# Patient Record
Sex: MALE | Race: WHITE | Employment: STUDENT | URBAN - NONMETROPOLITAN AREA
[De-identification: names, ages, dates, MRNs, and addresses within clinical notes are randomized per-mention and may not be internally consistent; named-entity substitution may affect disease eponyms.]

---
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
## 2019-04-23 ENCOUNTER — HOSPITAL ENCOUNTER (EMERGENCY)
Age: 19
Discharge: HOME | End: 2019-04-23
Attending: EMERGENCY MEDICINE
Payer: COMMERCIAL

## 2019-04-23 VITALS
TEMPERATURE: 98.5 F | SYSTOLIC BLOOD PRESSURE: 152 MMHG | BODY MASS INDEX: 21.67 KG/M2 | WEIGHT: 160 LBS | HEIGHT: 72 IN | HEART RATE: 61 BPM | RESPIRATION RATE: 16 BRPM | DIASTOLIC BLOOD PRESSURE: 79 MMHG | OXYGEN SATURATION: 99 %

## 2019-04-23 DIAGNOSIS — L03.011 CELLULITIS OF RIGHT MIDDLE FINGER: Primary | ICD-10-CM

## 2019-04-23 PROCEDURE — 99213 OFFICE O/P EST LOW 20 MIN: CPT

## 2019-04-23 PROCEDURE — 99213 OFFICE O/P EST LOW 20 MIN: CPT | Performed by: EMERGENCY MEDICINE

## 2019-04-23 RX ORDER — CEPHALEXIN 500 MG/1
500 CAPSULE ORAL 4 TIMES DAILY
Qty: 40 CAPSULE | Refills: 0 | Status: SHIPPED | OUTPATIENT
Start: 2019-04-23 | End: 2019-05-03

## 2019-04-23 ASSESSMENT — ENCOUNTER SYMPTOMS
SORE THROAT: 0
VOMITING: 0
SINUS PRESSURE: 0
ABDOMINAL PAIN: 0
TROUBLE SWALLOWING: 0
BACK PAIN: 0
WHEEZING: 0
STRIDOR: 0
EYE REDNESS: 0
DIARRHEA: 0
COUGH: 0
NAUSEA: 0
EYE DISCHARGE: 0
EYE PAIN: 0
SHORTNESS OF BREATH: 0
VOICE CHANGE: 0

## 2019-04-23 NOTE — ED PROVIDER NOTES
[address] [facility] Encounter      [address] [facility]       Chief Complaint   Patient presents with    Nail Problem       Nurses Notes reviewed and I agree except as noted in the HPI. HISTORY OF PRESENT ILLNESS   Soni Alvarez is a 22 y.o. male who presents with 5 day history of increasingly severe pain, redness, swelling distal aspect of right middle finger. Denies pain at this time. Started as hang nail, now has infection. He noticed purulent drainage this morning adjacent to The nail plate. No fever, vomiting, motor sensory deficits. No previous history of diabetes or MRSA. Right-hand dominant. Nonsmoker    REVIEW OF SYSTEMS     Review of Systems   Constitutional: Negative for appetite change, chills, fatigue, fever and unexpected weight change. HENT: Negative for congestion, ear discharge, ear pain, sinus pressure, sneezing, sore throat, trouble swallowing and voice change. Eyes: Negative for pain, discharge and redness. Respiratory: Negative for cough, shortness of breath, wheezing and stridor. Cardiovascular: Negative for chest pain and leg swelling. Gastrointestinal: Negative for abdominal pain, diarrhea, nausea and vomiting. Genitourinary: Negative for dysuria, frequency, hematuria and urgency. Musculoskeletal: Negative for arthralgias, back pain, myalgias and neck pain. Skin: Negative for rash. Pain Redness swelling distal right middle finger   Neurological: Negative for dizziness, syncope, weakness and headaches. Hematological: Negative for adenopathy. Psychiatric/Behavioral: Negative for behavioral problems, confusion, sleep disturbance and suicidal ideas. The patient is not nervous/anxious. All other systems reviewed and are negative. PAST MEDICAL HISTORY   History reviewed. No pertinent past medical history. SURGICAL HISTORY     Patient  has a past surgical history that includes Foot surgery.     CURRENT MEDICATIONS       Discharge Medication List as of 4/23/2019  7:23 PM      CONTINUE these medications which have NOT CHANGED    Details   ibuprofen (IBU) 400 MG tablet Take 1 tablet by mouth every 6 hours as needed for Pain or Fever, Disp-20 tablet, R-0Normal             ALLERGIES     Patient is has No Known Allergies. FAMILY HISTORY     Patient'sfamily history is not on file. SOCIAL HISTORY     Patient  reports that he has never smoked. He has never used smokeless tobacco. He reports that he does not drink alcohol or use drugs. PHYSICAL EXAM     ED TRIAGE VITALS  BP: (!) 152/79, Temp: 98.5 °F (36.9 °C), Heart Rate: 61, Resp: 16, SpO2: 99 %  Physical Exam   Constitutional: He is oriented to person, place, and time. He appears well-developed and well-nourished. No distress. Moist membranes, normal airway   HENT:   Head: Normocephalic and atraumatic. Right Ear: External ear normal.   Left Ear: External ear normal.   Nose: Nose normal.   Mouth/Throat: Oropharynx is clear and moist. No trismus in the jaw. No uvula swelling. No oropharyngeal exudate, posterior oropharyngeal edema, posterior oropharyngeal erythema or tonsillar abscesses. Eyes: Pupils are equal, round, and reactive to light. Conjunctivae and EOM are normal. Right eye exhibits no discharge. Left eye exhibits no discharge. No scleral icterus. Neck: Normal range of motion. No JVD present. No thyromegaly present. No meningismus   Cardiovascular: Normal rate, regular rhythm, S1 normal, S2 normal, normal heart sounds, intact distal pulses and normal pulses. Exam reveals no gallop and no friction rub. No murmur heard. Pulmonary/Chest: Effort normal and breath sounds normal. No stridor. No respiratory distress. He has no wheezes. He has no rales. He exhibits no tenderness. No Cough, lungs clear   Abdominal: Soft. Bowel sounds are normal. He exhibits no distension and no mass. There is no tenderness. There is no rebound and no guarding.    Soft nontender Musculoskeletal: Normal range of motion. He exhibits no edema. Right hand: He exhibits tenderness and swelling. He exhibits normal range of motion, no bony tenderness and no deformity. Hands:  Lymphadenopathy:     He has no cervical adenopathy. Right cervical: No superficial cervical and no deep cervical adenopathy present. Left cervical: No superficial cervical and no deep cervical adenopathy present. Neurological: He is alert and oriented to person, place, and time. He has normal reflexes. He displays normal reflexes. No cranial nerve deficit. He exhibits normal muscle tone. Coordination normal.   Appropriate, distal neurovascular intact right upper extremity   Skin: Skin is warm and dry. No rash noted. He is not diaphoretic. There is erythema. No felon or abscess. no septic joint or tenosynovitis. no osteomyelitis. All c/w with cellulitis   Psychiatric: He has a normal mood and affect. His behavior is normal. Judgment and thought content normal.   Nursing note and vitals reviewed. DIAGNOSTIC RESULTS   Labs: No results found for this visit on 04/23/19. IMAGING:  No orders to display     URGENT CARE COURSE:     Vitals:    04/23/19 1846   BP: (!) 152/79   Pulse: 61   Resp: 16   Temp: 98.5 °F (36.9 °C)   TempSrc: Temporal   SpO2: 99%   Weight: 160 lb (72.6 kg)   Height: 6' (1.829 m)       Medications - No data to display  PROCEDURES:  None  FINALIMPRESSION      1. Cellulitis of right middle finger        DISPOSITION/PLAN   DISPOSITION    Nontoxic, well-hydrated, normal airway. No sepsis or CNS infection. Patient has localized cellulitis of the distal aspect right middle finger without evidence of abscess, felon, tenosynovitis, septic joint, osteomyelitis. Will treat with cephalexin, warm soaks followed by Bactroban ointment, Tylenol, Motrin, increased oral clear liquids.   Patient to recheck with PCP in 6 days if problems persist, and he understands to go to ED if worse.    PATIENT REFERRED TO:  Sherrilyn Bosworth Dr.  [address] [facility]  [phone]    Schedule an appointment as soon as possible for a visit in 6 days  Recheck if problems persist, go to emergency if worse    DISCHARGE MEDICATIONS:  Discharge Medication List as of 4/23/2019  7:23 PM      START taking these medications    Details   cephALEXin (KEFLEX) 500 MG capsule Take 1 capsule by mouth 4 times daily for 40 doses, Disp-40 capsule, R-0Print      mupirocin (BACTROBAN) 2 % ointment Apply topically 3 times daily.   Apply after warm soaks, Disp-22 g, R-0, Print           Discharge Medication List as of 4/23/2019  7:23 PM          MD Brian Huynh MD  04/23/19 Kelley Boss

## 2023-10-19 ENCOUNTER — HOSPITAL ENCOUNTER (INPATIENT)
Facility: HOSPITAL | Age: 23
LOS: 2 days | Discharge: OTHER INSTITUTION | DRG: 424 | End: 2023-10-21
Attending: STUDENT IN AN ORGANIZED HEALTH CARE EDUCATION/TRAINING PROGRAM | Admitting: INTERNAL MEDICINE
Payer: COMMERCIAL

## 2023-10-19 ENCOUNTER — APPOINTMENT (OUTPATIENT)
Dept: RADIOLOGY | Facility: HOSPITAL | Age: 23
DRG: 424 | End: 2023-10-19
Payer: COMMERCIAL

## 2023-10-19 DIAGNOSIS — C25.9 METASTASIS FROM PANCREATIC CANCER (MULTI): Primary | ICD-10-CM

## 2023-10-19 DIAGNOSIS — C79.9 METASTASIS FROM PANCREATIC CANCER (MULTI): Primary | ICD-10-CM

## 2023-10-19 LAB
ALBUMIN SERPL BCP-MCNC: 4 G/DL (ref 3.4–5)
ALP SERPL-CCNC: 58 U/L (ref 33–120)
ALT SERPL W P-5'-P-CCNC: 14 U/L (ref 10–52)
ANION GAP SERPL CALC-SCNC: 17 MMOL/L (ref 10–20)
AST SERPL W P-5'-P-CCNC: 38 U/L (ref 9–39)
BASOPHILS # BLD AUTO: 0.03 X10*3/UL (ref 0–0.1)
BASOPHILS NFR BLD AUTO: 0.4 %
BILIRUB SERPL-MCNC: 0.5 MG/DL (ref 0–1.2)
BUN SERPL-MCNC: 12 MG/DL (ref 6–23)
CALCIUM SERPL-MCNC: 8.9 MG/DL (ref 8.6–10.3)
CHLORIDE SERPL-SCNC: 96 MMOL/L (ref 98–107)
CO2 SERPL-SCNC: 29 MMOL/L (ref 21–32)
CREAT SERPL-MCNC: 1.19 MG/DL (ref 0.5–1.3)
CREAT SERPL-MCNC: 1.24 MG/DL (ref 0.5–1.3)
EOSINOPHIL # BLD AUTO: 0.04 X10*3/UL (ref 0–0.7)
EOSINOPHIL NFR BLD AUTO: 0.6 %
ERYTHROCYTE [DISTWIDTH] IN BLOOD BY AUTOMATED COUNT: 12.8 % (ref 11.5–14.5)
GFR SERPL CREATININE-BSD FRML MDRD: 83 ML/MIN/1.73M*2
GFR SERPL CREATININE-BSD FRML MDRD: 87 ML/MIN/1.73M*2
GLUCOSE SERPL-MCNC: 87 MG/DL (ref 74–99)
HCT VFR BLD AUTO: 39.8 % (ref 41–52)
HETEROPH AB SERPLBLD QL IA.RAPID: NEGATIVE
HGB BLD-MCNC: 13.7 G/DL (ref 13.5–17.5)
IMM GRANULOCYTES # BLD AUTO: 0.02 X10*3/UL (ref 0–0.7)
IMM GRANULOCYTES NFR BLD AUTO: 0.3 % (ref 0–0.9)
LYMPHOCYTES # BLD AUTO: 1.15 X10*3/UL (ref 1.2–4.8)
LYMPHOCYTES NFR BLD AUTO: 16.4 %
MCH RBC QN AUTO: 29.5 PG (ref 26–34)
MCHC RBC AUTO-ENTMCNC: 34.4 G/DL (ref 32–36)
MCV RBC AUTO: 86 FL (ref 80–100)
MONOCYTES # BLD AUTO: 0.48 X10*3/UL (ref 0.1–1)
MONOCYTES NFR BLD AUTO: 6.9 %
NEUTROPHILS # BLD AUTO: 5.28 X10*3/UL (ref 1.2–7.7)
NEUTROPHILS NFR BLD AUTO: 75.4 %
NRBC BLD-RTO: 0 /100 WBCS (ref 0–0)
PLATELET # BLD AUTO: 331 X10*3/UL (ref 150–450)
PMV BLD AUTO: 9.6 FL (ref 7.5–11.5)
POTASSIUM SERPL-SCNC: 4.1 MMOL/L (ref 3.5–5.3)
PROT SERPL-MCNC: 6.4 G/DL (ref 6.4–8.2)
RBC # BLD AUTO: 4.64 X10*6/UL (ref 4.5–5.9)
S PYO DNA THROAT QL NAA+PROBE: NOT DETECTED
SODIUM SERPL-SCNC: 138 MMOL/L (ref 136–145)
WBC # BLD AUTO: 7 X10*3/UL (ref 4.4–11.3)

## 2023-10-19 PROCEDURE — 82378 CARCINOEMBRYONIC ANTIGEN: CPT | Mod: CMCLAB,GEALAB | Performed by: STUDENT IN AN ORGANIZED HEALTH CARE EDUCATION/TRAINING PROGRAM

## 2023-10-19 PROCEDURE — 86308 HETEROPHILE ANTIBODY SCREEN: CPT | Performed by: PHYSICIAN ASSISTANT

## 2023-10-19 PROCEDURE — 85025 COMPLETE CBC W/AUTO DIFF WBC: CPT | Performed by: PHYSICIAN ASSISTANT

## 2023-10-19 PROCEDURE — 86301 IMMUNOASSAY TUMOR CA 19-9: CPT | Mod: CMCLAB,GEALAB | Performed by: STUDENT IN AN ORGANIZED HEALTH CARE EDUCATION/TRAINING PROGRAM

## 2023-10-19 PROCEDURE — 82105 ALPHA-FETOPROTEIN SERUM: CPT | Mod: CMCLAB,GEALAB | Performed by: STUDENT IN AN ORGANIZED HEALTH CARE EDUCATION/TRAINING PROGRAM

## 2023-10-19 PROCEDURE — 96365 THER/PROPH/DIAG IV INF INIT: CPT | Mod: 59

## 2023-10-19 PROCEDURE — 87651 STREP A DNA AMP PROBE: CPT | Performed by: PHYSICIAN ASSISTANT

## 2023-10-19 PROCEDURE — 2500000004 HC RX 250 GENERAL PHARMACY W/ HCPCS (ALT 636 FOR OP/ED): Performed by: PHYSICIAN ASSISTANT

## 2023-10-19 PROCEDURE — 2550000001 HC RX 255 CONTRASTS: Performed by: PHYSICIAN ASSISTANT

## 2023-10-19 PROCEDURE — 1100000001 HC PRIVATE ROOM DAILY

## 2023-10-19 PROCEDURE — 96375 TX/PRO/DX INJ NEW DRUG ADDON: CPT

## 2023-10-19 PROCEDURE — 36415 COLL VENOUS BLD VENIPUNCTURE: CPT | Performed by: PHYSICIAN ASSISTANT

## 2023-10-19 PROCEDURE — 70491 CT SOFT TISSUE NECK W/DYE: CPT | Mod: MG

## 2023-10-19 PROCEDURE — 99285 EMERGENCY DEPT VISIT HI MDM: CPT | Performed by: STUDENT IN AN ORGANIZED HEALTH CARE EDUCATION/TRAINING PROGRAM

## 2023-10-19 PROCEDURE — 96372 THER/PROPH/DIAG INJ SC/IM: CPT

## 2023-10-19 PROCEDURE — 2500000001 HC RX 250 WO HCPCS SELF ADMINISTERED DRUGS (ALT 637 FOR MEDICARE OP)

## 2023-10-19 PROCEDURE — 74177 CT ABD & PELVIS W/CONTRAST: CPT | Mod: FOREIGN READ | Performed by: RADIOLOGY

## 2023-10-19 PROCEDURE — 74177 CT ABD & PELVIS W/CONTRAST: CPT | Mod: FR,ME

## 2023-10-19 PROCEDURE — 82565 ASSAY OF CREATININE: CPT | Performed by: STUDENT IN AN ORGANIZED HEALTH CARE EDUCATION/TRAINING PROGRAM

## 2023-10-19 PROCEDURE — 36415 COLL VENOUS BLD VENIPUNCTURE: CPT | Performed by: STUDENT IN AN ORGANIZED HEALTH CARE EDUCATION/TRAINING PROGRAM

## 2023-10-19 PROCEDURE — 80053 COMPREHEN METABOLIC PANEL: CPT | Performed by: PHYSICIAN ASSISTANT

## 2023-10-19 PROCEDURE — 2500000004 HC RX 250 GENERAL PHARMACY W/ HCPCS (ALT 636 FOR OP/ED)

## 2023-10-19 PROCEDURE — 70491 CT SOFT TISSUE NECK W/DYE: CPT | Performed by: STUDENT IN AN ORGANIZED HEALTH CARE EDUCATION/TRAINING PROGRAM

## 2023-10-19 RX ORDER — ACETAMINOPHEN 325 MG/1
650 TABLET ORAL EVERY 4 HOURS PRN
Status: DISCONTINUED | OUTPATIENT
Start: 2023-10-19 | End: 2023-10-20

## 2023-10-19 RX ORDER — ENOXAPARIN SODIUM 100 MG/ML
40 INJECTION SUBCUTANEOUS EVERY 24 HOURS
Status: DISCONTINUED | OUTPATIENT
Start: 2023-10-19 | End: 2023-10-21 | Stop reason: HOSPADM

## 2023-10-19 RX ORDER — AMOXICILLIN 250 MG
2 CAPSULE ORAL 2 TIMES DAILY
Status: DISCONTINUED | OUTPATIENT
Start: 2023-10-19 | End: 2023-10-21 | Stop reason: HOSPADM

## 2023-10-19 RX ORDER — DEXAMETHASONE SODIUM PHOSPHATE 10 MG/ML
10 INJECTION INTRAMUSCULAR; INTRAVENOUS ONCE
Status: COMPLETED | OUTPATIENT
Start: 2023-10-19 | End: 2023-10-19

## 2023-10-19 RX ORDER — OXYCODONE AND ACETAMINOPHEN 5; 325 MG/1; MG/1
2 TABLET ORAL EVERY 6 HOURS PRN
Status: DISCONTINUED | OUTPATIENT
Start: 2023-10-19 | End: 2023-10-20

## 2023-10-19 RX ORDER — KETOROLAC TROMETHAMINE 15 MG/ML
15 INJECTION, SOLUTION INTRAMUSCULAR; INTRAVENOUS ONCE
Status: COMPLETED | OUTPATIENT
Start: 2023-10-19 | End: 2023-10-19

## 2023-10-19 RX ORDER — OXYCODONE AND ACETAMINOPHEN 5; 325 MG/1; MG/1
1 TABLET ORAL EVERY 6 HOURS PRN
Status: DISCONTINUED | OUTPATIENT
Start: 2023-10-19 | End: 2023-10-20

## 2023-10-19 RX ADMIN — KETOROLAC TROMETHAMINE 15 MG: 15 INJECTION INTRAMUSCULAR; INTRAVENOUS at 17:09

## 2023-10-19 RX ADMIN — AMPICILLIN SODIUM AND SULBACTAM SODIUM 3 G: 2; 1 INJECTION, POWDER, FOR SOLUTION INTRAMUSCULAR; INTRAVENOUS at 17:41

## 2023-10-19 RX ADMIN — ENOXAPARIN SODIUM 40 MG: 40 INJECTION SUBCUTANEOUS at 23:04

## 2023-10-19 RX ADMIN — IOHEXOL 125 ML: 350 INJECTION, SOLUTION INTRAVENOUS at 18:58

## 2023-10-19 RX ADMIN — DEXAMETHASONE SODIUM PHOSPHATE 10 MG: 10 INJECTION, SOLUTION INTRAMUSCULAR; INTRAVENOUS at 17:09

## 2023-10-19 RX ADMIN — SODIUM CHLORIDE 1000 ML: 9 INJECTION, SOLUTION INTRAVENOUS at 17:13

## 2023-10-19 RX ADMIN — SENNOSIDES AND DOCUSATE SODIUM 2 TABLET: 8.6; 5 TABLET ORAL at 23:04

## 2023-10-19 RX ADMIN — AMPICILLIN SODIUM AND SULBACTAM SODIUM 3 G: 2; 1 INJECTION, POWDER, FOR SOLUTION INTRAMUSCULAR; INTRAVENOUS at 23:14

## 2023-10-19 SDOH — SOCIAL STABILITY: SOCIAL INSECURITY: ARE YOU OR HAVE YOU BEEN THREATENED OR ABUSED PHYSICALLY, EMOTIONALLY, OR SEXUALLY BY ANYONE?: NO

## 2023-10-19 SDOH — SOCIAL STABILITY: SOCIAL INSECURITY: DO YOU FEEL UNSAFE GOING BACK TO THE PLACE WHERE YOU ARE LIVING?: NO

## 2023-10-19 SDOH — SOCIAL STABILITY: SOCIAL INSECURITY: ARE THERE ANY APPARENT SIGNS OF INJURIES/BEHAVIORS THAT COULD BE RELATED TO ABUSE/NEGLECT?: NO

## 2023-10-19 SDOH — SOCIAL STABILITY: SOCIAL INSECURITY: HAVE YOU HAD THOUGHTS OF HARMING ANYONE ELSE?: NO

## 2023-10-19 SDOH — SOCIAL STABILITY: SOCIAL INSECURITY: ABUSE: ADULT

## 2023-10-19 SDOH — SOCIAL STABILITY: SOCIAL INSECURITY: HAS ANYONE EVER THREATENED TO HURT YOUR FAMILY OR YOUR PETS?: NO

## 2023-10-19 SDOH — SOCIAL STABILITY: SOCIAL INSECURITY: DO YOU FEEL ANYONE HAS EXPLOITED OR TAKEN ADVANTAGE OF YOU FINANCIALLY OR OF YOUR PERSONAL PROPERTY?: NO

## 2023-10-19 SDOH — SOCIAL STABILITY: SOCIAL INSECURITY: WERE YOU ABLE TO COMPLETE ALL THE BEHAVIORAL HEALTH SCREENINGS?: YES

## 2023-10-19 SDOH — SOCIAL STABILITY: SOCIAL INSECURITY: DOES ANYONE TRY TO KEEP YOU FROM HAVING/CONTACTING OTHER FRIENDS OR DOING THINGS OUTSIDE YOUR HOME?: NO

## 2023-10-19 ASSESSMENT — ACTIVITIES OF DAILY LIVING (ADL)
GROOMING: INDEPENDENT
TOILETING: INDEPENDENT
LACK_OF_TRANSPORTATION: NO
HEARING - RIGHT EAR: FUNCTIONAL
WALKS IN HOME: INDEPENDENT
JUDGMENT_ADEQUATE_SAFELY_COMPLETE_DAILY_ACTIVITIES: YES
JUDGMENT_ADEQUATE_SAFELY_COMPLETE_DAILY_ACTIVITIES: YES
LACK_OF_TRANSPORTATION: NO
HEARING - LEFT EAR: FUNCTIONAL
PATIENT'S MEMORY ADEQUATE TO SAFELY COMPLETE DAILY ACTIVITIES?: YES
BATHING: INDEPENDENT
HEARING - LEFT EAR: FUNCTIONAL
FEEDING YOURSELF: INDEPENDENT
ADEQUATE_TO_COMPLETE_ADL: YES
TOILETING: INDEPENDENT
DRESSING YOURSELF: INDEPENDENT
BATHING: INDEPENDENT
PATIENT'S MEMORY ADEQUATE TO SAFELY COMPLETE DAILY ACTIVITIES?: YES
ADEQUATE_TO_COMPLETE_ADL: YES
HEARING - RIGHT EAR: FUNCTIONAL
FEEDING YOURSELF: INDEPENDENT
DRESSING YOURSELF: INDEPENDENT
WALKS IN HOME: INDEPENDENT

## 2023-10-19 ASSESSMENT — PAIN - FUNCTIONAL ASSESSMENT
PAIN_FUNCTIONAL_ASSESSMENT: 0-10
PAIN_FUNCTIONAL_ASSESSMENT: 0-10

## 2023-10-19 ASSESSMENT — PATIENT HEALTH QUESTIONNAIRE - PHQ9
2. FEELING DOWN, DEPRESSED OR HOPELESS: NOT AT ALL
SUM OF ALL RESPONSES TO PHQ9 QUESTIONS 1 & 2: 1
1. LITTLE INTEREST OR PLEASURE IN DOING THINGS: SEVERAL DAYS

## 2023-10-19 ASSESSMENT — COGNITIVE AND FUNCTIONAL STATUS - GENERAL
MOBILITY SCORE: 24
DAILY ACTIVITIY SCORE: 24
PATIENT BASELINE BEDBOUND: NO

## 2023-10-19 ASSESSMENT — LIFESTYLE VARIABLES
HAVE PEOPLE ANNOYED YOU BY CRITICIZING YOUR DRINKING: NO
HOW OFTEN DO YOU HAVE 6 OR MORE DRINKS ON ONE OCCASION: NEVER
SKIP TO QUESTIONS 9-10: 1
HOW MANY STANDARD DRINKS CONTAINING ALCOHOL DO YOU HAVE ON A TYPICAL DAY: PATIENT DOES NOT DRINK
EVER HAD A DRINK FIRST THING IN THE MORNING TO STEADY YOUR NERVES TO GET RID OF A HANGOVER: NO
HAVE YOU EVER FELT YOU SHOULD CUT DOWN ON YOUR DRINKING: NO
AUDIT-C TOTAL SCORE: 0
AUDIT-C TOTAL SCORE: 0
HOW OFTEN DO YOU HAVE A DRINK CONTAINING ALCOHOL: NEVER
EVER FELT BAD OR GUILTY ABOUT YOUR DRINKING: NO
REASON UNABLE TO ASSESS: NO

## 2023-10-19 ASSESSMENT — PAIN SCALES - GENERAL
PAINLEVEL_OUTOF10: 3
PAINLEVEL_OUTOF10: 0 - NO PAIN

## 2023-10-19 ASSESSMENT — COLUMBIA-SUICIDE SEVERITY RATING SCALE - C-SSRS
6. HAVE YOU EVER DONE ANYTHING, STARTED TO DO ANYTHING, OR PREPARED TO DO ANYTHING TO END YOUR LIFE?: NO
1. IN THE PAST MONTH, HAVE YOU WISHED YOU WERE DEAD OR WISHED YOU COULD GO TO SLEEP AND NOT WAKE UP?: NO
2. HAVE YOU ACTUALLY HAD ANY THOUGHTS OF KILLING YOURSELF?: NO

## 2023-10-19 ASSESSMENT — PAIN DESCRIPTION - LOCATION: LOCATION: ABDOMEN

## 2023-10-19 NOTE — ED NOTES
Patient sent in by urgent care for tonsil abscess. Also endorses abdominal pain x 5 days. Denies nausea/constipation. Endorses constipation. Ambulated to bed, mother bedside      Maged Fuller RN  10/19/23 [id number]

## 2023-10-19 NOTE — ED PROVIDER NOTES
HPI   Chief Complaint   Patient presents with    Sore Throat     Urgent care states he has a tonsil abscess and needs to come to ED. Also complaints of abd pain        This is a 23-year-old male who denies significant PMH presenting for evaluation of throat swelling.  Was sent in by urgent care out of concern for abscess.  Reports throat swelling and discomfort for the past 2 weeks but decided to get evaluated today because his bilateral lower quadrants of stomach began hurting today.  Mom is present with him at bedside and is concerned because patient's father passed away at age 35 of pancreatic cancer and is concerned something is wrong.  He actually denies any throat pain.  He states his voice is hoarse and frog like it resolved the swelling but he has no trismus.  Denies fevers, chills, cough, ear pain, chest pain, shortness of breath, vomiting.                          [place] Coma Scale Score: 15                  Patient History   Past Medical History:   Diagnosis Date    Colles' fracture of right radius, initial encounter for closed fracture 04/23/2015    Colles' fracture of right radius    Personal history of other mental and behavioral disorders 01/28/2014    History of attention deficit hyperactivity disorder    Personal history of other mental and behavioral disorders 05/10/2017    History of attention deficit hyperactivity disorder (ADHD)    Personal history of other specified conditions 05/03/2016    History of gynecomastia    Unspecified fracture of navicular (scaphoid) bone of left wrist, initial encounter for closed fracture 12/17/2015    Closed fracture of scaphoid bone of left wrist     Past Surgical History:   Procedure Laterality Date    OTHER SURGICAL HISTORY  10/03/2017    History Of Prior Surgery     No family history on file.  Social History     Tobacco Use    Smoking status: Not on file    Smokeless tobacco: Not on file   Substance Use Topics    Alcohol use: Not on file    Drug use: Not on  file       Physical Exam   ED Triage Vitals [10/19/23 1644]   Temp Heart Rate Resp BP   36.8 °C (98.2 °F) 60 16 (!) 155/91      SpO2 Temp Source Heart Rate Source Patient Position   98 % Skin Monitor --      BP Location FiO2 (%)     -- --       Physical Exam    Gen.: Vitals noted.  Hot potato phonation, no stridor, no trismus. Nontoxic appearing, no apparent distress.  ENT: Posterior oropharynx patent, injected, left-sided tonsillar swelling with exudates.  Uvula midline.  There is deviation of the left tonsillar pillar.  Bilateral TMs clear and intact. EACs unremarkable bilaterally. Mastoids nontender.   Neck: Supple. No meningismus.  Left anterior cervical lymphadenopathy.  Cardiac: Regular rate and rhythm. No murmur.   Lungs: Good aeration throughout. No adventitious breath sounds.   Abdomen: Soft.  Minimal tenderness to palpation in the bilateral lower quadrants.  No rebound.  No guarding.  Skin: No rash  Neuro: No focal neurologic deficits    ED Course & MDM        Medical Decision Making  This is a 23-year-old male who denies significant PMH presenting for evaluation of throat swelling.  Actually denies any pain.  Has associated abdominal pain.  Minimally if at all reproducible on exam he has no rebound or guarding.  He is ENT exam is as above.  He has no trismus but he has hot potato voice.  No stridor.  His laboratory evaluation reveals no leukocytosis, unremarkable metabolic panel, negative strep, negative mono.  We are obtaining CT neck and abdomen pelvis for further eval.  Patient was given IV normal saline, IV Decadron, IV Toradol.  Currently stable.  This visit was staffed with the attending physician Dr. Rios.    Impression: see diagnosis      Disclaimer: This note was dictated using speech recognition software. An attempt at proofreading was made to minimize errors. Minor errors in transcription may be present. Please call if questions.        Procedure  Procedures     Enoch Bauer,  DOROTA  10/19/23 9201

## 2023-10-20 ENCOUNTER — APPOINTMENT (OUTPATIENT)
Dept: RADIOLOGY | Facility: HOSPITAL | Age: 23
DRG: 424 | End: 2023-10-20
Payer: COMMERCIAL

## 2023-10-20 ENCOUNTER — ANCILLARY PROCEDURE (OUTPATIENT)
Dept: RADIOLOGY | Facility: HOSPITAL | Age: 23
DRG: 424 | End: 2023-10-20
Payer: COMMERCIAL

## 2023-10-20 ENCOUNTER — LAB REQUISITION (OUTPATIENT)
Dept: LAB | Facility: HOSPITAL | Age: 23
End: 2023-10-20
Payer: COMMERCIAL

## 2023-10-20 VITALS
OXYGEN SATURATION: 95 % | DIASTOLIC BLOOD PRESSURE: 75 MMHG | HEART RATE: 50 BPM | TEMPERATURE: 96.8 F | SYSTOLIC BLOOD PRESSURE: 134 MMHG | RESPIRATION RATE: 18 BRPM

## 2023-10-20 DIAGNOSIS — C79.9 SECONDARY MALIGNANT NEOPLASM OF UNSPECIFIED SITE (CODE) (MULTI): ICD-10-CM

## 2023-10-20 DIAGNOSIS — C25.9 METASTASIS FROM PANCREATIC CANCER (MULTI): Primary | ICD-10-CM

## 2023-10-20 DIAGNOSIS — C79.9 METASTASIS FROM PANCREATIC CANCER (MULTI): Primary | ICD-10-CM

## 2023-10-20 DIAGNOSIS — Z71.1: ICD-10-CM

## 2023-10-20 DIAGNOSIS — C25.9 MALIGNANT NEOPLASM OF PANCREAS, UNSPECIFIED (MULTI): ICD-10-CM

## 2023-10-20 LAB
AFP SERPL-MCNC: 4 NG/ML (ref 0–9)
ALBUMIN SERPL BCP-MCNC: 3.4 G/DL (ref 3.4–5)
ANION GAP SERPL CALC-SCNC: 14 MMOL/L (ref 10–20)
BUN SERPL-MCNC: 17 MG/DL (ref 6–23)
CALCIUM SERPL-MCNC: 8.7 MG/DL (ref 8.6–10.3)
CANCER AG125 SERPL-ACNC: 100.9 U/ML (ref 0–30.2)
CANCER AG19-9 SERPL-ACNC: 14.65 U/ML
CANCER AG19-9 SERPL-ACNC: 8.88 U/ML
CEA SERPL-MCNC: <0.5 UG/L
CHLORIDE SERPL-SCNC: 101 MMOL/L (ref 98–107)
CO2 SERPL-SCNC: 28 MMOL/L (ref 21–32)
CREAT SERPL-MCNC: 1.09 MG/DL (ref 0.5–1.3)
ERYTHROCYTE [DISTWIDTH] IN BLOOD BY AUTOMATED COUNT: 12.4 % (ref 11.5–14.5)
GFR SERPL CREATININE-BSD FRML MDRD: >90 ML/MIN/1.73M*2
GLUCOSE SERPL-MCNC: 100 MG/DL (ref 74–99)
HCT VFR BLD AUTO: 37.1 % (ref 41–52)
HGB BLD-MCNC: 12.4 G/DL (ref 13.5–17.5)
MAGNESIUM SERPL-MCNC: 1.99 MG/DL (ref 1.6–2.4)
MCH RBC QN AUTO: 28.8 PG (ref 26–34)
MCHC RBC AUTO-ENTMCNC: 33.4 G/DL (ref 32–36)
MCV RBC AUTO: 86 FL (ref 80–100)
NRBC BLD-RTO: 0 /100 WBCS (ref 0–0)
PHOSPHATE SERPL-MCNC: 5.2 MG/DL (ref 2.5–4.9)
PLATELET # BLD AUTO: 327 X10*3/UL (ref 150–450)
PMV BLD AUTO: 9.9 FL (ref 7.5–11.5)
POTASSIUM SERPL-SCNC: 4.8 MMOL/L (ref 3.5–5.3)
RBC # BLD AUTO: 4.3 X10*6/UL (ref 4.5–5.9)
SODIUM SERPL-SCNC: 138 MMOL/L (ref 136–145)
WBC # BLD AUTO: 6.7 X10*3/UL (ref 4.4–11.3)

## 2023-10-20 PROCEDURE — 2550000001 HC RX 255 CONTRASTS

## 2023-10-20 PROCEDURE — 86301 IMMUNOASSAY TUMOR CA 19-9: CPT | Mod: CMCLAB,GEALAB

## 2023-10-20 PROCEDURE — 99236 HOSP IP/OBS SAME DATE HI 85: CPT | Performed by: INTERNAL MEDICINE

## 2023-10-20 PROCEDURE — 96372 THER/PROPH/DIAG INJ SC/IM: CPT

## 2023-10-20 PROCEDURE — 71260 CT THORAX DX C+: CPT | Performed by: RADIOLOGY

## 2023-10-20 PROCEDURE — 88307 TISSUE EXAM BY PATHOLOGIST: CPT

## 2023-10-20 PROCEDURE — 76942 ECHO GUIDE FOR BIOPSY: CPT | Performed by: RADIOLOGY

## 2023-10-20 PROCEDURE — 36415 COLL VENOUS BLD VENIPUNCTURE: CPT

## 2023-10-20 PROCEDURE — 83735 ASSAY OF MAGNESIUM: CPT

## 2023-10-20 PROCEDURE — 86304 IMMUNOASSAY TUMOR CA 125: CPT | Mod: CMCLAB,GEALAB

## 2023-10-20 PROCEDURE — 88307 TISSUE EXAM BY PATHOLOGIST: CPT | Performed by: PATHOLOGY

## 2023-10-20 PROCEDURE — 74176 CT ABD & PELVIS W/O CONTRAST: CPT | Performed by: RADIOLOGY

## 2023-10-20 PROCEDURE — 74150 CT ABDOMEN W/O CONTRAST: CPT

## 2023-10-20 PROCEDURE — 2500000004 HC RX 250 GENERAL PHARMACY W/ HCPCS (ALT 636 FOR OP/ED)

## 2023-10-20 PROCEDURE — 2500000001 HC RX 250 WO HCPCS SELF ADMINISTERED DRUGS (ALT 637 FOR MEDICARE OP)

## 2023-10-20 PROCEDURE — 88184 FLOWCYTOMETRY/ TC 1 MARKER: CPT | Mod: TC

## 2023-10-20 PROCEDURE — 1100000001 HC PRIVATE ROOM DAILY

## 2023-10-20 PROCEDURE — 07BB3ZX EXCISION OF MESENTERIC LYMPHATIC, PERCUTANEOUS APPROACH, DIAGNOSTIC: ICD-10-PCS | Performed by: RADIOLOGY

## 2023-10-20 PROCEDURE — 76942 ECHO GUIDE FOR BIOPSY: CPT

## 2023-10-20 PROCEDURE — 2550000001 HC RX 255 CONTRASTS: Performed by: STUDENT IN AN ORGANIZED HEALTH CARE EDUCATION/TRAINING PROGRAM

## 2023-10-20 PROCEDURE — 84702 CHORIONIC GONADOTROPIN TEST: CPT | Mod: CMCLAB,GEALAB | Performed by: HOSPITALIST

## 2023-10-20 PROCEDURE — 85027 COMPLETE CBC AUTOMATED: CPT

## 2023-10-20 PROCEDURE — 99222 1ST HOSP IP/OBS MODERATE 55: CPT | Performed by: NURSE PRACTITIONER

## 2023-10-20 PROCEDURE — A9698 NON-RAD CONTRAST MATERIALNOC: HCPCS

## 2023-10-20 PROCEDURE — 49180 BIOPSY ABDOMINAL MASS: CPT | Performed by: RADIOLOGY

## 2023-10-20 PROCEDURE — 92610 EVALUATE SWALLOWING FUNCTION: CPT | Mod: GN

## 2023-10-20 PROCEDURE — 2500000004 HC RX 250 GENERAL PHARMACY W/ HCPCS (ALT 636 FOR OP/ED): Performed by: RADIOLOGY

## 2023-10-20 PROCEDURE — 2720000007 HC OR 272 NO HCPCS

## 2023-10-20 PROCEDURE — 71260 CT THORAX DX C+: CPT

## 2023-10-20 PROCEDURE — 80069 RENAL FUNCTION PANEL: CPT

## 2023-10-20 RX ORDER — FENTANYL CITRATE 50 UG/ML
INJECTION, SOLUTION INTRAMUSCULAR; INTRAVENOUS
Status: DISPENSED
Start: 2023-10-20 | End: 2023-10-21

## 2023-10-20 RX ORDER — ACETAMINOPHEN 325 MG/1
975 TABLET ORAL EVERY 8 HOURS PRN
Status: DISCONTINUED | OUTPATIENT
Start: 2023-10-20 | End: 2023-10-21 | Stop reason: HOSPADM

## 2023-10-20 RX ORDER — MIDAZOLAM HYDROCHLORIDE 1 MG/ML
INJECTION INTRAMUSCULAR; INTRAVENOUS AS NEEDED
Status: COMPLETED | OUTPATIENT
Start: 2023-10-20 | End: 2023-10-20

## 2023-10-20 RX ORDER — ACETAMINOPHEN 325 MG/1
975 TABLET ORAL EVERY 6 HOURS PRN
Status: DISCONTINUED | OUTPATIENT
Start: 2023-10-20 | End: 2023-10-20

## 2023-10-20 RX ORDER — FENTANYL CITRATE 50 UG/ML
INJECTION, SOLUTION INTRAMUSCULAR; INTRAVENOUS AS NEEDED
Status: COMPLETED | OUTPATIENT
Start: 2023-10-20 | End: 2023-10-20

## 2023-10-20 RX ORDER — MIDAZOLAM HYDROCHLORIDE 1 MG/ML
INJECTION, SOLUTION INTRAMUSCULAR; INTRAVENOUS
Status: DISPENSED
Start: 2023-10-20 | End: 2023-10-21

## 2023-10-20 RX ORDER — OXYCODONE AND ACETAMINOPHEN 5; 325 MG/1; MG/1
1 TABLET ORAL EVERY 6 HOURS PRN
Status: DISCONTINUED | OUTPATIENT
Start: 2023-10-20 | End: 2023-10-21 | Stop reason: HOSPADM

## 2023-10-20 RX ADMIN — ENOXAPARIN SODIUM 40 MG: 40 INJECTION SUBCUTANEOUS at 21:22

## 2023-10-20 RX ADMIN — FENTANYL CITRATE 100 MCG: 50 INJECTION, SOLUTION INTRAMUSCULAR; INTRAVENOUS at 14:26

## 2023-10-20 RX ADMIN — IOHEXOL 50 ML: 350 INJECTION, SOLUTION INTRAVENOUS at 18:22

## 2023-10-20 RX ADMIN — IOHEXOL 300 ML: 12 SOLUTION ORAL at 12:55

## 2023-10-20 RX ADMIN — AMPICILLIN SODIUM AND SULBACTAM SODIUM 3 G: 2; 1 INJECTION, POWDER, FOR SOLUTION INTRAMUSCULAR; INTRAVENOUS at 09:52

## 2023-10-20 RX ADMIN — MIDAZOLAM HYDROCHLORIDE 1 MG: 1 INJECTION, SOLUTION INTRAMUSCULAR; INTRAVENOUS at 14:27

## 2023-10-20 RX ADMIN — OXYCODONE HYDROCHLORIDE AND ACETAMINOPHEN 1 TABLET: 5; 325 TABLET ORAL at 20:27

## 2023-10-20 RX ADMIN — AMPICILLIN SODIUM AND SULBACTAM SODIUM 3 G: 2; 1 INJECTION, POWDER, FOR SOLUTION INTRAMUSCULAR; INTRAVENOUS at 16:46

## 2023-10-20 RX ADMIN — AMPICILLIN SODIUM AND SULBACTAM SODIUM 3 G: 2; 1 INJECTION, POWDER, FOR SOLUTION INTRAMUSCULAR; INTRAVENOUS at 21:22

## 2023-10-20 RX ADMIN — SENNOSIDES AND DOCUSATE SODIUM 2 TABLET: 8.6; 5 TABLET ORAL at 20:26

## 2023-10-20 RX ADMIN — AMPICILLIN SODIUM AND SULBACTAM SODIUM 3 G: 2; 1 INJECTION, POWDER, FOR SOLUTION INTRAMUSCULAR; INTRAVENOUS at 04:11

## 2023-10-20 ASSESSMENT — ENCOUNTER SYMPTOMS
HEMATOLOGIC/LYMPHATIC NEGATIVE: 1
DIARRHEA: 0
BLOOD IN STOOL: 0
EYES NEGATIVE: 1
ABDOMINAL PAIN: 1
NEUROLOGICAL NEGATIVE: 1
VOMITING: 0
NAUSEA: 0
CONSTIPATION: 1
RESPIRATORY NEGATIVE: 1
TROUBLE SWALLOWING: 1
CARDIOVASCULAR NEGATIVE: 1
CONSTITUTIONAL NEGATIVE: 1
MUSCULOSKELETAL NEGATIVE: 1
SORE THROAT: 1

## 2023-10-20 ASSESSMENT — COGNITIVE AND FUNCTIONAL STATUS - GENERAL
MOBILITY SCORE: 24
DAILY ACTIVITIY SCORE: 24

## 2023-10-20 ASSESSMENT — PAIN SCALES - GENERAL
PAINLEVEL_OUTOF10: 7
PAINLEVEL_OUTOF10: 5 - MODERATE PAIN

## 2023-10-20 ASSESSMENT — PAIN - FUNCTIONAL ASSESSMENT: PAIN_FUNCTIONAL_ASSESSMENT: 0-10

## 2023-10-20 NOTE — PROGRESS NOTES
Pharmacy Medication History Review    Shilo Thakkar is a 23 y.o. male admitted for No Principal Problem: There is no principal problem currently on the Problem List. Please update the Problem List and refresh.. Pharmacy reviewed the patient's [id number] medications and allergies for accuracy.    The list below reflectives the updated PTA list. Please review each medication in order reconciliation for additional clarification and justification.  (Not in a hospital admission)       The list below reflectives the updated allergy list. Please review each documented allergy for additional clarification and justification.  Allergies  Reviewed by Maged Fuller RN on 10/19/2023   No Known Allergies         Below are additional concerns with the patient's PTA list.      Mirlande Her CPhT

## 2023-10-20 NOTE — HOSPITAL COURSE
Shilo Thakkar is a 23 y.o. male presenting for abdominal pain and dysphagia with a neck oropharyngeal lesion. He presented following a 1 week hx of diminished oral intake, crampy abdominal pain with weight loss, and a L neck mass from the urgent care (reportedly concerned for peritonsillar abscess). Workup in ED was concerning for metastatic pancreatic adenocarcinoma vs. lymphoma, of note pt has a family history significant for multiple direct male relatives with early onset cancer (father  age 35 from pancreatic adenocarcinoma). In the ED pt was HDS, afebrile, CBC and CMP were unremarkable, CT of neck showed a lesion in L oropharynx concerning for neoplasm but otherwise was nonspecific and with nonspecific enlargement of L cervical lymph nodes. CT A/P showed a mass in the body of the pancreas concerning for pancreatic cancer per radiologic read with associated carcinomatosis, fluid in abdomen and pelvis, and bilateral pleural effusions. Pt remained stable and without pain during hospitalization. Pt was treated with Unasyn in the event the neck mass represented an infectious abscess. CEA and AFP were negative. CA 19-9 and  were ordered and pending. IR was consulted and favored a lymphoma as primary etiology rather than pancreatic origin and completed an ultrasound guided bx of the mesentery on 10/20. GI was consulted but deferred tissue sampling to IR. Oncology was consulted but at time of discharge had not evaluated the patient. Patient was accepted for transfer to [facility] for further work up, including PET scan.

## 2023-10-20 NOTE — DISCHARGE INSTRUCTIONS
Dear Shilo,    It was a pleasure to take care of you during your hospitalization at [facility]. You were admitted after complaining of crampy abdominal pain in addition to a left neck mass. In the ED your blood work was normal. CT imaging that was completed of your neck suggested this mass could represent malignancy and there was general enlargement of lymph nodes in your neck. CT scans of abdomen showed a mass in the body of the pancreas that was concerning for cancer. There was also evidence of lesions on the mesentery or the lining of the abdominal cavity. You had small amounts of fluid in your lungs that may be associated with malignancy. Additional blood work to look for tumor markers was completed while here and the results are pending. You underwent a biopsy procedure to the mesentery with interventional radiology to make a diagnosis. You were accepted for transfer to [facility] where they will continue to work up these results. We wish you the best.

## 2023-10-20 NOTE — PROGRESS NOTES
"Speech-Language Pathology    Inpatient Speech-Language Pathology Clinical Swallow Evaluation       Patient Name: Shilo Thakkar  MRN: [id number]  : 2000  Today's Date: 10/20/23  Time Calculation  Start Time: 1515  Stop Time: 1540  Time Calculation (min): 25 min         Recommendations:      Solid Diet Recommendations : Regular (IDDSI Level 7)   Liquid Diet Recommendations: Thin (IDDSI Level 0)         Medication Administration Recommendations: Whole, With Liquid      Assessment:    Pt's vocal quality was somewhat distorted by tonsillar mass.   Consistencies Trialed: Consistencies Trialed: Thin (IDDSI Level 0) - Cup   Oral Motor: Within Functional Limits      Dentition: Adequate/Natural   Assessment Results: Oral and pharyngeal phases of the swallow were within normal limits. Pt drank 3 ounces of water consecutively without evidence of difficulty. Good oral bolus control, timely swallows, no coughing/ choking/ throat clearing/ change in vocal quality after swallows.       Medical Staff Made Aware: Yes     Baseline Assessment:      Patient Positioning: Upright in Bed     Plan:  SLP Plan: No skilled SLP         Discussed POC: Patient       Patient/Caregiver Agreeable: Yes   SLP Discharge Recommendations: unable to determine at this time, refer to subsequent notes    Subjective   \"I don't really have difficulty unless I eat solid foods. If I chew well there's no problem.\"      General Visit Information:  Pt. Admitted  10/19/24  Past medical history: 23 year old male admitted with abdominal pain, dysphaiga. Imagining with for oropharyngeal mass, pancreatic body mass and multiple masses in the mesentary. Concern for metastatic pancreatic cancer vs Lymphoma. Plan for transfer to [facility].             Ordering Physician: Rodri Saldana MD     Reason for Referral: Concern for dysphagia      Current Diet : Regular consistency foods with thin liquids         Education:  Learner patient;    Barriers to Learning none   Method " demonstration; verbal   Education - Topic ST provided patient education regarding role of ST, purpose of assessment, clinical impressions, goals of treatment, and plan of care. Patient verbalized full comprehension, consistent with cognitive status   Outcome    Verbalized understanding and agreement

## 2023-10-20 NOTE — CONSULTS
"Reason For Consult  CT findings concerning for metastatic pancreatic cancer.   History Of Present Illness  Shilo Thakkar is a 23 y.o. male presenting with complaints of abdominal pain. Pt reports that he was feeling like himself until yesterday evening. He reports acute onset of diffuse pain which was severe. He tried heat packs and OTC pain medication without any improvement. Was initially seen in urgent care and noted a peritonsillar mass and directed to come into the ED. Reports that he has had mild intermittent abdominal cramping for some time. He reports difficulty swallowing. Noteably his father and grandfather had early onset of pancreatic cancer. No fevers, chills, jaundice.      Past Medical History  He has a past medical history of Colles' fracture of right radius, initial encounter for closed fracture (04/23/2015), Personal history of other mental and behavioral disorders (01/28/2014), Personal history of other mental and behavioral disorders (05/10/2017), Personal history of other specified conditions (05/03/2016), and Unspecified fracture of navicular (scaphoid) bone of left wrist, initial encounter for closed fracture (12/17/2015).    Surgical History  He has a past surgical history that includes Other surgical history (10/03/2017).     Social History  He reports that he has never smoked. He has never used smokeless tobacco. No history on file for alcohol use and drug use.    Family History  No family history on file.     Allergies  Patient has no known allergies.    Review of Systems  A12 point ROS negative, pertinent positives noted in HPI       Physical Exam  /69 (BP Location: Right arm, Patient Position: Lying)   Pulse 52   Temp 37 °C (98.6 °F) (Temporal)   Resp 18   Ht 1.854 m (6' 0.99\")   Wt 72.2 kg (159 lb 2.8 oz)   SpO2 96%   BMI 21.00 kg/m²   General: Alert and awake, NAD, No acute distress  HENT: normocephalic, PERRL, anicteric  Skin: no jaundice, intact   RESP: Nonlabored on " "RA  CARD: RRR  GI: soft, NT, ND, no ascites  Extremities: no edema  Neuro: A&Ox3, no asterixis  Psych: Calm and cooperative      Last Recorded Vitals  Blood pressure 136/69, pulse 52, temperature 37 °C (98.6 °F), temperature source Temporal, resp. rate 18, height 1.854 m (6' 0.99\"), weight 72.2 kg (159 lb 2.8 oz), SpO2 96 %.    Relevant Results  Scheduled medications  ampicillin-sulbactam, 3 g, intravenous, q6h  enoxaparin, 40 mg, subcutaneous, q24h  fentaNYL PF, , ,   midazolam, , ,   sennosides-docusate sodium, 2 tablet, oral, BID      Continuous medications     PRN medications  PRN medications: acetaminophen, fentaNYL PF, midazolam, oxyCODONE-acetaminophen    Results for orders placed or performed during the hospital encounter of 10/19/23 (from the past 96 hour(s))   CBC and Auto Differential   Result Value Ref Range    WBC 7.0 4.4 - 11.3 x10*3/uL    nRBC 0.0 0.0 - 0.0 /100 WBCs    RBC 4.64 4.50 - 5.90 x10*6/uL    Hemoglobin 13.7 13.5 - 17.5 g/dL    Hematocrit 39.8 (L) 41.0 - 52.0 %    MCV 86 80 - 100 fL    MCH 29.5 26.0 - 34.0 pg    MCHC 34.4 32.0 - 36.0 g/dL    RDW 12.8 11.5 - 14.5 %    Platelets 331 150 - 450 x10*3/uL    MPV 9.6 7.5 - 11.5 fL    Neutrophils % 75.4 40.0 - 80.0 %    Immature Granulocytes %, Automated 0.3 0.0 - 0.9 %    Lymphocytes % 16.4 13.0 - 44.0 %    Monocytes % 6.9 2.0 - 10.0 %    Eosinophils % 0.6 0.0 - 6.0 %    Basophils % 0.4 0.0 - 2.0 %    Neutrophils Absolute 5.28 1.20 - 7.70 x10*3/uL    Immature Granulocytes Absolute, Automated 0.02 0.00 - 0.70 x10*3/uL    Lymphocytes Absolute 1.15 (L) 1.20 - 4.80 x10*3/uL    Monocytes Absolute 0.48 0.10 - 1.00 x10*3/uL    Eosinophils Absolute 0.04 0.00 - 0.70 x10*3/uL    Basophils Absolute 0.03 0.00 - 0.10 x10*3/uL   Comprehensive metabolic panel   Result Value Ref Range    Glucose 87 74 - 99 mg/dL    Sodium 138 136 - 145 mmol/L    Potassium 4.1 3.5 - 5.3 mmol/L    Chloride 96 (L) 98 - 107 mmol/L    Bicarbonate 29 21 - 32 mmol/L    Anion Gap 17 10 - " 20 mmol/L    Urea Nitrogen 12 6 - 23 mg/dL    Creatinine 1.19 0.50 - 1.30 mg/dL    eGFR 87 >60 mL/min/1.73m*2    Calcium 8.9 8.6 - 10.3 mg/dL    Albumin 4.0 3.4 - 5.0 g/dL    Alkaline Phosphatase 58 33 - 120 U/L    Total Protein 6.4 6.4 - 8.2 g/dL    AST 38 9 - 39 U/L    Bilirubin, Total 0.5 0.0 - 1.2 mg/dL    ALT 14 10 - 52 U/L   Group A Streptococcus, PCR    Specimen: Throat/Pharynx; Swab   Result Value Ref Range    Group A Strep PCR Not Detected Not Detected   Mononucleosis screen   Result Value Ref Range    Mononucleosis Screen Negative Negative   Carcinoembryonic Antigen   Result Value Ref Range    Carcinoembryonic AG <0.5 ug/L   AFP Tumor Marker   Result Value Ref Range    Alpha-Fetoprotein 4 0 - 9 ng/mL   Creatinine, Serum   Result Value Ref Range    Creatinine 1.24 0.50 - 1.30 mg/dL    eGFR 83 >60 mL/min/1.73m*2   CBC   Result Value Ref Range    WBC 6.7 4.4 - 11.3 x10*3/uL    nRBC 0.0 0.0 - 0.0 /100 WBCs    RBC 4.30 (L) 4.50 - 5.90 x10*6/uL    Hemoglobin 12.4 (L) 13.5 - 17.5 g/dL    Hematocrit 37.1 (L) 41.0 - 52.0 %    MCV 86 80 - 100 fL    MCH 28.8 26.0 - 34.0 pg    MCHC 33.4 32.0 - 36.0 g/dL    RDW 12.4 11.5 - 14.5 %    Platelets 327 150 - 450 x10*3/uL    MPV 9.9 7.5 - 11.5 fL   Renal function panel   Result Value Ref Range    Glucose 100 (H) 74 - 99 mg/dL    Sodium 138 136 - 145 mmol/L    Potassium 4.8 3.5 - 5.3 mmol/L    Chloride 101 98 - 107 mmol/L    Bicarbonate 28 21 - 32 mmol/L    Anion Gap 14 10 - 20 mmol/L    Urea Nitrogen 17 6 - 23 mg/dL    Creatinine 1.09 0.50 - 1.30 mg/dL    eGFR >90 >60 mL/min/1.73m*2    Calcium 8.7 8.6 - 10.3 mg/dL    Phosphorus 5.2 (H) 2.5 - 4.9 mg/dL    Albumin 3.4 3.4 - 5.0 g/dL   Magnesium   Result Value Ref Range    Magnesium 1.99 1.60 - 2.40 mg/dL        CT abdomen pelvis w IV contrast    Addendum Date: 10/19/2023    This finding was discussed with and acknowledged by Dr. Machelle Rios on 10/19/2023 at 8:05 PM Signed by Gerry Abrams MD    Result Date:  10/19/2023  STUDY: CT Abdomen and Pelvis with IV Contrast; 10/19/2023 at 6:59 p.m. INDICATION: Right lower quadrant abdominal pain.  Additional History:  Strong family history of pancreatic cancer. COMPARISON: None Available. ACCESSION NUMBER(S): [id number] ORDERING CLINICIAN: GARRY PETERSEN TECHNIQUE: CT of the abdomen and pelvis was performed.  Contiguous axial images were obtained at 3 mm slice thickness through the abdomen and pelvis. Coronal and sagittal reconstructions at 3 mm slice thickness were performed.  Omnipaque 350 125 mL was administered intravenously.  FINDINGS: LOWER CHEST: No cardiomegaly.  No pericardial effusion.  There are moderate right and small left pleural effusions with associated atelectasis.  ABDOMEN:  LIVER: No hepatomegaly.  Smooth surface contour.  Normal attenuation.  BILE DUCTS: No intrahepatic or extrahepatic biliary ductal dilatation.  GALLBLADDER: The gallbladder is unremarkable. STOMACH: No abnormalities identified.  PANCREAS: There is a mass in the body the pancreas measuring approximately 2.7 x 2.9 cm. The pancreatic duct is dilated.  SPLEEN: No splenomegaly or focal splenic lesion.  ADRENAL GLANDS: No thickening or nodules.  KIDNEYS AND URETERS: Kidneys are normal in size and location.  No renal or ureteral calculi.  PELVIS:  BLADDER: No abnormalities identified.  REPRODUCTIVE ORGANS: No abnormalities identified.  BOWEL: No abnormalities identified.  VESSELS: No abnormalities identified.  Abdominal aorta is normal in caliber.  PERITONEUM/RETROPERITONEUM/LYMPH NODES: There are multiple masses in the mesentery. The largest measures approximately 7.1 x 5.4 cm. There is omental caking. No pneumoperitoneum. There is a moderate amount of free fluid throughout the abdomen and pelvis. No lymphadenopathy.  ABDOMINAL WALL: No abnormalities identified. SOFT TISSUES: No abnormalities identified.  BONES: No acute fracture or aggressive osseous lesion.    1. There is a mass in the body of  the pancreas which likely represents pancreatic cancer. There are multiple masses in the mesentery as well as omental caking. These are consistent with carcinomatosis. 2. Moderate amount of free fluid throughout the abdomen and pelvis. 3. Moderate right and small left pleural effusions. Signed by Gerry Abrams MD    CT soft tissue neck w IV contrast    Result Date: 10/19/2023  Interpreted By:  Wallace Mccall, STUDY: CT SOFT TISSUE NECK W IV CONTRAST;  10/19/2023 6:53 pm   INDICATION: Signs/Symptoms:L abscess vs mass.   COMPARISON: None.   ACCESSION NUMBER(S): [id number]   ORDERING CLINICIAN: GARRY PETERSEN   TECHNIQUE: Axial CT images of the neck were obtained.  The patient received 125 mL Omnipaque 350 intravenous contrast agent. The images were reformatted in angled axial, coronal and sagittal planes.   FINDINGS: Oral Cavity, Pharynx and Larynx:  There is a homogeneous soft tissue attenuation lesion in the left oropharynx which measures approximately 3.0 x 4.0 x 5.1 cm (coronal image 50). The airway is mildly narrowed and the adjacent parapharyngeal fat is effaced. The oral cavity, nasopharynx, hypopharynx, and larynx are unremarkable.   Retropharyngeal and Prevertebral Soft Tissues: Unremarkable.   Lymph nodes: Mildly enlarged left cervical lymph nodes, most prominently a 1.2 x 1.8 cm left level IIa lymph node (axial image 55).   Neck vessels: Bilateral neck vessels are normal in course and caliber and appear patent.   Thyroid gland: Incidental subcentimeter low-attenuation nodules in the left lobe of the thyroid, no specific imaging follow-up is recommended.   Parotid and submandibular glands: Bilateral parotid and submandibular glands are unremarkable in appearance.   Paranasal Sinuses and Mastoids: Scattered paranasal sinus mucosal thickening. The mastoid air cells are clear.   The orbits are unremarkable. The visualized intracranial contents are unremarkable.   Small pleural effusions bilaterally with  associated dependent pulmonary opacities.   Mild degenerative changes in the spine.       1. Homogeneous soft tissue attenuation lesion in the left oropharynx. This is concerning for a neoplasm but is otherwise nonspecific, recommend direct visualization and/or tissue sampling for further evaluation. 2. Enlarged left cervical lymph nodes are nonspecific, further follow-up as clinically indicated. 3. Small pleural effusions bilaterally.   MACRO: None   Signed by: Wallace Mccall 10/19/2023 7:05 PM Dictation workstation:   [id number]       Assessment/Plan     23 year old male admitted with abdominal pain, dysphaiga. Imagining with for oropharyngeal mass, pancreatic body mass and multiple masses in the mesentary. Concern for metastatic pancreatic cancer vs Lymphoma. Plan for transfer to [facility].    -Oncology consulted, appreciate input  -Unable to do bx of oropharyngeal mass on EGD  -Biopsy with IR today  -Could consider EUS with FNA is unable to get adequate tissue for biopsy  -Ca 19-9       GI will sign off.    Please contact with any questions.     Discussed with Dr Tucker, who is in agreement.    Sanjana De Jesus, CNP

## 2023-10-20 NOTE — CARE PLAN
The patient's goals for the shift include  pain free    The clinical goals for the shift include to be able to sleep this shift    Over the shift, the patient did make progress toward the following goals.

## 2023-10-20 NOTE — DISCHARGE SUMMARY
Discharge Diagnosis  Metastatic Cancer with Unknown Primary Site    Issues Requiring Follow-Up  -follow up with IR mesentery tissue biopsy results  -further imaging for staging at [facility]  -pancreatic cancer blood markers pending  -potential lymphoma workup if determined present    Discharge Meds     Your medication list      You have not been prescribed any medications.         Test Results Pending At Discharge  Pending Labs       Order Current Status    Cancer Antigen 125 In process            Hospital Course  Shilo Thakkar is a 23 y.o. male presenting for abdominal pain and dysphagia with a neck oropharyngeal lesion. He presented following a 1 week hx of diminished oral intake, crampy abdominal pain with weight loss, and a L neck mass from the urgent care (reportedly concerned for peritonsillar abscess). Workup in ED was concerning for metastatic pancreatic adenocarcinoma vs. lymphoma, of note pt has a family history significant for multiple direct male relatives with early onset cancer (father  age 35 from pancreatic adenocarcinoma). In the ED pt was HDS, afebrile, CBC and CMP were unremarkable, CT of neck showed a lesion in L oropharynx concerning for neoplasm but otherwise was nonspecific and with nonspecific enlargement of L cervical lymph nodes. CT A/P showed a mass in the body of the pancreas concerning for pancreatic cancer per radiologic read with associated carcinomatosis, fluid in abdomen and pelvis, and bilateral pleural effusions. Pt remained stable and without pain during hospitalization. Pt was treated with Unasyn in the event the neck mass represented an infectious abscess. CEA and AFP were negative. CA 19-9 and  were ordered and pending. IR was consulted and favored a lymphoma as primary etiology rather than pancreatic origin and completed an ultrasound guided bx of the mesentery on 10/20. GI was consulted but deferred tissue sampling to IR. Oncology was consulted but at time  of discharge had not evaluated the patient. Patient was accepted for transfer to [facility] for further work up, including PET scan.        Pertinent Physical Exam At Time of Discharge  Physical Exam  Constitutional:       General: He is not in acute distress.     Comments: Thin appearing male, resting in bed comfortably.   HENT:      Head: Normocephalic and atraumatic.   Eyes:      Conjunctiva/sclera: Conjunctivae normal.   Neck:      Comments: Enlarged left tonsil.  Cardiovascular:      Rate and Rhythm: Normal rate and regular rhythm.      Heart sounds: No murmur heard.     No friction rub. No gallop.   Pulmonary:      Effort: Pulmonary effort is normal.      Breath sounds: Normal breath sounds. No wheezing, rhonchi or rales.   Abdominal:      General: Abdomen is flat. Bowel sounds are normal. There is no distension.      Palpations: Abdomen is soft.      Tenderness: There is no abdominal tenderness.   Musculoskeletal:         General: Normal range of motion.   Skin:     General: Skin is warm and dry.      Coloration: Skin is pale.   Neurological:      General: No focal deficit present.      Mental Status: He is alert. Mental status is at baseline.   Psychiatric:         Mood and Affect: Mood normal.         Behavior: Behavior normal.         Outpatient Follow-Up  No future appointments.      Itzel More MD

## 2023-10-20 NOTE — H&P
Subjective   HPI    Shilo Thakkar is a 23 y.o. male who presented to the hospital for acute abdominal pain and new diagnosis of metastatic pancreatic cancer. Patient has a past medical history of trauma fractures of wrists and foot which were addressed surgically. The patient was in his usual state of health until last night at which time he experienced significant abdominal pain. He described it as severe 8/10 cramping which did not respond to OTC analgesics or heat. On 10/19, he presented to urgent care who found a peritonsilar mass concerning for abscess. The patient was directed to the ED, where a CT scan showed evidence of a pancreatic mass most consistent with pancreatic adenocarcinoma, intestinal masses most consistent with carcinomatosis, and a solid lesion in his neck, most consistent with a metastasis. The patient endorses some abdominal pain as well as difficulty swallowing for an unknown period of time. No other complaints. Family history of multiple direct male relatives with early onset pancreatic cancer. [place] [facility] [place].    12 point Review of Systems negative unless stated above.     Review of Systems   Constitutional: Negative.    HENT:  Positive for sore throat and trouble swallowing.    Eyes: Negative.    Respiratory: Negative.     Cardiovascular: Negative.    Gastrointestinal:  Positive for abdominal pain and constipation. Negative for blood in stool, diarrhea, nausea and vomiting.   Genitourinary: Negative.    Musculoskeletal: Negative.    Neurological: Negative.    Hematological: Negative.      ED Course   Vitals - /56 (BP Location: Right arm, Patient Position: Lying)   Pulse 56   Temp 36.4 °C (97.6 °F) (Temporal)   Resp 16   Wt 72.2 kg (159 lb 2.8 oz)   SpO2 96%   Labs -   Lab Results   Component Value Date    WBC 7.0 10/19/2023    HGB 13.7 10/19/2023    HCT 39.8 (L) 10/19/2023    MCV 86 10/19/2023     10/19/2023     Lab Results  "  Component Value Date    GLUCOSE 87 10/19/2023    CALCIUM 8.9 10/19/2023     10/19/2023    K 4.1 10/19/2023    CO2 29 10/19/2023    CL 96 (L) 10/19/2023    BUN 12 10/19/2023    CREATININE 1.24 10/19/2023     Imaging -   CT soft tissue neck w IV contrast 10/19/2023 7:05 PM   Final Result   1. Homogeneous soft tissue attenuation lesion in the left oropharynx.   This is concerning for a neoplasm but is otherwise nonspecific,   recommend direct visualization and/or tissue sampling for further   evaluation.   2. Enlarged left cervical lymph nodes are nonspecific, further   follow-up as clinically indicated.   3. Small pleural effusions bilaterally.      CT abdomen pelvis w IV contrast  at 8:05 PM   Final Result   Final   1. There is a mass in the body of the pancreas which likely represents   pancreatic cancer. There are multiple masses in the mesentery as well   as omental caking. These are consistent with carcinomatosis.   2. Moderate amount of free fluid throughout the abdomen and pelvis.   3. Moderate right and small left pleural effusions.      Interventions -   Medications   dexAMETHasone (PF) (Decadron) injection 10 mg    ketorolac (Toradol) injection 15 mg    sodium chloride 0.9 % bolus 1,000 mL    ampicillin-sulbactam (Unasyn) 3 g in sodium chloride 0.9 % 100 mL IV   iohexol (OMNIPaque) 350 mg iodine/mL solution 125 mL     Past Medical History     Past Medical History:   Diagnosis Date    Colles' fracture of right radius 2015    History of attention deficit hyperactivity disorder (ADHD) 05/10/2017    Gynecomastia 2016    Closed fracture of scaphoid bone of left wrist 2015     Surgical History     R foot reconstruction date unknown    Family History     Father - Pancreatic cancer,  age 35  Grandfather - Pancreatic cancer,  \"early\" (exact age unknown)  Brother - MVA,  2 years ago    Social History     Social History     Tobacco Use    Smoking status: Never    Smokeless " tobacco: Never     Allergies   No Known Allergies     Meds    Scheduled medications  ampicillin-sulbactam, 3 g, intravenous, q6h  enoxaparin, 40 mg, subcutaneous, q24h  sennosides-docusate sodium, 2 tablet, oral, BID       PRN medications  Acetaminophen 650mg mild pain  oxyCODONE-acetaminophen 5mg-325mg moderate pain  oxyCODONE-acetaminophen 10mg-325mg severe pain    Objective   Vitals:    10/19/23 2045 10/19/23 2130 10/19/23 2156 10/19/23 2357   BP: 153/83 132/70 151/66 127/56   Pulse:   57 56   Resp: 16 16 16 16   Temp:   36.4 °C (97.6 °F) 36.4 °C (97.6 °F)   SpO2: 96% 96% 97% 96%     Physical Examination:  Physical Exam  Constitutional:       General: He is not in acute distress.     Appearance: Normal appearance. He is normal weight. He is not ill-appearing.   HENT:      Head: Normocephalic and atraumatic.      Nose: Nose normal.      Mouth/Throat:      Mouth: Mucous membranes are moist.      Pharynx: Oropharynx is clear.      Comments: Poorly visualized peritonsilar erythema  Eyes:      Extraocular Movements: Extraocular movements intact.      Conjunctiva/sclera: Conjunctivae normal.      Pupils: Pupils are equal, round, and reactive to light.   Cardiovascular:      Rate and Rhythm: Normal rate and regular rhythm.      Pulses: Normal pulses.      Heart sounds: Normal heart sounds. No murmur heard.     No friction rub. No gallop.   Pulmonary:      Effort: Pulmonary effort is normal.      Breath sounds: Normal breath sounds.   Abdominal:      General: Abdomen is flat. There is no distension.      Palpations: There is mass.      Tenderness: There is no abdominal tenderness. There is no guarding or rebound.      Comments: Diminished bowel sounds, mild soft tissue resistance on palpation   Musculoskeletal:      Cervical back: Normal range of motion and neck supple. No rigidity.   Lymphadenopathy:      Cervical: Cervical adenopathy present.   Neurological:      Mental Status: He is alert.     I/Os    Intake/Output  Summary (Last 24 hours) at 10/20/2023 0109  Last data filed at 10/19/2023 2357  Gross per 24 hour   Intake 1550 ml   Output --   Net 1550 ml     Labs:   Results for orders placed 10/19/23   CBC and Auto Differential   Result Value Ref Range    WBC 7.0 4.4 - 11.3 x10*3/uL    nRBC 0.0 0.0 - 0.0 /100 WBCs    RBC 4.64 4.50 - 5.90 x10*6/uL    Hemoglobin 13.7 13.5 - 17.5 g/dL    Hematocrit 39.8 (L) 41.0 - 52.0 %    MCV 86 80 - 100 fL    MCH 29.5 26.0 - 34.0 pg    MCHC 34.4 32.0 - 36.0 g/dL    RDW 12.8 11.5 - 14.5 %    Platelets 331 150 - 450 x10*3/uL    MPV 9.6 7.5 - 11.5 fL    Neutrophils % 75.4 40.0 - 80.0 %    Immature Granulocytes %, Automated 0.3 0.0 - 0.9 %    Lymphocytes % 16.4 13.0 - 44.0 %    Monocytes % 6.9 2.0 - 10.0 %    Eosinophils % 0.6 0.0 - 6.0 %    Basophils % 0.4 0.0 - 2.0 %    Neutrophils Absolute 5.28 1.20 - 7.70 x10*3/uL    Immature Granulocytes Absolute, Automated 0.02 0.00 - 0.70 x10*3/uL    Lymphocytes Absolute 1.15 (L) 1.20 - 4.80 x10*3/uL    Monocytes Absolute 0.48 0.10 - 1.00 x10*3/uL    Eosinophils Absolute 0.04 0.00 - 0.70 x10*3/uL    Basophils Absolute 0.03 0.00 - 0.10 x10*3/uL   Comprehensive metabolic panel   Result Value Ref Range    Glucose 87 74 - 99 mg/dL    Sodium 138 136 - 145 mmol/L    Potassium 4.1 3.5 - 5.3 mmol/L    Chloride 96 (L) 98 - 107 mmol/L    Bicarbonate 29 21 - 32 mmol/L    Anion Gap 17 10 - 20 mmol/L    Urea Nitrogen 12 6 - 23 mg/dL    Creatinine 1.19 0.50 - 1.30 mg/dL    eGFR 87 >60 mL/min/1.73m*2    Calcium 8.9 8.6 - 10.3 mg/dL    Albumin 4.0 3.4 - 5.0 g/dL    Alkaline Phosphatase 58 33 - 120 U/L    Total Protein 6.4 6.4 - 8.2 g/dL    AST 38 9 - 39 U/L    Bilirubin, Total 0.5 0.0 - 1.2 mg/dL    ALT 14 10 - 52 U/L   Group A Streptococcus, PCR    Specimen: Throat/Pharynx; Swab   Result Value Ref Range    Group A Strep PCR Not Detected Not Detected   Mononucleosis screen   Result Value Ref Range    Mononucleosis Screen Negative Negative   Creatinine, Serum   Result Value  "Ref Range    Creatinine 1.24 0.50 - 1.30 mg/dL    eGFR 83 >60 mL/min/1.73m*2     Images      CT soft tissue neck w IV contrast 10/19/2023 7:05 PM   Final Result   1. Homogeneous soft tissue attenuation lesion in the left oropharynx.   This is concerning for a neoplasm but is otherwise nonspecific,   recommend direct visualization and/or tissue sampling for further   evaluation.   2. Enlarged left cervical lymph nodes are nonspecific, further   follow-up as clinically indicated.   3. Small pleural effusions bilaterally.      CT abdomen pelvis w IV contrast 0/19/2023 at 8:05 PM   Final Result   Final   1. There is a mass in the body of the pancreas which likely represents   pancreatic cancer. There are multiple masses in the mesentery as well   as omental caking. These are consistent with carcinomatosis.   2. Moderate amount of free fluid throughout the abdomen and pelvis.   3. Moderate right and small left pleural effusions.      Assessment/Plan   Shilo Thakkar is a 23 y.o. male presenting for abdominal pain and dysphagia most likely 2/2 metastatic pancreatic adenocarcinoma. Patient has no pertinent past medical history. Family history significant for multiple direct male relatives with early onset pancreatic cancer. Pt accepted for transfer to [facility] for further workup pending bed, admit to [facility] [place] [place] for initial assessment including staging scan, CT chest, and tumor markers.     Acute medical issues  # Abdominal pain  # Pancreatic body mass  # Mesentery masses concerning for carcinomatosis   # Pleural effusions  - CT chest ordered  - CA 19-9, CEA, AFP, and  ordered and pending  - [facility] recommended \"staging scan\"  -- Consider \"NM PET CT whole body head and neck initial staging\"  - Pain management  -- Acetaminophen 650mg mild  -- Oxycodone 5mg-325mg moderate  -- Oxycodone 10mg-650mg severe  - GI consulted for possible upper endoscopy per [facility] admitting oncology hospitalist Dr Esteban  - IR consulted for biopsies of neck " and pancreatic masses  -- May require ERCP for pancreatic mass, which cannot be done at [facility]  - ENT contacted, they do not perform biopsies, recommended we reach out to IR  - Oncology consulted at [facility] for initial staging and further diagnostic recommendations    # Neck Oropharynx Lesion  - Potential related to malignancy  - Recommend direct visualization  -- GI consulted, may be able to visualize with upper endoscopy  - Recommend biopsy  -- ENT declined, suggested IR to perform  - SLP to evaluate dysphagia    Chronic medical issues  # None    F PO  E Replete as needed  N Regular  G Senna 8.6mg PRN  Code status: Full Code  NOK: Deidra Pickett (Mother) - [phone]    Dispo: 23 y.o male admitted with abdominal pain, likely 2/2 pancreatic cancer with metastases. Pending transfer to [facility] for further management. Initial diagnostic workup including biopsies and staging scan to be done at [facility].    Rodri Saldana MD

## 2023-10-20 NOTE — PROGRESS NOTES
10/20/23 1140   Discharge Planning   Living Arrangements Parent   Support Systems Parent   Assistance Needed patient is alert and oriented x3, indpendent in ADL's, drives and uses no DME   Type of Residence Private residence   Home or Post Acute Services None   Patient [place] to be discharged [place]: [place] [facility]

## 2023-10-21 ENCOUNTER — DOCUMENTATION (OUTPATIENT)
Dept: INPATIENT UNIT | Facility: HOSPITAL | Age: 23
End: 2023-10-21
Payer: COMMERCIAL

## 2023-10-21 ENCOUNTER — HOSPITAL ENCOUNTER (OUTPATIENT)
Facility: HOSPITAL | Age: 23
Setting detail: OBSERVATION
LOS: 1 days | Discharge: HOME | DRG: 824 | End: 2023-10-23
Attending: HOSPITALIST | Admitting: HOSPITALIST
Payer: COMMERCIAL

## 2023-10-21 VITALS
OXYGEN SATURATION: 95 % | RESPIRATION RATE: 18 BRPM | HEIGHT: 73 IN | WEIGHT: 159.17 LBS | BODY MASS INDEX: 21.1 KG/M2 | TEMPERATURE: 97.3 F | DIASTOLIC BLOOD PRESSURE: 92 MMHG | HEART RATE: 58 BPM | SYSTOLIC BLOOD PRESSURE: 149 MMHG

## 2023-10-21 DIAGNOSIS — C25.9 PANCREATIC CANCER (MULTI): Primary | ICD-10-CM

## 2023-10-21 DIAGNOSIS — R22.1 NECK MASS: ICD-10-CM

## 2023-10-21 DIAGNOSIS — J35.1 TONSILLAR HYPERTROPHY, UNILATERAL: ICD-10-CM

## 2023-10-21 DIAGNOSIS — C25.9 METASTASIS FROM PANCREATIC CANCER (MULTI): ICD-10-CM

## 2023-10-21 DIAGNOSIS — C79.9 METASTASIS FROM PANCREATIC CANCER (MULTI): ICD-10-CM

## 2023-10-21 LAB
ALBUMIN SERPL BCP-MCNC: 3.8 G/DL (ref 3.4–5)
ALP SERPL-CCNC: 57 U/L (ref 33–120)
ALT SERPL W P-5'-P-CCNC: 19 U/L (ref 10–52)
ANION GAP SERPL CALC-SCNC: 17 MMOL/L (ref 10–20)
AST SERPL W P-5'-P-CCNC: 41 U/L (ref 9–39)
B-HCG SERPL-ACNC: <3 MIU/ML
BASOPHILS # BLD AUTO: 0.06 X10*3/UL (ref 0–0.1)
BASOPHILS NFR BLD AUTO: 0.7 %
BILIRUB SERPL-MCNC: 0.5 MG/DL (ref 0–1.2)
BUN SERPL-MCNC: 17 MG/DL (ref 6–23)
CALCIUM SERPL-MCNC: 7.6 MG/DL (ref 8.6–10.6)
CHLORIDE SERPL-SCNC: 100 MMOL/L (ref 98–107)
CO2 SERPL-SCNC: 26 MMOL/L (ref 21–32)
CREAT SERPL-MCNC: 1.21 MG/DL (ref 0.5–1.3)
EOSINOPHIL # BLD AUTO: 0.09 X10*3/UL (ref 0–0.7)
EOSINOPHIL NFR BLD AUTO: 1 %
ERYTHROCYTE [DISTWIDTH] IN BLOOD BY AUTOMATED COUNT: 13 % (ref 11.5–14.5)
GFR SERPL CREATININE-BSD FRML MDRD: 86 ML/MIN/1.73M*2
GLUCOSE SERPL-MCNC: 82 MG/DL (ref 74–99)
HCT VFR BLD AUTO: 42.3 % (ref 41–52)
HGB BLD-MCNC: 14 G/DL (ref 13.5–17.5)
HIV 1+2 AB+HIV1 P24 AG SERPL QL IA: NONREACTIVE
IMM GRANULOCYTES # BLD AUTO: 0.03 X10*3/UL (ref 0–0.7)
IMM GRANULOCYTES NFR BLD AUTO: 0.3 % (ref 0–0.9)
LDH SERPL L TO P-CCNC: 1032 U/L (ref 84–246)
LYMPHOCYTES # BLD AUTO: 1.42 X10*3/UL (ref 1.2–4.8)
LYMPHOCYTES NFR BLD AUTO: 16.4 %
MAGNESIUM SERPL-MCNC: 2.11 MG/DL (ref 1.6–2.4)
MCH RBC QN AUTO: 29.1 PG (ref 26–34)
MCHC RBC AUTO-ENTMCNC: 33.1 G/DL (ref 32–36)
MCV RBC AUTO: 88 FL (ref 80–100)
MONOCYTES # BLD AUTO: 0.55 X10*3/UL (ref 0.1–1)
MONOCYTES NFR BLD AUTO: 6.4 %
NEUTROPHILS # BLD AUTO: 6.5 X10*3/UL (ref 1.2–7.7)
NEUTROPHILS NFR BLD AUTO: 75.2 %
NRBC BLD-RTO: 0 /100 WBCS (ref 0–0)
PLATELET # BLD AUTO: 397 X10*3/UL (ref 150–450)
PMV BLD AUTO: 10.4 FL (ref 7.5–11.5)
POTASSIUM SERPL-SCNC: 4.1 MMOL/L (ref 3.5–5.3)
PROT SERPL-MCNC: 5.8 G/DL (ref 6.4–8.2)
RBC # BLD AUTO: 4.81 X10*6/UL (ref 4.5–5.9)
SODIUM SERPL-SCNC: 139 MMOL/L (ref 136–145)
URATE SERPL-MCNC: 7.7 MG/DL (ref 4–7.5)
WBC # BLD AUTO: 8.7 X10*3/UL (ref 4.4–11.3)

## 2023-10-21 PROCEDURE — 2500000004 HC RX 250 GENERAL PHARMACY W/ HCPCS (ALT 636 FOR OP/ED)

## 2023-10-21 PROCEDURE — 93010 ELECTROCARDIOGRAM REPORT: CPT | Performed by: INTERNAL MEDICINE

## 2023-10-21 PROCEDURE — 99221 1ST HOSP IP/OBS SF/LOW 40: CPT | Performed by: STUDENT IN AN ORGANIZED HEALTH CARE EDUCATION/TRAINING PROGRAM

## 2023-10-21 PROCEDURE — G0378 HOSPITAL OBSERVATION PER HR: HCPCS

## 2023-10-21 PROCEDURE — 83735 ASSAY OF MAGNESIUM: CPT

## 2023-10-21 PROCEDURE — 83615 LACTATE (LD) (LDH) ENZYME: CPT | Performed by: HOSPITALIST

## 2023-10-21 PROCEDURE — 87040 BLOOD CULTURE FOR BACTERIA: CPT | Mod: CMCLAB

## 2023-10-21 PROCEDURE — 36415 COLL VENOUS BLD VENIPUNCTURE: CPT

## 2023-10-21 PROCEDURE — 36415 COLL VENOUS BLD VENIPUNCTURE: CPT | Mod: CMCLAB

## 2023-10-21 PROCEDURE — 84550 ASSAY OF BLOOD/URIC ACID: CPT | Performed by: HOSPITALIST

## 2023-10-21 PROCEDURE — 99222 1ST HOSP IP/OBS MODERATE 55: CPT

## 2023-10-21 PROCEDURE — 2500000001 HC RX 250 WO HCPCS SELF ADMINISTERED DRUGS (ALT 637 FOR MEDICARE OP)

## 2023-10-21 PROCEDURE — 85025 COMPLETE CBC W/AUTO DIFF WBC: CPT

## 2023-10-21 PROCEDURE — 87389 HIV-1 AG W/HIV-1&-2 AB AG IA: CPT | Mod: CMCLAB

## 2023-10-21 PROCEDURE — 96372 THER/PROPH/DIAG INJ SC/IM: CPT

## 2023-10-21 PROCEDURE — 80053 COMPREHEN METABOLIC PANEL: CPT

## 2023-10-21 PROCEDURE — 1170000001 HC PRIVATE ONCOLOGY ROOM DAILY

## 2023-10-21 RX ORDER — HYDROMORPHONE HYDROCHLORIDE 1 MG/ML
0.2 INJECTION, SOLUTION INTRAMUSCULAR; INTRAVENOUS; SUBCUTANEOUS ONCE
Status: COMPLETED | OUTPATIENT
Start: 2023-10-21 | End: 2023-10-21

## 2023-10-21 RX ORDER — HYDROMORPHONE HYDROCHLORIDE 1 MG/ML
0.2 INJECTION, SOLUTION INTRAMUSCULAR; INTRAVENOUS; SUBCUTANEOUS
Status: DISCONTINUED | OUTPATIENT
Start: 2023-10-21 | End: 2023-10-23 | Stop reason: HOSPADM

## 2023-10-21 RX ORDER — AMOXICILLIN 250 MG
2 CAPSULE ORAL 2 TIMES DAILY
Status: DISCONTINUED | OUTPATIENT
Start: 2023-10-21 | End: 2023-10-22

## 2023-10-21 RX ORDER — OXYCODONE HYDROCHLORIDE 5 MG/1
10 TABLET ORAL EVERY 6 HOURS PRN
Status: DISCONTINUED | OUTPATIENT
Start: 2023-10-21 | End: 2023-10-23 | Stop reason: HOSPADM

## 2023-10-21 RX ORDER — ACETAMINOPHEN 325 MG/1
650 TABLET ORAL EVERY 6 HOURS PRN
Status: DISCONTINUED | OUTPATIENT
Start: 2023-10-21 | End: 2023-10-21

## 2023-10-21 RX ORDER — ACETAMINOPHEN 325 MG/1
975 TABLET ORAL 3 TIMES DAILY
Status: DISCONTINUED | OUTPATIENT
Start: 2023-10-21 | End: 2023-10-23 | Stop reason: HOSPADM

## 2023-10-21 RX ORDER — OXYCODONE HYDROCHLORIDE 5 MG/1
5 TABLET ORAL EVERY 6 HOURS PRN
Status: DISCONTINUED | OUTPATIENT
Start: 2023-10-21 | End: 2023-10-23 | Stop reason: HOSPADM

## 2023-10-21 RX ORDER — ENOXAPARIN SODIUM 100 MG/ML
40 INJECTION SUBCUTANEOUS EVERY 24 HOURS
Status: DISPENSED | OUTPATIENT
Start: 2023-10-21 | End: 2023-10-23

## 2023-10-21 RX ORDER — OXYCODONE HYDROCHLORIDE 5 MG/1
5 TABLET ORAL EVERY 6 HOURS PRN
Status: DISCONTINUED | OUTPATIENT
Start: 2023-10-21 | End: 2023-10-21

## 2023-10-21 RX ORDER — POLYETHYLENE GLYCOL 3350 17 G/17G
17 POWDER, FOR SOLUTION ORAL DAILY
Status: DISCONTINUED | OUTPATIENT
Start: 2023-10-21 | End: 2023-10-22

## 2023-10-21 RX ORDER — ONDANSETRON HYDROCHLORIDE 2 MG/ML
4 INJECTION, SOLUTION INTRAVENOUS EVERY 6 HOURS PRN
Status: DISCONTINUED | OUTPATIENT
Start: 2023-10-21 | End: 2023-10-23

## 2023-10-21 RX ADMIN — ACETAMINOPHEN 975 MG: 325 TABLET ORAL at 22:50

## 2023-10-21 RX ADMIN — HYDROMORPHONE HYDROCHLORIDE 0.2 MG: 1 INJECTION, SOLUTION INTRAMUSCULAR; INTRAVENOUS; SUBCUTANEOUS at 04:42

## 2023-10-21 RX ADMIN — ACETAMINOPHEN 975 MG: 325 TABLET ORAL at 10:49

## 2023-10-21 RX ADMIN — ONDANSETRON 4 MG: 2 INJECTION INTRAMUSCULAR; INTRAVENOUS at 16:35

## 2023-10-21 RX ADMIN — OXYCODONE HYDROCHLORIDE 5 MG: 5 TABLET ORAL at 16:36

## 2023-10-21 RX ADMIN — ACETAMINOPHEN 975 MG: 325 TABLET ORAL at 16:35

## 2023-10-21 RX ADMIN — SENNOSIDES AND DOCUSATE SODIUM 2 TABLET: 8.6; 5 TABLET ORAL at 22:50

## 2023-10-21 RX ADMIN — ENOXAPARIN SODIUM 40 MG: 40 INJECTION SUBCUTANEOUS at 22:49

## 2023-10-21 SDOH — SOCIAL STABILITY: SOCIAL INSECURITY: HAS ANYONE EVER THREATENED TO HURT YOUR FAMILY OR YOUR PETS?: NO

## 2023-10-21 SDOH — SOCIAL STABILITY: SOCIAL INSECURITY: ARE THERE ANY APPARENT SIGNS OF INJURIES/BEHAVIORS THAT COULD BE RELATED TO ABUSE/NEGLECT?: NO

## 2023-10-21 SDOH — SOCIAL STABILITY: SOCIAL INSECURITY: ARE YOU OR HAVE YOU BEEN THREATENED OR ABUSED PHYSICALLY, EMOTIONALLY, OR SEXUALLY BY ANYONE?: NO

## 2023-10-21 SDOH — SOCIAL STABILITY: SOCIAL INSECURITY: WERE YOU ABLE TO COMPLETE ALL THE BEHAVIORAL HEALTH SCREENINGS?: YES

## 2023-10-21 SDOH — SOCIAL STABILITY: SOCIAL INSECURITY: DOES ANYONE TRY TO KEEP YOU FROM HAVING/CONTACTING OTHER FRIENDS OR DOING THINGS OUTSIDE YOUR HOME?: NO

## 2023-10-21 SDOH — SOCIAL STABILITY: SOCIAL INSECURITY: HAVE YOU HAD THOUGHTS OF HARMING ANYONE ELSE?: NO

## 2023-10-21 SDOH — SOCIAL STABILITY: SOCIAL INSECURITY: DO YOU FEEL ANYONE HAS EXPLOITED OR TAKEN ADVANTAGE OF YOU FINANCIALLY OR OF YOUR PERSONAL PROPERTY?: NO

## 2023-10-21 SDOH — SOCIAL STABILITY: SOCIAL INSECURITY: DO YOU FEEL UNSAFE GOING BACK TO THE PLACE WHERE YOU ARE LIVING?: NO

## 2023-10-21 SDOH — SOCIAL STABILITY: SOCIAL INSECURITY: ABUSE: ADULT

## 2023-10-21 ASSESSMENT — ACTIVITIES OF DAILY LIVING (ADL)
DRESSING YOURSELF: INDEPENDENT
PATIENT'S MEMORY ADEQUATE TO SAFELY COMPLETE DAILY ACTIVITIES?: YES
JUDGMENT_ADEQUATE_SAFELY_COMPLETE_DAILY_ACTIVITIES: YES
GROOMING: INDEPENDENT
FEEDING YOURSELF: INDEPENDENT
LACK_OF_TRANSPORTATION: NO
HEARING - LEFT EAR: FUNCTIONAL
HEARING - RIGHT EAR: FUNCTIONAL
BATHING: INDEPENDENT
ADEQUATE_TO_COMPLETE_ADL: YES
WALKS IN HOME: INDEPENDENT
TOILETING: INDEPENDENT

## 2023-10-21 ASSESSMENT — PAIN - FUNCTIONAL ASSESSMENT
PAIN_FUNCTIONAL_ASSESSMENT: 0-10

## 2023-10-21 ASSESSMENT — LIFESTYLE VARIABLES
HOW OFTEN DO YOU HAVE 6 OR MORE DRINKS ON ONE OCCASION: NEVER
SKIP TO QUESTIONS 9-10: 1
AUDIT-C TOTAL SCORE: 0
HOW OFTEN DO YOU HAVE A DRINK CONTAINING ALCOHOL: NEVER
AUDIT-C TOTAL SCORE: 0
HOW MANY STANDARD DRINKS CONTAINING ALCOHOL DO YOU HAVE ON A TYPICAL DAY: PATIENT DOES NOT DRINK
SUBSTANCE_ABUSE_PAST_12_MONTHS: NO
PRESCIPTION_ABUSE_PAST_12_MONTHS: NO

## 2023-10-21 ASSESSMENT — COGNITIVE AND FUNCTIONAL STATUS - GENERAL
DAILY ACTIVITIY SCORE: 24
MOBILITY SCORE: 24
MOBILITY SCORE: 24
PATIENT BASELINE BEDBOUND: NO

## 2023-10-21 ASSESSMENT — PAIN SCALES - GENERAL
PAINLEVEL_OUTOF10: 5 - MODERATE PAIN
PAINLEVEL_OUTOF10: 0 - NO PAIN
PAINLEVEL_OUTOF10: 3
PAINLEVEL_OUTOF10: 4
PAINLEVEL_OUTOF10: 5 - MODERATE PAIN
PAINLEVEL_OUTOF10: 5 - MODERATE PAIN
PAINLEVEL_OUTOF10: 0 - NO PAIN

## 2023-10-21 ASSESSMENT — PATIENT HEALTH QUESTIONNAIRE - PHQ9
2. FEELING DOWN, DEPRESSED OR HOPELESS: SEVERAL DAYS
SUM OF ALL RESPONSES TO PHQ9 QUESTIONS 1 & 2: 2
1. LITTLE INTEREST OR PLEASURE IN DOING THINGS: SEVERAL DAYS

## 2023-10-21 NOTE — H&P
MEDICINE ADMISSION NOTE    History of Present Illness:  Shilo Thakkar is a 24 y/o Male w/ only notable pmhx of Colles' fracture of right radius (4/2015), fracture of navicular (scaphoid) bone of left wrist (12/2015) who presents as transfer from [facility] to [facility] for suspected pancreatic malignancy vs lymphoma workup.     Pt initially presented to [facility] ED on 10/19 for worsening abdominal pain and dysphagia. At the time pt reported 1-week history of worsening PO intake, crampy abdominal pain and 5lb weight loss related to his difficulty swallowing. He also had left sided neck mass that he said occurred over the same time period. Pt initially presented to Urgent Care but, given his symptoms it was reccomedned that he go to the ED. In ED, pt was HDS, afebrile, CBC and CMP were unremarkable, CT of neck showed a lesion in L oropharynx concerning for possible neoplasm and nonspecific enlargement of L cervical lymph nodes. CT A/P showed a mass in the body of the pancreas concerning for pancreatic cancer as well as multiple masses in the mesentery consistent with carcinomatosis. Pt was treated with Unasyn for concern that neck mass possibly represented an infectious abscess. CEA and AFP were negative. CA 19-9  was wnl and - was elevated 100.9. IR completed ultrasound guided bx of the mesentery on 10/20 (Path currently pending).GI was consulted for possible EUS FNA but, was ultimately deferred. Patient was then transferred to [facility] for further evaluation.     On arrival to [facility], pt was HDS and was endorsing abdominal pain/constipation. Pt denied any history prior to this of any fever or weight loss. Pt otherwise denied chest pain, SOB, fever, chills, N/V or any other symptoms.     Objective:  BP (!) 146/94 (BP Location: Right arm)   Pulse 51   Temp 36 °C (96.8 °F) (Temporal)   Resp 18   SpO2 96%      Labs:  Results for orders placed or performed during the hospital encounter of 10/19/23 (from the past 24 hour(s))    CBC   Result Value Ref Range    WBC 6.7 4.4 - 11.3 x10*3/uL    nRBC 0.0 0.0 - 0.0 /100 WBCs    RBC 4.30 (L) 4.50 - 5.90 x10*6/uL    Hemoglobin 12.4 (L) 13.5 - 17.5 g/dL    Hematocrit 37.1 (L) 41.0 - 52.0 %    MCV 86 80 - 100 fL    MCH 28.8 26.0 - 34.0 pg    MCHC 33.4 32.0 - 36.0 g/dL    RDW 12.4 11.5 - 14.5 %    Platelets 327 150 - 450 x10*3/uL    MPV 9.9 7.5 - 11.5 fL   Renal function panel   Result Value Ref Range    Glucose 100 (H) 74 - 99 mg/dL    Sodium 138 136 - 145 mmol/L    Potassium 4.8 3.5 - 5.3 mmol/L    Chloride 101 98 - 107 mmol/L    Bicarbonate 28 21 - 32 mmol/L    Anion Gap 14 10 - 20 mmol/L    Urea Nitrogen 17 6 - 23 mg/dL    Creatinine 1.09 0.50 - 1.30 mg/dL    eGFR >90 >60 mL/min/1.73m*2    Calcium 8.7 8.6 - 10.3 mg/dL    Phosphorus 5.2 (H) 2.5 - 4.9 mg/dL    Albumin 3.4 3.4 - 5.0 g/dL   Magnesium   Result Value Ref Range    Magnesium 1.99 1.60 - 2.40 mg/dL   Cancer Antigen 125   Result Value Ref Range    Cancer  100.9 (H) 0.0 - 30.2 U/mL   Cancer Antigen 19-9   Result Value Ref Range    Cancer AG 19-9 14.65 <35.00 U/mL          Imaging:  CT chest w IV contrast(10/20/2023)    1.Moderate bilateral pleural effusions.     2. No significant lymphadenopathy or consolidation       CT abdomen wo IV contrast (10/20/2023)    1.  Mesenteric lymphadenopathy. No clear path is seen for CT-guided biopsy. Ultrasound of the abdomen recommended to evaluate for possible ultrasound-guided biopsy of mesenteric lymph nodes.       CT abdomen pelvis w IV contrast (10/19/2023 )    1. There is a mass in the body of the pancreas which likely represents pancreatic cancer. There are multiple masses in the mesentery as well as omental caking. These are consistent with carcinomatosis.   2. Moderate amount of free fluid throughout the abdomen and pelvis.     3. Moderate right and small left pleural effusions.    CT soft tissue neck w IV contrast (10/19/2023)    1. Homogeneous soft tissue attenuation lesion in the left  oropharynx. This is concerning for a neoplasm but is otherwise nonspecific, recommend direct visualization and/or tissue sampling for further evaluation.   2. Enlarged left cervical lymph nodes are nonspecific, further follow-up as clinically indicated.     3. Small pleural effusions bilaterally.         Current Medications:  Medications   enoxaparin (Lovenox) syringe 40 mg (has no administration in time range)   polyethylene glycol (Glycolax, Miralax) packet 17 g (has no administration in time range)   sennosides-docusate sodium (Ashley-Colace) 8.6-50 mg per tablet 2 tablet (has no administration in time range)   acetaminophen (Tylenol) tablet 650 mg (has no administration in time range)   oxyCODONE (Roxicodone) immediate release tablet 5 mg (has no administration in time range)   oxyCODONE (Roxicodone) immediate release tablet 5 mg (has no administration in time range)   HYDROmorphone (Dilaudid) injection 0.2 mg (has no administration in time range)   HYDROmorphone (Dilaudid) injection 0.2 mg (0.2 mg intravenous Given 10/21/23 0442)         Past Medical History  Past Medical History:   Diagnosis Date    Colles' fracture of right radius, initial encounter for closed fracture 04/23/2015    Colles' fracture of right radius    Personal history of other mental and behavioral disorders 01/28/2014    History of attention deficit hyperactivity disorder    Personal history of other mental and behavioral disorders 05/10/2017    History of attention deficit hyperactivity disorder (ADHD)    Personal history of other specified conditions 05/03/2016    History of gynecomastia    Unspecified fracture of navicular (scaphoid) bone of left wrist, initial encounter for closed fracture 12/17/2015    Closed fracture of scaphoid bone of left wrist       Surgical History  Past Surgical History:   Procedure Laterality Date    OTHER SURGICAL HISTORY  10/03/2017    History Of Prior Surgery    US GUIDED SOFT TISSUE BIOPSY  10/20/2023    US  GUIDED SOFT TISSUE BIOPSY 10/20/2023 [facility]       Social History  He reports that he has never smoked. He has never used smokeless tobacco. No history on file for alcohol use and drug use.    Family History  Father- of Pancreatic Cancer at 35  Grandfather- of Pancreatic Cancer in his 70s    Allergies  Patient has no known allergies.     Physical Exam   Constitutional: No acute distress, resting comfortably in bed, cooperative  HEENT: Left tonsillar enlargement   Cardiovascular: RRR, normal S1/S2, no murmurs noted  Pulmonary: Clear to auscultation b/l, no wheezes/crackles/rhonchi, no increased work of breathing, no supplemental oxygen  GI: Soft,  epigastric tenderness, non-distended  : No donald catheter  Lower extremities: Warm and well perfused, no lower extremity edema  Neuro: A&O x3, able to move all 4 extremities spontaneously, normal gait  Psych: Appropriate mood and affect     Medications  Scheduled medications  enoxaparin, 40 mg, subcutaneous, q24h  polyethylene glycol, 17 g, oral, Daily  sennosides-docusate sodium, 2 tablet, oral, BID      Continuous medications     PRN medications  PRN medications: acetaminophen, HYDROmorphone, oxyCODONE, oxyCODONE      Assessment/Plan     Shilo Thakkar is a 22 y/o Male w/ only notable pmhx of Colles' fracture of right radius (2015), fracture of navicular (scaphoid) bone of left wrist (2015) who presents as transfer from [facility] to [facility] with abdominal pain, dysphagia, weight loss and left sided neck mass concerning for     pancreatic malignancy vs lymphoma. Currently uncertain if neck lesion is related to underlying malignacy vs infection thus will continue IV Unasyn, thought not endorsing systemic s/s of infection.     PLAN:    # Pancreatic body mass  # Mesentery masses concerning for carcinomatosis   :: CT AP with a mass in the body of the pancreas concerning for pancreatic cancer with carcinomatosis, moderate right and small left pleural effusions.  ::GI  consulted at prior hospital for upper endoscopy but, ultimately was deferred   ::CT Chest-No significant lymphadenopathy or consolidation, moderate sized bl plerural effusion  ::CEA and AFP negative, CA 19-9 wnl and  elevated 100.9  [ ] F/U IR Mesenteric Bx  -Consult Heme/Onc once tissue diagnosis obtained   -There were plans for completion of staging with NM PET CT whole body head and neck, can further discuss this in AM  -Can Discuss next steps as far further bx given GI denied EUS FNA at OSH  [ ]Please call family once patient is staffed and has a plan    #Abdominal Pain  ::Pain management Plan  -Tylenol 650 q6h prn (Mild)  -Oxy 5 q6h prn (Mod)  -Oxy 10 q6h prn (Severe)  -IV Dilaudid 0.2 mg q6h prn (Breakthrough)      # Neck Oropharynx Lesion  :: Malignancy vs infectious   - Will c/w Unasyn continued for infectious tonsillar abscess  - ENT declined possible biopsy at OSH, suggested IR to perform, consult IR in AM  - SLP recommenced regular w/ thin liquids at OSH w/ no skilled SLP needs     #Constipation  -Miralax 17g daily  -Doc/Senna BID    F: PRN  E: PRN  N: Regular consistency foods with thin liquids  DVT: Lovenox  A: PIV    Code Status: Full Code (confirmed on admission)   NOK: Extended Emergency Contact Information  Primary Emergency Contact: Deidra Pickett  Home Phone: [phone]  Work Phone: [phone]  Relation: Parent   Deidra(Mother)-[phone]       Wiliam Blanchard MD  Internal Medicine, PGY-2

## 2023-10-21 NOTE — PROGRESS NOTES
"Child Life Assessment:   Reason for Consult  Discipline: Child Life Specialist  Reason for Consult: Coping skill development/planning  Referral Source: Self  Total Time Spent (min): 10 minutes     Patient Intervention(s)  Type of Intervention Performed: Healing environment interventions  Healing Environment Intervention(s): Coping skill development/planning, Empathetic listening/validation of emotions, Opportunity for choice and control, Orientation to services, Assessment, Resources provided    Support Provided to Family  Support Provided to Family: Family present for patient session  Family Present for Patient Session: Other(s)  Other(s) Relationship: Girlfriend    Evaluation  Evaluation/Plan of Care: Provide ongoing support    Session Details: Certified Child Life Specialist (CCLS) introduced child life services to patient and girlfriend and engaged in conversation regarding patient's coping with hospitalization and current medical work up.  CCLS asked what patient does to cope with stress.  Patient shared that he is unsure and his main hobby is motocross racing.  CCLS offered a resource list of apps that patient can explore, which provide guidance on relaxation, breathing, and coping techniques.  Patient expressed interest in the list, which CCLS provided, but denied the need for CCLS to guide patient through any interventions at this time.  CCLS also provided a puzzle book for an activity at bedside.  Patient shared that his pain was well managed and he felt that he was \"doing okay\" at this time.  Child life will continue to follow patient as needed throughout admission.    JIHAN Treviño, CCLS  [employer] Secure Chat    "

## 2023-10-21 NOTE — CARE PLAN
The patient's goals for the shift include  to have pain control    The clinical goals for the shift include To be able to sleep this shift    Over the shift, the patient did not make progress toward the following goals. Barriers to progression include. Recommendations to address these barriers include.

## 2023-10-21 NOTE — CONSULTS
ENT DEPARTMENT CONSULTATION NOTE  Name: Shilo Thakkar  MRN: [id number]  : 2000  Consulting Team: Med onc  Reason for Consult: Consideration of oropharyngeal mass biopsy    History of Present Illness  The patient is a 23 y.o. male who presented to [facility] on 10/21/2023 for transfer from OSH for expedited oncologic work up. Presented to OSH with 3 weeks of abdominal pain with weight loss 2/2 not wanting to eat 2/2 pain with asymptomatic OP mass appreciated by pt at that time as well. Found to have pancreatic mass and 5u6d3cc OP mass. Pending mesenteric LN bx results by IR at OSH during said visit; transferred here overnight for further oncologic expedited w/u. No dyspnea or dysphagia; voice somewhat muffled/raspy per pt; on regular diet. Denies neck pain/swelling. Notes mass has been growing over past 3 weeks. No drainage from mass or hemoptysis. Denies notable PMH. Denies prior H&N surgeries. DC monday vs tuesday with f/u in diagnostic clinic next week per primary.    ENT was consulted for consideration of bx of the above oropharyngeal mass.        Review of Systems  14 point review of systems completed and all negative except as noted in HPI.    Past Medical History  Past Medical History:   Diagnosis Date    Colles' fracture of right radius, initial encounter for closed fracture 2015    Colles' fracture of right radius    Personal history of other mental and behavioral disorders 2014    History of attention deficit hyperactivity disorder    Personal history of other mental and behavioral disorders 05/10/2017    History of attention deficit hyperactivity disorder (ADHD)    Personal history of other specified conditions 2016    History of gynecomastia    Unspecified fracture of navicular (scaphoid) bone of left wrist, initial encounter for closed fracture 2015    Closed fracture of scaphoid bone of left wrist       Past Surgical History  Past Surgical History:   Procedure Laterality Date     OTHER SURGICAL HISTORY  10/03/2017    History Of Prior Surgery    US GUIDED SOFT TISSUE BIOPSY  10/20/2023    US GUIDED SOFT TISSUE BIOPSY 10/20/2023 [facility] US       Allergies  No Known Allergies    Medications    Current Facility-Administered Medications:     acetaminophen (Tylenol) tablet 975 mg, 975 mg, oral, TID, Antonio Gale MD    ampicillin-sulbactam (Unasyn) in sodium chloride 0.9 % 100 mL 3 g, 3 g, intravenous, q6h, Wiliam Blanchard MD, Stopped at 10/21/23 0738    enoxaparin (Lovenox) syringe 40 mg, 40 mg, subcutaneous, q24h, Wiliam Blanchard MD    HYDROmorphone (Dilaudid) injection 0.2 mg, 0.2 mg, intravenous, q3h PRN, Wiliam Blanchard MD    oxyCODONE (Roxicodone) immediate release tablet 10 mg, 10 mg, oral, q6h PRN, Antonio Gale MD    oxyCODONE (Roxicodone) immediate release tablet 5 mg, 5 mg, oral, q6h PRN, Wiliam Blanchard MD    polyethylene glycol (Glycolax, Miralax) packet 17 g, 17 g, oral, Daily, Wiliam Blanchard MD    sennosides-docusate sodium (Ashley-Colace) 8.6-50 mg per tablet 2 tablet, 2 tablet, oral, BID, Wiliam Blanchard MD    Family History  No family history on file.    Social History  Social History     Socioeconomic History    Marital status: Single     Spouse name: Not on file    Number of children: Not on file    Years of education: Not on file    Highest education level: Not on file   Occupational History    Not on file   Tobacco Use    Smoking status: Never    Smokeless tobacco: Never   Substance and Sexual Activity    Alcohol use: Not on file    Drug use: Not on file    Sexual activity: Not on file   Other Topics Concern    Not on file   Social History Narrative    Not on file     Social Determinants of Health     Financial Resource Strain: Low Risk  (10/21/2023)    Overall Financial Resource Strain (CARDIA)     Difficulty of Paying Living Expenses: Not hard at all   Food Insecurity: Not on file   Transportation Needs: No Transportation Needs (10/21/2023)    PRAPARE - Transportation     Lack  of Transportation (Medical): No     Lack of Transportation (Non-Medical): No   Physical Activity: Not on file   Stress: Not on file   Social Connections: Not on file   Intimate Partner Violence: Not on file   Housing Stability: Low Risk  (10/21/2023)    Housing Stability Vital Sign     Unable to Pay for Housing in the Last Year: No     Number of Places Lived in the Last Year: 1     Unstable Housing in the Last Year: No       Vital Signs  Vitals:    10/21/23 1002   BP: 147/88   Pulse: 50   Resp: 18   Temp: 37 °C (98.6 °F)   SpO2: 96%       Physical Examination  GEN: The patient appears stated age in no acute distress  VOICE: Slightly muffled voice  RESP: Unlabored on room air with no audible stertor or stridor  CV: Clinically well perfused   EYES: EOM grossly intact with no scleral icterus  NEURO: Alert and oriented with no focal deficits and CN II-XII symmetric and intact bilaterally  HEAD: Scalp is normocephalic and atraumatic  FACE: No abrasions or lacerations, no maxillary or mandibular stepoffs  SAL: No parotid or submandibular masses or tenderness to palpation and clear saliva expressed from ducts  EARS: Normal external ears, external auditory canals, and TMs to otoscopy, normal hearing to spoken voice  NOSE: External nose appears normal, no significant septal deviation, anterior rhinoscopy limited with no active bleeding or lesions  OC: Normal lips, normal buccal mucosa, normal alveolar ridge, normal floor of mouth, normal tongue, normal hard palate, normal retromolar trigone  OP: Right tonsil 1-2+, left tonsil 4+ with significant submucosal fullness, nonfriable, with violaceous hue; normal pharyngeal walls, normal soft palate  NECK: Trachea midline, firm level 2 LAD without overlying skin changes    Laboratory and Data  Results for orders placed or performed during the hospital encounter of 10/21/23 (from the past 24 hour(s))   CBC and Auto Differential   Result Value Ref Range    WBC 8.7 4.4 - 11.3 x10*3/uL     nRBC 0.0 0.0 - 0.0 /100 WBCs    RBC 4.81 4.50 - 5.90 x10*6/uL    Hemoglobin 14.0 13.5 - 17.5 g/dL    Hematocrit 42.3 41.0 - 52.0 %    MCV 88 80 - 100 fL    MCH 29.1 26.0 - 34.0 pg    MCHC 33.1 32.0 - 36.0 g/dL    RDW 13.0 11.5 - 14.5 %    Platelets 397 150 - 450 x10*3/uL    MPV 10.4 7.5 - 11.5 fL    Neutrophils % 75.2 40.0 - 80.0 %    Immature Granulocytes %, Automated 0.3 0.0 - 0.9 %    Lymphocytes % 16.4 13.0 - 44.0 %    Monocytes % 6.4 2.0 - 10.0 %    Eosinophils % 1.0 0.0 - 6.0 %    Basophils % 0.7 0.0 - 2.0 %    Neutrophils Absolute 6.50 1.20 - 7.70 x10*3/uL    Immature Granulocytes Absolute, Automated 0.03 0.00 - 0.70 x10*3/uL    Lymphocytes Absolute 1.42 1.20 - 4.80 x10*3/uL    Monocytes Absolute 0.55 0.10 - 1.00 x10*3/uL    Eosinophils Absolute 0.09 0.00 - 0.70 x10*3/uL    Basophils Absolute 0.06 0.00 - 0.10 x10*3/uL       Radiology Reviewed  I have personally reviewed the CT neck w from 10/19 and agree with the followin. Homogeneous soft tissue attenuation lesion in the left oropharynx.  This is concerning for a neoplasm but is otherwise nonspecific,  recommend direct visualization and/or tissue sampling for further  evaluation.  2. Enlarged left cervical lymph nodes are nonspecific, further  follow-up as clinically indicated.  3. Small pleural effusions bilaterally.      Assessment  Shilo Thakkar is a 23 y.o. male who is transferred from OSH overnight for expedited oncologic work up of new pancreatic mass I/s/o weight loss and abdominal pain x3 weeks s/p mesenteric LN bx by IR with results pending, found to have significant tonsillar asymmetry L>R c/f underlying malignancy with associated left level 2 LAD    Recommendations  -Will plan for transoral left tonsil biopsy vs left transcervical lymph node biopsy in OR  -Pt consented  -NPOmn prior to procedure 10/24  -Pt seen and d/w Dr. Huber who agrees    Pedro Arredondo DO, PGY3  Otolaryngology - Head & Neck Surgery  ENT Consult pager: [phone]  Peds pager:  [phone]  Adult Head & Neck Phone: [phone]  ENT subspecialty team: Kat individual resident who wrote today's note  Please page if urgent

## 2023-10-21 NOTE — CARE PLAN
The clinical goals for the shift include Pain management    Problem: Fall/Injury  Goal: Not fall by end of shift  Outcome: Progressing  Goal: Be free from injury by end of the shift  Outcome: Progressing  Goal: Verbalize understanding of personal risk factors for fall in the hospital  Outcome: Progressing  Goal: Verbalize understanding of risk factor reduction measures to prevent injury from fall in the home  Outcome: Progressing  Goal: Use assistive devices by end of the shift  Outcome: Progressing  Goal: Pace activities to prevent fatigue by end of the shift  Outcome: Progressing     Problem: Pain  Goal: My pain/discomfort is manageable  Outcome: Progressing     Problem: Safety  Goal: Patient will be injury free during hospitalization  Outcome: Progressing  Goal: I will remain free of falls  Outcome: Progressing     Problem: Daily Care  Goal: Daily care needs are met  Outcome: Progressing     Problem: Psychosocial Needs  Goal: Demonstrates ability to cope with hospitalization/illness  Outcome: Progressing  Goal: Collaborate with me, my family, and caregiver to identify my specific goals  Outcome: Progressing  Flowsheets (Taken 10/21/2023 0417)  Cultural Requests During Hospitalization: none  Spiritual Requests During Hospitalization: none

## 2023-10-21 NOTE — CARE PLAN
The clinical goals for the shift include Pain management   Problem: Fall/Injury  Goal: Not fall by end of shift  Outcome: Progressing  Goal: Be free from injury by end of the shift  Outcome: Progressing  Goal: Verbalize understanding of personal risk factors for fall in the hospital  Outcome: Progressing  Goal: Verbalize understanding of risk factor reduction measures to prevent injury from fall in the home  Outcome: Progressing  Goal: Use assistive devices by end of the shift  Outcome: Progressing  Goal: Pace activities to prevent fatigue by end of the shift  Outcome: Progressing

## 2023-10-21 NOTE — PROGRESS NOTES
Transitional Care Coordinator Note: Met with patient to discuss discharge planning s/p admission.  Patient lives home with his parents.  Independent in all ADL's. Requires no assist devices for ambulation.  Patient denies active home care or home care needs.  Demographics and contact information confirmed.  Will continue to monitor patient for all home going needs.  Cris Davey RN TCC via doc halo.      PCP- patient unsure of states his mom would know   Pharm- patient has no prescription meds at this time per patient

## 2023-10-21 NOTE — SIGNIFICANT EVENT
10/21/23 0255   Vital Signs   Temp 36 °C (96.8 °F)   Temp Source Temporal   Heart Rate 51   Heart Rate Source Monitor   Resp 18   BP (!) 146/94   BP Location Right arm   BP Method Automatic   Medical Gas Therapy   SpO2 96 %   Pulse Oximetry Type Intermittent   Oximetry Probe Site Location Finger   Patient Activity During SpO2 Measurement At rest   Oxygen Therapy None (Room air)     Patient arrived by community care safely at 0245. Vitals are stable.

## 2023-10-21 NOTE — PROGRESS NOTES
Shilo Thakkar is a 23 y.o. male on day 0 of admission presenting with Pancreatic cancer (CMS/HCC).    Subjective   NAOE.  Patient laying down in bed this am, girlfriend at bedside. Complains of non- radiating abdominal shooting pain that has improved with pain medications 4/10. Had 1 episode of emesis this am, says it might've been from drinking too much water and its the first time happening. Not endorsing dysphagia, nausea, sob, fever, chills, and night sweats. Hasn't noticed any growth with the oropharyngeal mass, started growing 3 weeks ago and has stopped. Denies any weight loss, but says his mother noticed some changes in his weight.        Objective     Physical Exam  Constitutional:       General: He is not in acute distress.     Appearance: Normal appearance.   HENT:      Head: Normocephalic and atraumatic.      Comments: Swollen left cervical lymph node not tender to touch, fixed and hard.   Left oropharyngeal mass.      Nose: Nose normal.   Eyes:      Pupils: Pupils are equal, round, and reactive to light.   Cardiovascular:      Rate and Rhythm: Normal rate and regular rhythm.      Pulses: Normal pulses.      Heart sounds: Normal heart sounds. No murmur heard.  Pulmonary:      Effort: Pulmonary effort is normal. No respiratory distress.      Breath sounds: Normal breath sounds.   Abdominal:      General: Abdomen is flat. Bowel sounds are normal. There is no distension.      Palpations: Abdomen is soft. There is no mass.      Tenderness: There is abdominal tenderness. There is no guarding.   Musculoskeletal:      Right lower leg: No edema.      Left lower leg: No edema.   Skin:     General: Skin is warm and dry.   Neurological:      General: No focal deficit present.      Mental Status: He is alert and oriented to person, place, and time.   Psychiatric:         Mood and Affect: Mood normal.         Behavior: Behavior normal.       Last Recorded Vitals  Blood pressure 147/88, pulse 50, temperature 37 °C  "(98.6 °F), temperature source Temporal, resp. rate 18, height 1.854 m (6' 1\"), SpO2 96 %.  Intake/Output last 3 Shifts:  No intake/output data recorded.    Relevant Results  Results for orders placed or performed during the hospital encounter of 10/21/23 (from the past 24 hour(s))   CBC and Auto Differential   Result Value Ref Range    WBC 8.7 4.4 - 11.3 x10*3/uL    nRBC 0.0 0.0 - 0.0 /100 WBCs    RBC 4.81 4.50 - 5.90 x10*6/uL    Hemoglobin 14.0 13.5 - 17.5 g/dL    Hematocrit 42.3 41.0 - 52.0 %    MCV 88 80 - 100 fL    MCH 29.1 26.0 - 34.0 pg    MCHC 33.1 32.0 - 36.0 g/dL    RDW 13.0 11.5 - 14.5 %    Platelets 397 150 - 450 x10*3/uL    MPV 10.4 7.5 - 11.5 fL    Neutrophils % 75.2 40.0 - 80.0 %    Immature Granulocytes %, Automated 0.3 0.0 - 0.9 %    Lymphocytes % 16.4 13.0 - 44.0 %    Monocytes % 6.4 2.0 - 10.0 %    Eosinophils % 1.0 0.0 - 6.0 %    Basophils % 0.7 0.0 - 2.0 %    Neutrophils Absolute 6.50 1.20 - 7.70 x10*3/uL    Immature Granulocytes Absolute, Automated 0.03 0.00 - 0.70 x10*3/uL    Lymphocytes Absolute 1.42 1.20 - 4.80 x10*3/uL    Monocytes Absolute 0.55 0.10 - 1.00 x10*3/uL    Eosinophils Absolute 0.09 0.00 - 0.70 x10*3/uL    Basophils Absolute 0.06 0.00 - 0.10 x10*3/uL      Current Facility-Administered Medications:     acetaminophen (Tylenol) tablet 975 mg, 975 mg, oral, TID, Antonio Gale MD    ampicillin-sulbactam (Unasyn) in sodium chloride 0.9 % 100 mL 3 g, 3 g, intravenous, q6h, Wiliam Blanchard MD, Stopped at 10/21/23 0738    enoxaparin (Lovenox) syringe 40 mg, 40 mg, subcutaneous, q24h, Wiliam Blanchard MD    HYDROmorphone (Dilaudid) injection 0.2 mg, 0.2 mg, intravenous, q3h PRN, Wiliam Blanchard MD    oxyCODONE (Roxicodone) immediate release tablet 10 mg, 10 mg, oral, q6h PRN, Antonio Gale MD    oxyCODONE (Roxicodone) immediate release tablet 5 mg, 5 mg, oral, q6h PRN, Wiliam Blanchard MD    polyethylene glycol (Glycolax, Miralax) packet 17 g, 17 g, oral, Daily, Wiliam Blanchard MD    " sennosides-docusate sodium (Ashley-Colace) 8.6-50 mg per tablet 2 tablet, 2 tablet, oral, BID, Wiliam Blanchard MD           Assessment/Plan   Principal Problem:    Pancreatic cancer (CMS/HCC)    Shilo Thakkar, is a 23 year old Male with a PMH only notable for Colles' fracture of right radius (4/2015), fracture of navicular (scaphoid) bone of left wrist (12/2015) who presents as transfer from [facility] to [facility] with abdominal pain, weight loss, pancreatic body mass, mesentery masses and left sided neck mass concerning for two primary cancers vs pancreatic malignancy vs lymphoma vs sarcoma vs infection. Currently uncertain if neck lesion is related to underlying malignacy vs infection thus will continue IV Unasyn, though not endorsing systemic s/s of infection.      Update 10/21:  - ENT consult, for left oropharyngeal mass biopsy  - Blood culture x 2  - HIV work up    #Pancreatic body mass  #Mesentery masses concerning for carcinomatosis   #Abdominal Pain  ::CT AP with IV contrast: There is a mass in the body of the pancreas which likely represents pancreatic cancer. There are multiple masses in the mesentery as well as omental caking. These are consistent with carcinomatosis. Moderate amount of free fluid throughout the abdomen and pelvis. Moderate right and small left pleural effusions. 10/19  ::CT Abdomen without IV contrast: Mesenteric lymphadenopathy. 10/20  ::CT Chest: No significant lymphadenopathy or consolidation, moderate sized bilateral plerural effusion 10/20  ::GI consulted at [facility] hospital for EUS/ FNA, but ultimately was deferred.  ::CEA and AFP negative, CA 19-9 wnl and  elevated 100.9  Plan  - [ ] Follow up on IR Mesenteric Biopsy done on at OSH10/20  Pain management:  - Tylenol 650 q6h prn (Mild)  - Oxy 5 q6h prn (Mod)  - Oxy 10 q6h prn (Severe)  - IV Dilaudid 0.2 mg q6h prn (Breakthrough)     #Left oropharyngeal mass   #Swollen left cervical lymph node   :: Sarcoma vs Lymphoma vs Infection   ::CT soft  tissue neck with IV contrast: homogeneous soft tissue attenuation lesion in the left oropharynx which measures approximately 3.0 x 4.0 x 5.1 cm. The airway is mildly narrowed and the adjacent parapharyngeal fat is effaced 10/19  Plan  - Will continue with Unasyn concerns for infection  - ENT consult for biopsy of the mass  - SLP recommenced regular w/ thin liquids at OSH w/ no skilled SLP needs  [ ] Blood cultures, pending results  [ ] HIV, pending results     #Constipation  Plan  - Miralax 17g daily  - Doc/Senna BID     F: PRN  E: PRN  N: Regular consistency foods with thin liquids  DVT: Lovenox  A: PIV     Code Status: Full Code (confirmed on admission)   NOK: Extended Emergency Contact Information  Primary Emergency Contact: Deidra Pickett  Home Phone: [phone]  Work Phone: [phone]  Relation: Parent   Deidra(Mother)-[phone]           Luma Trent MD

## 2023-10-22 ENCOUNTER — APPOINTMENT (OUTPATIENT)
Dept: RADIOLOGY | Facility: HOSPITAL | Age: 23
DRG: 824 | End: 2023-10-22
Payer: COMMERCIAL

## 2023-10-22 PROBLEM — J35.1 TONSILLAR HYPERTROPHY, UNILATERAL: Status: ACTIVE | Noted: 2023-10-19

## 2023-10-22 PROBLEM — R22.1 NECK MASS: Status: ACTIVE | Noted: 2023-10-19

## 2023-10-22 LAB
ALBUMIN SERPL BCP-MCNC: 3.6 G/DL (ref 3.4–5)
ANION GAP SERPL CALC-SCNC: 19 MMOL/L (ref 10–20)
BASOPHILS # BLD AUTO: 0.07 X10*3/UL (ref 0–0.1)
BASOPHILS NFR BLD AUTO: 0.8 %
BUN SERPL-MCNC: 20 MG/DL (ref 6–23)
CALCIUM SERPL-MCNC: 9.4 MG/DL (ref 8.6–10.6)
CHLORIDE SERPL-SCNC: 98 MMOL/L (ref 98–107)
CO2 SERPL-SCNC: 24 MMOL/L (ref 21–32)
CREAT SERPL-MCNC: 1.28 MG/DL (ref 0.5–1.3)
EOSINOPHIL # BLD AUTO: 0.07 X10*3/UL (ref 0–0.7)
EOSINOPHIL NFR BLD AUTO: 0.8 %
ERYTHROCYTE [DISTWIDTH] IN BLOOD BY AUTOMATED COUNT: 12.9 % (ref 11.5–14.5)
GFR SERPL CREATININE-BSD FRML MDRD: 80 ML/MIN/1.73M*2
GLUCOSE SERPL-MCNC: 68 MG/DL (ref 74–99)
HCT VFR BLD AUTO: 41.7 % (ref 41–52)
HGB BLD-MCNC: 13.8 G/DL (ref 13.5–17.5)
IMM GRANULOCYTES # BLD AUTO: 0.04 X10*3/UL (ref 0–0.7)
IMM GRANULOCYTES NFR BLD AUTO: 0.5 % (ref 0–0.9)
LYMPHOCYTES # BLD AUTO: 1.34 X10*3/UL (ref 1.2–4.8)
LYMPHOCYTES NFR BLD AUTO: 15.5 %
MAGNESIUM SERPL-MCNC: 1.97 MG/DL (ref 1.6–2.4)
MCH RBC QN AUTO: 28.8 PG (ref 26–34)
MCHC RBC AUTO-ENTMCNC: 33.1 G/DL (ref 32–36)
MCV RBC AUTO: 87 FL (ref 80–100)
MONOCYTES # BLD AUTO: 0.59 X10*3/UL (ref 0.1–1)
MONOCYTES NFR BLD AUTO: 6.8 %
NEUTROPHILS # BLD AUTO: 6.51 X10*3/UL (ref 1.2–7.7)
NEUTROPHILS NFR BLD AUTO: 75.6 %
NRBC BLD-RTO: 0 /100 WBCS (ref 0–0)
PHOSPHATE SERPL-MCNC: 4.5 MG/DL (ref 2.5–4.9)
PLATELET # BLD AUTO: 400 X10*3/UL (ref 150–450)
PMV BLD AUTO: 10.4 FL (ref 7.5–11.5)
POTASSIUM SERPL-SCNC: 4.4 MMOL/L (ref 3.5–5.3)
RBC # BLD AUTO: 4.79 X10*6/UL (ref 4.5–5.9)
SODIUM SERPL-SCNC: 137 MMOL/L (ref 136–145)
WBC # BLD AUTO: 8.6 X10*3/UL (ref 4.4–11.3)

## 2023-10-22 PROCEDURE — G0378 HOSPITAL OBSERVATION PER HR: HCPCS

## 2023-10-22 PROCEDURE — 2500000004 HC RX 250 GENERAL PHARMACY W/ HCPCS (ALT 636 FOR OP/ED)

## 2023-10-22 PROCEDURE — 2500000001 HC RX 250 WO HCPCS SELF ADMINISTERED DRUGS (ALT 637 FOR MEDICARE OP)

## 2023-10-22 PROCEDURE — 80069 RENAL FUNCTION PANEL: CPT | Mod: CMCLAB

## 2023-10-22 PROCEDURE — 36415 COLL VENOUS BLD VENIPUNCTURE: CPT | Mod: CMCLAB

## 2023-10-22 PROCEDURE — 83735 ASSAY OF MAGNESIUM: CPT

## 2023-10-22 PROCEDURE — 70450 CT HEAD/BRAIN W/O DYE: CPT

## 2023-10-22 PROCEDURE — 85025 COMPLETE CBC W/AUTO DIFF WBC: CPT

## 2023-10-22 PROCEDURE — 99232 SBSQ HOSP IP/OBS MODERATE 35: CPT

## 2023-10-22 PROCEDURE — 1170000001 HC PRIVATE ONCOLOGY ROOM DAILY

## 2023-10-22 PROCEDURE — 38500 BIOPSY/REMOVAL LYMPH NODES: CPT

## 2023-10-22 PROCEDURE — 70450 CT HEAD/BRAIN W/O DYE: CPT | Performed by: RADIOLOGY

## 2023-10-22 RX ORDER — POLYETHYLENE GLYCOL 3350 17 G/17G
17 POWDER, FOR SOLUTION ORAL 2 TIMES DAILY PRN
Status: DISCONTINUED | OUTPATIENT
Start: 2023-10-23 | End: 2023-10-23 | Stop reason: HOSPADM

## 2023-10-22 RX ORDER — AMOXICILLIN 250 MG
2 CAPSULE ORAL 2 TIMES DAILY PRN
Status: DISCONTINUED | OUTPATIENT
Start: 2023-10-22 | End: 2023-10-23 | Stop reason: HOSPADM

## 2023-10-22 RX ORDER — POLYETHYLENE GLYCOL 3350 17 G/17G
17 POWDER, FOR SOLUTION ORAL ONCE
Status: COMPLETED | OUTPATIENT
Start: 2023-10-22 | End: 2023-10-22

## 2023-10-22 RX ADMIN — HYDROMORPHONE HYDROCHLORIDE 0.2 MG: 1 INJECTION, SOLUTION INTRAMUSCULAR; INTRAVENOUS; SUBCUTANEOUS at 17:35

## 2023-10-22 RX ADMIN — OXYCODONE HYDROCHLORIDE 5 MG: 5 TABLET ORAL at 12:45

## 2023-10-22 RX ADMIN — ACETAMINOPHEN 975 MG: 325 TABLET ORAL at 20:05

## 2023-10-22 RX ADMIN — SENNOSIDES AND DOCUSATE SODIUM 2 TABLET: 8.6; 5 TABLET ORAL at 17:35

## 2023-10-22 RX ADMIN — OXYCODONE HYDROCHLORIDE 5 MG: 5 TABLET ORAL at 06:15

## 2023-10-22 RX ADMIN — ACETAMINOPHEN 975 MG: 325 TABLET ORAL at 12:45

## 2023-10-22 RX ADMIN — POLYETHYLENE GLYCOL 3350 17 G: 17 POWDER, FOR SOLUTION ORAL at 17:35

## 2023-10-22 RX ADMIN — POLYETHYLENE GLYCOL 3350 17 G: 17 POWDER, FOR SOLUTION ORAL at 08:20

## 2023-10-22 RX ADMIN — SENNOSIDES AND DOCUSATE SODIUM 2 TABLET: 8.6; 5 TABLET ORAL at 08:20

## 2023-10-22 ASSESSMENT — COGNITIVE AND FUNCTIONAL STATUS - GENERAL
MOBILITY SCORE: 24
DAILY ACTIVITIY SCORE: 24

## 2023-10-22 ASSESSMENT — PAIN SCALES - GENERAL
PAINLEVEL_OUTOF10: 6
PAINLEVEL_OUTOF10: 3
PAINLEVEL_OUTOF10: 6
PAINLEVEL_OUTOF10: 5 - MODERATE PAIN
PAINLEVEL_OUTOF10: 6
PAINLEVEL_OUTOF10: 0 - NO PAIN
PAINLEVEL_OUTOF10: 3

## 2023-10-22 ASSESSMENT — PAIN - FUNCTIONAL ASSESSMENT
PAIN_FUNCTIONAL_ASSESSMENT: 0-10

## 2023-10-22 NOTE — CARE PLAN
Problem: Fall/Injury  Goal: Not fall by end of shift  Outcome: Progressing  Goal: Be free from injury by end of the shift  Outcome: Progressing  Goal: Verbalize understanding of personal risk factors for fall in the hospital  Outcome: Progressing  Goal: Verbalize understanding of risk factor reduction measures to prevent injury from fall in the home  Outcome: Progressing  Goal: Use assistive devices by end of the shift  Outcome: Progressing  Goal: Pace activities to prevent fatigue by end of the shift  Outcome: Progressing     Problem: Pain  Goal: My pain/discomfort is manageable  Outcome: Progressing     Problem: Safety  Goal: Patient will be injury free during hospitalization  Outcome: Progressing  Goal: I will remain free of falls  Outcome: Progressing     Problem: Daily Care  Goal: Daily care needs are met  Outcome: Progressing     Problem: Psychosocial Needs  Goal: Demonstrates ability to cope with hospitalization/illness  Outcome: Progressing  Goal: Collaborate with me, my family, and caregiver to identify my specific goals  Outcome: Progressing     Problem: Pain  Goal: Takes deep breaths with improved pain control throughout the shift  Outcome: Progressing  Goal: Turns in bed with improved pain control throughout the shift  Outcome: Progressing  Goal: Walks with improved pain control throughout the shift  Outcome: Progressing  Goal: Performs ADL's with improved pain control throughout shift  Outcome: Progressing  Goal: Participates in PT with improved pain control throughout the shift  Outcome: Progressing  Goal: Free from opioid side effects throughout the shift  Outcome: Progressing  Goal: Free from acute confusion related to pain meds throughout the shift  Outcome: Progressing   The patient's goals for the shift include      The clinical goals for the shift include pt to remain hemodynamically stable through 10/22 at 1900

## 2023-10-22 NOTE — CARE PLAN
Problem: Fall/Injury  Goal: Not fall by end of shift  Outcome: Progressing  Goal: Be free from injury by end of the shift  Outcome: Progressing  Goal: Verbalize understanding of personal risk factors for fall in the hospital  Outcome: Progressing  Goal: Verbalize understanding of risk factor reduction measures to prevent injury from fall in the home  Outcome: Progressing  Goal: Use assistive devices by end of the shift  Outcome: Progressing  Goal: Pace activities to prevent fatigue by end of the shift  Outcome: Progressing     Problem: Pain  Goal: My pain/discomfort is manageable  Outcome: Progressing     Problem: Safety  Goal: Patient will be injury free during hospitalization  Outcome: Progressing  Goal: I will remain free of falls  Outcome: Progressing     Problem: Daily Care  Goal: Daily care needs are met  Outcome: Progressing     Problem: Psychosocial Needs  Goal: Demonstrates ability to cope with hospitalization/illness  Outcome: Progressing  Goal: Collaborate with me, my family, and caregiver to identify my specific goals  Outcome: Progressing     Problem: Pain  Goal: Takes deep breaths with improved pain control throughout the shift  Outcome: Progressing  Goal: Turns in bed with improved pain control throughout the shift  Outcome: Progressing  Goal: Walks with improved pain control throughout the shift  Outcome: Progressing  Goal: Performs ADL's with improved pain control throughout shift  Outcome: Progressing  Goal: Participates in PT with improved pain control throughout the shift  Outcome: Progressing  Goal: Free from opioid side effects throughout the shift  Outcome: Progressing  Goal: Free from acute confusion related to pain meds throughout the shift  Outcome: Progressing   The patient's goals for the shift include      The clinical goals for the shift include pt to have pain level less than 5    Over the shift, the patient did not make progress toward the following goals. Barriers to progression  include pain level of 6. Recommendations to address these barriers include frequent pain assessments.
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
## 2023-10-23 ENCOUNTER — ANESTHESIA (OUTPATIENT)
Dept: OPERATING ROOM | Facility: HOSPITAL | Age: 23
DRG: 824 | End: 2023-10-23
Payer: COMMERCIAL

## 2023-10-23 ENCOUNTER — ANESTHESIA EVENT (OUTPATIENT)
Dept: OPERATING ROOM | Facility: HOSPITAL | Age: 23
DRG: 824 | End: 2023-10-23
Payer: COMMERCIAL

## 2023-10-23 ENCOUNTER — PHARMACY VISIT (OUTPATIENT)
Dept: PHARMACY | Facility: CLINIC | Age: 23
End: 2023-10-23
Payer: MEDICARE

## 2023-10-23 VITALS
TEMPERATURE: 97.7 F | HEIGHT: 73 IN | SYSTOLIC BLOOD PRESSURE: 153 MMHG | OXYGEN SATURATION: 96 % | DIASTOLIC BLOOD PRESSURE: 74 MMHG | HEART RATE: 94 BPM | BODY MASS INDEX: 21 KG/M2 | RESPIRATION RATE: 18 BRPM

## 2023-10-23 LAB
ALBUMIN SERPL BCP-MCNC: 3.9 G/DL (ref 3.4–5)
ANION GAP SERPL CALC-SCNC: 20 MMOL/L (ref 10–20)
BASOPHILS # BLD AUTO: 0.08 X10*3/UL (ref 0–0.1)
BASOPHILS NFR BLD AUTO: 0.8 %
BUN SERPL-MCNC: 21 MG/DL (ref 6–23)
CALCIUM SERPL-MCNC: 9.9 MG/DL (ref 8.6–10.6)
CHLORIDE SERPL-SCNC: 95 MMOL/L (ref 98–107)
CO2 SERPL-SCNC: 26 MMOL/L (ref 21–32)
CREAT SERPL-MCNC: 1.3 MG/DL (ref 0.5–1.3)
EOSINOPHIL # BLD AUTO: 0.09 X10*3/UL (ref 0–0.7)
EOSINOPHIL NFR BLD AUTO: 0.9 %
ERYTHROCYTE [DISTWIDTH] IN BLOOD BY AUTOMATED COUNT: 12.7 % (ref 11.5–14.5)
GFR SERPL CREATININE-BSD FRML MDRD: 79 ML/MIN/1.73M*2
GLUCOSE SERPL-MCNC: 72 MG/DL (ref 74–99)
HCT VFR BLD AUTO: 42.5 % (ref 41–52)
HGB BLD-MCNC: 14 G/DL (ref 13.5–17.5)
IMM GRANULOCYTES # BLD AUTO: 0.05 X10*3/UL (ref 0–0.7)
IMM GRANULOCYTES NFR BLD AUTO: 0.5 % (ref 0–0.9)
LYMPHOCYTES # BLD AUTO: 1.48 X10*3/UL (ref 1.2–4.8)
LYMPHOCYTES NFR BLD AUTO: 15.4 %
MAGNESIUM SERPL-MCNC: 2.04 MG/DL (ref 1.6–2.4)
MCH RBC QN AUTO: 29.3 PG (ref 26–34)
MCHC RBC AUTO-ENTMCNC: 32.9 G/DL (ref 32–36)
MCV RBC AUTO: 89 FL (ref 80–100)
MONOCYTES # BLD AUTO: 0.56 X10*3/UL (ref 0.1–1)
MONOCYTES NFR BLD AUTO: 5.8 %
NEUTROPHILS # BLD AUTO: 7.38 X10*3/UL (ref 1.2–7.7)
NEUTROPHILS NFR BLD AUTO: 76.6 %
NRBC BLD-RTO: 0 /100 WBCS (ref 0–0)
PHOSPHATE SERPL-MCNC: 4.6 MG/DL (ref 2.5–4.9)
PLATELET # BLD AUTO: 454 X10*3/UL (ref 150–450)
PMV BLD AUTO: 10.1 FL (ref 7.5–11.5)
POTASSIUM SERPL-SCNC: 5 MMOL/L (ref 3.5–5.3)
RBC # BLD AUTO: 4.78 X10*6/UL (ref 4.5–5.9)
SODIUM SERPL-SCNC: 136 MMOL/L (ref 136–145)
WBC # BLD AUTO: 9.6 X10*3/UL (ref 4.4–11.3)

## 2023-10-23 PROCEDURE — 3700000002 HC GENERAL ANESTHESIA TIME - EACH INCREMENTAL 1 MINUTE: Performed by: OTOLARYNGOLOGY

## 2023-10-23 PROCEDURE — 99239 HOSP IP/OBS DSCHRG MGMT >30: CPT

## 2023-10-23 PROCEDURE — A4217 STERILE WATER/SALINE, 500 ML: HCPCS | Performed by: OTOLARYNGOLOGY

## 2023-10-23 PROCEDURE — 88185 FLOWCYTOMETRY/TC ADD-ON: CPT | Mod: TC | Performed by: STUDENT IN AN ORGANIZED HEALTH CARE EDUCATION/TRAINING PROGRAM

## 2023-10-23 PROCEDURE — 2780000003 HC OR 278 NO HCPCS: Performed by: OTOLARYNGOLOGY

## 2023-10-23 PROCEDURE — 88271 CYTOGENETICS DNA PROBE: CPT | Performed by: OTOLARYNGOLOGY

## 2023-10-23 PROCEDURE — G0378 HOSPITAL OBSERVATION PER HR: HCPCS

## 2023-10-23 PROCEDURE — 2500000004 HC RX 250 GENERAL PHARMACY W/ HCPCS (ALT 636 FOR OP/ED): Performed by: OTOLARYNGOLOGY

## 2023-10-23 PROCEDURE — 7100000001 HC RECOVERY ROOM TIME - INITIAL BASE CHARGE: Performed by: OTOLARYNGOLOGY

## 2023-10-23 PROCEDURE — A31535 PR LARYNGOSCOPY,DIRCT,OP,BIOPSY: Performed by: ANESTHESIOLOGY

## 2023-10-23 PROCEDURE — 88342 IMHCHEM/IMCYTCHM 1ST ANTB: CPT | Mod: TC,59,SUR | Performed by: OTOLARYNGOLOGY

## 2023-10-23 PROCEDURE — 2500000004 HC RX 250 GENERAL PHARMACY W/ HCPCS (ALT 636 FOR OP/ED)

## 2023-10-23 PROCEDURE — 2500000001 HC RX 250 WO HCPCS SELF ADMINISTERED DRUGS (ALT 637 FOR MEDICARE OP)

## 2023-10-23 PROCEDURE — A31535 PR LARYNGOSCOPY,DIRCT,OP,BIOPSY

## 2023-10-23 PROCEDURE — 80069 RENAL FUNCTION PANEL: CPT

## 2023-10-23 PROCEDURE — 7100000002 HC RECOVERY ROOM TIME - EACH INCREMENTAL 1 MINUTE: Performed by: OTOLARYNGOLOGY

## 2023-10-23 PROCEDURE — 31535 LARYNGOSCOPY W/BIOPSY: CPT | Performed by: OTOLARYNGOLOGY

## 2023-10-23 PROCEDURE — 38510 BIOPSY/REMOVAL LYMPH NODES: CPT | Performed by: OTOLARYNGOLOGY

## 2023-10-23 PROCEDURE — RXMED WILLOW AMBULATORY MEDICATION CHARGE

## 2023-10-23 PROCEDURE — 85025 COMPLETE CBC W/AUTO DIFF WBC: CPT

## 2023-10-23 PROCEDURE — 88291 CYTO/MOLECULAR REPORT: CPT | Performed by: OTOLARYNGOLOGY

## 2023-10-23 PROCEDURE — 83735 ASSAY OF MAGNESIUM: CPT

## 2023-10-23 PROCEDURE — 3600000008 HC OR TIME - EACH INCREMENTAL 1 MINUTE - PROCEDURE LEVEL THREE: Performed by: OTOLARYNGOLOGY

## 2023-10-23 PROCEDURE — 88342 IMHCHEM/IMCYTCHM 1ST ANTB: CPT | Performed by: PATHOLOGY

## 2023-10-23 PROCEDURE — 3700000001 HC GENERAL ANESTHESIA TIME - INITIAL BASE CHARGE: Performed by: OTOLARYNGOLOGY

## 2023-10-23 PROCEDURE — 2500000005 HC RX 250 GENERAL PHARMACY W/O HCPCS

## 2023-10-23 PROCEDURE — 88331 PATH CONSLTJ SURG 1 BLK 1SPC: CPT | Performed by: PATHOLOGY

## 2023-10-23 PROCEDURE — 2500000001 HC RX 250 WO HCPCS SELF ADMINISTERED DRUGS (ALT 637 FOR MEDICARE OP): Performed by: OTOLARYNGOLOGY

## 2023-10-23 PROCEDURE — 88305 TISSUE EXAM BY PATHOLOGIST: CPT | Performed by: PATHOLOGY

## 2023-10-23 PROCEDURE — 42800 BIOPSY OF THROAT: CPT | Performed by: OTOLARYNGOLOGY

## 2023-10-23 PROCEDURE — 88341 IMHCHEM/IMCYTCHM EA ADD ANTB: CPT | Performed by: PATHOLOGY

## 2023-10-23 PROCEDURE — 3600000003 HC OR TIME - INITIAL BASE CHARGE - PROCEDURE LEVEL THREE: Performed by: OTOLARYNGOLOGY

## 2023-10-23 PROCEDURE — 88189 FLOWCYTOMETRY/READ 16 & >: CPT | Performed by: STUDENT IN AN ORGANIZED HEALTH CARE EDUCATION/TRAINING PROGRAM

## 2023-10-23 PROCEDURE — 36415 COLL VENOUS BLD VENIPUNCTURE: CPT | Mod: CMCLAB

## 2023-10-23 RX ORDER — ACETAMINOPHEN 325 MG/1
650 TABLET ORAL EVERY 6 HOURS PRN
Qty: 30 TABLET | Refills: 0 | Status: SHIPPED | OUTPATIENT
Start: 2023-10-23 | End: 2023-10-23 | Stop reason: SDUPTHER

## 2023-10-23 RX ORDER — ONDANSETRON HYDROCHLORIDE 2 MG/ML
4 INJECTION, SOLUTION INTRAVENOUS EVERY 4 HOURS PRN
Status: DISCONTINUED | OUTPATIENT
Start: 2023-10-23 | End: 2023-10-23 | Stop reason: HOSPADM

## 2023-10-23 RX ORDER — OXYMETAZOLINE HCL 0.05 %
SPRAY, NON-AEROSOL (ML) NASAL AS NEEDED
Status: DISCONTINUED | OUTPATIENT
Start: 2023-10-23 | End: 2023-10-23 | Stop reason: HOSPADM

## 2023-10-23 RX ORDER — OXYCODONE HYDROCHLORIDE 5 MG/1
5 TABLET ORAL EVERY 6 HOURS PRN
Qty: 15 TABLET | Refills: 0
Start: 2023-10-23 | End: 2023-11-11 | Stop reason: HOSPADM

## 2023-10-23 RX ORDER — PROPOFOL 10 MG/ML
INJECTION, EMULSION INTRAVENOUS AS NEEDED
Status: DISCONTINUED | OUTPATIENT
Start: 2023-10-23 | End: 2023-10-23

## 2023-10-23 RX ORDER — FENTANYL CITRATE 50 UG/ML
INJECTION, SOLUTION INTRAMUSCULAR; INTRAVENOUS AS NEEDED
Status: DISCONTINUED | OUTPATIENT
Start: 2023-10-23 | End: 2023-10-23

## 2023-10-23 RX ORDER — POLYETHYLENE GLYCOL 3350 17 G/17G
17 POWDER, FOR SOLUTION ORAL 2 TIMES DAILY PRN
Qty: 60 PACKET | Refills: 0 | Status: SHIPPED | OUTPATIENT
Start: 2023-10-23 | End: 2023-11-24 | Stop reason: HOSPADM

## 2023-10-23 RX ORDER — MIDAZOLAM HYDROCHLORIDE 1 MG/ML
INJECTION INTRAMUSCULAR; INTRAVENOUS AS NEEDED
Status: DISCONTINUED | OUTPATIENT
Start: 2023-10-23 | End: 2023-10-23

## 2023-10-23 RX ORDER — LIDOCAINE HYDROCHLORIDE 10 MG/ML
0.1 INJECTION, SOLUTION EPIDURAL; INFILTRATION; INTRACAUDAL; PERINEURAL ONCE
Status: DISCONTINUED | OUTPATIENT
Start: 2023-10-23 | End: 2023-10-23 | Stop reason: HOSPADM

## 2023-10-23 RX ORDER — ONDANSETRON HYDROCHLORIDE 2 MG/ML
4 INJECTION, SOLUTION INTRAVENOUS ONCE AS NEEDED
Status: DISCONTINUED | OUTPATIENT
Start: 2023-10-23 | End: 2023-10-23 | Stop reason: HOSPADM

## 2023-10-23 RX ORDER — NALOXONE HYDROCHLORIDE 0.4 MG/ML
0.4 INJECTION, SOLUTION INTRAMUSCULAR; INTRAVENOUS; SUBCUTANEOUS ONCE
Status: DISCONTINUED | OUTPATIENT
Start: 2023-10-23 | End: 2023-11-11 | Stop reason: HOSPADM

## 2023-10-23 RX ORDER — SODIUM CHLORIDE, SODIUM LACTATE, POTASSIUM CHLORIDE, CALCIUM CHLORIDE 600; 310; 30; 20 MG/100ML; MG/100ML; MG/100ML; MG/100ML
100 INJECTION, SOLUTION INTRAVENOUS CONTINUOUS
Status: DISCONTINUED | OUTPATIENT
Start: 2023-10-23 | End: 2023-10-23 | Stop reason: HOSPADM

## 2023-10-23 RX ORDER — OXYCODONE HYDROCHLORIDE 10 MG/1
10 TABLET ORAL EVERY 6 HOURS PRN
Qty: 15 TABLET | Refills: 0 | Status: SHIPPED | OUTPATIENT
Start: 2023-10-23 | End: 2023-10-23 | Stop reason: SDUPTHER

## 2023-10-23 RX ORDER — SODIUM CHLORIDE 0.9 G/100ML
IRRIGANT IRRIGATION AS NEEDED
Status: DISCONTINUED | OUTPATIENT
Start: 2023-10-23 | End: 2023-10-23 | Stop reason: HOSPADM

## 2023-10-23 RX ORDER — ROCURONIUM BROMIDE 10 MG/ML
INJECTION, SOLUTION INTRAVENOUS AS NEEDED
Status: DISCONTINUED | OUTPATIENT
Start: 2023-10-23 | End: 2023-10-23

## 2023-10-23 RX ORDER — DEXAMETHASONE SODIUM PHOSPHATE 4 MG/ML
INJECTION, SOLUTION INTRA-ARTICULAR; INTRALESIONAL; INTRAMUSCULAR; INTRAVENOUS; SOFT TISSUE AS NEEDED
Status: DISCONTINUED | OUTPATIENT
Start: 2023-10-23 | End: 2023-10-23

## 2023-10-23 RX ORDER — ACETAMINOPHEN 325 MG/1
650 TABLET ORAL EVERY 6 HOURS PRN
Qty: 30 TABLET | Refills: 0 | Status: SHIPPED | OUTPATIENT
Start: 2023-10-23 | End: 2024-01-23 | Stop reason: HOSPADM

## 2023-10-23 RX ORDER — ONDANSETRON 4 MG/1
4 TABLET, FILM COATED ORAL EVERY 8 HOURS PRN
Status: DISCONTINUED | OUTPATIENT
Start: 2023-10-23 | End: 2024-04-05 | Stop reason: HOSPADM

## 2023-10-23 RX ORDER — OXYCODONE HYDROCHLORIDE 5 MG/1
5 TABLET ORAL EVERY 6 HOURS PRN
Qty: 15 TABLET | Refills: 0 | Status: SHIPPED | OUTPATIENT
Start: 2023-10-23 | End: 2023-10-23 | Stop reason: SDUPTHER

## 2023-10-23 RX ORDER — CEFAZOLIN SODIUM 1 G/50ML
SOLUTION INTRAVENOUS AS NEEDED
Status: DISCONTINUED | OUTPATIENT
Start: 2023-10-23 | End: 2023-10-23

## 2023-10-23 RX ORDER — ESMOLOL HYDROCHLORIDE 10 MG/ML
INJECTION INTRAVENOUS AS NEEDED
Status: DISCONTINUED | OUTPATIENT
Start: 2023-10-23 | End: 2023-10-23

## 2023-10-23 RX ORDER — AMOXICILLIN 250 MG
2 CAPSULE ORAL 2 TIMES DAILY PRN
Qty: 60 TABLET | Refills: 0 | Status: SHIPPED | OUTPATIENT
Start: 2023-10-23 | End: 2024-04-05 | Stop reason: HOSPADM

## 2023-10-23 RX ORDER — ONDANSETRON HYDROCHLORIDE 2 MG/ML
4 INJECTION, SOLUTION INTRAVENOUS EVERY 4 HOURS PRN
Status: CANCELLED | OUTPATIENT
Start: 2023-10-23

## 2023-10-23 RX ORDER — OXYCODONE HYDROCHLORIDE 5 MG/1
5 TABLET ORAL EVERY 4 HOURS PRN
Status: DISCONTINUED | OUTPATIENT
Start: 2023-10-23 | End: 2023-10-23 | Stop reason: HOSPADM

## 2023-10-23 RX ORDER — LIDOCAINE HYDROCHLORIDE 40 MG/ML
INJECTION, SOLUTION RETROBULBAR AS NEEDED
Status: DISCONTINUED | OUTPATIENT
Start: 2023-10-23 | End: 2023-10-23

## 2023-10-23 RX ORDER — OXYCODONE HYDROCHLORIDE 10 MG/1
10 TABLET ORAL EVERY 6 HOURS PRN
Qty: 15 TABLET | Refills: 0
Start: 2023-10-23 | End: 2023-10-26 | Stop reason: SDUPTHER

## 2023-10-23 RX ADMIN — DEXMEDETOMIDINE HYDROCHLORIDE 8 MCG: 100 INJECTION, SOLUTION INTRAVENOUS at 09:38

## 2023-10-23 RX ADMIN — ROCURONIUM BROMIDE 60 MG: 50 INJECTION, SOLUTION INTRAVENOUS at 08:32

## 2023-10-23 RX ADMIN — FENTANYL CITRATE 100 MCG: 50 INJECTION, SOLUTION INTRAMUSCULAR; INTRAVENOUS at 08:32

## 2023-10-23 RX ADMIN — HYDROMORPHONE HYDROCHLORIDE 0.2 MG: 1 INJECTION, SOLUTION INTRAMUSCULAR; INTRAVENOUS; SUBCUTANEOUS at 01:59

## 2023-10-23 RX ADMIN — CEFAZOLIN SODIUM 2 G: 1 INJECTION, SOLUTION INTRAVENOUS at 08:40

## 2023-10-23 RX ADMIN — LIDOCAINE HYDROCHLORIDE 100 MG: 40 INJECTION, SOLUTION RETROBULBAR; TOPICAL at 08:31

## 2023-10-23 RX ADMIN — OXYCODONE HYDROCHLORIDE 5 MG: 5 TABLET ORAL at 01:28

## 2023-10-23 RX ADMIN — ROCURONIUM BROMIDE 10 MG: 50 INJECTION, SOLUTION INTRAVENOUS at 08:51

## 2023-10-23 RX ADMIN — PROPOFOL 200 MG: 10 INJECTION, EMULSION INTRAVENOUS at 08:32

## 2023-10-23 RX ADMIN — ONDANSETRON 4 MG: 2 INJECTION INTRAMUSCULAR; INTRAVENOUS at 09:30

## 2023-10-23 RX ADMIN — ESMOLOL HYDROCHLORIDE 50 MG: 10 INJECTION, SOLUTION INTRAVENOUS at 08:36

## 2023-10-23 RX ADMIN — ESMOLOL HYDROCHLORIDE 30 MG: 10 INJECTION, SOLUTION INTRAVENOUS at 10:09

## 2023-10-23 RX ADMIN — DEXMEDETOMIDINE HYDROCHLORIDE 8 MCG: 100 INJECTION, SOLUTION INTRAVENOUS at 09:50

## 2023-10-23 RX ADMIN — DEXAMETHASONE SODIUM PHOSPHATE 10 MG: 4 INJECTION, SOLUTION INTRA-ARTICULAR; INTRALESIONAL; INTRAMUSCULAR; INTRAVENOUS; SOFT TISSUE at 08:41

## 2023-10-23 RX ADMIN — DEXMEDETOMIDINE HYDROCHLORIDE 8 MCG: 100 INJECTION, SOLUTION INTRAVENOUS at 08:45

## 2023-10-23 RX ADMIN — ROCURONIUM BROMIDE 10 MG: 50 INJECTION, SOLUTION INTRAVENOUS at 09:29

## 2023-10-23 RX ADMIN — SODIUM CHLORIDE, SODIUM LACTATE, POTASSIUM CHLORIDE, AND CALCIUM CHLORIDE: 600; 310; 30; 20 INJECTION, SOLUTION INTRAVENOUS at 08:22

## 2023-10-23 RX ADMIN — SUGAMMADEX 200 MG: 100 INJECTION, SOLUTION INTRAVENOUS at 10:24

## 2023-10-23 RX ADMIN — MIDAZOLAM HYDROCHLORIDE 2 MG: 1 INJECTION, SOLUTION INTRAMUSCULAR; INTRAVENOUS at 08:22

## 2023-10-23 SDOH — HEALTH STABILITY: MENTAL HEALTH: CURRENT SMOKER: 0

## 2023-10-23 ASSESSMENT — PAIN - FUNCTIONAL ASSESSMENT
PAIN_FUNCTIONAL_ASSESSMENT: 0-10

## 2023-10-23 ASSESSMENT — COGNITIVE AND FUNCTIONAL STATUS - GENERAL
DAILY ACTIVITIY SCORE: 24
MOBILITY SCORE: 24

## 2023-10-23 ASSESSMENT — PAIN SCALES - GENERAL
PAINLEVEL_OUTOF10: 6
PAINLEVEL_OUTOF10: 0 - NO PAIN
PAINLEVEL_OUTOF10: 0 - NO PAIN
PAINLEVEL_OUTOF10: 6
PAIN_LEVEL: 0
PAINLEVEL_OUTOF10: 0 - NO PAIN
PAINLEVEL_OUTOF10: 0 - NO PAIN

## 2023-10-23 NOTE — OP NOTE
Operative Report   Shilo Thakkar  [id number]  2000    [facility]   10/23/23    Preoperative Diagnosis:   Cervical LAD  Tonsil mass    Postoperative Diagnosis:  1. Same    Procedure:   1. Direct laryngoscopy with biopsy  2. Left incisional lymph node biopsy  3. Incisional biopsy left oropharynx    Attending Surgeon:   Pradip Huber MD    Resident Surgeon:   Ian Larson MD; Pedro Arredondo DO    Anesthesia:   GETA     Estimated Blood Loss:   25cc    Complications:  None     Findings:   Large fleshy appearing tonsil mass and neck lymph node  Frozen positive for lymphoid tissue, additional specimen sent for lymphoma protocol and flow cytometry    Operative Indications:   Patient is a 23 year old with a tonsil mass, pancreatic mass, inguinal lymph nodes concerning for multiple primaries vs . After discussion of all the risks, benefits, indications and alternatives to the planned surgery in clinic patient was in agreement with proceeding with operative intervention. These risks were again reviewed in the pre-operative area and informed consent was signed by the patient.    Procedure in Detail:   Patient was seen and evaluated in the pre-operative area. Informed consent was obtained as described above. Patient was then taken to the operative room by the anesthesia team. General anesthesia was induced, and patient was orotracheally intubated without issue. Patient was then turned 90 degrees towards the ENT team. Time out was performed by Dr. Huber.    Tooth guard was placed over the maxillary dentition.  The Dedo laryngoscope was then introduced to the right side of the mouth and all sites of the upper aerodigestive tract were inspected.  There appeared to be a large exophytic mass of the left tonsil.  Multiple biopsies were sent from this mass.  Frozen showed lymphoid tissue.  We took additional biopsies that were sent for fresh lymphoma protocol and flow cytometry.  We then incised a portion of the tonsillar tissue using a  15 blade.  This was also sent for lymphoma protocol.  An Afrin-soaked tonsil was used to pack the mouth.  Hemostasis was achieved with a combination of suction monopolar and bipolar cautery.    A 3 cm incision in the left neck was designed 2 fingerbreadths beneath the mandible.  This was overlying the left neck lymph node.  This was infiltrated with 1% lidocaine with epinephrine.  The patient was then prepped and draped in the usual sterile fashion.  15 blade was used to incise the skin, Bovie electrocautery was used to dissect down to the subplatysmal layer.  Small superior subplatysmal flap was created.  We then used a combination of blunt dissection and bipolar cautery to free up the level 2 lymph node.  The lymph node in question was quite large, so decision was made to perform an incisional biopsy.  A 15 blade was used to incise the inferior half of the lymph node with preservation of the lymphoid capsule.  This was then sent for fresh pathology for lymphoma protocol.  Hemostasis was then achieved with bipolar cautery.  The wound was copiously irrigated.  Small amount of surgical fibrillar was placed in the wound bed.  Incision was then closed in layers using 3-0 Vicryl for the platysma and deep layers and 4-0 Monocryl for the skin.  Mastisol and Steri-Strips were applied.  A pressure dressing was then placed over the incision.    Patient was then turned back to anesthesia, woken up, activated, and transported to PACU in stable addition.  All counts were correct and there were no complications.    Dr. Huber was present for the critical portions of the procedure.

## 2023-10-23 NOTE — ANESTHESIA PROCEDURE NOTES
Peripheral IV  Date/Time: 10/23/2023 8:00 AM  Inserted by: DENIZ Clark    Placement  Needle size: 20 G  Laterality: right  Location: hand  Site prep: alcohol  Technique: anatomical landmarks  Attempts: 1

## 2023-10-23 NOTE — ANESTHESIA POSTPROCEDURE EVALUATION
Patient: Shilo Thakkar    Procedure Summary       Date: 10/23/23 Room / Location: [facility] OR 04 / [facility] OR    Anesthesia Start: 0822 Anesthesia Stop: 1048    Procedures:       Biopsy Lymph Node Head/Neck (Left)      Tonsillectomy (Left) Diagnosis:       Neck mass      Tonsillar hypertrophy, unilateral      (Neck mass [R22.1])      (Tonsillar hypertrophy, unilateral [J35.1])    Surgeons: Pradip Huber MD Responsible Provider: Henry Bose MD    Anesthesia Type: general ASA Status: 2            Anesthesia Type: general    Vitals Value Taken Time   /86 10/23/23 1052   Temp 36 10/23/23 1052   Pulse 96 10/23/23 1045   Resp 17 10/23/23 1045   SpO2 95 % 10/23/23 1045   Vitals shown include unvalidated device data.    Anesthesia Post Evaluation    Patient location during evaluation: bedside  Patient participation: complete - patient cannot participate  Level of consciousness: sleepy but conscious  Pain score: 0  Pain management: adequate  Airway patency: patent  Cardiovascular status: acceptable  Respiratory status: acceptable  Hydration status: acceptable      No notable events documented.

## 2023-10-23 NOTE — NURSING NOTE
10/23/2023 1621    Shilo Thakkar discharged to home by parents in private car. Patient was educated on care for biopsy site and medications. Patient belongings with patient. Scripts given to patient.     Jazzy barros RN & Chris Smith

## 2023-10-23 NOTE — INTERVAL H&P NOTE
H&P reviewed. Plan for dl and biopsy of tonsillar mass. Possible lymph node biopsy. The patient was examined and there are no changes to the H&P.

## 2023-10-23 NOTE — ANESTHESIA PROCEDURE NOTES
Airway  Date/Time: 10/23/2023 8:34 AM  Urgency: elective    Airway not difficult    Staffing  Performed: DENIZ   Authorized by: Henry Bose MD    Performed by: DENIZ Clark  Patient location during procedure: OR    Indications and Patient Condition  Indications for airway management: anesthesia  Spontaneous Ventilation: absent  Sedation level: deep  Preoxygenated: yes  Patient position: sniffing  Mask difficulty assessment: 1 - vent by mask    Final Airway Details  Final airway type: endotracheal airway      Successful airway: ETT  Cuffed: yes   Successful intubation technique: video laryngoscopy  Facilitating devices/methods: intubating stylet  Endotracheal tube insertion site: oral  Blade size: #4  ETT size (mm): 6.5  Cormack-Lehane Classification: grade I - full view of glottis  Placement verified by: chest auscultation, capnometry and palpation of cuff   Measured from: lips  ETT to lips (cm): 21  Number of attempts at approach: 1  Number of other approaches attempted: 0

## 2023-10-23 NOTE — ANESTHESIA PREPROCEDURE EVALUATION
Patient: Shilo Thakkar    Procedure Information       Date/Time: 10/23/23 0735    Procedures:       Biopsy Lymph Node Head/Neck (Left)      Tonsillectomy (Left)    Location: [facility] OR 04 / [facility] OR    Surgeons: Pradip Huber MD            Relevant Problems   GI/Hepatic   (+) Metastasis from pancreatic cancer (CMS/HCC)   (+) Pancreatic cancer (CMS/HCC)       Clinical information reviewed:   Tobacco  Allergies  Meds  Problems  Med Hx  Surg Hx   Fam Hx  Soc   Hx        NPO Detail:  NPO/Void Status  Date of Last Liquid: 10/23/23  Time of Last Liquid: 0000  Date of Last Solid: 10/23/23  Time of Last Solid: 0000         Physical Exam    Airway  Mallampati: III  Comments: Mass on left posterior throat   Cardiovascular - normal exam     Dental    Pulmonary - normal exam     Abdominal - normal exam           Anesthesia Plan    ASA 2     general     The patient is not a current smoker.    intravenous induction   Postoperative administration of opioids is intended.  Anesthetic plan and risks discussed with patient.  Use of blood products discussed with patient who.    Plan discussed with CAA and attending.

## 2023-10-23 NOTE — CARE PLAN
Problem: Fall/Injury  Goal: Not fall by end of shift  Outcome: Progressing  Goal: Be free from injury by end of the shift  Outcome: Progressing  Goal: Verbalize understanding of personal risk factors for fall in the hospital  Outcome: Progressing  Goal: Verbalize understanding of risk factor reduction measures to prevent injury from fall in the home  Outcome: Progressing  Goal: Use assistive devices by end of the shift  Outcome: Progressing  Goal: Pace activities to prevent fatigue by end of the shift  Outcome: Progressing     Problem: Pain  Goal: My pain/discomfort is manageable  Outcome: Progressing     Problem: Safety  Goal: Patient will be injury free during hospitalization  Outcome: Progressing  Goal: I will remain free of falls  Outcome: Progressing     Problem: Daily Care  Goal: Daily care needs are met  Outcome: Progressing     Problem: Psychosocial Needs  Goal: Demonstrates ability to cope with hospitalization/illness  Outcome: Progressing  Goal: Collaborate with me, my family, and caregiver to identify my specific goals  Outcome: Progressing     Problem: Pain  Goal: Takes deep breaths with improved pain control throughout the shift  Outcome: Progressing  Goal: Turns in bed with improved pain control throughout the shift  Outcome: Progressing  Goal: Walks with improved pain control throughout the shift  Outcome: Progressing  Goal: Performs ADL's with improved pain control throughout shift  Outcome: Progressing  Goal: Participates in PT with improved pain control throughout the shift  Outcome: Progressing  Goal: Free from opioid side effects throughout the shift  Outcome: Progressing  Goal: Free from acute confusion related to pain meds throughout the shift  Outcome: Progressing

## 2023-10-23 NOTE — HOSPITAL COURSE
Shilo Thakkar, is a 23 year old Male with a PMH only notable for Colles' fracture of right radius (4/2015), fracture of navicular (scaphoid) bone of left wrist (12/2015) who presents as transfer from [facility] to [facility] with abdominal pain, weight loss, pancreatic body mass, mesentery masses and left sided neck mass concerning for two primary cancers vs pancreatic malignancy vs lymphoma vs sarcoma vs infection. Patient was started on Unasyn at OSH, which was discontinued on arrival, unlikely patient OM is of an infectious etiology. Blood cultures showed no growth to date. HIV nonreactive, LDH 1,032, Uric acid 7.7 10/22. CEA and AFP negative, CA 19-9 wnl and  elevated 100.9 HCG <3 10/21. CT AP with IV contrast: There is a mass in the body of the pancreas which likely represents pancreatic cancer. There are multiple masses in the mesentery as well as omental caking. These are consistent with carcinomatosis. Moderate amount of free fluid throughout the abdomen and pelvis. Moderate right and small left pleural effusions.10/19. CT Abdomen without IV contrast: Mesenteric lymphadenopathy 10/20. CT Chest: No significant lymphadenopathy or consolidation, moderate sized bilateral plerural effusion 10/20. Patient bp in the 150s and bradycardic, concerns for mets to the brain, CTH non contrast: No acute intracranial hemorrhage or mass effect. MRI with contrast is more sensitive to evaluate for intracranial metastatic disease 10/22. Biopsy of the left OM biopsy done by ENT 10/23, pending results.  Mesenteric node FNA done at OSH10/20, pending results. Patient abdominal pain was managed with tylenol 675 TID PRN, Oxy 5 mg and 10 mg for mod-severe pain q6h PRN reducing pain to 1-2/10. Nausea managed with zofran q6h PRN.

## 2023-10-23 NOTE — DISCHARGE SUMMARY
Discharge Diagnosis  Pancreatic cancer (CMS/HCC)    Issues Requiring Follow-Up  - Pathology report of Mesentery lymph node 10/20  - Pathology report of the lymph node on the left side of the neck  - Blood cultures NGTD    Test Results Pending At Discharge  Pending Labs       Order Current Status    Surgical Pathology Exam In process    Blood Culture Preliminary result    Blood Culture Preliminary result            Hospital Course  Shilo Thakkar, is a 23 year old Male with a PMH only notable for Colles' fracture of right radius (4/2015), fracture of navicular (scaphoid) bone of left wrist (12/2015) who presents as transfer from [facility] to [facility] with abdominal pain, weight loss, pancreatic body mass, mesentery masses and left sided neck mass concerning for two primary cancers vs pancreatic malignancy vs lymphoma vs sarcoma vs infection. Patient was started on Unasyn at OSH, which was discontinued on arrival, unlikely patient OM is of an infectious etiology. Blood cultures showed no growth to date. HIV nonreactive, LDH 1,032, Uric acid 7.7 10/22. CEA and AFP negative, CA 19-9 wnl and  elevated 100.9 HCG <3 10/21. CT AP with IV contrast: There is a mass in the body of the pancreas which likely represents pancreatic cancer. There are multiple masses in the mesentery as well as omental caking. These are consistent with carcinomatosis. Moderate amount of free fluid throughout the abdomen and pelvis. Moderate right and small left pleural effusions.10/19. CT Abdomen without IV contrast: Mesenteric lymphadenopathy 10/20. CT Chest: No significant lymphadenopathy or consolidation, moderate sized bilateral plerural effusion 10/20. Patient bp in the 150s and bradycardic, concerns for mets to the brain, CTH non contrast: No acute intracranial hemorrhage or mass effect. MRI with contrast is more sensitive to evaluate for intracranial metastatic disease 10/22. Biopsy of the left OM biopsy done by ENT 10/23, pending results.   Mesenteric node FNA done at OSH10/20, pending results. Patient abdominal pain was managed with tylenol 675 TID PRN, Oxy 5 mg and 10 mg for mod-severe pain q6h PRN reducing pain to 1-2/10. Nausea managed with zofran q6h PRN.        Pertinent Physical Exam At Time of Discharge  Physical Exam  Constitutional:       General: He is not in acute distress.     Appearance: Normal appearance.   HENT:      Head: Normocephalic and atraumatic.      Comments: Swollen left cervical lymph node not tender to touch, fixed and hard.   Left oropharyngeal mass.      Nose: Nose normal.   Eyes:      Pupils: Pupils are equal, round, and reactive to light.   Cardiovascular:      Rate and Rhythm: Normal rate and regular rhythm.      Pulses: Normal pulses.      Heart sounds: Normal heart sounds. No murmur heard.  Pulmonary:      Effort: Pulmonary effort is normal. No respiratory distress.      Breath sounds: Normal breath sounds.   Abdominal:      General: Abdomen is flat. Bowel sounds are normal. There is no distension.      Palpations: Abdomen is soft. There is no mass.      Tenderness: There is abdominal tenderness. There is no guarding.   Musculoskeletal:      Right lower leg: No edema.      Left lower leg: No edema.   Skin:     General: Skin is warm and dry.   Neurological:      General: No focal deficit present.      Mental Status: He is alert and oriented to person, place, and time.   Psychiatric:         Mood and Affect: Mood normal.         Behavior: Behavior normal.        #Pancreatic body mass  #Mesentery masses concerning for carcinomatosis   #Abdominal Pain  ::CT AP with IV contrast: There is a mass in the body of the pancreas which likely represents pancreatic cancer. There are multiple masses in the mesentery as well as omental caking. These are consistent with carcinomatosis. Moderate amount of free fluid throughout the abdomen and pelvis. Moderate right and small left pleural effusions. 10/19  ::CT Abdomen without IV  contrast: Mesenteric lymphadenopathy. 10/20  ::CT Chest: No significant lymphadenopathy or consolidation, moderate sized bilateral plerural effusion 10/20  ::GI consulted at OSH hospital for EUS/ FNA, but ultimately was deferred.  ::CEA and AFP negative, CA 19-9 wnl and  elevated 100.9  Plan  - CTH non contrast: No acute intracranial hemorrhage or mass effect. MRI with contrast is more sensitive to evaluate for intracranial metastatic disease. 10/22  - [ ] Follow up on IR Mesenteric Biopsy done on at OSH10/20  Pain management:  - Tylenol 650 q6h prn (Mild)  - Oxy 5 q6h prn (Mod)  - Oxy 10 q6h prn (Severe)  - IV Dilaudid 0.2 mg q6h prn (Breakthrough)     #Left oropharyngeal mass   #Swollen left cervical lymph node   :: Sarcoma vs Lymphoma vs Infection   ::CT soft tissue neck with IV contrast: homogeneous soft tissue attenuation lesion in the left oropharynx which measures approximately 3.0 x 4.0 x 5.1 cm. The airway is mildly narrowed and the adjacent parapharyngeal fat is effaced 10/19  Plan  - Will continue with Unasyn concerns for infection  - ENT consult for biopsy of the mass  - SLP recommenced regular w/ thin liquids at OSH w/ no skilled SLP needs  - Blood cultures, NGTD  - HIV nonreactive 10/21  - LDH 1,032, Uric acid 7.7 10/21  - HCG <3 10/21     #Constipation  Plan  - Miralax 17g daily  - Doc/Senna BID    Home Medications     Medication List      START taking these medications     acetaminophen 325 mg tablet; Commonly known as: Tylenol; Take 2 tablets   (650 mg) by mouth every 6 hours if needed for mild pain (1 - 3).   * oxyCODONE 5 mg immediate release tablet; Commonly known as:   Roxicodone; Take 1 tablet (5 mg) by mouth every 6 hours if needed for   moderate pain (4 - 6).   * oxyCODONE 10 mg immediate release tablet; Commonly known as:   Roxicodone; Take 1 tablet (10 mg) by mouth every 6 hours if needed for   severe pain (7 - 10).   polyethylene glycol packet; Commonly known as: Glycolax, Miralax;  Take   17 g by mouth 2 times a day as needed (constipation).   sennosides-docusate sodium 8.6-50 mg tablet; Commonly known as:   Ashley-Colace; Take 2 tablets by mouth 2 times a day as needed for   constipation.  * This list has 2 medication(s) that are the same as other medications   prescribed for you. Read the directions carefully, and ask your doctor or   other care provider to review them with you.       Outpatient Follow-Up  No future appointments.    Luma Trent MD

## 2023-10-23 NOTE — DISCHARGE INSTRUCTIONS
Dear  Ariane,     You were admitted here at [facility] for abdominal pain, weight loss, left sided neck mass, pancreatic body mass, and mesentery masses that were seen on imaging. You got a biopsy of the mesentery lymph node  done at [facility], and another biopsy done here of the lymph node on the left side of your neck. We managed your abdominal pain and nausea with tylenol 675 mg, oxycodone 5 and 10 mg, and zofran for your nausea which you will be going home with.     To Do:  - Please follow up with Medicine Oncology, Dr. Bella October 26 at 12:00 am.

## 2023-10-23 NOTE — CARE PLAN
Problem: Fall/Injury  Goal: Not fall by end of shift  Outcome: Progressing  Goal: Be free from injury by end of the shift  Outcome: Progressing  Goal: Verbalize understanding of personal risk factors for fall in the hospital  Outcome: Progressing  Goal: Verbalize understanding of risk factor reduction measures to prevent injury from fall in the home  Outcome: Progressing  Goal: Use assistive devices by end of the shift  Outcome: Progressing  Goal: Pace activities to prevent fatigue by end of the shift  Outcome: Progressing     Problem: Pain  Goal: My pain/discomfort is manageable  Outcome: Progressing     Problem: Safety  Goal: Patient will be injury free during hospitalization  Outcome: Progressing  Goal: I will remain free of falls  Outcome: Progressing     Problem: Daily Care  Goal: Daily care needs are met  Outcome: Progressing     Problem: Psychosocial Needs  Goal: Demonstrates ability to cope with hospitalization/illness  Outcome: Progressing  Goal: Collaborate with me, my family, and caregiver to identify my specific goals  Outcome: Progressing     Problem: Pain  Goal: Takes deep breaths with improved pain control throughout the shift  Outcome: Progressing  Goal: Turns in bed with improved pain control throughout the shift  Outcome: Progressing  Goal: Walks with improved pain control throughout the shift  Outcome: Progressing  Goal: Performs ADL's with improved pain control throughout shift  Outcome: Progressing  Goal: Participates in PT with improved pain control throughout the shift  Outcome: Progressing  Goal: Free from opioid side effects throughout the shift  Outcome: Progressing  Goal: Free from acute confusion related to pain meds throughout the shift  Outcome: Progressing   The patient's goals for the shift include      The clinical goals for the shift include pt to have pain level less than a 5 during shift.    Over the shift, the patient did not make progress toward the following goals. Barriers  to progression include pt c/o pain level of 6/10. Recommendations to address these barriers include frequent pain assessments, prn pain medications, repositioning.

## 2023-10-24 ENCOUNTER — HOSPITAL ENCOUNTER (OUTPATIENT)
Dept: CARDIOLOGY | Facility: HOSPITAL | Age: 23
Discharge: HOME | End: 2023-10-24
Payer: COMMERCIAL

## 2023-10-24 LAB
ATRIAL RATE: 52 BPM
P AXIS: -16 DEGREES
P OFFSET: 192 MS
P ONSET: 154 MS
PR INTERVAL: 124 MS
Q ONSET: 216 MS
QRS COUNT: 9 BEATS
QRS DURATION: 102 MS
QT INTERVAL: 454 MS
QTC CALCULATION(BAZETT): 422 MS
QTC FREDERICIA: 432 MS
R AXIS: 57 DEGREES
T AXIS: 41 DEGREES
T OFFSET: 443 MS
VENTRICULAR RATE: 52 BPM

## 2023-10-24 PROCEDURE — 93005 ELECTROCARDIOGRAM TRACING: CPT

## 2023-10-25 PROBLEM — Z97.3 WEARS GLASSES: Status: ACTIVE | Noted: 2023-10-25

## 2023-10-25 PROBLEM — F90.9 ADHD (ATTENTION DEFICIT HYPERACTIVITY DISORDER): Status: ACTIVE | Noted: 2023-10-25

## 2023-10-25 PROBLEM — Z55.3 ACADEMIC UNDERACHIEVEMENT: Status: ACTIVE | Noted: 2023-10-25

## 2023-10-25 PROBLEM — L70.9 ACNE: Status: ACTIVE | Noted: 2023-10-25

## 2023-10-25 LAB
BACTERIA BLD CULT: NORMAL
BACTERIA BLD CULT: NORMAL
CELL COUNT (BLOOD): 0.05 X10*3/UL
CELL POPULATIONS: NORMAL
DIAGNOSIS: NORMAL
FLOW DIFFERENTIAL: NORMAL
FLOW TEST ORDERED: NORMAL
LAB TEST METHOD: NORMAL
NUMBER OF CELLS COLLECTED: NORMAL
PATH REPORT.TOTAL CANCER: NORMAL
SIGNATURE COMMENT: NORMAL
SPECIMEN VIABILITY: NORMAL

## 2023-10-25 RX ORDER — IBUPROFEN 400 MG/1
1 TABLET ORAL EVERY 6 HOURS PRN
COMMUNITY
Start: 2018-10-17 | End: 2023-11-11 | Stop reason: HOSPADM

## 2023-10-26 ENCOUNTER — OFFICE VISIT (OUTPATIENT)
Dept: HEMATOLOGY/ONCOLOGY | Facility: HOSPITAL | Age: 23
DRG: 840 | End: 2023-10-26
Payer: COMMERCIAL

## 2023-10-26 ENCOUNTER — PATIENT OUTREACH (OUTPATIENT)
Dept: HEMATOLOGY/ONCOLOGY | Facility: HOSPITAL | Age: 23
End: 2023-10-26
Payer: COMMERCIAL

## 2023-10-26 ENCOUNTER — SOCIAL WORK (OUTPATIENT)
Dept: CASE MANAGEMENT | Facility: HOSPITAL | Age: 23
End: 2023-10-26
Payer: COMMERCIAL

## 2023-10-26 ENCOUNTER — HOSPITAL ENCOUNTER (INPATIENT)
Facility: HOSPITAL | Age: 23
LOS: 16 days | Discharge: HOME | DRG: 840 | End: 2023-11-11
Attending: EMERGENCY MEDICINE
Payer: COMMERCIAL

## 2023-10-26 VITALS
HEIGHT: 72 IN | BODY MASS INDEX: 18.7 KG/M2 | DIASTOLIC BLOOD PRESSURE: 103 MMHG | OXYGEN SATURATION: 100 % | SYSTOLIC BLOOD PRESSURE: 134 MMHG | WEIGHT: 138.1 LBS | RESPIRATION RATE: 18 BRPM | HEART RATE: 111 BPM | TEMPERATURE: 97.3 F

## 2023-10-26 DIAGNOSIS — R53.1 GENERALIZED WEAKNESS: Primary | ICD-10-CM

## 2023-10-26 DIAGNOSIS — J96.01 ACUTE HYPOXIC RESPIRATORY FAILURE (MULTI): ICD-10-CM

## 2023-10-26 DIAGNOSIS — C79.9 METASTASIS FROM PANCREATIC CANCER (MULTI): ICD-10-CM

## 2023-10-26 DIAGNOSIS — C83.70 BURKITT LYMPHOMA, UNSPECIFIED BODY REGION (MULTI): ICD-10-CM

## 2023-10-26 DIAGNOSIS — C25.9 METASTASIS FROM PANCREATIC CANCER (MULTI): ICD-10-CM

## 2023-10-26 DIAGNOSIS — J35.1 TONSILLAR HYPERTROPHY, UNILATERAL: ICD-10-CM

## 2023-10-26 DIAGNOSIS — R09.02 HYPOXEMIA: ICD-10-CM

## 2023-10-26 DIAGNOSIS — R60.0 LOCALIZED EDEMA: ICD-10-CM

## 2023-10-26 DIAGNOSIS — C25.9 PANCREATIC CANCER (MULTI): Primary | ICD-10-CM

## 2023-10-26 DIAGNOSIS — C83.78 BURKITT LYMPHOMA OF LYMPH NODES OF MULTIPLE REGIONS (MULTI): ICD-10-CM

## 2023-10-26 DIAGNOSIS — R22.1 NECK MASS: ICD-10-CM

## 2023-10-26 LAB
ALBUMIN SERPL BCP-MCNC: 4 G/DL (ref 3.4–5)
ALP SERPL-CCNC: 58 U/L (ref 33–120)
ALT SERPL W P-5'-P-CCNC: 120 U/L (ref 10–52)
ANION GAP SERPL CALC-SCNC: 19 MMOL/L (ref 10–20)
APTT PPP: 28 SECONDS (ref 27–38)
AST SERPL W P-5'-P-CCNC: 136 U/L (ref 9–39)
BASOPHILS # BLD AUTO: 0.09 X10*3/UL (ref 0–0.1)
BASOPHILS NFR BLD AUTO: 0.8 %
BILIRUB SERPL-MCNC: 0.6 MG/DL (ref 0–1.2)
BUN SERPL-MCNC: 28 MG/DL (ref 6–23)
CALCIUM SERPL-MCNC: 9.3 MG/DL (ref 8.6–10.6)
CHLORIDE SERPL-SCNC: 93 MMOL/L (ref 98–107)
CO2 SERPL-SCNC: 23 MMOL/L (ref 21–32)
CREAT SERPL-MCNC: 1.18 MG/DL (ref 0.5–1.3)
EOSINOPHIL # BLD AUTO: 0.07 X10*3/UL (ref 0–0.7)
EOSINOPHIL NFR BLD AUTO: 0.6 %
ERYTHROCYTE [DISTWIDTH] IN BLOOD BY AUTOMATED COUNT: 13.3 % (ref 11.5–14.5)
FIBRINOGEN PPP-MCNC: 366 MG/DL (ref 200–400)
GFR SERPL CREATININE-BSD FRML MDRD: 88 ML/MIN/1.73M*2
GLUCOSE SERPL-MCNC: 93 MG/DL (ref 74–99)
HAV IGM SER QL: NONREACTIVE
HBV CORE IGM SER QL: NONREACTIVE
HBV SURFACE AG SERPL QL IA: NONREACTIVE
HCT VFR BLD AUTO: 40.9 % (ref 41–52)
HCV AB SER QL: NONREACTIVE
HGB BLD-MCNC: 14.4 G/DL (ref 13.5–17.5)
HIV 1+2 AB+HIV1 P24 AG SERPL QL IA: NONREACTIVE
IMM GRANULOCYTES # BLD AUTO: 0.06 X10*3/UL (ref 0–0.7)
IMM GRANULOCYTES NFR BLD AUTO: 0.5 % (ref 0–0.9)
INR PPP: 1.1 (ref 0.9–1.1)
LDH SERPL L TO P-CCNC: 1729 U/L (ref 84–246)
LYMPHOCYTES # BLD AUTO: 1.48 X10*3/UL (ref 1.2–4.8)
LYMPHOCYTES NFR BLD AUTO: 13.1 %
MCH RBC QN AUTO: 28.7 PG (ref 26–34)
MCHC RBC AUTO-ENTMCNC: 35.2 G/DL (ref 32–36)
MCV RBC AUTO: 82 FL (ref 80–100)
MONOCYTES # BLD AUTO: 0.72 X10*3/UL (ref 0.1–1)
MONOCYTES NFR BLD AUTO: 6.4 %
NEUTROPHILS # BLD AUTO: 8.85 X10*3/UL (ref 1.2–7.7)
NEUTROPHILS NFR BLD AUTO: 78.6 %
NRBC BLD-RTO: 0 /100 WBCS (ref 0–0)
PLATELET # BLD AUTO: 495 X10*3/UL (ref 150–450)
PMV BLD AUTO: 10.3 FL (ref 7.5–11.5)
POTASSIUM SERPL-SCNC: 4.3 MMOL/L (ref 3.5–5.3)
PROT SERPL-MCNC: 6.4 G/DL (ref 6.4–8.2)
PROTHROMBIN TIME: 12.2 SECONDS (ref 9.8–12.8)
RBC # BLD AUTO: 5.01 X10*6/UL (ref 4.5–5.9)
SODIUM SERPL-SCNC: 131 MMOL/L (ref 136–145)
URATE SERPL-MCNC: 10.4 MG/DL (ref 4–7.5)
WBC # BLD AUTO: 11.3 X10*3/UL (ref 4.4–11.3)

## 2023-10-26 PROCEDURE — 99285 EMERGENCY DEPT VISIT HI MDM: CPT | Performed by: EMERGENCY MEDICINE

## 2023-10-26 PROCEDURE — 96375 TX/PRO/DX INJ NEW DRUG ADDON: CPT

## 2023-10-26 PROCEDURE — 80053 COMPREHEN METABOLIC PANEL: CPT

## 2023-10-26 PROCEDURE — 2500000001 HC RX 250 WO HCPCS SELF ADMINISTERED DRUGS (ALT 637 FOR MEDICARE OP)

## 2023-10-26 PROCEDURE — 36415 COLL VENOUS BLD VENIPUNCTURE: CPT

## 2023-10-26 PROCEDURE — 87635 SARS-COV-2 COVID-19 AMP PRB: CPT

## 2023-10-26 PROCEDURE — 96361 HYDRATE IV INFUSION ADD-ON: CPT

## 2023-10-26 PROCEDURE — 80074 ACUTE HEPATITIS PANEL: CPT

## 2023-10-26 PROCEDURE — 82960 TEST FOR G6PD ENZYME: CPT

## 2023-10-26 PROCEDURE — 96374 THER/PROPH/DIAG INJ IV PUSH: CPT

## 2023-10-26 PROCEDURE — 99215 OFFICE O/P EST HI 40 MIN: CPT | Mod: 25 | Performed by: INTERNAL MEDICINE

## 2023-10-26 PROCEDURE — 99215 OFFICE O/P EST HI 40 MIN: CPT | Performed by: INTERNAL MEDICINE

## 2023-10-26 PROCEDURE — 85384 FIBRINOGEN ACTIVITY: CPT

## 2023-10-26 PROCEDURE — 84550 ASSAY OF BLOOD/URIC ACID: CPT

## 2023-10-26 PROCEDURE — 99223 1ST HOSP IP/OBS HIGH 75: CPT

## 2023-10-26 PROCEDURE — 87389 HIV-1 AG W/HIV-1&-2 AB AG IA: CPT

## 2023-10-26 PROCEDURE — 2500000004 HC RX 250 GENERAL PHARMACY W/ HCPCS (ALT 636 FOR OP/ED)

## 2023-10-26 PROCEDURE — 86704 HEP B CORE ANTIBODY TOTAL: CPT

## 2023-10-26 PROCEDURE — 1036F TOBACCO NON-USER: CPT | Performed by: INTERNAL MEDICINE

## 2023-10-26 PROCEDURE — 85730 THROMBOPLASTIN TIME PARTIAL: CPT

## 2023-10-26 PROCEDURE — 1210000001 HC SEMI-PRIVATE ROOM DAILY

## 2023-10-26 PROCEDURE — 83615 LACTATE (LD) (LDH) ENZYME: CPT

## 2023-10-26 PROCEDURE — 85025 COMPLETE CBC W/AUTO DIFF WBC: CPT

## 2023-10-26 RX ORDER — OXYCODONE HYDROCHLORIDE 10 MG/1
10 TABLET ORAL EVERY 6 HOURS PRN
Qty: 120 TABLET | Refills: 0 | Status: SHIPPED | OUTPATIENT
Start: 2023-10-26 | End: 2023-11-11 | Stop reason: HOSPADM

## 2023-10-26 RX ORDER — ONDANSETRON HYDROCHLORIDE 2 MG/ML
4 INJECTION, SOLUTION INTRAVENOUS EVERY 8 HOURS PRN
Status: DISCONTINUED | OUTPATIENT
Start: 2023-10-26 | End: 2023-11-01

## 2023-10-26 RX ORDER — OLANZAPINE 5 MG/1
5 TABLET ORAL NIGHTLY
Qty: 30 TABLET | Refills: 2 | Status: SHIPPED | OUTPATIENT
Start: 2023-10-26 | End: 2023-11-24 | Stop reason: HOSPADM

## 2023-10-26 RX ORDER — ONDANSETRON HYDROCHLORIDE 2 MG/ML
4 INJECTION, SOLUTION INTRAVENOUS ONCE
Status: COMPLETED | OUTPATIENT
Start: 2023-10-26 | End: 2023-10-26

## 2023-10-26 RX ORDER — OXYCODONE HYDROCHLORIDE 5 MG/1
5 TABLET ORAL EVERY 6 HOURS PRN
Status: DISCONTINUED | OUTPATIENT
Start: 2023-10-26 | End: 2023-11-11 | Stop reason: HOSPADM

## 2023-10-26 RX ORDER — SODIUM CHLORIDE 9 MG/ML
150 INJECTION, SOLUTION INTRAVENOUS CONTINUOUS
Status: DISCONTINUED | OUTPATIENT
Start: 2023-10-26 | End: 2023-10-28

## 2023-10-26 RX ORDER — OXYCODONE HYDROCHLORIDE 5 MG/1
10 TABLET ORAL EVERY 6 HOURS PRN
Status: DISCONTINUED | OUTPATIENT
Start: 2023-10-26 | End: 2023-11-11 | Stop reason: HOSPADM

## 2023-10-26 RX ORDER — ALLOPURINOL 100 MG/1
300 TABLET ORAL 2 TIMES DAILY
Status: DISCONTINUED | OUTPATIENT
Start: 2023-10-26 | End: 2023-11-04

## 2023-10-26 RX ORDER — DOCUSATE SODIUM 100 MG/1
100 CAPSULE, LIQUID FILLED ORAL 2 TIMES DAILY
Status: DISCONTINUED | OUTPATIENT
Start: 2023-10-26 | End: 2023-10-29

## 2023-10-26 RX ORDER — HYDROMORPHONE HYDROCHLORIDE 1 MG/ML
0.5 INJECTION, SOLUTION INTRAMUSCULAR; INTRAVENOUS; SUBCUTANEOUS ONCE
Status: COMPLETED | OUTPATIENT
Start: 2023-10-26 | End: 2023-10-26

## 2023-10-26 RX ORDER — POLYETHYLENE GLYCOL 3350 17 G/17G
17 POWDER, FOR SOLUTION ORAL DAILY
Status: DISCONTINUED | OUTPATIENT
Start: 2023-10-27 | End: 2023-10-29

## 2023-10-26 RX ORDER — OXYCODONE HYDROCHLORIDE 5 MG/1
10 TABLET ORAL EVERY 4 HOURS PRN
Status: DISCONTINUED | OUTPATIENT
Start: 2023-10-26 | End: 2023-10-26

## 2023-10-26 RX ORDER — OXYCODONE HYDROCHLORIDE 5 MG/1
5 TABLET ORAL EVERY 4 HOURS PRN
Status: DISCONTINUED | OUTPATIENT
Start: 2023-10-26 | End: 2023-10-26

## 2023-10-26 RX ORDER — NALOXONE HYDROCHLORIDE 4 MG/.1ML
4 SPRAY NASAL AS NEEDED
Qty: 2 EACH | Refills: 0 | Status: SHIPPED | OUTPATIENT
Start: 2023-10-26 | End: 2023-11-11 | Stop reason: HOSPADM

## 2023-10-26 RX ORDER — NALOXONE HYDROCHLORIDE 0.4 MG/ML
0.2 INJECTION, SOLUTION INTRAMUSCULAR; INTRAVENOUS; SUBCUTANEOUS EVERY 5 MIN PRN
Status: DISCONTINUED | OUTPATIENT
Start: 2023-10-26 | End: 2023-11-11 | Stop reason: HOSPADM

## 2023-10-26 RX ADMIN — ONDANSETRON 4 MG: 2 INJECTION INTRAMUSCULAR; INTRAVENOUS at 20:24

## 2023-10-26 RX ADMIN — DOCUSATE SODIUM 100 MG: 100 CAPSULE, LIQUID FILLED ORAL at 23:08

## 2023-10-26 RX ADMIN — HYDROMORPHONE HYDROCHLORIDE 0.5 MG: 1 INJECTION, SOLUTION INTRAMUSCULAR; INTRAVENOUS; SUBCUTANEOUS at 21:16

## 2023-10-26 RX ADMIN — SODIUM CHLORIDE, POTASSIUM CHLORIDE, SODIUM LACTATE AND CALCIUM CHLORIDE 1000 ML: 600; 310; 30; 20 INJECTION, SOLUTION INTRAVENOUS at 20:23

## 2023-10-26 RX ADMIN — SODIUM CHLORIDE 100 ML/HR: 9 INJECTION, SOLUTION INTRAVENOUS at 23:08

## 2023-10-26 RX ADMIN — ALLOPURINOL 300 MG: 100 TABLET ORAL at 23:09

## 2023-10-26 SDOH — ECONOMIC STABILITY: HOUSING INSECURITY
IN THE LAST 12 MONTHS, WAS THERE A TIME WHEN YOU DID NOT HAVE A STEADY PLACE TO SLEEP OR SLEPT IN A SHELTER (INCLUDING NOW)?: NO

## 2023-10-26 SDOH — ECONOMIC STABILITY: TRANSPORTATION INSECURITY
IN THE PAST 12 MONTHS, HAS LACK OF TRANSPORTATION KEPT YOU FROM MEETINGS, WORK, OR FROM GETTING THINGS NEEDED FOR DAILY LIVING?: NO

## 2023-10-26 SDOH — ECONOMIC STABILITY: TRANSPORTATION INSECURITY
IN THE PAST 12 MONTHS, HAS THE LACK OF TRANSPORTATION KEPT YOU FROM MEDICAL APPOINTMENTS OR FROM GETTING MEDICATIONS?: NO

## 2023-10-26 ASSESSMENT — COLUMBIA-SUICIDE SEVERITY RATING SCALE - C-SSRS
2. HAVE YOU ACTUALLY HAD ANY THOUGHTS OF KILLING YOURSELF?: NO
6. HAVE YOU EVER DONE ANYTHING, STARTED TO DO ANYTHING, OR PREPARED TO DO ANYTHING TO END YOUR LIFE?: NO
1. IN THE PAST MONTH, HAVE YOU WISHED YOU WERE DEAD OR WISHED YOU COULD GO TO SLEEP AND NOT WAKE UP?: NO

## 2023-10-26 ASSESSMENT — LIFESTYLE VARIABLES
HAVE PEOPLE ANNOYED YOU BY CRITICIZING YOUR DRINKING: NO
HAVE PEOPLE ANNOYED YOU BY CRITICIZING YOUR DRINKING: NO
EVER FELT BAD OR GUILTY ABOUT YOUR DRINKING: NO
HAVE YOU EVER FELT YOU SHOULD CUT DOWN ON YOUR DRINKING: NO
REASON UNABLE TO ASSESS: YES
HAVE YOU EVER FELT YOU SHOULD CUT DOWN ON YOUR DRINKING: NO
REASON UNABLE TO ASSESS: YES
EVER HAD A DRINK FIRST THING IN THE MORNING TO STEADY YOUR NERVES TO GET RID OF A HANGOVER: NO
EVER FELT BAD OR GUILTY ABOUT YOUR DRINKING: NO

## 2023-10-26 ASSESSMENT — ENCOUNTER SYMPTOMS
OCCASIONAL FEELINGS OF UNSTEADINESS: 0
DEPRESSION: 0
LOSS OF SENSATION IN FEET: 0

## 2023-10-26 ASSESSMENT — PAIN SCALES - GENERAL
PAINLEVEL_OUTOF10: 0 - NO PAIN
PAINLEVEL_OUTOF10: 7
PAINLEVEL_OUTOF10: 0 - NO PAIN
PAINLEVEL: 0-NO PAIN

## 2023-10-26 ASSESSMENT — PAIN - FUNCTIONAL ASSESSMENT: PAIN_FUNCTIONAL_ASSESSMENT: 0-10

## 2023-10-26 NOTE — PROGRESS NOTES
SW was consulted by CONNER Echols to meet with patient today who has NPV with Dr. Bella. Patient is a 23 year old male with new diagnosis of pancreatic cancer with mets. SW was not able to meet with patient during visit but reached out to patient via phone. The number listed was for mother. SW left message with mother today to offer support and resources.

## 2023-10-26 NOTE — ED TRIAGE NOTES
Patient to ED from [facility] diagnosed with new cancer. Patient has been having throat pain and abdominal pain for about 3 weeks. Sent from [facility] for rapid induction of chemo therapy. Patient has no new complaints.

## 2023-10-26 NOTE — SIGNIFICANT EVENT
Patient's mother denies need for transportation assistance.  Patient's mother or family will transport patient to appointments.

## 2023-10-26 NOTE — PROGRESS NOTES
Patient ID: Shilo Thakkar is a 23 y.o. male    Referring Physician: Rimma Sterling MD  [id number] [place] [address]    Primary Care Provider: Luiza Houston MD    Diagnosis:   Pending biopsy     Surgeon:   Jef: Dr. Jena Randc:    Current Therapy:  None    Surgery  None    Prior Therapy   None    Molecular Genetics  None    Sites of Disease  Pancreatic mass  Mesenteric masses with omental caking   Left oropharyngeal mass  Left cervical lymphadenopathy     Oncologic Issues       Past Medical History: Shilo has a past medical history of Colles' fracture of right radius, initial encounter for closed fracture (04/23/2015), Personal history of other mental and behavioral disorders (01/28/2014), Personal history of other mental and behavioral disorders (05/10/2017), Personal history of other specified conditions (05/03/2016), and Unspecified fracture of navicular (scaphoid) bone of left wrist, initial encounter for closed fracture (12/17/2015).  Surgical History:  Shilo has a past surgical history that includes Other surgical history (10/03/2017); US guided soft tissue biopsy (10/20/2023); and Foot fracture surgery (Left).  Social History:  Shilo reports that he has never smoked. He has never been exposed to tobacco smoke. He has never used smokeless tobacco. He reports that he does not currently use alcohol. He reports that he does not use drugs.  Family History:    Family History   Problem Relation Name Age of Onset    ADD / ADHD Mother      ADD / ADHD Sister      ADD / ADHD Brother       Family Oncology History:  Cancer-related family history is not on file.    SUBJECTIVE:    History of Present Illness:  Shilo Thakkar is a 23 y.o. male who was referred by Rimma Sterling MD and presents with pancreatic and oropharyngeal masses concerning for malignancy.     Patient presented to [facility] ED on 10/19 with 2 weeks of worsening abdominal pain, dysphagia, and 20lb weight loss. Imaging a pancreatic mass,  "mesenteric LAD, left oropharyngeal mass, and cervical LAD. He had ultrasound guided mesenteric LN biopsy 10/20 and then patient was transferred to [facility] for further work-up. He had biopsy of the oropharyngeal mass and cervical lymph node by ENT 10/23/23. He was discharged home and now presents to establish care with oncology with family.     He denies back pain, fever, chills, dyspnea, n/v/d/c, bleeding. He endorses constipation but has not been eating well due to poor appetite.     Patient's father passed away from pancreatic cancer in his 30s. He had presented with back pain. Grandfather passed away from pancreatic cancer in his 70s.     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, never smoker, denies EtOH, illicits    OBJECTIVE:    VS / Pain:  BP (!) 134/103 (BP Location: Left arm, Patient Position: Sitting, BP Cuff Size: Adult)   Pulse (!) 111   Temp 36.3 °C (97.3 °F) (Skin)   Resp 18   Ht 1.836 m (6' 0.28\")   Wt 62.6 kg (138 lb 1.6 oz) Comment: 138.1 lb  SpO2 100%   BMI 18.58 kg/m²   BSA: 1.79 meters squared    General: awake, alert, conversant, appears stated age, thin male, soft voice   HEENT: pupils equal and round, no scleral icterus or conjunctivitis, large left sided oropharyngeal mass s/p biopsy, no bleeding   Skin: no suspect lesions or rashes noted on visible skin  Chest: ctab, normal respiratory effort, not on supplemental oxygen  Cardiac: regular rate and rhythm, normal s1, s2, no M/R/G, no JVD  Abdomen: soft, ND, NT, no involuntary guarding  : no flank pain or indwelling urinary catheter  EXT: no peripheral edema, no asymmetry noted  MSK: no focal joint swelling noted  Neuro: AOx4, moving all limbs spontaneously, follows commands  Psych: coherent thought process, appropriate mood and affect     Diagnostic Results     WBC   Date/Time Value Ref Range Status   10/23/2023 05:32 AM 9.6 4.4 - 11.3 x10*3/uL Final   10/22/2023 06:31 AM 8.6 4.4 - 11.3 x10*3/uL Final   10/21/2023 " 08:15 AM 8.7 4.4 - 11.3 x10*3/uL Final     Hemoglobin   Date Value Ref Range Status   10/23/2023 14.0 13.5 - 17.5 g/dL Final   10/22/2023 13.8 13.5 - 17.5 g/dL Final   10/21/2023 14.0 13.5 - 17.5 g/dL Final     MCV   Date/Time Value Ref Range Status   10/23/2023 05:32 AM 89 80 - 100 fL Final   10/22/2023 06:31 AM 87 80 - 100 fL Final   10/21/2023 08:15 AM 88 80 - 100 fL Final     Platelets   Date/Time Value Ref Range Status   10/23/2023 05:32  (H) 150 - 450 x10*3/uL Final   10/22/2023 06:31  150 - 450 x10*3/uL Final   10/21/2023 08:15  150 - 450 x10*3/uL Final     Neutrophils Absolute   Date/Time Value Ref Range Status   10/23/2023 05:32 AM 7.38 1.20 - 7.70 x10*3/uL Final     Comment:     Percent differential counts (%) should be interpreted in the context of the absolute cell counts (cells/uL).   10/22/2023 06:31 AM 6.51 1.20 - 7.70 x10*3/uL Final     Comment:     Percent differential counts (%) should be interpreted in the context of the absolute cell counts (cells/uL).   10/21/2023 08:15 AM 6.50 1.20 - 7.70 x10*3/uL Final     Comment:     Percent differential counts (%) should be interpreted in the context of the absolute cell counts (cells/uL).     Imaging:  CT head without (10/22/23)  IMPRESSION:  No acute intracranial hemorrhage or mass effect. MRI with contrast is  more sensitive to evaluate for intracranial metastatic disease.    CT chest w IV contrast(10/20/2023)     1.Moderate bilateral pleural effusions.     2. No significant lymphadenopathy or consolidation      CT abdomen wo IV contrast (10/20/2023)     1.  Mesenteric lymphadenopathy. No clear path is seen for CT-guided biopsy. Ultrasound of the abdomen recommended to evaluate for possible ultrasound-guided biopsy of mesenteric lymph nodes.        CT abdomen pelvis w IV contrast (10/19/2023 )     1. There is a mass in the body of the pancreas which likely represents pancreatic cancer. There are multiple masses in the mesentery as well  as omental caking. These are consistent with carcinomatosis.   2. Moderate amount of free fluid throughout the abdomen and pelvis.   3. Moderate right and small left pleural effusions.     CT soft tissue neck w IV contrast (10/19/2023)     1. Homogeneous soft tissue attenuation lesion in the left oropharynx. This is concerning for a neoplasm but is otherwise nonspecific, recommend direct visualization and/or tissue sampling for further evaluation.   2. Enlarged left cervical lymph nodes are nonspecific, further follow-up as clinically indicated.   3. Small pleural effusions bilaterally.      LDH 1032  Uric acid 7.7   100.9   14.65  AFP, b-HCG, CEA negative   Flow pending     Assessment/Plan   Shilo Thakkar is a 23 y.o. male with no significant PMH who presents with pancreatic and oropharyngeal masses concerning for malignancy.     Patient has several abnormalities on imaging listed above concerning for malignancy. Mesenteric LN biopsy 10/20 and oropharyngeal mass/LN biopsy 10/23 are still pending. Discussed all the available data to this point and that we do not yet have a unifying diagnosis. Explained the concern for diffuse malignancy but that this would not be a typical presentation for pancreatic cancer.     Concern for malignancy:  - pending biopsies as above  - will update patient with results and refer to the appropriate team, if not pancreatic cancer  - briefly discussed reproductive planning  - referral to [facility] clinic, genetics  - tentatively RTC in 2 weeks     Dysphagia, odynophagia  - magic mouth wash, olanzapine at bedtime   - encouraged PO intake  - discussed feeding tube, if needed for nutritional support    Patient seen and discussed with Dr. Bella.     Emmanuel Briones MD  PGY-4 hem-onc fellow

## 2023-10-27 ENCOUNTER — APPOINTMENT (OUTPATIENT)
Dept: RADIOLOGY | Facility: HOSPITAL | Age: 23
DRG: 840 | End: 2023-10-27
Payer: COMMERCIAL

## 2023-10-27 ENCOUNTER — APPOINTMENT (OUTPATIENT)
Dept: CARDIOLOGY | Facility: HOSPITAL | Age: 23
DRG: 840 | End: 2023-10-27
Payer: COMMERCIAL

## 2023-10-27 PROBLEM — C83.70: Status: ACTIVE | Noted: 2023-10-27

## 2023-10-27 LAB
ALBUMIN SERPL BCP-MCNC: 3.4 G/DL (ref 3.4–5)
ALBUMIN SERPL BCP-MCNC: 3.5 G/DL (ref 3.4–5)
ALP SERPL-CCNC: 47 U/L (ref 33–120)
ALT SERPL W P-5'-P-CCNC: 98 U/L (ref 10–52)
AMYLASE SERPL-CCNC: 311 U/L (ref 29–103)
ANION GAP SERPL CALC-SCNC: 15 MMOL/L (ref 10–20)
ANION GAP SERPL CALC-SCNC: 17 MMOL/L (ref 10–20)
AST SERPL W P-5'-P-CCNC: 115 U/L (ref 9–39)
BASOPHILS # BLD AUTO: 0.1 X10*3/UL (ref 0–0.1)
BASOPHILS NFR BLD AUTO: 0.9 %
BILIRUB DIRECT SERPL-MCNC: 0 MG/DL (ref 0–0.3)
BILIRUB SERPL-MCNC: 0.7 MG/DL (ref 0–1.2)
BUN SERPL-MCNC: 27 MG/DL (ref 6–23)
BUN SERPL-MCNC: 28 MG/DL (ref 6–23)
CALCIUM SERPL-MCNC: 8.4 MG/DL (ref 8.6–10.6)
CALCIUM SERPL-MCNC: 8.7 MG/DL (ref 8.6–10.6)
CELL COUNT (BLOOD): 2.67 X10*3/UL
CELL POPULATIONS: NORMAL
CHLORIDE SERPL-SCNC: 94 MMOL/L (ref 98–107)
CHLORIDE SERPL-SCNC: 97 MMOL/L (ref 98–107)
CO2 SERPL-SCNC: 24 MMOL/L (ref 21–32)
CO2 SERPL-SCNC: 25 MMOL/L (ref 21–32)
CREAT SERPL-MCNC: 1.08 MG/DL (ref 0.5–1.3)
CREAT SERPL-MCNC: 1.12 MG/DL (ref 0.5–1.3)
DIAGNOSIS: NORMAL
EBV DNA SPEC NAA+PROBE-LOG#: NORMAL {LOG_COPIES}/ML
EJECTION FRACTION APICAL 4 CHAMBER: 55.5
EOSINOPHIL # BLD AUTO: 0.12 X10*3/UL (ref 0–0.7)
EOSINOPHIL NFR BLD AUTO: 1.1 %
ERYTHROCYTE [DISTWIDTH] IN BLOOD BY AUTOMATED COUNT: 13.1 % (ref 11.5–14.5)
FLOW DIFFERENTIAL: NORMAL
FLOW TEST ORDERED: NORMAL
G6PD RBC QL: NORMAL
GFR SERPL CREATININE-BSD FRML MDRD: >90 ML/MIN/1.73M*2
GFR SERPL CREATININE-BSD FRML MDRD: >90 ML/MIN/1.73M*2
GLOBAL LONGITUDINAL STRAIN: 25.3
GLUCOSE SERPL-MCNC: 87 MG/DL (ref 74–99)
GLUCOSE SERPL-MCNC: 90 MG/DL (ref 74–99)
HBV CORE AB SER QL: NONREACTIVE
HCT VFR BLD AUTO: 36 % (ref 41–52)
HGB BLD-MCNC: 12.8 G/DL (ref 13.5–17.5)
IMM GRANULOCYTES # BLD AUTO: 0.04 X10*3/UL (ref 0–0.7)
IMM GRANULOCYTES NFR BLD AUTO: 0.4 % (ref 0–0.9)
LAB TEST METHOD: NORMAL
LABORATORY COMMENT REPORT: NORMAL
LABORATORY COMMENT REPORT: NOT DETECTED
LACTATE SERPL-SCNC: 1.6 MMOL/L (ref 0.4–2)
LDH SERPL L TO P-CCNC: 1511 U/L (ref 84–246)
LDH SERPL L TO P-CCNC: 1567 U/L (ref 84–246)
LIPASE SERPL-CCNC: 987 U/L (ref 9–82)
LYMPHOCYTES # BLD AUTO: 1.26 X10*3/UL (ref 1.2–4.8)
LYMPHOCYTES NFR BLD AUTO: 11.6 %
MAGNESIUM SERPL-MCNC: 2.05 MG/DL (ref 1.6–2.4)
MCH RBC QN AUTO: 29.6 PG (ref 26–34)
MCHC RBC AUTO-ENTMCNC: 35.6 G/DL (ref 32–36)
MCV RBC AUTO: 83 FL (ref 80–100)
MONOCYTES # BLD AUTO: 0.8 X10*3/UL (ref 0.1–1)
MONOCYTES NFR BLD AUTO: 7.4 %
NEUTROPHILS # BLD AUTO: 8.55 X10*3/UL (ref 1.2–7.7)
NEUTROPHILS NFR BLD AUTO: 78.6 %
NRBC BLD-RTO: 0 /100 WBCS (ref 0–0)
NUMBER OF CELLS COLLECTED: NORMAL
PATH REPORT.COMMENTS IMP SPEC: NORMAL
PATH REPORT.FINAL DX SPEC: NORMAL
PATH REPORT.GROSS SPEC: NORMAL
PATH REPORT.TOTAL CANCER: NORMAL
PATH REPORT.TOTAL CANCER: NORMAL
PHOSPHATE SERPL-MCNC: 3.9 MG/DL (ref 2.5–4.9)
PHOSPHATE SERPL-MCNC: 4.2 MG/DL (ref 2.5–4.9)
PLATELET # BLD AUTO: 414 X10*3/UL (ref 150–450)
PMV BLD AUTO: 9.9 FL (ref 7.5–11.5)
POTASSIUM SERPL-SCNC: 4.4 MMOL/L (ref 3.5–5.3)
POTASSIUM SERPL-SCNC: 4.8 MMOL/L (ref 3.5–5.3)
PROT SERPL-MCNC: 5.6 G/DL (ref 6.4–8.2)
RBC # BLD AUTO: 4.33 X10*6/UL (ref 4.5–5.9)
RESIDENT REVIEW: NORMAL
SARS-COV-2 RNA RESP QL NAA+PROBE: NOT DETECTED
SIGNATURE COMMENT: NORMAL
SODIUM SERPL-SCNC: 131 MMOL/L (ref 136–145)
SODIUM SERPL-SCNC: 132 MMOL/L (ref 136–145)
SPECIMEN VIABILITY: NORMAL
URATE SERPL-MCNC: 7.3 MG/DL (ref 4–7.5)
URATE SERPL-MCNC: 9.4 MG/DL (ref 4–7.5)
WBC # BLD AUTO: 10.9 X10*3/UL (ref 4.4–11.3)

## 2023-10-27 PROCEDURE — 84550 ASSAY OF BLOOD/URIC ACID: CPT

## 2023-10-27 PROCEDURE — 93356 MYOCRD STRAIN IMG SPCKL TRCK: CPT | Performed by: INTERNAL MEDICINE

## 2023-10-27 PROCEDURE — 2550000001 HC RX 255 CONTRASTS: Performed by: INTERNAL MEDICINE

## 2023-10-27 PROCEDURE — 84100 ASSAY OF PHOSPHORUS: CPT

## 2023-10-27 PROCEDURE — 36569 INSJ PICC 5 YR+ W/O IMAGING: CPT

## 2023-10-27 PROCEDURE — 83615 LACTATE (LD) (LDH) ENZYME: CPT

## 2023-10-27 PROCEDURE — 83735 ASSAY OF MAGNESIUM: CPT

## 2023-10-27 PROCEDURE — 85025 COMPLETE CBC W/AUTO DIFF WBC: CPT

## 2023-10-27 PROCEDURE — 70553 MRI BRAIN STEM W/O & W/DYE: CPT

## 2023-10-27 PROCEDURE — 70553 MRI BRAIN STEM W/O & W/DYE: CPT | Performed by: RADIOLOGY

## 2023-10-27 PROCEDURE — 36415 COLL VENOUS BLD VENIPUNCTURE: CPT

## 2023-10-27 PROCEDURE — 93356 MYOCRD STRAIN IMG SPCKL TRCK: CPT

## 2023-10-27 PROCEDURE — 1170000001 HC PRIVATE ONCOLOGY ROOM DAILY

## 2023-10-27 PROCEDURE — 2500000001 HC RX 250 WO HCPCS SELF ADMINISTERED DRUGS (ALT 637 FOR MEDICARE OP)

## 2023-10-27 PROCEDURE — 92610 EVALUATE SWALLOWING FUNCTION: CPT | Mod: GN | Performed by: SPEECH-LANGUAGE PATHOLOGIST

## 2023-10-27 PROCEDURE — 87799 DETECT AGENT NOS DNA QUANT: CPT

## 2023-10-27 PROCEDURE — 82150 ASSAY OF AMYLASE: CPT

## 2023-10-27 PROCEDURE — 2500000004 HC RX 250 GENERAL PHARMACY W/ HCPCS (ALT 636 FOR OP/ED)

## 2023-10-27 PROCEDURE — 80053 COMPREHEN METABOLIC PANEL: CPT

## 2023-10-27 PROCEDURE — 93306 TTE W/DOPPLER COMPLETE: CPT | Performed by: INTERNAL MEDICINE

## 2023-10-27 PROCEDURE — C1751 CATH, INF, PER/CENT/MIDLINE: HCPCS

## 2023-10-27 PROCEDURE — 2780000003 HC OR 278 NO HCPCS

## 2023-10-27 PROCEDURE — 83690 ASSAY OF LIPASE: CPT

## 2023-10-27 PROCEDURE — A9575 INJ GADOTERATE MEGLUMI 0.1ML: HCPCS | Performed by: INTERNAL MEDICINE

## 2023-10-27 PROCEDURE — 99233 SBSQ HOSP IP/OBS HIGH 50: CPT | Performed by: INTERNAL MEDICINE

## 2023-10-27 PROCEDURE — 82248 BILIRUBIN DIRECT: CPT

## 2023-10-27 PROCEDURE — 93306 TTE W/DOPPLER COMPLETE: CPT

## 2023-10-27 PROCEDURE — 76376 3D RENDER W/INTRP POSTPROCES: CPT | Performed by: INTERNAL MEDICINE

## 2023-10-27 PROCEDURE — 76376 3D RENDER W/INTRP POSTPROCES: CPT

## 2023-10-27 PROCEDURE — 84460 ALANINE AMINO (ALT) (SGPT): CPT

## 2023-10-27 PROCEDURE — 83605 ASSAY OF LACTIC ACID: CPT

## 2023-10-27 RX ORDER — SODIUM CHLORIDE 9 MG/ML
125 INJECTION, SOLUTION INTRAVENOUS CONTINUOUS
Status: DISCONTINUED | OUTPATIENT
Start: 2023-10-27 | End: 2023-10-29

## 2023-10-27 RX ORDER — GADOTERATE MEGLUMINE 376.9 MG/ML
15 INJECTION INTRAVENOUS
Status: COMPLETED | OUTPATIENT
Start: 2023-10-27 | End: 2023-10-27

## 2023-10-27 RX ORDER — PANTOPRAZOLE SODIUM 40 MG/1
40 TABLET, DELAYED RELEASE ORAL
Status: DISCONTINUED | OUTPATIENT
Start: 2023-10-27 | End: 2023-11-11 | Stop reason: HOSPADM

## 2023-10-27 RX ORDER — LIDOCAINE HYDROCHLORIDE 10 MG/ML
5 INJECTION, SOLUTION EPIDURAL; INFILTRATION; INTRACAUDAL; PERINEURAL ONCE
Status: COMPLETED | OUTPATIENT
Start: 2023-10-27 | End: 2023-10-28

## 2023-10-27 RX ORDER — DEXAMETHASONE SODIUM PHOSPHATE 100 MG/10ML
10 INJECTION INTRAMUSCULAR; INTRAVENOUS EVERY 8 HOURS
Status: DISCONTINUED | OUTPATIENT
Start: 2023-10-28 | End: 2023-10-29

## 2023-10-27 RX ORDER — ACYCLOVIR 400 MG/1
400 TABLET ORAL 2 TIMES DAILY
Status: DISCONTINUED | OUTPATIENT
Start: 2023-10-27 | End: 2023-11-11 | Stop reason: HOSPADM

## 2023-10-27 RX ORDER — ENOXAPARIN SODIUM 100 MG/ML
40 INJECTION SUBCUTANEOUS DAILY
Status: DISCONTINUED | OUTPATIENT
Start: 2023-10-27 | End: 2023-10-29

## 2023-10-27 RX ADMIN — ACYCLOVIR 400 MG: 400 TABLET ORAL at 20:58

## 2023-10-27 RX ADMIN — DEXAMETHASONE SODIUM PHOSPHATE 20 MG: 10 INJECTION, SOLUTION INTRAMUSCULAR; INTRAVENOUS at 18:13

## 2023-10-27 RX ADMIN — ALLOPURINOL 300 MG: 100 TABLET ORAL at 08:51

## 2023-10-27 RX ADMIN — ENOXAPARIN SODIUM 40 MG: 100 INJECTION SUBCUTANEOUS at 18:18

## 2023-10-27 RX ADMIN — PANTOPRAZOLE SODIUM 40 MG: 40 TABLET, DELAYED RELEASE ORAL at 06:23

## 2023-10-27 RX ADMIN — SODIUM CHLORIDE 150 ML/HR: 9 INJECTION, SOLUTION INTRAVENOUS at 18:39

## 2023-10-27 RX ADMIN — OXYCODONE HYDROCHLORIDE 5 MG: 5 TABLET ORAL at 02:34

## 2023-10-27 RX ADMIN — ACYCLOVIR 400 MG: 400 TABLET ORAL at 08:51

## 2023-10-27 RX ADMIN — GADOTERATE MEGLUMINE 12 ML: 376.9 INJECTION INTRAVENOUS at 17:51

## 2023-10-27 RX ADMIN — OXYCODONE HYDROCHLORIDE 10 MG: 5 TABLET ORAL at 12:19

## 2023-10-27 RX ADMIN — ALLOPURINOL 300 MG: 100 TABLET ORAL at 20:58

## 2023-10-27 SDOH — SOCIAL STABILITY: SOCIAL INSECURITY: ARE YOU OR HAVE YOU BEEN THREATENED OR ABUSED PHYSICALLY, EMOTIONALLY, OR SEXUALLY BY ANYONE?: NO

## 2023-10-27 SDOH — SOCIAL STABILITY: SOCIAL INSECURITY: DO YOU FEEL ANYONE HAS EXPLOITED OR TAKEN ADVANTAGE OF YOU FINANCIALLY OR OF YOUR PERSONAL PROPERTY?: NO

## 2023-10-27 SDOH — SOCIAL STABILITY: SOCIAL INSECURITY: DO YOU FEEL UNSAFE GOING BACK TO THE PLACE WHERE YOU ARE LIVING?: NO

## 2023-10-27 SDOH — SOCIAL STABILITY: SOCIAL INSECURITY: WERE YOU ABLE TO COMPLETE ALL THE BEHAVIORAL HEALTH SCREENINGS?: YES

## 2023-10-27 SDOH — SOCIAL STABILITY: SOCIAL INSECURITY: HAS ANYONE EVER THREATENED TO HURT YOUR FAMILY OR YOUR PETS?: NO

## 2023-10-27 SDOH — SOCIAL STABILITY: SOCIAL INSECURITY: HAVE YOU HAD THOUGHTS OF HARMING ANYONE ELSE?: NO

## 2023-10-27 SDOH — SOCIAL STABILITY: SOCIAL INSECURITY: DOES ANYONE TRY TO KEEP YOU FROM HAVING/CONTACTING OTHER FRIENDS OR DOING THINGS OUTSIDE YOUR HOME?: NO

## 2023-10-27 SDOH — SOCIAL STABILITY: SOCIAL INSECURITY: ABUSE: ADULT

## 2023-10-27 SDOH — SOCIAL STABILITY: SOCIAL INSECURITY: ARE THERE ANY APPARENT SIGNS OF INJURIES/BEHAVIORS THAT COULD BE RELATED TO ABUSE/NEGLECT?: NO

## 2023-10-27 ASSESSMENT — ENCOUNTER SYMPTOMS
TROUBLE SWALLOWING: 1
FATIGUE: 0
MYALGIAS: 0
DIFFICULTY URINATING: 0
CONFUSION: 0
APNEA: 0
BACK PAIN: 0
CHEST TIGHTNESS: 0
COUGH: 0
AGITATION: 0
NAUSEA: 1
WHEEZING: 0
FEVER: 0
ACTIVITY CHANGE: 0
LIGHT-HEADEDNESS: 0
APPETITE CHANGE: 1
NECK PAIN: 1
VOICE CHANGE: 0
SHORTNESS OF BREATH: 0
RHINORRHEA: 0
DIARRHEA: 0
ABDOMINAL PAIN: 1
UNEXPECTED WEIGHT CHANGE: 1
SORE THROAT: 1
DIZZINESS: 0
CHILLS: 0
CONSTIPATION: 1
STRIDOR: 0
HEMATURIA: 0
ARTHRALGIAS: 0
HEADACHES: 0
PALPITATIONS: 0

## 2023-10-27 ASSESSMENT — ACTIVITIES OF DAILY LIVING (ADL)
BATHING: INDEPENDENT
JUDGMENT_ADEQUATE_SAFELY_COMPLETE_DAILY_ACTIVITIES: YES
HEARING - LEFT EAR: FUNCTIONAL
LACK_OF_TRANSPORTATION: NO
WALKS IN HOME: INDEPENDENT
TOILETING: INDEPENDENT
PATIENT'S MEMORY ADEQUATE TO SAFELY COMPLETE DAILY ACTIVITIES?: YES
DRESSING YOURSELF: INDEPENDENT
HEARING - RIGHT EAR: FUNCTIONAL
FEEDING YOURSELF: INDEPENDENT
GROOMING: INDEPENDENT
ADEQUATE_TO_COMPLETE_ADL: YES

## 2023-10-27 ASSESSMENT — PAIN - FUNCTIONAL ASSESSMENT
PAIN_FUNCTIONAL_ASSESSMENT: 0-10
PAIN_FUNCTIONAL_ASSESSMENT: 0-10

## 2023-10-27 ASSESSMENT — COGNITIVE AND FUNCTIONAL STATUS - GENERAL
DAILY ACTIVITIY SCORE: 24
MOBILITY SCORE: 24
PATIENT BASELINE BEDBOUND: NO

## 2023-10-27 ASSESSMENT — LIFESTYLE VARIABLES
SKIP TO QUESTIONS 9-10: 1
HOW OFTEN DO YOU HAVE A DRINK CONTAINING ALCOHOL: NEVER
AUDIT-C TOTAL SCORE: 0
HOW OFTEN DO YOU HAVE 6 OR MORE DRINKS ON ONE OCCASION: NEVER
HOW MANY STANDARD DRINKS CONTAINING ALCOHOL DO YOU HAVE ON A TYPICAL DAY: PATIENT DOES NOT DRINK
AUDIT-C TOTAL SCORE: 0

## 2023-10-27 ASSESSMENT — PAIN SCALES - GENERAL
PAINLEVEL_OUTOF10: 0 - NO PAIN
PAINLEVEL_OUTOF10: 0 - NO PAIN
PAINLEVEL_OUTOF10: 6
PAINLEVEL_OUTOF10: 0 - NO PAIN
PAINLEVEL_OUTOF10: 0 - NO PAIN

## 2023-10-27 ASSESSMENT — PATIENT HEALTH QUESTIONNAIRE - PHQ9
1. LITTLE INTEREST OR PLEASURE IN DOING THINGS: NOT AT ALL
SUM OF ALL RESPONSES TO PHQ9 QUESTIONS 1 & 2: 0
2. FEELING DOWN, DEPRESSED OR HOPELESS: NOT AT ALL

## 2023-10-27 NOTE — CONSULTS
Vascular Access Team  Consult     Visit Date: 10/27/2023      Patient Name: Shilo Thakkar         MRN: [id number]                Reason for Consult: PICC            Assessment: Patient off the unit in MRI           Plan: PICC to placed tomorrow.  Mariel Zimmerman PA-C aware       Zenia Ford RN  10/27/2023  5:10 PM

## 2023-10-27 NOTE — ED PROVIDER NOTES
HPI   Chief Complaint   Patient presents with    Weakness, Gen       Patient is a previously healthy 23-year-old male presenting to the emergency department after referral by primary care.  Patient initially presented to [facility] on October 19 with 2 weeks of abdominal pain, difficulty swallowing and a 20 pound weight loss.  Imaging obtained which showed a pancreatic mass, mesenteric LAD and a left oropharyngeal mass.  Biopsy was performed on October 20 and patient was transferred to [facility] for further work-up.  Was eventually discharged and represented to establish care with oncology when biopsy results resulted showing concern for Burkitt lymphoma.  Patient has a strong family history of pancreatic cancer in his father passed away in his 30s.  Grandfather also passed away from pancreatic cancer in his 70s.  Biopsy shows concern for malignancy and patient was recommended to the ED for observation admission and to begin chemotherapy.  Patient denies fever, chills, chest pain or shortness of breath.  Is not able to tolerate much p.o. due to pain with swallowing, is also endorsing vomiting due to abdominal pain.  No urinary symptoms, no blood in the urine or stool.                          No data recorded                Patient History   Past Medical History:   Diagnosis Date    Colles' fracture of right radius, initial encounter for closed fracture 04/23/2015    Colles' fracture of right radius    Personal history of other mental and behavioral disorders 01/28/2014    History of attention deficit hyperactivity disorder    Personal history of other mental and behavioral disorders 05/10/2017    History of attention deficit hyperactivity disorder (ADHD)    Personal history of other specified conditions 05/03/2016    History of gynecomastia    Unspecified fracture of navicular (scaphoid) bone of left wrist, initial encounter for closed fracture 12/17/2015    Closed fracture of scaphoid bone of left wrist     Past Surgical  History:   Procedure Laterality Date    FOOT FRACTURE SURGERY Left     OTHER SURGICAL HISTORY  10/03/2017    History Of Prior Surgery    US GUIDED SOFT TISSUE BIOPSY  10/20/2023    US GUIDED SOFT TISSUE BIOPSY 10/20/2023 [facility]      Family History   Problem Relation Name Age of Onset    ADD / ADHD Mother      ADD / ADHD Sister      ADD / ADHD Brother       Social History     Tobacco Use    Smoking status: Never     Passive exposure: Never    Smokeless tobacco: Never   Vaping Use    Vaping Use: Not on file   Substance Use Topics    Alcohol use: Not Currently     Comment: rarely    Drug use: Never       Physical Exam   ED Triage Vitals [10/26/23 1922]   Temp Heart Rate Resp BP   37.8 °C (100 °F) 101 16 (!) 131/94      SpO2 Temp Source Heart Rate Source Patient Position   100 % Temporal Monitor Sitting      BP Location FiO2 (%)     Right arm --       Physical Exam  Vitals and nursing note reviewed.   Constitutional:       General: He is not in acute distress.     Appearance: Normal appearance. He is not toxic-appearing.   HENT:      Head: Normocephalic.      Mouth/Throat:      Mouth: Mucous membranes are moist.   Eyes:      Conjunctiva/sclera: Conjunctivae normal.      Pupils: Pupils are equal, round, and reactive to light.   Cardiovascular:      Rate and Rhythm: Normal rate and regular rhythm.      Pulses:           Radial pulses are 2+ on the right side and 2+ on the left side.   Pulmonary:      Effort: Pulmonary effort is normal. No respiratory distress.      Breath sounds: Normal breath sounds.   Abdominal:      General: Abdomen is flat.      Palpations: Abdomen is soft.      Tenderness: There is no abdominal tenderness. There is no guarding or rebound.   Musculoskeletal:      Cervical back: Normal range of motion and neck supple.      Right lower leg: No edema.      Left lower leg: No edema.   Skin:     General: Skin is warm.      Coloration: Skin is jaundiced.   Neurological:      Mental Status: He is alert and  oriented to person, place, and time.   Psychiatric:         Mood and Affect: Mood normal.         ED Course & MDM   Diagnoses as of 10/27/23 1420   Generalized weakness       Medical Decision Making  23-year-old male presenting to the emergency department with general weakness, weight loss and recent diagnosis of malignancy.  On physical exam, patient is jaundiced appearing, and speech is limited secondary to pain in his throat.  Has extensive cervical lymphadenopathy.  IV access obtained, patient given IV fluids, Zofran and pain medicine for symptomatic management.  Consulted with the on-call malignant heme attending and fellow who recommended we initiate work-up and admit him directly to their service for further management.  Labs placed.  Patient and family updated with plan for admission to which they are agreeable.  Patient admitted to [facility]/heme in stable condition.        Procedure  Procedures     Juliane Gray DO  Resident  10/27/23 1421    I saw and evaluated the patient. I personally obtained the key and critical portions of the history and physical exam or was physically present for key and critical portions performed by the resident/fellow. I reviewed the resident/fellow's documentation and discussed the patient with the resident/fellow. I agree with the resident/fellow's medical decision making as documented in the note.      [facility]  Patient sent in for initiation of chemotherapy for newly diagnosed Burkitts Lymphoma  Oncology aware  Patient and family provided with support and answered all questions    Patient is very thin and pale but has no hypoxia or respiratory complaints  Admit     MD Meche Dietrich MD  11/08/23 1207

## 2023-10-27 NOTE — H&P
History Of Present Illness  Shilo Thakkar is a 23 y.o. male with no significant medical history presenting to the ED as a referral from outpatient clinic with dysphagia and abdominal pain for the past 3 weeks. Patient initially presented to [facility] ED on 10/19 with 2 weeks of worsening abdominal pain, dysphagia, and 20lb weight loss. Imaging revealed a pancreatic mass, mesenteric LAD, left oropharyngeal mass, and cervical LAD. He had ultrasound guided mesenteric LN biopsy 10/20 and then patient was transferred to [facility] for further work-up. He had biopsy of the oropharyngeal mass and cervical lymph node by ENT 10/23/23. He was then discharged home and presented to outpatient clinic to establish care on 10/26. Given concern for lymphoma from biopsies, patient was referred to the ED for further workup and management.  He is now being admitted to [facility] for further workup and management of suspected new Burkitt Lymphoma.     On admission, history obtained from patient at bedside. Patient's stepdad, girlfriend and his brother present at bedside as well. Patient reports dysphagia for about 3 weeks and endorses a 20lb weight loss during this time as a result of poor PO intake. He also endorses nausea and diffuse abdominal pain, ongoing for the past 3 weeks. Patient also reports constipation, but thinks it can be explained by his poor po intake. He denies fever, chills, headaches, vision changes, epistaxis, cough, CP, SOB, palpitations. He endorses some voice changes when symptoms initially started but states that is now resolved. Patient denies urinary symptoms. Denies melena, hematochezia. Also denies any new rashes or bleeding, or peripheral weakness or neuropathy. No sick contacts. No falls.   All other ROS negative.      Past Medical History  Past Medical History:   Diagnosis Date    Colles' fracture of right radius, initial encounter for closed fracture 04/23/2015    Colles' fracture of right radius    Personal history of  other mental and behavioral disorders 01/28/2014    History of attention deficit hyperactivity disorder    Personal history of other mental and behavioral disorders 05/10/2017    History of attention deficit hyperactivity disorder (ADHD)    Personal history of other specified conditions 05/03/2016    History of gynecomastia    Unspecified fracture of navicular (scaphoid) bone of left wrist, initial encounter for closed fracture 12/17/2015    Closed fracture of scaphoid bone of left wrist       Surgical History  Past Surgical History:   Procedure Laterality Date    FOOT FRACTURE SURGERY Left     OTHER SURGICAL HISTORY  10/03/2017    History Of Prior Surgery    US GUIDED SOFT TISSUE BIOPSY  10/20/2023    US GUIDED SOFT TISSUE BIOPSY 10/20/2023 [facility]        Social History  He reports that he has never smoked. He has never been exposed to tobacco smoke. He has never used smokeless tobacco. He reports that he does not currently use alcohol. He reports that he does not use drugs.    Family History  Family History   Problem Relation Name Age of Onset    ADD / ADHD Mother      ADD / ADHD Sister      ADD / ADHD Brother          Allergies  Patient has no known allergies.    Review of Systems   Constitutional:  Positive for appetite change and unexpected weight change (20 lb weight loss in the past 2 weeks). Negative for activity change, chills, fatigue and fever.   HENT:  Positive for sore throat and trouble swallowing. Negative for congestion, drooling, rhinorrhea and voice change.    Eyes:  Negative for visual disturbance.   Respiratory:  Negative for apnea, cough, chest tightness, shortness of breath, wheezing and stridor.    Cardiovascular:  Negative for chest pain, palpitations and leg swelling.   Gastrointestinal:  Positive for abdominal pain, constipation and nausea. Negative for diarrhea.   Genitourinary:  Negative for difficulty urinating and hematuria.   Musculoskeletal:  Positive for neck pain. Negative for  "arthralgias, back pain and myalgias.   Neurological:  Negative for dizziness, light-headedness and headaches.   Psychiatric/Behavioral:  Negative for agitation and confusion.         Physical Exam  Constitutional:       General: He is not in acute distress.     Appearance: Normal appearance.   HENT:      Head: Normocephalic and atraumatic.      Nose: Nose normal. No congestion.      Mouth/Throat:      Mouth: Mucous membranes are dry.      Pharynx: Posterior oropharyngeal erythema present.      Comments:  large left sided oropharyngeal mass s/p biopsy, no bleeding   Eyes:      Extraocular Movements: Extraocular movements intact.      Pupils: Pupils are equal, round, and reactive to light.   Neck:      Comments: Left sided LAD  Cardiovascular:      Rate and Rhythm: Normal rate and regular rhythm.      Pulses: Normal pulses.      Heart sounds: No murmur heard.     No friction rub. No gallop.   Pulmonary:      Effort: Pulmonary effort is normal. No respiratory distress.      Breath sounds: Normal breath sounds. No stridor. No wheezing or rales.   Abdominal:      General: Abdomen is flat. There is no distension.      Palpations: Abdomen is soft.      Tenderness: There is abdominal tenderness. There is no guarding or rebound.   Musculoskeletal:         General: No swelling. Normal range of motion.      Cervical back: Normal range of motion and neck supple. No rigidity.   Skin:     General: Skin is warm and dry.   Neurological:      General: No focal deficit present.      Mental Status: He is alert and oriented to person, place, and time.      Cranial Nerves: No cranial nerve deficit.   Psychiatric:         Mood and Affect: Mood normal.         Behavior: Behavior normal.          Last Recorded Vitals  Blood pressure (!) 159/101, pulse 78, temperature 37.8 °C (100 °F), temperature source Temporal, resp. rate 15, height 1.836 m (6' 0.28\"), weight 62.6 kg (138 lb), SpO2 95 %.    Relevant Results  Scheduled " medications  allopurinol, 300 mg, oral, BID  docusate sodium, 100 mg, oral, BID  naloxone, 0.4 mg, intravenous, Once  perflutren lipid microspheres, 0.5-10 mL of dilution, intravenous, Once in imaging  perflutren protein A microsphere, 0.5 mL, intravenous, Once in imaging  polyethylene glycol, 17 g, oral, Daily  sulfur hexafluoride microsphr, 2 mL, intravenous, Once in imaging      Continuous medications  sodium chloride 0.9%, 100 mL/hr, Last Rate: 100 mL/hr (10/26/23 2308)      PRN medications  PRN medications: magic mouthwash (lidocaine, diphenhydrAMINE, Maalox 1:1:1), naloxone, ondansetron, ondansetron, oxyCODONE, oxyCODONE    Results for orders placed or performed during the hospital encounter of 10/26/23 (from the past 24 hour(s))   CBC and Auto Differential   Result Value Ref Range    WBC 11.3 4.4 - 11.3 x10*3/uL    nRBC 0.0 0.0 - 0.0 /100 WBCs    RBC 5.01 4.50 - 5.90 x10*6/uL    Hemoglobin 14.4 13.5 - 17.5 g/dL    Hematocrit 40.9 (L) 41.0 - 52.0 %    MCV 82 80 - 100 fL    MCH 28.7 26.0 - 34.0 pg    MCHC 35.2 32.0 - 36.0 g/dL    RDW 13.3 11.5 - 14.5 %    Platelets 495 (H) 150 - 450 x10*3/uL    MPV 10.3 7.5 - 11.5 fL    Neutrophils % 78.6 40.0 - 80.0 %    Immature Granulocytes %, Automated 0.5 0.0 - 0.9 %    Lymphocytes % 13.1 13.0 - 44.0 %    Monocytes % 6.4 2.0 - 10.0 %    Eosinophils % 0.6 0.0 - 6.0 %    Basophils % 0.8 0.0 - 2.0 %    Neutrophils Absolute 8.85 (H) 1.20 - 7.70 x10*3/uL    Immature Granulocytes Absolute, Automated 0.06 0.00 - 0.70 x10*3/uL    Lymphocytes Absolute 1.48 1.20 - 4.80 x10*3/uL    Monocytes Absolute 0.72 0.10 - 1.00 x10*3/uL    Eosinophils Absolute 0.07 0.00 - 0.70 x10*3/uL    Basophils Absolute 0.09 0.00 - 0.10 x10*3/uL   Comprehensive metabolic panel   Result Value Ref Range    Glucose 93 74 - 99 mg/dL    Sodium 131 (L) 136 - 145 mmol/L    Potassium 4.3 3.5 - 5.3 mmol/L    Chloride 93 (L) 98 - 107 mmol/L    Bicarbonate 23 21 - 32 mmol/L    Anion Gap 19 10 - 20 mmol/L    Urea  Nitrogen 28 (H) 6 - 23 mg/dL    Creatinine 1.18 0.50 - 1.30 mg/dL    eGFR 88 >60 mL/min/1.73m*2    Calcium 9.3 8.6 - 10.6 mg/dL    Albumin 4.0 3.4 - 5.0 g/dL    Alkaline Phosphatase 58 33 - 120 U/L    Total Protein 6.4 6.4 - 8.2 g/dL     (H) 9 - 39 U/L    Bilirubin, Total 0.6 0.0 - 1.2 mg/dL     (H) 10 - 52 U/L   Coagulation Screen   Result Value Ref Range    Protime 12.2 9.8 - 12.8 seconds    INR 1.1 0.9 - 1.1    aPTT 28 27 - 38 seconds   LDH, Lactate dehydrogenase   Result Value Ref Range    LDH 1,729 (H) 84 - 246 U/L   HIV 1/2 Antigen/Antibody Screen with Reflex to Confirmation   Result Value Ref Range    HIV 1/2 Antigen/Antibody Screen with Reflex to Confirmation Nonreactive Nonreactive   Hepatitis panel, acute   Result Value Ref Range    Hepatitis B Surface AG Nonreactive Nonreactive    Hepatitis A  AB- IgM Nonreactive Nonreactive    Hepatitis B Core AB; IgM Nonreactive Nonreactive    Hepatitis C AB Nonreactive Nonreactive   Fibrinogen   Result Value Ref Range    Fibrinogen 366 200 - 400 mg/dL   Uric acid   Result Value Ref Range    Uric Acid 10.4 (H) 4.0 - 7.5 mg/dL   Sars-CoV-2 PCR, Screen Asymptomatic   Result Value Ref Range    Coronavirus 2019, PCR Not Detected Not Detected     No results found.     Assessment/Plan   Principal Problem:    Generalized weakness  Shilo Thakkar is a 23 y.o. male with no significant medical history presenting to the ED as a referral from outpatient clinic with dysphagia and abdominal pain for the past 3 weeks. Patient initially presented to [facility] ED on 10/19 with 2 weeks of worsening abdominal pain, dysphagia, and 20lb weight loss. Imaging revealed a pancreatic mass, mesenteric LAD, left oropharyngeal mass, and cervical LAD. He had ultrasound guided mesenteric LN biopsy 10/20 and then patient was transferred to [facility] for further work-up. He had biopsy of the oropharyngeal mass and cervical lymph node by ENT 10/23/23. He was discharged home and presented to  outpatient clinic to establish care on 10/26. Given biopsy is concerning for lymphoma,  patient was referred to the ED for further workup and management.  He is now being admitted to [facility] for further workup and management of suspected new Burkitt Lymphoma.     HEME/ONC:  -10/19: presented to [facility] ED with dysphagia and abdominal pain  -CT soft tissue neck (10/19/23): homogeneous soft tissue attenuation lesion in the left oropharynx which measures approximately 3.0 x 4.0 x 5.1 cm. The airway is mildly narrowed and the adjacent parapharyngeal fat is effaced    -CT Abdomen without IV contrast: Mesenteric lymphadenopathy. 10/20   -US guided mesenteric LN biopsy (10/20), pending  -Biopsy of the oropharyngeal mass and cervical lymph node by ENT 10/23/23  -Per onc fellow report (Emmanuel Briones), biopsy results concerning for Burkitt lymphoma  -Flow cytometry pending  -Transfuse to keep Hgb>7, Plt>10  -Consider PET CT  -Consider BMBx  -DVT ppx: SCDs    ID:  Afebrile on admission, no signs of infection  Allergies: NKDA  -BCX x2 (10/21): NGTD  -Covid neg (10/26)  -Hepatitis serology negative (10/26/23)  -HIV 1/2 nonreactive (10/26)  -Consider Acyclovir ppx in am  -Plan Zosyn for fever  -Plan Levaquin for neutropenia    FEN  Admit weight: 62.6kg  Replete electrolytes as needed  #TLS monitoring  -LDH 1729 and UA 10.4 on admission  -Start IVF 100cc/hr  -Start Allopurinol 300mg BID  -G6PD ordered on admission, pending  -Daily TLS labs ordered  Ppx: Protonix started on admission  #Dysphagia  -Secondary to LAD  -Continue BMX  -NPO on admission  -SLP ordered for reevaluation  #Weight loss  -Secondary to dysphagia  -Nutrition consult on admit    GI  #Constipation  -Bowel regimen with scheduled Colace and Miralax PRN  #Pancreatic body mass  -CT A/P (10/19/23): mass in the body of the pancreas and multiple masses in the mesentery as well as omental caking, consistent with carcinomatosis.   -CEA, b-HCG, AFP all negative.   -CA 19-9 WNL and   elevated 100.9  -s/p US guided IR biopsy of mesentery 10/20  -flow pending    CARDS/PULM  Onco echo ordered on admission  EKG on admission  #Pleural effusions, BL  -Found incidentally on CT chest with IV contrast 10/20   -No significant lymphadenopathy or consolidation   -RA on admission without c/o dyspnea  -Monitor    #Pain management  -Oxy PRN q6hr  -Consider supportive onc consult    DISPO:  Full code  Needs outpatient oncologist assigned  Access: [facility]  NOK: Deidra Pickett (Mother) - [phone]      I spent 60 minutes in the professional and overall care of this patient.      Huber Waller PA-C

## 2023-10-27 NOTE — PROGRESS NOTES
Shilo Thakkar is a 23 y.o. male on day 1 of admission presenting with Generalized weakness.    Subjective   Reports 3 week history of stomach pain and dysphagia. First symptoms he noticed were shooting pain in his stomach and difficulty swallowing. Abd pain currently controlled with oxycodone. Reports 20-25 lb weight loss over last 3-4 weeks. Mom and sister are here. Appetite ok, less than normal. Ate ice cream and french fries today, kept it down. Denies any fevers, chills, night sweats, SOB, CP, HA, cough, urinary symptoms, bleeding, bruising.    PMH = none, no meds  Surg Hx = L foot  Fhx = Dad and Grandfather both had pancreatic cancer       Objective     Physical Exam  Constitutional:       General: He is not in acute distress.     Comments: Cachectic    HENT:      Head: Normocephalic and atraumatic.      Nose: Nose normal.      Mouth/Throat:      Mouth: Mucous membranes are moist.      Comments: Large ulcerated oropharyngeal mass, L side  Eyes:      Extraocular Movements: Extraocular movements intact.      Pupils: Pupils are equal, round, and reactive to light.   Neck:      Comments: L cervical LAD  Cardiovascular:      Rate and Rhythm: Normal rate and regular rhythm.   Pulmonary:      Effort: Pulmonary effort is normal.      Comments: Decreased breath sounds bilateral lower lobes  Abdominal:      General: Bowel sounds are normal.      Palpations: Abdomen is soft. There is mass.      Comments: Several abdominal masses   Musculoskeletal:         General: Swelling present. Normal range of motion.      Cervical back: Normal range of motion and neck supple.      Comments: L back swelling, small pinpoint lesion with scab   Lymphadenopathy:      Cervical: Cervical adenopathy present.   Skin:     General: Skin is warm and dry.   Neurological:      General: No focal deficit present.      Mental Status: He is alert and oriented to person, place, and time.      Cranial Nerves: No cranial nerve deficit.   Psychiatric:          Mood and Affect: Mood normal.         Behavior: Behavior normal.          Assessment/Plan   Principal Problem:    Generalized weakness    Shilo Thakkar is a 23 y.o. male with no significant medical history presenting to the ED as a referral from outpatient clinic with dysphagia and abdominal pain for the past 3 weeks. Patient initially presented to [facility] ED on 10/19 with 2 weeks of worsening abdominal pain, dysphagia, and 20lb weight loss. Imaging revealed a pancreatic mass, mesenteric LAD, left oropharyngeal mass, and cervical LAD. He had ultrasound guided mesenteric LN biopsy 10/20 and then patient was transferred to [facility] for further work-up. He had biopsy of the oropharyngeal mass and cervical lymph node by ENT 10/23/23. He was discharged home and presented to outpatient clinic to establish care on 10/26. Given biopsy is concerning for lymphoma,  patient was referred to the ED for further workup and management.  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      ONC:  #new Burkitt lymphoma  -10/19: presented to [facility] ED with dysphagia and abdominal pain  -CT soft tissue neck (10/19/23): homogeneous soft tissue attenuation lesion in the left oropharynx which measures approximately 3.0 x 4.0 x 5.1 cm. The airway is mildly narrowed and the adjacent parapharyngeal fat is effaced    -CT Abdomen without IV contrast (10/20): Mesenteric lymphadenopathy. 10/20   -US guided mesenteric LN biopsy (10/20/23): HIGH GRADE B-CELL LYMPHOMA MORPHOLOGICALLY CONSISTENT WITH BURKITT LYMPHOMA; flow cytometry: CD10+ kappa restricted B cells supportive of a B cell lymphoma with germinal center origin.   -biopsy L oropharyngeal mass and L cervical LN by ENT (10/23/23), pending  -Per onc fellow report (Emmanuel Briones), biopsy results concerning for Burkitt lymphoma  -Transfuse to keep Hgb>7, Plt>10  -PET CT ordered  -Consider BMBx  - LP with IT methotrexate on D2  - 20 mg dex once,  then 10mg q8hr  - ordered DL PICC solo, will be placed 10/28  - MRI head w/wo 10/27, pending    HEME:  #anemia, likely 2/2 disease  -Transfuse to keep Hgb>7, Plt>10  -DVT ppx: lovenox, SCDs     ID:  Afebrile on admission, no signs of infection  Allergies: NKDA  -BCX x2 (10/21): NGTD  -Covid neg (10/26)  -Hepatitis serology negative (10/26/23)  -HIV 1/2 nonreactive (10/26)  -Consider Acyclovir ppx in am  -Plan Zosyn for fever  -Plan Levaquin for neutropenia     FEN  Admit weight: 62.6kg  Replete electrolytes as needed  #TLS monitoring  -LDH 1729 and UA 10.4 on admission  -Start  cc/hr  -Start Allopurinol 300mg BID  -G6PD ordered on admission, normal  -BID TLS labs ordered  Ppx: Protonix started on admission  #Dysphagia  -Secondary to LAD  -Continue BMX  - NPO on admission, SLP ordered for reevaluation  - Per SLP (10/27): regular diet with thin liquids. If he demonstrates increase difficulty with PO then order a MBSS  #Weight loss  -Secondary to dysphagia  -Nutrition consulted on admit     GI  #Constipation  -Bowel regimen with scheduled Colace and Miralax PRN  #Pancreatic body mass  -CT A/P (10/19/23): mass in the body of the pancreas and multiple masses in the mesentery as well as omental caking, consistent with carcinomatosis.   -CEA, b-HCG, AFP all negative.   -CA 19-9 WNL and  elevated 100.9  -s/p US guided IR biopsy of mesentery 10/20  -flow pending     CARDS/PULM  - Onco echo (10/27): LVSF normal with a 60-65% estimated EF. RVSP WNL. No prior echo available for comparison.  - EKG on admission  #Pleural effusions, BL  -Found incidentally on CT chest with IV contrast 10/20   -No significant lymphadenopathy or consolidation   -RA on admission without c/o dyspnea  -Monitor     MISC:  #Pain management  -Oxy PRN q6hr  -Consider supportive onc consult  #fertility preservation  - fertility consulted and following  - patient interested, collection pending, may need to start chemo before collection is done      DISPO:  Full code  Needs outpatient oncologist assigned  Access: PIV  NOK: Deidra Pickett (Mother) - [phone]   I spent 60 minutes in the professional and overall care of this patient.    Patient seen, examined and discussed with Dr. Latoya Zimmerman PA-C

## 2023-10-27 NOTE — PROGRESS NOTES
Speech-Language Pathology    Inpatient Speech-Language Pathology Clinical Swallow Evaluation    Patient Name: Shilo Thakkar  MRN: [id number]  Today's Date: 10/27/2023   Time Calculation  Start Time: 0955  Stop Time: 1022  Time Calculation (min): 27 min       History: Patient is a previously healthy 23-year-old male presenting to the emergency department after referral by primary care.  Patient initially presented to [facility] on October 19 with 2 weeks of abdominal pain, difficulty swallowing and a 20 pound weight loss.  Imaging obtained which showed a pancreatic mass, mesenteric LAD and a left oropharyngeal mass.  Biopsy was performed on October 20 and patient was transferred to [facility] for further work-up.  Was eventually discharged and represented to establish care with oncology when biopsy results resulted showing concern for Burkitt lymphoma.       Recommendations:  Solid Diet Recommendations : Regular (IDDSI Level 7)  Liquid Diet Recommendations: Thin (IDDSI Level 0)      Assessment:  Clinical swallow evaluation completed. Pt endorsed lack of appetite after a few bites. Per pt swallow continues to gradually improve since biopsy was completed. Pt. A&O x4 with functional oral motor musculature. Pt. tolerated ice chips and sips of water without difficulty. Consumed 3 oz of water in continuous sequential swallows without overt s/s of aspiration.  Adequate manipulation of puree and mastication of solids with full oral clearance. No evidence of dysphagia at this time. If there is a change in medical condition or increase difficulty swallowing please reconsult SLP. Discussed with RN and MD results and recommendations.       Plan:  SLP Plan: Skilled SLP  SLP Frequency: 1x per week  Duration: 2 weeks  Discussed POC: Patient  Patient/Caregiver Agreeable: Yes  SLP - OK to Discharge: Yes    Goal:   Pt. will tolerate least restrictive diet without overt s/s of aspiration with 100% accuracy.    Subjective   Pt alert and willing to  participate in clinical swallow evaluation. Endorsed swallow has become easier since biopsy.     Pain:  Pain Assessment: 0-10  Pain Score: 0 - No pain       Oral/Motor Assessment:  Dentition: Adequate/Natural  Oral Motor: Within Functional Limits      Inpatient:  Education Documentation  Extensive education provided to pt re: current swallow function, recommendations/results and dysphagia POC.

## 2023-10-28 ENCOUNTER — APPOINTMENT (OUTPATIENT)
Dept: RADIOLOGY | Facility: HOSPITAL | Age: 23
DRG: 840 | End: 2023-10-28
Payer: COMMERCIAL

## 2023-10-28 DIAGNOSIS — C83.78 BURKITT LYMPHOMA OF LYMPH NODES OF MULTIPLE REGIONS (MULTI): Primary | ICD-10-CM

## 2023-10-28 LAB
ALBUMIN SERPL BCP-MCNC: 3.3 G/DL (ref 3.4–5)
ALBUMIN SERPL BCP-MCNC: 3.4 G/DL (ref 3.4–5)
ANION GAP SERPL CALC-SCNC: 14 MMOL/L (ref 10–20)
ANION GAP SERPL CALC-SCNC: 16 MMOL/L (ref 10–20)
BASOPHILS # BLD AUTO: 0.01 X10*3/UL (ref 0–0.1)
BASOPHILS NFR BLD AUTO: 0.2 %
BUN SERPL-MCNC: 30 MG/DL (ref 6–23)
BUN SERPL-MCNC: 32 MG/DL (ref 6–23)
C TRACH RRNA SPEC QL NAA+PROBE: NEGATIVE
CALCIUM SERPL-MCNC: 8.3 MG/DL (ref 8.6–10.6)
CALCIUM SERPL-MCNC: 8.5 MG/DL (ref 8.6–10.6)
CHLORIDE SERPL-SCNC: 100 MMOL/L (ref 98–107)
CHLORIDE SERPL-SCNC: 102 MMOL/L (ref 98–107)
CO2 SERPL-SCNC: 23 MMOL/L (ref 21–32)
CO2 SERPL-SCNC: 24 MMOL/L (ref 21–32)
CREAT SERPL-MCNC: 0.86 MG/DL (ref 0.5–1.3)
CREAT SERPL-MCNC: 0.91 MG/DL (ref 0.5–1.3)
EOSINOPHIL # BLD AUTO: 0 X10*3/UL (ref 0–0.7)
EOSINOPHIL NFR BLD AUTO: 0 %
ERYTHROCYTE [DISTWIDTH] IN BLOOD BY AUTOMATED COUNT: 13.5 % (ref 11.5–14.5)
GFR SERPL CREATININE-BSD FRML MDRD: >90 ML/MIN/1.73M*2
GFR SERPL CREATININE-BSD FRML MDRD: >90 ML/MIN/1.73M*2
GLUCOSE SERPL-MCNC: 134 MG/DL (ref 74–99)
GLUCOSE SERPL-MCNC: 154 MG/DL (ref 74–99)
HCT VFR BLD AUTO: 32.9 % (ref 41–52)
HGB BLD-MCNC: 11.2 G/DL (ref 13.5–17.5)
IMM GRANULOCYTES # BLD AUTO: 0.03 X10*3/UL (ref 0–0.7)
IMM GRANULOCYTES NFR BLD AUTO: 0.6 % (ref 0–0.9)
LDH SERPL L TO P-CCNC: 1582 U/L (ref 84–246)
LDH SERPL L TO P-CCNC: 1763 U/L (ref 84–246)
LYMPHOCYTES # BLD AUTO: 0.5 X10*3/UL (ref 1.2–4.8)
LYMPHOCYTES NFR BLD AUTO: 10.4 %
MAGNESIUM SERPL-MCNC: 2.27 MG/DL (ref 1.6–2.4)
MCH RBC QN AUTO: 29.4 PG (ref 26–34)
MCHC RBC AUTO-ENTMCNC: 34 G/DL (ref 32–36)
MCV RBC AUTO: 86 FL (ref 80–100)
MONOCYTES # BLD AUTO: 0.06 X10*3/UL (ref 0.1–1)
MONOCYTES NFR BLD AUTO: 1.2 %
N GONORRHOEA DNA SPEC QL PROBE+SIG AMP: NEGATIVE
NEUTROPHILS # BLD AUTO: 4.22 X10*3/UL (ref 1.2–7.7)
NEUTROPHILS NFR BLD AUTO: 87.6 %
NRBC BLD-RTO: 0 /100 WBCS (ref 0–0)
PHOSPHATE SERPL-MCNC: 3.6 MG/DL (ref 2.5–4.9)
PHOSPHATE SERPL-MCNC: 5 MG/DL (ref 2.5–4.9)
PLATELET # BLD AUTO: 411 X10*3/UL (ref 150–450)
PMV BLD AUTO: 10.3 FL (ref 7.5–11.5)
POTASSIUM SERPL-SCNC: 4.2 MMOL/L (ref 3.5–5.3)
POTASSIUM SERPL-SCNC: 4.2 MMOL/L (ref 3.5–5.3)
RBC # BLD AUTO: 3.81 X10*6/UL (ref 4.5–5.9)
SODIUM SERPL-SCNC: 135 MMOL/L (ref 136–145)
SODIUM SERPL-SCNC: 136 MMOL/L (ref 136–145)
T PALLIDUM AB SER QL: NONREACTIVE
URATE SERPL-MCNC: 3.8 MG/DL (ref 4–7.5)
URATE SERPL-MCNC: 5.1 MG/DL (ref 4–7.5)
WBC # BLD AUTO: 4.8 X10*3/UL (ref 4.4–11.3)

## 2023-10-28 PROCEDURE — 84550 ASSAY OF BLOOD/URIC ACID: CPT

## 2023-10-28 PROCEDURE — 83735 ASSAY OF MAGNESIUM: CPT

## 2023-10-28 PROCEDURE — 2500000001 HC RX 250 WO HCPCS SELF ADMINISTERED DRUGS (ALT 637 FOR MEDICARE OP)

## 2023-10-28 PROCEDURE — 36569 INSJ PICC 5 YR+ W/O IMAGING: CPT

## 2023-10-28 PROCEDURE — 71045 X-RAY EXAM CHEST 1 VIEW: CPT

## 2023-10-28 PROCEDURE — 2500000004 HC RX 250 GENERAL PHARMACY W/ HCPCS (ALT 636 FOR OP/ED)

## 2023-10-28 PROCEDURE — 86780 TREPONEMA PALLIDUM: CPT

## 2023-10-28 PROCEDURE — 96372 THER/PROPH/DIAG INJ SC/IM: CPT

## 2023-10-28 PROCEDURE — 99233 SBSQ HOSP IP/OBS HIGH 50: CPT | Performed by: INTERNAL MEDICINE

## 2023-10-28 PROCEDURE — 80069 RENAL FUNCTION PANEL: CPT

## 2023-10-28 PROCEDURE — 83615 LACTATE (LD) (LDH) ENZYME: CPT

## 2023-10-28 PROCEDURE — 1170000001 HC PRIVATE ONCOLOGY ROOM DAILY

## 2023-10-28 PROCEDURE — 85025 COMPLETE CBC W/AUTO DIFF WBC: CPT

## 2023-10-28 PROCEDURE — 87081 CULTURE SCREEN ONLY: CPT

## 2023-10-28 PROCEDURE — 87800 DETECT AGNT MULT DNA DIREC: CPT

## 2023-10-28 PROCEDURE — 36415 COLL VENOUS BLD VENIPUNCTURE: CPT

## 2023-10-28 PROCEDURE — 99223 1ST HOSP IP/OBS HIGH 75: CPT | Performed by: NURSE PRACTITIONER

## 2023-10-28 PROCEDURE — 2500000005 HC RX 250 GENERAL PHARMACY W/O HCPCS

## 2023-10-28 PROCEDURE — 71045 X-RAY EXAM CHEST 1 VIEW: CPT | Performed by: RADIOLOGY

## 2023-10-28 RX ORDER — IPRATROPIUM BROMIDE AND ALBUTEROL SULFATE 2.5; .5 MG/3ML; MG/3ML
3 SOLUTION RESPIRATORY (INHALATION)
Status: DISCONTINUED | OUTPATIENT
Start: 2023-10-29 | End: 2023-10-29

## 2023-10-28 RX ORDER — LORAZEPAM 0.5 MG/1
0.5 TABLET ORAL EVERY 8 HOURS PRN
Status: DISCONTINUED | OUTPATIENT
Start: 2023-10-28 | End: 2023-11-06

## 2023-10-28 RX ADMIN — OXYCODONE HYDROCHLORIDE 10 MG: 5 TABLET ORAL at 21:47

## 2023-10-28 RX ADMIN — ACYCLOVIR 400 MG: 400 TABLET ORAL at 09:49

## 2023-10-28 RX ADMIN — SODIUM CHLORIDE 150 ML/HR: 9 INJECTION, SOLUTION INTRAVENOUS at 00:48

## 2023-10-28 RX ADMIN — DEXAMETHASONE SODIUM PHOSPHATE 10 MG: 10 INJECTION INTRAMUSCULAR; INTRAVENOUS at 09:50

## 2023-10-28 RX ADMIN — ENOXAPARIN SODIUM 40 MG: 100 INJECTION SUBCUTANEOUS at 09:50

## 2023-10-28 RX ADMIN — ACYCLOVIR 400 MG: 400 TABLET ORAL at 20:44

## 2023-10-28 RX ADMIN — DEXAMETHASONE SODIUM PHOSPHATE 10 MG: 10 INJECTION INTRAMUSCULAR; INTRAVENOUS at 02:44

## 2023-10-28 RX ADMIN — ALLOPURINOL 300 MG: 100 TABLET ORAL at 09:49

## 2023-10-28 RX ADMIN — DOCUSATE SODIUM 100 MG: 100 CAPSULE, LIQUID FILLED ORAL at 20:44

## 2023-10-28 RX ADMIN — SODIUM CHLORIDE 150 ML/HR: 9 INJECTION, SOLUTION INTRAVENOUS at 09:53

## 2023-10-28 RX ADMIN — ALLOPURINOL 300 MG: 100 TABLET ORAL at 20:44

## 2023-10-28 RX ADMIN — LIDOCAINE HYDROCHLORIDE 5 ML: 10 INJECTION, SOLUTION EPIDURAL; INFILTRATION; INTRACAUDAL; PERINEURAL at 08:45

## 2023-10-28 RX ADMIN — DEXAMETHASONE SODIUM PHOSPHATE 10 MG: 10 INJECTION INTRAMUSCULAR; INTRAVENOUS at 17:44

## 2023-10-28 RX ADMIN — PANTOPRAZOLE SODIUM 40 MG: 40 TABLET, DELAYED RELEASE ORAL at 06:26

## 2023-10-28 ASSESSMENT — COGNITIVE AND FUNCTIONAL STATUS - GENERAL
MOBILITY SCORE: 24
MOBILITY SCORE: 24
DAILY ACTIVITIY SCORE: 24
DAILY ACTIVITIY SCORE: 24

## 2023-10-28 ASSESSMENT — PAIN SCALES - GENERAL
PAINLEVEL_OUTOF10: 1
PAINLEVEL_OUTOF10: 7
PAINLEVEL_OUTOF10: 0 - NO PAIN
PAINLEVEL_OUTOF10: 0 - NO PAIN

## 2023-10-28 ASSESSMENT — PAIN - FUNCTIONAL ASSESSMENT
PAIN_FUNCTIONAL_ASSESSMENT: 0-10
PAIN_FUNCTIONAL_ASSESSMENT: 0-10

## 2023-10-28 NOTE — CONSULTS
"Nutrition Assessment    Nutrition Initial Assessment:   Reason for Assessment: Provider consult order      Pt initially presented to [facility] ED on 10/19 with 2 weeks of worsening abdominal pain, dysphagia, and 20lb weight loss. Imaging revealed a pancreatic mass, mesenteric LAD, left oropharyngeal mass, and cervical LAD. He had ultrasound guided mesenteric LN biopsy 10/20 and then patient was transferred to [facility] for further work-up. He had biopsy of the oropharyngeal mass and cervical lymph node by ENT 10/23/23. He was then discharged home and presented to outpatient clinic to establish care on 10/26. Given concern for lymphoma from biopsies, patient was referred to the ED for further workup and management.    Patient is a 23 y.o. male presenting with new diagnosis of burkitt's lymphoma admitted to receive systemic chemotherapy.   10/27: SLP cleared pt for regular and thin liquids    Nutrition History:  Food and Nutrient History: Met with patient and family this morning. Pt reports he was able to eat cereal, a pastry, and a breakfast sandwich this morning. Pt reports prior to today was struggling to eat a little (unclear of how much or for how long this occured). Pt denies nausea, vomiting,diarrhea, constipation, and stomahc pain. Pt reports that he had a BM this morning. Pt does not like boost but is open to ensure  Food Allergies/Intolerances:  None  Energy intake:    GI Symptoms: None  Vitamin/Herbal Supplement Use: Previously tried boost does not like. Is using a plant based protein powder at home [place]-?  Oral Problems: None    Anthropometrics:  Height: 183.8 cm (6' 0.36\")  Weight: 70.3 kg (154 lb 15.7 oz)  BMI (Calculated): 20.81  IBW/kg (Dietitian Calculated): 80.5 kg          Objective/Subjective Weight History:   Wt Readings from Last 5 Encounters:   10/27/23 70.3 kg (154 lb 15.7 oz) - likely more accurate.    10/26/23 62.6 kg (138 lb 1.6 oz) - admission wt; family and pt report this wt is not accurate.  "   10/19/23 72.2 kg (159 lb 2.8 oz) (2.66 % wt loss x ~ 1 week) - significant        Weight Change %:  Weight History / % Weight Change: Pt reports wt loss of ~ 10 # and has been slowly gaining it back.  Significant Weight Loss: Yes  Interpretation of Weight Loss: >2% in 1 week    Nutrition Focused Physical Exam Findings:  Subcutaneous Fat Loss:   Orbital Fat Pads: Well nourshed (slightly bulging fat pads)   Buccal Fat Pads: Well nourished (full, rounded cheeks)   Triceps: Well nourished (ample fat tissue)   Muscle Wasting:  Temporalis: Well nourished (well-defined muscle)  Pectoralis (Clavicular Region): Mild-Moderate (some protrusion of clavicle)  Interosseous: Well nourished (muscle bulges)  Edema:  Edema: none   Physical Findings:  Skin: Negative    Objective Data:  Nutrition Significant Labs:  Results for orders placed or performed during the hospital encounter of 10/26/23 (from the past 24 hour(s))   Lactate   Result Value Ref Range    Lactate 1.6 0.4 - 2.0 mmol/L   Amylase   Result Value Ref Range    Amylase 311 (H) 29 - 103 U/L   Lipase   Result Value Ref Range    Lipase 987 (H) 9 - 82 U/L   Renal Function Panel   Result Value Ref Range    Glucose 87 74 - 99 mg/dL    Sodium 132 (L) 136 - 145 mmol/L    Potassium 4.4 3.5 - 5.3 mmol/L    Chloride 97 (L) 98 - 107 mmol/L    Bicarbonate 24 21 - 32 mmol/L    Anion Gap 15 10 - 20 mmol/L    Urea Nitrogen 28 (H) 6 - 23 mg/dL    Creatinine 1.08 0.50 - 1.30 mg/dL    eGFR >90 >60 mL/min/1.73m*2    Calcium 8.4 (L) 8.6 - 10.6 mg/dL    Phosphorus 3.9 2.5 - 4.9 mg/dL    Albumin 3.4 3.4 - 5.0 g/dL   Uric Acid   Result Value Ref Range    Uric Acid 7.3 4.0 - 7.5 mg/dL   Lactate Dehydrogenase   Result Value Ref Range    LDH 1,511 (H) 84 - 246 U/L   CBC and Auto Differential   Result Value Ref Range    WBC 4.8 4.4 - 11.3 x10*3/uL    nRBC 0.0 0.0 - 0.0 /100 WBCs    RBC 3.81 (L) 4.50 - 5.90 x10*6/uL    Hemoglobin 11.2 (L) 13.5 - 17.5 g/dL    Hematocrit 32.9 (L) 41.0 - 52.0 %    MCV  86 80 - 100 fL    MCH 29.4 26.0 - 34.0 pg    MCHC 34.0 32.0 - 36.0 g/dL    RDW 13.5 11.5 - 14.5 %    Platelets 411 150 - 450 x10*3/uL    MPV 10.3 7.5 - 11.5 fL    Neutrophils % 87.6 40.0 - 80.0 %    Immature Granulocytes %, Automated 0.6 0.0 - 0.9 %    Lymphocytes % 10.4 13.0 - 44.0 %    Monocytes % 1.2 2.0 - 10.0 %    Eosinophils % 0.0 0.0 - 6.0 %    Basophils % 0.2 0.0 - 2.0 %    Neutrophils Absolute 4.22 1.20 - 7.70 x10*3/uL    Immature Granulocytes Absolute, Automated 0.03 0.00 - 0.70 x10*3/uL    Lymphocytes Absolute 0.50 (L) 1.20 - 4.80 x10*3/uL    Monocytes Absolute 0.06 (L) 0.10 - 1.00 x10*3/uL    Eosinophils Absolute 0.00 0.00 - 0.70 x10*3/uL    Basophils Absolute 0.01 0.00 - 0.10 x10*3/uL   Renal Function Panel   Result Value Ref Range    Glucose 134 (H) 74 - 99 mg/dL    Sodium 135 (L) 136 - 145 mmol/L    Potassium 4.2 3.5 - 5.3 mmol/L    Chloride 100 98 - 107 mmol/L    Bicarbonate 23 21 - 32 mmol/L    Anion Gap 16 10 - 20 mmol/L    Urea Nitrogen 30 (H) 6 - 23 mg/dL    Creatinine 0.91 0.50 - 1.30 mg/dL    eGFR >90 >60 mL/min/1.73m*2    Calcium 8.3 (L) 8.6 - 10.6 mg/dL    Phosphorus 5.0 (H) 2.5 - 4.9 mg/dL    Albumin 3.3 (L) 3.4 - 5.0 g/dL   Magnesium   Result Value Ref Range    Magnesium 2.27 1.60 - 2.40 mg/dL   Uric Acid   Result Value Ref Range    Uric Acid 5.1 4.0 - 7.5 mg/dL   Lactate Dehydrogenase   Result Value Ref Range    LDH 1,582 (H) 84 - 246 U/L     Nutrition Specific Mediations:  Scheduled medications  acyclovir, 400 mg, oral, BID  allopurinol, 300 mg, oral, BID  dexAMETHasone, 10 mg, intravenous, q8h  docusate sodium, 100 mg, oral, BID  enoxaparin, 40 mg, subcutaneous, Daily  pantoprazole, 40 mg, oral, Daily before breakfast  perflutren lipid microspheres, 0.5-10 mL of dilution, intravenous, Once in imaging  perflutren protein A microsphere, 0.5 mL, intravenous, Once in imaging  polyethylene glycol, 17 g, oral, Daily  sulfur hexafluoride microsphr, 2 mL, intravenous, Once in  imaging      Continuous medications  sodium chloride 0.9%, 150 mL/hr, Last Rate: 150 mL/hr (10/28/23 1417)      PRN medications  PRN medications: alteplase, magic mouthwash (lidocaine, diphenhydrAMINE, Maalox 1:1:1), naloxone, ondansetron, oxyCODONE, oxyCODONE      I/O:     Intake/Output Summary (Last 24 hours) at 10/28/2023 1448  Last data filed at 10/28/2023 1417  Gross per 24 hour   Intake 3945 ml   Output --   Net 3945 ml       Dietary Orders (From admission, onward)       Start     Ordered    10/27/23 1022  Adult diet Low pathogen  Diet effective now        Question:  Diet type  Answer:  Low pathogen    10/27/23 1021                     Estimated Needs:   Total Energy Estimated Needs (kCal): 2200 kCal   Method for Estimating Needs: MSJ: 1739 x 1.3-1.4  Daily kcal needs range: 2351-9379  Total Protein Estimated Needs (g): 90 g   Method for Estimating Needs: 1.3 x ABW  Daily protein needs range:   Total Fluid Estimated Needs (mL):  (per team)         Nutrition Diagnosis   Nutrition Diagnosis:       Nutrition Diagnosis  Patient has Nutrition Diagnosis: Yes  Diagnosis Status (1): New  Nutrition Diagnosis 1: Increased nutrient needs  Related to (1): increased metabolic demand  As Evidenced by (1): new diagnosis of burkitt's lymphoma  Additional Assessment Information (1): Suspect pt may be malnurished due to pt reported history but timeline is unclear so difficult to evaluate at this time, will re-evaluate on follow-up.       Nutrition Interventions/Recommendations   Nutrition Interventions and Recommendations:        Nutrition Prescription:  Continue low pathogen diet as tolerated.   Will order Ensure plus BID   Monitor weight daily  Monitor lytes and replete as needed    Time Spent/Follow-up Reminder:   Follow Up  Time Spent (min): 60 minutes  Last Date of Nutrition Visit: 10/28/23  Nutrition Follow-Up Needed?: Dietitian to reassess per policy  Follow up Comment: ONS; pt needs low pathogen diet edu -  discussed briefly pt/ family denies questions but did not provide fully education

## 2023-10-28 NOTE — PROGRESS NOTES
Shilo Thakkar is a 23 y.o. male on day 2 of admission presenting with Generalized weakness.    Subjective   Feeling better today. Reports swallowing better, can talk easier. No pain. Has feeling of fullness in abdomen, but this is improved compared to yesterday. Ate breakfast this AM. BM this AM. Making clear urine. Denies any fevers, chills, night sweats, SOB, CP, N/V, HA, cough, urinary symptoms, bleeding, bruising.       Objective   Physical Exam  Constitutional:       General: He is not in acute distress.     Comments: Cachectic    HENT:      Head: Normocephalic and atraumatic.      Nose: Nose normal.      Mouth/Throat:      Mouth: Mucous membranes are moist.      Comments: Large ulcerated oropharyngeal mass, L side - improved from yesterday (smaller, ulceration improved)  Eyes:      Extraocular Movements: Extraocular movements intact.      Pupils: Pupils are equal, round, and reactive to light.   Neck:      Comments: L cervical LAD  Cardiovascular:      Rate and Rhythm: Normal rate and regular rhythm.   Pulmonary:      Effort: Pulmonary effort is normal.      Comments: Decreased breath sounds bilateral lower lobes  Abdominal:      General: Bowel sounds are normal.      Palpations: Abdomen is soft. There is mass.      Comments: Several abdominal masses. Abdomen less tense compared to yesterday   Musculoskeletal:         General: Swelling present. Normal range of motion.      Cervical back: Normal range of motion and neck supple.      Comments: L back swelling, small pinpoint lesion with scab   Lymphadenopathy:      Cervical: Cervical adenopathy present.   Skin:     General: Skin is warm and dry.   Neurological:      General: No focal deficit present.      Mental Status: He is alert and oriented to person, place, and time.      Cranial Nerves: No cranial nerve deficit.   Psychiatric:         Mood and Affect: Mood normal.         Behavior: Behavior normal.        Assessment/Plan   Principal Problem:    Generalized  weakness  Active Problems:    Burkitt lymphoma (CMS/[facility])    Shilo Thakkar is a 23 y.o. male with no significant medical history presenting to the ED as a referral from outpatient clinic with dysphagia and abdominal pain for the past 3 weeks. Patient initially presented to [facility] ED on 10/19 with 2 weeks of worsening abdominal pain, dysphagia, and 20lb weight loss. Imaging revealed a pancreatic mass, mesenteric LAD, left oropharyngeal mass, and cervical LAD. He had ultrasound guided mesenteric LN biopsy 10/20 and then patient was transferred to [facility] for further work-up. He had biopsy of the oropharyngeal mass and cervical lymph node by ENT 10/23/23. He was discharged home and presented to outpatient clinic to establish care on 10/26. Given biopsy is concerning for lymphoma,  patient was referred to the ED for further workup and management.  He is now being admitted to [facility] for further workup and management of suspected new Burkitt Lymphoma.      ONC:  #new Burkitt lymphoma  - 10/19/23 presented to [facility] ED with dysphagia and abdominal pain  - CT A/P with IV contrast (10/19) mass in the body of the pancreas measuring approximately 2.7 x 2.9 cm. The pancreatic duct is dilated. Multiple masses in the mesentery as well as omental caking consistent with carcinomatosis. Moderate amount of free fluid throughout the abdomen and pelvis.  Moderate right and small left pleural effusions   - CT soft tissue neck with IV contrast (10/19): homogeneous soft tissue attenuation lesion in the L oropharynx which measures approximately 3.0 x 4.0 x 5.1 cm. The airway is mildly narrowed and the adjacent parapharyngeal fat is effaced. Enlarged left cervical lymph nodes. Small pleural effusions bilaterally.   - CT Abdomen w/o IV contrast (10/20): Mesenteric lymphadenopathy.   - CT chest with IV contrast (10/20): Moderate bilateral pleural effusions. No significant lymphadenopathy or consolidation otherwise   - US guided mesenteric LN biopsy  (10/20/23): HIGH GRADE B-CELL LYMPHOMA MORPHOLOGICALLY CONSISTENT WITH BURKITT LYMPHOMA; flow cytometry: CD10+ kappa restricted B cells supportive of a B cell lymphoma with germinal center origin.   - CT head w/o IV contrast (10/22): No acute intracranial hemorrhage or mass effect.   - biopsy L oropharyngeal mass and L cervical LN by ENT (10/23/23), pending  - MR brain with and w/o IV contrast (10/27): No evidence of intracranial metastatic disease.  Partially visualized mass located within the left oropharynx with adjacent enlarged lymph nodes. These findings were better seen on the prior CT neck study.   - 20 mg dexamethasone once 10/27 AM, then 10mg q8hr  - PET CT ordered 10/27, pending  - plan start HyperCVAD on 10/29  - plan LP with 12 mg IT methotrexate 10/29  - plan BMBx 10/30    HEME:  #anemia, likely 2/2 disease  -Transfuse to keep Hgb>7, Plt>10  -DVT ppx: lovenox, SCDs     ID:  Allergies: NKDA  Ppx: acyclovir; Start bactrim MWF at discharge  -Plan Levaquin for neutropenia  -Plan Zosyn for fever  -BCX x2 (10/21): NGTD  # viral workup  - Covid neg (10/26)  -Hepatitis serology negative (10/26/23)  -HIV 1/2 nonreactive (10/26)  - EBV neg (10/27)   - lactate (10/27) 1.6    FEN/GI  Admit weight: 62.6kg  Ppx: Protonix started on admission  Replete electrolytes as needed  #TLS monitoring  -LDH 1729 and UA 10.4 on admission  -Start  cc/hr  -Start Allopurinol 300mg BID  -G6PD ordered on admission, normal  -BID TLS labs ordered  #Dysphagia, improved with dexamethasone   - Secondary to LAD  - Continue BMX  - NPO on admission, SLP ordered for reevaluation  - Per SLP (10/27): regular diet with thin liquids. If he demonstrates increase difficulty with PO then order a MBSS  - improved 10/28  #Weight loss  -Secondary to dysphagia  -Nutrition consulted on admit  #pancreatic body mass  - CT A/P (10/19) pancreatic body mass approx 2.7 x 2.9 cm. The pancreatic duct is dilated.   -CEA, b-HCG, AFP all negative.   -CA 19-9  WNL and  elevated 100.9  - (10/27)  amylase 311, lipase 987   #Constipation, improved  -Bowel regimen with scheduled Colace and Miralax      CARDS/PULM  - Onco echo (10/27): LVSF normal with a 60-65% estimated EF. RVSP WNL. No prior echo available for comparison.  - EKG on admission  #Pleural effusions, BL  -Found incidentally on CT chest with IV contrast 10/20   -RA on admission without c/o dyspnea, Monitor     MISC:  #anxiety, agitation   - lorazepam 0.5mg prn q8hr  #Pain management  -Oxy PRN q6hr  -Consider supportive onc consult  #fertility preservation  - onco-fertility consulted and following  - patient interested, collection pending, may need to start chemo before collection is done  - per onco-fertility decided not to pursue sperm banking (10/28)  - will attempt to discuss again without family present (10/29) prior to start of chemo     DISPO:  Full code  Primary Onc: Dr. Mccall  Access: DL PICC solo (10/28)  NOK: Deidra Pickett (Mother) - [phone]   I spent 60 minutes in the professional and overall care of this patient.    Patient seen, examined and discussed with Dr. Latoya Zimmerman PA-C

## 2023-10-28 NOTE — CARE PLAN
VSS. Afebrile. No complaints of N/V/D or pain. NS infusing at 150mL/hr. No other complaints. Pt remained safe and free from injuries.

## 2023-10-28 NOTE — HOSPITAL COURSE
Shilo Thakkar is a 23 y.o. male with no significant medical history presenting to the ED as a referral from outpatient clinic with dysphagia and abdominal pain for the past 3 weeks. Patient initially presented to [facility] ED on 10/19 with 2 weeks of worsening abdominal pain, dysphagia, and 20lb weight loss. Imaging revealed a pancreatic mass, mesenteric LAD, left oropharyngeal mass, and cervical LAD. He had ultrasound guided mesenteric LN biopsy 10/20 and then patient was transferred to [facility] for further work-up. He had biopsy of the oropharyngeal mass and cervical lymph node by ENT 10/23/23. He was then discharged home and presented to outpatient clinic to establish care on 10/26. Given concern for lymphoma from biopsies, patient was referred to the ED for further workup and management. He was admitted to [facility] for further workup and management of suspected new Burkitt Lymphoma.     Hospital course complicated by transfer to MICU 10/29 for Acute hypoexemic respiratory failure likely due to bilateral pleural effusion and atelectasis. Thoracentesis with chest tube placement on right 10/29. He underwent bone marrow biopsy which confirmed Burkitt lymphoma. and received chemotherapy with hyperCVAD and was discharged home with outpatient follow-up with Dr. Klein.

## 2023-10-28 NOTE — CONSULTS
Reason For Consult  Fertility Preservation in Young Adult with Cancer    History Of Present Illness  Shilo Thakkar is a 23 y.o. male presenting with admission for new diagnosis of burkitt's lymphoma.    Oncologic History:  10/2023 developed dysphagia and abdominal pain persisted over 3 weeks  10/19/23 presented to [facility] ED with worsening abdominal pain, dysphagia and 20lb weight loss  10/19/23 CT Neck/Abd/Pelvis demonstrated pancreatic mass, mesenteric LAD, cervical LAD and oropharyngeal mass.   10/20/23 US guided mesenteric LN biopsy preliminary pathology consistent with burkitt's lymphoma  10/23/23 oropharyngeal mass and cervical lymph node biopsy by ENT  10/26/23 contacted by outpt onc team Dr. Bella who instructed patient to present to ED for prelim path findings concerning for aggressive lymphoma.    Referred today by: Dr. Bella/Inpatient Oncology Team    Planned treatment: Systemic chemotherapy with cyclophosphamide, cytarabine, methotrexate and anthracycline    HPI: Patient currently admitted, mother Deidra and sister at bedside, relays above history. All note patient is markedly improved s/p steroids. Have been told about risk for infertility but uncertain what fertility preservation entails.     PMH: None, does not routinely see primary care provider for preventative health  Psych History: ADHD  Surg Hx: colles fracture of R radius, fracture of scaphoid bone of L wrist    Sexual Development History:   Age at puberty: uncertain ~11 y.o.  Hx of STI/ tract infections: no  Current sexual activity/Libido: no concerns  Hx of infertility: none  Prior SA: No    Family History:  Synagogue heritage: No  Breast ca: No  Ovarian ca: No  Other gyn ca: MGM unspecified malignancy of female origin  Colon ca: MGGM MGU  Pancreatic ca: PGF, father in 30's  VTE < 50 years: No  MI or stroke < 50: No  CF: No  MR: No  Sickle cell disease: No  SIblings: 1 sister, 1 brother younger  in  from car accident, 2 step siblings,  "Children: no, Trouble conceiving or miscarriages: no  Genetic diseases/birth defects: no    Social History: Lives with parents/siblings in [facility] [place] [facility], graduated high school, no college, works in [employer], in relationship with girlfriend x 18 mo. Never smoker, no ETOH, no recreational drug use.    Allergies  Patient has no known allergies.    Review of Systems  Improved pain, dysphagia following steroid administration, eating/drinking. Feels overwhelmed with new diagnosis.     Physical Exam  Alert, oriented x 3, pleasant and interactive, appropriate mood and behavior.   Resting comfortably in bed, mother/sister at bedside.  Respirations regular and non-labored.     Last Recorded Vitals  Blood pressure 118/75, pulse 67, temperature 37 °C (98.6 °F), resp. rate 18, height 1.838 m (6' 0.36\"), weight 70.3 kg (154 lb 15.7 oz), SpO2 90 %.    Medications Active:  acyclovir, 400 mg, oral, BID  allopurinol, 300 mg, oral, BID  dexAMETHasone, 10 mg, intravenous, q8h  docusate sodium, 100 mg, oral, BID  enoxaparin, 40 mg, subcutaneous, Daily  pantoprazole, 40 mg, oral, Daily before breakfast  perflutren lipid microspheres, 0.5-10 mL of dilution, intravenous, Once in imaging  perflutren protein A microsphere, 0.5 mL, intravenous, Once in imaging  polyethylene glycol, 17 g, oral, Daily  sulfur hexafluoride microsphr, 2 mL, intravenous, Once in imaging       Assessment/Plan   Shilo Thakkar is a 23 y.o. M with a new diagnosis of burkitts lymphoma, currently admitted with plan to receive systemic chemotherapy urgently.    #Risk for infertility:  - Discussed damaging effects cancer treatments can have on sperm production, quantity, and quality and the potential impact of infertility  - Discussed potential for cancer treatments to effect male hormones, ejaculation and/or cause erectile dysfunction  - Current risk to fertility is INTERMEDIATE based on treatment of Cyclophosphamide -- discussed with patient/family that this is dose " dependent and unable to predict how patients fertility may be affected, pending dose risk for infertility is within the range of 50-70%, with doses >7gm causing a higher risk for infertility   - Discussed possibility of long periods of azoospermia with some functional return following treatment  - Recommended waiting at least 12-24 months following chemo/radiation therapy before trying to conceive    - Reviewed fertility preservation via sperm cryopreservation, this involves collecting a sample of his sperm via masturbation.  - Logistically would need to coordinate with primary team, nursing staff, and andrology lab who do not work on weekends  - Discussed processing of sample/fee of $220 followed by transfer to long term storage facility with [employer], patient would be responsible for creating and maintaining an account with [employer] and paying annual storage fee of ~$300  - Following completion of treatment, we could repeat a semen analysis at 1 year out from chemotherapy to evaluate reproductive potential  - Should patient want to use sample in future, he/partner could use assisted reproductive technologies if necessary such as intrauterine insemination and/or invitro fertilization with cryopreserved sample  - Sperm cryopreservation is not a guarantee of pregnancy in the future  - STI testing ordered and pending    Patient would like several hours of time to consider options and decide about fertility preservation. Uncertain timeline for start of chemotherapy but anticipate today or in next 1-2 days. Discussed above with primary team TEMITOPE Zimmerman. Will follow up with patient for futher conversation later today.    1100 Update: Spoke with patient in follow up by phone and he relays having time to talk with family about fertility preservation and has elected to not bank sperm and proceed with chemotherapy. He is aware of the risk for infertility. Young adult oncology will continue to follow patient, an ambulatory  follow up visit request was placed for one month. Patient/family have my contact information and will reach out with any questions or concerns.    I spent 90 minutes in the professional and overall care of this patient.      Claire Win, DEMETRIO-CNP

## 2023-10-29 ENCOUNTER — APPOINTMENT (OUTPATIENT)
Dept: RADIOLOGY | Facility: HOSPITAL | Age: 23
DRG: 840 | End: 2023-10-29
Payer: COMMERCIAL

## 2023-10-29 PROBLEM — C25.9 PANCREATIC CANCER (MULTI): Status: RESOLVED | Noted: 2023-10-21 | Resolved: 2023-10-29

## 2023-10-29 LAB
ALBUMIN SERPL BCP-MCNC: 3.3 G/DL (ref 3.4–5)
ALBUMIN SERPL BCP-MCNC: 4.1 G/DL (ref 3.4–5)
ANION GAP BLDA CALCULATED.4IONS-SCNC: 12 MMO/L (ref 10–25)
ANION GAP SERPL CALC-SCNC: 12 MMOL/L (ref 10–20)
ANION GAP SERPL CALC-SCNC: 16 MMOL/L (ref 10–20)
BASE EXCESS BLDA CALC-SCNC: 3.5 MMOL/L (ref -2–3)
BASOPHILS # BLD AUTO: 0.01 X10*3/UL (ref 0–0.1)
BASOPHILS NFR BLD AUTO: 0.1 %
BODY TEMPERATURE: 37 DEGREES CELSIUS
BUN SERPL-MCNC: 32 MG/DL (ref 6–23)
BUN SERPL-MCNC: 35 MG/DL (ref 6–23)
CA-I BLDA-SCNC: 1.03 MMOL/L (ref 1.1–1.33)
CALCIUM SERPL-MCNC: 8.4 MG/DL (ref 8.6–10.6)
CALCIUM SERPL-MCNC: 8.6 MG/DL (ref 8.6–10.6)
CHLORIDE BLDA-SCNC: 98 MMOL/L (ref 98–107)
CHLORIDE SERPL-SCNC: 104 MMOL/L (ref 98–107)
CHLORIDE SERPL-SCNC: 97 MMOL/L (ref 98–107)
CO2 SERPL-SCNC: 25 MMOL/L (ref 21–32)
CO2 SERPL-SCNC: 29 MMOL/L (ref 21–32)
CREAT SERPL-MCNC: 0.79 MG/DL (ref 0.5–1.3)
CREAT SERPL-MCNC: 0.85 MG/DL (ref 0.5–1.3)
EOSINOPHIL # BLD AUTO: 0 X10*3/UL (ref 0–0.7)
EOSINOPHIL NFR BLD AUTO: 0 %
ERYTHROCYTE [DISTWIDTH] IN BLOOD BY AUTOMATED COUNT: 13.5 % (ref 11.5–14.5)
GFR SERPL CREATININE-BSD FRML MDRD: >90 ML/MIN/1.73M*2
GFR SERPL CREATININE-BSD FRML MDRD: >90 ML/MIN/1.73M*2
GLUCOSE BLDA-MCNC: 208 MG/DL (ref 74–99)
GLUCOSE SERPL-MCNC: 136 MG/DL (ref 74–99)
GLUCOSE SERPL-MCNC: 204 MG/DL (ref 74–99)
HCO3 BLDA-SCNC: 26.7 MMOL/L (ref 22–26)
HCT VFR BLD AUTO: 29.7 % (ref 41–52)
HCT VFR BLD EST: ABNORMAL %
HGB BLD-MCNC: 9.8 G/DL (ref 13.5–17.5)
HGB BLDA-MCNC: ABNORMAL G/DL
IMM GRANULOCYTES # BLD AUTO: 0.09 X10*3/UL (ref 0–0.7)
IMM GRANULOCYTES NFR BLD AUTO: 0.6 % (ref 0–0.9)
INHALED O2 CONCENTRATION: 60 %
LACTATE BLDA-SCNC: 2.7 MMOL/L (ref 0.4–2)
LDH SERPL L TO P-CCNC: 1743 U/L (ref 84–246)
LDH SERPL L TO P-CCNC: 2092 U/L (ref 84–246)
LYMPHOCYTES # BLD AUTO: 0.52 X10*3/UL (ref 1.2–4.8)
LYMPHOCYTES NFR BLD AUTO: 3.7 %
MAGNESIUM SERPL-MCNC: 2.45 MG/DL (ref 1.6–2.4)
MCH RBC QN AUTO: 29.6 PG (ref 26–34)
MCHC RBC AUTO-ENTMCNC: 33 G/DL (ref 32–36)
MCV RBC AUTO: 90 FL (ref 80–100)
MONOCYTES # BLD AUTO: 0.39 X10*3/UL (ref 0.1–1)
MONOCYTES NFR BLD AUTO: 2.8 %
NEUTROPHILS # BLD AUTO: 12.92 X10*3/UL (ref 1.2–7.7)
NEUTROPHILS NFR BLD AUTO: 92.8 %
NRBC BLD-RTO: 0 /100 WBCS (ref 0–0)
OXYHGB MFR BLDA: ABNORMAL %
PCO2 BLDA: 35 MM HG (ref 38–42)
PH BLDA: 7.49 PH (ref 7.38–7.42)
PH FLD: 7.29 [PH]
PHOSPHATE SERPL-MCNC: 4.2 MG/DL (ref 2.5–4.9)
PHOSPHATE SERPL-MCNC: 4.4 MG/DL (ref 2.5–4.9)
PLATELET # BLD AUTO: 399 X10*3/UL (ref 150–450)
PMV BLD AUTO: 9.9 FL (ref 7.5–11.5)
PO2 BLDA: 64 MM HG (ref 85–95)
POTASSIUM BLDA-SCNC: 3.5 MMOL/L (ref 3.5–5.3)
POTASSIUM SERPL-SCNC: 3.7 MMOL/L (ref 3.5–5.3)
POTASSIUM SERPL-SCNC: 4.4 MMOL/L (ref 3.5–5.3)
RBC # BLD AUTO: 3.31 X10*6/UL (ref 4.5–5.9)
SAO2 % BLDA: ABNORMAL %
SODIUM BLDA-SCNC: 133 MMOL/L (ref 136–145)
SODIUM SERPL-SCNC: 137 MMOL/L (ref 136–145)
SODIUM SERPL-SCNC: 138 MMOL/L (ref 136–145)
URATE SERPL-MCNC: 3.3 MG/DL (ref 4–7.5)
URATE SERPL-MCNC: 3.4 MG/DL (ref 4–7.5)
WBC # BLD AUTO: 13.9 X10*3/UL (ref 4.4–11.3)

## 2023-10-29 PROCEDURE — 32551 INSERTION OF CHEST TUBE: CPT | Mod: GC | Performed by: STUDENT IN AN ORGANIZED HEALTH CARE EDUCATION/TRAINING PROGRAM

## 2023-10-29 PROCEDURE — 2500000002 HC RX 250 W HCPCS SELF ADMINISTERED DRUGS (ALT 637 FOR MEDICARE OP, ALT 636 FOR OP/ED)

## 2023-10-29 PROCEDURE — 2500000004 HC RX 250 GENERAL PHARMACY W/ HCPCS (ALT 636 FOR OP/ED)

## 2023-10-29 PROCEDURE — 96372 THER/PROPH/DIAG INJ SC/IM: CPT

## 2023-10-29 PROCEDURE — 84132 ASSAY OF SERUM POTASSIUM: CPT

## 2023-10-29 PROCEDURE — 82945 GLUCOSE OTHER FLUID: CPT

## 2023-10-29 PROCEDURE — 89051 BODY FLUID CELL COUNT: CPT

## 2023-10-29 PROCEDURE — 82746 ASSAY OF FOLIC ACID SERUM: CPT | Performed by: INTERNAL MEDICINE

## 2023-10-29 PROCEDURE — 2500000004 HC RX 250 GENERAL PHARMACY W/ HCPCS (ALT 636 FOR OP/ED): Performed by: INTERNAL MEDICINE

## 2023-10-29 PROCEDURE — 83735 ASSAY OF MAGNESIUM: CPT

## 2023-10-29 PROCEDURE — 2550000001 HC RX 255 CONTRASTS

## 2023-10-29 PROCEDURE — 87102 FUNGUS ISOLATION CULTURE: CPT

## 2023-10-29 PROCEDURE — 71275 CT ANGIOGRAPHY CHEST: CPT | Performed by: RADIOLOGY

## 2023-10-29 PROCEDURE — 80069 RENAL FUNCTION PANEL: CPT

## 2023-10-29 PROCEDURE — 88305 TISSUE EXAM BY PATHOLOGIST: CPT | Performed by: PATHOLOGY

## 2023-10-29 PROCEDURE — 88184 FLOWCYTOMETRY/ TC 1 MARKER: CPT | Mod: TC

## 2023-10-29 PROCEDURE — 94640 AIRWAY INHALATION TREATMENT: CPT

## 2023-10-29 PROCEDURE — 71045 X-RAY EXAM CHEST 1 VIEW: CPT | Performed by: RADIOLOGY

## 2023-10-29 PROCEDURE — 99233 SBSQ HOSP IP/OBS HIGH 50: CPT | Performed by: INTERNAL MEDICINE

## 2023-10-29 PROCEDURE — 71275 CT ANGIOGRAPHY CHEST: CPT

## 2023-10-29 PROCEDURE — 99291 CRITICAL CARE FIRST HOUR: CPT

## 2023-10-29 PROCEDURE — 2500000001 HC RX 250 WO HCPCS SELF ADMINISTERED DRUGS (ALT 637 FOR MEDICARE OP)

## 2023-10-29 PROCEDURE — 99222 1ST HOSP IP/OBS MODERATE 55: CPT | Performed by: STUDENT IN AN ORGANIZED HEALTH CARE EDUCATION/TRAINING PROGRAM

## 2023-10-29 PROCEDURE — 84550 ASSAY OF BLOOD/URIC ACID: CPT

## 2023-10-29 PROCEDURE — 32551 INSERTION OF CHEST TUBE: CPT | Performed by: STUDENT IN AN ORGANIZED HEALTH CARE EDUCATION/TRAINING PROGRAM

## 2023-10-29 PROCEDURE — 88305 TISSUE EXAM BY PATHOLOGIST: CPT | Mod: TC,MCY

## 2023-10-29 PROCEDURE — 84157 ASSAY OF PROTEIN OTHER: CPT

## 2023-10-29 PROCEDURE — 36600 WITHDRAWAL OF ARTERIAL BLOOD: CPT

## 2023-10-29 PROCEDURE — 84478 ASSAY OF TRIGLYCERIDES: CPT

## 2023-10-29 PROCEDURE — 88189 FLOWCYTOMETRY/READ 16 & >: CPT | Performed by: PATHOLOGY

## 2023-10-29 PROCEDURE — 87205 SMEAR GRAM STAIN: CPT

## 2023-10-29 PROCEDURE — 83615 LACTATE (LD) (LDH) ENZYME: CPT

## 2023-10-29 PROCEDURE — 82435 ASSAY OF BLOOD CHLORIDE: CPT

## 2023-10-29 PROCEDURE — 3E03305 INTRODUCTION OF OTHER ANTINEOPLASTIC INTO PERIPHERAL VEIN, PERCUTANEOUS APPROACH: ICD-10-PCS | Performed by: INTERNAL MEDICINE

## 2023-10-29 PROCEDURE — 71045 X-RAY EXAM CHEST 1 VIEW: CPT

## 2023-10-29 PROCEDURE — 87070 CULTURE OTHR SPECIMN AEROBIC: CPT

## 2023-10-29 PROCEDURE — 88112 CYTOPATH CELL ENHANCE TECH: CPT | Performed by: PATHOLOGY

## 2023-10-29 PROCEDURE — 2020000001 HC ICU ROOM DAILY

## 2023-10-29 PROCEDURE — 85025 COMPLETE CBC W/AUTO DIFF WBC: CPT

## 2023-10-29 PROCEDURE — 83986 ASSAY PH BODY FLUID NOS: CPT

## 2023-10-29 PROCEDURE — 82150 ASSAY OF AMYLASE: CPT

## 2023-10-29 PROCEDURE — 84295 ASSAY OF SERUM SODIUM: CPT

## 2023-10-29 RX ORDER — DOCUSATE SODIUM 100 MG/1
100 CAPSULE, LIQUID FILLED ORAL 2 TIMES DAILY PRN
Status: DISCONTINUED | OUTPATIENT
Start: 2023-10-29 | End: 2023-11-11 | Stop reason: HOSPADM

## 2023-10-29 RX ORDER — ALBUTEROL SULFATE 0.83 MG/ML
3 SOLUTION RESPIRATORY (INHALATION) AS NEEDED
Status: DISCONTINUED | OUTPATIENT
Start: 2023-10-29 | End: 2023-11-11 | Stop reason: HOSPADM

## 2023-10-29 RX ORDER — SODIUM CHLORIDE 9 MG/ML
75 INJECTION, SOLUTION INTRAVENOUS CONTINUOUS
Status: DISCONTINUED | OUTPATIENT
Start: 2023-10-29 | End: 2023-10-29

## 2023-10-29 RX ORDER — IPRATROPIUM BROMIDE AND ALBUTEROL SULFATE 2.5; .5 MG/3ML; MG/3ML
3 SOLUTION RESPIRATORY (INHALATION)
Status: DISCONTINUED | OUTPATIENT
Start: 2023-10-29 | End: 2023-10-30

## 2023-10-29 RX ORDER — PETROLATUM 420 MG/G
1 OINTMENT TOPICAL
Status: DISCONTINUED | OUTPATIENT
Start: 2023-10-29 | End: 2023-11-11 | Stop reason: HOSPADM

## 2023-10-29 RX ORDER — PROCHLORPERAZINE MALEATE 10 MG
10 TABLET ORAL EVERY 6 HOURS PRN
Status: DISCONTINUED | OUTPATIENT
Start: 2023-10-29 | End: 2023-10-29

## 2023-10-29 RX ORDER — DIPHENHYDRAMINE HYDROCHLORIDE 50 MG/ML
50 INJECTION INTRAMUSCULAR; INTRAVENOUS AS NEEDED
Status: DISCONTINUED | OUTPATIENT
Start: 2023-10-29 | End: 2023-11-11 | Stop reason: HOSPADM

## 2023-10-29 RX ORDER — POLYETHYLENE GLYCOL 3350 17 G/17G
17 POWDER, FOR SOLUTION ORAL DAILY PRN
Status: DISCONTINUED | OUTPATIENT
Start: 2023-10-29 | End: 2023-11-11 | Stop reason: HOSPADM

## 2023-10-29 RX ORDER — PROCHLORPERAZINE EDISYLATE 5 MG/ML
10 INJECTION INTRAMUSCULAR; INTRAVENOUS EVERY 6 HOURS PRN
Status: DISCONTINUED | OUTPATIENT
Start: 2023-10-29 | End: 2023-11-06

## 2023-10-29 RX ORDER — EPINEPHRINE 0.1 MG/ML
0.3 INJECTION INTRACARDIAC; INTRAVENOUS EVERY 5 MIN PRN
Status: DISCONTINUED | OUTPATIENT
Start: 2023-10-29 | End: 2023-11-11 | Stop reason: HOSPADM

## 2023-10-29 RX ORDER — DEXAMETHASONE 4 MG/1
40 TABLET ORAL EVERY MORNING
Status: COMPLETED | OUTPATIENT
Start: 2023-10-30 | End: 2023-11-01

## 2023-10-29 RX ORDER — ENOXAPARIN SODIUM 100 MG/ML
40 INJECTION SUBCUTANEOUS
Status: DISCONTINUED | OUTPATIENT
Start: 2023-10-30 | End: 2023-11-11 | Stop reason: HOSPADM

## 2023-10-29 RX ORDER — DEXAMETHASONE 4 MG/1
40 TABLET ORAL ONCE
Status: COMPLETED | OUTPATIENT
Start: 2023-10-29 | End: 2023-10-29

## 2023-10-29 RX ORDER — FUROSEMIDE 10 MG/ML
40 INJECTION INTRAMUSCULAR; INTRAVENOUS ONCE
Status: COMPLETED | OUTPATIENT
Start: 2023-10-29 | End: 2023-10-29

## 2023-10-29 RX ORDER — FAMOTIDINE 10 MG/ML
20 INJECTION INTRAVENOUS AS NEEDED
Status: DISCONTINUED | OUTPATIENT
Start: 2023-10-29 | End: 2023-11-11 | Stop reason: HOSPADM

## 2023-10-29 RX ADMIN — FUROSEMIDE 40 MG: 10 INJECTION, SOLUTION INTRAVENOUS at 17:22

## 2023-10-29 RX ADMIN — SODIUM CHLORIDE 150 ML/HR: 9 INJECTION, SOLUTION INTRAVENOUS at 02:19

## 2023-10-29 RX ADMIN — ALLOPURINOL 300 MG: 100 TABLET ORAL at 21:00

## 2023-10-29 RX ADMIN — ENOXAPARIN SODIUM 40 MG: 100 INJECTION SUBCUTANEOUS at 08:44

## 2023-10-29 RX ADMIN — DEXAMETHASONE SODIUM PHOSPHATE 10 MG: 10 INJECTION INTRAMUSCULAR; INTRAVENOUS at 02:18

## 2023-10-29 RX ADMIN — DEXAMETHASONE SODIUM PHOSPHATE 10 MG: 10 INJECTION INTRAMUSCULAR; INTRAVENOUS at 10:23

## 2023-10-29 RX ADMIN — ONDANSETRON 16 MG: 2 INJECTION INTRAMUSCULAR; INTRAVENOUS at 13:38

## 2023-10-29 RX ADMIN — FUROSEMIDE 40 MG: 10 INJECTION, SOLUTION INTRAVENOUS at 02:48

## 2023-10-29 RX ADMIN — SODIUM CHLORIDE 150 ML/HR: 9 INJECTION, SOLUTION INTRAVENOUS at 13:39

## 2023-10-29 RX ADMIN — SODIUM CHLORIDE 150 ML/HR: 9 INJECTION, SOLUTION INTRAVENOUS at 16:24

## 2023-10-29 RX ADMIN — Medication 5 L/MIN: at 02:48

## 2023-10-29 RX ADMIN — FUROSEMIDE 40 MG: 10 INJECTION, SOLUTION INTRAVENOUS at 07:56

## 2023-10-29 RX ADMIN — DEXAMETHASONE 40 MG: 4 TABLET ORAL at 13:38

## 2023-10-29 RX ADMIN — MESNA 1070 MG: 100 INJECTION, SOLUTION INTRAVENOUS at 13:38

## 2023-10-29 RX ADMIN — ACYCLOVIR 400 MG: 400 TABLET ORAL at 08:44

## 2023-10-29 RX ADMIN — PANTOPRAZOLE SODIUM 40 MG: 40 TABLET, DELAYED RELEASE ORAL at 06:09

## 2023-10-29 RX ADMIN — IPRATROPIUM BROMIDE AND ALBUTEROL SULFATE 3 ML: .5; 3 SOLUTION RESPIRATORY (INHALATION) at 14:53

## 2023-10-29 RX ADMIN — ALLOPURINOL 300 MG: 100 TABLET ORAL at 08:44

## 2023-10-29 RX ADMIN — IPRATROPIUM BROMIDE AND ALBUTEROL SULFATE 3 ML: .5; 3 SOLUTION RESPIRATORY (INHALATION) at 08:12

## 2023-10-29 RX ADMIN — ACYCLOVIR 400 MG: 400 TABLET ORAL at 21:00

## 2023-10-29 RX ADMIN — IOHEXOL 100 ML: 350 INJECTION, SOLUTION INTRAVENOUS at 09:20

## 2023-10-29 RX ADMIN — CYCLOPHOSPHAMIDE 537 MG: 1 INJECTION, POWDER, FOR SOLUTION INTRAVENOUS; ORAL at 14:28

## 2023-10-29 ASSESSMENT — COGNITIVE AND FUNCTIONAL STATUS - GENERAL
DAILY ACTIVITIY SCORE: 24
MOBILITY SCORE: 24

## 2023-10-29 ASSESSMENT — PAIN SCALES - GENERAL: PAINLEVEL_OUTOF10: 0 - NO PAIN

## 2023-10-29 ASSESSMENT — PAIN - FUNCTIONAL ASSESSMENT: PAIN_FUNCTIONAL_ASSESSMENT: 0-10

## 2023-10-29 NOTE — PROGRESS NOTES
Shilo Thakkar is a 23 y.o. male on day 3 of admission presenting with Generalized weakness.    Subjective   Feeling better today. No complaints. Swallowing and speaking both easier. No pain. Not feeling SOB. Had an episode of emesis last night, but says this was due to eating too much (panera mac and cheese). Making clear urine. BM this AM, a little soft/runny, will dial back the bowel regimen. Denies any fevers, chills, night sweats, SOB, CP, N/V, HA, cough, urinary symptoms, bleeding, bruising.     Objective   Physical Exam  Constitutional:       General: He is not in acute distress.     Comments: Cachectic    HENT:      Head: Normocephalic and atraumatic.      Nose: Nose normal.      Mouth/Throat:      Mouth: Mucous membranes are moist.      Comments: Large ulcerated oropharyngeal mass, L side - improved from yesterday (smaller, ulceration improved)  Eyes:      Extraocular Movements: Extraocular movements intact.      Pupils: Pupils are equal, round, and reactive to light.   Neck:      Comments: L cervical LAD  Cardiovascular:      Rate and Rhythm: Normal rate and regular rhythm.   Pulmonary:      Effort: Pulmonary effort is normal.      Comments: Decreased breath sounds bilateral lower + middle lobes d/t effusions  Abdominal:      General: Bowel sounds are normal.      Palpations: Abdomen is soft. There is mass.      Comments: Several abdominal masses. Abdomen less tense compared to yesterday   Musculoskeletal:         General: Swelling present. Normal range of motion.      Cervical back: Normal range of motion and neck supple.      Comments: L back swelling, small pinpoint lesion with scab   Lymphadenopathy:      Cervical: Cervical adenopathy present.   Skin:     General: Skin is warm and dry.   Neurological:      General: No focal deficit present.      Mental Status: He is alert and oriented to person, place, and time.      Cranial Nerves: No cranial nerve deficit.   Psychiatric:         Mood and Affect: Mood  normal.         Behavior: Behavior normal.        Assessment/Plan   Principal Problem:    Generalized weakness  Active Problems:    Burkitt lymphoma (CMS/HCC)    Shilo Thakkar is a 23 y.o. male with no significant medical history presenting to the ED as a referral from outpatient clinic with dysphagia and abdominal pain for the past 3 weeks. Patient initially presented to [facility] ED on 10/19 with 2 weeks of worsening abdominal pain, dysphagia, and 20lb weight loss. Imaging revealed a pancreatic mass, mesenteric LAD, left oropharyngeal mass, and cervical LAD. He had ultrasound guided mesenteric LN biopsy 10/20 and then patient was transferred to [facility] for further work-up. He had biopsy of the oropharyngeal mass and cervical lymph node by ENT 10/23/23. He was discharged home and presented to outpatient clinic to establish care on 10/26. Given biopsy is concerning for lymphoma, patient was referred to the ED for further workup and management, now admitted to [facility] for further workup and management of suspected new Burkitt Lymphoma.      ONC:  #new Burkitt lymphoma  - 10/19/23 presented to [facility] ED with dysphagia and abdominal pain  - CT A/P with IV contrast (10/19) mass in the body of the pancreas measuring approximately 2.7 x 2.9 cm. The pancreatic duct is dilated. Multiple masses in the mesentery as well as omental caking consistent with carcinomatosis. Moderate amount of free fluid throughout the abdomen and pelvis.  Moderate right and small left pleural effusions   - CT soft tissue neck with IV contrast (10/19): homogeneous soft tissue attenuation lesion in the L oropharynx which measures approximately 3.0 x 4.0 x 5.1 cm. The airway is mildly narrowed and the adjacent parapharyngeal fat is effaced. Enlarged left cervical lymph nodes. Small pleural effusions bilaterally.   - CT Abdomen w/o IV contrast (10/20): Mesenteric lymphadenopathy.   - CT chest with IV contrast (10/20): Moderate bilateral pleural effusions. No  significant lymphadenopathy or consolidation otherwise   - US guided mesenteric LN biopsy (10/20/23): HIGH GRADE B-CELL LYMPHOMA MORPHOLOGICALLY CONSISTENT WITH BURKITT LYMPHOMA; flow cytometry: CD10+ kappa restricted B cells supportive of a B cell lymphoma with germinal center origin.   - CT head w/o IV contrast (10/22): No acute intracranial hemorrhage or mass effect.   - biopsy L oropharyngeal mass and L cervical LN by ENT (10/23/23), pending  - MR brain with and w/o IV contrast (10/27): No evidence of intracranial metastatic disease.  Partially visualized mass located within the left oropharynx with adjacent enlarged lymph nodes. These findings were better seen on the prior CT neck study.   - 20 mg dexamethasone once 10/27 AM, then 10mg q8hr  - PET CT ordered 10/27, pending  - Started HyperCVAD 10/29  - needs BMBx 10/30  - needs LP + IT cytarabine 10/30    HEME:  #anemia, likely 2/2 disease  -Transfuse to keep Hgb>7, Plt>10  -DVT ppx: lovenox, SCDs     ID:  Allergies: NKDA  Ppx: acyclovir; Start bactrim MWF at discharge  -Plan Levaquin for neutropenia  -Plan Zosyn for fever  -BCX x2 (10/21): NGTD  - lactate (10/27) 1.6  # viral workup  - Covid neg (10/26)  -Hepatitis serology negative (10/26/23)  -HIV 1/2 nonreactive (10/26)  - EBV neg (10/27)     FEN/GI  Admit weight: 62.6kg  Ppx: Protonix started on admission  Replete electrolytes as needed  #TLS monitoring  -LDH 1729 and UA 10.4 on admission  -Start  cc/hr -> dec to 50 cc/hr (10/29) d/t inc O2 req, b/l pleural effusions  -Start Allopurinol 300mg BID  -G6PD ordered on admission, normal  -BID TLS labs ordered  #Dysphagia, improved with dexamethasone   - Secondary to LAD  - Continue BMX  - NPO on admission, SLP ordered for reevaluation  - Per SLP (10/27): regular diet with thin liquids. If he demonstrates increase difficulty with PO then order a MBSS  - improved 10/28  #Weight loss  -Secondary to dysphagia  -Nutrition consulted on admit  #pancreatic body  mass  - CT A/P (10/19) pancreatic body mass approx 2.7 x 2.9 cm. The pancreatic duct is dilated.   -CEA, b-HCG, AFP all negative.   -CA 19-9 WNL and  elevated 100.9  - (10/27)  amylase 311, lipase 987   #Constipation, improved  -Bowel regimen with scheduled Colace and Miralax      CARDS/PULM  - Onco echo (10/27): LVSF normal with a 60-65% estimated EF. RVSP WNL. No prior echo available for comparison.  - EKG on admission  #Pleural effusions, BL  -Found incidentally on CT chest with IV contrast 10/20   -RA on admission without c/o dyspnea, Monitor  # inc O2 req  - (10/28 PM) worsening O2 demand, 90% on 2L -> 92% on 5L.   - CXR (10/28) showing Bilateral Pleural Effusions  - NS dec to 50 ml/hr, Lasix 40mg IV ordered  --> UOP ~4L at 06:30, currently 92% on 6L  - CTA chest (10/29) No evidence of acute pulmonary embolism. Persistent moderate-to-large sized bilateral pleural effusions  with adjacent atelectasis, mildly increased compared to prior exam. Partial visualization of small to moderate volume abdominopelvic ascites, similar to prior exam.  - ABG (10/29), pH 7.46 pO2 62 lactate 1.6  - repeat 40 IVP lasix  - consulted pulm 10/29 for b/l effusions + possible need for thoracentesis, appreciate recs; pulm refusing thoracentesis today d/t patient receiving 40mg lovenox this AM  - ordered vascular US b/l legs (10/29) to check for DVTs  - O2 requirement continues to increase throughout the day 10/29, patient asx, ending up calling rapid response to facilitate thoracentesis, patient now on 40L 70% airvo   - patient moving to MICU to get thoracentesis 10/29     MISC:  #anxiety, agitation   - lorazepam 0.5mg prn q8hr  #Pain management  -Oxy PRN q6hr  -Consider supportive onc consult  #fertility preservation  - onco-fertility consulted and following  - patient interested, collection pending, may need to start chemo before collection is done  - Syphilis nonreactive, gonorrhea neg, chlamydia neg  - per onco-fertility  decided not to pursue sperm banking (10/28)    DISPO:  Full code  Primary Onc: Dr. Mccall  Access: DL PICC solo (10/28)  NOK: Deidra Pickett (Mother) - [phone]     Patient seen, examined and discussed with JOSELYN MaeC

## 2023-10-29 NOTE — SIGNIFICANT EVENT
Rapid Response Note    Rapid Response called for increased O2 demand.  Patient is currently being treated for Burkitt's Lymphoma and was found to have sizable bilateral pleural effusions.  Patient is currently awaiting thoracentesis.  Upon my arrival to the bedside patient was on 12 L High Flow with oxygen saturations of 89-90%.  Placed patient on Airvo 40L 80%.  ABG drawn with results of 7.49, 35, 64 and a lactate of 2.7.  Contacted MICU fellow who agreed to accept patient for transfer to the MICU for thoracentesis and high oxygen demands.

## 2023-10-29 NOTE — SIGNIFICANT EVENT
Rapid Response called for increased O2 demand at approximately 5381-3816.     Patient with newly diagnosed Burkitt lymphoma, starting HyperCVAD chemotherapy this afternoon, and known pleural effusions as of 10/20 that have increased in volume to moderate-large bilateral pleural effusions as of 10/29.     Patient had new and increasing O2 requirement overnight 10/28 into 10/29. Ordered CTA which was negative for PE. ABG @ 0730:  pH 7.46 pO2 62 lactate 1.6.     Consulted pulmonary this AM for thoracentesis prior to initiating chemotherapy d/t new and increasing O2 demand overnight in addition to the fact that the patient will only continue to third space fluid during treatment, but they refused d/t patient receiving lovenox 40mg this AM. Attempted to have IR perform thoracentesis but they redirected to pulmonary.     Patient's O2 requirement increased throughout the day and is now on 40L 70% airvo. ABG pH 7.49, pCO2 35, pO2 64, Lactate 2.7. After discussion with MICU fellow, patient is transferred to MICU to facilitate thoracentesis in the setting of his increased O2 demand requiring more support than the floor can provide.     Will continue to deliver chemotherapy while patient is in MICU as long as patient's condition allows.

## 2023-10-29 NOTE — SIGNIFICANT EVENT
10/29/23 0948   Prechemo Checklist   Has the patient been in the hospital, ED, or urgent care since last date of service No   Chemo/Immuno Consent Signed Yes   Protocol/Indications Verified Yes   Confirmed to previous date/time of medication N/A   Compared to previous dose N/A   All medications are dated accurately Yes   Pregnancy Test Negative Not applicable   Parameters Met Yes   BSA/Weight-Height Verified Yes   Dose Calculations Verified Yes

## 2023-10-29 NOTE — H&P
SYMONE Thakkar is a 23 y.o. male with no significant medical history presenting to the ED as a referral from outpatient clinic with dysphagia and abdominal pain for the past 3 weeks. Patient initially presented to [facility] ED on 10/19 with 2 weeks of worsening abdominal pain, dysphagia, and 20lb weight loss. Imaging revealed a pancreatic mass, mesenteric LAD, left oropharyngeal mass, and cervical LAD. He had ultrasound guided mesenteric LN biopsy 10/20 and then patient was transferred to [facility] for further work-up. On 10/29/2023, a rapid response was called due to increasing O2 requirement necessitating AirVo and MICU transfer (RA -> 5L -> 12L to AirVo)     Patient interviewed in the medical ICU.  Patient denies shortness of breath.  Reports that he never felt short of breath but was told he had low oxygen levels.  Denies chest pain, fevers, chills, rigors. Denies cough. Denies belly pain (previously had pain). No N/V post chemo. Denies urinary symptoms. Denies joint pains. Dysphagia getting better.      Initial symptoms were jaw swelling, difficulty swallowing, and 20lb weight loss in 2 weeks. No night sweats. Denies history of mononucleosis      Does not take meds at home.     Reports a family Hx of pancreatic cancer in father (30s), gradfather (70s). Denied other cancers/hematological malignancies on either side of family. 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 Smokes a few cigarettes on the weekends. No significant alcohol intake. No drugs, ever.     He had biopsy of the oropharyngeal mass and cervical lymph node by ENT 10/23/23. He was then discharged home and presented to outpatient clinic to establish care on 10/26. Given concern for lymphoma from biopsies, patient was referred to the ED for further workup and management.     ONC Hx:   developed dysphagia and abdominal pain persisted over 3 weeks, 10/19/23 presented to [facility] ED with worsening abdominal pain, dysphagia and 20lb weight loss over 2 weeks, 10/19/23 CT  Neck/Abd/Pelvis demonstrated Multiple masses in the mesentery as well as omental caking consistent with carcinomatosis, pancreatic mass, mesenteric LAD, cervical LAD and oropharyngeal mass, 10/20/23 US guided mesenteric LN biopsy preliminary pathology consistent with burkitt's lymphoma, 10/23/23 oropharyngeal mass and cervical lymph node biopsy by ENT, 10/26/23 contacted by outpt onc team Dr. Bella who instructed patient to present to ED for prelim path findings concerning for aggressive lymphoma. Received cyclophosphamide + Mesna 10/29/2023 + decadron since 10/27     MICU vitals on arrival  afebrile, HR 82, /81, RR 15, SpO2 91% on 40L FiO2 70%  - Latest Labs:              CBC: WBC 13.9, Hgb 9.8, plt 399              BMP: Na 138, K 3.7, Cl 97, HCO3 29, BUN 35, Cr 0.85, glu 204              ABG: pH 7.49, pCO2 35, pO2 64, lactate 2.7              COVID: Negative                 - Imaging:  CTPE 10/29/2023:  1. No evidence of acute pulmonary embolism.  2. Persistent moderate-to-large sized bilateral pleural effusions with adjacent atelectasis, mildly increased compared to prior exam.  3. Partial visualization of small to moderate volume abdominopelvic ascites, similar to prior exam.      CT Head 10/22/2023:  No acute intracranial hemorrhage or mass effect. MRI with contrast is more sensitive to evaluate for intracranial metastatic disease.     CT Chest w/ contrast 10/20/2023:  Moderate bilateral pleural effusions. No significant lymphadenopathy or consolidation otherwise.     CT abdomen wo contrast 10/20/2023:   1.  Mesenteric lymphadenopathy. No clear path is seen for CT-guided biopsy. Ultrasound of the abdomen recommended to evaluate for possible ultrasound-guided biopsy of mesenteric lymph nodes.      CT abdomen with contrast 10/19/2023:  1. There is a mass in the body of the pancreas which likely represents pancreatic cancer. There are multiple masses in the mesentery as well as omental caking. These are  consistent with carcinomatosis.  2. Moderate amount of free fluid throughout the abdomen and pelvis.  3. Moderate right and small left pleural effusions.     CT neck 10/19/2023:   1. Homogeneous soft tissue attenuation lesion in the left oropharynx. This is concerning for a neoplasm but is otherwise nonspecific, recommend direct visualization and/or tissue sampling for further evaluation.  2. Enlarged left cervical lymph nodes are nonspecific, further follow-up as clinically indicated.  3. Small pleural effusions bilaterally.     Cardiac Hx:   Echocardiogram 2023:   1. Left ventricular systolic function is normal with a 60-65% estimated ejection fraction.  2. There is a large pleural effusion.  3. RVSP within normal limits.  4. No prior echocardiogram available for comparison.        PMH: none  SurgHx: Foot surgery  Prior functional status: independent  Allergies: NKDA  SocHx: Works in [employer] since age 18 EtOH: rare, Tobacco: a few cigarettes on the weekends, Drugs: denies history of drugs especially IV drugs  FamHx: Father with pancreatic cancer (Dx ~30s, ), Grandfather with pancreatic cancer ( in his 70s), mother without significant history      Home Medications: NONE     PHYSICAL EXAM:  General: awake, alert, conversant, appears stated age, on AirVo, no in distress  Chest: on AirVo, absent breath sounds in lower and midzones, clear in the upper zones   Cardiac: regular rate and rhythm, normal s1, s2  Abdomen: soft, ND, NT, no involuntary guarding  EXT: no peripheral edema, no asymmetry noted     Assessment & Plan  23 y.o. male with no significant medical history presenting who was referred to the emergency department by for concern for aggressive lymphoma.  His initial symptoms were dysphagia, 20 pound weight loss in 2 weeks, and abdominal pain.  CT scans significant for left oropharynx homogenous soft tissue mass with enlarged left cervical lymph nodes in addition to a mass in the  pancreas.  Ultrasound-guided mesenteric lymph node biopsy preliminary pathology consistent with Burkitt's lymphoma.  Underwent oropharyngeal mass and cervical lymph node biopsy by ENT on 10/23. Patient received cyclophosphamide and mesna 10/29 + decadron since 10/27 .  Patient developed acute hypoxic respiratory failure on the floor with CT scan significant for bilateral pleural effusions (moderate to large) and negative for PE. AHRF requiring AirVo for which patient was transferred to MICU 10/29/2023. Plan for Pleural pigtail insertion today unilaterally + the opposite side in the coming days. Will send for cytology, flow cytometry, + routine pleural tap labs.     NEUROLOGIC  No acute issues      CARDIOVASCULAR  No acute issues, normal oncology echo, no signs of fluid overload     PULMONARY  #Acute hypoxic respiratory failure   #Bilateral moderate to large pleural effusions  ::AHRF: RA -> 5L -> Airvo  ::hypoxemia likely 2/2 to mod-large effusions + atelectasis which are likely malignant i/s/o lymphoma   ::unlikely fluid overload related   ::CTPE negative for PE   -will place chest tube (right side) and send for lab studies (routine thora labs + cytology + flow cytometry), drain ~1.2-2 L initially, avoid more to avoid re-expansion pulmonary edema   -wean O2 as tolerated   -bubble study to r/o shunting   -daily CXR while pigtail in place      RENAL/  No acute issues      GASTROINTESTINAL  #Dysphagia, improved with dexamethasone   ::Secondary to LAD  ::Per SLP (10/27): regular diet with thin liquids. If he demonstrates increase difficulty with PO then order a MBSS  - NPO for possible PET scan in the AM and due to high O2 requirement      ENDOCRINE  No acute issues      HEME/ONC  #New diagnosis of Burkitt lymphoma   :: developed dysphagia and abdominal pain persisted over 3 weeks, 10/19/23 presented to [facility] ED with worsening abdominal pain, dysphagia and 20lb weight loss over 2 weeks, 10/19/23 CT Neck/Abd/Pelvis  demonstrated Multiple masses in the mesentery as well as omental caking consistent with carcinomatosis, pancreatic mass, mesenteric LAD, cervical LAD and oropharyngeal mass, 10/20/23 US guided mesenteric LN biopsy preliminary pathology consistent with burkitt's lymphoma, 10/23/23 oropharyngeal mass and cervical lymph node biopsy by ENT, 10/26/23 contacted by outpt onc team Dr. Bella who instructed patient to present to ED for prelim path findings concerning for aggressive   lymphoma.  :: HCG <3, AFP negative, CA 19-9: negative,  elevated 100.9   :: received first dose cyclophosphamide 10/29/2023 with Mesna (treatment protocol: hyperCVAD)  ::receiving decadron since 10/27   :: EBV DNA PCR not detected, mononucleosis screen negative, HIV negative   :: NOT G6PD deficient   -continue dexamethasone per mal heme   -hold sodium chloride drip 75cc/hr for now given oxygen requirement, consider resumption once improving (no evidence of TLS at this time)   -continue allopurinol   -BID LDH, Uric acid, + RFP  -pending lower extremity DVT US ordered by gilberto on the floor   -appreciate mal Heme recs   -PET CT ordered by [facility] gilberto, pending (NPO at midnight)  -pending BMBx     #Anemia  ::likely of chronic disease  -order iron, TIBC, ferritin, B12, folate levels to r/o deficiencies      #fertility preservation  :: onco-fertility consulted and following  :: Syphilis nonreactive, gonorrhea neg, chlamydia neg  - per onco-fertility decided not to pursue sperm banking (10/28)     INFECTIOUS DISEASE  #Leukocytosis   ::likely secondary to dexamethasone   ::no cough/urinary symptoms/fevers to suggest infection   ::BCx x2 10/21/2023 negative   -continue to monitor   -procalcitonin   -low threshold to start Zosyn (immunocompromised, high O2 requirement)        F: HOLDING 75cc/hr for now given respiratory status  E: PRN, defer K supplementation as patient at risk for TLS  N: NPO for now given O2 requirement   A: PIVs, right solo PICC  (10/28)  DVT: hold pre chest tube insertion, hold in the AM given plans for BMBx and another pigtail insertion, resume 6-8 hours unless otherwise stated by heme    GI: pantoprazole prophylaxis (not a home medication)  Surrogate Decision Maker: Deidra (mother): [phone]  Code Status: Full code (confirmed on admission to MICU)    Lokesh Leon   Internal Medicine PGY-2   [facility]

## 2023-10-29 NOTE — CONSULTS
Reason For Consult  Bilateral pleural effusions, evaluate for thoracentesis    History Of Present Illness  Shilo Thakkar is a 23 y.o. male with no PMH who was admitted with a new diagnosis of high-grade Burkitt lymphoma.  Oncology plan is to start hyperCVAD later today.  Pulmonary is consulted to evaluate for thoracentesis in the setting of bilateral pleural effusions and new hypoxic respiratory failure.    Patient reports that he was on RA yesterday, and then overnight was noted to be more hypoxic on pulse oximetry and required 8L NC.  He has not had any dyspnea, cough, fevers/chills.  States that he has been able to ambulate to the bathroom on O2 without feeling short of breath or lightheaded.  Was diuresed with 40mg IV lasix and made 4L of urine overnight, but without much improvement in O2 requirement.  ABG showed a pAO2 of 62mmHg which corresponded to a sat in the low 90s.  On our assessment he is satting 95% on 8L NC.    Confirmed that he presented with abdominal pain and dysphagia as initial symptoms, as well as 20lb weight loss over the last several weeks.  Does have some nausea/decreased appetite.       Past Medical History  He has a past medical history of Colles' fracture of right radius, initial encounter for closed fracture (04/23/2015), Personal history of other mental and behavioral disorders (01/28/2014), Personal history of other mental and behavioral disorders (05/10/2017), Personal history of other specified conditions (05/03/2016), and Unspecified fracture of navicular (scaphoid) bone of left wrist, initial encounter for closed fracture (12/17/2015).    Surgical History  He has a past surgical history that includes Other surgical history (10/03/2017); US guided soft tissue biopsy (10/20/2023); and Foot fracture surgery (Left).     Social History  No significant smoking history    Family History  Family History   Problem Relation Name Age of Onset    ADD / ADHD Mother      ADD / ADHD Sister       "ADD / ADHD Brother          Allergies  Patient has no known allergies.    Review of Systems  10 point ROS was negative except for as stated above     Physical Exam  General: thin male in NAD  Resp: decreased breath sounds bilateral bases, no w/r/r in the upper lobes, on 8L NC saturating 95%  Cardio: RRR, no mrg  GI: nondistended, mildly tender to palpation  Extremities: WWP with trace pitting edema  Psych: pleasant and cooperative  Skin: warm, dry  Neuro: no FND     Last Recorded Vitals  Blood pressure 121/69, pulse 74, temperature 36.4 °C (97.5 °F), resp. rate 18, height 1.838 m (6' 0.36\"), weight 67.7 kg (149 lb 4 oz), SpO2 93 %.    Relevant Results  CTPE 10/29/2023:  IMPRESSION:  1. No evidence of acute pulmonary embolism.  2. Persistent moderate-to-large sized bilateral pleural effusions  with adjacent atelectasis, mildly increased compared to prior exam.  3. Partial visualization of small to moderate volume abdominopelvic  ascites, similar to prior exam.     Assessment/Plan   23 y.o. male with no PMH who was admitted with a new diagnosis of high-grade Burkitt lymphoma.  Oncology plan is to start hyperCVAD later today.  Pulmonary is consulted to evaluate for thoracentesis in the setting of bilateral pleural effusions and new hypoxic respiratory failure.  Unfortunately patient received lovenox ppx this am; given that he has no dyspnea without increased work of breathing, will plan for R-sided thoracentesis on 10/30 to reduce risk of bleeding.      #Bilateral pleural effusions  #Acute hypoxic respiratory failure    Recommendations  -Will add on for thoracentesis with bronch suite in the AM (could also be done by IR, given that we cannot guarantee a timing for thoracentesis as an add-on)  -Agree with continued diuresis as BP tolerates  -If respiratory status worsens, please engage ICU team as at that time benefit of thoracentesis may outweigh risk of bleeding from lovenox ppx.    Patient seen, examined, and " discussed with Dr. Qamar De Anda MD

## 2023-10-29 NOTE — SIGNIFICANT EVENT
Rapid response at bedside assessing pt after as follow up for a RADAR. RADAR score 6 due to the following VS: T 36 °Celsius; HR 69 ; RR 18; /72; SPO2 90%. O2 requirements have increased and pt is now on 4L NC sating 94%. Chest xray already completed prior to arrival and pt also received IV lasix. Lungs auscultated clear bilateral with good air movement. Patient denied pain, shortness of breath, dizziness or lightheadedness. Primary team to consider consulting pulm for management of pleural effusions.     Staff to page rapid response for any concerns or acute change in condition/VS.

## 2023-10-29 NOTE — NURSING NOTE
10/29/23 1730    Rapid Response called for increased O2 demand at approximately 1730 requiring 12L HF with sats 89-92% and desatting to 70's upon standing at bedside. Pt. does not appear to be in distress, denies difficulty breathing only some mild back discomfort. IVF decreased from 150 to 75ml/hr and 40 IV lasix administered. RT and MICU RN at bedside and oxgyen increased to 40L/ 80% airvo, ABG obtained, all other VSS. Pt. Accepted to MICU bed 3028 and transferred by MICU Rns, bedside RN and RT at 1900 with continuous monitoring. Bedside handoff given to MICU RN taking patient. Pt's mom, sister and girlfriend present at bedside.     Katelyn Elena, RN

## 2023-10-29 NOTE — CARE PLAN
Pt afebrile. Pt complained of abdominal discomfort/pain, given colace and 10mg oxy. Pt had 1 episode of emesis. Pt's O2 sat 88-90% on RA, placed on 6L O2. CXR done. Pt given 40mg Lasix. VSS otherwise. No other complaints. Pt remained safe and free from injuries.

## 2023-10-30 ENCOUNTER — APPOINTMENT (OUTPATIENT)
Dept: RADIOLOGY | Facility: HOSPITAL | Age: 23
DRG: 840 | End: 2023-10-30
Payer: COMMERCIAL

## 2023-10-30 ENCOUNTER — APPOINTMENT (OUTPATIENT)
Dept: CARDIOLOGY | Facility: HOSPITAL | Age: 23
DRG: 840 | End: 2023-10-30
Payer: COMMERCIAL

## 2023-10-30 PROBLEM — J96.01 ACUTE HYPOXIC RESPIRATORY FAILURE (MULTI): Status: ACTIVE | Noted: 2023-10-30

## 2023-10-30 LAB
ABO GROUP (TYPE) IN BLOOD: NORMAL
ALBUMIN SERPL BCP-MCNC: 3.3 G/DL (ref 3.4–5)
ALBUMIN SERPL BCP-MCNC: 3.4 G/DL (ref 3.4–5)
ALBUMIN SERPL BCP-MCNC: 3.5 G/DL (ref 3.4–5)
ALP SERPL-CCNC: 49 U/L (ref 33–120)
ALP SERPL-CCNC: 50 U/L (ref 33–120)
ALP SERPL-CCNC: 52 U/L (ref 33–120)
ALT SERPL W P-5'-P-CCNC: 44 U/L (ref 10–52)
ALT SERPL W P-5'-P-CCNC: 50 U/L (ref 10–52)
ALT SERPL W P-5'-P-CCNC: 52 U/L (ref 10–52)
AMYLASE FLD-CCNC: 159 U/L
ANION GAP BLDA CALCULATED.4IONS-SCNC: 8 MMO/L (ref 10–25)
ANION GAP SERPL CALC-SCNC: 13 MMOL/L (ref 10–20)
ANION GAP SERPL CALC-SCNC: 14 MMOL/L (ref 10–20)
ANION GAP SERPL CALC-SCNC: 15 MMOL/L (ref 10–20)
ANTIBODY SCREEN: NORMAL
APPEARANCE UR: ABNORMAL
APTT PPP: 23 SECONDS (ref 27–38)
APTT PPP: 23 SECONDS (ref 27–38)
AST SERPL W P-5'-P-CCNC: 47 U/L (ref 9–39)
AST SERPL W P-5'-P-CCNC: 57 U/L (ref 9–39)
AST SERPL W P-5'-P-CCNC: 57 U/L (ref 9–39)
BASE EXCESS BLDA CALC-SCNC: 4.2 MMOL/L (ref -2–3)
BASOPHILS # BLD AUTO: 0.01 X10*3/UL (ref 0–0.1)
BASOPHILS NFR BLD AUTO: 0.1 %
BASOPHILS NFR FLD MANUAL: 0 %
BILIRUB DIRECT SERPL-MCNC: 0.1 MG/DL (ref 0–0.3)
BILIRUB DIRECT SERPL-MCNC: 0.1 MG/DL (ref 0–0.3)
BILIRUB SERPL-MCNC: 0.3 MG/DL (ref 0–1.2)
BILIRUB SERPL-MCNC: 0.3 MG/DL (ref 0–1.2)
BILIRUB SERPL-MCNC: 0.4 MG/DL (ref 0–1.2)
BILIRUB UR STRIP.AUTO-MCNC: NEGATIVE MG/DL
BLASTS NFR FLD MANUAL: 0 %
BODY SURFACE AREA: 1.86 M2
BODY TEMPERATURE: 37 DEGREES CELSIUS
BUN SERPL-MCNC: 34 MG/DL (ref 6–23)
BUN SERPL-MCNC: 34 MG/DL (ref 6–23)
BUN SERPL-MCNC: 42 MG/DL (ref 6–23)
CA-I BLDA-SCNC: 1.11 MMOL/L (ref 1.1–1.33)
CALCIUM SERPL-MCNC: 7.9 MG/DL (ref 8.6–10.6)
CALCIUM SERPL-MCNC: 8.2 MG/DL (ref 8.6–10.6)
CALCIUM SERPL-MCNC: 8.3 MG/DL (ref 8.6–10.6)
CELL COUNT (BLOOD): 12.03 X10*3/UL
CELL POPULATIONS: NORMAL
CHLORIDE BLDA-SCNC: 101 MMOL/L (ref 98–107)
CHLORIDE SERPL-SCNC: 102 MMOL/L (ref 98–107)
CHLORIDE SERPL-SCNC: 103 MMOL/L (ref 98–107)
CHLORIDE SERPL-SCNC: 104 MMOL/L (ref 98–107)
CHOLEST FLD-MCNC: 61 MG/DL
CLARITY FLD: ABNORMAL
CLARITY FLD: ABNORMAL
CO2 SERPL-SCNC: 27 MMOL/L (ref 21–32)
CO2 SERPL-SCNC: 29 MMOL/L (ref 21–32)
CO2 SERPL-SCNC: 29 MMOL/L (ref 21–32)
COLOR FLD: ABNORMAL
COLOR FLD: ABNORMAL
COLOR UR: YELLOW
CREAT SERPL-MCNC: 0.8 MG/DL (ref 0.5–1.3)
CREAT SERPL-MCNC: 0.8 MG/DL (ref 0.5–1.3)
CREAT SERPL-MCNC: 1.01 MG/DL (ref 0.5–1.3)
DIAGNOSIS: NORMAL
EOSINOPHIL # BLD AUTO: 0 X10*3/UL (ref 0–0.7)
EOSINOPHIL NFR BLD AUTO: 0 %
EOSINOPHIL NFR FLD MANUAL: 5 %
ERYTHROCYTE [DISTWIDTH] IN BLOOD BY AUTOMATED COUNT: 13.5 % (ref 11.5–14.5)
ERYTHROCYTE [DISTWIDTH] IN BLOOD BY AUTOMATED COUNT: 13.6 % (ref 11.5–14.5)
ERYTHROCYTE [DISTWIDTH] IN BLOOD BY AUTOMATED COUNT: 13.7 % (ref 11.5–14.5)
FERRITIN SERPL-MCNC: 172 NG/ML (ref 20–300)
FIBRINOGEN PPP-MCNC: 189 MG/DL (ref 200–400)
FIBRINOGEN PPP-MCNC: 200 MG/DL (ref 200–400)
FLOW DIFFERENTIAL: NORMAL
FLOW TEST ORDERED: NORMAL
FOLATE SERPL-MCNC: 9.3 NG/ML
GFR SERPL CREATININE-BSD FRML MDRD: >90 ML/MIN/1.73M*2
GLUCOSE BLDA-MCNC: 141 MG/DL (ref 74–99)
GLUCOSE FLD-MCNC: 109 MG/DL
GLUCOSE FLD-MCNC: 148 MG/DL
GLUCOSE SERPL-MCNC: 123 MG/DL (ref 74–99)
GLUCOSE SERPL-MCNC: 127 MG/DL (ref 74–99)
GLUCOSE SERPL-MCNC: 142 MG/DL (ref 74–99)
GLUCOSE UR STRIP.AUTO-MCNC: ABNORMAL MG/DL
HCO3 BLDA-SCNC: 28.4 MMOL/L (ref 22–26)
HCT VFR BLD AUTO: 28.3 % (ref 41–52)
HCT VFR BLD AUTO: 29.2 % (ref 41–52)
HCT VFR BLD AUTO: 30.6 % (ref 41–52)
HCT VFR BLD EST: 33 % (ref 41–52)
HGB BLD-MCNC: 10 G/DL (ref 13.5–17.5)
HGB BLD-MCNC: 10.1 G/DL (ref 13.5–17.5)
HGB BLD-MCNC: 9.8 G/DL (ref 13.5–17.5)
HGB BLDA-MCNC: 11.1 G/DL (ref 13.5–17.5)
HOLD SPECIMEN: NORMAL
IMM GRANULOCYTES # BLD AUTO: 0.05 X10*3/UL (ref 0–0.7)
IMM GRANULOCYTES # BLD AUTO: 0.11 X10*3/UL (ref 0–0.7)
IMM GRANULOCYTES # BLD AUTO: 0.15 X10*3/UL (ref 0–0.7)
IMM GRANULOCYTES NFR BLD AUTO: 0.4 % (ref 0–0.9)
IMM GRANULOCYTES NFR BLD AUTO: 0.7 % (ref 0–0.9)
IMM GRANULOCYTES NFR BLD AUTO: 0.9 % (ref 0–0.9)
IMMATURE GRANULOCYTES IN FLUID: 0 %
INHALED O2 CONCENTRATION: 6 %
INR PPP: 1 (ref 0.9–1.1)
INR PPP: 1 (ref 0.9–1.1)
IRON SATN MFR SERPL: 8 % (ref 25–45)
IRON SERPL-MCNC: 23 UG/DL (ref 35–150)
KETONES UR STRIP.AUTO-MCNC: ABNORMAL MG/DL
LAB AP ASR DISCLAIMER: NORMAL
LAB TEST METHOD: NORMAL
LABORATORY COMMENT REPORT: NORMAL
LACTATE BLDA-SCNC: 1.6 MMOL/L (ref 0.4–2)
LACTATE SERPL-SCNC: 1.1 MMOL/L (ref 0.4–2)
LDH FLD L TO P-CCNC: 6237 U/L
LDH FLD L TO P-CCNC: 7868 U/L
LDH SERPL L TO P-CCNC: 1696 U/L (ref 84–246)
LDH SERPL L TO P-CCNC: 1702 U/L (ref 84–246)
LEUKOCYTE ESTERASE UR QL STRIP.AUTO: NEGATIVE
LYMPHOCYTES # BLD AUTO: 0.33 X10*3/UL (ref 1.2–4.8)
LYMPHOCYTES # BLD AUTO: 0.36 X10*3/UL (ref 1.2–4.8)
LYMPHOCYTES # BLD AUTO: 0.42 X10*3/UL (ref 1.2–4.8)
LYMPHOCYTES NFR BLD AUTO: 2.2 %
LYMPHOCYTES NFR BLD AUTO: 2.5 %
LYMPHOCYTES NFR BLD AUTO: 2.5 %
LYMPHOCYTES NFR FLD MANUAL: 82 %
Lab: NORMAL
MAGNESIUM SERPL-MCNC: 2.49 MG/DL (ref 1.6–2.4)
MAGNESIUM SERPL-MCNC: 2.74 MG/DL (ref 1.6–2.4)
MCH RBC QN AUTO: 29.7 PG (ref 26–34)
MCH RBC QN AUTO: 29.7 PG (ref 26–34)
MCH RBC QN AUTO: 29.9 PG (ref 26–34)
MCHC RBC AUTO-ENTMCNC: 33 G/DL (ref 32–36)
MCHC RBC AUTO-ENTMCNC: 34.2 G/DL (ref 32–36)
MCHC RBC AUTO-ENTMCNC: 34.6 G/DL (ref 32–36)
MCV RBC AUTO: 86 FL (ref 80–100)
MCV RBC AUTO: 87 FL (ref 80–100)
MCV RBC AUTO: 90 FL (ref 80–100)
MONOCYTES # BLD AUTO: 0.22 X10*3/UL (ref 0.1–1)
MONOCYTES # BLD AUTO: 0.34 X10*3/UL (ref 0.1–1)
MONOCYTES # BLD AUTO: 0.34 X10*3/UL (ref 0.1–1)
MONOCYTES NFR BLD AUTO: 1.7 %
MONOCYTES NFR BLD AUTO: 2.1 %
MONOCYTES NFR BLD AUTO: 2.1 %
MONOS+MACROS NFR FLD MANUAL: 13 %
NEUTROPHILS # BLD AUTO: 12.61 X10*3/UL (ref 1.2–7.7)
NEUTROPHILS # BLD AUTO: 15.44 X10*3/UL (ref 1.2–7.7)
NEUTROPHILS # BLD AUTO: 15.59 X10*3/UL (ref 1.2–7.7)
NEUTROPHILS NFR BLD AUTO: 94.4 %
NEUTROPHILS NFR BLD AUTO: 94.9 %
NEUTROPHILS NFR BLD AUTO: 95.3 %
NEUTROPHILS NFR FLD MANUAL: 0 %
NITRITE UR QL STRIP.AUTO: NEGATIVE
NRBC BLD-RTO: 0 /100 WBCS (ref 0–0)
NUMBER OF CELLS COLLECTED: NORMAL PER TUBE
OTHER CELLS NFR FLD MANUAL: 0 %
OXYHGB MFR BLDA: 88.9 % (ref 94–98)
PATH REPORT.ADDENDUM SPEC: NORMAL
PATH REPORT.COMMENTS IMP SPEC: NORMAL
PATH REPORT.FINAL DX SPEC: NORMAL
PATH REPORT.GROSS SPEC: NORMAL
PATH REPORT.RELEVANT HX SPEC: NORMAL
PATH REPORT.TOTAL CANCER: NORMAL
PATH REPORT.TOTAL CANCER: NORMAL
PCO2 BLDA: 40 MM HG (ref 38–42)
PH BLDA: 7.46 PH (ref 7.38–7.42)
PH UR STRIP.AUTO: 6 [PH]
PHOSPHATE SERPL-MCNC: 5.7 MG/DL (ref 2.5–4.9)
PHOSPHATE SERPL-MCNC: 7.5 MG/DL (ref 2.5–4.9)
PLASMA CELLS NFR FLD MANUAL: 0 %
PLATELET # BLD AUTO: 369 X10*3/UL (ref 150–450)
PLATELET # BLD AUTO: 401 X10*3/UL (ref 150–450)
PLATELET # BLD AUTO: 411 X10*3/UL (ref 150–450)
PMV BLD AUTO: 10.1 FL (ref 7.5–11.5)
PMV BLD AUTO: 10.1 FL (ref 7.5–11.5)
PMV BLD AUTO: 10.4 FL (ref 7.5–11.5)
PO2 BLDA: 62 MM HG (ref 85–95)
POTASSIUM BLDA-SCNC: 4 MMOL/L (ref 3.5–5.3)
POTASSIUM SERPL-SCNC: 4.1 MMOL/L (ref 3.5–5.3)
POTASSIUM SERPL-SCNC: 4.2 MMOL/L (ref 3.5–5.3)
POTASSIUM SERPL-SCNC: 4.4 MMOL/L (ref 3.5–5.3)
PROCALCITONIN SERPL-MCNC: 0.03 NG/ML
PROT FLD-MCNC: 2.8 G/DL
PROT FLD-MCNC: 3.2 G/DL
PROT SERPL-MCNC: 5 G/DL (ref 6.4–8.2)
PROT SERPL-MCNC: 5.1 G/DL (ref 6.4–8.2)
PROT SERPL-MCNC: 5.4 G/DL (ref 6.4–8.2)
PROT UR STRIP.AUTO-MCNC: ABNORMAL MG/DL
PROTHROMBIN TIME: 11.3 SECONDS (ref 9.8–12.8)
PROTHROMBIN TIME: 11.5 SECONDS (ref 9.8–12.8)
RBC # BLD AUTO: 3.28 X10*6/UL (ref 4.5–5.9)
RBC # BLD AUTO: 3.37 X10*6/UL (ref 4.5–5.9)
RBC # BLD AUTO: 3.4 X10*6/UL (ref 4.5–5.9)
RBC # FLD AUTO: ABNORMAL /UL
RBC # FLD AUTO: ABNORMAL /UL
RBC # UR STRIP.AUTO: ABNORMAL /UL
RBC #/AREA URNS AUTO: ABNORMAL /HPF
RESIDENT REVIEW: NORMAL
RH FACTOR (ANTIGEN D): NORMAL
SAO2 % BLDA: 92 % (ref 94–100)
SIGNATURE COMMENT: NORMAL
SODIUM BLDA-SCNC: 133 MMOL/L (ref 136–145)
SODIUM SERPL-SCNC: 141 MMOL/L (ref 136–145)
SODIUM SERPL-SCNC: 141 MMOL/L (ref 136–145)
SODIUM SERPL-SCNC: 142 MMOL/L (ref 136–145)
SP GR UR STRIP.AUTO: 1.01
SPECIMEN VIABILITY: NORMAL
STAPHYLOCOCCUS SPEC CULT: NORMAL
TIBC SERPL-MCNC: 282 UG/DL (ref 240–445)
TOTAL CELLS COUNTED FLD: 100
TRIGL FLD-MCNC: 94 MG/DL
TRIGL FLD-MCNC: 99 MG/DL
UIBC SERPL-MCNC: 259 UG/DL (ref 110–370)
URATE SERPL-MCNC: 2.7 MG/DL (ref 4–7.5)
URATE SERPL-MCNC: 3.2 MG/DL (ref 4–7.5)
URATE SERPL-MCNC: 3.2 MG/DL (ref 4–7.5)
UROBILINOGEN UR STRIP.AUTO-MCNC: <2 MG/DL
VIT B12 SERPL-MCNC: 594 PG/ML (ref 211–911)
WBC # BLD AUTO: 13.2 X10*3/UL (ref 4.4–11.3)
WBC # BLD AUTO: 16.3 X10*3/UL (ref 4.4–11.3)
WBC # BLD AUTO: 16.5 X10*3/UL (ref 4.4–11.3)
WBC # FLD AUTO: ABNORMAL /UL
WBC # FLD AUTO: ABNORMAL /UL
WBC #/AREA URNS AUTO: ABNORMAL /HPF

## 2023-10-30 PROCEDURE — 80053 COMPREHEN METABOLIC PANEL: CPT

## 2023-10-30 PROCEDURE — 87075 CULTR BACTERIA EXCEPT BLOOD: CPT | Performed by: PHARMACIST

## 2023-10-30 PROCEDURE — 82607 VITAMIN B-12: CPT

## 2023-10-30 PROCEDURE — 85610 PROTHROMBIN TIME: CPT

## 2023-10-30 PROCEDURE — 84550 ASSAY OF BLOOD/URIC ACID: CPT

## 2023-10-30 PROCEDURE — 82248 BILIRUBIN DIRECT: CPT

## 2023-10-30 PROCEDURE — 86850 RBC ANTIBODY SCREEN: CPT

## 2023-10-30 PROCEDURE — 71045 X-RAY EXAM CHEST 1 VIEW: CPT | Performed by: RADIOLOGY

## 2023-10-30 PROCEDURE — 84550 ASSAY OF BLOOD/URIC ACID: CPT | Performed by: INTERNAL MEDICINE

## 2023-10-30 PROCEDURE — 83615 LACTATE (LD) (LDH) ENZYME: CPT | Performed by: STUDENT IN AN ORGANIZED HEALTH CARE EDUCATION/TRAINING PROGRAM

## 2023-10-30 PROCEDURE — 2500000004 HC RX 250 GENERAL PHARMACY W/ HCPCS (ALT 636 FOR OP/ED)

## 2023-10-30 PROCEDURE — 32556 INSERT CATH PLEURA W/O IMAGE: CPT | Performed by: INTERNAL MEDICINE

## 2023-10-30 PROCEDURE — 83605 ASSAY OF LACTIC ACID: CPT

## 2023-10-30 PROCEDURE — 83735 ASSAY OF MAGNESIUM: CPT

## 2023-10-30 PROCEDURE — 87205 SMEAR GRAM STAIN: CPT | Performed by: STUDENT IN AN ORGANIZED HEALTH CARE EDUCATION/TRAINING PROGRAM

## 2023-10-30 PROCEDURE — 2500000001 HC RX 250 WO HCPCS SELF ADMINISTERED DRUGS (ALT 637 FOR MEDICARE OP)

## 2023-10-30 PROCEDURE — 89240 UNLISTED MISC PATH TEST: CPT | Performed by: STUDENT IN AN ORGANIZED HEALTH CARE EDUCATION/TRAINING PROGRAM

## 2023-10-30 PROCEDURE — 82465 ASSAY BLD/SERUM CHOLESTEROL: CPT | Performed by: STUDENT IN AN ORGANIZED HEALTH CARE EDUCATION/TRAINING PROGRAM

## 2023-10-30 PROCEDURE — 99291 CRITICAL CARE FIRST HOUR: CPT

## 2023-10-30 PROCEDURE — 82945 GLUCOSE OTHER FLUID: CPT | Performed by: STUDENT IN AN ORGANIZED HEALTH CARE EDUCATION/TRAINING PROGRAM

## 2023-10-30 PROCEDURE — 85384 FIBRINOGEN ACTIVITY: CPT | Performed by: INTERNAL MEDICINE

## 2023-10-30 PROCEDURE — 85025 COMPLETE CBC W/AUTO DIFF WBC: CPT

## 2023-10-30 PROCEDURE — 78815 PET IMAGE W/CT SKULL-THIGH: CPT | Mod: PI

## 2023-10-30 PROCEDURE — 83615 LACTATE (LD) (LDH) ENZYME: CPT

## 2023-10-30 PROCEDURE — 84157 ASSAY OF PROTEIN OTHER: CPT | Performed by: STUDENT IN AN ORGANIZED HEALTH CARE EDUCATION/TRAINING PROGRAM

## 2023-10-30 PROCEDURE — 3430000001 HC RX 343 DIAGNOSTIC RADIOPHARMACEUTICALS: Performed by: STUDENT IN AN ORGANIZED HEALTH CARE EDUCATION/TRAINING PROGRAM

## 2023-10-30 PROCEDURE — 36415 COLL VENOUS BLD VENIPUNCTURE: CPT | Performed by: PHARMACIST

## 2023-10-30 PROCEDURE — 93308 TTE F-UP OR LMTD: CPT

## 2023-10-30 PROCEDURE — 85730 THROMBOPLASTIN TIME PARTIAL: CPT

## 2023-10-30 PROCEDURE — 80053 COMPREHEN METABOLIC PANEL: CPT | Performed by: INTERNAL MEDICINE

## 2023-10-30 PROCEDURE — 83550 IRON BINDING TEST: CPT

## 2023-10-30 PROCEDURE — 78815 PET IMAGE W/CT SKULL-THIGH: CPT | Mod: PET TUMOR INIT TX STRAT | Performed by: STUDENT IN AN ORGANIZED HEALTH CARE EDUCATION/TRAINING PROGRAM

## 2023-10-30 PROCEDURE — 82728 ASSAY OF FERRITIN: CPT

## 2023-10-30 PROCEDURE — 71045 X-RAY EXAM CHEST 1 VIEW: CPT | Mod: FY

## 2023-10-30 PROCEDURE — 89050 BODY FLUID CELL COUNT: CPT | Performed by: STUDENT IN AN ORGANIZED HEALTH CARE EDUCATION/TRAINING PROGRAM

## 2023-10-30 PROCEDURE — A9552 F18 FDG: HCPCS | Performed by: STUDENT IN AN ORGANIZED HEALTH CARE EDUCATION/TRAINING PROGRAM

## 2023-10-30 PROCEDURE — 2020000001 HC ICU ROOM DAILY

## 2023-10-30 PROCEDURE — 84100 ASSAY OF PHOSPHORUS: CPT

## 2023-10-30 PROCEDURE — 32555 ASPIRATE PLEURA W/ IMAGING: CPT | Mod: GC | Performed by: STUDENT IN AN ORGANIZED HEALTH CARE EDUCATION/TRAINING PROGRAM

## 2023-10-30 PROCEDURE — 37799 UNLISTED PX VASCULAR SURGERY: CPT | Performed by: INTERNAL MEDICINE

## 2023-10-30 PROCEDURE — 81001 URINALYSIS AUTO W/SCOPE: CPT | Performed by: PHARMACIST

## 2023-10-30 PROCEDURE — 87116 MYCOBACTERIA CULTURE: CPT | Performed by: STUDENT IN AN ORGANIZED HEALTH CARE EDUCATION/TRAINING PROGRAM

## 2023-10-30 PROCEDURE — 2500000004 HC RX 250 GENERAL PHARMACY W/ HCPCS (ALT 636 FOR OP/ED): Performed by: INTERNAL MEDICINE

## 2023-10-30 PROCEDURE — 37799 UNLISTED PX VASCULAR SURGERY: CPT

## 2023-10-30 PROCEDURE — 84478 ASSAY OF TRIGLYCERIDES: CPT | Performed by: STUDENT IN AN ORGANIZED HEALTH CARE EDUCATION/TRAINING PROGRAM

## 2023-10-30 PROCEDURE — 2500000004 HC RX 250 GENERAL PHARMACY W/ HCPCS (ALT 636 FOR OP/ED): Performed by: PHARMACIST

## 2023-10-30 PROCEDURE — 85384 FIBRINOGEN ACTIVITY: CPT

## 2023-10-30 PROCEDURE — 82746 ASSAY OF FOLIC ACID SERUM: CPT

## 2023-10-30 PROCEDURE — 84145 PROCALCITONIN (PCT): CPT

## 2023-10-30 PROCEDURE — 85025 COMPLETE CBC W/AUTO DIFF WBC: CPT | Performed by: INTERNAL MEDICINE

## 2023-10-30 PROCEDURE — 99233 SBSQ HOSP IP/OBS HIGH 50: CPT | Performed by: INTERNAL MEDICINE

## 2023-10-30 RX ORDER — FLUDEOXYGLUCOSE F 18 200 MCI/ML
12.24 INJECTION, SOLUTION INTRAVENOUS
Status: COMPLETED | OUTPATIENT
Start: 2023-10-30 | End: 2023-10-30

## 2023-10-30 RX ORDER — SODIUM CHLORIDE 9 MG/ML
150 INJECTION, SOLUTION INTRAVENOUS CONTINUOUS
Status: ACTIVE | OUTPATIENT
Start: 2023-10-30 | End: 2023-10-30

## 2023-10-30 RX ORDER — SODIUM CHLORIDE 9 MG/ML
200 INJECTION, SOLUTION INTRAVENOUS CONTINUOUS
Status: ACTIVE | OUTPATIENT
Start: 2023-10-30 | End: 2023-10-31

## 2023-10-30 RX ADMIN — Medication 80 PERCENT: at 03:31

## 2023-10-30 RX ADMIN — ALLOPURINOL 300 MG: 100 TABLET ORAL at 20:38

## 2023-10-30 RX ADMIN — ACYCLOVIR 400 MG: 400 TABLET ORAL at 08:07

## 2023-10-30 RX ADMIN — SODIUM CHLORIDE 150 ML/HR: 9 INJECTION, SOLUTION INTRAVENOUS at 19:15

## 2023-10-30 RX ADMIN — ONDANSETRON 16 MG: 2 INJECTION INTRAMUSCULAR; INTRAVENOUS at 13:40

## 2023-10-30 RX ADMIN — PIPERACILLIN SODIUM AND TAZOBACTAM SODIUM 4.5 G: 4; .5 INJECTION, SOLUTION INTRAVENOUS at 22:39

## 2023-10-30 RX ADMIN — CYCLOPHOSPHAMIDE 537 MG: 1 INJECTION, POWDER, FOR SOLUTION INTRAVENOUS; ORAL at 01:58

## 2023-10-30 RX ADMIN — ALLOPURINOL 300 MG: 100 TABLET ORAL at 08:07

## 2023-10-30 RX ADMIN — PANTOPRAZOLE SODIUM 40 MG: 40 TABLET, DELAYED RELEASE ORAL at 08:07

## 2023-10-30 RX ADMIN — SODIUM CHLORIDE 75 ML/HR: 9 INJECTION, SOLUTION INTRAVENOUS at 08:08

## 2023-10-30 RX ADMIN — PIPERACILLIN SODIUM AND TAZOBACTAM SODIUM 4.5 G: 4; .5 INJECTION, SOLUTION INTRAVENOUS at 18:53

## 2023-10-30 RX ADMIN — PIPERACILLIN SODIUM AND TAZOBACTAM SODIUM 4.5 G: 4; .5 INJECTION, SOLUTION INTRAVENOUS at 11:00

## 2023-10-30 RX ADMIN — MESNA 1070 MG: 100 INJECTION, SOLUTION INTRAVENOUS at 13:55

## 2023-10-30 RX ADMIN — CYCLOPHOSPHAMIDE 537 MG: 1 INJECTION, POWDER, FOR SOLUTION INTRAVENOUS; ORAL at 14:27

## 2023-10-30 RX ADMIN — DEXAMETHASONE 40 MG: 6 TABLET ORAL at 08:06

## 2023-10-30 RX ADMIN — FLUDEOXYGLUCOSE F 18 12.24 MILLICURIE: 200 INJECTION, SOLUTION INTRAVENOUS at 12:34

## 2023-10-30 RX ADMIN — ACYCLOVIR 400 MG: 400 TABLET ORAL at 20:38

## 2023-10-30 SDOH — SOCIAL STABILITY: SOCIAL INSECURITY: WITHIN THE LAST YEAR, HAVE YOU BEEN AFRAID OF YOUR PARTNER OR EX-PARTNER?: NO

## 2023-10-30 SDOH — SOCIAL STABILITY: SOCIAL INSECURITY: WITHIN THE LAST YEAR, HAVE YOU BEEN HUMILIATED OR EMOTIONALLY ABUSED IN OTHER WAYS BY YOUR PARTNER OR EX-PARTNER?: NO

## 2023-10-30 SDOH — ECONOMIC STABILITY: FOOD INSECURITY: WITHIN THE PAST 12 MONTHS, THE FOOD YOU BOUGHT JUST DIDN'T LAST AND YOU DIDN'T HAVE MONEY TO GET MORE.: NEVER TRUE

## 2023-10-30 SDOH — SOCIAL STABILITY: SOCIAL INSECURITY
WITHIN THE LAST YEAR, HAVE YOU BEEN KICKED, HIT, SLAPPED, OR OTHERWISE PHYSICALLY HURT BY YOUR PARTNER OR EX-PARTNER?: NO

## 2023-10-30 SDOH — ECONOMIC STABILITY: INCOME INSECURITY: IN THE PAST 12 MONTHS, HAS THE ELECTRIC, GAS, OIL, OR WATER COMPANY THREATENED TO SHUT OFF SERVICE IN YOUR HOME?: NO

## 2023-10-30 SDOH — ECONOMIC STABILITY: FOOD INSECURITY: WITHIN THE PAST 12 MONTHS, YOU WORRIED THAT YOUR FOOD WOULD RUN OUT BEFORE YOU GOT MONEY TO BUY MORE.: NEVER TRUE

## 2023-10-30 SDOH — ECONOMIC STABILITY: HOUSING INSECURITY: IN THE LAST 12 MONTHS, HOW MANY PLACES HAVE YOU LIVED?: 1

## 2023-10-30 SDOH — ECONOMIC STABILITY: INCOME INSECURITY: IN THE LAST 12 MONTHS, WAS THERE A TIME WHEN YOU WERE NOT ABLE TO PAY THE MORTGAGE OR RENT ON TIME?: NO

## 2023-10-30 SDOH — SOCIAL STABILITY: SOCIAL INSECURITY
WITHIN THE LAST YEAR, HAVE TO BEEN RAPED OR FORCED TO HAVE ANY KIND OF SEXUAL ACTIVITY BY YOUR PARTNER OR EX-PARTNER?: NO

## 2023-10-30 SDOH — ECONOMIC STABILITY: INCOME INSECURITY: HOW HARD IS IT FOR YOU TO PAY FOR THE VERY BASICS LIKE FOOD, HOUSING, MEDICAL CARE, AND HEATING?: NOT HARD AT ALL

## 2023-10-30 ASSESSMENT — PAIN - FUNCTIONAL ASSESSMENT
PAIN_FUNCTIONAL_ASSESSMENT: 0-10

## 2023-10-30 ASSESSMENT — PAIN SCALES - GENERAL
PAINLEVEL_OUTOF10: 0 - NO PAIN

## 2023-10-30 ASSESSMENT — ACTIVITIES OF DAILY LIVING (ADL): LACK_OF_TRANSPORTATION: NO

## 2023-10-30 NOTE — PROGRESS NOTES
Shilo Thakkar is a 23 y.o. male on day 4 of admission presenting with Burkitt lymphoma (CMS/HCC).    Subjective   No complaints. Denies any fevers, chills, night sweats, SOB, CP, N/V, HA, cough, urinary symptoms, bleeding, bruising.     Objective   Physical Exam  Constitutional:       General: He is not in acute distress.     Comments: Cachectic    HENT:      Head: Normocephalic and atraumatic.      Nose: Nose normal.      Mouth/Throat:      Mouth: Mucous membranes are moist.      Comments: Large ulcerated oropharyngeal mass, L side - improved from yesterday (smaller, ulceration improved)  Eyes:      Extraocular Movements: Extraocular movements intact.      Pupils: Pupils are equal, round, and reactive to light.   Neck:      Comments: L cervical LAD  Cardiovascular:      Rate and Rhythm: Normal rate and regular rhythm.   Pulmonary:      Effort: Pulmonary effort is normal.      Comments: Decreased breath sounds bilateral lower + middle lobes d/t effusions, chest tube in place. On airvo  Abdominal:      General: Bowel sounds are normal.      Palpations: Abdomen is soft. There is mass.      Comments: Several abdominal masses. Abdomen less tense compared to yesterday   Musculoskeletal:         General: Swelling present. Normal range of motion.      Cervical back: Normal range of motion and neck supple.      Comments: L back swelling, small pinpoint lesion with scab   Lymphadenopathy:      Cervical: Cervical adenopathy present.   Skin:     General: Skin is warm and dry.   Neurological:      General: No focal deficit present.      Mental Status: He is alert and oriented to person, place, and time.      Cranial Nerves: No cranial nerve deficit.   Psychiatric:         Mood and Affect: Mood normal.         Behavior: Behavior normal.        Assessment/Plan   Principal Problem:    Burkitt lymphoma (CMS/HCC)  Active Problems:    Generalized weakness    Acute hypoxic respiratory failure (CMS/HCC)    Neck mass    Shilo Thakkar is a  23 y.o. male with no significant medical history presenting to the ED as a referral from outpatient clinic with dysphagia and abdominal pain for the past 3 weeks. Patient initially presented to [facility] ED on 10/19 with 2 weeks of worsening abdominal pain, dysphagia, and 20lb weight loss. Imaging revealed a pancreatic mass, mesenteric LAD, left oropharyngeal mass, and cervical LAD. He had ultrasound guided mesenteric LN biopsy 10/20 and then patient was transferred to [facility] for further work-up. He had biopsy of the oropharyngeal mass and cervical lymph node by ENT 10/23/23. He was discharged home and presented to outpatient clinic to establish care on 10/26. Given biopsy is concerning for lymphoma, patient was referred to the ED for further workup and management, now admitted to [facility] for further workup and management of new Burkitt Lymphoma.      ONC:  #Burkitt lymphoma, newly diagnosed  - 10/19/23 presented to [facility] ED with dysphagia and abdominal pain  - CT A/P with IV contrast (10/19) mass in the body of the pancreas measuring approximately 2.7 x 2.9 cm. The pancreatic duct is dilated. Multiple masses in the mesentery as well as omental caking consistent with carcinomatosis. Moderate amount of free fluid throughout the abdomen and pelvis.   - CT soft tissue neck with IV contrast (10/19): homogeneous soft tissue attenuation lesion in the L oropharynx which measures approximately 3.0 x 4.0 x 5.1 cm.   - CT Abdomen w/o IV contrast (10/20): Mesenteric lymphadenopathy.   - CT chest with IV contrast (10/20): Moderate bilateral pleural effusions.   - US guided mesenteric LN biopsy (10/20/23): HIGH GRADE B-CELL LYMPHOMA MORPHOLOGICALLY CONSISTENT WITH BURKITT LYMPHOMA; flow cytometry: CD10+ kappa restricted B cells supportive of a B cell lymphoma with germinal center origin.   - CT head w/o IV contrast (10/22): No acute intracranial hemorrhage or mass effect.   - Biopsy L oropharyngeal mass and L cervical LN by ENT  (10/23/23), pending  - MR brain with and w/o IV contrast (10/27): No evidence of intracranial metastatic disease.     - Dexa 40 mg daily  - PET CT ordered 10/27, pending, if not possible please order CT A/P with PO and Iv contrast   - Started HyperCVAD 10/29  - needs BMBx 10/30  - needs LP + IT cytarabine 10/30    HEME:  #anemia, likely 2/2 disease  -Transfuse to keep Hgb>7, Plt>10  -DVT ppx: SCDs     ID:  Allergies: NKDA  Ppx: acyclovir; Start bactrim MWF at discharge  -Plan Levaquin for neutropenia  -Zosyn (10/30 -->)  -BCX x2 (10/21): NGTD  - lactate (10/27) 1.6  # viral workup  - Covid neg (10/26)  -Hepatitis serology negative (10/26/23)  -HIV 1/2 nonreactive (10/26)  - EBV neg (10/27)     FEN/GI  Admit weight: 62.6kg  Ppx: Protonix started on admission  Replete electrolytes as needed  #TLS monitoring  -Allopurinol 300mg BID  -G6PD normal  -IVF   -BID TLS labs ordered  #Dysphagia, improved with dexamethasone   - Secondary to LAD  - Continue BMX  - NPO   #Weight loss  -Secondary to dysphagia  -Nutrition consulted on admit  #Constipation, improved  -Bowel regimen with scheduled Colace and Miralax      CARDS/PULM  - Onco echo (10/27): LVSF normal with a 60-65% estimated EF. RVSP WNL. No prior echo available for comparison.  #Acute hypoxic respiratory failure  #b/l moderate to large pleural effusion s/p thoracocentesis and chest tube placement 10/29  - (10/28) worsening O2 demand, 90% on 2L -> 92% on 5L -->Transferred to MICU  - CXR (10/28) showing Bilateral Pleural Effusions s/p thora and rt chest tube placement  - Pleural fluid cytology pending   - Currently 80% FiO2  - CTA chest (10/29) No evidence of acute pulmonary embolism. Persistent moderate-to-large sized bilateral pleural effusions     MISC:  #anxiety, agitation   - lorazepam 0.5mg prn q8hr  #Pain management  -Oxy PRN q6hr  #Fertility preservation  - onco-fertility consulted and following  - Syphilis nonreactive, gonorrhea neg, chlamydia neg  - per  onco-fertility decided not to pursue sperm banking (10/28)    DISPO:  Full code  Primary Onc: Dr. Mccall  Access: DL [facility] solo (10/28)  NOK: Deidra Pickett (Mother) - [phone]     Patient seen, examined and discussed with Dr. Latoya Eduardo MD

## 2023-10-30 NOTE — SIGNIFICANT EVENT
10/30/23 0910   Prechemo Checklist   Has the patient been in the hospital, ED, or urgent care since last date of service Yes   Chemo/Immuno Consent Signed Yes   Protocol/Indications Verified Yes   Confirmed to previous date/time of medication Yes   Compared to previous dose Yes   All medications are dated accurately Yes   Pregnancy Test Negative Not applicable   Parameters Met Yes   BSA/Weight-Height Verified Yes   Dose Calculations Verified Yes

## 2023-10-30 NOTE — CARE PLAN
The patient's goals for the shift include    =The clinical goals for the shift include spo2 >/= 90%    Over the shift, the patient did not make progress toward the following goals. Barriers to progression include criticial illness. Recommendations to address these barriers include orders per micu team .

## 2023-10-30 NOTE — PROCEDURES
Thoracentesis    Date/Time: 10/30/2023 7:03 PM    Performed by: Cam Mcleod MD  Authorized by: Cam Mcleod MD    Consent:     Consent obtained:  Written    Consent given by:  Patient    Risks, benefits, and alternatives were discussed: yes      Risks discussed:  Bleeding, infection and pneumothorax    Alternatives discussed:  No treatment and observation  Universal protocol:     Procedure explained and questions answered to patient or proxy's satisfaction: yes      Imaging studies available: yes      Site/side marked: yes      Immediately prior to procedure, a time out was called: yes      Patient identity confirmed:  Verbally with patient and arm band  Sedation:     Sedation type:  None  Anesthesia:     Anesthesia method:  Local infiltration    Local anesthetic:  Lidocaine 1% w/o epi  Procedure details:     Preparation: Patient was prepped and draped in usual sterile fashion      Patient position:  Sitting    Location:  L midscapular line    Intercostal space:  6th    Puncture method:  Over-the-needle catheter    Ultrasound guidance: yes      Indwelling catheter placed: yes      Needle gauge:  18    Catheter size:  8 Fr    Number of attempts:  1    Drainage characteristics:  Serosanguinous  Post-procedure details:     Chest x-ray performed: no      Procedure completion:  Tolerated  Comments:      CXR pending at this time.  1050 ml of fluids were removed.

## 2023-10-30 NOTE — H&P
23 y.o. with newly diagnosed burkitts lymphoma presenting with AHRF (90% on Airvo) after inpatient chemotherapy initiation (cyclophosphamide + Mesna 10/29/2023 + decadron since 10/27)    Significant events: trasnferred to MICU for increasing o2 requirments and. R sided chest tube placed.  Received 2nd chemo dose yesterday night    HPI  Shilo Thakkar is a 23 y.o. male with no significant medical history presenting to the ED as a referral from outpatient clinic with dysphagia and abdominal pain for the past 3 weeks. Patient initially presented to [facility] ED on 10/19 with 2 weeks of worsening abdominal pain, dysphagia, and 20lb weight loss. Imaging revealed a pancreatic mass, mesenteric LAD, left oropharyngeal mass, and cervical LAD. He had ultrasound guided mesenteric LN biopsy 10/20 and then patient was transferred to [facility] for further work-up. On 10/29/2023, a rapid response was called due to increasing O2 requirement necessitating AirVo and MICU transfer (RA -> 5L -> 12L to AirVo). Diuresed with 40mg iv lasix with 4L UOP, but no significant improvement in oxygen status.     Patient interviewed in the medical ICU.  Patient denies shortness of breath.  Reports that he never felt short of breath but was told he had low oxygen levels.  Denies chest pain, fevers, chills, rigors. Denies cough. Denies belly pain (previously had pain). No N/V post chemo. Denies urinary symptoms. Denies joint pains. Dysphagia getting better.      Initial symptoms were jaw swelling, difficulty swallowing, and 20lb weight loss in 2 weeks. No night sweats. Denies history of mononucleosis      Does not take meds at home.     Reports a family Hx of pancreatic cancer in father (30s), gradfather (70s). Denied other cancers/hematological malignancies on either side of family. 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 Smokes a few cigarettes on the weekends. No significant alcohol intake. No drugs, ever.     He had biopsy of the oropharyngeal mass and cervical  lymph node by ENT 10/23/23. He was then discharged home and presented to outpatient clinic to establish care on 10/26. Given concern for lymphoma from biopsies, patient was referred to the ED for further workup and management.     ONC Hx:   developed dysphagia and abdominal pain persisted over 3 weeks, 10/19/23 presented to [facility] ED with worsening abdominal pain, dysphagia and 20lb weight loss over 2 weeks, 10/19/23 CT Neck/Abd/Pelvis demonstrated Multiple masses in the mesentery as well as omental caking consistent with carcinomatosis, pancreatic mass, mesenteric LAD, cervical LAD and oropharyngeal mass, 10/20/23 US guided mesenteric LN biopsy preliminary pathology consistent with burkitt's lymphoma, 10/23/23 oropharyngeal mass and cervical lymph node biopsy by ENT, 10/26/23 contacted by outpt onc team Dr. Bella who instructed patient to present to ED for prelim path findings concerning for aggressive lymphoma. Received cyclophosphamide + Mesna 10/29/2023 + decadron since 10/27     MICU vitals on arrival  afebrile, HR 82, /81, RR 15, SpO2 91% on 40L FiO2 70%    Visit Vitals  /78   Pulse 64   Temp 36.5 °C (97.7 °F)   Resp 15   HFNC 40 l/min at 80 %      I/O last 3 completed shifts:  In: 480 (7.1 mL/kg) [P.O.:480]  Out: 95913 (147.7 mL/kg) [Urine:8400 (3.4 mL/kg/hr); Emesis/NG output:400; Chest Tube:1200]  Weight: 67.7 kg   No intake/output data recorded.      - Latest Labs:              CBC: WBC 16.3, Hgb 10.0, plt 411              BMP: Na 142, K 4.1, Cl 102, HCO3 29, BUN 34, Cr 0.80, glu 123              ABG: pH 7.49, pCO2 35, pO2 64, lactate 2.7   Uric acid  3.2              COVID: Negative  Pleural fluid   Gram Stain (3+) Moderate Polymorphonuclear leukocytes      No organisms seen         Turbid, straw, LDH - 6000, wbc 31k                 - Imaging:  CTPE 10/29/2023:  1. No evidence of acute pulmonary embolism.  2. Persistent moderate-to-large sized bilateral pleural effusions with adjacent  atelectasis, mildly increased compared to prior exam.  3. Partial visualization of small to moderate volume abdominopelvic ascites, similar to prior exam.      CT Head 10/22/2023:  No acute intracranial hemorrhage or mass effect. MRI with contrast is more sensitive to evaluate for intracranial metastatic disease.     CT Chest w/ contrast 10/20/2023:  Moderate bilateral pleural effusions. No significant lymphadenopathy or consolidation otherwise.     CT abdomen wo contrast 10/20/2023:   1.  Mesenteric lymphadenopathy. No clear path is seen for CT-guided biopsy. Ultrasound of the abdomen recommended to evaluate for possible ultrasound-guided biopsy of mesenteric lymph nodes.      CT abdomen with contrast 10/19/2023:  1. There is a mass in the body of the pancreas which likely represents pancreatic cancer. There are multiple masses in the mesentery as well as omental caking. These are consistent with carcinomatosis.  2. Moderate amount of free fluid throughout the abdomen and pelvis.  3. Moderate right and small left pleural effusions.     CT neck 10/19/2023:   1. Homogeneous soft tissue attenuation lesion in the left oropharynx. This is concerning for a neoplasm but is otherwise nonspecific, recommend direct visualization and/or tissue sampling for further evaluation.  2. Enlarged left cervical lymph nodes are nonspecific, further follow-up as clinically indicated.  3. Small pleural effusions bilaterally.     Cardiac Hx:   Echocardiogram October 2023:   1. Left ventricular systolic function is normal with a 60-65% estimated ejection fraction.  2. There is a large pleural effusion.  3. RVSP within normal limits.  4. No prior echocardiogram available for comparison.        PMH: none  SurgHx: Foot surgery  Prior functional status: independent  Allergies: NKDA  SocHx: Works in [employer] since age 18 EtOH: rare, Tobacco: a few cigarettes on the weekends, Drugs: denies history of drugs especially IV drugs  FamHx: Father with  pancreatic cancer (Dx ~30s, ), Grandfather with pancreatic cancer ( in his 70s), mother without significant history      Home Medications: NONE     PHYSICAL EXAM:  General: awake, alert, conversant, appears stated age, on AirVo, no in distress  Chest: on AirVo, absent breath sounds in lower and midzones, clear in the upper zones   Cardiac: regular rate and rhythm, normal s1, s2  Abdomen: soft, ND, NT, no involuntary guarding  EXT: no peripheral edema, no asymmetry noted     Assessment & Plan  23 y.o. male with no significant medical history presenting who was referred to the emergency department by for concern for aggressive lymphoma.  His initial symptoms were dysphagia, 20 pound weight loss in 2 weeks, and abdominal pain.  CT scans significant for left oropharynx homogenous soft tissue mass with enlarged left cervical lymph nodes in addition to a mass in the pancreas.  Ultrasound-guided mesenteric lymph node biopsy preliminary pathology consistent with Burkitt's lymphoma.  Underwent oropharyngeal mass and cervical lymph node biopsy by ENT on 10/23. Patient received cyclophosphamide and mesna 10/29 + decadron since 10/27 .  Patient developed acute hypoxic respiratory failure on the floor with CT scan significant for bilateral pleural effusions (moderate to large) and negative for PE. AHRF requiring AirVo for which patient was transferred to MICU 10/29/2023. Plan for Pleural pigtail insertion today unilaterally + the opposite side in the coming days. Will send for cytology, flow cytometry, + routine pleural tap labs.     NEUROLOGIC  No acute issues      CARDIOVASCULAR  No acute issues, normal oncology echo, no signs of fluid overload     PULMONARY  #Acute hypoxic respiratory failure   #Bilateral moderate to large pleural effusions  ::AHRF: RA -> 5L -> Airvo  ::hypoxemia likely 2/2 to mod-large effusions + atelectasis which are likely malignant i/s/o lymphoma   ::unlikely fluid overload related    ::CTPE negative for PE   -will place chest tube (right side) and send for lab studies (routine thora labs + cytology + flow cytometry), drain ~1.2-2 L initially, avoid more to avoid re-expansion pulmonary edema   -wean O2 as tolerated   -bubble study wnl    Plan:  -daily CXR while pigtail in place. Keep chest tube in place on right side until output is <100 for 2 days.  -thoracentesis versus chest tube placement today on left side  -keep right chest     RENAL/  No acute issues      GASTROINTESTINAL  #Dysphagia, improved with dexamethasone   ::Secondary to LAD  ::Per SLP (10/27): regular diet with thin liquids. If he demonstrates increase difficulty with PO then order a MBSS  - defer PET scan due to high O2 requirement      ENDOCRINE  No acute issues      HEME/ONC  #New diagnosis of Burkitt lymphoma   :: developed dysphagia and abdominal pain persisted over 3 weeks, 10/19/23 presented to [facility] ED with worsening abdominal pain, dysphagia and 20lb weight loss over 2 weeks, 10/19/23 CT Neck/Abd/Pelvis demonstrated Multiple masses in the mesentery as well as omental caking consistent with carcinomatosis, pancreatic mass, mesenteric LAD, cervical LAD and oropharyngeal mass, 10/20/23 US guided mesenteric LN biopsy preliminary pathology consistent with burkitt's lymphoma, 10/23/23 oropharyngeal mass and cervical lymph node biopsy by ENT, 10/26/23 contacted by outpt onc team Dr. Bella who instructed patient to present to ED for prelim path findings concerning for aggressive   lymphoma.  :: HCG <3, AFP negative, CA 19-9: negative,  elevated 100.9   :: received first dose cyclophosphamide 10/29/2023 with Mesna (treatment protocol: hyperCVAD)  ::receiving decadron since 10/27   :: EBV DNA PCR not detected, mononucleosis screen negative, HIV negative   :: NOT G6PD deficient   -continue dexamethasone per mal heme   -dvt lower extremity unremarkable    Plan:  - hyperCVAD and decadron as per heme  -restarted sodium  chloride drip 75cc/hr for now, reduced from 150 given oxygen requirement, (no evidence of TLS at this time)   -continue allopurinol   -BID LDH, Uric acid, + RFP  - defer PET CT ordered by mal heme, pending (NPO at midnight)   - BMBx as per heme/onc     #Anemia  ::likely of chronic disease  -order iron, TIBC, ferritin, B12, folate levels to r/o deficiencies      #fertility preservation  :: onco-fertility consulted and following  :: Syphilis nonreactive, gonorrhea neg, chlamydia neg  - per onco-fertility decided not to pursue sperm banking (10/28)     INFECTIOUS DISEASE  #Leukocytosis   ::likely secondary to dexamethasone   ::no cough/urinary symptoms/fevers to suggest infection   ::BCx x2 10/21/2023 negative   ::fungal culture - ngtd  ::exudative pleural fluid effusion - wbc 31k, LDH 6K, turbid color  (3+) Moderate Polymorphonuclear leukocytes      No organisms seen   Plan:  -initiated zosyn pulmonary dosing 10/30         F: Restarted 75cc/hr for now given respiratory status (scheduled to end 11/1)  E: PRN, defer K supplementation as patient at risk for TLS  N: NPO for now given O2 requirement  and possible   A: PIVs, right solo PICC (10/28)  DVT: hold pre chest tube insertion, hold in the AM given plans for BMBx and another pigtail insertion, resume 6-8 hours unless otherwise stated by gilberto    GI: pantoprazole prophylaxis (not a home medication)  Surrogate Decision Maker: Deidra (mother): [phone]  Code Status: Full code (confirmed on admission to MICU)    Patrick Holt  Internal Medicine PGY-1  [facility]

## 2023-10-30 NOTE — PROGRESS NOTES
Spiritual Care Visit    Clinical Encounter Type  Visited With: Patient and family together  Routine Visit: Introduction  Continue Visiting: Yes    Advent Encounters  Advent Needs: Prayer    Patient received the Sacrament of the Anointing of the Sick by Fr. Erwin Tony,  [place] .   Family members were preset.        Within the [place] [employer] the Sacrament of the Sick, also known as the Anointing of the Sick, is a prayer in which we invoke the healing power of God.  Along with Eucharist and Reconciliation it is a repeatable sacrament and should be received by anyone about to undergo surgery, those in recovery and the elderly.  This IS NOT 'Last Rites' nor does the [place] have such a ritual.  Those who are actively dying should receive the Anointing of the Sick for physical and spiritual healing.

## 2023-10-30 NOTE — PROCEDURES
Chest Tube Insertion Procedure Note    Indications: Clinically significant Effusion    Pre-operative Diagnosis: Effusion    Post-operative Diagnosis: Effusion    Procedure Details   Informed consent was obtained for the procedure, including sedation. Risks of lung perforation, hemorrhage, arrhythmia, and adverse drug reaction were discussed.     After sterile skin prep, using standard technique, a 14 Romanian tube was placed in the mid scapular region above the rib.     Findings:  250 ml of straw colored fluid obtained for testing. Atrium to water seal. Post procedure CXR without complications.    Estimated Blood Loss:  Minimal           Specimens: Sent fluid culture and gram stain                Complications: None; patient tolerated the procedure well.           Disposition: ICU; On HFNC 40L/90%           Condition: stable    Attending Attestation:

## 2023-10-30 NOTE — PROGRESS NOTES
10/30/23 1435   Discharge Planning   Living Arrangements Parent   Support Systems Parent;Family members   Assistance Needed independent prior to admission   Type of Residence Private residence   Financial Resource Strain   How hard is it for you to pay for the very basics like food, housing, medical care, and heating? Not hard   Housing Stability   In the last 12 months, was there a time when you were not able to pay the mortgage or rent on time? N   In the last 12 months, how many places have you lived? 1   In the last 12 months, was there a time when you did not have a steady place to sleep or slept in a shelter (including now)? N   Transportation Needs   In the past 12 months, has lack of transportation kept you from medical appointments or from getting medications? no   In the past 12 months, has lack of transportation kept you from meetings, work, or from getting things needed for daily living? No       SW met with patient's mother at bedside, patient was out of the room, for assessment and SDOH. Patient lives at home with mother. Normally at baseline he is independent  without needs. Patient is not currently linked to PCP. He presents following new mass. Currently denied needs for social work. Social work to follow.   Kristina Vásquez, AMARILIS, LISW-S ([id number])

## 2023-10-31 ENCOUNTER — APPOINTMENT (OUTPATIENT)
Dept: RADIOLOGY | Facility: HOSPITAL | Age: 23
DRG: 840 | End: 2023-10-31
Payer: COMMERCIAL

## 2023-10-31 ENCOUNTER — APPOINTMENT (OUTPATIENT)
Dept: VASCULAR MEDICINE | Facility: HOSPITAL | Age: 23
DRG: 840 | End: 2023-10-31
Payer: COMMERCIAL

## 2023-10-31 LAB
ALBUMIN SERPL BCP-MCNC: 3.2 G/DL (ref 3.4–5)
ALBUMIN SERPL BCP-MCNC: 3.2 G/DL (ref 3.4–5)
ALP SERPL-CCNC: 54 U/L (ref 33–120)
ALT SERPL W P-5'-P-CCNC: 43 U/L (ref 10–52)
ANION GAP SERPL CALC-SCNC: 13 MMOL/L (ref 10–20)
ANION GAP SERPL CALC-SCNC: 14 MMOL/L (ref 10–20)
APPEARANCE UR: CLEAR
APTT PPP: 23 SECONDS (ref 27–38)
AST SERPL W P-5'-P-CCNC: 43 U/L (ref 9–39)
BASOPHILS # BLD AUTO: 0 X10*3/UL (ref 0–0.1)
BASOPHILS NFR BLD AUTO: 0 %
BILIRUB DIRECT SERPL-MCNC: 0.1 MG/DL (ref 0–0.3)
BILIRUB SERPL-MCNC: 0.3 MG/DL (ref 0–1.2)
BILIRUB UR STRIP.AUTO-MCNC: NEGATIVE MG/DL
BUN SERPL-MCNC: 37 MG/DL (ref 6–23)
BUN SERPL-MCNC: 44 MG/DL (ref 6–23)
CALCIUM SERPL-MCNC: 8.1 MG/DL (ref 8.6–10.6)
CALCIUM SERPL-MCNC: 8.1 MG/DL (ref 8.6–10.6)
CHLORIDE SERPL-SCNC: 105 MMOL/L (ref 98–107)
CHLORIDE SERPL-SCNC: 105 MMOL/L (ref 98–107)
CO2 SERPL-SCNC: 24 MMOL/L (ref 21–32)
CO2 SERPL-SCNC: 24 MMOL/L (ref 21–32)
COLOR UR: ABNORMAL
CREAT SERPL-MCNC: 1 MG/DL (ref 0.5–1.3)
CREAT SERPL-MCNC: 1.11 MG/DL (ref 0.5–1.3)
EOSINOPHIL # BLD AUTO: 0 X10*3/UL (ref 0–0.7)
EOSINOPHIL NFR BLD AUTO: 0 %
ERYTHROCYTE [DISTWIDTH] IN BLOOD BY AUTOMATED COUNT: 13.5 % (ref 11.5–14.5)
FIBRINOGEN PPP-MCNC: 177 MG/DL (ref 200–400)
GFR SERPL CREATININE-BSD FRML MDRD: >90 ML/MIN/1.73M*2
GFR SERPL CREATININE-BSD FRML MDRD: >90 ML/MIN/1.73M*2
GLUCOSE SERPL-MCNC: 117 MG/DL (ref 74–99)
GLUCOSE SERPL-MCNC: 159 MG/DL (ref 74–99)
GLUCOSE UR STRIP.AUTO-MCNC: ABNORMAL MG/DL
HCT VFR BLD AUTO: 26.3 % (ref 41–52)
HGB BLD-MCNC: 9.2 G/DL (ref 13.5–17.5)
IMM GRANULOCYTES # BLD AUTO: 0.07 X10*3/UL (ref 0–0.7)
IMM GRANULOCYTES NFR BLD AUTO: 0.6 % (ref 0–0.9)
INR PPP: 0.9 (ref 0.9–1.1)
KETONES UR STRIP.AUTO-MCNC: ABNORMAL MG/DL
LACTATE SERPL-SCNC: 1.2 MMOL/L (ref 0.4–2)
LACTATE SERPL-SCNC: 2 MMOL/L (ref 0.4–2)
LDH SERPL L TO P-CCNC: 1436 U/L (ref 84–246)
LEUKOCYTE ESTERASE UR QL STRIP.AUTO: NEGATIVE
LYMPHOCYTES # BLD AUTO: 0.3 X10*3/UL (ref 1.2–4.8)
LYMPHOCYTES NFR BLD AUTO: 2.7 %
MAGNESIUM SERPL-MCNC: 2.75 MG/DL (ref 1.6–2.4)
MCH RBC QN AUTO: 29.7 PG (ref 26–34)
MCHC RBC AUTO-ENTMCNC: 35 G/DL (ref 32–36)
MCV RBC AUTO: 85 FL (ref 80–100)
MONOCYTES # BLD AUTO: 0.5 X10*3/UL (ref 0.1–1)
MONOCYTES NFR BLD AUTO: 4.5 %
NEUTROPHILS # BLD AUTO: 10.26 X10*3/UL (ref 1.2–7.7)
NEUTROPHILS NFR BLD AUTO: 92.2 %
NITRITE UR QL STRIP.AUTO: NEGATIVE
NRBC BLD-RTO: 0 /100 WBCS (ref 0–0)
PH UR STRIP.AUTO: 6 [PH]
PHOSPHATE SERPL-MCNC: 5.4 MG/DL (ref 2.5–4.9)
PHOSPHATE SERPL-MCNC: 6.5 MG/DL (ref 2.5–4.9)
PLATELET # BLD AUTO: 329 X10*3/UL (ref 150–450)
PMV BLD AUTO: 10.7 FL (ref 7.5–11.5)
POTASSIUM SERPL-SCNC: 4.3 MMOL/L (ref 3.5–5.3)
POTASSIUM SERPL-SCNC: 4.4 MMOL/L (ref 3.5–5.3)
PROT SERPL-MCNC: 4.9 G/DL (ref 6.4–8.2)
PROT UR STRIP.AUTO-MCNC: NEGATIVE MG/DL
PROTHROMBIN TIME: 10.6 SECONDS (ref 9.8–12.8)
RBC # BLD AUTO: 3.1 X10*6/UL (ref 4.5–5.9)
RBC # UR STRIP.AUTO: ABNORMAL /UL
RBC #/AREA URNS AUTO: >20 /HPF
SODIUM SERPL-SCNC: 138 MMOL/L (ref 136–145)
SODIUM SERPL-SCNC: 139 MMOL/L (ref 136–145)
SP GR UR STRIP.AUTO: 1.01
URATE SERPL-MCNC: 2.9 MG/DL (ref 4–7.5)
UROBILINOGEN UR STRIP.AUTO-MCNC: <2 MG/DL
WBC # BLD AUTO: 11.1 X10*3/UL (ref 4.4–11.3)
WBC #/AREA URNS AUTO: ABNORMAL /HPF

## 2023-10-31 PROCEDURE — 81001 URINALYSIS AUTO W/SCOPE: CPT

## 2023-10-31 PROCEDURE — 97161 PT EVAL LOW COMPLEX 20 MIN: CPT | Mod: GP

## 2023-10-31 PROCEDURE — 71045 X-RAY EXAM CHEST 1 VIEW: CPT | Performed by: RADIOLOGY

## 2023-10-31 PROCEDURE — 2500000001 HC RX 250 WO HCPCS SELF ADMINISTERED DRUGS (ALT 637 FOR MEDICARE OP)

## 2023-10-31 PROCEDURE — 80069 RENAL FUNCTION PANEL: CPT | Performed by: INTERNAL MEDICINE

## 2023-10-31 PROCEDURE — 83615 LACTATE (LD) (LDH) ENZYME: CPT

## 2023-10-31 PROCEDURE — 84100 ASSAY OF PHOSPHORUS: CPT

## 2023-10-31 PROCEDURE — 85384 FIBRINOGEN ACTIVITY: CPT

## 2023-10-31 PROCEDURE — 93970 EXTREMITY STUDY: CPT

## 2023-10-31 PROCEDURE — 2500000004 HC RX 250 GENERAL PHARMACY W/ HCPCS (ALT 636 FOR OP/ED)

## 2023-10-31 PROCEDURE — 2500000004 HC RX 250 GENERAL PHARMACY W/ HCPCS (ALT 636 FOR OP/ED): Performed by: INTERNAL MEDICINE

## 2023-10-31 PROCEDURE — 83605 ASSAY OF LACTIC ACID: CPT | Performed by: INTERNAL MEDICINE

## 2023-10-31 PROCEDURE — 2500000001 HC RX 250 WO HCPCS SELF ADMINISTERED DRUGS (ALT 637 FOR MEDICARE OP): Performed by: INTERNAL MEDICINE

## 2023-10-31 PROCEDURE — 1200000002 HC GENERAL ROOM WITH TELEMETRY DAILY

## 2023-10-31 PROCEDURE — 74177 CT ABD & PELVIS W/CONTRAST: CPT

## 2023-10-31 PROCEDURE — 2550000001 HC RX 255 CONTRASTS: Performed by: INTERNAL MEDICINE

## 2023-10-31 PROCEDURE — 74177 CT ABD & PELVIS W/CONTRAST: CPT | Performed by: RADIOLOGY

## 2023-10-31 PROCEDURE — 37799 UNLISTED PX VASCULAR SURGERY: CPT

## 2023-10-31 PROCEDURE — 2500000004 HC RX 250 GENERAL PHARMACY W/ HCPCS (ALT 636 FOR OP/ED): Mod: JZ | Performed by: INTERNAL MEDICINE

## 2023-10-31 PROCEDURE — 82248 BILIRUBIN DIRECT: CPT

## 2023-10-31 PROCEDURE — 84550 ASSAY OF BLOOD/URIC ACID: CPT

## 2023-10-31 PROCEDURE — 99233 SBSQ HOSP IP/OBS HIGH 50: CPT | Performed by: INTERNAL MEDICINE

## 2023-10-31 PROCEDURE — 85730 THROMBOPLASTIN TIME PARTIAL: CPT

## 2023-10-31 PROCEDURE — 2500000005 HC RX 250 GENERAL PHARMACY W/O HCPCS: Performed by: INTERNAL MEDICINE

## 2023-10-31 PROCEDURE — 2500000004 HC RX 250 GENERAL PHARMACY W/ HCPCS (ALT 636 FOR OP/ED): Performed by: PHARMACIST

## 2023-10-31 PROCEDURE — 85025 COMPLETE CBC W/AUTO DIFF WBC: CPT

## 2023-10-31 PROCEDURE — 93970 EXTREMITY STUDY: CPT | Performed by: INTERNAL MEDICINE

## 2023-10-31 PROCEDURE — 80053 COMPREHEN METABOLIC PANEL: CPT

## 2023-10-31 PROCEDURE — 71045 X-RAY EXAM CHEST 1 VIEW: CPT | Mod: FY

## 2023-10-31 PROCEDURE — 99232 SBSQ HOSP IP/OBS MODERATE 35: CPT | Performed by: STUDENT IN AN ORGANIZED HEALTH CARE EDUCATION/TRAINING PROGRAM

## 2023-10-31 PROCEDURE — 83735 ASSAY OF MAGNESIUM: CPT

## 2023-10-31 PROCEDURE — 83605 ASSAY OF LACTIC ACID: CPT

## 2023-10-31 PROCEDURE — 0W9B30Z DRAINAGE OF LEFT PLEURAL CAVITY WITH DRAINAGE DEVICE, PERCUTANEOUS APPROACH: ICD-10-PCS | Performed by: STUDENT IN AN ORGANIZED HEALTH CARE EDUCATION/TRAINING PROGRAM

## 2023-10-31 RX ORDER — SODIUM CHLORIDE 9 MG/ML
150 INJECTION, SOLUTION INTRAVENOUS CONTINUOUS
Status: ACTIVE | OUTPATIENT
Start: 2023-10-31 | End: 2023-11-01

## 2023-10-31 RX ADMIN — SODIUM CHLORIDE 200 ML/HR: 9 INJECTION, SOLUTION INTRAVENOUS at 11:59

## 2023-10-31 RX ADMIN — MESNA 1070 MG: 100 INJECTION, SOLUTION INTRAVENOUS at 15:02

## 2023-10-31 RX ADMIN — ACYCLOVIR 400 MG: 400 TABLET ORAL at 20:43

## 2023-10-31 RX ADMIN — PIPERACILLIN SODIUM AND TAZOBACTAM SODIUM 3.38 G: 3; .375 INJECTION, SOLUTION INTRAVENOUS at 22:46

## 2023-10-31 RX ADMIN — ONDANSETRON 16 MG: 2 INJECTION INTRAMUSCULAR; INTRAVENOUS at 14:11

## 2023-10-31 RX ADMIN — ACYCLOVIR 400 MG: 400 TABLET ORAL at 08:32

## 2023-10-31 RX ADMIN — SODIUM CHLORIDE 150 ML/HR: 9 INJECTION, SOLUTION INTRAVENOUS at 17:37

## 2023-10-31 RX ADMIN — DEXAMETHASONE 40 MG: 6 TABLET ORAL at 10:04

## 2023-10-31 RX ADMIN — ALLOPURINOL 300 MG: 100 TABLET ORAL at 20:43

## 2023-10-31 RX ADMIN — PANTOPRAZOLE SODIUM 40 MG: 40 TABLET, DELAYED RELEASE ORAL at 08:31

## 2023-10-31 RX ADMIN — PIPERACILLIN SODIUM AND TAZOBACTAM SODIUM 3.38 G: 3; .375 INJECTION, SOLUTION INTRAVENOUS at 17:34

## 2023-10-31 RX ADMIN — CYCLOPHOSPHAMIDE 537 MG: 1 INJECTION, POWDER, FOR SOLUTION INTRAVENOUS; ORAL at 15:20

## 2023-10-31 RX ADMIN — CYCLOPHOSPHAMIDE 537 MG: 1 INJECTION, POWDER, FOR SOLUTION INTRAVENOUS; ORAL at 02:01

## 2023-10-31 RX ADMIN — IOHEXOL 75 ML: 350 INJECTION, SOLUTION INTRAVENOUS at 16:28

## 2023-10-31 RX ADMIN — Medication 6 L/MIN: at 11:40

## 2023-10-31 RX ADMIN — ALLOPURINOL 300 MG: 100 TABLET ORAL at 08:31

## 2023-10-31 RX ADMIN — PIPERACILLIN SODIUM AND TAZOBACTAM SODIUM 4.5 G: 4; .5 INJECTION, SOLUTION INTRAVENOUS at 06:05

## 2023-10-31 ASSESSMENT — COGNITIVE AND FUNCTIONAL STATUS - GENERAL
MOVING TO AND FROM BED TO CHAIR: A LITTLE
MOVING TO AND FROM BED TO CHAIR: A LITTLE
CLIMB 3 TO 5 STEPS WITH RAILING: A LITTLE
DRESSING REGULAR LOWER BODY CLOTHING: A LITTLE
MOBILITY SCORE: 20
STANDING UP FROM CHAIR USING ARMS: A LITTLE
WALKING IN HOSPITAL ROOM: A LITTLE
DRESSING REGULAR UPPER BODY CLOTHING: A LITTLE
STANDING UP FROM CHAIR USING ARMS: A LITTLE
DAILY ACTIVITIY SCORE: 22
WALKING IN HOSPITAL ROOM: A LITTLE
CLIMB 3 TO 5 STEPS WITH RAILING: A LITTLE
MOBILITY SCORE: 20

## 2023-10-31 ASSESSMENT — PAIN - FUNCTIONAL ASSESSMENT
PAIN_FUNCTIONAL_ASSESSMENT: 0-10

## 2023-10-31 ASSESSMENT — ACTIVITIES OF DAILY LIVING (ADL): ADL_ASSISTANCE: INDEPENDENT

## 2023-10-31 ASSESSMENT — PAIN SCALES - GENERAL
PAINLEVEL_OUTOF10: 0 - NO PAIN

## 2023-10-31 NOTE — PROGRESS NOTES
Physical Therapy    Physical Therapy Evaluation    Patient Name: Shilo Thakkar  MRN: [id number]  Today's Date: 10/31/2023   Time Calculation  Start Time: 0943  Stop Time: 1001  Time Calculation (min): 18 min    Assessment/Plan   PT Assessment  PT Assessment Results: Decreased mobility  Rehab Prognosis: Excellent  End of Session Communication: Bedside nurse  End of Session Patient Position: Bed, 3 rail up, Alarm off, not on at start of session  IP OR SWING BED PT PLAN  Inpatient or Swing Bed: Inpatient  PT Plan  Treatment/Interventions: Bed mobility, Transfer training, Gait training, Stair training, Balance training, Strengthening, Endurance training, Therapeutic exercise, Therapeutic activity  PT Plan: Skilled PT  PT Frequency: 2 times per week  PT Discharge Recommendations: Other (comment) (no further skilled PT needs upon D/C.)  PT Recommended Transfer Status: Stand by assist  PT - OK to Discharge: Yes    Subjective   General Visit Information:  General  Reason for Referral: dysphagia, abdominal pain, 20# weight loss; found to have Burkitt Lymphoma; imaging-> pancreatic mass, mesenteric LAD, L orophangeal mass, cervical LAD; s/p chest tube placement and thoracocentsis; CT PE (-)  Past Medical History Relevant to Rehab: no significant PMH  Family/Caregiver Present: Yes  Caregiver Feedback: patient's mother  Prior to Session Communication: Bedside nurse  Patient Position Received: Bed, 3 rail up, Alarm off, not on at start of session  General Comment: pleasant and agreeable; patient wanted to return to bed at end of the session; RN notified patient that he was going to be transferred to [place] [id number] soon.  Home Living:  Home Living  Type of Home: House  Lives With:  (+mother and step-father)  Home Adaptive Equipment: None  Home Layout: Two level, Bed/bath upstairs  Home Access: Stairs to enter with rails  Entrance Stairs-Number of Steps: 4 with HR x1  Bathroom Shower/Tub: Walk-in shower  Bathroom Equipment: None  Prior  Level of Function:  Prior Function Per Pt/Caregiver Report  Level of [place]: Independent with ADLs and functional transfers, Independent with homemaking with ambulation  ADL Assistance: Independent  Homemaking Assistance: Independent  Ambulatory Assistance: Independent  Vocational: Full time employment (asphalt)  Hand Dominance: Right  Prior Function Comments: (+) drive  Precautions:  Precautions  Hearing/Visual Limitations: none  Medical Precautions: Fall precautions, Oxygen therapy device and L/min (protective precautions)  Vital Signs:  Vital Signs  Heart Rate:  (pre: 46 during: HR 40s-60s; post: 54)  Resp:  (pre: 18 post: 19)  SpO2:  (pre: 100% during: >92% when good waveform, denied SOB, post: 100%; on 8 L NC)  BP:  (sitting EOB: 109/78 post in supine: 111/92)  BP Location: Left arm  BP Method: Automatic    Objective   Pain:  Pain Assessment  Pain Assessment: 0-10  Pain Score: 0 - No pain  Cognition:  Cognition  Overall Cognitive Status: Within Functional Limits (AxO x3)    General Assessments:     Sensation  Light Touch:  (denied numbness/tingling in BUEs/BLEs)       Static Sitting Balance  Static Sitting-Balance Support: No upper extremity supported  Static Sitting-Level of Assistance: Independent  Dynamic Sitting Balance  Dynamic Sitting-Balance Support: No upper extremity supported  Dynamic Sitting-Comments: supervision    Static Standing Balance  Static Standing-Balance Support: No upper extremity supported  Static Standing-Level of Assistance: Distant supervision  Static Standing-Comment/Number of Minutes: stood ~2 mins to use urinal; no acute LOB or postural sway  Dynamic Standing Balance  Dynamic Standing-Comments: SBAx1  Functional Assessments:  Bed Mobility  Bed Mobility: Yes  Bed Mobility 1  Bed Mobility 1: Supine to sitting, Sitting to supine  Level of Assistance 1: Distant supervision    Transfers  Transfer: Yes  Transfer 1  Transfer From 1: Sit to, Stand to  Transfer to 1: Sit,  Stand  Technique 1: Sit to stand, Stand to sit  Transfer Level of Assistance 1: Close supervision  Trials/Comments 1: no acute LOB    Ambulation/Gait Training  Ambulation/Gait Training Performed: Yes  Ambulation/Gait Training 1  Surface 1: Level tile  Device 1: No device  Assistance 1: Close supervision  Quality of Gait 1:  (no observed gait abnormalities or acute LOB)  Comments/Distance (ft) 1: lateral B x4 ft, forward/backwards x5 ft  Extremity/Trunk Assessments:  RUE   RUE :  (AROM WFL)  LUE   LUE:  (AROM WFL)  RLE   RLE :  (AROM WFL)  LLE   LLE :  (AROM WFL)  Outcome Measures:  [facility] Basic Mobility  Turning from your back to your side while in a flat bed without using bedrails: None  Moving from lying on your back to sitting on the side of a flat bed without using bedrails: None  Moving to and from bed to chair (including a wheelchair): A little  Standing up from a chair using your arms (e.g. wheelchair or bedside chair): A little  To walk in hospital room: A little  Climbing 3-5 steps with railing: A little  Basic Mobility - Total Score: 20    FSS-ICU  Ambulation: Walks <50 feet with any assistance x1 or walks any distance with assistance x2 people  Rolling: Complete independence  Sitting: Supervision or set-up only  Transfer Sit-to-Stand: Minimal assistance (performs 75% or more of task)  Transfer Supine-to-Sit: Minimal assistance (performs 75% or more of task)  Total Score: 21    E = Exercise and Early Mobility  Current Activity: Ambulating in room    Encounter Problems       Encounter Problems (Active)       Balance       Patient will independently complete standing dynamic balance activities without acute LOB       Start:  10/31/23    Expected End:  11/21/23               Mobility       STG - Patient will ambulate >/=500 ft without acute LOB, while maintaining stable vitals, independently        Start:  10/31/23    Expected End:  11/21/23            patient will ascend/descend >/=12 stairs using HR as  needed without acute LOB, independently.        Start:  10/31/23    Expected End:  11/21/23            Patient will self-report ambulating for >/=20 cumulative minutes/day in order to improve and/or maintain activity tolerance/endurance.        Start:  10/31/23    Expected End:  11/21/23                     Transfers       STG - Transfer from bed to chair independently        Start:  10/31/23    Expected End:  11/21/23            STG - Patient will perform bed mobility independently         Start:  10/31/23    Expected End:  11/21/23            STG - Patient will transfer sit to and from stand independently        Start:  10/31/23    Expected End:  11/21/23                   Education Documentation  Mobility Training, taught by Gertrude Neil PT at 10/31/2023 10:54 AM.  Learner: Patient  Readiness: Acceptance  Method: Explanation  Response: Verbalizes Understanding  Comment: encouraged OOB with meals in the chair; ambulating throughout the day as tolerated with staff    Education Comments  No comments found.

## 2023-10-31 NOTE — PROGRESS NOTES
"Shilo Thakkar is a 23 y.o. male on day 5 of admission presenting with Burkitt lymphoma (CMS/HCC).    Hospital Course  Shilo Thakkar is a 23 y.o. male with no significant medical history presenting to the ED as a referral from outpatient clinic with dysphagia and abdominal pain for the past 3 weeks. Patient initially presented to [facility] ED on 10/19 with 2 weeks of worsening abdominal pain, dysphagia, and 20lb weight loss. Imaging revealed a pancreatic mass, mesenteric LAD, left oropharyngeal mass, and cervical LAD. He had ultrasound guided mesenteric LN biopsy 10/20 and then patient was transferred to [facility] for further work-up. He had biopsy of the oropharyngeal mass and cervical lymph node by ENT 10/23/23. He was then discharged home and presented to outpatient clinic to establish care on 10/26. Given concern for lymphoma from biopsies, patient was referred to the ED for further workup and management. He is now being admitted to [facility] for further workup and management of suspected new Burkitt Lymphoma.     Hospital course complicated by transfer to MICU 10/29 for Acute hypoexemic respiratory failure likely due to bilateral pleural effusion and atelectasis. Thoracentesis with chest tube placement on right 10/29. 1.5 L drained intially, with additional 1.2L over next day. Chest tube to water seal currently. Patient remained on 40L/min on 80% after R drainage, so thoracentesis on left was performed 10/30 with 1 L removed. Oxygen requirements decreased to 8 L HFNC.. PET scan performed 10/30. Patient received chemotherapy as per heme/onc.     Subjective   Improved breathing after thoracentesis and chest tube placement yesterday. Overnight, patient noted to have MALU, increased phos. NS rate increased to 200cc/hr        Objective     Last Recorded Vitals  Blood pressure (!) 111/92, pulse 53, temperature 36.6 °C (97.9 °F), temperature source Temporal, resp. rate 18, height 1.838 m (6' 0.36\"), weight 63.6 kg (140 lb 3.4 oz), " SpO2 100 %.  Intake/Output last 3 Shifts:    Intake/Output Summary (Last 24 hours) at 10/31/2023 1246  Last data filed at 10/31/2023 0800  Gross per 24 hour   Intake 1703.2 ml   Output 5320 ml   Net -3616.8 ml       Physical Exam  Constitutional:       Appearance: He is cachectic.   HENT:      Head: Normocephalic and atraumatic.      Nose: Nose normal.   Eyes:      Extraocular Movements: Extraocular movements intact.      Pupils: Pupils are equal, round, and reactive to light.   Cardiovascular:      Rate and Rhythm: Normal rate and regular rhythm.   Pulmonary:      Effort: Pulmonary effort is normal.      Breath sounds: Normal breath sounds.   Abdominal:      General: There is no distension.      Palpations: Abdomen is soft.      Tenderness: There is no abdominal tenderness.   Musculoskeletal:         General: No swelling.      Cervical back: Normal range of motion.      Right lower leg: No edema.      Left lower leg: No edema.   Skin:     General: Skin is warm and dry.      Capillary Refill: Capillary refill takes less than 2 seconds.   Neurological:      General: No focal deficit present.   Psychiatric:         Mood and Affect: Mood normal.         Behavior: Behavior normal.         Relevant Results             Assessment/Plan   This patient currently has cardiac telemetry ordered; if you would like to modify or discontinue the telemetry order, click here to go to the orders activity to modify/discontinue the order.    Principal Problem:    Burkitt lymphoma (CMS/HCC)  Active Problems:    Generalized weakness    Acute hypoxic respiratory failure (CMS/HCC)    Neck mass    23 y.o. male with no significant medical history presenting who was referred to the emergency department by for concern for aggressive lymphoma.  His initial symptoms were dysphagia, 20 pound weight loss in 2 weeks, and abdominal pain.  CT scans significant for left oropharynx homogenous soft tissue mass with enlarged left cervical lymph nodes in  addition to a mass in the pancreas.  Ultrasound-guided mesenteric lymph node biopsy preliminary pathology consistent with Burkitt's lymphoma.  Underwent oropharyngeal mass and cervical lymph node biopsy by ENT on 10/23. Patient received cyclophosphamide and mesna 10/29 + decadron since 10/27 .  Patient developed acute hypoxic respiratory failure on the floor with CT scan significant for bilateral pleural effusions (moderate to large) and negative for PE. AHRF requiring AirVo for which patient was transferred to MICU 10/29/2023. Plan for Pleural pigtail insertion today unilaterally + the opposite side in the coming days. Will send for cytology, flow cytometry, + routine pleural tap labs.     NEUROLOGIC  No acute issues      CARDIOVASCULAR  No acute issues, normal oncology echo, no signs of fluid overload     PULMONARY  #Acute hypoxic respiratory failure   #Bilateral moderate to large pleural effusions  ::AHRF: RA -> 5L -> Airvo  ::hypoxemia likely 2/2 to mod-large effusions + atelectasis which are likely malignant i/s/o lymphoma   ::unlikely fluid overload related   ::CTPE negative for PE   -chest tube on Right drained total 1.2L 10/29, then 1.5L 10/30.  - thoracentesis on left drained 1L on 10/30  -wean O2 as tolerated   -bubble study wnl     Plan:  -daily CXR while pigtail in place. Keep chest tube in place on right side until output is <100 for 2 days.        RENAL/  No acute issues      GASTROINTESTINAL  #Dysphagia, improved with dexamethasone   ::Secondary to LAD in oropharynx  ::Per SLP (10/27): regular diet with thin liquids. If he demonstrates increase difficulty with PO then order a MBSS  - PET scan completed 10/30     ENDOCRINE  No acute issues      HEME/ONC  #New diagnosis of Burkitt lymphoma   :: developed dysphagia and abdominal pain persisted over 3 weeks, 10/19/23 presented to [facility] ED with worsening abdominal pain, dysphagia and 20lb weight loss over 2 weeks. Imaging and biopsy proven.  :: HCG <3,  AFP negative, CA 19-9: negative,  elevated 100.9   :: received first dose cyclophosphamide 10/29/2023 with Mesna (treatment protocol: hyperCVAD)  ::receiving decadron since 10/27   :: EBV DNA PCR not detected, mononucleosis screen negative, HIV negative   :: NOT G6PD deficient   -continue dexamethasone per mal heme   -dvt lower extremity unremarkable     Plan:  -hyperCVAD and decadron as per heme  -sodium chloride drip 200cc/hr for now, Cl at 104  -continue allopurinol bid   -BID LDH, Uric acid, + RFP  -LP and BMBx as per heme/onc     #Anemia  ::likely of chronic disease           Lab Results   Component Value Date     IRON 23 (L) 10/30/2023     TIBC 282 10/30/2023     FERRITIN 172 10/30/2023      Plan:   - treat underlying malignancy     #fertility preservation  :: onco-fertility consulted and following  :: Syphilis nonreactive, gonorrhea neg, chlamydia neg  - per onco-fertility decided not to pursue sperm banking (10/28)     INFECTIOUS DISEASE  #Leukocytosis   ::component related to dexamethasone   ::no cough/urinary symptoms/fevers to suggest infection  ::BCx x2 10/21/2023 negative   ::fungal culture - ngtd  ::exudative pleural fluid effusion - wbc 31k, LDH 6K, turbid color  ::leukocytosis improved from 16 to 13 with zosyn  (3+) Moderate Polymorphonuclear leukocytes       No organisms seen   Plan:  -initiated zosyn pulmonary dosing for infection of pleural space 10/30 (7 day course, EoT 11/6)        F: 200cc/hr for now given signs of TLS (MALU, rising phos)  E: PRN, defer K supplementation as patient at risk for TLS  N: NPO for now given O2 requirement  and possible   A: PIVs, right solo PICC (10/28)  DVT: hold in the AM given plans for BMBx and possible LP, resume 6-8 hours unless otherwise stated by heme    GI: pantoprazole prophylaxis (not a home medication)  Surrogate Decision Maker: Deidra (mother): [phone]   Code Status: Full code (confirmed on admission to MICU)             Patrick Holt MD

## 2023-10-31 NOTE — NURSING NOTE
Dose # 3 of 4 Cyclosphosphamide 537 mg in 126.9 mL given over 3 hrs via (R) PICC catheter.  Dose up at 0201 and down at 0501.  Pre-medicated with zofran.  Patient, dose and rate verified with second RN, Naomi Keith.  + BBR obtained via syringe aspiration before and after administration.  Patient with no side effects.

## 2023-10-31 NOTE — PROGRESS NOTES
"Shilo Thakkar is a 23 y.o. male on day 5 of admission presenting with Burkitt lymphoma (CMS/HCC).    Subjective   Patient having some discomfort from Rt pig tail. Otherwise feels well.    ON 6 liter oxygen   Had 1.5 liter drained so far from Rt pig tail today     Objective     Physical Exam    Last Recorded Vitals  Blood pressure 133/68, pulse (!) 47, temperature 36.3 °C (97.3 °F), temperature source Temporal, resp. rate 16, height 1.838 m (6' 0.36\"), weight 63.6 kg (140 lb 3.4 oz), SpO2 100 %.  Intake/Output last 3 Shifts:  I/O last 3 completed shifts:  In: 2223.2 (35 mL/kg) [P.O.:1040; I.V.:587.5 (9.2 mL/kg); IV Piggyback:595.7]  Out: 8195 (128.9 mL/kg) [Urine:5425 (2.4 mL/kg/hr); Chest Tube:2770]  Weight: 63.6 kg     Relevant Results  Results for orders placed or performed during the hospital encounter of 10/26/23 (from the past 24 hour(s))   Lactate Dehydrogenase   Result Value Ref Range    LDH 1,702 (H) 84 - 246 U/L   Uric Acid   Result Value Ref Range    Uric Acid 2.7 (L) 4.0 - 7.5 mg/dL   Magnesium   Result Value Ref Range    Magnesium 2.74 (H) 1.60 - 2.40 mg/dL   Lactate   Result Value Ref Range    Lactate 1.1 0.4 - 2.0 mmol/L   Fibrinogen   Result Value Ref Range    Fibrinogen 189 (L) 200 - 400 mg/dL   Coagulation Screen   Result Value Ref Range    Protime 11.3 9.8 - 12.8 seconds    INR 1.0 0.9 - 1.1    aPTT 23 (L) 27 - 38 seconds   CBC and Auto Differential   Result Value Ref Range    WBC 13.2 (H) 4.4 - 11.3 x10*3/uL    nRBC 0.0 0.0 - 0.0 /100 WBCs    RBC 3.28 (L) 4.50 - 5.90 x10*6/uL    Hemoglobin 9.8 (L) 13.5 - 17.5 g/dL    Hematocrit 28.3 (L) 41.0 - 52.0 %    MCV 86 80 - 100 fL    MCH 29.9 26.0 - 34.0 pg    MCHC 34.6 32.0 - 36.0 g/dL    RDW 13.6 11.5 - 14.5 %    Platelets 369 150 - 450 x10*3/uL    MPV 10.1 7.5 - 11.5 fL    Neutrophils % 95.3 40.0 - 80.0 %    Immature Granulocytes %, Automated 0.4 0.0 - 0.9 %    Lymphocytes % 2.5 13.0 - 44.0 %    Monocytes % 1.7 2.0 - 10.0 %    Eosinophils % 0.0 0.0 - 6.0 " %    Basophils % 0.1 0.0 - 2.0 %    Neutrophils Absolute 12.61 (H) 1.20 - 7.70 x10*3/uL    Immature Granulocytes Absolute, Automated 0.05 0.00 - 0.70 x10*3/uL    Lymphocytes Absolute 0.33 (L) 1.20 - 4.80 x10*3/uL    Monocytes Absolute 0.22 0.10 - 1.00 x10*3/uL    Eosinophils Absolute 0.00 0.00 - 0.70 x10*3/uL    Basophils Absolute 0.01 0.00 - 0.10 x10*3/uL   Hepatic function panel   Result Value Ref Range    Albumin 3.4 3.4 - 5.0 g/dL    Bilirubin, Total 0.4 0.0 - 1.2 mg/dL    Bilirubin, Direct 0.1 0.0 - 0.3 mg/dL    Alkaline Phosphatase 49 33 - 120 U/L    ALT 44 10 - 52 U/L    AST 47 (H) 9 - 39 U/L    Total Protein 5.0 (L) 6.4 - 8.2 g/dL   Phosphorus   Result Value Ref Range    Phosphorus 7.5 (H) 2.5 - 4.9 mg/dL   Basic Metabolic Panel   Result Value Ref Range    Glucose 127 (H) 74 - 99 mg/dL    Sodium 141 136 - 145 mmol/L    Potassium 4.4 3.5 - 5.3 mmol/L    Chloride 104 98 - 107 mmol/L    Bicarbonate 27 21 - 32 mmol/L    Anion Gap 14 10 - 20 mmol/L    Urea Nitrogen 42 (H) 6 - 23 mg/dL    Creatinine 1.01 0.50 - 1.30 mg/dL    eGFR >90 >60 mL/min/1.73m*2    Calcium 8.2 (L) 8.6 - 10.6 mg/dL   AFB Culture/Smear    Specimen: Pleural; Fluid   Result Value Ref Range    AFB Culture       Culture in progress and will be examined weekly. A result will be issued either when positive or after 8 weeks incubation.    AFB Stain No acid fast bacilli seen    Sterile Fluid Culture/Smear    Specimen: Pleural; Fluid   Result Value Ref Range    Sterile Fluid Culture/Smear No growth to date     Gram Stain (1+) Rare Polymorphonuclear leukocytes     Gram Stain No organisms seen    Glucose, Fluid   Result Value Ref Range    Glucose, Fluid 109 Not established mg/dL   Lactate Dehydrogenase, Fluid   Result Value Ref Range    LD, Fluid 7,868 Not established. U/L   Protein, Total Fluid   Result Value Ref Range    Protein, Total Fluid 2.8 Not established g/dL   Triglycerides, Fluid   Result Value Ref Range    Triglycerides, Fluid 99 No  established mg/dL   Cholesterol, Body Fluid   Result Value Ref Range    Cholesterol, Fluid 61 Not established mg/dL   Body Fluid Cell Count   Result Value Ref Range    Color, Fluid Orange (A) Colorless, Straw, Yellow    Clarity, Fluid Turbid (A) Clear    WBC, Fluid 24,168 See Comment /uL    RBC, Fluid 17,000 0  /uL /uL   Urinalysis with Reflex Microscopic   Result Value Ref Range    Color, Urine Straw Straw, Yellow    Appearance, Urine Clear Clear    Specific Gravity, Urine 1.012 1.005 - 1.035    pH, Urine 6.0 5.0, 5.5, 6.0, 6.5, 7.0, 7.5, 8.0    Protein, Urine NEGATIVE NEGATIVE mg/dL    Glucose, Urine 50 (1+) (A) NEGATIVE mg/dL    Blood, Urine LARGE (3+) (A) NEGATIVE    Ketones, Urine 5 (TRACE) (A) NEGATIVE mg/dL    Bilirubin, Urine NEGATIVE NEGATIVE    Urobilinogen, Urine <2.0 <2.0 mg/dL    Nitrite, Urine NEGATIVE NEGATIVE    Leukocyte Esterase, Urine NEGATIVE NEGATIVE   Microscopic Only, Urine   Result Value Ref Range    WBC, Urine 1-5 1-5, NONE /HPF    RBC, Urine >20 (A) NONE, 1-2, 3-5 /HPF   Lactate Dehydrogenase   Result Value Ref Range    LDH 1,436 (H) 84 - 246 U/L   Uric Acid   Result Value Ref Range    Uric Acid 2.9 (L) 4.0 - 7.5 mg/dL   Magnesium   Result Value Ref Range    Magnesium 2.75 (H) 1.60 - 2.40 mg/dL   CBC and Auto Differential   Result Value Ref Range    WBC 11.1 4.4 - 11.3 x10*3/uL    nRBC 0.0 0.0 - 0.0 /100 WBCs    RBC 3.10 (L) 4.50 - 5.90 x10*6/uL    Hemoglobin 9.2 (L) 13.5 - 17.5 g/dL    Hematocrit 26.3 (L) 41.0 - 52.0 %    MCV 85 80 - 100 fL    MCH 29.7 26.0 - 34.0 pg    MCHC 35.0 32.0 - 36.0 g/dL    RDW 13.5 11.5 - 14.5 %    Platelets 329 150 - 450 x10*3/uL    MPV 10.7 7.5 - 11.5 fL    Neutrophils % 92.2 40.0 - 80.0 %    Immature Granulocytes %, Automated 0.6 0.0 - 0.9 %    Lymphocytes % 2.7 13.0 - 44.0 %    Monocytes % 4.5 2.0 - 10.0 %    Eosinophils % 0.0 0.0 - 6.0 %    Basophils % 0.0 0.0 - 2.0 %    Neutrophils Absolute 10.26 (H) 1.20 - 7.70 x10*3/uL    Immature Granulocytes Absolute,  Automated 0.07 0.00 - 0.70 x10*3/uL    Lymphocytes Absolute 0.30 (L) 1.20 - 4.80 x10*3/uL    Monocytes Absolute 0.50 0.10 - 1.00 x10*3/uL    Eosinophils Absolute 0.00 0.00 - 0.70 x10*3/uL    Basophils Absolute 0.00 0.00 - 0.10 x10*3/uL   Coagulation Screen   Result Value Ref Range    Protime 10.6 9.8 - 12.8 seconds    INR 0.9 0.9 - 1.1    aPTT 23 (L) 27 - 38 seconds   Fibrinogen   Result Value Ref Range    Fibrinogen 177 (L) 200 - 400 mg/dL   Hepatic function panel   Result Value Ref Range    Albumin 3.2 (L) 3.4 - 5.0 g/dL    Bilirubin, Total 0.3 0.0 - 1.2 mg/dL    Bilirubin, Direct 0.1 0.0 - 0.3 mg/dL    Alkaline Phosphatase 54 33 - 120 U/L    ALT 43 10 - 52 U/L    AST 43 (H) 9 - 39 U/L    Total Protein 4.9 (L) 6.4 - 8.2 g/dL   Lactate   Result Value Ref Range    Lactate 2.0 0.4 - 2.0 mmol/L   Phosphorus   Result Value Ref Range    Phosphorus 6.5 (H) 2.5 - 4.9 mg/dL   Basic Metabolic Panel   Result Value Ref Range    Glucose 159 (H) 74 - 99 mg/dL    Sodium 139 136 - 145 mmol/L    Potassium 4.3 3.5 - 5.3 mmol/L    Chloride 105 98 - 107 mmol/L    Bicarbonate 24 21 - 32 mmol/L    Anion Gap 14 10 - 20 mmol/L    Urea Nitrogen 44 (H) 6 - 23 mg/dL    Creatinine 1.11 0.50 - 1.30 mg/dL    eGFR >90 >60 mL/min/1.73m*2    Calcium 8.1 (L) 8.6 - 10.6 mg/dL   Fungal Culture/Smear    Specimen: Pleural; Fluid   Result Value Ref Range    Fungal Culture/Smear       Culture in progress, a report will be issued when positive or after 2 weeks of incubation.    Fungal Smear No fungal elements seen    Renal function panel   Result Value Ref Range    Glucose 117 (H) 74 - 99 mg/dL    Sodium 138 136 - 145 mmol/L    Potassium 4.4 3.5 - 5.3 mmol/L    Chloride 105 98 - 107 mmol/L    Bicarbonate 24 21 - 32 mmol/L    Anion Gap 13 10 - 20 mmol/L    Urea Nitrogen 37 (H) 6 - 23 mg/dL    Creatinine 1.00 0.50 - 1.30 mg/dL    eGFR >90 >60 mL/min/1.73m*2    Calcium 8.1 (L) 8.6 - 10.6 mg/dL    Phosphorus 5.4 (H) 2.5 - 4.9 mg/dL    Albumin 3.2 (L) 3.4 -  5.0 g/dL    XR chest 1 view    Result Date: 10/31/2023  Interpreted By:  Son Stern, STUDY: XR CHEST 1 VIEW;  10/31/2023 8:26 am   INDICATION: Signs/Symptoms:chest tube on right, bilateral pleural effusions.   COMPARISON: Radiograph dated 10/30/2023   ACCESSION NUMBER(S): [id number]   ORDERING CLINICIAN: YOUNG ARECHIGAED   FINDINGS: Right basilar pigtail chest tube is stable. Right upper extremity PICC is stable.   The cardiac silhouette size is within normal limits.   Persistent small left and trace right pleural effusion with bibasilar atelectasis. No mariano edema or sizable pneumothorax.   No acute osseous abnormality.       1. Small left and trace right pleural effusion with bibasilar atelectasis. 2. No appreciable pneumothorax. 3. Medical appliances as described above.       Signed by: Son Nunez 10/31/2023 10:09 AM Dictation workstation:   [id number]    XR chest 1 view    Result Date: 10/31/2023  Interpreted By:  Son Stern, STUDY: XR CHEST 1 VIEW;  10/30/2023 7:33 pm   INDICATION: Signs/Symptoms:L sided thoracentesis perfromed this afternoon with 1.2 L serosanguineus fluid drained.   COMPARISON: Radiograph dated 10/30/2023   ACCESSION NUMBER(S): [id number]   ORDERING CLINICIAN: YOUNG RODRIGUEZ   FINDINGS: Right upper extremity PICC is in place with the tip projecting over lower SVC. Right basilar pigtail chest tube is stable in position.   The cardiac silhouette size is within normal limits.   Interval improvement in the aeration of the left lung status post left thoracentesis. Small residual left basilar pleural effusion. No edema or focal infiltrate. Cannot exclude residual trace right apical pneumothorax.   No acute osseous change.       1. Interval improvement in the aeration of the left lung status post left thoracentesis with residual small left basilar pleural effusion. No sizable pneumothorax on the left side. 2. Cannot exclude residual trace right apical  pneumothorax. 3. Medical appliances as described above.       Signed by: Son Nunez 10/31/2023 10:09 AM Dictation workstation:   [id number]    Vascular US lower extremity venous duplex bilateral    Result Date: 10/31/2023            [place] [id number]   Tel [phone] and Fax [phone]  Vascular Lab Report VASC US LOWER EXTREMITY VENOUS DUPLEX BILATERAL  Patient Name:      MARVA THADDEUS        Reading Physician:  [id number] Oleg Sal MD, RPVI Study Date:        10/31/2023          Ordering Physician: [id number] BREEZY STROUD MRN/PID:           [id number]            Technologist:       Chidi Miguel RVT Accession#:        [id number]        Technologist 2: Date of Birth/Age: 2000 / 23 years Encounter#:         [id number] Gender:            M Admission Status:  Inpatient           Location Performed: [facility]  Diagnosis/ICD: Localized (leg) edema-R60.0 CPT Codes:     47857 Peripheral venous duplex scan for DVT complete  CONCLUSIONS: Right Lower Venous: No evidence of acute deep vein thrombus visualized in the right lower extremity. Left Lower Venous: No evidence of acute deep vein thrombus visualized in the left lower extremity.  Imaging & Doppler Findings:  Right                 Compressible Thrombus        Flow Distal External Iliac                None   Spontaneous/Phasic CFV                       Yes        None   Spontaneous/Phasic PFV                       Yes        None FV Proximal               Yes        None   Spontaneous/Phasic FV Mid                    Yes        None FV Distal                 Yes        None Popliteal                 Yes        None   Spontaneous/Phasic Peroneal                  Yes        None PTV                       Yes        None  Left                  Compress Thrombus        Flow Distal External Iliac            None   Spontaneous/Phasic CFV                      Yes      None   Spontaneous/Phasic PFV                     Yes      None FV Proximal             Yes      None   Spontaneous/Phasic FV Mid                  Yes      None FV Distal               Yes      None Popliteal               Yes      None   Spontaneous/Phasic Peroneal                Yes      None PTV                     Yes      None  [id number] Oleg Sal MD, LEIGHTON Electronically signed by [id number] LEIGHTON Kaufman MD on 10/31/2023 at 8:53:09 AM  ** Final **     NM PET CT lymphoma staging    Result Date: 10/31/2023  Interpreted By:  Kong Monroy and Osman Sena STUDY: NM PET CT LYMPHOMA STAGING;  10/30/2023 2:28 pm   INDICATION: Signs/Symptoms: New suspected Burkitt lymphoma, staging. CT abdomen on 10/20/2023 demonstrated mesenteric lymphadenopathy. patient presented recently with abdominal pain and dysphagia for 3 weeks' duration, presented to ed on 10/19/23 with two weeks worsening abdominal pain,  dysphagia and 20 pound weight loss, imaging revealed ill-defined pancreatic body mass, mesenteric LAD, left oropharyngeal mass and cervical LAD. Left tonsillar biopsy revealed high-grade B-cell lymphoma with morphology and immuno phenotypic features consistent with Burkitt's lymphoma.   COMPARISON: CT ANGIO CHEST ON 10/29/2023 CT ABDOMEN ON 10/20/2023   ACCESSION NUMBER(S): [id number]   ORDERING CLINICIAN: DESTINEE HARRIS   TECHNIQUE: DIVISION OF NUCLEAR MEDICINE POSITRON EMISSION TOMOGRAPHY (PET-CT)   The patient received an intravenous dose of 12.2 mCi of Fluorine-18 fluorodeoxyglucose (FDG). Positron emission tomographic (PET) images from mid-thigh to skull base were then acquired after a one hour delay. Also acquired was a contemporaneous low dose non-contrast CT scan performed for attenuation correction of PET images and anatomic localization.  The PET and CT images were digitally fused for display.  All images were acquired on a combined PET-CT scanner unit. Some areas of FDG accumulation may be  described in standardized uptake value (SUV) units.   CODING: Initial Treatment Strategy (PI)     CALIBRATION: Dose Injection-to-Scan Interval (mins): 66 min Mediastinal blood pool SUV (normal 1.5-2.5): 1.7 Blood glucose: 142 mg/dL   FINDINGS: NECK: *FDG avid left oropharyngeal mass with SUV max of 6.3, compatible with biopsy-proven non-Hodgkin's lymphoma. *Multiple FDG avid left cervical level II and III lymph nodes with SUV max of 3.3. Few minimal FDG avid right cervical level II lymph nodes. *No evidence of paranasal sinus disease. *Bilateral thyroid glands are unremarkable   CHEST: *Non FDG avid Moderate-large left pleural effusion and trace right-sided pleural effusion. *Multiple FDG avid cardiophrenic lymph nodes with SUV max of 4.9. FDG avid anterior mediastinal node with SUV max of 2.7. *No concerning pulmonary nodule. Atelectasis in the right middle and lower lobes.   ABDOMEN AND PELVIS: *Physiologic radiotracer uptake is present in the liver and spleen with excretion into the bowel loops and the genitourinary tract. Evidence of hepatomegaly measuring 22 x 23 x 10 0.0 cm. Normal-size spleen measuring 11.0 cm craniocaudally without focal abnormal FDG uptake. *Large size abdominopelvic ascites with minimal FDG avidity. *Large FDG avid mass likely within the pancreatic body/ tail, corresponding to the abnormal findings seen on CT with SUV max 10 . Additionally, FDG avid retroperitoneal and mesentery lymphadenopathy with SUV max of 7.9. FDG avid presacral node with SUV max 5.2   MUSCULOSKELETAL: *No concerning FDG avid bone lesion throughout axial and appendicular skeleton *Diffuse anasarca       1. FDG avid left oropharyngeal mass, compatible with biopsy-proven non-Hodgkin's lymphoma. 2. Other FDG avid lymphadenopathy above and below diaphragm in the neck, chest, abdomen and pelvis with the largest bulky lesion likely in the pancreas, concerning for lymphoma as well. 3. Non FDG avid moderate-large left pleural  effusion and large ascites   I personally reviewed the image(s) / study and agree with the findings and interpretation as stated. This study was interpreted at [facility].   MACRO: None   Signed by: oKng Monroy 10/31/2023 7:52 AM Dictation workstation:   [id number]    Thoracentesis    Result Date: 10/30/2023  Cam Mcleod MD     10/31/2023  7:07 AM Thoracentesis Date/Time: 10/30/2023 7:03 PM Performed by: Cam Mcleod MD Authorized by: Cam Mcleod MD  Consent:   Consent obtained:  Written   Consent given by:  Patient   Risks, benefits, and alternatives were discussed: yes    Risks discussed:  Bleeding, infection and pneumothorax   Alternatives discussed:  No treatment and observation Universal protocol:   Procedure explained and questions answered to patient or proxy's satisfaction: yes    Imaging studies available: yes    Site/side marked: yes    Immediately prior to procedure, a time out was called: yes    Patient identity confirmed:  Verbally with patient and arm band Sedation:   Sedation type:  None Anesthesia:   Anesthesia method:  Local infiltration   Local anesthetic:  Lidocaine 1% w/o epi Procedure details:   Preparation: Patient was prepped and draped in usual sterile fashion    Patient position:  Sitting   Location:  L midscapular line   Intercostal space:  6th   Puncture method:  Over-the-needle catheter   Ultrasound guidance: yes    Indwelling catheter placed: yes    Needle gauge:  18   Catheter size:  8 Fr   Number of attempts:  1   Drainage characteristics:  Serosanguinous Post-procedure details:   Chest x-ray performed: no    Procedure completion:  Tolerated Comments:    CXR pending at this time. 1050 ml of fluids were removed.    XR chest 1 view    Result Date: 10/30/2023  Interpreted By:  Son Stern  and Shorty Maria STUDY: XR CHEST 1 VIEW; ;  10/30/2023 8:16 am   INDICATION: Signs/Symptoms:daily CXR for chest pigtail.   COMPARISON: Chest  radiograph on 10/29/2023.   ACCESSION NUMBER(S): [id number]   ORDERING CLINICIAN: DESTINEE HARRIS   FINDINGS: Single AP view of the chest.   Right upper extremity PICC with tip overlying the superior cavoatrial junction/upper right atrium, stable in position compared to prior exam. Stable positioning of right basilar pigtail catheter.   The cardiomediastinal silhouette is normal in size and configuration.   Persistent moderate left pleural effusion with adjacent atelectasis. Stable size and appearance of small right apical pneumothorax.   No acute osseous abnormality.       1. Persistent small right apical pneumothorax, similar in size and appearance to prior exam. Right basilar chest tube remains in place. 2. Moderate left-sided pleural effusion with adjacent atelectasis, similar to prior exam. 3. Additional medical device as above.   I personally reviewed the images/study and I agree with the findings as stated. This study was interpreted at [place], [place].   MACRO: None   Signed by: Son Nunez 10/30/2023 10:57 AM Dictation workstation:   [id number]    Onco-Echo Limited (Strain and 3D)    Result Date: 10/30/2023   [facility], [address] [place] [address]                Tel [phone] and Fax [phone] TRANSTHORACIC ECHOCARDIOGRAM REPORT  Patient Name:      MARVA Spear Physician:    [id number] Wiliam Lowry MD Study Date:        10/30/2023          Ordering Provider:    [id number] ALICIA YANCEY MRN/PID:           [id number]            Fellow: Accession#:        [id number]        Nurse: Date of Birth/Age: 2000 / 23 years Sonographer:          KAYLAN Dimas RDCS Gender:            M                   Additional Staff: Height:            182.88 cm           Admit Date:            10/26/2023 Weight:            67.59 kg            Admission Status:     Inpatient -                                                              Critical/Stat                                                              (within 1-3 hours) BSA:               1.88 m2             Encounter#:           [id number]                                        Department Location:  [address] Study Type:    ONCO-ECHO LIMITED (STRAIN AND 3D) Diagnosis/ICD: Hypoxemia-R09.02 Indication:    Hypoxemia CPT Code:      Echo Limited-33771 Patient History: Pertinent History: Recent Burkitts Lymphoma diagnosed s/p chemo x 2, rt pleural                    centesis, acute hypoxic respiratory failure. Study Detail: The following Echo studies were performed: 2D and M-Mode.               Technically challenging study due to patient lying in supine               position. Agitated saline used as a contrast agent for intraseptal               flow evaluation. Unable to obtain suprasternal notch view. The               patient was awake.  PHYSICIAN INTERPRETATION: Left Ventricle: The left ventricular systolic function is normal, with an estimated ejection fraction of 55-60%. There are no regional wall motion abnormalities. The left ventricular cavity size is mildly dilated. Left ventricular diastolic filling was not assessed. Left Atrium: The left atrium is normal in size. There is no evidence of a patent foramen ovale. A bubble study using agitated saline was performed. Bubble study is negative. Right Ventricle: The right ventricle is normal in size. There is normal right ventricular global systolic function. Right Atrium: The right atrium is normal in size. There is a device visualized in the right atrium. Aortic Valve: The aortic valve is probably trileaflet. Aortic valve regurgitation was not assessed. Mitral Valve: The mitral valve is normal in structure. Mitral valve regurgitation was not assessed. Tricuspid Valve: The tricuspid valve is  structurally normal. Tricuspid regurgitation was not assessed. Pulmonic Valve: The pulmonic valve was not assessed. Pulmonic valve regurgitation was not assessed. Pericardium: There is a trivial pericardial effusion. Pleural: There is a large left pleural effusion. Aorta: The aortic root is normal. Systemic Veins: The inferior vena cava appears to be of normal size. There is IVC inspiratory collapse greater than 50%.  CONCLUSIONS:  1. Left ventricular systolic function is normal with a 55-60% estimated ejection fraction.  2. There is a large pleural effusion.  3. There is no evidence of a patent foramen ovale. QUANTITATIVE DATA SUMMARY:  [id number] Wiliam Lowry MD Electronically signed on 10/30/2023 at 8:35:10 AM  ** Final **     XR chest 1 view    Result Date: 10/30/2023  Interpreted By:  Son Stern and Tippareddy Charit STUDY: XR CHEST 1 VIEW;  10/29/2023 11:07 pm   INDICATION: Signs/Symptoms:post right pleural pigtail insertion.   COMPARISON: Radiograph 10/28/2023   ACCESSION NUMBER(S): [id number]   ORDERING CLINICIAN: DETSINEE HARRIS   FINDINGS: AP radiograph of the chest was provided.   Right upper extremity PICC line with tip overlying the superior cavoatrial junction/upper right atrium. Interval placement of a right basilar pigtail catheter.   CARDIOMEDIASTINAL SILHOUETTE: Cardiomediastinal silhouette is stable in size and configuration.   LUNGS: Interval improvement of lung aeration bilaterally. Significant interval reduction in size of right pleural effusion with no significant effusion seen on today's radiograph. Persistent small to moderate left pleural effusion. Suggestion of small right apical pneumothorax.   ABDOMEN: No remarkable upper abdominal findings.   BONES: No acute osseous changes.       1.  Interval placement of right basilar drain with interval near complete resolution of moderate right pleural effusion. Suggestion of small right apical pneumothorax. Recommend follow-up radiograph.  2. Persistent small to moderate left-sided pleural effusion with associated atelectasis.   I personally reviewed the images/study and I agree with the findings as stated by Godfrey Goddard MD. This study was interpreted at [facility] [place], [place].   MACRO: None.   Signed by: Lazcedricabdirashid Kavonnallely Nunez 10/30/2023 7:21 AM Dictation workstation:   [id number]               Assessment/Plan   Principal Problem:    Burkitt lymphoma (CMS/HCC)  Active Problems:    Generalized weakness    Acute hypoxic respiratory failure (CMS/HCC)    Neck mass    23 y.o. male with no PMH who was admitted with a new diagnosis of high-grade Burkitt lymphoma.  Oncology plan is to start hyperCVAD later today.  Pulmonary is consulted to evaluate for thoracentesis in the setting of bilateral pleural effusions and new hypoxic respiratory failure.  Unfortunately patient received lovenox ppx this am; given that he has no dyspnea without increased work of breathing, will plan for R-sided thoracentesis on 10/30 to reduce risk of bleeding.       #Bilateral pleural effusions, s/p RT pig tail on 10/29 in MICU 1.5 liter drained initially exudative , Left throacetesis done on 10/30  1 liter drained serosanguinous exudative too   #Acute hypoxic respiratory failure  #Burkitt lymphoma  # Small right apical pneumothorax post pig tail     Plan:  -FU cultures and cytology from pleural fluid   -keep Rt pig tail on water seal (drainage remains high)  -CXR daily in AM      Patient was seen with Dr. Antonieta Bruce MD

## 2023-10-31 NOTE — PROGRESS NOTES
3 y.o. with newly diagnosed burkitts lymphoma presenting with AHRF (90% on Airvo) after inpatient chemotherapy initiation (cyclophosphamide + Mesna 10/29/2023 + decadron since 10/27)    Significant events: trasnferred to MICU for increasing o2 requirments and. R sided chest tube placed.  Received 2nd chemo dose yesterday night    HPI  Shilo Thakkar is a 23 y.o. male with no significant medical history presenting to the ED as a referral from outpatient clinic with dysphagia and abdominal pain for the past 3 weeks. Patient initially presented to [facility] ED on 10/19 with 2 weeks of worsening abdominal pain, dysphagia, and 20lb weight loss. Imaging revealed a pancreatic mass, mesenteric LAD, left oropharyngeal mass, and cervical LAD. He had ultrasound guided mesenteric LN biopsy 10/20 and then patient was transferred to [facility] for further work-up. On 10/29/2023, a rapid response was called due to increasing O2 requirement necessitating AirVo and MICU transfer (RA -> 5L -> 12L to AirVo). Diuresed with 40mg iv lasix with 4L UOP, but no significant improvement in oxygen status.     Patient interviewed in the medical ICU.  Patient denies shortness of breath.  Reports that he never felt short of breath but was told he had low oxygen levels.  Denies chest pain, fevers, chills, rigors. Denies cough. Denies belly pain (previously had pain). No N/V post chemo. Denies urinary symptoms. Denies joint pains. Dysphagia getting better.      Initial symptoms were jaw swelling, difficulty swallowing, and 20lb weight loss in 2 weeks. No night sweats. Denies history of mononucleosis      Does not take meds at home.     Reports a family Hx of pancreatic cancer in father (30s), gradfather (70s). Denied other cancers/hematological malignancies on either side of family. 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 Smokes a few cigarettes on the weekends. No significant alcohol intake. No drugs, ever.     He had biopsy of the oropharyngeal mass and cervical  lymph node by ENT 10/23/23. He was then discharged home and presented to outpatient clinic to establish care on 10/26. Given concern for lymphoma from biopsies, patient was referred to the ED for further workup and management.     ONC Hx:   developed dysphagia and abdominal pain persisted over 3 weeks, 10/19/23 presented to [facility] ED with worsening abdominal pain, dysphagia and 20lb weight loss over 2 weeks, 10/19/23 CT Neck/Abd/Pelvis demonstrated Multiple masses in the mesentery as well as omental caking consistent with carcinomatosis, pancreatic mass, mesenteric LAD, cervical LAD and oropharyngeal mass, 10/20/23 US guided mesenteric LN biopsy preliminary pathology consistent with burkitt's lymphoma, 10/23/23 oropharyngeal mass and cervical lymph node biopsy by ENT, 10/26/23 contacted by outpt onc team Dr. Bella who instructed patient to present to ED for prelim path findings concerning for aggressive lymphoma. Received cyclophosphamide + Mesna 10/29/2023 + decadron since 10/27     MICU vitals on arrival  afebrile, HR 82, /81, RR 15, SpO2 91% on 40L FiO2 70%    Visit Vitals  /78   Pulse 64   Temp 36.5 °C (97.7 °F)   Resp 15   HFNC 40 l/min at 80 %      I/O last 3 completed shifts:  In: 480 (7.1 mL/kg) [P.O.:480]  Out: 81260 (147.7 mL/kg) [Urine:8400 (3.4 mL/kg/hr); Emesis/NG output:400; Chest Tube:1200]  Weight: 67.7 kg   No intake/output data recorded.      - Latest Labs:              CBC: WBC 16.3, Hgb 10.0, plt 411              BMP: Na 142, K 4.1, Cl 102, HCO3 29, BUN 34, Cr 0.80, glu 123              ABG: pH 7.49, pCO2 35, pO2 64, lactate 2.7   Uric acid  3.2              COVID: Negative  Pleural fluid   Gram Stain (3+) Moderate Polymorphonuclear leukocytes      No organisms seen         Turbid, straw, LDH - 6000, wbc 31k                 - Imaging:  CTPE 10/29/2023:  1. No evidence of acute pulmonary embolism.  2. Persistent moderate-to-large sized bilateral pleural effusions with adjacent  atelectasis, mildly increased compared to prior exam.  3. Partial visualization of small to moderate volume abdominopelvic ascites, similar to prior exam.      CT Head 10/22/2023:  No acute intracranial hemorrhage or mass effect. MRI with contrast is more sensitive to evaluate for intracranial metastatic disease.     CT Chest w/ contrast 10/20/2023:  Moderate bilateral pleural effusions. No significant lymphadenopathy or consolidation otherwise.     CT abdomen wo contrast 10/20/2023:   1.  Mesenteric lymphadenopathy. No clear path is seen for CT-guided biopsy. Ultrasound of the abdomen recommended to evaluate for possible ultrasound-guided biopsy of mesenteric lymph nodes.      CT abdomen with contrast 10/19/2023:  1. There is a mass in the body of the pancreas which likely represents pancreatic cancer. There are multiple masses in the mesentery as well as omental caking. These are consistent with carcinomatosis.  2. Moderate amount of free fluid throughout the abdomen and pelvis.  3. Moderate right and small left pleural effusions.     CT neck 10/19/2023:   1. Homogeneous soft tissue attenuation lesion in the left oropharynx. This is concerning for a neoplasm but is otherwise nonspecific, recommend direct visualization and/or tissue sampling for further evaluation.  2. Enlarged left cervical lymph nodes are nonspecific, further follow-up as clinically indicated.  3. Small pleural effusions bilaterally.     Cardiac Hx:   Echocardiogram October 2023:   1. Left ventricular systolic function is normal with a 60-65% estimated ejection fraction.  2. There is a large pleural effusion.  3. RVSP within normal limits.  4. No prior echocardiogram available for comparison.        PMH: none  SurgHx: Foot surgery  Prior functional status: independent  Allergies: NKDA  SocHx: Works in [employer] since age 18 EtOH: rare, Tobacco: a few cigarettes on the weekends, Drugs: denies history of drugs especially IV drugs  FamHx: Father with  pancreatic cancer (Dx ~30s, ), Grandfather with pancreatic cancer ( in his 70s), mother without significant history      Home Medications: NONE     PHYSICAL EXAM:  General: awake, alert, conversant, appears stated age, on AirVo, no in distress  Chest: on AirVo, absent breath sounds in lower and midzones, clear in the upper zones   Cardiac: regular rate and rhythm, normal s1, s2  Abdomen: soft, ND, NT, no involuntary guarding  EXT: no peripheral edema, no asymmetry noted     Assessment & Plan  23 y.o. male with no significant medical history presenting who was referred to the emergency department by for concern for aggressive lymphoma.  His initial symptoms were dysphagia, 20 pound weight loss in 2 weeks, and abdominal pain.  CT scans significant for left oropharynx homogenous soft tissue mass with enlarged left cervical lymph nodes in addition to a mass in the pancreas.  Ultrasound-guided mesenteric lymph node biopsy preliminary pathology consistent with Burkitt's lymphoma.  Underwent oropharyngeal mass and cervical lymph node biopsy by ENT on 10/23. Patient received cyclophosphamide and mesna 10/29 + decadron since 10/27 .  Patient developed acute hypoxic respiratory failure on the floor with CT scan significant for bilateral pleural effusions (moderate to large) and negative for PE. AHRF requiring AirVo for which patient was transferred to MICU 10/29/2023. Plan for Pleural pigtail insertion today unilaterally + the opposite side in the coming days. Will send for cytology, flow cytometry, + routine pleural tap labs.     NEUROLOGIC  No acute issues      CARDIOVASCULAR  No acute issues, normal oncology echo, no signs of fluid overload     PULMONARY  #Acute hypoxic respiratory failure   #Bilateral moderate to large pleural effusions  ::AHRF: RA -> 5L -> Airvo  ::hypoxemia likely 2/2 to mod-large effusions + atelectasis which are likely malignant i/s/o lymphoma   ::unlikely fluid overload related    ::CTPE negative for PE   -will place chest tube (right side) and send for lab studies (routine thora labs + cytology + flow cytometry), drain ~1.2-2 L initially, avoid more to avoid re-expansion pulmonary edema   -wean O2 as tolerated   -bubble study wnl    Plan:  -daily CXR while pigtail in place. Keep chest tube in place on right side until output is <100 for 2 days.  -thoracentesis versus chest tube placement today on left side  -keep right chest     RENAL/  No acute issues      GASTROINTESTINAL  #Dysphagia, improved with dexamethasone   ::Secondary to LAD  ::Per SLP (10/27): regular diet with thin liquids. If he demonstrates increase difficulty with PO then order a MBSS  - defer PET scan due to high O2 requirement      ENDOCRINE  No acute issues      HEME/ONC  #New diagnosis of Burkitt lymphoma   :: developed dysphagia and abdominal pain persisted over 3 weeks, 10/19/23 presented to [facility] ED with worsening abdominal pain, dysphagia and 20lb weight loss over 2 weeks, 10/19/23 CT Neck/Abd/Pelvis demonstrated Multiple masses in the mesentery as well as omental caking consistent with carcinomatosis, pancreatic mass, mesenteric LAD, cervical LAD and oropharyngeal mass, 10/20/23 US guided mesenteric LN biopsy preliminary pathology consistent with burkitt's lymphoma, 10/23/23 oropharyngeal mass and cervical lymph node biopsy by ENT, 10/26/23 contacted by outpt onc team Dr. Bella who instructed patient to present to ED for prelim path findings concerning for aggressive   lymphoma.  :: HCG <3, AFP negative, CA 19-9: negative,  elevated 100.9   :: received first dose cyclophosphamide 10/29/2023 with Mesna (treatment protocol: hyperCVAD)  ::receiving decadron since 10/27   :: EBV DNA PCR not detected, mononucleosis screen negative, HIV negative   :: NOT G6PD deficient   -continue dexamethasone per mal heme   -dvt lower extremity unremarkable    Plan:  - hyperCVAD and decadron as per heme  -restarted sodium  chloride drip 75cc/hr for now, reduced from 150 given oxygen requirement, (no evidence of TLS at this time)   -continue allopurinol   -BID LDH, Uric acid, + RFP  - defer PET CT ordered by mal heme, pending (NPO at midnight)   - BMBx as per heme/onc     #Anemia  ::likely of chronic disease  -order iron, TIBC, ferritin, B12, folate levels to r/o deficiencies      #fertility preservation  :: onco-fertility consulted and following  :: Syphilis nonreactive, gonorrhea neg, chlamydia neg  - per onco-fertility decided not to pursue sperm banking (10/28)     INFECTIOUS DISEASE  #Leukocytosis   ::likely secondary to dexamethasone   ::no cough/urinary symptoms/fevers to suggest infection   ::BCx x2 10/21/2023 negative   ::fungal culture - ngtd  ::exudative pleural fluid effusion - wbc 31k, LDH 6K, turbid color  (3+) Moderate Polymorphonuclear leukocytes      No organisms seen   Plan:  -initiated zosyn pulmonary dosing 10/30         F: Restarted 75cc/hr for now given respiratory status (scheduled to end 11/1)  E: PRN, defer K supplementation as patient at risk for TLS  N: NPO for now given O2 requirement  and possible   A: PIVs, right solo PICC (10/28)  DVT: hold pre chest tube insertion, hold in the AM given plans for BMBx and another pigtail insertion, resume 6-8 hours unless otherwise stated by gilberto    GI: pantoprazole prophylaxis (not a home medication)  Surrogate Decision Maker: Deidra (mother): [phone]  Code Status: Full code (confirmed on admission to MICU)    Patrick Holt  Internal Medicine PGY-1  [facility]

## 2023-10-31 NOTE — CARE PLAN
The patient's goals for the shift include  Removing chest tube    The clinical goals for the shift include pt will remain free of injury    Over the shift, the patient did not make progress toward the following goals. Barriers to progression include Patient not understanding his limitations. Recommendations to address these barriers include assisting patient with transfer and bathroom needs.

## 2023-10-31 NOTE — CARE PLAN
The patient's goals for the shift include      The clinical goals for the shift include pt will remain safe and free of falls/injury    Problem: Safety  Goal: Patient will be injury free during hospitalization  Outcome: Progressing  Goal: I will remain free of falls  Outcome: Progressing     Problem: Daily Care  Goal: Daily care needs are met  Outcome: Progressing     Problem: Psychosocial Needs  Goal: Demonstrates ability to cope with hospitalization/illness  Outcome: Progressing  Goal: Collaborate with me, my family, and caregiver to identify my specific goals  Outcome: Progressing     Problem: Discharge Barriers  Goal: My discharge needs are met  Outcome: Progressing     Problem: Fall/Injury  Goal: Not fall by end of shift  Outcome: Progressing  Goal: Be free from injury by end of the shift  Outcome: Progressing  Goal: Verbalize understanding of personal risk factors for fall in the hospital  Outcome: Progressing  Goal: Verbalize understanding of risk factor reduction measures to prevent injury from fall in the home  Outcome: Progressing  Goal: Use assistive devices by end of the shift  Outcome: Progressing  Goal: Pace activities to prevent fatigue by end of the shift  Outcome: Progressing

## 2023-10-31 NOTE — PROGRESS NOTES
Shilo Thakkar is a 23 y.o. male on day 5 of admission presenting with Burkitt lymphoma (CMS/HCC).    Subjective   No complaints. Denies any fevers, chills, night sweats, SOB, CP, N/V, HA, cough, urinary symptoms, bleeding, bruising.     Objective   Physical Exam  Constitutional:       General: He is not in acute distress.     Comments: Cachectic    HENT:      Head: Normocephalic and atraumatic.      Nose: Nose normal.      Mouth/Throat:      Mouth: Mucous membranes are moist.      Comments: Ulcerated oropharyngeal mass significantly improved from yesterday    Eyes:      Extraocular Movements: Extraocular movements intact.      Pupils: Pupils are equal, round, and reactive to light.   Neck:      Comments: L cervical LAD significantly improved  Cardiovascular:      Rate and Rhythm: Normal rate and regular rhythm.   Pulmonary:      Effort: Pulmonary effort is normal.      Comments: Decreased breath sounds bilateral lower + middle lobes d/t effusions, chest tube in place. On airvo  Abdominal:      General: Bowel sounds are normal.      Palpations: Abdomen is soft. There is no mass.      Comments: Several abdominal masses. Abdomen less tense compared to yesterday   Musculoskeletal:         General: Swelling present. Normal range of motion.      Cervical back: Normal range of motion and neck supple.      Comments: L back swelling, small pinpoint lesion with scab   Lymphadenopathy:      Cervical: Cervical adenopathy present.   Skin:     General: Skin is warm and dry.   Neurological:      General: No focal deficit present.      Mental Status: He is alert and oriented to person, place, and time.      Cranial Nerves: No cranial nerve deficit.   Psychiatric:         Mood and Affect: Mood normal.         Behavior: Behavior normal.      Assessment/Plan   Principal Problem:    Burkitt lymphoma (CMS/HCC)  Active Problems:    Generalized weakness    Acute hypoxic respiratory failure (CMS/HCC)    Neck mass    Shilo Thakkar is a 23  y.o. male with no significant medical history presenting to the ED as a referral from outpatient clinic with dysphagia and abdominal pain for the past 3 weeks. Patient initially presented to [facility] ED on 10/19 with 2 weeks of worsening abdominal pain, dysphagia, and 20lb weight loss. Imaging revealed a pancreatic mass, mesenteric LAD, left oropharyngeal mass, and cervical LAD. He had ultrasound guided mesenteric LN biopsy 10/20 and then patient was transferred to [facility] for further work-up. He had biopsy of the oropharyngeal mass and cervical lymph node by ENT 10/23/23. He was discharged home and presented to outpatient clinic to establish care on 10/26. Given biopsy is concerning for lymphoma, patient was referred to the ED for further workup and management, now admitted to [facility] for further workup and management of new Burkitt Lymphoma.      ONC:  #Burkitt lymphoma, newly diagnosed  - 10/19/23 presented to [facility] ED with dysphagia and abdominal pain  - CT A/P with IV contrast (10/19) mass in the body of the pancreas measuring approximately 2.7 x 2.9 cm. The pancreatic duct is dilated. Multiple masses in the mesentery as well as omental caking consistent with carcinomatosis. Moderate amount of free fluid throughout the abdomen and pelvis.   - CT soft tissue neck with IV contrast (10/19): homogeneous soft tissue attenuation lesion in the L oropharynx which measures approximately 3.0 x 4.0 x 5.1 cm.   - CT Abdomen w/o IV contrast (10/20): Mesenteric lymphadenopathy.   - CT chest with IV contrast (10/20): Moderate bilateral pleural effusions.   - US guided mesenteric LN biopsy (10/20/23): HIGH GRADE B-CELL LYMPHOMA MORPHOLOGICALLY CONSISTENT WITH BURKITT LYMPHOMA; flow cytometry: CD10+ kappa restricted B cells supportive of a B cell lymphoma with germinal center origin.   - CT head w/o IV contrast (10/22): No acute intracranial hemorrhage or mass effect.   - Biopsy L oropharyngeal mass and L cervical LN by ENT (10/23/23),  pending  - MR brain with and w/o IV contrast (10/27): No evidence of intracranial metastatic disease.     - Dexa 40 mg daily  - PET CT ordered 10/30 showed FDG avid left oropharyngeal mass and LAD above and below diaphragm  - Started HyperCVAD 10/29  - Plan for Bone marrow bx today  - Order CT A/P with IV and PO contrast  - needs LP + IT cytarabine 11/02    HEME:  #anemia, likely 2/2 disease  -Transfuse to keep Hgb>7, Plt>10  -DVT ppx: SCDs     ID:  Allergies: NKDA  Ppx: acyclovir; Start bactrim MWF at discharge  -Plan Levaquin for neutropenia  -Zosyn (10/30 -->)  -BCX x2 (10/21): NGTD  - lactate (10/27) 1.6  # viral workup  - Covid neg (10/26)  -Hepatitis serology negative (10/26/23)  -HIV 1/2 nonreactive (10/26)  - EBV neg (10/27)     FEN/GI  Admit weight: 62.6kg  Ppx: Protonix started on admission  Replete electrolytes as needed  #TLS monitoring  -Allopurinol 300mg BID  -G6PD normal  - ml/hr  -BID TLS labs ordered  #Dysphagia, improved with dexamethasone   - Secondary to LAD  - Continue BMX  - Low pathogen diet  #Weight loss  -Secondary to dysphagia  -Nutrition consulted on admit  #Constipation, improved  -Bowel regimen with scheduled Colace and Miralax      CARDS/PULM  - Onco echo (10/27): LVSF normal with a 60-65% estimated EF. RVSP WNL. No prior echo available for comparison.  #Acute hypoxic respiratory failure  #b/l moderate to large pleural effusion s/p thoracocentesis and chest tube placement 10/29  - (10/28) worsening O2 demand, 90% on 2L -> 92% on 5L -->Transferred to MICU -->10/31 saturating 100% on 8 NC -->transferred to S3, trying to wean oxygen  - CXR (10/28) showing Bilateral Pleural Effusions s/p thora and rt chest tube placement  - CTA chest (10/29) No evidence of acute pulmonary embolism. Persistent moderate-to-large sized bilateral pleural effusions  - Pleural fluid cytology pending, pleural fluid cx NGTD   - Order daily CXR while has chest tube  - Pulm on board       MISC:  #anxiety,  agitation   - lorazepam 0.5mg prn q8hr  #Pain management  -Oxy PRN q6hr  #Fertility preservation  - onco-fertility consulted and following  - Syphilis nonreactive, gonorrhea neg, chlamydia neg  - per onco-fertility decided not to pursue sperm banking (10/28)    DISPO:  Full code  Primary Onc: Dr. Mccall  Access:  PICC solo (10/28)  NOK: Deidra Pickett (Mother) - [phone]     Patient seen, examined and discussed with Dr. Latoya Eduardo MD

## 2023-11-01 ENCOUNTER — APPOINTMENT (OUTPATIENT)
Dept: RADIOLOGY | Facility: HOSPITAL | Age: 23
DRG: 840 | End: 2023-11-01
Payer: COMMERCIAL

## 2023-11-01 LAB
ALBUMIN SERPL BCP-MCNC: 3.1 G/DL (ref 3.4–5)
ANION GAP SERPL CALC-SCNC: 11 MMOL/L (ref 10–20)
BASOPHILS # BLD AUTO: 0.01 X10*3/UL (ref 0–0.1)
BASOPHILS NFR BLD AUTO: 0.1 %
BUN SERPL-MCNC: 33 MG/DL (ref 6–23)
CALCIUM SERPL-MCNC: 8.4 MG/DL (ref 8.6–10.6)
CHLORIDE SERPL-SCNC: 106 MMOL/L (ref 98–107)
CO2 SERPL-SCNC: 25 MMOL/L (ref 21–32)
CREAT SERPL-MCNC: 0.98 MG/DL (ref 0.5–1.3)
EOSINOPHIL # BLD AUTO: 0 X10*3/UL (ref 0–0.7)
EOSINOPHIL NFR BLD AUTO: 0 %
ERYTHROCYTE [DISTWIDTH] IN BLOOD BY AUTOMATED COUNT: 13.8 % (ref 11.5–14.5)
FOLATE SERPL-MCNC: 9.3 NG/ML
GFR SERPL CREATININE-BSD FRML MDRD: >90 ML/MIN/1.73M*2
GLUCOSE SERPL-MCNC: 142 MG/DL (ref 74–99)
HCT VFR BLD AUTO: 27.3 % (ref 41–52)
HGB BLD-MCNC: 8.9 G/DL (ref 13.5–17.5)
IMM GRANULOCYTES # BLD AUTO: 0.05 X10*3/UL (ref 0–0.7)
IMM GRANULOCYTES NFR BLD AUTO: 0.6 % (ref 0–0.9)
LACTATE SERPL-SCNC: 1.8 MMOL/L (ref 0.4–2)
LYMPHOCYTES # BLD AUTO: 0.26 X10*3/UL (ref 1.2–4.8)
LYMPHOCYTES NFR BLD AUTO: 3.1 %
MCH RBC QN AUTO: 29.7 PG (ref 26–34)
MCHC RBC AUTO-ENTMCNC: 32.6 G/DL (ref 32–36)
MCV RBC AUTO: 91 FL (ref 80–100)
MONOCYTES # BLD AUTO: 0.28 X10*3/UL (ref 0.1–1)
MONOCYTES NFR BLD AUTO: 3.3 %
NEUTROPHILS # BLD AUTO: 7.84 X10*3/UL (ref 1.2–7.7)
NEUTROPHILS NFR BLD AUTO: 92.9 %
NRBC BLD-RTO: 0 /100 WBCS (ref 0–0)
PHOSPHATE SERPL-MCNC: 5 MG/DL (ref 2.5–4.9)
PLATELET # BLD AUTO: 293 X10*3/UL (ref 150–450)
PMV BLD AUTO: 10.4 FL (ref 7.5–11.5)
POTASSIUM SERPL-SCNC: 4.8 MMOL/L (ref 3.5–5.3)
RBC # BLD AUTO: 3 X10*6/UL (ref 4.5–5.9)
SODIUM SERPL-SCNC: 137 MMOL/L (ref 136–145)
URATE SERPL-MCNC: 2.3 MG/DL (ref 4–7.5)
WBC # BLD AUTO: 8.4 X10*3/UL (ref 4.4–11.3)

## 2023-11-01 PROCEDURE — 2500000004 HC RX 250 GENERAL PHARMACY W/ HCPCS (ALT 636 FOR OP/ED): Mod: JZ | Performed by: INTERNAL MEDICINE

## 2023-11-01 PROCEDURE — 2500000004 HC RX 250 GENERAL PHARMACY W/ HCPCS (ALT 636 FOR OP/ED): Performed by: INTERNAL MEDICINE

## 2023-11-01 PROCEDURE — 99232 SBSQ HOSP IP/OBS MODERATE 35: CPT | Performed by: STUDENT IN AN ORGANIZED HEALTH CARE EDUCATION/TRAINING PROGRAM

## 2023-11-01 PROCEDURE — 71045 X-RAY EXAM CHEST 1 VIEW: CPT | Mod: FY

## 2023-11-01 PROCEDURE — 88184 FLOWCYTOMETRY/ TC 1 MARKER: CPT | Mod: TC | Performed by: INTERNAL MEDICINE

## 2023-11-01 PROCEDURE — 85097 BONE MARROW INTERPRETATION: CPT | Mod: TC | Performed by: INTERNAL MEDICINE

## 2023-11-01 PROCEDURE — 07DR3ZX EXTRACTION OF ILIAC BONE MARROW, PERCUTANEOUS APPROACH, DIAGNOSTIC: ICD-10-PCS | Performed by: STUDENT IN AN ORGANIZED HEALTH CARE EDUCATION/TRAINING PROGRAM

## 2023-11-01 PROCEDURE — 88237 TISSUE CULTURE BONE MARROW: CPT | Performed by: INTERNAL MEDICINE

## 2023-11-01 PROCEDURE — 2500000001 HC RX 250 WO HCPCS SELF ADMINISTERED DRUGS (ALT 637 FOR MEDICARE OP): Performed by: INTERNAL MEDICINE

## 2023-11-01 PROCEDURE — 88185 FLOWCYTOMETRY/TC ADD-ON: CPT | Mod: TC | Performed by: INTERNAL MEDICINE

## 2023-11-01 PROCEDURE — 80069 RENAL FUNCTION PANEL: CPT | Performed by: INTERNAL MEDICINE

## 2023-11-01 PROCEDURE — 88305 TISSUE EXAM BY PATHOLOGIST: CPT | Mod: TC,SUR | Performed by: INTERNAL MEDICINE

## 2023-11-01 PROCEDURE — 84550 ASSAY OF BLOOD/URIC ACID: CPT | Performed by: INTERNAL MEDICINE

## 2023-11-01 PROCEDURE — 97165 OT EVAL LOW COMPLEX 30 MIN: CPT | Mod: GO

## 2023-11-01 PROCEDURE — 88341 IMHCHEM/IMCYTCHM EA ADD ANTB: CPT | Performed by: PATHOLOGY

## 2023-11-01 PROCEDURE — 85025 COMPLETE CBC W/AUTO DIFF WBC: CPT | Performed by: INTERNAL MEDICINE

## 2023-11-01 PROCEDURE — 85097 BONE MARROW INTERPRETATION: CPT | Performed by: PATHOLOGY

## 2023-11-01 PROCEDURE — 88189 FLOWCYTOMETRY/READ 16 & >: CPT | Performed by: INTERNAL MEDICINE

## 2023-11-01 PROCEDURE — 079T3ZX DRAINAGE OF BONE MARROW, PERCUTANEOUS APPROACH, DIAGNOSTIC: ICD-10-PCS | Performed by: STUDENT IN AN ORGANIZED HEALTH CARE EDUCATION/TRAINING PROGRAM

## 2023-11-01 PROCEDURE — 83605 ASSAY OF LACTIC ACID: CPT | Performed by: INTERNAL MEDICINE

## 2023-11-01 PROCEDURE — 99233 SBSQ HOSP IP/OBS HIGH 50: CPT | Performed by: INTERNAL MEDICINE

## 2023-11-01 PROCEDURE — 99221 1ST HOSP IP/OBS SF/LOW 40: CPT | Performed by: STUDENT IN AN ORGANIZED HEALTH CARE EDUCATION/TRAINING PROGRAM

## 2023-11-01 PROCEDURE — 71045 X-RAY EXAM CHEST 1 VIEW: CPT | Performed by: RADIOLOGY

## 2023-11-01 PROCEDURE — 88305 TISSUE EXAM BY PATHOLOGIST: CPT | Performed by: PATHOLOGY

## 2023-11-01 PROCEDURE — 88311 DECALCIFY TISSUE: CPT | Performed by: PATHOLOGY

## 2023-11-01 PROCEDURE — 1200000002 HC GENERAL ROOM WITH TELEMETRY DAILY

## 2023-11-01 PROCEDURE — 88342 IMHCHEM/IMCYTCHM 1ST ANTB: CPT | Performed by: PATHOLOGY

## 2023-11-01 PROCEDURE — A4217 STERILE WATER/SALINE, 500 ML: HCPCS | Performed by: INTERNAL MEDICINE

## 2023-11-01 RX ORDER — PROCHLORPERAZINE EDISYLATE 5 MG/ML
10 INJECTION INTRAMUSCULAR; INTRAVENOUS ONCE
Status: COMPLETED | OUTPATIENT
Start: 2023-11-01 | End: 2023-11-01

## 2023-11-01 RX ORDER — AMOXICILLIN 250 MG
1 CAPSULE ORAL ONCE
Status: COMPLETED | OUTPATIENT
Start: 2023-11-01 | End: 2023-11-01

## 2023-11-01 RX ORDER — PROCHLORPERAZINE EDISYLATE 5 MG/ML
10 INJECTION INTRAMUSCULAR; INTRAVENOUS ONCE
Status: DISCONTINUED | OUTPATIENT
Start: 2023-11-01 | End: 2023-11-11 | Stop reason: HOSPADM

## 2023-11-01 RX ORDER — VANCOMYCIN HYDROCHLORIDE 1 G/200ML
1000 INJECTION, SOLUTION INTRAVENOUS EVERY 12 HOURS
Status: DISCONTINUED | OUTPATIENT
Start: 2023-11-02 | End: 2023-11-03 | Stop reason: DRUGHIGH

## 2023-11-01 RX ORDER — SODIUM CHLORIDE 9 MG/ML
75 INJECTION, SOLUTION INTRAVENOUS CONTINUOUS
Status: DISCONTINUED | OUTPATIENT
Start: 2023-11-01 | End: 2023-11-07

## 2023-11-01 RX ADMIN — ALLOPURINOL 300 MG: 100 TABLET ORAL at 20:18

## 2023-11-01 RX ADMIN — ACYCLOVIR 400 MG: 400 TABLET ORAL at 09:10

## 2023-11-01 RX ADMIN — DEXAMETHASONE 40 MG: 6 TABLET ORAL at 09:10

## 2023-11-01 RX ADMIN — PIPERACILLIN SODIUM AND TAZOBACTAM SODIUM 3.38 G: 3; .375 INJECTION, SOLUTION INTRAVENOUS at 10:36

## 2023-11-01 RX ADMIN — SODIUM CHLORIDE 89.5 MG: 9 INJECTION, SOLUTION INTRAVENOUS at 14:15

## 2023-11-01 RX ADMIN — ACYCLOVIR 400 MG: 400 TABLET ORAL at 20:18

## 2023-11-01 RX ADMIN — PIPERACILLIN SODIUM AND TAZOBACTAM SODIUM 3.38 G: 3; .375 INJECTION, SOLUTION INTRAVENOUS at 22:37

## 2023-11-01 RX ADMIN — SODIUM CHLORIDE 150 ML/HR: 9 INJECTION, SOLUTION INTRAVENOUS at 11:42

## 2023-11-01 RX ADMIN — SODIUM CHLORIDE 150 ML/HR: 9 INJECTION, SOLUTION INTRAVENOUS at 22:39

## 2023-11-01 RX ADMIN — ALLOPURINOL 300 MG: 100 TABLET ORAL at 09:10

## 2023-11-01 RX ADMIN — PROCHLORPERAZINE EDISYLATE 10 MG: 5 INJECTION INTRAMUSCULAR; INTRAVENOUS at 13:33

## 2023-11-01 RX ADMIN — VANCOMYCIN HYDROCHLORIDE 1500 MG: 5 INJECTION, POWDER, LYOPHILIZED, FOR SOLUTION INTRAVENOUS at 17:19

## 2023-11-01 RX ADMIN — SENNOSIDES AND DOCUSATE SODIUM 1 TABLET: 50; 8.6 TABLET ORAL at 11:41

## 2023-11-01 RX ADMIN — PANTOPRAZOLE SODIUM 40 MG: 40 TABLET, DELAYED RELEASE ORAL at 05:39

## 2023-11-01 RX ADMIN — PIPERACILLIN SODIUM AND TAZOBACTAM SODIUM 3.38 G: 3; .375 INJECTION, SOLUTION INTRAVENOUS at 05:01

## 2023-11-01 RX ADMIN — VINCRISTINE SULFATE 2 MG: 1 INJECTION, SOLUTION INTRAVENOUS at 14:13

## 2023-11-01 RX ADMIN — PIPERACILLIN SODIUM AND TAZOBACTAM SODIUM 3.38 G: 3; .375 INJECTION, SOLUTION INTRAVENOUS at 19:05

## 2023-11-01 RX ADMIN — CYCLOPHOSPHAMIDE 537 MG: 1 INJECTION, POWDER, FOR SOLUTION INTRAVENOUS; ORAL at 01:58

## 2023-11-01 ASSESSMENT — COGNITIVE AND FUNCTIONAL STATUS - GENERAL
HELP NEEDED FOR BATHING: A LITTLE
CLIMB 3 TO 5 STEPS WITH RAILING: A LITTLE
STANDING UP FROM CHAIR USING ARMS: A LITTLE
DAILY ACTIVITIY SCORE: 23
HELP NEEDED FOR BATHING: A LITTLE
MOVING TO AND FROM BED TO CHAIR: A LITTLE
WALKING IN HOSPITAL ROOM: A LITTLE
DAILY ACTIVITIY SCORE: 23
MOBILITY SCORE: 20

## 2023-11-01 ASSESSMENT — PAIN SCALES - GENERAL
PAINLEVEL_OUTOF10: 0 - NO PAIN
PAINLEVEL_OUTOF10: 0 - NO PAIN

## 2023-11-01 ASSESSMENT — PAIN - FUNCTIONAL ASSESSMENT
PAIN_FUNCTIONAL_ASSESSMENT: 0-10
PAIN_FUNCTIONAL_ASSESSMENT: 0-10

## 2023-11-01 NOTE — PROGRESS NOTES
Occupational Therapy    Evaluation    Patient Name: Shilo Thakkar  MRN: [id number]  Today's Date: 11/1/2023  Time Calculation  Start Time: 1002  Stop Time: 1012  Time Calculation (min): 10 min        Assessment:  OT Assessment: Pt high functioning with ADLs and functional mobility, modified IADLs as needed. No balance or strength deficits impacting independence. Demo good insight. No further OT needs identified; pt in agreeance.  Barriers to Discharge: None  Evaluation/Treatment Tolerance: Patient tolerated treatment well  Medical Staff Made Aware: Yes  End of Session Communication: Bedside nurse  End of Session Patient Position: Bed, 2 rail up, Alarm off, not on at start of session  OT Assessment Results: Decreased endurance  Barriers to Discharge: None  Evaluation/Treatment Tolerance: Patient tolerated treatment well  Medical Staff Made Aware: Yes  Strengths: Ability to acquire knowledge, Capable of completing ADLs semi/independent, Support of Caregivers  Plan:  No Skilled OT: Independent with ADLs  OT Discharge Recommendations: No further acute OT  OT Recommended Transfer Status:  (Supervision (line management/hemodynamics))  OT - OK to Discharge: Yes       Subjective   Current Problem:  1. Generalized weakness  Onco-Echo Complete (Strain & 3D)    Onco-Echo Complete (Strain & 3D)      2. 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)  Vascular US lower extremity venous duplex bilateral    Vascular US lower extremity venous duplex bilateral    prochlorperazine (Compazine) injection 10 mg    ondansetron (Zofran) 16 mg in dextrose 5 % in water (D5W) 50 mL IVPB    dexAMETHasone (Decadron) tablet 40 mg    dexAMETHasone (Decadron) tablet 40 mg    mesna (Mesnex) 1,070 mg in sodium chloride 0.9% 500 mL infusion    cyclophosphamide (Cytoxan) 537 mg in sodium chloride 0.9% 126.85 mL IV    sodium chloride 0.9 % bolus 500 mL    dextrose 5 % in water (D5W) bolus    diphenhydrAMINE (BENADryl) injection 50 mg     methylPREDNISolone sod succinate (PF) (SOLU-Medrol) 40 mg/mL injection 40 mg    famotidine PF (Pepcid) injection 20 mg    EPINEPHrine (Adrenalin) 1 mg/10mL injection 0.3 mg    albuterol 2.5 mg /3 mL (0.083 %) nebulizer solution 3 mL    ondansetron (Zofran) 16 mg in dextrose 5 % in water (D5W) 50 mL IVPB    mesna (Mesnex) 1,070 mg in sodium chloride 0.9% 500 mL infusion    cyclophosphamide (Cytoxan) 537 mg in sodium chloride 0.9% 126.85 mL IV    methotrexate 12 mg in sodium chloride (PF) 0.9% 3 mL intrathecal injection    ondansetron (Zofran) 16 mg in dextrose 5 % in water (D5W) 50 mL IVPB    mesna (Mesnex) 1,070 mg in sodium chloride 0.9% 500 mL infusion    cyclophosphamide (Cytoxan) 537 mg in sodium chloride 0.9% 126.85 mL IV    vinCRIStine (Oncovin) 2 mg in sodium chloride 0.9% 52 mL IV    ondansetron (Zofran) 16 mg IVPB    Treatment Conditions    Nursing Communication - Cyclophosphamide    Nursing Communication - Extravasation Management, Irritant    Nursing Communication - Hypersensitivity Management, Moderate    Nursing Communication - Hypersensitivity Management, Severe    Nursing Communication - Respiratory Management    Pulse oximetry, continuous    Treatment Conditions    Nursing Communication - Cyclophosphamide    Nursing Communication - Extravasation Management, Irritant    Nursing Communication - Hypersensitivity Management, Moderate    Nursing Communication - Hypersensitivity Management, Severe    Nursing Communication - Respiratory Management    Pulse oximetry, continuous    DOXOrubicin (Adriamycin) 44 mg in sodium chloride 0.9% 1,022 mL IV    Nursing Communication - Cyclophosphamide    Nursing Communication - Extravasation Management, Irritant    Nursing Communication - Cyclophosphamide    Nursing Communication - Extravasation Management, Irritant    Nursing Communication - Cyclophosphamide    Nursing Communication - Extravasation Management, Irritant    Nursing Communication - Cyclophosphamide     Nursing Communication - Extravasation Management, Irritant    Nursing Communication - DOXOrubicin    Nursing Communication - Extravasation Management, Anthracyclines    Nursing Communication - VinCRIStine    Nursing Communication - Extravasation Management, Vinca Alkaloids    Nursing Communication - DOXOrubicin    Nursing Communication - Extravasation Management, Anthracyclines    Nursing Communication - VinCRIStine    Nursing Communication - Extravasation Management, Vinca Alkaloids    DISCONTINUED: sodium chloride 0.9% infusion    DISCONTINUED: prochlorperazine (Compazine) tablet 10 mg      3. Acute hypoxic respiratory failure (CMS/HCC)  Onco-Echo Limited (Strain and 3D)    Onco-Echo Limited (Strain and 3D)    Transthoracic Echo (TTE) Limited    Transthoracic Echo (TTE) Limited      4. Hypoxemia  Onco-Echo Limited (Strain and 3D)    Thoracentesis    Thoracentesis      5. Localized edema  Vascular US lower extremity venous duplex bilateral        General:  General  Reason for Referral: worsening abdominal pain, dysphagia, and 20lb weight loss. pancreatic mass, mesenteric LAD, left oropharyngeal mass, and cervical LAD. s/p chest tube  Past Medical History Relevant to Rehab: no significant PMH  Family/Caregiver Present: Yes  Caregiver Feedback: girlfriend  Prior to Session Communication: Bedside nurse  Patient Position Received: Bed, 2 rail up  General Comment: Pt supine in bed upon arrival. Pleasant and cooperative throughout. Verb/demo good insight and understanding.  Precautions:  Medical Precautions:  (protective precautions)    Pain:  Pain Assessment  Pain Assessment: 0-10  Pain Score: 0 - No pain    Objective   Cognition:  Overall Cognitive Status: Within Functional Limits  Orientation Level: Oriented X4 (w/ cues for time)  Attention: Within Functional Limits     Confusion Assessment Method (CAM)  Acute Onset and Fluctuating Course (1A): No  Cook Agitation Sedation Scale  Cook Agitation Sedation Scale  (RASS): Alert and calm  Home Living:  Type of Home: House  Lives With:  (mother, step father)  Home Adaptive Equipment: None  Home Layout: Two level, Bed/bath upstairs  Home Access: Stairs to enter with rails  Entrance Stairs-Number of Steps: 4  Bathroom Shower/Tub: Walk-in shower  Bathroom Toilet: Standard  Prior Function:  Level of [place]: Independent with ADLs and functional transfers, Independent with homemaking with ambulation  Vocational: Full time employment (asphalt)  Hand Dominance: Right  Prior Function Comments: (+) drive  IADL History:     ADL:  Eating Assistance: Independent  Grooming Assistance: Independent  UE Dressing Assistance: Independent  LE Dressing Assistance: Independent  ADL Comments: Edu on task grading strategies and modified techniques as needed for ADLs for pain management, activity pacing and maximal safety/independence.  Activity Tolerance:     Bed Mobility/Transfers: Bed Mobility  Bed Mobility: Yes  Bed Mobility 1  Bed Mobility 1: Supine to sitting, Long sit  Level of Assistance 1: Independent   and Transfer 1  Technique 1: Sit to stand  Transfer Level of Assistance 1: Independent  Trials/Comments 1: Ind functional mobility   Modalities:     Vision:Vision - Basic Assessment  Current Vision: No visual deficits  Sensation:  Light Touch: No apparent deficits  Strength:  Strength Comments: KARINA UE/LE WFL  Perception:  Inattention/Neglect: Appears intact  Coordination:      Hand Function:  Gross Grasp: Functional  Extremities: RUE   RUE : Within Functional Limits and LUE   LUE: Within Functional Limits      Outcome Measures:[facility] Daily Activity  Putting on and taking off regular lower body clothing: None  Bathing (including washing, rinsing, drying): A little  Putting on and taking off regular upper body clothing: None  Toileting, which includes using toilet, bedpan or urinal: None  Taking care of personal grooming such as brushing teeth: None  Eating Meals: None  Daily Activity -  Total Score: 23        Education Documentation  No documentation found.  Education Comments  No comments found.

## 2023-11-01 NOTE — CONSULTS
Nutrition Note:     Nutrition re-consult received for nutrition assessment/recommendations. Discussed with team. Full assessment originally completed on 10/28 + oral nutrition supplements added (see note from that date for further information). Team denied any new needs at this time. RDN to reassess/follow-up with pt again per policy.

## 2023-11-01 NOTE — NURSING NOTE
Dose # 6 of 6 Cyclosphosphamide 537 mg in 126.9 mL given over 3 hrs via (R) PICC catheter.  Dose up at 0201 and down at 0501.  Pre-medicated with zofran.  Patient, dose and rate verified with second RNPenny  + BBR obtained via syringe aspiration before and after administration.  Patient with no side effects.

## 2023-11-01 NOTE — PROGRESS NOTES
Shilo Thakkar is a 23 y.o. male on day 6 of admission presenting with Burkitt lymphoma (CMS/HCC).    Subjective   No complaints. Denies any fevers, chills, night sweats, SOB, CP, N/V, HA, cough, urinary symptoms, bleeding, bruising.     Objective   Physical Exam  Constitutional:       General: He is not in acute distress.     Comments: Cachectic    HENT:      Head: Normocephalic and atraumatic.      Nose: Nose normal.      Mouth/Throat:      Mouth: Mucous membranes are moist.      Comments: Ulcerated oropharyngeal mass significantly improved from yesterday    Eyes:      Extraocular Movements: Extraocular movements intact.      Pupils: Pupils are equal, round, and reactive to light.   Neck:      Comments: L cervical LAD significantly improved  Cardiovascular:      Rate and Rhythm: Normal rate and regular rhythm.   Pulmonary:      Effort: Pulmonary effort is normal.      Comments: Left chest tube in place  Abdominal:      General: Bowel sounds are normal.      Palpations: Abdomen is soft. There is no mass.      Comments: Several abdominal masses. Abdomen less tense compared to yesterday   Musculoskeletal:         General: Swelling present. Normal range of motion.      Cervical back: Normal range of motion and neck supple.      Comments: L back swelling, small pinpoint lesion with scab   Lymphadenopathy:      Cervical: Cervical adenopathy present.   Skin:     General: Skin is warm and dry.   Neurological:      General: No focal deficit present.      Mental Status: He is alert and oriented to person, place, and time.      Cranial Nerves: No cranial nerve deficit.   Psychiatric:         Mood and Affect: Mood normal.         Behavior: Behavior normal.        Assessment/Plan   Principal Problem:    Burkitt lymphoma (CMS/HCC)  Active Problems:    Generalized weakness    Acute hypoxic respiratory failure (CMS/HCC)    Neck mass    Shilo Thakkar is a 23 y.o. male with no significant medical history presenting to the ED as a  referral from outpatient clinic with dysphagia and abdominal pain for the past 3 weeks. Patient initially presented to [facility] ED on 10/19 with 2 weeks of worsening abdominal pain, dysphagia, and 20lb weight loss. Imaging revealed a pancreatic mass, mesenteric LAD, left oropharyngeal mass, and cervical LAD. He had ultrasound guided mesenteric LN biopsy 10/20 and then patient was transferred to [facility] for further work-up. He had biopsy of the oropharyngeal mass and cervical lymph node by ENT 10/23/23. He was discharged home and presented to outpatient clinic to establish care on 10/26. Given biopsy is concerning for lymphoma, patient was referred to the ED for further workup and management, now admitted to [facility] for further workup and management of new Burkitt Lymphoma.      ONC:  #Burkitt lymphoma, newly diagnosed  - 10/19/23 presented to [facility] ED with dysphagia and abdominal pain  - CT A/P with IV contrast (10/19) mass in the body of the pancreas measuring approximately 2.7 x 2.9 cm. The pancreatic duct is dilated. Multiple masses in the mesentery as well as omental caking consistent with carcinomatosis. Moderate amount of free fluid throughout the abdomen and pelvis.   - CT soft tissue neck with IV contrast (10/19): homogeneous soft tissue attenuation lesion in the L oropharynx which measures approximately 3.0 x 4.0 x 5.1 cm.   - CT Abdomen w/o IV contrast (10/20): Mesenteric lymphadenopathy.   - CT chest with IV contrast (10/20): Moderate bilateral pleural effusions.   - US guided mesenteric LN biopsy (10/20/23): HIGH GRADE B-CELL LYMPHOMA MORPHOLOGICALLY CONSISTENT WITH BURKITT LYMPHOMA; flow cytometry: CD10+ kappa restricted B cells supportive of a B cell lymphoma with germinal center origin.   - CT head w/o IV contrast (10/22): No acute intracranial hemorrhage or mass effect.   - Biopsy L oropharyngeal mass and L cervical LN by ENT (10/23/23) high grade B-cell burkitt lymphoma  - MR brain with and w/o IV contrast  (10/27): No evidence of intracranial metastatic disease.     - Dexa 40 mg daily ended 11/1  - PET CT ordered 10/30 showed FDG avid left oropharyngeal mass and LAD above and below diaphragm  - CT abdo/pelvis showed no bowel involvement, b/l renal infarcts, left sided small pneumothorax,   - Started HyperCVAD 10/29  - Plan for Bone marrow bx today  - Plan for D4C1 with doxorubicin + vincristine today 11/1  - needs LP + IT cytarabine 11/02    HEME:  #anemia, likely 2/2 disease  -Transfuse to keep Hgb>7, Plt>10  -DVT ppx: SCDs     ID:  Allergies: NKDA  Ppx: acyclovir; Start bactrim MWF at discharge  -Plan Levaquin for neutropenia  -Zosyn (10/30 -->), consider 7 day course  -BCX x2 (10/21): NGTD, pleural cx gm +ve cocci in clusters  -Vancomycin 11/1-->  -ID consulted  - lactate (10/27) 1.6  # viral workup  - Covid neg (10/26)  -Hepatitis serology negative (10/26/23)  -HIV 1/2 nonreactive (10/26)  - EBV neg (10/27)     FEN/GI  Admit weight: 62.6kg  Ppx: Protonix started on admission  Replete electrolytes as needed  #TLS monitoring  -Allopurinol 300mg BID  -G6PD normal  - ml/hr  -BID TLS labs ordered  -Daily LDH and uric acid  #Dysphagia, improved with dexamethasone   - Low pathogen diet  #Weight loss  -Secondary to dysphagia  -Nutrition consulted on admit  #Constipation, improved  -Bowel regimen with scheduled Colace and Miralax      CARDS/PULM  - Onco echo (10/27): LVSF normal with a 60-65% estimated EF. RVSP WNL. No prior echo available for comparison.  #Acute hypoxic respiratory failure  #b/l moderate to large pleural effusion s/p thoracocentesis and chest tube placement 10/29  #Sinus bradycardia  - (10/28) worsening O2 demand, 90% on 2L -> 92% on 5L -->Transferred to MICU -->10/31 saturating 100% on 8 NC -->transferred to S3, trying to wean oxygen  - CXR (10/28) showing Bilateral Pleural Effusions s/p thora and rt chest tube placement  - CTA chest (10/29) No evidence of acute pulmonary embolism. Persistent  moderate-to-large sized bilateral pleural effusions  - Pleural fluid cytology pending, pleural fluid cx NGTD   - Order daily CXR while has chest tube  - Pulm on board       MISC:  #anxiety, agitation   - lorazepam 0.5mg prn q8hr  #Pain management  -Oxy PRN q6hr  #Fertility preservation  - onco-fertility consulted and following  - Syphilis nonreactive, gonorrhea neg, chlamydia neg  - per onco-fertility decided not to pursue sperm banking (10/28)    DISPO:  Full code  Primary Onc: Dr. Mccall  Access: DL PICC solo (10/28)  DVT ppx: Lovenox  NOK: Deidra Pickett (Mother) - [phone]     Patient seen, examined and discussed with Dr. Latoya Eduardo MD

## 2023-11-01 NOTE — PROCEDURES
Bone marrow aspirate and biopsy:    The procedure was explained & potential complications reviewed with the patient including the riskd for bleeding, infection, & discomfort at the bone marrow biopsy site. The patient was given time for questions regarding the procedure. The patient agreeded to proced & electronic signed consent was obtained.  The patient's most recent history & physical exam were reviewed & relevant findings were noted.  The patient's allergy history was reviewed.  Next, a pre-procedure time out was performed to properly identify the patient & the procedure to be performed.  The patient was placed in the prone position & the left posterior iliac crest bone marrow biopsy site was identified & marked.  Next, the skin at the marked bone marrow biopsy site was cleansed with Chlorhexidine solution & sterile drapes were placed.  The skin, subcutaneous tissue, & periosteum below the marked bone marrow biopsy site were anesthetized using 10 ml of 1% Lidocaine solution.  Next, a Jamshidi needle was inserted at the marked biopsy site & slowly advanced into the posterior iliac crest.  Marrow aspirate & core biopsy were obtained & sent to Pathology for review & testing.  The needle was removed & sterile dressing was applied to the biopsy site.  The paatient tolerated the procedure well without incident.  Prior to discharge, the biopsy site was inspected & the patient was given written post procedure care instructions.

## 2023-11-01 NOTE — PROGRESS NOTES
"Shilo Thakkar is a 23 y.o. male on day 6 of admission presenting with Burkitt lymphoma (CMS/HCC).    Subjective   On room air   Denies dyspnea, pleuritic pain or cough        Objective     Physical Exam  IN no acute distress  Decrease air entry bilaterally   RRR  No edema in legs   Pig tail to water seal on right side, no air leak     Last Recorded Vitals  Blood pressure 137/81, pulse (!) 48, temperature 36 °C (96.8 °F), temperature source Temporal, resp. rate 16, height 1.838 m (6' 0.36\"), weight 61.7 kg (136 lb 0.4 oz), SpO2 100 %.  Intake/Output last 3 Shifts:  I/O last 3 completed shifts:  In: 5678.8 (89.3 mL/kg) [P.O.:480; I.V.:3895 (61.2 mL/kg); IV Piggyback:1303.8]  Out: 8165 (128.4 mL/kg) [Urine:6425 (2.8 mL/kg/hr); Chest Tube:1740]  Weight: 63.6 kg     Relevant Results  CT abdomen pelvis w IV contrast    Result Date: 10/31/2023  Interpreted By:  Dung Marion,  and Dequan Chavez STUDY: CT ABDOMEN PELVIS W IV CONTRAST;  10/31/2023 4:27 pm   INDICATION: Signs/Symptoms:staging.   Per EMR: New suspected Burkitt lymphoma, staging. CT abdomen on 10/20/2023 demonstrated mesenteric lymphadenopathy. patient presented recently with abdominal pain and dysphagia for 3 weeks' duration, presented to ed on 10/19/23 with two weeks worsening abdominal pain, dysphagia and 20 pound weight loss, imaging revealed ill-defined pancreatic body mass, mesenteric LAD, left oropharyngeal mass and cervical LAD. Left tonsillar biopsy revealed high-grade B-cell lymphoma with morphology and immuno phenotypic features consistent with Burkitt's lymphoma.. Patient received cyclophosphamide and mesna 10/29 + decadron since 10/27.   COMPARISON: CT abdomen 10/20/2023. CT chest 10/29/2023.   ACCESSION NUMBER(S): [id number]   ORDERING CLINICIAN: BREEZY STROUD   TECHNIQUE: CT of the abdomen and pelvis was performed.  Standard contiguous axial images were obtained at 3 mm slice thickness through the abdomen and pelvis. Coronal and sagittal " reconstructions at 3 mm slice thickness were performed.   75 ml of contrast Omnipaque 350 were administered intravenously without immediate complication.   FINDINGS: LOWER CHEST: Interval decrease in left pleural effusion, now small in size with adjacent atelectasis. Subsegmental atelectasis in the inferior right middle lobe. Patchy ground-glass and nodular opacities within the right lung dependent regions is similar to PET-CT dated 10/30/2023. Right basilar pigtail chest tube is in place with a small partially visualized right basilar pneumothorax. The heart is normal in size without pericardial effusion.  Visualized distal esophagus appears normal   Stable nodular soft tissue lesion within the right pericardiophrenic space measuring 3 4.8 x 1.3 cm which demonstrated increased FDG uptake on PET-CT.   ABDOMEN:   LIVER: Mild hepatomegaly measuring up to 18.9 cm in craniocaudal dimension. There is mild heterogeneous enhancement of the liver parenchyma. There is mild periportal edema.   BILE DUCTS: The intrahepatic and extrahepatic ducts are not dilated.   GALLBLADDER: The gallbladder is nondistended and without evidence of radiopaque stones.   PANCREAS: Pancreas is normal in size and enhancement. There is a 1.4 x 1.2 cm focal hypodense area within the pancreatic body (series 2, image 48), decreased from 10/19/2023 where it measured 3.5 x 3.0 cm. There is no pancreatic ductal dilatation..   SPLEEN: Splenomegaly measuring up to 17.0 cm in craniocaudal dimension.   ADRENAL GLANDS: Bilateral adrenal glands appear normal.   KIDNEYS AND URETERS: Kidneys are normal in size. Interval development of multiple wedge-shaped hypodense areas scattered throughout the bilateral kidneys, new from contrast enhanced CT dated 10/19/2023. No hydronephrosis or nephrolithiasis.   PELVIS:   BLADDER: The urinary bladder appears normal without abnormal wall thickening.   REPRODUCTIVE ORGANS: No pelvic masses.   BOWEL: Positive oral contrast  opacifies the stomach multiple loops of proximal small bowel. The small and large bowel are normal in caliber and demonstrate no wall thickening.   Nonvisualization of the appendix.   VESSELS: The aorta and IVC appear normal.   PERITONEUM/RETROPERITONEUM/LYMPH NODES: Stable abdominopelvic ascites with interval decrease thickening of the peritoneal reflection most pronounced in the deep pelvis as seen on (series 2, image 127).   Interval decrease in FDG avid bulky abdominopelvic lymphadenopathy when compared to  CT abdomen dated 10/20/2023.   The low-dose PET-CT from 10/20/2023 is used as a reference for comparison. Bulky lymphadenopathy in the left hemiabdomen surrounding the pancreas previously measured 10.0 x 6.0 cm, now measures 5.9 x 3.6 cm additional foci is noted within the mesentery in the right hemiabdomen measuring 3.3 x 2.6 cm, previously measured 7.4 x 5.9 cm. Interval decrease in nodular thickening of the pelvic peritoneal reflections along the pelvic sidewall. A discrete pelvic deposit measures 1.9 x 1.4 cm, previously measured 4.1 x 1.8 cm. No new abdominopelvic lymphadenopathy.   There is diffuse mesenteric stranding.   BONES AND ABDOMINAL WALL: No suspicious osseous lesions are identified.  Interval increase in abdominal wall anasarca. Limbus vertebrae along the anterior superior L5 endplate.         1. Interval significant decrease in disease burden compared to the CT scans of 10/19/2023 and 10/20/2023 as evidenced by decrease in bulky abdominopelvic, pancreatic mass hypodense pancreatic body lesion and nodular peritoneal thickening in the pelvis. No new abdominopelvic lymphadenopathy. Stable right cardiophrenic lymphadenopathy. 2. New bilateral peripheral wedge-shaped involving bilateral kidneys. Differential diagnosis includes pyelonephritis and less likely renal infarcts. Recommend correlation with urinalysis. 3. Findings compatible with increasing fluid overload with heterogeneous enhancement of  the liver parenchyma, periportal abdominopelvic ascites and body wall edema. 4. Right lung base pigtail catheter is in place with a small right basilar pneumothorax. 5. Patchy ground-glass and nodular opacities within the right lung dependent regions, may represent atelectasis versus aspiration pneumonia. 6. Interval decrease in now partially visualized small left pleural effusion with adjacent atelectasis and right subsegmental basilar atelectasis.   The critical information above was relayed directly by Resident Scott Baires epic secure chat to Anya Hutchison MD on 10/31/2020 at 5:17 p.m.. I personally reviewed the images/study and I agree with the findings as stated. This study was interpreted at [place], [place].   Signed by: Dung Marion 10/31/2023 6:47 PM Dictation workstation:   [id number]    XR chest 1 view    Result Date: 10/31/2023  Interpreted By:  Son Stern, STUDY: XR CHEST 1 VIEW;  10/31/2023 8:26 am   INDICATION: Signs/Symptoms:chest tube on right, bilateral pleural effusions.   COMPARISON: Radiograph dated 10/30/2023   ACCESSION NUMBER(S): [id number]   ORDERING CLINICIAN: YOUNG RODRIGUEZ   FINDINGS: Right basilar pigtail chest tube is stable. Right upper extremity PICC is stable.   The cardiac silhouette size is within normal limits.   Persistent small left and trace right pleural effusion with bibasilar atelectasis. No mariano edema or sizable pneumothorax.   No acute osseous abnormality.       1. Small left and trace right pleural effusion with bibasilar atelectasis. 2. No appreciable pneumothorax. 3. Medical appliances as described above.       Signed by: Son Nunez 10/31/2023 10:09 AM Dictation workstation:   [id number]    XR chest 1 view    Result Date: 10/31/2023  Interpreted By:  Son Stern, STUDY: XR CHEST 1 VIEW;  10/30/2023 7:33 pm   INDICATION: Signs/Symptoms:L sided thoracentesis perfromed this  afternoon with 1.2 L serosanguineus fluid drained.   COMPARISON: Radiograph dated 10/30/2023   ACCESSION NUMBER(S): [id number]   ORDERING CLINICIAN: YOUNG RODRIGUEZ   FINDINGS: Right upper extremity PICC is in place with the tip projecting over lower SVC. Right basilar pigtail chest tube is stable in position.   The cardiac silhouette size is within normal limits.   Interval improvement in the aeration of the left lung status post left thoracentesis. Small residual left basilar pleural effusion. No edema or focal infiltrate. Cannot exclude residual trace right apical pneumothorax.   No acute osseous change.       1. Interval improvement in the aeration of the left lung status post left thoracentesis with residual small left basilar pleural effusion. No sizable pneumothorax on the left side. 2. Cannot exclude residual trace right apical pneumothorax. 3. Medical appliances as described above.       Signed by: Son Nunez 10/31/2023 10:09 AM Dictation workstation:   [id number]    Vascular US lower extremity venous duplex bilateral    Result Date: 10/31/2023            [place] [address]   Tel [phone] and Fax [phone]  Vascular Lab Report VASC US LOWER EXTREMITY VENOUS DUPLEX BILATERAL  Patient Name:      MARVA TRAVIS        Reading Physician:  [id number] Oleg Sal MD, RPVI Study Date:        10/31/2023          Ordering Physician: [id number] STROUD MRN/PID:           [id number]            Technologist:       Chidi Miguel RVT Accession#:        [id number]        Technologist 2: Date of Birth/Age: 2000 / 23 years Encounter#:         [id number] Gender:            M Admission Status:  Inpatient           Location Performed: [facility]  Diagnosis/ICD: Localized (leg) edema-R60.0 CPT Codes:     99013 Peripheral venous duplex scan for DVT complete  CONCLUSIONS: Right Lower Venous: No  evidence of acute deep vein thrombus visualized in the right lower extremity. Left Lower Venous: No evidence of acute deep vein thrombus visualized in the left lower extremity.  Imaging & Doppler Findings:  Right                 Compressible Thrombus        Flow Distal External Iliac                None   Spontaneous/Phasic CFV                       Yes        None   Spontaneous/Phasic PFV                       Yes        None FV Proximal               Yes        None   Spontaneous/Phasic FV Mid                    Yes        None FV Distal                 Yes        None Popliteal                 Yes        None   Spontaneous/Phasic Peroneal                  Yes        None PTV                       Yes        None  Left                  Compress Thrombus        Flow Distal External Iliac            None   Spontaneous/Phasic CFV                     Yes      None   Spontaneous/Phasic PFV                     Yes      None FV Proximal             Yes      None   Spontaneous/Phasic FV Mid                  Yes      None FV Distal               Yes      None Popliteal               Yes      None   Spontaneous/Phasic Peroneal                Yes      None PTV                     Yes      None  [id number] Oleg Sal MD, RPVI Electronically signed by [id number] Oleg Sal MD, RPVI on 10/31/2023 at 8:53:09 AM  ** Final **     Thoracentesis    Result Date: 10/30/2023  Cam Mcleod MD     10/31/2023  7:07 AM Thoracentesis Date/Time: 10/30/2023 7:03 PM Performed by: Cam Mcleod MD Authorized by: Cam Mcleod MD  Consent:   Consent obtained:  Written   Consent given by:  Patient   Risks, benefits, and alternatives were discussed: yes    Risks discussed:  Bleeding, infection and pneumothorax   Alternatives discussed:  No treatment and observation Universal protocol:   Procedure explained and questions answered to patient or proxy's satisfaction: yes    Imaging studies available: yes    Site/side marked: yes    Immediately prior  to procedure, a time out was called: yes    Patient identity confirmed:  Verbally with patient and arm band Sedation:   Sedation type:  None Anesthesia:   Anesthesia method:  Local infiltration   Local anesthetic:  Lidocaine 1% w/o epi Procedure details:   Preparation: Patient was prepped and draped in usual sterile fashion    Patient position:  Sitting   Location:  L midscapular line   Intercostal space:  6th   Puncture method:  Over-the-needle catheter   Ultrasound guidance: yes    Indwelling catheter placed: yes    Needle gauge:  18   Catheter size:  8 Fr   Number of attempts:  1   Drainage characteristics:  Serosanguinous Post-procedure details:   Chest x-ray performed: no    Procedure completion:  Tolerated Comments:    CXR pending at this time. 1050 ml of fluids were removed.       Culture from RT pig tail growing clusters G+      Assessment/Plan   Principal Problem:    Burkitt lymphoma (CMS/HCC)  Active Problems:    Generalized weakness    Acute hypoxic respiratory failure (CMS/HCC)    Neck mass  23 y.o. male with no PMH who was admitted with a new diagnosis of high-grade Burkitt lymphoma.  Oncology plan is to start hyperCVAD later today.  Pulmonary is consulted to evaluate for thoracentesis in the setting of bilateral pleural effusions and new hypoxic respiratory failure.  Unfortunately patient received lovenox ppx this am; given that he has no dyspnea without increased work of breathing, will plan for R-sided thoracentesis on 10/30 to reduce risk of bleeding.       #Bilateral pleural effusions, s/p RT pig tail on 10/29 in MICU 1.5 liter drained initially exudative , Left throacetesis done on 10/30  1 liter drained serosanguinous exudative too   #Acute hypoxic respiratory failure  #Burkitt lymphoma  # Small right apical pneumothorax post pig tail   #Right sided empyema ?? As culture is growing G+ clusters      Plan:  -FU  final cultures and cytology from pleural fluid   -Recommend covering G+ also with Ab    -keep Rt pig tail on water seal (drainage remains high)  -CXR daily in AM as long as pig tail remains present        Patient was seen with Dr. Antonieta Bruce MD

## 2023-11-01 NOTE — CONSULTS
Inpatient consult to Infectious Diseases  Consult performed by: Callie Spears DO  Consult ordered by: Anya POLO MD        Primary MD: Luiza Houston MD    Reason For Consult: pleural fluid Gm +ve cocci in clusters     History Of Present Illness  Shilo Thakkar is a 23 y.o. man admitted to [facility] 10/19 with 2 weeks worsening abd pain, dysphagia, weight loss, found to have new pancreatic mass, mesenteric and cervical LAD, L oropharyngeal mass; s/p biopsy 10/20 and transferred to . Discharged home w/ outpt follow up with Onc, and referred back to ED w/ biopsy results concerning for Burkitt lymphoma and to start chemo. On admission pt afebrile, HDS, on room air. WBC 10k, neutrophils wnl. Hepatitis and HIV testing neg. Started steroids 10/27 w/ dex. Initially tx w/ hyper CVAD w/ rituximab 10/29. Pt had CT chest on 10/20 with incidentally found pleural effusions. This admission was initially asymptomatic, he denies cough or SOB prior to admission. On 10/28 had incr O2 requirements up to 8L. IVF discontinued, started on diuresis. CTA 10/29 w/o PE, persistent mod to large b/l pleural effusions. Transferred to ICU on 10/29. Had RIGHT thoracentesis on 10/29 w/ pigtail catheter placement and L thoracentesis on 10/30, ~1L removed, R pigtail catheter w/ 1.5L removed.  Started on pip-tazo on 10/30. Pleural fluid from RIGHT side 10/29  pH 7.29, LDH 6237, prot 3.2, turbid fluid w/ 31k WBCs. Gram stain from liquid media growing GPCs in clusters. BMBx today 11/1.     Speaking w/ the pt today he is on room air, denies cough, SOB. No pain at pleural catheter site. Tolerating abx and chemo thus far.      Past Medical History  He has a past medical history of Colles' fracture of right radius, initial encounter for closed fracture (04/23/2015), Personal history of other mental and behavioral disorders (01/28/2014), Personal history of other mental and behavioral disorders (05/10/2017), Personal history of other specified  conditions (05/03/2016), and Unspecified fracture of navicular (scaphoid) bone of left wrist, initial encounter for closed fracture (12/17/2015).    Surgical History  He has a past surgical history that includes Other surgical history (10/03/2017); US guided soft tissue biopsy (10/20/2023); Foot fracture surgery (Left); and Chest Tube Insertion (10/29/2023).     Social History     Occupational History    Not on file   Tobacco Use    Smoking status: Never     Passive exposure: Never    Smokeless tobacco: Never   Vaping Use    Vaping Use: Not on file   Substance and Sexual Activity    Alcohol use: Not Currently     Comment: rarely    Drug use: Never    Sexual activity: Defer     Travel History   Travel since 10/01/23    No documented travel since 10/01/23            Pets: unknown  Hobbies: unknown    Family History  Family History   Problem Relation Name Age of Onset    ADD / ADHD Mother      ADD / ADHD Sister      ADD / ADHD Brother       Allergies  Patient has no known allergies.     Immunization History   Administered Date(s) Administered    DTP 1999, 1999, 02/10/2000, 04/03/2001, 09/14/2004    HPV, Quadrivalent 04/04/2012, 04/25/2013, 04/29/2014    Hep A, Unspecified 04/04/2012, 04/25/2013    Hepatitis B vaccine, adult (RECOMBIVAX, ENGERIX) 1999, 1999, 05/04/2000    HiB PRP-OMP conjugate vaccine, pediatric (PEDVAXHIB) 1999, 1999, 02/10/2000, 04/03/2001    MMR vaccine, subcutaneous (MMR II) 04/03/2001, 09/14/2004    Meningococcal B vaccine (BEXSERO) 05/10/2017    Meningococcal MCV4P 03/10/2011, 05/03/2016    Novel influenza-H1N1-09, preservative-free 11/05/2009    Pneumococcal polysaccharide vaccine, 23-valent, age 2 years and older (PNEUMOVAX 23) 08/29/2000, 04/03/2001    Poliovirus vaccine, subcutaneous (IPOL) 1999, 1999, 04/03/2001, 09/14/2004    Tdap vaccine, age 7 year and older (BOOSTRIX) 03/10/2011, 09/11/2021    Varicella vaccine, subcutaneous (VARIVAX)  08/29/2000, 03/10/2011     Medications  Home medications:  Facility-Administered Medications Prior to Admission   Medication Dose Route Frequency Provider Last Rate Last Admin    naloxone (Narcan) injection 0.4 mg  0.4 mg intravenous Once Luma Trent MD        ondansetron (Zofran) tablet 4 mg  4 mg oral q8h PRN Luma Trent MD         Medications Prior to Admission   Medication Sig Dispense Refill Last Dose    acetaminophen (Tylenol) 325 mg tablet Take 2 tablets (650 mg) by mouth every 6 hours if needed for mild pain (1 - 3). 30 tablet 0     ibuprofen 400 mg tablet Take 1 tablet (400 mg) by mouth every 6 hours if needed.       magic mouthwash (lidocaine, diphenhydrAMINE, Maalox 1:1:1) Swish and spit 10 mL every 6 hours if needed for mucositis. 900 mL 3     naloxone (Narcan) 4 mg/0.1 mL nasal spray Administer 1 spray (4 mg) into affected nostril(s) if needed for opioid reversal. May repeat every 2-3 minutes if needed, alternating nostrils, until medical assistance becomes available. 2 each 0     OLANZapine (ZyPREXA) 5 mg tablet Take 1 tablet (5 mg) by mouth once daily at bedtime. 30 tablet 2     oxyCODONE (Roxicodone) 10 mg immediate release tablet Take 1 tablet (10 mg) by mouth every 6 hours if needed for severe pain (7 - 10). 120 tablet 0     oxyCODONE (Roxicodone) 5 mg immediate release tablet Take 1 tablet (5 mg) by mouth every 6 hours if needed for moderate pain (4 - 6). 15 tablet 0     polyethylene glycol (Glycolax, Miralax) packet Take 17 g by mouth 2 times a day as needed (constipation). 60 packet 0     sennosides-docusate sodium (Ashley-Colace) 8.6-50 mg tablet Take 2 tablets by mouth 2 times a day as needed for constipation. 60 tablet 0      Current medications:  Scheduled medications  acyclovir, 400 mg, oral, BID  allopurinol, 300 mg, oral, BID  DOXOrubicin, 50 mg/m2 (Treatment Plan Recorded), intravenous, Once  [Held by provider] enoxaparin, 40 mg, subcutaneous, Daily  iohexol, 500 mL, oral, Once in  imaging  mesna, 600 mg/m2 (Treatment Plan Recorded), intravenous, Once  methotrexate (PF), 12 mg, intrathecal, Once  pantoprazole, 40 mg, oral, Daily before breakfast  piperacillin-tazobactam, 3.375 g, intravenous, q6h  prochlorperazine, 10 mg, intravenous, Once  prochlorperazine, 10 mg, intravenous, Once  vinCRIStine, 2 mg, intravenous, Once      Continuous medications  sodium chloride 0.9%, 150 mL/hr, Last Rate: 150 mL/hr (11/01/23 1142)      PRN medications  PRN medications: albuterol, alteplase, dextrose, diphenhydrAMINE, docusate sodium, EPINEPHrine, famotidine, LORazepam, magic mouthwash (lidocaine, diphenhydrAMINE, Maalox 1:1:1), methylPREDNISolone sodium succinate (PF), naloxone, oxyCODONE, oxyCODONE, oxygen, polyethylene glycol, prochlorperazine, sodium chloride, white petrolatum    Review of Systems - per HPI     Objective  Range of Vitals (last 24 hours)  Heart Rate:  [44-58]   Temp:  [36 °C (96.8 °F)-36.5 °C (97.7 °F)]   Resp:  [16-18]   BP: (125-143)/(68-89)   Weight:  [61.7 kg (136 lb 0.4 oz)]   SpO2:  [98 %-100 %]   Daily Weight  11/01/23 : 61.7 kg (136 lb 0.4 oz)    Body mass index is 18.26 kg/m².     Physical Exam  GEN: comfortable appearing, thin young man, laying in bed, NAD  HEENT: MMM  RESP: CTA anteriorly, no wheezes or rhonchi, no tachypnea or cyanosis  CV: RRR, S1/S2, no appreciable murmurs, 2+ R radial pulse; R chest pigtail catheter w/ serous fluid in container   ABDOMEN: soft, nontender, BS+, nondistended  MSK: no bony deformities, no joint warmth or swelling  EXTREMITIES: warm and well-perfused, no peripheral edema; RUE PICC  SKIN: no gross skin lesions  NEURO: alert, answers questions appropriately, moving b/l UE and LE to command, no gross focal deficits  PSYCH: calm and cooperative       Relevant Results  Outside Hospital Results  No  Labs  Results from last 72 hours   Lab Units 11/01/23  0154 10/31/23  0606 10/30/23  1854   WBC AUTO x10*3/uL 8.4 11.1 13.2*   HEMOGLOBIN g/dL 8.9* 9.2*  "9.8*   HEMATOCRIT % 27.3* 26.3* 28.3*   PLATELETS AUTO x10*3/uL 293 329 369   NEUTROS PCT AUTO % 92.9 92.2 95.3   LYMPHS PCT AUTO % 3.1 2.7 2.5   MONOS PCT AUTO % 3.3 4.5 1.7   EOS PCT AUTO % 0.0 0.0 0.0     Results from last 72 hours   Lab Units 11/01/23  0154 10/31/23  1251 10/31/23  0606   SODIUM mmol/L 137 138 139   POTASSIUM mmol/L 4.8 4.4 4.3   CHLORIDE mmol/L 106 105 105   CO2 mmol/L 25 24 24   BUN mg/dL 33* 37* 44*   CREATININE mg/dL 0.98 1.00 1.11   GLUCOSE mg/dL 142* 117* 159*   CALCIUM mg/dL 8.4* 8.1* 8.1*   ANION GAP mmol/L 11 13 14   EGFR mL/min/1.73m*2 >90 >90 >90   PHOSPHORUS mg/dL 5.0* 5.4* 6.5*     Results from last 72 hours   Lab Units 11/01/23  0154 10/31/23  1251 10/31/23  0606 10/30/23  1854 10/30/23  0818 10/30/23  0537   ALK PHOS U/L  --   --  54 49 50 52   BILIRUBIN TOTAL mg/dL  --   --  0.3 0.4 0.3 0.3   BILIRUBIN DIRECT mg/dL  --   --  0.1 0.1  --  0.1   PROTEIN TOTAL g/dL  --   --  4.9* 5.0* 5.4* 5.1*   ALT U/L  --   --  43 44 52 50   AST U/L  --   --  43* 47* 57* 57*   ALBUMIN g/dL 3.1* 3.2* 3.2* 3.4 3.5 3.3*     Estimated Creatinine Clearance: 101.4 mL/min (by C-G formula based on SCr of 0.98 mg/dL).  No results found for: \"CRP\", \"SEDRATE\"  HIV 1/2 Antigen/Antibody Screen with Reflex to Confirmation   Date Value Ref Range Status   10/26/2023 Nonreactive Nonreactive Final     Hepatitis C AB   Date Value Ref Range Status   10/26/2023 Nonreactive Nonreactive Final     Comment:     Results from patients taking biotin supplements or receiving high-dose biotin therapy should be interpreted with caution due to possible interference with this test. Providers may contact their local laboratory for further information.     Microbiology    10/21 Blood cx neg  10/29 3+ PMNs  10/30 Blood cx neg  10/30 No org - gram stain from liquid media w/ GPCs in clusters         Imaging    CT abd pelvis 10/31    Impression   1. Interval significant decrease in disease burden compared to the CT  scans of " 10/19/2023 and 10/20/2023 as evidenced by decrease in bulky  abdominopelvic, pancreatic mass hypodense pancreatic body lesion and  nodular peritoneal thickening in the pelvis. No new abdominopelvic  lymphadenopathy. Stable right cardiophrenic lymphadenopathy.  2. New bilateral peripheral wedge-shaped involving bilateral kidneys.  Differential diagnosis includes pyelonephritis and less likely renal  infarcts. Recommend correlation with urinalysis.  3. Findings compatible with increasing fluid overload with  heterogeneous enhancement of the liver parenchyma, periportal  abdominopelvic ascites and body wall edema.  4. Right lung base pigtail catheter is in place with a small right  basilar pneumothorax.  5. Patchy ground-glass and nodular opacities within the right lung  dependent regions, may represent atelectasis versus aspiration  pneumonia.  6. Interval decrease in now partially visualized small left pleural  effusion with adjacent atelectasis and right subsegmental basilar  atelectasis.        Assessment/Plan     Shilo Thakkar is a 22 y/o admitted w/ recent diagnosis Burkitt lymphoma s/p oropharyngeal mass, pancreatic mass and LAD, s/p initiation of hyper CVAD on 10/29, had pleural effusions on CT imaging 10/20 with worsening O2 req this admission and R pigtail catheter placement and L thoracentesis, R-sided cx now w/ GPCs in clusters on gram stain.     #New dx Burkitt lymphoma  #B/l pleural effusions w/ GPCs in clusters       Recommendations:  -Cont IV pip-tazo 3.375gm q6H and IV vanc, dosed w/ pharmacy, for now while pending any growth on liquid media  -As pt did not have resp symptoms prior to admission, the GPCs on gram stain may be a contaminant and his effusions are related to his malignancy and not true empyema, but will await any culture growth    Discussed w. Dr. Ruiz, will follow       Callie Spears DO  Infectious Disease Fellow, PGY5  Epic Chat until 5pm/Team A pager [phone]

## 2023-11-01 NOTE — CARE PLAN
Problem: Safety  Goal: Patient will be injury free during hospitalization  Outcome: Progressing  Goal: I will remain free of falls  Outcome: Progressing     Problem: Daily Care  Goal: Daily care needs are met  Outcome: Progressing     Problem: Psychosocial Needs  Goal: Demonstrates ability to cope with hospitalization/illness  Outcome: Progressing  Goal: Collaborate with me, my family, and caregiver to identify my specific goals  Outcome: Progressing     Problem: Discharge Barriers  Goal: My discharge needs are met  Outcome: Progressing     Problem: Fall/Injury  Goal: Not fall by end of shift  Outcome: Progressing  Goal: Be free from injury by end of the shift  Outcome: Progressing  Goal: Verbalize understanding of personal risk factors for fall in the hospital  Outcome: Progressing  Goal: Verbalize understanding of risk factor reduction measures to prevent injury from fall in the home  Outcome: Progressing  Goal: Use assistive devices by end of the shift  Outcome: Progressing  Goal: Pace activities to prevent fatigue by end of the shift  Outcome: Progressing     Problem: Pain - Adult  Goal: Verbalizes/displays adequate comfort level or baseline comfort level  Outcome: Progressing     Problem: Safety - Adult  Goal: Free from fall injury  Outcome: Progressing     Problem: Discharge Planning  Goal: Discharge to home or other facility with appropriate resources  Outcome: Progressing     Problem: Chronic Conditions and Co-morbidities  Goal: Patient's chronic conditions and co-morbidity symptoms are monitored and maintained or improved  Outcome: Progressing     Problem: Skin  Goal: Participates in plan/prevention/treatment measures  Outcome: Progressing  Goal: Prevent/manage excess moisture  Outcome: Progressing  Goal: Promote/optimize nutrition  Outcome: Progressing       The clinical goals for the shift include remain HDS

## 2023-11-01 NOTE — PROGRESS NOTES
Vancomycin Dosing by Pharmacy- INITIAL    Shilo Thakkar is a 23 y.o. year old male who Pharmacy has been consulted for vancomycin dosing for pneumonia. Based on the patient's indication and renal status this patient will be dosed based on a goal AUC of 400-600.     Renal function is currently stable.    Visit Vitals  /89 (Patient Position: Lying)   Pulse (!) 44   Temp 36 °C (96.8 °F) (Temporal)   Resp 18        Lab Results   Component Value Date    CREATININE 0.98 11/01/2023    CREATININE 1.00 10/31/2023    CREATININE 1.11 10/31/2023    CREATININE 1.01 10/30/2023      I/O last 3 completed shifts:  In: 5678.8 (89.3 mL/kg) [P.O.:480; I.V.:3895 (61.2 mL/kg); IV Piggyback:1303.8]  Out: 8165 (128.4 mL/kg) [Urine:6425 (2.8 mL/kg/hr); Chest Tube:1740]  Weight: 63.6 kg     Lab Results   Component Value Date    PATIENTTEMP 37.0 10/29/2023    PATIENTTEMP 37.0 10/29/2023          Assessment/Plan     Patient will be given a loading dose of 1500 mg.  Will initiate vancomycin maintenance,  1000 mg every 12 hours.    This dosing regimen is predicted by [employer] to result in the following pharmacokinetic parameters:    Regimen: 1000 mg IV every 12 hours.  Start time: 15:22 on 11/01/2023  Exposure target: AUC24 (range)400-600 mg/L.hr   AUC24,ss: 498 mg/L.hr  Probability of AUC24 > 400: 73 %  Ctrough,ss: 14.7 mg/L  Probability of Ctrough,ss > 20: 26 %  Probability of nephrotoxicity (Lodise BON 2009): 10 %    Follow-up level will be ordered on 11/2 with AM labs unless clinically indicated sooner.  Will continue to monitor renal function daily while on vancomycin and order serum creatinine at least every 48 hours if not already ordered.  Follow for continued vancomycin needs, clinical response, and signs/symptoms of toxicity.       Soan Ortega [facility]

## 2023-11-01 NOTE — PROGRESS NOTES
Spiritual Care Visit     attempted to visit patient Shilo Thakkar to introduce self and Spiritual Care services. Patient and family member at bedside were resting. Spiritual Care will follow up at another time and remains available as needed/requested.    Rev. Lisa Parker, MDiv, BCC

## 2023-11-01 NOTE — CARE PLAN
The patient's goals for the shift include  remain pain free, comfortable  Problem: Safety  Goal: Patient will be injury free during hospitalization  Outcome: Progressing  Goal: I will remain free of falls  Outcome: Progressing     Problem: Daily Care  Goal: Daily care needs are met  Outcome: Progressing     Problem: Psychosocial Needs  Goal: Demonstrates ability to cope with hospitalization/illness  Outcome: Progressing  Goal: Collaborate with me, my family, and caregiver to identify my specific goals  Outcome: Progressing     Problem: Discharge Barriers  Goal: My discharge needs are met  Outcome: Progressing     Problem: Fall/Injury  Goal: Not fall by end of shift  Outcome: Progressing  Goal: Be free from injury by end of the shift  Outcome: Progressing  Goal: Verbalize understanding of personal risk factors for fall in the hospital  Outcome: Progressing  Goal: Verbalize understanding of risk factor reduction measures to prevent injury from fall in the home  Outcome: Progressing  Goal: Use assistive devices by end of the shift  Outcome: Progressing  Goal: Pace activities to prevent fatigue by end of the shift  Outcome: Progressing     Problem: Discharge Planning  Goal: Discharge to home or other facility with appropriate resources  Outcome: Progressing     Problem: Chronic Conditions and Co-morbidities  Goal: Patient's chronic conditions and co-morbidity symptoms are monitored and maintained or improved  Outcome: Progressing     Problem: Skin  Goal: Participates in plan/prevention/treatment measures  Outcome: Progressing  Goal: Prevent/manage excess moisture  Outcome: Progressing  Goal: Promote/optimize nutrition  Outcome: Progressing       The clinical goals for the shift include remain HDS    Over the shift, the patient did make progress toward the following goals.

## 2023-11-02 ENCOUNTER — APPOINTMENT (OUTPATIENT)
Dept: RADIOLOGY | Facility: HOSPITAL | Age: 23
DRG: 840 | End: 2023-11-02
Payer: COMMERCIAL

## 2023-11-02 LAB
ALBUMIN SERPL BCP-MCNC: 3 G/DL (ref 3.4–5)
AMYLASE SERPL-CCNC: 71 U/L (ref 29–103)
ANION GAP SERPL CALC-SCNC: 12 MMOL/L (ref 10–20)
APPEARANCE CSF: CLEAR
BACTERIA FLD CULT: NORMAL
BASOPHILS # BLD AUTO: 0 X10*3/UL (ref 0–0.1)
BASOPHILS NFR BLD AUTO: 0 %
BASOPHILS NFR CSF MANUAL: 0 %
BLASTS CSF MANUAL: 0 %
BUN SERPL-MCNC: 26 MG/DL (ref 6–23)
CALCIUM SERPL-MCNC: 7.9 MG/DL (ref 8.6–10.6)
CELL COUNT (BLOOD): 87.53 X10*3/UL
CELL POPULATIONS: NORMAL
CHLORIDE SERPL-SCNC: 105 MMOL/L (ref 98–107)
CO2 SERPL-SCNC: 26 MMOL/L (ref 21–32)
COLOR CSF: COLORLESS
COLOR SPUN CSF: COLORLESS
CREAT SERPL-MCNC: 0.81 MG/DL (ref 0.5–1.3)
DIAGNOSIS: NORMAL
EOSINOPHIL # BLD AUTO: 0 X10*3/UL (ref 0–0.7)
EOSINOPHIL NFR BLD AUTO: 0 %
EOSINOPHIL NFR CSF MANUAL: 0 %
ERYTHROCYTE [DISTWIDTH] IN BLOOD BY AUTOMATED COUNT: 13.7 % (ref 11.5–14.5)
FLOW DIFFERENTIAL: NORMAL
FLOW TEST ORDERED: NORMAL
GFR SERPL CREATININE-BSD FRML MDRD: >90 ML/MIN/1.73M*2
GLUCOSE CSF-MCNC: 78 MG/DL (ref 40–70)
GLUCOSE SERPL-MCNC: 115 MG/DL (ref 74–99)
GRAM STN SPEC: NORMAL
GRAM STN SPEC: NORMAL
HCT VFR BLD AUTO: 27.7 % (ref 41–52)
HGB BLD-MCNC: 8.9 G/DL (ref 13.5–17.5)
IMM GRANULOCYTES # BLD AUTO: 0.11 X10*3/UL (ref 0–0.7)
IMM GRANULOCYTES NFR BLD AUTO: 1.2 % (ref 0–0.9)
IMM GRANULOCYTES NFR CSF: 0 %
LAB TEST METHOD: NORMAL
LABORATORY COMMENT REPORT: NORMAL
LABORATORY COMMENT REPORT: NORMAL
LACTATE SERPL-SCNC: 1.2 MMOL/L (ref 0.4–2)
LDH SERPL L TO P-CCNC: 800 U/L (ref 84–246)
LIPASE SERPL-CCNC: 73 U/L (ref 9–82)
LYMPHOCYTES # BLD AUTO: 0.27 X10*3/UL (ref 1.2–4.8)
LYMPHOCYTES NFR BLD AUTO: 2.9 %
LYMPHOCYTES NFR CSF MANUAL: 20 % (ref 28–96)
MCH RBC QN AUTO: 28.6 PG (ref 26–34)
MCHC RBC AUTO-ENTMCNC: 32.1 G/DL (ref 32–36)
MCV RBC AUTO: 89 FL (ref 80–100)
MONOCYTES # BLD AUTO: 0.43 X10*3/UL (ref 0.1–1)
MONOCYTES NFR BLD AUTO: 4.6 %
MONOS+MACROS NFR CSF MANUAL: 80 % (ref 16–56)
NEUTROPHILS # BLD AUTO: 8.46 X10*3/UL (ref 1.2–7.7)
NEUTROPHILS NFR BLD AUTO: 91.3 %
NEUTS SEG NFR CSF MANUAL: 0 % (ref 0–5)
NRBC BLD-RTO: 0 /100 WBCS (ref 0–0)
NUMBER OF CELLS COLLECTED: NORMAL
OTHER CELLS NFR CSF MANUAL: 0 %
PATH REPORT.FINAL DX SPEC: NORMAL
PATH REPORT.GROSS SPEC: NORMAL
PATH REPORT.RELEVANT HX SPEC: NORMAL
PATH REPORT.TOTAL CANCER: NORMAL
PATH REPORT.TOTAL CANCER: NORMAL
PHOSPHATE SERPL-MCNC: 3.7 MG/DL (ref 2.5–4.9)
PLASMA CELLS NFR CSF MICRO: 0 %
PLATELET # BLD AUTO: 285 X10*3/UL (ref 150–450)
POTASSIUM SERPL-SCNC: 4.6 MMOL/L (ref 3.5–5.3)
PROT CSF-MCNC: 34 MG/DL (ref 15–45)
RBC # BLD AUTO: 3.11 X10*6/UL (ref 4.5–5.9)
RBC # CSF AUTO: 3 /UL (ref 0–5)
SIGNATURE COMMENT: NORMAL
SODIUM SERPL-SCNC: 138 MMOL/L (ref 136–145)
SPECIMEN VIABILITY: NORMAL
TOTAL CELLS COUNTED CSF: 10
TUBE # CSF: NORMAL
URATE SERPL-MCNC: 1.5 MG/DL (ref 4–7.5)
WBC # BLD AUTO: 9.3 X10*3/UL (ref 4.4–11.3)
WBC # CSF AUTO: 1 /UL (ref 1–5)

## 2023-11-02 PROCEDURE — 82945 GLUCOSE OTHER FLUID: CPT | Performed by: PHYSICIAN ASSISTANT

## 2023-11-02 PROCEDURE — 83605 ASSAY OF LACTIC ACID: CPT | Performed by: INTERNAL MEDICINE

## 2023-11-02 PROCEDURE — 85025 COMPLETE CBC W/AUTO DIFF WBC: CPT | Performed by: INTERNAL MEDICINE

## 2023-11-02 PROCEDURE — 88185 FLOWCYTOMETRY/TC ADD-ON: CPT | Mod: TC | Performed by: PHYSICIAN ASSISTANT

## 2023-11-02 PROCEDURE — 36569 INSJ PICC 5 YR+ W/O IMAGING: CPT

## 2023-11-02 PROCEDURE — 71045 X-RAY EXAM CHEST 1 VIEW: CPT | Mod: FY

## 2023-11-02 PROCEDURE — 83615 LACTATE (LD) (LDH) ENZYME: CPT | Performed by: INTERNAL MEDICINE

## 2023-11-02 PROCEDURE — 2500000001 HC RX 250 WO HCPCS SELF ADMINISTERED DRUGS (ALT 637 FOR MEDICARE OP): Performed by: INTERNAL MEDICINE

## 2023-11-02 PROCEDURE — 88104 CYTOPATH FL NONGYN SMEARS: CPT | Performed by: PHYSICIAN ASSISTANT

## 2023-11-02 PROCEDURE — 82150 ASSAY OF AMYLASE: CPT | Performed by: INTERNAL MEDICINE

## 2023-11-02 PROCEDURE — 84157 ASSAY OF PROTEIN OTHER: CPT | Performed by: PHYSICIAN ASSISTANT

## 2023-11-02 PROCEDURE — 83690 ASSAY OF LIPASE: CPT | Performed by: INTERNAL MEDICINE

## 2023-11-02 PROCEDURE — 99232 SBSQ HOSP IP/OBS MODERATE 35: CPT | Performed by: STUDENT IN AN ORGANIZED HEALTH CARE EDUCATION/TRAINING PROGRAM

## 2023-11-02 PROCEDURE — 88184 FLOWCYTOMETRY/ TC 1 MARKER: CPT | Mod: TC | Performed by: PHYSICIAN ASSISTANT

## 2023-11-02 PROCEDURE — 2500000004 HC RX 250 GENERAL PHARMACY W/ HCPCS (ALT 636 FOR OP/ED): Performed by: INTERNAL MEDICINE

## 2023-11-02 PROCEDURE — 89051 BODY FLUID CELL COUNT: CPT | Performed by: PHYSICIAN ASSISTANT

## 2023-11-02 PROCEDURE — A4216 STERILE WATER/SALINE, 10 ML: HCPCS | Performed by: INTERNAL MEDICINE

## 2023-11-02 PROCEDURE — 62270 DX LMBR SPI PNXR: CPT | Performed by: PHYSICIAN ASSISTANT

## 2023-11-02 PROCEDURE — 99233 SBSQ HOSP IP/OBS HIGH 50: CPT | Performed by: INTERNAL MEDICINE

## 2023-11-02 PROCEDURE — 88187 FLOWCYTOMETRY/READ 2-8: CPT | Performed by: PATHOLOGY

## 2023-11-02 PROCEDURE — C1751 CATH, INF, PER/CENT/MIDLINE: HCPCS

## 2023-11-02 PROCEDURE — 71045 X-RAY EXAM CHEST 1 VIEW: CPT | Performed by: RADIOLOGY

## 2023-11-02 PROCEDURE — 2780000003 HC OR 278 NO HCPCS

## 2023-11-02 PROCEDURE — 80069 RENAL FUNCTION PANEL: CPT | Performed by: INTERNAL MEDICINE

## 2023-11-02 PROCEDURE — 1200000002 HC GENERAL ROOM WITH TELEMETRY DAILY

## 2023-11-02 PROCEDURE — 84550 ASSAY OF BLOOD/URIC ACID: CPT | Performed by: INTERNAL MEDICINE

## 2023-11-02 RX ORDER — CEFEPIME 1 G/50ML
1 INJECTION, SOLUTION INTRAVENOUS EVERY 8 HOURS
Status: DISCONTINUED | OUTPATIENT
Start: 2023-11-02 | End: 2023-11-04

## 2023-11-02 RX ADMIN — ALLOPURINOL 300 MG: 100 TABLET ORAL at 21:10

## 2023-11-02 RX ADMIN — ALLOPURINOL 300 MG: 100 TABLET ORAL at 08:53

## 2023-11-02 RX ADMIN — PIPERACILLIN SODIUM AND TAZOBACTAM SODIUM 3.38 G: 3; .375 INJECTION, SOLUTION INTRAVENOUS at 06:27

## 2023-11-02 RX ADMIN — PROCHLORPERAZINE EDISYLATE 10 MG: 5 INJECTION INTRAMUSCULAR; INTRAVENOUS at 09:02

## 2023-11-02 RX ADMIN — ACYCLOVIR 400 MG: 400 TABLET ORAL at 21:10

## 2023-11-02 RX ADMIN — VANCOMYCIN HYDROCHLORIDE 1000 MG: 1 INJECTION, SOLUTION INTRAVENOUS at 05:18

## 2023-11-02 RX ADMIN — VANCOMYCIN HYDROCHLORIDE 1000 MG: 1 INJECTION, SOLUTION INTRAVENOUS at 16:35

## 2023-11-02 RX ADMIN — METHOTREXATE 12 MG: 25 INJECTION INTRA-ARTERIAL; INTRAMUSCULAR; INTRATHECAL; INTRAVENOUS at 15:45

## 2023-11-02 RX ADMIN — CEFEPIME 1 G: 1 INJECTION, SOLUTION INTRAVENOUS at 22:05

## 2023-11-02 RX ADMIN — PANTOPRAZOLE SODIUM 40 MG: 40 TABLET, DELAYED RELEASE ORAL at 06:13

## 2023-11-02 RX ADMIN — ACYCLOVIR 400 MG: 400 TABLET ORAL at 08:53

## 2023-11-02 RX ADMIN — CEFEPIME 1 G: 1 INJECTION, SOLUTION INTRAVENOUS at 15:17

## 2023-11-02 ASSESSMENT — COGNITIVE AND FUNCTIONAL STATUS - GENERAL
MOBILITY SCORE: 20
WALKING IN HOSPITAL ROOM: A LITTLE
HELP NEEDED FOR BATHING: A LITTLE
CLIMB 3 TO 5 STEPS WITH RAILING: A LITTLE
DAILY ACTIVITIY SCORE: 23
MOVING TO AND FROM BED TO CHAIR: A LITTLE
STANDING UP FROM CHAIR USING ARMS: A LITTLE

## 2023-11-02 ASSESSMENT — PAIN - FUNCTIONAL ASSESSMENT: PAIN_FUNCTIONAL_ASSESSMENT: 0-10

## 2023-11-02 ASSESSMENT — PAIN SCALES - GENERAL: PAINLEVEL_OUTOF10: 0 - NO PAIN

## 2023-11-02 NOTE — PROGRESS NOTES
Shilo Thakkar is a 23 y.o. male on day 7 of admission presenting with Burkitt lymphoma (CMS/HCC).    Subjective   No complaints. Denies any fevers, chills, night sweats, SOB, CP, N/V, HA, cough, urinary symptoms, bleeding, bruising.     Objective   Physical Exam  Constitutional:       General: He is not in acute distress.     Comments: Cachectic    HENT:      Head: Normocephalic and atraumatic.      Nose: Nose normal.      Mouth/Throat:      Mouth: Mucous membranes are moist.      Comments: Ulcerated oropharyngeal mass significantly improved from yesterday    Eyes:      Extraocular Movements: Extraocular movements intact.      Pupils: Pupils are equal, round, and reactive to light.   Neck:      Comments: L cervical LAD significantly improved  Cardiovascular:      Rate and Rhythm: Normal rate and regular rhythm.   Pulmonary:      Effort: Pulmonary effort is normal.      Comments: chest tube in place  Abdominal:      General: Abdomen is flat. Bowel sounds are normal.      Palpations: Abdomen is soft. There is no mass.   Musculoskeletal:         General: No swelling. Normal range of motion.      Cervical back: Normal range of motion and neck supple.   Lymphadenopathy:      Cervical: Cervical adenopathy present.   Skin:     General: Skin is warm and dry.   Neurological:      General: No focal deficit present.      Mental Status: He is alert and oriented to person, place, and time.      Cranial Nerves: No cranial nerve deficit.   Psychiatric:         Mood and Affect: Mood normal.         Behavior: Behavior normal.        Assessment/Plan   Principal Problem:    Burkitt lymphoma (CMS/HCC)  Active Problems:    Generalized weakness    Acute hypoxic respiratory failure (CMS/HCC)    Neck mass    Shilo Thakkar is a 23 y.o. male with no significant medical history presenting to the ED as a referral from outpatient clinic with dysphagia and abdominal pain for the past 3 weeks. Patient initially presented to [facility] ED on 10/19 with  2 weeks of worsening abdominal pain, dysphagia, and 20lb weight loss. Imaging revealed a pancreatic mass, mesenteric LAD, left oropharyngeal mass, and cervical LAD. He had ultrasound guided mesenteric LN biopsy 10/20 and then patient was transferred to [facility] for further work-up. He had biopsy of the oropharyngeal mass and cervical lymph node by ENT 10/23/23. He was discharged home and presented to outpatient clinic to establish care on 10/26. Given biopsy is concerning for lymphoma, patient was referred to the ED for further workup and management, now admitted to [facility] for further workup and management of new Burkitt Lymphoma.      ONC:  #Burkitt lymphoma, newly diagnosed  - 10/19/23 presented to OSH ED with dysphagia and abdominal pain  - CT A/P with IV contrast (10/19) mass in the body of the pancreas measuring approximately 2.7 x 2.9 cm. The pancreatic duct is dilated. Multiple masses in the mesentery as well as omental caking consistent with carcinomatosis. Moderate amount of free fluid throughout the abdomen and pelvis.   - CT soft tissue neck with IV contrast (10/19): homogeneous soft tissue attenuation lesion in the L oropharynx which measures approximately 3.0 x 4.0 x 5.1 cm.   - CT Abdomen w/o IV contrast (10/20): Mesenteric lymphadenopathy.   - CT chest with IV contrast (10/20): Moderate bilateral pleural effusions.   - US guided mesenteric LN biopsy (10/20/23): HIGH GRADE B-CELL LYMPHOMA MORPHOLOGICALLY CONSISTENT WITH BURKITT LYMPHOMA; flow cytometry: CD10+ kappa restricted B cells supportive of a B cell lymphoma with germinal center origin.   - CT head w/o IV contrast (10/22): No acute intracranial hemorrhage or mass effect.   - Biopsy L oropharyngeal mass and L cervical LN by ENT (10/23/23) high grade B-cell burkitt lymphoma  - MR brain with and w/o IV contrast (10/27): No evidence of intracranial metastatic disease.     - Dexa 40 mg daily ended 11/1  - PET CT ordered 10/30 showed FDG avid left  oropharyngeal mass and LAD above and below diaphragm  - CT abdo/pelvis showed no bowel involvement, b/l renal infarcts, left sided small pneumothorax,   - Started HyperCVAD 10/29  - Bone marrow bx completed 11/1, pending results  - Plan for D4C1 with doxorubicin + vincristine today 11/1  - needs LP + IT methotrexate 11/02 D5C1  - IT cytarabine 11/4 for D7C1    HEME:  #anemia, likely 2/2 disease  -Transfuse to keep Hgb>7, Plt>10  -DVT ppx: SCDs     ID:  Allergies: NKDA  Ppx: acyclovir; Start bactrim MWF at discharge  -Plan Levaquin for neutropenia  -Zosyn (10/30 -->11/2), switched to Cefepime 1 gm q8 (11/2-->) due to concern for renal dysfunction while on Vanc  -BCX x2 (10/21): NGTD, pleural cx gm +ve cocci in clusters likely contaminant  -Vancomycin 11/1-->  -ID consulted  - lactate (10/27) 1.6 -->1.2 (11/2)  # viral workup  - Covid neg (10/26)  -Hepatitis serology negative (10/26/23)  -HIV 1/2 nonreactive (10/26)  - EBV neg (10/27)     FEN/GI  Admit weight: 62.6kg  Ppx: Protonix started on admission  Replete electrolytes as needed  #TLS monitoring  -Allopurinol 300mg BID  -G6PD normal  - ml/hr  -BID TLS labs ordered  -Daily LDH and uric acid  #Dysphagia, improved with dexamethasone   - Low pathogen diet  #Weight loss  -Secondary to dysphagia  -Nutrition consulted on admit  #Constipation, improved  -Bowel regimen with scheduled Colace and Miralax      CARDS/PULM  - Onco echo (10/27): LVSF normal with a 60-65% estimated EF. RVSP WNL. No prior echo available for comparison.  #Acute hypoxic respiratory failure  #b/l moderate to large pleural effusion s/p thoracocentesis and chest tube placement 10/29  #Sinus bradycardia  - (10/28) worsening O2 demand, 90% on 2L -> 92% on 5L -->Transferred to MICU -->10/31 saturating 100% on 8 NC -->transferred to S3, trying to wean oxygen  - CXR (10/28) showing Bilateral Pleural Effusions s/p thora and rt chest tube placement  - CTA chest (10/29) No evidence of acute pulmonary  embolism. Persistent moderate-to-large sized bilateral pleural effusions  - Pleural fluid cytology pending, pleural fluid cx NGTD   - Order daily CXR while has chest tube  - Pulm on board       MISC:  #anxiety, agitation   - lorazepam 0.5mg prn q8hr  #Pain management  -Oxy PRN q6hr  #Fertility preservation  - onco-fertility consulted and following  - Syphilis nonreactive, gonorrhea neg, chlamydia neg  - per onco-fertility decided not to pursue sperm banking (10/28)    DISPO:  Full code  Primary Onc: Dr. Mccall  Access: DL PICC solo (10/28)  DVT ppx: Lovenox  NOK: Deidra Pickett (Mother) - [phone]     Patient seen, examined and discussed with Dr. Latoya Eduardo MD

## 2023-11-02 NOTE — CARE PLAN
The clinical goals for the shift include remain HDS      Problem: Safety  Goal: Patient will be injury free during hospitalization  Outcome: Progressing  Goal: I will remain free of falls  Outcome: Progressing     Problem: Daily Care  Goal: Daily care needs are met  Outcome: Progressing     Problem: Psychosocial Needs  Goal: Demonstrates ability to cope with hospitalization/illness  Outcome: Progressing  Goal: Collaborate with me, my family, and caregiver to identify my specific goals  Outcome: Progressing     Problem: Discharge Barriers  Goal: My discharge needs are met  Outcome: Progressing     Problem: Fall/Injury  Goal: Not fall by end of shift  Outcome: Progressing  Goal: Be free from injury by end of the shift  Outcome: Progressing  Goal: Verbalize understanding of personal risk factors for fall in the hospital  Outcome: Progressing  Goal: Verbalize understanding of risk factor reduction measures to prevent injury from fall in the home  Outcome: Progressing  Goal: Use assistive devices by end of the shift  Outcome: Progressing  Goal: Pace activities to prevent fatigue by end of the shift  Outcome: Progressing     Problem: Pain - Adult  Goal: Verbalizes/displays adequate comfort level or baseline comfort level  Outcome: Progressing     Problem: Safety - Adult  Goal: Free from fall injury  Outcome: Progressing     Problem: Discharge Planning  Goal: Discharge to home or other facility with appropriate resources  Outcome: Progressing     Problem: Chronic Conditions and Co-morbidities  Goal: Patient's chronic conditions and co-morbidity symptoms are monitored and maintained or improved  Outcome: Progressing     Problem: Skin  Goal: Participates in plan/prevention/treatment measures  Outcome: Progressing  Goal: Prevent/manage excess moisture  Outcome: Progressing  Goal: Promote/optimize nutrition  Outcome: Progressing

## 2023-11-02 NOTE — PROGRESS NOTES
"Shilo Thakkar is a 23 y.o. male on day 7 of admission presenting with Burkitt lymphoma (CMS/HCC).    Subjective   On room air   Denies dyspnea, pleuritic pain or cough        Objective     Physical Exam  IN no acute distress  Clear lungs  RRR  No edema in legs   Pig tail to water seal on right side, no air leak     Last Recorded Vitals  Blood pressure 131/81, pulse (!) 49, temperature 36.7 °C (98.1 °F), temperature source Temporal, resp. rate 18, height 1.838 m (6' 0.36\"), weight 62.6 kg (138 lb 0.1 oz), SpO2 99 %.  Intake/Output last 3 Shifts:  I/O last 3 completed shifts:  In: 3513.2 (56.1 mL/kg) [I.V.:2000 (31.9 mL/kg); IV Piggyback:1513.2]  Out: 4748 (75.8 mL/kg) [Urine:4450 (2 mL/kg/hr); Chest Tube:298]  Weight: 62.6 kg     Relevant Results  CT abdomen pelvis w IV contrast    Result Date: 10/31/2023  Interpreted By:  Dung Marion,  and Dequan Chavez STUDY: CT ABDOMEN PELVIS W IV CONTRAST;  10/31/2023 4:27 pm   INDICATION: Signs/Symptoms:staging.   Per EMR: New suspected Burkitt lymphoma, staging. CT abdomen on 10/20/2023 demonstrated mesenteric lymphadenopathy. patient presented recently with abdominal pain and dysphagia for 3 weeks' duration, presented to ed on 10/19/23 with two weeks worsening abdominal pain, dysphagia and 20 pound weight loss, imaging revealed ill-defined pancreatic body mass, mesenteric LAD, left oropharyngeal mass and cervical LAD. Left tonsillar biopsy revealed high-grade B-cell lymphoma with morphology and immuno phenotypic features consistent with Burkitt's lymphoma.. Patient received cyclophosphamide and mesna 10/29 + decadron since 10/27.   COMPARISON: CT abdomen 10/20/2023. CT chest 10/29/2023.   ACCESSION NUMBER(S): [id number]   ORDERING CLINICIAN: BREEZY STROUD   TECHNIQUE: CT of the abdomen and pelvis was performed.  Standard contiguous axial images were obtained at 3 mm slice thickness through the abdomen and pelvis. Coronal and sagittal reconstructions at 3 mm slice " thickness were performed.   75 ml of contrast Omnipaque 350 were administered intravenously without immediate complication.   FINDINGS: LOWER CHEST: Interval decrease in left pleural effusion, now small in size with adjacent atelectasis. Subsegmental atelectasis in the inferior right middle lobe. Patchy ground-glass and nodular opacities within the right lung dependent regions is similar to PET-CT dated 10/30/2023. Right basilar pigtail chest tube is in place with a small partially visualized right basilar pneumothorax. The heart is normal in size without pericardial effusion.  Visualized distal esophagus appears normal   Stable nodular soft tissue lesion within the right pericardiophrenic space measuring 3 4.8 x 1.3 cm which demonstrated increased FDG uptake on PET-CT.   ABDOMEN:   LIVER: Mild hepatomegaly measuring up to 18.9 cm in craniocaudal dimension. There is mild heterogeneous enhancement of the liver parenchyma. There is mild periportal edema.   BILE DUCTS: The intrahepatic and extrahepatic ducts are not dilated.   GALLBLADDER: The gallbladder is nondistended and without evidence of radiopaque stones.   PANCREAS: Pancreas is normal in size and enhancement. There is a 1.4 x 1.2 cm focal hypodense area within the pancreatic body (series 2, image 48), decreased from 10/19/2023 where it measured 3.5 x 3.0 cm. There is no pancreatic ductal dilatation..   SPLEEN: Splenomegaly measuring up to 17.0 cm in craniocaudal dimension.   ADRENAL GLANDS: Bilateral adrenal glands appear normal.   KIDNEYS AND URETERS: Kidneys are normal in size. Interval development of multiple wedge-shaped hypodense areas scattered throughout the bilateral kidneys, new from contrast enhanced CT dated 10/19/2023. No hydronephrosis or nephrolithiasis.   PELVIS:   BLADDER: The urinary bladder appears normal without abnormal wall thickening.   REPRODUCTIVE ORGANS: No pelvic masses.   BOWEL: Positive oral contrast opacifies the stomach multiple  loops of proximal small bowel. The small and large bowel are normal in caliber and demonstrate no wall thickening.   Nonvisualization of the appendix.   VESSELS: The aorta and IVC appear normal.   PERITONEUM/RETROPERITONEUM/LYMPH NODES: Stable abdominopelvic ascites with interval decrease thickening of the peritoneal reflection most pronounced in the deep pelvis as seen on (series 2, image 127).   Interval decrease in FDG avid bulky abdominopelvic lymphadenopathy when compared to  CT abdomen dated 10/20/2023.   The low-dose PET-CT from 10/20/2023 is used as a reference for comparison. Bulky lymphadenopathy in the left hemiabdomen surrounding the pancreas previously measured 10.0 x 6.0 cm, now measures 5.9 x 3.6 cm additional foci is noted within the mesentery in the right hemiabdomen measuring 3.3 x 2.6 cm, previously measured 7.4 x 5.9 cm. Interval decrease in nodular thickening of the pelvic peritoneal reflections along the pelvic sidewall. A discrete pelvic deposit measures 1.9 x 1.4 cm, previously measured 4.1 x 1.8 cm. No new abdominopelvic lymphadenopathy.   There is diffuse mesenteric stranding.   BONES AND ABDOMINAL WALL: No suspicious osseous lesions are identified.  Interval increase in abdominal wall anasarca. Limbus vertebrae along the anterior superior L5 endplate.         1. Interval significant decrease in disease burden compared to the CT scans of 10/19/2023 and 10/20/2023 as evidenced by decrease in bulky abdominopelvic, pancreatic mass hypodense pancreatic body lesion and nodular peritoneal thickening in the pelvis. No new abdominopelvic lymphadenopathy. Stable right cardiophrenic lymphadenopathy. 2. New bilateral peripheral wedge-shaped involving bilateral kidneys. Differential diagnosis includes pyelonephritis and less likely renal infarcts. Recommend correlation with urinalysis. 3. Findings compatible with increasing fluid overload with heterogeneous enhancement of the liver parenchyma,  periportal abdominopelvic ascites and body wall edema. 4. Right lung base pigtail catheter is in place with a small right basilar pneumothorax. 5. Patchy ground-glass and nodular opacities within the right lung dependent regions, may represent atelectasis versus aspiration pneumonia. 6. Interval decrease in now partially visualized small left pleural effusion with adjacent atelectasis and right subsegmental basilar atelectasis.   The critical information above was relayed directly by Resident Scott Baires epic secure chat to Anya Hutchison MD on 10/31/2020 at 5:17 p.m.. I personally reviewed the images/study and I agree with the findings as stated. This study was interpreted at [place], [place].   Signed by: Dung Marion 10/31/2023 6:47 PM Dictation workstation:   [id number]    XR chest 1 view    Result Date: 10/31/2023  Interpreted By:  Son Stern, STUDY: XR CHEST 1 VIEW;  10/31/2023 8:26 am   INDICATION: Signs/Symptoms:chest tube on right, bilateral pleural effusions.   COMPARISON: Radiograph dated 10/30/2023   ACCESSION NUMBER(S): [id number]   ORDERING CLINICIAN: YOUNG RODRIGUEZ   FINDINGS: Right basilar pigtail chest tube is stable. Right upper extremity PICC is stable.   The cardiac silhouette size is within normal limits.   Persistent small left and trace right pleural effusion with bibasilar atelectasis. No mariano edema or sizable pneumothorax.   No acute osseous abnormality.       1. Small left and trace right pleural effusion with bibasilar atelectasis. 2. No appreciable pneumothorax. 3. Medical appliances as described above.       Signed by: Son Nunez 10/31/2023 10:09 AM Dictation workstation:   [id number]    XR chest 1 view    Result Date: 10/31/2023  Interpreted By:  Son Stern, STUDY: XR CHEST 1 VIEW;  10/30/2023 7:33 pm   INDICATION: Signs/Symptoms:L sided thoracentesis perfromed this afternoon with 1.2 L  serosanguineus fluid drained.   COMPARISON: Radiograph dated 10/30/2023   ACCESSION NUMBER(S): [id number]   ORDERING CLINICIAN: YOUNG RODRIGUEZ   FINDINGS: Right upper extremity PICC is in place with the tip projecting over lower SVC. Right basilar pigtail chest tube is stable in position.   The cardiac silhouette size is within normal limits.   Interval improvement in the aeration of the left lung status post left thoracentesis. Small residual left basilar pleural effusion. No edema or focal infiltrate. Cannot exclude residual trace right apical pneumothorax.   No acute osseous change.       1. Interval improvement in the aeration of the left lung status post left thoracentesis with residual small left basilar pleural effusion. No sizable pneumothorax on the left side. 2. Cannot exclude residual trace right apical pneumothorax. 3. Medical appliances as described above.       Signed by: Son Nunez 10/31/2023 10:09 AM Dictation workstation:   [id number]    Vascular US lower extremity venous duplex bilateral    Result Date: 10/31/2023            [place] [id number]   Tel [phone] and Fax [phone]  Vascular Lab Report VASC US LOWER EXTREMITY VENOUS DUPLEX BILATERAL  Patient Name:      MARVA THADDEUS        Reading Physician:  [id number] Oleg Sal MD, RPVI Study Date:        10/31/2023          Ordering Physician: [id number] STROUD MRN/PID:           [id number]            Technologist:       Chidi Miguel RVT Accession#:        [id number]        Technologist 2: Date of Birth/Age: 2000 / 23 years Encounter#:         [id number] Gender:            M Admission Status:  Inpatient           Location Performed: [facility]  Diagnosis/ICD: Localized (leg) edema-R60.0 CPT Codes:     82560 Peripheral venous duplex scan for DVT complete  CONCLUSIONS: Right Lower Venous: No evidence of acute deep  vein thrombus visualized in the right lower extremity. Left Lower Venous: No evidence of acute deep vein thrombus visualized in the left lower extremity.  Imaging & Doppler Findings:  Right                 Compressible Thrombus        Flow Distal External Iliac                None   Spontaneous/Phasic CFV                       Yes        None   Spontaneous/Phasic PFV                       Yes        None FV Proximal               Yes        None   Spontaneous/Phasic FV Mid                    Yes        None FV Distal                 Yes        None Popliteal                 Yes        None   Spontaneous/Phasic Peroneal                  Yes        None PTV                       Yes        None  Left                  Compress Thrombus        Flow Distal External Iliac            None   Spontaneous/Phasic CFV                     Yes      None   Spontaneous/Phasic PFV                     Yes      None FV Proximal             Yes      None   Spontaneous/Phasic FV Mid                  Yes      None FV Distal               Yes      None Popliteal               Yes      None   Spontaneous/Phasic Peroneal                Yes      None PTV                     Yes      None  [id number] Oleg Sal MD, RPVI Electronically signed by [id number] Oleg Sal MD, RPVI on 10/31/2023 at 8:53:09 AM  ** Final **     Thoracentesis    Result Date: 10/30/2023  Cam Mcleod MD     10/31/2023  7:07 AM Thoracentesis Date/Time: 10/30/2023 7:03 PM Performed by: Cam Mcleod MD Authorized by: Cam Mcleod MD  Consent:   Consent obtained:  Written   Consent given by:  Patient   Risks, benefits, and alternatives were discussed: yes    Risks discussed:  Bleeding, infection and pneumothorax   Alternatives discussed:  No treatment and observation Universal protocol:   Procedure explained and questions answered to patient or proxy's satisfaction: yes    Imaging studies available: yes    Site/side marked: yes    Immediately prior to procedure, a time out  was called: yes    Patient identity confirmed:  Verbally with patient and arm band Sedation:   Sedation type:  None Anesthesia:   Anesthesia method:  Local infiltration   Local anesthetic:  Lidocaine 1% w/o epi Procedure details:   Preparation: Patient was prepped and draped in usual sterile fashion    Patient position:  Sitting   Location:  L midscapular line   Intercostal space:  6th   Puncture method:  Over-the-needle catheter   Ultrasound guidance: yes    Indwelling catheter placed: yes    Needle gauge:  18   Catheter size:  8 Fr   Number of attempts:  1   Drainage characteristics:  Serosanguinous Post-procedure details:   Chest x-ray performed: no    Procedure completion:  Tolerated Comments:    CXR pending at this time. 1050 ml of fluids were removed.       Culture from RT pig tail growing clusters G+Staphylococcus saccharolyticus Abnormal        Assessment/Plan   Principal Problem:    Burkitt lymphoma (CMS/HCC)  Active Problems:    Generalized weakness    Acute hypoxic respiratory failure (CMS/HCC)    Neck mass  23 y.o. male with no PMH who was admitted with a new diagnosis of high-grade Burkitt lymphoma.  Oncology plan is to start hyperCVAD later today.  Pulmonary is consulted to evaluate for thoracentesis in the setting of bilateral pleural effusions and new hypoxic respiratory failure.  Unfortunately patient received lovenox ppx this am; given that he has no dyspnea without increased work of breathing, will plan for R-sided thoracentesis on 10/30 to reduce risk of bleeding.       #Bilateral pleural effusions, s/p RT pig tail on 10/29 in MICU 1.5 liter drained initially exudative , Left throacetesis done on 10/30  1 liter drained serosanguinous exudative too   #Acute hypoxic respiratory failure  #Burkitt lymphoma  # Small right apical pneumothorax post pig tail   #Right sided empyema ?? As culture is growing G+ clusters      Cytology on 10/29  Highly atypical lymphocytes present consistent with patient's  history of high-grade lymphoma.     Plan:  -Ab according to ID  -keep Rt pig tail on water seal (drainage remains high)  -CXR daily in AM as long as pig tail remains present        Patient was seen with Dr. Antonieta Bruce MD

## 2023-11-02 NOTE — CARE PLAN
Problem: Safety  Goal: Patient will be injury free during hospitalization  Outcome: Progressing  Goal: I will remain free of falls  Outcome: Progressing     Problem: Daily Care  Goal: Daily care needs are met  Outcome: Progressing     Problem: Psychosocial Needs  Goal: Demonstrates ability to cope with hospitalization/illness  Outcome: Progressing  Goal: Collaborate with me, my family, and caregiver to identify my specific goals  Outcome: Progressing     Problem: Discharge Barriers  Goal: My discharge needs are met  Outcome: Progressing     Problem: Fall/Injury  Goal: Not fall by end of shift  Outcome: Progressing  Goal: Be free from injury by end of the shift  Outcome: Progressing  Goal: Verbalize understanding of personal risk factors for fall in the hospital  Outcome: Progressing  Goal: Verbalize understanding of risk factor reduction measures to prevent injury from fall in the home  Outcome: Progressing  Goal: Use assistive devices by end of the shift  Outcome: Progressing  Goal: Pace activities to prevent fatigue by end of the shift  Outcome: Progressing     Problem: Chronic Conditions and Co-morbidities  Goal: Patient's chronic conditions and co-morbidity symptoms are monitored and maintained or improved  Outcome: Progressing     Problem: Discharge Planning  Goal: Discharge to home or other facility with appropriate resources  Outcome: Progressing     Problem: Safety - Adult  Goal: Free from fall injury  Outcome: Progressing     The patient's goals for the shift include decrease n/v    The clinical goals for the shift include remain HDS    Over the shift, the patient did make progress toward the following goals.

## 2023-11-02 NOTE — PROCEDURES
Lumbar Puncture    Date/Time: 11/2/2023 5:09 PM    Performed by: Roselia Shetty PA-C  Authorized by: Roselia Shetty PA-C    Consent:     Consent obtained:  Written    Consent given by:  Patient    Risks, benefits, and alternatives were discussed: yes      Risks discussed:  Bleeding, infection, pain and headache    Alternatives discussed:  No treatment  Universal protocol:     Procedure explained and questions answered to patient or proxy's satisfaction: yes      Relevant documents present and verified: yes      Test results available: yes      Imaging studies available: yes      Required blood products, implants, devices, and special equipment available: yes      Immediately prior to procedure a time out was called: yes      Site/side marked: yes      Patient identity confirmed:  Hospital-assigned identification number and verbally with patient  Pre-procedure details:     Procedure purpose:  Diagnostic    Preparation: Patient was prepped and draped in usual sterile fashion    Anesthesia:     Anesthesia method:  Local infiltration    Local anesthetic:  Lidocaine 1% w/o epi  Procedure details:     Lumbar space:  L3-L4 interspace    Patient position:  Sitting    Needle type:  Spinal needle - Quincke tip    Ultrasound guidance: no      Number of attempts:  1    Fluid appearance:  Clear    Tubes of fluid:  3  Post-procedure details:     Puncture site:  Adhesive bandage applied and direct pressure applied    Procedure completion:  Tolerated well, no immediate complications  Comments:      Samples sent for cell count and diff, total protein and glucose and flow cytometry

## 2023-11-03 ENCOUNTER — APPOINTMENT (OUTPATIENT)
Dept: RADIOLOGY | Facility: HOSPITAL | Age: 23
DRG: 840 | End: 2023-11-03
Payer: COMMERCIAL

## 2023-11-03 ENCOUNTER — APPOINTMENT (OUTPATIENT)
Dept: OTOLARYNGOLOGY | Facility: CLINIC | Age: 23
End: 2023-11-03
Payer: COMMERCIAL

## 2023-11-03 LAB
ALBUMIN SERPL BCP-MCNC: 3 G/DL (ref 3.4–5)
ANION GAP SERPL CALC-SCNC: 12 MMOL/L (ref 10–20)
BACTERIA BLD CULT: NORMAL
BACTERIA BLD CULT: NORMAL
BASOPHILS # BLD AUTO: 0 X10*3/UL (ref 0–0.1)
BASOPHILS NFR BLD AUTO: 0 %
BUN SERPL-MCNC: 24 MG/DL (ref 6–23)
CALCIUM SERPL-MCNC: 8.2 MG/DL (ref 8.6–10.6)
CELL COUNT (BLOOD): 20.44 X10*3/UL
CELL POPULATIONS: NORMAL
CHLORIDE SERPL-SCNC: 105 MMOL/L (ref 98–107)
CO2 SERPL-SCNC: 25 MMOL/L (ref 21–32)
CREAT SERPL-MCNC: 0.7 MG/DL (ref 0.5–1.3)
DIAGNOSIS: NORMAL
EOSINOPHIL # BLD AUTO: 0.03 X10*3/UL (ref 0–0.7)
EOSINOPHIL NFR BLD AUTO: 0.4 %
ERYTHROCYTE [DISTWIDTH] IN BLOOD BY AUTOMATED COUNT: 13.4 % (ref 11.5–14.5)
FLOW DIFFERENTIAL: NORMAL
FLOW TEST ORDERED: NORMAL
GFR SERPL CREATININE-BSD FRML MDRD: >90 ML/MIN/1.73M*2
GLUCOSE SERPL-MCNC: 81 MG/DL (ref 74–99)
HCT VFR BLD AUTO: 28.3 % (ref 41–52)
HGB BLD-MCNC: 9.5 G/DL (ref 13.5–17.5)
IMM GRANULOCYTES # BLD AUTO: 0.04 X10*3/UL (ref 0–0.7)
IMM GRANULOCYTES NFR BLD AUTO: 0.5 % (ref 0–0.9)
LAB TEST METHOD: NORMAL
LDH SERPL L TO P-CCNC: 647 U/L (ref 84–246)
LYMPHOCYTES # BLD AUTO: 0.6 X10*3/UL (ref 1.2–4.8)
LYMPHOCYTES NFR BLD AUTO: 7.2 %
MCH RBC QN AUTO: 30 PG (ref 26–34)
MCHC RBC AUTO-ENTMCNC: 33.6 G/DL (ref 32–36)
MCV RBC AUTO: 89 FL (ref 80–100)
MONOCYTES # BLD AUTO: 0.39 X10*3/UL (ref 0.1–1)
MONOCYTES NFR BLD AUTO: 4.7 %
NEUTROPHILS # BLD AUTO: 7.29 X10*3/UL (ref 1.2–7.7)
NEUTROPHILS NFR BLD AUTO: 87.2 %
NRBC BLD-RTO: 0 /100 WBCS (ref 0–0)
NUMBER OF CELLS COLLECTED: NORMAL PER TUBE
PATH REPORT.TOTAL CANCER: NORMAL
PATH REVIEW-CELL CT,CSF: NORMAL
PHOSPHATE SERPL-MCNC: 2.8 MG/DL (ref 2.5–4.9)
PLATELET # BLD AUTO: 294 X10*3/UL (ref 150–450)
POTASSIUM SERPL-SCNC: 4 MMOL/L (ref 3.5–5.3)
RBC # BLD AUTO: 3.17 X10*6/UL (ref 4.5–5.9)
SCAN RESULT: NORMAL
SIGNATURE COMMENT: NORMAL
SODIUM SERPL-SCNC: 138 MMOL/L (ref 136–145)
SPECIMEN VIABILITY: NORMAL
URATE SERPL-MCNC: 1.6 MG/DL (ref 4–7.5)
VANCOMYCIN SERPL-MCNC: 9.2 UG/ML (ref 5–20)
WBC # BLD AUTO: 8.4 X10*3/UL (ref 4.4–11.3)

## 2023-11-03 PROCEDURE — 99233 SBSQ HOSP IP/OBS HIGH 50: CPT | Performed by: INTERNAL MEDICINE

## 2023-11-03 PROCEDURE — 83615 LACTATE (LD) (LDH) ENZYME: CPT | Performed by: INTERNAL MEDICINE

## 2023-11-03 PROCEDURE — 2500000004 HC RX 250 GENERAL PHARMACY W/ HCPCS (ALT 636 FOR OP/ED): Performed by: INTERNAL MEDICINE

## 2023-11-03 PROCEDURE — 1200000002 HC GENERAL ROOM WITH TELEMETRY DAILY

## 2023-11-03 PROCEDURE — 71045 X-RAY EXAM CHEST 1 VIEW: CPT

## 2023-11-03 PROCEDURE — 2500000001 HC RX 250 WO HCPCS SELF ADMINISTERED DRUGS (ALT 637 FOR MEDICARE OP): Performed by: INTERNAL MEDICINE

## 2023-11-03 PROCEDURE — 85025 COMPLETE CBC W/AUTO DIFF WBC: CPT | Performed by: INTERNAL MEDICINE

## 2023-11-03 PROCEDURE — 84550 ASSAY OF BLOOD/URIC ACID: CPT | Performed by: INTERNAL MEDICINE

## 2023-11-03 PROCEDURE — 80069 RENAL FUNCTION PANEL: CPT | Performed by: INTERNAL MEDICINE

## 2023-11-03 PROCEDURE — 80202 ASSAY OF VANCOMYCIN: CPT | Performed by: INTERNAL MEDICINE

## 2023-11-03 PROCEDURE — 96372 THER/PROPH/DIAG INJ SC/IM: CPT | Performed by: INTERNAL MEDICINE

## 2023-11-03 PROCEDURE — 71045 X-RAY EXAM CHEST 1 VIEW: CPT | Performed by: RADIOLOGY

## 2023-11-03 RX ORDER — DIPHENHYDRAMINE HCL 50 MG
50 CAPSULE ORAL ONCE
Status: COMPLETED | OUTPATIENT
Start: 2023-11-03 | End: 2023-11-03

## 2023-11-03 RX ORDER — ACETAMINOPHEN 325 MG/1
650 TABLET ORAL ONCE
Status: COMPLETED | OUTPATIENT
Start: 2023-11-03 | End: 2023-11-03

## 2023-11-03 RX ADMIN — ALLOPURINOL 300 MG: 100 TABLET ORAL at 20:36

## 2023-11-03 RX ADMIN — CEFEPIME 1 G: 1 INJECTION, SOLUTION INTRAVENOUS at 06:30

## 2023-11-03 RX ADMIN — ACYCLOVIR 400 MG: 400 TABLET ORAL at 09:17

## 2023-11-03 RX ADMIN — CEFEPIME 1 G: 1 INJECTION, SOLUTION INTRAVENOUS at 14:05

## 2023-11-03 RX ADMIN — SODIUM CHLORIDE 700 MG: 9 INJECTION, SOLUTION INTRAVENOUS at 10:26

## 2023-11-03 RX ADMIN — VANCOMYCIN HYDROCHLORIDE 1000 MG: 1 INJECTION, SOLUTION INTRAVENOUS at 05:24

## 2023-11-03 RX ADMIN — ALLOPURINOL 300 MG: 100 TABLET ORAL at 09:16

## 2023-11-03 RX ADMIN — SODIUM CHLORIDE 100 ML/HR: 9 INJECTION, SOLUTION INTRAVENOUS at 12:40

## 2023-11-03 RX ADMIN — ACETAMINOPHEN 650 MG: 325 TABLET ORAL at 09:16

## 2023-11-03 RX ADMIN — DIPHENHYDRAMINE HYDROCHLORIDE 50 MG: 50 CAPSULE ORAL at 09:16

## 2023-11-03 RX ADMIN — CEFEPIME 1 G: 1 INJECTION, SOLUTION INTRAVENOUS at 22:13

## 2023-11-03 RX ADMIN — ACYCLOVIR 400 MG: 400 TABLET ORAL at 20:36

## 2023-11-03 RX ADMIN — FILGRASTIM-SNDZ 300 MCG: 300 INJECTION, SOLUTION INTRAVENOUS; SUBCUTANEOUS at 14:38

## 2023-11-03 RX ADMIN — PANTOPRAZOLE SODIUM 40 MG: 40 TABLET, DELAYED RELEASE ORAL at 09:16

## 2023-11-03 ASSESSMENT — COGNITIVE AND FUNCTIONAL STATUS - GENERAL
HELP NEEDED FOR BATHING: A LITTLE
WALKING IN HOSPITAL ROOM: A LITTLE
DAILY ACTIVITIY SCORE: 22
DRESSING REGULAR UPPER BODY CLOTHING: A LITTLE
MOBILITY SCORE: 20
STANDING UP FROM CHAIR USING ARMS: A LITTLE
MOVING TO AND FROM BED TO CHAIR: A LITTLE
CLIMB 3 TO 5 STEPS WITH RAILING: A LITTLE

## 2023-11-03 ASSESSMENT — PAIN SCALES - GENERAL: PAINLEVEL_OUTOF10: 0 - NO PAIN

## 2023-11-03 NOTE — SIGNIFICANT EVENT
Dose # 1 of 1 Rituximab chemotherapy 700mg in 320mL given via Double PICC catheter.  Dose up at 1030 started at 50mg/hr rate of 23ml/hr .  Pre-medicated with benadryl.  Patient, dose and rate verified with second RN, Gail Lakhani.  + BBR obtained via syringe aspiration before and after administration. VS obtained every half hour with dose increase of 50mg.  Patient with no side effects.

## 2023-11-03 NOTE — SIGNIFICANT EVENT
Chart reviewed, culture from pleural fluid is CONS and likely a contaminant. Recommended discontinuing IV abx as there is low suspicion for empyema.     Discussed w/ Dr. Joseph Spears DO  Infectious Disease Fellow, PGY5  Epic Chat until 5pm/Team A pager [phone]

## 2023-11-03 NOTE — PROGRESS NOTES
Vancomycin Dosing by Pharmacy- FOLLOW UP    Shilo Thakkar is a 23 y.o. year old male who Pharmacy has been consulted for vancomycin dosing for pneumonia. Based on the patient's indication and renal status this patient is being dosed based on a goal AUC of 400-600.     Renal function is currently stable.    Current vancomycin dose: 1000 mg given every 12 hours    Estimated vancomycin AUC on current dose: 341 mg/L.hr     Visit Vitals  /72 (BP Location: Left arm, Patient Position: Lying)   Pulse 56   Temp 36.9 °C (98.4 °F) (Temporal)   Resp 18        Lab Results   Component Value Date    CREATININE 0.70 11/03/2023    CREATININE 0.81 11/02/2023    CREATININE 0.98 11/01/2023    CREATININE 1.00 10/31/2023        Lab Results   Component Value Date    PATIENTTEMP 37.0 10/29/2023    PATIENTTEMP 37.0 10/29/2023        Assessment/Plan    Below goal AUC. Orders placed for new vancomcyin regimen of 1250 every 12 hours to begin at 11/3 @1700.     This dosing regimen is predicted by [employer] to result in the following pharmacokinetic parameters:  Loading dose: N/A  Regimen: 1250 mg IV every 12 hours.  Start time: 17:24 on 11/03/2023  Exposure target: AUC24 (range)400-600 mg/L.hr   AUC24,ss: 426 mg/L.hr  Probability of AUC24 > 400: 63 %  Ctrough,ss: 10.4 mg/L  Probability of Ctrough,ss > 20: 1 %  Probability of nephrotoxicity (Lodise BON 2009): 6 %      The next level will be obtained on 11/4 at AM Labs. May be obtained sooner if clinically indicated.   Will continue to monitor renal function daily while on vancomycin and order serum creatinine at least every 48 hours if not already ordered.  Follow for continued vancomycin needs, clinical response, and signs/symptoms of toxicity.       Jahaira Deutsch, PharmD            Chief Complaint:  Patient is a 57y old  Male who presents with a chief complaint of transferred for advanced GI procedure (14 Oct 2020 06:56)    Reason for consult: necrotizing pancreatitis    Interval Events: S/p ERCP w/ stone removal & stent placement (no sphincterotomy) and part 1 of staged pancreatic necrosectomy yesterday. Pt doing well today, Afebrile, no abd pain.     Hospital Medications:  benzocaine 15 mG/menthol 3.6 mG (Sugar-Free) Lozenge 1 Lozenge Oral every 6 hours PRN  cadexomer iodine 0.9% Gel 1 Application(s) Topical daily  cefepime   IVPB 1000 milliGRAM(s) IV Intermittent every 8 hours  cholecalciferol 1000 Unit(s) Oral daily  collagenase Ointment 1 Application(s) Topical daily  DAPTOmycin IVPB 350 milliGRAM(s) IV Intermittent every 24 hours  dextrose 5%. 1000 milliLiter(s) IV Continuous <Continuous>  enoxaparin Injectable 40 milliGRAM(s) SubCutaneous daily  fluconAZOLE   Tablet 100 milliGRAM(s) Oral daily  HYDROmorphone   Tablet 1 milliGRAM(s) Oral every 4 hours PRN  levETIRAcetam 500 milliGRAM(s) Oral two times a day  melatonin 3 milliGRAM(s) Oral at bedtime  metoclopramide 5 milliGRAM(s) Oral every 6 hours PRN  multivitamin 1 Tablet(s) Oral daily  naphazoline/pheniramine Solution 1 Drop(s) Both EYES three times a day  pantoprazole    Tablet 40 milliGRAM(s) Oral before breakfast  polyethylene glycol 3350 17 Gram(s) Oral daily PRN  saccharomyces boulardii 250 milliGRAM(s) Oral two times a day  senna 2 Tablet(s) Oral at bedtime  sodium chloride 0.9%. 1000 milliLiter(s) IV Continuous <Continuous>      ROS:   General:  No  fevers, chills, night sweats, fatigue  Eyes:  Good vision, no reported pain  ENT:  No sore throat, pain, runny nose  CV:  No pain, palpitations  Pulm:  No dyspnea, cough  GI:  See HPI, otherwise negative  :  No  incontinence, nocturia  Muscle:  No pain, weakness  Neuro:  No memory problems  Psych:  No insomnia, mood problems, depression  Endocrine:  No polyuria, polydipsia, cold/heat intolerance  Heme:  No petechiae, ecchymosis, easy bruisability  Skin:  No rash    PHYSICAL EXAM:   Vital Signs:  Vital Signs Last 24 Hrs  T(C): 37.1 (14 Oct 2020 05:03), Max: 37.4 (13 Oct 2020 11:04)  T(F): 98.7 (14 Oct 2020 05:03), Max: 99.3 (13 Oct 2020 11:04)  HR: 96 (14 Oct 2020 05:03) (90 - 99)  BP: 100/60 (14 Oct 2020 05:03) (90/60 - 106/80)  BP(mean): --  RR: 17 (14 Oct 2020 05:03) (16 - 26)  SpO2: 99% (14 Oct 2020 05:03) (95% - 99%)  Daily Height in cm: 167.6 (13 Oct 2020 11:04)    Daily     GENERAL: no acute distress, + cachexia  NEURO: alert, no asterixis  HEENT: anicteric sclera, no conjunctival pallor appreciated  CHEST: no respiratory distress, no accessory muscle use  CARDIAC: regular rate, rhythm  ABDOMEN: soft, non-tender, non-distended, no rebound or guarding  EXTREMITIES: warm, well perfused, no edema  SKIN: no lesions noted    LABS: reviewed                        9.3    10.42 )-----------( 234      ( 14 Oct 2020 06:31 )             30.5     10-13    131<L>  |  96<L>  |  14  ----------------------------<  129<H>  4.6   |  25  |  0.37<L>    Ca    9.9      13 Oct 2020 01:45    TPro  7.6  /  Alb  3.4  /  TBili  1.1  /  DBili  x   /  AST  98<H>  /  ALT  121<H>  /  AlkPhos  1164<H>  10-13    LIVER FUNCTIONS - ( 13 Oct 2020 01:45 )  Alb: 3.4 g/dL / Pro: 7.6 g/dL / ALK PHOS: 1164 u/L / ALT: 121 u/L / AST: 98 u/L / GGT: x             Interval Diagnostic Studies: see sunrise for full report

## 2023-11-03 NOTE — CARE PLAN
The patient's goals for the shift include  pain control, no N/V    The clinical goals for the shift include patient will remain HDS this shift    Over the shift, the chest tube remains to water seal. Draining small amount of s/s fluid. Patient remains free from injury.

## 2023-11-03 NOTE — PROGRESS NOTES
Shilo Thakkar is a 23 y.o. male on day 8 of admission presenting with Burkitt lymphoma (CMS/HCC).    Subjective   No complaints. Denies any fevers, chills, night sweats, SOB, CP, N/V, HA, cough, urinary symptoms, bleeding, bruising.     Objective   Physical Exam  Constitutional:       General: He is not in acute distress.     Comments: Cachectic    HENT:      Head: Normocephalic and atraumatic.      Nose: Nose normal.      Mouth/Throat:      Mouth: Mucous membranes are moist.      Comments: Ulcerated oropharyngeal mass significantly improved from yesterday    Eyes:      Extraocular Movements: Extraocular movements intact.      Pupils: Pupils are equal, round, and reactive to light.   Neck:      Comments: L cervical LAD significantly improved  Cardiovascular:      Rate and Rhythm: Normal rate and regular rhythm.   Pulmonary:      Effort: Pulmonary effort is normal.      Comments: chest tube in place  Abdominal:      General: Abdomen is flat. Bowel sounds are normal.      Palpations: Abdomen is soft. There is no mass.   Musculoskeletal:         General: No swelling. Normal range of motion.      Cervical back: Normal range of motion and neck supple.   Lymphadenopathy:      Cervical: Cervical adenopathy present.   Skin:     General: Skin is warm and dry.   Neurological:      General: No focal deficit present.      Mental Status: He is alert and oriented to person, place, and time.      Cranial Nerves: No cranial nerve deficit.   Psychiatric:         Mood and Affect: Mood normal.         Behavior: Behavior normal.        Assessment/Plan   Principal Problem:    Burkitt lymphoma (CMS/HCC)  Active Problems:    Generalized weakness    Acute hypoxic respiratory failure (CMS/HCC)    Neck mass    Shilo Thakkar is a 23 y.o. male with no significant medical history presenting to the ED as a referral from outpatient clinic with dysphagia and abdominal pain for the past 3 weeks. Patient initially presented to [facility] ED on 10/19 with  2 weeks of worsening abdominal pain, dysphagia, and 20lb weight loss. Imaging revealed a pancreatic mass, mesenteric LAD, left oropharyngeal mass, and cervical LAD. He had ultrasound guided mesenteric LN biopsy 10/20 and then patient was transferred to [facility] for further work-up. He had biopsy of the oropharyngeal mass and cervical lymph node by ENT 10/23/23. He was discharged home and presented to outpatient clinic to establish care on 10/26. Given biopsy is concerning for lymphoma, patient was referred to the ED for further workup and management, now admitted to [facility] for further workup and management of new Burkitt Lymphoma.      ONC:  #Burkitt lymphoma, newly diagnosed  - 10/19/23 presented to OSH ED with dysphagia and abdominal pain  - CT A/P with IV contrast (10/19) mass in the body of the pancreas measuring approximately 2.7 x 2.9 cm. The pancreatic duct is dilated. Multiple masses in the mesentery as well as omental caking consistent with carcinomatosis. Moderate amount of free fluid throughout the abdomen and pelvis.   - CT soft tissue neck with IV contrast (10/19): homogeneous soft tissue attenuation lesion in the L oropharynx which measures approximately 3.0 x 4.0 x 5.1 cm.   - CT Abdomen w/o IV contrast (10/20): Mesenteric lymphadenopathy.   - CT chest with IV contrast (10/20): Moderate bilateral pleural effusions.   - US guided mesenteric LN biopsy (10/20/23): HIGH GRADE B-CELL LYMPHOMA MORPHOLOGICALLY CONSISTENT WITH BURKITT LYMPHOMA; flow cytometry: CD10+ kappa restricted B cells supportive of a B cell lymphoma with germinal center origin.   - CT head w/o IV contrast (10/22): No acute intracranial hemorrhage or mass effect.   - Biopsy L oropharyngeal mass and L cervical LN by ENT (10/23/23) high grade B-cell burkitt lymphoma  - MR brain with and w/o IV contrast (10/27): No evidence of intracranial metastatic disease.     - Dexa 40 mg daily ended 11/1  - PET CT ordered 10/30 showed FDG avid left  oropharyngeal mass and LAD above and below diaphragm  - CT abdo/pelvis showed no bowel involvement, b/l renal infarcts, left sided small pneumothorax,   - Started HyperCVAD 10/29  - Bone marrow bx completed 11/1, pending results  - Plan for D4C1 with doxorubicin + vincristine today 11/1  - completed LP + IT methotrexate 11/02 D5C1  - Receiving rituximab 11/3  - IT cytarabine 11/4 for D7C1    HEME:  #anemia, likely 2/2 disease  -Transfuse to keep Hgb>7, Plt>10  -DVT ppx: SCDs     ID:  Allergies: NKDA  Ppx: acyclovir; Start bactrim MWF at discharge  -Plan Levaquin for neutropenia  -Zosyn (10/30 -->11/2), switched to Cefepime 1 gm q8 (11/2-->) due to concern for renal dysfunction while on Vanc  -BCX x2 (10/21): NGTD, pleural cx gm +ve cocci in clusters likely contaminant  -Vancomycin 11/1-->  -ID consulted  - lactate (10/27) 1.6 -->1.2 (11/2)  # viral workup  - Covid neg (10/26)  -Hepatitis serology negative (10/26/23)  -HIV 1/2 nonreactive (10/26)  - EBV neg (10/27)     FEN/GI  Admit weight: 62.6kg  Ppx: Protonix started on admission  Replete electrolytes as needed  #TLS monitoring  -Allopurinol 300mg BID  -G6PD normal  - ml/hr  -BID TLS labs ordered  -Daily LDH and uric acid  #Dysphagia, improved with dexamethasone   - Low pathogen diet  #Weight loss  -Secondary to dysphagia  -Nutrition consulted on admit  #Constipation, improved  -Bowel regimen with scheduled Colace and Miralax      CARDS/PULM  - Onco echo (10/27): LVSF normal with a 60-65% estimated EF. RVSP WNL. No prior echo available for comparison.  #Acute hypoxic respiratory failure  #b/l moderate to large pleural effusion s/p thoracocentesis and chest tube placement 10/29  #Sinus bradycardia  - (10/28) worsening O2 demand, 90% on 2L -> 92% on 5L -->Transferred to MICU -->10/31 saturating 100% on 8 NC -->transferred to S3, trying to wean oxygen  - CXR (10/28) showing Bilateral Pleural Effusions s/p thora and rt chest tube placement  - CTA chest (10/29)  No evidence of acute pulmonary embolism. Persistent moderate-to-large sized bilateral pleural effusions  - Pleural fluid cytology showed highly atypical lymphocytes present consistent with patient's history of high-grade lymphoma, pleural fluid cx NGTD   - Order daily CXR while has chest tube  - Pulm on board       MISC:  #anxiety, agitation   - lorazepam 0.5mg prn q8hr  #Pain management  -Oxy PRN q6hr  #Fertility preservation  - onco-fertility consulted and following  - Syphilis nonreactive, gonorrhea neg, chlamydia neg  - per onco-fertility decided not to pursue sperm banking (10/28)    DISPO:  Full code  Primary Onc: Dr. Mccall  Access: DL PICC solo (10/28)  DVT ppx: Lovenox  NOK: Deidra Pickett (Mother) - [phone]     Patient seen, examined and discussed with Dr. Latoya Eduardo MD

## 2023-11-03 NOTE — PROGRESS NOTES
Vancomycin Dosing by Pharmacy- Cessation of Therapy    Consult to pharmacy for vancomycin dosing has been discontinued by the prescriber, pharmacy will sign off at this time.    Please call pharmacy if there are further questions or re-enter a consult if vancomycin is resumed.     Devyn Harper, AlyssaD

## 2023-11-04 ENCOUNTER — APPOINTMENT (OUTPATIENT)
Dept: RADIOLOGY | Facility: HOSPITAL | Age: 23
DRG: 840 | End: 2023-11-04
Payer: COMMERCIAL

## 2023-11-04 LAB
ALBUMIN SERPL BCP-MCNC: 2.8 G/DL (ref 3.4–5)
ANION GAP SERPL CALC-SCNC: 14 MMOL/L (ref 10–20)
BASOPHILS # BLD MANUAL: 0 X10*3/UL (ref 0–0.1)
BASOPHILS NFR BLD MANUAL: 0 %
BUN SERPL-MCNC: 18 MG/DL (ref 6–23)
CALCIUM SERPL-MCNC: 7.8 MG/DL (ref 8.6–10.6)
CHLORIDE SERPL-SCNC: 105 MMOL/L (ref 98–107)
CO2 SERPL-SCNC: 23 MMOL/L (ref 21–32)
CREAT SERPL-MCNC: 0.84 MG/DL (ref 0.5–1.3)
EOSINOPHIL # BLD MANUAL: 0 X10*3/UL (ref 0–0.7)
EOSINOPHIL NFR BLD MANUAL: 0 %
ERYTHROCYTE [DISTWIDTH] IN BLOOD BY AUTOMATED COUNT: 13.4 % (ref 11.5–14.5)
GFR SERPL CREATININE-BSD FRML MDRD: >90 ML/MIN/1.73M*2
GLUCOSE SERPL-MCNC: 120 MG/DL (ref 74–99)
HCT VFR BLD AUTO: 27.3 % (ref 41–52)
HGB BLD-MCNC: 9.1 G/DL (ref 13.5–17.5)
IMM GRANULOCYTES # BLD AUTO: 0.82 X10*3/UL (ref 0–0.7)
IMM GRANULOCYTES NFR BLD AUTO: 5.4 % (ref 0–0.9)
LDH SERPL L TO P-CCNC: 535 U/L (ref 84–246)
LYMPHOCYTES # BLD MANUAL: 0.26 X10*3/UL (ref 1.2–4.8)
LYMPHOCYTES NFR BLD MANUAL: 1.7 %
MCH RBC QN AUTO: 29.3 PG (ref 26–34)
MCHC RBC AUTO-ENTMCNC: 33.3 G/DL (ref 32–36)
MCV RBC AUTO: 88 FL (ref 80–100)
MONOCYTES # BLD MANUAL: 0 X10*3/UL (ref 0.1–1)
MONOCYTES NFR BLD MANUAL: 0 %
NEUTS SEG # BLD MANUAL: 15.04 X10*3/UL (ref 1.2–7)
NEUTS SEG NFR BLD MANUAL: 98.3 %
NRBC BLD-RTO: 0 /100 WBCS (ref 0–0)
PHOSPHATE SERPL-MCNC: 2.7 MG/DL (ref 2.5–4.9)
PLATELET # BLD AUTO: 273 X10*3/UL (ref 150–450)
POTASSIUM SERPL-SCNC: 3.5 MMOL/L (ref 3.5–5.3)
RBC # BLD AUTO: 3.11 X10*6/UL (ref 4.5–5.9)
RBC MORPH BLD: ABNORMAL
SODIUM SERPL-SCNC: 138 MMOL/L (ref 136–145)
TOTAL CELLS COUNTED BLD: 116
URATE SERPL-MCNC: 1.6 MG/DL (ref 4–7.5)
VANCOMYCIN SERPL-MCNC: 3.5 UG/ML (ref 5–20)
WBC # BLD AUTO: 15.3 X10*3/UL (ref 4.4–11.3)

## 2023-11-04 PROCEDURE — 85027 COMPLETE CBC AUTOMATED: CPT | Performed by: INTERNAL MEDICINE

## 2023-11-04 PROCEDURE — 83615 LACTATE (LD) (LDH) ENZYME: CPT | Performed by: INTERNAL MEDICINE

## 2023-11-04 PROCEDURE — 96372 THER/PROPH/DIAG INJ SC/IM: CPT | Performed by: INTERNAL MEDICINE

## 2023-11-04 PROCEDURE — 1200000002 HC GENERAL ROOM WITH TELEMETRY DAILY

## 2023-11-04 PROCEDURE — 80202 ASSAY OF VANCOMYCIN: CPT | Performed by: INTERNAL MEDICINE

## 2023-11-04 PROCEDURE — 2500000004 HC RX 250 GENERAL PHARMACY W/ HCPCS (ALT 636 FOR OP/ED): Performed by: INTERNAL MEDICINE

## 2023-11-04 PROCEDURE — 99233 SBSQ HOSP IP/OBS HIGH 50: CPT | Performed by: INTERNAL MEDICINE

## 2023-11-04 PROCEDURE — 84550 ASSAY OF BLOOD/URIC ACID: CPT | Performed by: INTERNAL MEDICINE

## 2023-11-04 PROCEDURE — 2500000001 HC RX 250 WO HCPCS SELF ADMINISTERED DRUGS (ALT 637 FOR MEDICARE OP): Performed by: INTERNAL MEDICINE

## 2023-11-04 PROCEDURE — 80069 RENAL FUNCTION PANEL: CPT | Performed by: INTERNAL MEDICINE

## 2023-11-04 PROCEDURE — 2500000004 HC RX 250 GENERAL PHARMACY W/ HCPCS (ALT 636 FOR OP/ED): Mod: JZ | Performed by: INTERNAL MEDICINE

## 2023-11-04 PROCEDURE — 71045 X-RAY EXAM CHEST 1 VIEW: CPT | Performed by: RADIOLOGY

## 2023-11-04 PROCEDURE — 71045 X-RAY EXAM CHEST 1 VIEW: CPT | Mod: FY

## 2023-11-04 PROCEDURE — 85007 BL SMEAR W/DIFF WBC COUNT: CPT | Performed by: INTERNAL MEDICINE

## 2023-11-04 RX ORDER — ALLOPURINOL 100 MG/1
300 TABLET ORAL DAILY
Status: COMPLETED | OUTPATIENT
Start: 2023-11-05 | End: 2023-11-06

## 2023-11-04 RX ADMIN — CEFEPIME 1 G: 1 INJECTION, SOLUTION INTRAVENOUS at 06:15

## 2023-11-04 RX ADMIN — ALLOPURINOL 300 MG: 100 TABLET ORAL at 08:43

## 2023-11-04 RX ADMIN — ACYCLOVIR 400 MG: 400 TABLET ORAL at 20:08

## 2023-11-04 RX ADMIN — SODIUM CHLORIDE 100 ML/HR: 9 INJECTION, SOLUTION INTRAVENOUS at 09:51

## 2023-11-04 RX ADMIN — FILGRASTIM-SNDZ 300 MCG: 300 INJECTION, SOLUTION INTRAVENOUS; SUBCUTANEOUS at 08:43

## 2023-11-04 RX ADMIN — PANTOPRAZOLE SODIUM 40 MG: 40 TABLET, DELAYED RELEASE ORAL at 08:43

## 2023-11-04 RX ADMIN — ACYCLOVIR 400 MG: 400 TABLET ORAL at 08:43

## 2023-11-04 ASSESSMENT — COGNITIVE AND FUNCTIONAL STATUS - GENERAL
WALKING IN HOSPITAL ROOM: A LITTLE
HELP NEEDED FOR BATHING: A LITTLE
HELP NEEDED FOR BATHING: A LITTLE
CLIMB 3 TO 5 STEPS WITH RAILING: A LITTLE
DAILY ACTIVITIY SCORE: 22
CLIMB 3 TO 5 STEPS WITH RAILING: A LITTLE
MOBILITY SCORE: 22
DRESSING REGULAR UPPER BODY CLOTHING: A LITTLE
MOVING TO AND FROM BED TO CHAIR: A LITTLE
DRESSING REGULAR UPPER BODY CLOTHING: A LITTLE
HELP NEEDED FOR BATHING: A LITTLE
DAILY ACTIVITIY SCORE: 22
WALKING IN HOSPITAL ROOM: A LITTLE
CLIMB 3 TO 5 STEPS WITH RAILING: A LITTLE
MOVING TO AND FROM BED TO CHAIR: A LITTLE
DAILY ACTIVITIY SCORE: 22
DRESSING REGULAR UPPER BODY CLOTHING: A LITTLE
WALKING IN HOSPITAL ROOM: A LITTLE
MOBILITY SCORE: 20
MOBILITY SCORE: 21
STANDING UP FROM CHAIR USING ARMS: A LITTLE

## 2023-11-04 ASSESSMENT — PAIN SCALES - GENERAL
PAINLEVEL_OUTOF10: 0 - NO PAIN

## 2023-11-04 ASSESSMENT — PAIN - FUNCTIONAL ASSESSMENT: PAIN_FUNCTIONAL_ASSESSMENT: 0-10

## 2023-11-04 NOTE — PROGRESS NOTES
Shilo Thakkar is a 23 y.o. male on day 9 of admission presenting with Burkitt lymphoma (CMS/HCC).    Subjective   No complaints. Denies any fevers, chills, night sweats, SOB, CP, N/V, HA, cough, urinary symptoms, bleeding, bruising.     Objective   Physical Exam  HENT:      Head: Normocephalic and atraumatic.      Nose: Nose normal.      Mouth/Throat:      Mouth: Mucous membranes are moist.      Comments: Ulcerated oropharyngeal mass improved   Eyes:      Extraocular Movements: Extraocular movements intact.      Pupils: Pupils are equal, round, and reactive to light.   Neck:      Comments: L cervical LAD significantly improved  Cardiovascular:      Rate and Rhythm: Normal rate and regular rhythm.   Pulmonary:      Effort: Pulmonary effort is normal.      Comments: chest tube in place  Abdominal:      General: Abdomen is flat. Bowel sounds are normal.      Palpations: Abdomen is soft.   Musculoskeletal:         General: Normal range of motion.      Cervical back: Normal range of motion and neck supple.   Lymphadenopathy:      Cervical: Cervical adenopathy present.   Skin:     General: Skin is warm and dry.   Neurological:      General: No focal deficit present.      Mental Status: He is alert and oriented to person, place, and time.   Psychiatric:         Mood and Affect: Mood normal.         Behavior: Behavior normal.        Assessment/Plan   Principal Problem:    Burkitt lymphoma (CMS/HCC)  Active Problems:    Generalized weakness    Acute hypoxic respiratory failure (CMS/HCC)    Neck mass    Shilo Thakkar is a 23 y.o. male with no significant medical history presenting to the ED as a referral from outpatient clinic with dysphagia and abdominal pain for the past 3 weeks. Patient initially presented to [facility] ED on 10/19 with 2 weeks of worsening abdominal pain, dysphagia, and 20lb weight loss. Imaging revealed a pancreatic mass, mesenteric LAD, left oropharyngeal mass, and cervical LAD. He had ultrasound guided  mesenteric LN biopsy 10/20 and then patient was transferred to [facility] for further work-up. He had biopsy of the oropharyngeal mass and cervical lymph node by ENT 10/23/23. He was discharged home and presented to outpatient clinic to establish care on 10/26. Given biopsy is concerning for lymphoma, patient was referred to the ED for further workup and management, now admitted to [facility] for further workup and management of new Burkitt Lymphoma.      ONC:  #Burkitt lymphoma, newly diagnosed  - 10/19/23 presented to OSH ED with dysphagia and abdominal pain  - CT A/P with IV contrast (10/19) mass in the body of the pancreas measuring approximately 2.7 x 2.9 cm. The pancreatic duct is dilated. Multiple masses in the mesentery as well as omental caking consistent with carcinomatosis. Moderate amount of free fluid throughout the abdomen and pelvis.   - CT soft tissue neck with IV contrast (10/19): homogeneous soft tissue attenuation lesion in the L oropharynx which measures approximately 3.0 x 4.0 x 5.1 cm.   - CT Abdomen w/o IV contrast (10/20): Mesenteric lymphadenopathy.   - CT chest with IV contrast (10/20): Moderate bilateral pleural effusions.   - US guided mesenteric LN biopsy (10/20/23): HIGH GRADE B-CELL LYMPHOMA MORPHOLOGICALLY CONSISTENT WITH BURKITT LYMPHOMA; flow cytometry: CD10+ kappa restricted B cells supportive of a B cell lymphoma with germinal center origin.   - CT head w/o IV contrast (10/22): No acute intracranial hemorrhage or mass effect.   - Biopsy L oropharyngeal mass and L cervical LN by ENT (10/23/23) high grade B-cell burkitt lymphoma  - MR brain with and w/o IV contrast (10/27): No evidence of intracranial metastatic disease.     - Dexa 40 mg daily ended 11/1  - PET CT ordered 10/30 showed FDG avid left oropharyngeal mass and LAD above and below diaphragm  - CT abdo/pelvis showed no bowel involvement, b/l renal infarcts, left sided small pneumothorax,   - Started HyperCVAD 10/29  - Bone marrow bx  completed 11/1, pending results  - Plan for D4C1 with doxorubicin + vincristine today 11/1  - completed LP + IT methotrexate 11/02 D5C1  - Receiving rituximab 11/3  - IT cytarabine 11/6 for D9C1    HEME:  #anemia, likely 2/2 disease  -Transfuse to keep Hgb>7, Plt>10  -DVT ppx: SCDs     ID:  Allergies: NKDA  Ppx: acyclovir; Start bactrim MWF at discharge  -Plan Levaquin for neutropenia  -Zosyn (10/30 -->11/2), switched to Cefepime 1 gm q8 (11/2-->11/4) due to concern for renal dysfunction while on Vanc  -BCX x2 (10/21): NGTD, pleural cx gm +ve cocci in clusters likely contaminant  -Vancomycin 11/1-->11/3  -Will start levaquin when neutropenic in future  -ID consulted recommend dc'ing all antibiotics  - lactate (10/27) 1.6 -->1.2 (11/2)  # viral workup  - Covid neg (10/26)  -Hepatitis serology negative (10/26/23)  -HIV 1/2 nonreactive (10/26)  - EBV neg (10/27)     FEN/GI  Admit weight: 62.6kg  Ppx: Protonix started on admission  Replete electrolytes as needed  #TLS monitoring  -Allopurinol 300mg switched to daily  -G6PD normal  -IVF 75ml/hr, dc in a couple of days  -TLS labs daily  -Daily LDH and uric acid  #Dysphagia, improved with dexamethasone   - Low pathogen diet  #Weight loss  -Secondary to dysphagia  -Nutrition consulted on admit  #Constipation, improved  -Bowel regimen with scheduled Colace and Miralax      CARDS/PULM  - Onco echo (10/27): LVSF normal with a 60-65% estimated EF. RVSP WNL. No prior echo available for comparison.  #Acute hypoxic respiratory failure  #b/l moderate to large pleural effusion s/p thoracocentesis and chest tube placement 10/29  #Sinus bradycardia  - (10/28) worsening O2 demand, 90% on 2L -> 92% on 5L -->Transferred to MICU -->10/31 saturating 100% on 8 NC -->transferred to S3, trying to wean oxygen  - CXR (10/28) showing Bilateral Pleural Effusions s/p thora and rt chest tube placement  - CTA chest (10/29) No evidence of acute pulmonary embolism. Persistent moderate-to-large sized  bilateral pleural effusions  - Pleural fluid cytology showed highly atypical lymphocytes present consistent with patient's history of high-grade lymphoma, pleural fluid cx NGTD   - Order daily CXR while has chest tube  - Pulm on board       MISC:  #anxiety, agitation   - lorazepam 0.5mg prn q8hr  #Pain management  -Oxy PRN q6hr  #Fertility preservation  - onco-fertility consulted and following  - Syphilis nonreactive, gonorrhea neg, chlamydia neg  - per onco-fertility decided not to pursue sperm banking (10/28)    DISPO:  Full code  Primary Onc: Dr. Mccall  Access: DL PICC solo (10/28)  DVT ppx: Lovenox  NOK: Deidra Pickett (Mother) - [phone]     Patient seen, examined and discussed with Dr. Latoya Eduardo MD

## 2023-11-05 ENCOUNTER — APPOINTMENT (OUTPATIENT)
Dept: RADIOLOGY | Facility: HOSPITAL | Age: 23
DRG: 840 | End: 2023-11-05
Payer: COMMERCIAL

## 2023-11-05 LAB
ALBUMIN SERPL BCP-MCNC: 2.9 G/DL (ref 3.4–5)
ANION GAP SERPL CALC-SCNC: 11 MMOL/L (ref 10–20)
BASOPHILS # BLD MANUAL: 0 X10*3/UL (ref 0–0.1)
BASOPHILS NFR BLD MANUAL: 0 %
BUN SERPL-MCNC: 14 MG/DL (ref 6–23)
CALCIUM SERPL-MCNC: 8.2 MG/DL (ref 8.6–10.6)
CELL POPULATIONS: NORMAL
CHLORIDE SERPL-SCNC: 103 MMOL/L (ref 98–107)
CO2 SERPL-SCNC: 27 MMOL/L (ref 21–32)
CREAT SERPL-MCNC: 0.66 MG/DL (ref 0.5–1.3)
DIAGNOSIS: NORMAL
EOSINOPHIL # BLD MANUAL: 0.17 X10*3/UL (ref 0–0.7)
EOSINOPHIL NFR BLD MANUAL: 1.7 %
ERYTHROCYTE [DISTWIDTH] IN BLOOD BY AUTOMATED COUNT: 13.3 % (ref 11.5–14.5)
FLOW DIFFERENTIAL: NORMAL
FLOW TEST ORDERED: NORMAL
FLUID CELL COUNT: 1 /UL
GFR SERPL CREATININE-BSD FRML MDRD: >90 ML/MIN/1.73M*2
GLUCOSE SERPL-MCNC: 83 MG/DL (ref 74–99)
HCT VFR BLD AUTO: 29.3 % (ref 41–52)
HGB BLD-MCNC: 9.7 G/DL (ref 13.5–17.5)
IMM GRANULOCYTES # BLD AUTO: 0.96 X10*3/UL (ref 0–0.7)
IMM GRANULOCYTES NFR BLD AUTO: 9.4 % (ref 0–0.9)
LAB TEST METHOD: NORMAL
LDH SERPL L TO P-CCNC: 446 U/L (ref 84–246)
LYMPHOCYTES # BLD MANUAL: 0.27 X10*3/UL (ref 1.2–4.8)
LYMPHOCYTES NFR BLD MANUAL: 2.6 %
MCH RBC QN AUTO: 29.1 PG (ref 26–34)
MCHC RBC AUTO-ENTMCNC: 33.1 G/DL (ref 32–36)
MCV RBC AUTO: 88 FL (ref 80–100)
MONOCYTES # BLD MANUAL: 0 X10*3/UL (ref 0.1–1)
MONOCYTES NFR BLD MANUAL: 0 %
NEUTS SEG # BLD MANUAL: 9.76 X10*3/UL (ref 1.2–7)
NEUTS SEG NFR BLD MANUAL: 95.7 %
NRBC BLD-RTO: 0 /100 WBCS (ref 0–0)
NUMBER OF CELLS COLLECTED: NORMAL
PATH REPORT.TOTAL CANCER: NORMAL
PHOSPHATE SERPL-MCNC: 3.1 MG/DL (ref 2.5–4.9)
PLATELET # BLD AUTO: 254 X10*3/UL (ref 150–450)
POTASSIUM SERPL-SCNC: 3.9 MMOL/L (ref 3.5–5.3)
RBC # BLD AUTO: 3.33 X10*6/UL (ref 4.5–5.9)
RBC MORPH BLD: ABNORMAL
SIGNATURE COMMENT: NORMAL
SODIUM SERPL-SCNC: 137 MMOL/L (ref 136–145)
SPECIMEN VIABILITY: NORMAL
TOTAL CELLS COUNTED BLD: 115
URATE SERPL-MCNC: 1.9 MG/DL (ref 4–7.5)
WBC # BLD AUTO: 10.2 X10*3/UL (ref 4.4–11.3)

## 2023-11-05 PROCEDURE — 71045 X-RAY EXAM CHEST 1 VIEW: CPT

## 2023-11-05 PROCEDURE — 71045 X-RAY EXAM CHEST 1 VIEW: CPT | Mod: FY

## 2023-11-05 PROCEDURE — 71045 X-RAY EXAM CHEST 1 VIEW: CPT | Performed by: RADIOLOGY

## 2023-11-05 PROCEDURE — 85027 COMPLETE CBC AUTOMATED: CPT | Performed by: INTERNAL MEDICINE

## 2023-11-05 PROCEDURE — 84550 ASSAY OF BLOOD/URIC ACID: CPT | Performed by: INTERNAL MEDICINE

## 2023-11-05 PROCEDURE — 80069 RENAL FUNCTION PANEL: CPT | Performed by: INTERNAL MEDICINE

## 2023-11-05 PROCEDURE — 1200000002 HC GENERAL ROOM WITH TELEMETRY DAILY

## 2023-11-05 PROCEDURE — 2500000004 HC RX 250 GENERAL PHARMACY W/ HCPCS (ALT 636 FOR OP/ED): Mod: JZ | Performed by: INTERNAL MEDICINE

## 2023-11-05 PROCEDURE — 2500000001 HC RX 250 WO HCPCS SELF ADMINISTERED DRUGS (ALT 637 FOR MEDICARE OP): Performed by: INTERNAL MEDICINE

## 2023-11-05 PROCEDURE — 96372 THER/PROPH/DIAG INJ SC/IM: CPT | Performed by: INTERNAL MEDICINE

## 2023-11-05 PROCEDURE — 85007 BL SMEAR W/DIFF WBC COUNT: CPT | Performed by: INTERNAL MEDICINE

## 2023-11-05 PROCEDURE — 2500000004 HC RX 250 GENERAL PHARMACY W/ HCPCS (ALT 636 FOR OP/ED): Performed by: INTERNAL MEDICINE

## 2023-11-05 PROCEDURE — 99233 SBSQ HOSP IP/OBS HIGH 50: CPT | Performed by: INTERNAL MEDICINE

## 2023-11-05 PROCEDURE — 83615 LACTATE (LD) (LDH) ENZYME: CPT | Performed by: INTERNAL MEDICINE

## 2023-11-05 PROCEDURE — 99232 SBSQ HOSP IP/OBS MODERATE 35: CPT | Performed by: STUDENT IN AN ORGANIZED HEALTH CARE EDUCATION/TRAINING PROGRAM

## 2023-11-05 RX ADMIN — ACYCLOVIR 400 MG: 400 TABLET ORAL at 09:11

## 2023-11-05 RX ADMIN — PANTOPRAZOLE SODIUM 40 MG: 40 TABLET, DELAYED RELEASE ORAL at 09:11

## 2023-11-05 RX ADMIN — ALLOPURINOL 300 MG: 100 TABLET ORAL at 09:11

## 2023-11-05 RX ADMIN — FILGRASTIM-SNDZ 300 MCG: 300 INJECTION, SOLUTION INTRAVENOUS; SUBCUTANEOUS at 09:11

## 2023-11-05 RX ADMIN — ACYCLOVIR 400 MG: 400 TABLET ORAL at 20:31

## 2023-11-05 ASSESSMENT — COGNITIVE AND FUNCTIONAL STATUS - GENERAL
WALKING IN HOSPITAL ROOM: A LITTLE
HELP NEEDED FOR BATHING: A LITTLE
CLIMB 3 TO 5 STEPS WITH RAILING: A LITTLE
DAILY ACTIVITIY SCORE: 22
DRESSING REGULAR UPPER BODY CLOTHING: A LITTLE
MOBILITY SCORE: 22

## 2023-11-05 ASSESSMENT — PAIN SCALES - GENERAL
PAINLEVEL_OUTOF10: 0 - NO PAIN
PAINLEVEL_OUTOF10: 0 - NO PAIN

## 2023-11-05 NOTE — CARE PLAN
Spoke to patient in depth regarding his increasing PTX on the R side. Despite serial CXR showing increase in size, patient has had no symptoms and actually reports feeling much better after his pigtail was removed this morning. He has been able to walk around his room and shower without any respiratory symptoms. I had a detailed discussion with the patient about how a pigtail should be placed for expanding pneumothorax despite his symptoms, but he would prefer to wait and monitor serial CXRs because he has felt so much better since this morning when the pigtail was in place.     Plan:  -will obtain CXR at 10pm tonight and 6am tomorrow morning  -if PTX has further expanded on repeat CXRs or patient develops any symptoms, MICU fellow to place R pigtail overnight   -please call [phone] with the results of the 10pm CXR tonight to speak with the MICU fellow, who is aware of this plan   -patient will continue to try wearing his oxygen in attempt to reabsorb PTX

## 2023-11-05 NOTE — PROGRESS NOTES
Shilo Thakkar is a 23 y.o. male on day 10 of admission presenting with Burkitt lymphoma (CMS/HCC).    Subjective   No complaints. Denies any fevers, chills, night sweats, SOB, CP, N/V, HA, cough, urinary symptoms, bleeding, bruising.     Objective   Physical Exam  HENT:      Head: Normocephalic and atraumatic.      Nose: Nose normal.      Mouth/Throat:      Mouth: Mucous membranes are moist.      Comments: Ulcerated oropharyngeal mass improved   Eyes:      Extraocular Movements: Extraocular movements intact.      Pupils: Pupils are equal, round, and reactive to light.   Neck:      Comments: L cervical LAD significantly improved  Cardiovascular:      Rate and Rhythm: Normal rate and regular rhythm.   Pulmonary:      Effort: Pulmonary effort is normal.      Comments: chest tube was removed this morning but pulm reinserting it after pneumothorax on Xray  Abdominal:      General: Abdomen is flat. Bowel sounds are normal.      Palpations: Abdomen is soft.   Musculoskeletal:         General: Normal range of motion.      Cervical back: Normal range of motion and neck supple.   Lymphadenopathy:      Cervical: Cervical adenopathy present.   Skin:     General: Skin is warm and dry.   Neurological:      General: No focal deficit present.      Mental Status: He is alert and oriented to person, place, and time.   Psychiatric:         Mood and Affect: Mood normal.         Behavior: Behavior normal.        Assessment/Plan   Principal Problem:    Burkitt lymphoma (CMS/HCC)  Active Problems:    Generalized weakness    Acute hypoxic respiratory failure (CMS/HCC)    Neck mass    Shilo Thakkar is a 23 y.o. male with no significant medical history presenting to the ED as a referral from outpatient clinic with dysphagia and abdominal pain for the past 3 weeks. Patient initially presented to [facility] ED on 10/19 with 2 weeks of worsening abdominal pain, dysphagia, and 20lb weight loss. Imaging revealed a pancreatic mass, mesenteric LAD, left  oropharyngeal mass, and cervical LAD. He had ultrasound guided mesenteric LN biopsy 10/20 and then patient was transferred to [facility] for further work-up. He had biopsy of the oropharyngeal mass and cervical lymph node by ENT 10/23/23. He was discharged home and presented to outpatient clinic to establish care on 10/26. Given biopsy is concerning for lymphoma, patient was referred to the ED for further workup and management, now admitted to [facility] for further workup and management of new Burkitt Lymphoma.      ONC:  #Burkitt lymphoma, newly diagnosed  - 10/19/23 presented to OSH ED with dysphagia and abdominal pain  - CT A/P with IV contrast (10/19) mass in the body of the pancreas measuring approximately 2.7 x 2.9 cm. The pancreatic duct is dilated. Multiple masses in the mesentery as well as omental caking consistent with carcinomatosis. Moderate amount of free fluid throughout the abdomen and pelvis.   - CT soft tissue neck with IV contrast (10/19): homogeneous soft tissue attenuation lesion in the L oropharynx which measures approximately 3.0 x 4.0 x 5.1 cm.   - CT Abdomen w/o IV contrast (10/20): Mesenteric lymphadenopathy.   - CT chest with IV contrast (10/20): Moderate bilateral pleural effusions.   - US guided mesenteric LN biopsy (10/20/23): HIGH GRADE B-CELL LYMPHOMA MORPHOLOGICALLY CONSISTENT WITH BURKITT LYMPHOMA; flow cytometry: CD10+ kappa restricted B cells supportive of a B cell lymphoma with germinal center origin.   - CT head w/o IV contrast (10/22): No acute intracranial hemorrhage or mass effect.   - Biopsy L oropharyngeal mass and L cervical LN by ENT (10/23/23) high grade B-cell burkitt lymphoma  - MR brain with and w/o IV contrast (10/27): No evidence of intracranial metastatic disease.     - Dexa 40 mg daily ended 11/1  - PET CT ordered 10/30 showed FDG avid left oropharyngeal mass and LAD above and below diaphragm  - CT abdo/pelvis showed no bowel involvement, b/l renal infarcts, left sided  small pneumothorax,   - Started HyperCVAD 10/29  - Bone marrow bx completed 11/1, pending results  - Plan for D4C1 with doxorubicin + vincristine today 11/1  - completed LP + IT methotrexate 11/02 D5C1  - Receiving rituximab 11/3 and 11/10 D13C1  - IT cytarabine 11/6 for D9C1    HEME:  #anemia, likely 2/2 disease  -Transfuse to keep Hgb>7, Plt>10  -DVT ppx: SCDs     ID:  Allergies: NKDA  Ppx: acyclovir; Start bactrim MWF at discharge  -Plan Levaquin for neutropenia  -Zosyn (10/30 -->11/2), switched to Cefepime 1 gm q8 (11/2-->11/4) due to concern for renal dysfunction while on Vanc  -BCX x2 (10/21): NGTD, pleural cx gm +ve cocci in clusters likely contaminant  -Vancomycin 11/1-->11/3  -Will start levaquin when neutropenic in future  -ID consulted recommend dc'ing all antibiotics  - lactate (10/27) 1.6 -->1.2 (11/2)  # viral workup  - Covid neg (10/26)  -Hepatitis serology negative (10/26/23)  -HIV 1/2 nonreactive (10/26)  - EBV neg (10/27)     FEN/GI  Admit weight: 62.6kg  Ppx: Protonix started on admission  Replete electrolytes as needed  #TLS monitoring  -Allopurinol 300mg switched to daily and dc 11/6  -G6PD normal  -IVF 75ml/hr, dc in a couple of days  -TLS labs   #Dysphagia, improved with dexamethasone   - Low pathogen diet  #Weight loss  -Secondary to dysphagia  -Nutrition consulted on admit  #Constipation, improved  -Bowel regimen with scheduled Colace and Miralax      CARDS/PULM  - Onco echo (10/27): LVSF normal with a 60-65% estimated EF. RVSP WNL. No prior echo available for comparison.  #Acute hypoxic respiratory failure  #b/l moderate to large pleural effusion s/p thoracocentesis and chest tube placement 10/29  #Sinus bradycardia  - (10/28) worsening O2 demand, 90% on 2L -> 92% on 5L -->Transferred to MICU -->10/31 saturating 100% on 8 NC -->transferred to S3, trying to wean oxygen  - CXR (10/28) showing Bilateral Pleural Effusions s/p thora and rt chest tube placement  - CTA chest (10/29) No evidence of  acute pulmonary embolism. Persistent moderate-to-large sized bilateral pleural effusions  - Pleural fluid cytology showed highly atypical lymphocytes present consistent with patient's history of high-grade lymphoma, pleural fluid cx NGTD   - Chest tube pulled 11/5  -CXR 11/5 showed pneumothorax after pulling out chest tube. Asymptomatic and on 6L NC as per pulm to help reabsorb pneumothorax. Repeating CXR at 3 pm showed worsening pneumothorax. Plan to reinsert chest tube   - Pulm on board       MISC:  #anxiety, agitation   - lorazepam 0.5mg prn q8hr  #Pain management  -Oxy PRN q6hr  #Fertility preservation  - onco-fertility consulted and following  - Syphilis nonreactive, gonorrhea neg, chlamydia neg  - per onco-fertility decided not to pursue sperm banking (10/28)    DISPO:  Full code  Primary Onc: Dr. Mccall  Access: DL PICC solo (10/28)  DVT ppx: Lovenox HELD since chest tube pulling today and is ambulatory  NOK: Deidra Pickett (Mother) - [phone]     Patient seen, examined and discussed with Dr. Latoya Eduardo MD

## 2023-11-05 NOTE — PROGRESS NOTES
PULMONARY PROGRESS NOTE    Subjective  No acute overnight events. This morning patient reports feeling well. Denies any concerns including SOB, chest pain, HENSLEY, hemoptysis, or any other respiratory symptoms.      Objective  Vital signs in last 24 hours:  Heart Rate:  [54-74]   Temp:  [36 °C (96.8 °F)-37.4 °C (99.3 °F)]   Resp:  [16-18]   BP: (116-134)/(68-88)   SpO2:  [98 %-100 %]      Physical Exam  Constitutional:       Appearance: He is not ill-appearing.   Cardiovascular:      Rate and Rhythm: Normal rate and regular rhythm.      Pulses: Normal pulses.      Heart sounds: Normal heart sounds. No murmur heard.  Pulmonary:      Effort: Pulmonary effort is normal. No respiratory distress.      Breath sounds: Normal breath sounds. No wheezing or rhonchi.   Chest:      Chest wall: No tenderness.   Abdominal:      General: Abdomen is flat. Bowel sounds are normal. There is no distension.      Palpations: Abdomen is soft.      Tenderness: There is no abdominal tenderness. There is no guarding or rebound.   Musculoskeletal:         General: No swelling or tenderness.      Right lower leg: No edema.      Left lower leg: No edema.   Skin:     General: Skin is warm and dry.   Neurological:      General: No focal deficit present.      Mental Status: He is alert and oriented to person, place, and time. Mental status is at baseline.         Current Facility-Administered Medications:     acyclovir (Zovirax) tablet 400 mg, 400 mg, oral, BID, Olegario Klein MD, 400 mg at 11/05/23 0911    albuterol 2.5 mg /3 mL (0.083 %) nebulizer solution 3 mL, 3 mL, nebulization, PRN, Olegario Klein MD    allopurinol (Zyloprim) tablet 300 mg, 300 mg, oral, Daily, Olegario Klein MD, 300 mg at 11/05/23 0911    alteplase (Cathflo Activase) injection 2 mg, 2 mg, intra-catheter, PRN, Olegario Klein MD    dextrose 5 % in water (D5W) bolus, 500 mL, intravenous, PRN, Olegario Klein MD    diphenhydrAMINE (BENADryl) injection 50 mg, 50 mg, intravenous, PRN,  Olegario Klein MD    docusate sodium (Colace) capsule 100 mg, 100 mg, oral, BID PRN, Olegario Klein MD    [Held by provider] enoxaparin (Lovenox) syringe 40 mg, 40 mg, subcutaneous, Daily, Olegario Klein MD    EPINEPHrine (Adrenalin) 1 mg/10mL injection 0.3 mg, 0.3 mg, intramuscular, q5 min PRN, Olegario Klein MD    famotidine PF (Pepcid) injection 20 mg, 20 mg, intravenous, PRN, Olegario Klein MD    filgrastim-sndz (Zarxio) injection 300 mcg, 300 mcg, subcutaneous, Daily, Olegario Klein MD, 300 mcg at 11/05/23 0911    iohexol (OMNIPaque) 12 mg iodine/mL oral contrast 500 mL, 500 mL, oral, Once in imaging, Anya POLO MD    LORazepam (Ativan) tablet 0.5 mg, 0.5 mg, oral, q8h PRN, Olegario Klein MD    magic mouthwash (lidocaine, diphenhydrAMINE, Maalox 1:1:1), 10 mL, Swish & Spit, q6h PRN, Olgeario Klein MD    methylPREDNISolone sod succinate (PF) (SOLU-Medrol) 40 mg/mL injection 40 mg, 40 mg, intravenous, PRN, Olegario Klein MD    naloxone (Narcan) injection 0.2 mg, 0.2 mg, intravenous, q5 min PRN, Olegario Klein MD    oxyCODONE (Roxicodone) immediate release tablet 10 mg, 10 mg, oral, q6h PRN, Olegario Klein MD, 10 mg at 10/28/23 2147    oxyCODONE (Roxicodone) immediate release tablet 5 mg, 5 mg, oral, q6h PRN, Olegario Klein MD, 5 mg at 10/27/23 0234    oxygen (O2) therapy, , inhalation, Continuous PRN - O2/gases, Olegario Klein MD, 2 L/min at 10/31/23 1800    oxygen (O2) therapy, , inhalation, Continuous PRN - O2/gases, Olegario Klein MD    pantoprazole (ProtoNix) EC tablet 40 mg, 40 mg, oral, Daily before breakfast, Olegario Klein MD, 40 mg at 11/05/23 0911    polyethylene glycol (Glycolax, Miralax) packet 17 g, 17 g, oral, Daily PRN, Olegario Klein MD    prochlorperazine (Compazine) injection 10 mg, 10 mg, intravenous, q6h PRN, Olegario Klein MD, 10 mg at 11/02/23 0902    prochlorperazine (Compazine) injection 10 mg, 10 mg, intravenous, Once, Anya POLO MD    sodium chloride 0.9 % bolus 500 mL, 500 mL, intravenous,  PRN, Olegario Klein MD    sodium chloride 0.9% infusion, 75 mL/hr, intravenous, Continuous, Olegario Klein MD, Last Rate: 75 mL/hr at 11/04/23 1354, 75 mL/hr at 11/04/23 1354    white petrolatum (Aquaphor) 41 % ointment 1 Application, 1 Application, Topical, q1h PRN, Olegario Klein MD     Results:  Lab Results   Component Value Date    WBC 10.2 11/05/2023    HGB 9.7 (L) 11/05/2023    HCT 29.3 (L) 11/05/2023    MCV 88 11/05/2023     11/05/2023     Lab Results   Component Value Date    GLUCOSE 83 11/05/2023    CALCIUM 8.2 (L) 11/05/2023     11/05/2023    K 3.9 11/05/2023    CO2 27 11/05/2023     11/05/2023    BUN 14 11/05/2023    CREATININE 0.66 11/05/2023       CXR 11/4/23  IMPRESSION:  1. Slight interval decrease in size of the tiny right apical  pneumothorax with stable positioning of a right basilar pigtail chest  tube.  2. No new focal consolidation or pleural effusion.  3. Stable right upper extremity PICC line.    Assessment/Plan:  23 y.o. male with no PMH who was admitted with a new diagnosis of high-grade Burkitt lymphoma.  Oncology plan is to start hyperCVAD later today.  Pulmonary is consulted to evaluate for thoracentesis in the setting of bilateral pleural effusions and new hypoxic respiratory failure.  Unfortunately patient received lovenox ppx this am; given that he has no dyspnea without increased work of breathing, will plan for R-sided thoracentesis on 10/30 to reduce risk of bleeding.       #R apical PTX  #Bilateral pleural effusions, s/p RT pig tail on 10/29 in MICU 1.5 liter drained initially exudative, Left throacetesis done on 10/30 - 1 liter drained serosanguinous exudative too   #Acute hypoxic respiratory failure (resolved)  #Burkitt lymphoma     Cytology on 10/29  Highly atypical lymphocytes present consistent with patient's history of high-grade lymphoma.     Plan:  -removed pigtail this morning as minimal pleural fluid drainage over past 2 days  -subsequent CXR with  worsening PTX in R apex  -most likely this is air entrapment from tubing as there were loose connections noted during pigtail removal  -will continue conservative management of R PTX for now  -please place patient on O2 as tolerated for reabsorption of PTX  -repeat CXR at 4pm and at 6am on 11/6  -please [place] at [phone] with any clinical worsening or questions, or if at night please call the MICU fellow for assistance at [phone]      Pallavi Sharma, MD   Pulmonary & Critical Care Fellow

## 2023-11-05 NOTE — CARE PLAN
The patient's goals for the shift include      The clinical goals for the shift include pt will remain hemodynamically stable throughout shift.    Pt VSS afebrile no complaints overnight.  Patient had one episode of emesis but refused nausea meds.  Pt did state that he was able to rest more overnight after episode of emesis.  Patient continues with chest tube minimal drainage.  Will continue to monitor drainage and nausea.

## 2023-11-06 ENCOUNTER — APPOINTMENT (OUTPATIENT)
Dept: RADIOLOGY | Facility: HOSPITAL | Age: 23
DRG: 840 | End: 2023-11-06
Payer: COMMERCIAL

## 2023-11-06 LAB
ALBUMIN SERPL BCP-MCNC: 3 G/DL (ref 3.4–5)
ANION GAP SERPL CALC-SCNC: 11 MMOL/L (ref 10–20)
APPEARANCE CSF: CLEAR
BACTERIA FLD CULT: ABNORMAL
BASOPHILS # BLD AUTO: 0.05 X10*3/UL (ref 0–0.1)
BASOPHILS NFR BLD AUTO: 1.2 %
BASOPHILS NFR CSF MANUAL: 0 %
BLASTS CSF MANUAL: 0 %
BUN SERPL-MCNC: 14 MG/DL (ref 6–23)
CALCIUM SERPL-MCNC: 8.3 MG/DL (ref 8.6–10.6)
CHLORIDE SERPL-SCNC: 99 MMOL/L (ref 98–107)
CO2 SERPL-SCNC: 27 MMOL/L (ref 21–32)
COLOR CSF: COLORLESS
COLOR SPUN CSF: COLORLESS
CREAT SERPL-MCNC: 0.56 MG/DL (ref 0.5–1.3)
EOSINOPHIL # BLD AUTO: 0.13 X10*3/UL (ref 0–0.7)
EOSINOPHIL NFR BLD AUTO: 3 %
EOSINOPHIL NFR CSF MANUAL: 0 %
ERYTHROCYTE [DISTWIDTH] IN BLOOD BY AUTOMATED COUNT: 12.8 % (ref 11.5–14.5)
GFR SERPL CREATININE-BSD FRML MDRD: >90 ML/MIN/1.73M*2
GLUCOSE CSF-MCNC: 59 MG/DL (ref 40–70)
GLUCOSE SERPL-MCNC: 86 MG/DL (ref 74–99)
GRAM STN SPEC: ABNORMAL
GRAM STN SPEC: ABNORMAL
HCT VFR BLD AUTO: 27.2 % (ref 41–52)
HGB BLD-MCNC: 9.3 G/DL (ref 13.5–17.5)
IMM GRANULOCYTES # BLD AUTO: 0.35 X10*3/UL (ref 0–0.7)
IMM GRANULOCYTES NFR BLD AUTO: 8.1 % (ref 0–0.9)
IMM GRANULOCYTES NFR CSF: 0 %
LDH SERPL L TO P-CCNC: 359 U/L (ref 84–246)
LYMPHOCYTES # BLD AUTO: 0.31 X10*3/UL (ref 1.2–4.8)
LYMPHOCYTES NFR BLD AUTO: 7.2 %
LYMPHOCYTES NFR CSF MANUAL: 50 % (ref 28–96)
MCH RBC QN AUTO: 30.1 PG (ref 26–34)
MCHC RBC AUTO-ENTMCNC: 34.2 G/DL (ref 32–36)
MCV RBC AUTO: 88 FL (ref 80–100)
MONOCYTES # BLD AUTO: 0.04 X10*3/UL (ref 0.1–1)
MONOCYTES NFR BLD AUTO: 0.9 %
MONOS+MACROS NFR CSF MANUAL: 29 % (ref 16–56)
NEUTROPHILS # BLD AUTO: 3.45 X10*3/UL (ref 1.2–7.7)
NEUTROPHILS NFR BLD AUTO: 79.6 %
NEUTS SEG NFR CSF MANUAL: 21 % (ref 0–5)
NRBC BLD-RTO: 0 /100 WBCS (ref 0–0)
OTHER CELLS NFR CSF MANUAL: 0 %
PHOSPHATE SERPL-MCNC: 2.9 MG/DL (ref 2.5–4.9)
PLASMA CELLS NFR CSF MICRO: 0 %
PLATELET # BLD AUTO: 211 X10*3/UL (ref 150–450)
POTASSIUM SERPL-SCNC: 3.8 MMOL/L (ref 3.5–5.3)
PROT CSF-MCNC: 22 MG/DL (ref 15–45)
RBC # BLD AUTO: 3.09 X10*6/UL (ref 4.5–5.9)
RBC # CSF AUTO: 20 /UL (ref 0–5)
RBC MORPH BLD: NORMAL
SODIUM SERPL-SCNC: 133 MMOL/L (ref 136–145)
TOTAL CELLS COUNTED CSF: 28
TUBE # CSF: ABNORMAL
URATE SERPL-MCNC: 2 MG/DL (ref 4–7.5)
WBC # BLD AUTO: 4.3 X10*3/UL (ref 4.4–11.3)
WBC # CSF AUTO: 2 /UL (ref 1–5)

## 2023-11-06 PROCEDURE — 84550 ASSAY OF BLOOD/URIC ACID: CPT | Performed by: INTERNAL MEDICINE

## 2023-11-06 PROCEDURE — 62270 DX LMBR SPI PNXR: CPT | Performed by: PHYSICIAN ASSISTANT

## 2023-11-06 PROCEDURE — 2500000004 HC RX 250 GENERAL PHARMACY W/ HCPCS (ALT 636 FOR OP/ED): Performed by: INTERNAL MEDICINE

## 2023-11-06 PROCEDURE — 2500000001 HC RX 250 WO HCPCS SELF ADMINISTERED DRUGS (ALT 637 FOR MEDICARE OP): Performed by: INTERNAL MEDICINE

## 2023-11-06 PROCEDURE — 71045 X-RAY EXAM CHEST 1 VIEW: CPT | Mod: FY

## 2023-11-06 PROCEDURE — 83615 LACTATE (LD) (LDH) ENZYME: CPT | Performed by: INTERNAL MEDICINE

## 2023-11-06 PROCEDURE — 88184 FLOWCYTOMETRY/ TC 1 MARKER: CPT | Mod: TC | Performed by: PHYSICIAN ASSISTANT

## 2023-11-06 PROCEDURE — 3E0R305 INTRODUCTION OF OTHER ANTINEOPLASTIC INTO SPINAL CANAL, PERCUTANEOUS APPROACH: ICD-10-PCS | Performed by: PHYSICIAN ASSISTANT

## 2023-11-06 PROCEDURE — 80069 RENAL FUNCTION PANEL: CPT | Performed by: INTERNAL MEDICINE

## 2023-11-06 PROCEDURE — 2500000004 HC RX 250 GENERAL PHARMACY W/ HCPCS (ALT 636 FOR OP/ED)

## 2023-11-06 PROCEDURE — 96372 THER/PROPH/DIAG INJ SC/IM: CPT | Performed by: INTERNAL MEDICINE

## 2023-11-06 PROCEDURE — 82945 GLUCOSE OTHER FLUID: CPT | Performed by: PHYSICIAN ASSISTANT

## 2023-11-06 PROCEDURE — 99232 SBSQ HOSP IP/OBS MODERATE 35: CPT | Performed by: INTERNAL MEDICINE

## 2023-11-06 PROCEDURE — 71045 X-RAY EXAM CHEST 1 VIEW: CPT | Performed by: RADIOLOGY

## 2023-11-06 PROCEDURE — 1200000002 HC GENERAL ROOM WITH TELEMETRY DAILY

## 2023-11-06 PROCEDURE — 85025 COMPLETE CBC W/AUTO DIFF WBC: CPT | Performed by: INTERNAL MEDICINE

## 2023-11-06 PROCEDURE — 99232 SBSQ HOSP IP/OBS MODERATE 35: CPT | Performed by: STUDENT IN AN ORGANIZED HEALTH CARE EDUCATION/TRAINING PROGRAM

## 2023-11-06 PROCEDURE — 88104 CYTOPATH FL NONGYN SMEARS: CPT | Performed by: PHYSICIAN ASSISTANT

## 2023-11-06 PROCEDURE — 009U3ZZ DRAINAGE OF SPINAL CANAL, PERCUTANEOUS APPROACH: ICD-10-PCS | Performed by: PHYSICIAN ASSISTANT

## 2023-11-06 PROCEDURE — 84157 ASSAY OF PROTEIN OTHER: CPT | Performed by: PHYSICIAN ASSISTANT

## 2023-11-06 PROCEDURE — A4216 STERILE WATER/SALINE, 10 ML: HCPCS | Performed by: INTERNAL MEDICINE

## 2023-11-06 PROCEDURE — 89051 BODY FLUID CELL COUNT: CPT | Performed by: PHYSICIAN ASSISTANT

## 2023-11-06 PROCEDURE — 88187 FLOWCYTOMETRY/READ 2-8: CPT | Performed by: PATHOLOGY

## 2023-11-06 RX ORDER — LORAZEPAM 2 MG/ML
0.5 INJECTION INTRAMUSCULAR ONCE
Status: COMPLETED | OUTPATIENT
Start: 2023-11-06 | End: 2023-11-06

## 2023-11-06 RX ORDER — ONDANSETRON HYDROCHLORIDE 2 MG/ML
8 INJECTION, SOLUTION INTRAVENOUS EVERY 8 HOURS
Status: DISCONTINUED | OUTPATIENT
Start: 2023-11-06 | End: 2023-11-07

## 2023-11-06 RX ORDER — PROCHLORPERAZINE EDISYLATE 5 MG/ML
10 INJECTION INTRAMUSCULAR; INTRAVENOUS EVERY 6 HOURS PRN
Status: DISCONTINUED | OUTPATIENT
Start: 2023-11-06 | End: 2023-11-11 | Stop reason: HOSPADM

## 2023-11-06 RX ADMIN — PANTOPRAZOLE SODIUM 40 MG: 40 TABLET, DELAYED RELEASE ORAL at 09:07

## 2023-11-06 RX ADMIN — ONDANSETRON 8 MG: 2 INJECTION INTRAMUSCULAR; INTRAVENOUS at 20:26

## 2023-11-06 RX ADMIN — FILGRASTIM-SNDZ 300 MCG: 300 INJECTION, SOLUTION INTRAVENOUS; SUBCUTANEOUS at 09:07

## 2023-11-06 RX ADMIN — ACYCLOVIR 400 MG: 400 TABLET ORAL at 09:07

## 2023-11-06 RX ADMIN — ONDANSETRON 8 MG: 2 INJECTION INTRAMUSCULAR; INTRAVENOUS at 12:40

## 2023-11-06 RX ADMIN — ALLOPURINOL 300 MG: 100 TABLET ORAL at 09:07

## 2023-11-06 RX ADMIN — LORAZEPAM 0.5 MG: 2 INJECTION INTRAMUSCULAR; INTRAVENOUS at 16:10

## 2023-11-06 RX ADMIN — PROCHLORPERAZINE EDISYLATE 10 MG: 5 INJECTION INTRAMUSCULAR; INTRAVENOUS at 09:10

## 2023-11-06 RX ADMIN — ACYCLOVIR 400 MG: 400 TABLET ORAL at 20:33

## 2023-11-06 RX ADMIN — CYTARABINE 100 MG: 100 INJECTION, SOLUTION INTRATHECAL; INTRAVENOUS; SUBCUTANEOUS at 16:45

## 2023-11-06 RX ADMIN — SODIUM CHLORIDE 75 ML/HR: 9 INJECTION, SOLUTION INTRAVENOUS at 12:40

## 2023-11-06 ASSESSMENT — COGNITIVE AND FUNCTIONAL STATUS - GENERAL
DRESSING REGULAR UPPER BODY CLOTHING: A LITTLE
HELP NEEDED FOR BATHING: A LITTLE
CLIMB 3 TO 5 STEPS WITH RAILING: A LITTLE
DAILY ACTIVITIY SCORE: 24
MOBILITY SCORE: 22
WALKING IN HOSPITAL ROOM: A LITTLE
DAILY ACTIVITIY SCORE: 22
MOBILITY SCORE: 24

## 2023-11-06 ASSESSMENT — PAIN - FUNCTIONAL ASSESSMENT
PAIN_FUNCTIONAL_ASSESSMENT: 0-10

## 2023-11-06 ASSESSMENT — PAIN SCALES - GENERAL
PAINLEVEL_OUTOF10: 0 - NO PAIN

## 2023-11-06 NOTE — PROGRESS NOTES
Shilo Thakkar is a 23 y.o. male on day 11 of admission presenting with Burkitt lymphoma (CMS/HCC).    Subjective   No complaints. Denies any fevers, chills, night sweats, SOB, CP, N/V, HA, cough, urinary symptoms, bleeding, bruising.     Objective   Physical Exam  HENT:      Head: Normocephalic and atraumatic.      Nose: Nose normal.      Mouth/Throat:      Mouth: Mucous membranes are moist.      Comments: Ulcerated oropharyngeal mass improved   Eyes:      Extraocular Movements: Extraocular movements intact.      Pupils: Pupils are equal, round, and reactive to light.   Cardiovascular:      Rate and Rhythm: Normal rate and regular rhythm.   Pulmonary:      Effort: Pulmonary effort is normal.      Comments: No chest tube  Abdominal:      General: Abdomen is flat. Bowel sounds are normal.      Palpations: Abdomen is soft.   Musculoskeletal:         General: Normal range of motion.      Cervical back: Normal range of motion and neck supple.   Skin:     General: Skin is warm and dry.   Neurological:      General: No focal deficit present.      Mental Status: He is alert and oriented to person, place, and time.   Psychiatric:         Mood and Affect: Mood normal.         Behavior: Behavior normal.        Assessment/Plan   Principal Problem:    Burkitt lymphoma (CMS/HCC)  Active Problems:    Generalized weakness    Acute hypoxic respiratory failure (CMS/HCC)    Neck mass    Shilo Thakkar is a 23 y.o. male with no significant medical history presenting to the ED as a referral from outpatient clinic with dysphagia and abdominal pain for the past 3 weeks. Patient initially presented to [facility] ED on 10/19 with 2 weeks of worsening abdominal pain, dysphagia, and 20lb weight loss. Imaging revealed a pancreatic mass, mesenteric LAD, left oropharyngeal mass, and cervical LAD. He had ultrasound guided mesenteric LN biopsy 10/20 and then patient was transferred to [facility] for further work-up. He had biopsy of the oropharyngeal mass and  cervical lymph node by ENT 10/23/23. He was discharged home and presented to outpatient clinic to establish care on 10/26. Given biopsy is concerning for lymphoma, patient was referred to the ED for further workup and management, now admitted to [facility] for further workup and management of new Burkitt Lymphoma.      ONC:  #Burkitt lymphoma, newly diagnosed  - 10/19/23 presented to [facility] ED with dysphagia and abdominal pain  - CT A/P with IV contrast (10/19) mass in the body of the pancreas measuring approximately 2.7 x 2.9 cm. The pancreatic duct is dilated. Multiple masses in the mesentery as well as omental caking consistent with carcinomatosis. Moderate amount of free fluid throughout the abdomen and pelvis.   - CT soft tissue neck with IV contrast (10/19): homogeneous soft tissue attenuation lesion in the L oropharynx which measures approximately 3.0 x 4.0 x 5.1 cm.   - CT Abdomen w/o IV contrast (10/20): Mesenteric lymphadenopathy.   - CT chest with IV contrast (10/20): Moderate bilateral pleural effusions.   - US guided mesenteric LN biopsy (10/20/23): HIGH GRADE B-CELL LYMPHOMA MORPHOLOGICALLY CONSISTENT WITH BURKITT LYMPHOMA; flow cytometry: No B cell population identified  - CT head w/o IV contrast (10/22): No acute intracranial hemorrhage or mass effect.   - Biopsy L oropharyngeal mass and L cervical LN by ENT (10/23/23) high grade B-cell burkitt lymphoma  - MR brain with and w/o IV contrast (10/27): No evidence of intracranial metastatic disease.     - Dexa 40 mg daily ended 11/1  - PET CT ordered 10/30 showed FDG avid left oropharyngeal mass and LAD above and below diaphragm  - CT abdo/pelvis showed no bowel involvement, b/l renal infarcts, left sided small pneumothorax,   - Started HyperCVAD 10/29  - Bone marrow bx completed 11/1, pending results  - completed LP + IT methotrexate 11/02 D5C1  - Receiving rituximab 11/3 and 11/10 D13C1  - IT cytarabine 11/6 for D9C1    HEME:  #anemia, likely 2/2  disease  -Transfuse to keep Hgb>7, Plt>10  -DVT ppx: SCDs     ID:  Allergies: NKDA  Ppx: acyclovir; Start bactrim MWF at discharge  -Plan Levaquin for neutropenia  -Zosyn (10/30 -->11/2), Cefepime 1 gm q8 (11/2-->11/4)   -BCX x2 (10/21): NGTD, pleural cx gm +ve cocci in clusters likely contaminant  -Vancomycin 11/1-->11/3  -Will start levaquin when neutropenic in future  -ID consulted recommend dc'ing all antibiotics  - lactate (10/27) 1.6 -->1.2 (11/2)  # viral workup  - Covid neg (10/26)  -Hepatitis serology negative (10/26/23), HIV 1/2 nonreactive (10/26), EBV neg (10/27)     FEN/GI  Admit weight: 62.6kg  Ppx: Protonix started on admission  Replete electrolytes as needed  #TLS monitoring  -Allopurinol 300mg daily dc'd 11/6  -G6PD normal  -IVF 75ml/hr, dc in a couple of days  -TLS labs   #Dysphagia, improved with dexamethasone   - Low pathogen diet  #Weight loss  -Secondary to dysphagia  -Nutrition consulted on admit  #Constipation, improved  -Bowel regimen with scheduled Colace and Miralax      CARDS/PULM  - Onco echo (10/27): LVSF normal with a 60-65% estimated EF. RVSP WNL. No prior echo available for comparison.  #b/l moderate to large pleural effusion s/p thoracocentesis and chest tube placement 10/29  #Sinus bradycardia  - (10/28) worsening O2 demand, 90% on 2L -> 92% on 5L -->Transferred to MICU -->10/31 saturating 100% on 8 NC -->transferred to S3, trying to wean oxygen  - CXR (10/28) showing Bilateral Pleural Effusions s/p thora and rt chest tube placement  - CTA chest (10/29) No evidence of acute pulmonary embolism. Persistent moderate-to-large sized bilateral pleural effusions  - Pleural fluid cytology showed highly atypical lymphocytes present consistent with patient's history of high-grade lymphoma, pleural fluid cx NGTD   - Chest tube pulled 11/5  -CXR 11/5 showed pneumothorax after pulling out chest tube. Asymptomatic and on O2 via NC as per pulm to help reabsorb pneumothorax. Repeat CXR showing  resolution   - Pulm on board       MISC:  #anxiety, agitation   - lorazepam 0.5mg prn q8hr  #Pain management  -Oxy PRN q6hr  #Fertility preservation  - onco-fertility consulted and following  - Syphilis nonreactive, gonorrhea neg, chlamydia neg  - per onco-fertility decided not to pursue sperm banking (10/28)    DISPO:  Full code  Primary Onc: Dr. Mccall  Access: DL PICC solo (10/28)  DVT ppx: ambulatory  NOK: Deidra Pickett (Mother) - [phone]     Patient seen, examined and discussed with Dr. Mccall.     Mariann Eduardo MD

## 2023-11-06 NOTE — SIGNIFICANT EVENT
11/06/23 1226   Prechemo Checklist   Has the patient been in the hospital, ED, or urgent care since last date of service No   Chemo/Immuno Consent Signed Yes   Protocol/Indications Verified Yes   Confirmed to previous date/time of medication Yes   Compared to previous dose N/A   All medications are dated accurately Yes   Pregnancy Test Negative Not applicable   Parameters Met Yes   BSA/Weight-Height Verified Yes  (Not needed, flat dose)   Dose Calculations Verified Yes  (Not needed, flat dose)

## 2023-11-06 NOTE — CARE PLAN
The clinical goals for the shift include Patient will remain hemodynamically stable throughout shift.    VSS, afebrile, no complaints of diarrhea this shift. Pt remained safe and free of falls/injury. No pain reported this shift.     Patient with c/o of nausea and emesis. Pt received compazine x1 today, and scheduled zofran was started for patient's nausea plan. Patient received scheduled dose of IT cytarabine at bedside without complication. Pt received a 1x dose of ativan to prevent nausea from getting worse post IT chemotherapy.     Pt reports nausea/vomiting is much better controlled this evening.

## 2023-11-06 NOTE — PROGRESS NOTES
PULMONARY PROGRESS NOTE    Subjective  No acute overnight events. This morning patient reports feeling well. Denies any concerns including SOB, chest pain, HENSLEY, hemoptysis, or any other respiratory symptoms.      Objective  Vital signs in last 24 hours:  Heart Rate:  [49-58]   Temp:  [35.8 °C (96.4 °F)-36.8 °C (98.2 °F)]   Resp:  [14-18]   BP: (122-140)/(70-85)   SpO2:  [99 %-100 %]    Physical Exam  IN no acute distress  Clear lungs  RRR  No edema in legs   Crepitus under skin on right chest   Room Air       Current Facility-Administered Medications:     acyclovir (Zovirax) tablet 400 mg, 400 mg, oral, BID, Olegario Klein MD, 400 mg at 11/06/23 0907    albuterol 2.5 mg /3 mL (0.083 %) nebulizer solution 3 mL, 3 mL, nebulization, PRN, Olegario Klein MD    alteplase (Cathflo Activase) injection 2 mg, 2 mg, intra-catheter, PRN, Olegario Klein MD    dextrose 5 % in water (D5W) bolus, 500 mL, intravenous, PRN, Olegario Klein MD    diphenhydrAMINE (BENADryl) injection 50 mg, 50 mg, intravenous, PRN, Olegario Klein MD    docusate sodium (Colace) capsule 100 mg, 100 mg, oral, BID PRN, Olegario Klein MD    [Held by provider] enoxaparin (Lovenox) syringe 40 mg, 40 mg, subcutaneous, Daily, Olegario Klein MD    EPINEPHrine (Adrenalin) 1 mg/10mL injection 0.3 mg, 0.3 mg, intramuscular, q5 min PRN, Olegario Klein MD    famotidine PF (Pepcid) injection 20 mg, 20 mg, intravenous, PRN, Olegario Klein MD    filgrastim-sndz (Zarxio) injection 300 mcg, 300 mcg, subcutaneous, Daily, Olegario Klein MD, 300 mcg at 11/06/23 0907    magic mouthwash (lidocaine, diphenhydrAMINE, Maalox 1:1:1), 10 mL, Swish & Spit, q6h PRN, Olegario Klein MD    methylPREDNISolone sod succinate (PF) (SOLU-Medrol) 40 mg/mL injection 40 mg, 40 mg, intravenous, PRN, Olegario Klein MD    naloxone (Narcan) injection 0.2 mg, 0.2 mg, intravenous, q5 min PRN, Olegario Klein MD    ondansetron (Zofran) injection 8 mg, 8 mg, intravenous, q8h, Mariel Zimmerman PA-C, 8 mg at 11/06/23  1240    oxyCODONE (Roxicodone) immediate release tablet 10 mg, 10 mg, oral, q6h PRN, Olegario Klein MD, 10 mg at 10/28/23 2147    oxyCODONE (Roxicodone) immediate release tablet 5 mg, 5 mg, oral, q6h PRN, Olegario Klein MD, 5 mg at 10/27/23 0234    oxygen (O2) therapy, , inhalation, Continuous PRN - O2/gases, Olegario Klein MD, 2 L/min at 10/31/23 1800    oxygen (O2) therapy, , inhalation, Continuous PRN - O2/gases, Olegario Klein MD    pantoprazole (ProtoNix) EC tablet 40 mg, 40 mg, oral, Daily before breakfast, Olegario Klein MD, 40 mg at 11/06/23 0907    polyethylene glycol (Glycolax, Miralax) packet 17 g, 17 g, oral, Daily PRN, Olegario Klein MD    prochlorperazine (Compazine) injection 10 mg, 10 mg, intravenous, Once, Anya POLO MD    prochlorperazine (Compazine) injection 10 mg, 10 mg, intravenous, q6h PRN, Mariel Zimmerman PA-C    sodium chloride (Ocean) 0.65 % nasal spray 1 spray, 1 spray, Each Nostril, PRN, Olegario Klein MD    sodium chloride 0.9 % bolus 500 mL, 500 mL, intravenous, PRN, Olegario Klein MD    sodium chloride 0.9% infusion, 75 mL/hr, intravenous, Continuous, Olegario Klein MD, Last Rate: 75 mL/hr at 11/06/23 1240, 75 mL/hr at 11/06/23 1240    white petrolatum (Aquaphor) 41 % ointment 1 Application, 1 Application, Topical, q1h PRN, Olegario Klein MD     Results:  Lab Results   Component Value Date    WBC 4.3 (L) 11/06/2023    HGB 9.3 (L) 11/06/2023    HCT 27.2 (L) 11/06/2023    MCV 88 11/06/2023     11/06/2023     Lab Results   Component Value Date    GLUCOSE 86 11/06/2023    CALCIUM 8.3 (L) 11/06/2023     (L) 11/06/2023    K 3.8 11/06/2023    CO2 27 11/06/2023    CL 99 11/06/2023    BUN 14 11/06/2023    CREATININE 0.56 11/06/2023       CXR 11/4/23  IMPRESSION:  1. Slight interval decrease in size of the tiny right apical  pneumothorax with stable positioning of a right basilar pigtail chest  tube.  2. No new focal consolidation or pleural effusion.  3. Stable right upper extremity  PICC line.    Assessment/Plan:  23 y.o. male with no PMH who was admitted with a new diagnosis of high-grade Burkitt lymphoma.  Oncology plan is to start hyperCVAD later today.  Pulmonary is consulted to evaluate for thoracentesis in the setting of bilateral pleural effusions and new hypoxic respiratory failure.  Unfortunately patient received lovenox ppx this am; given that he has no dyspnea without increased work of breathing, will plan for R-sided thoracentesis on 10/30 to reduce risk of bleeding.       #R apical PTX  #Bilateral pleural effusions, s/p RT pig tail on 10/29 in MICU 1.5 liter drained initially exudative, Left throacetesis done on 10/30 - 1 liter drained serosanguinous exudative too   #Acute hypoxic respiratory failure (resolved)  #Burkitt lymphoma     Cytology on 10/29  Highly atypical lymphocytes present consistent with patient's history of high-grade lymphoma.   Removed pig tail on 11/5 and had mild worsening of pneumothorax and Subcutaneous emphysema, patient asymptomatic, repeat CXR 11/6 no read but looks better and Subcutaneous air will resolve spontaneously with time.     Pulmonary will sign off, please perform CXR in case of SOB, chest pain, or hypoxia and call pulmonary back.       Claire Bruce MD   Pulmonary & Critical Care Fellow

## 2023-11-07 LAB
ALBUMIN SERPL BCP-MCNC: 3 G/DL (ref 3.4–5)
ANION GAP SERPL CALC-SCNC: 14 MMOL/L (ref 10–20)
BASOPHILS # BLD AUTO: 0.01 X10*3/UL (ref 0–0.1)
BASOPHILS NFR BLD AUTO: 1.3 %
BUN SERPL-MCNC: 16 MG/DL (ref 6–23)
CALCIUM SERPL-MCNC: 8.1 MG/DL (ref 8.6–10.6)
CHLORIDE SERPL-SCNC: 104 MMOL/L (ref 98–107)
CHROM ANALY OVERALL INTERP-IMP: NORMAL
CO2 SERPL-SCNC: 25 MMOL/L (ref 21–32)
CREAT SERPL-MCNC: 0.66 MG/DL (ref 0.5–1.3)
ELECTRONICALLY COSIGNED BY CYTOGENETICS: NORMAL
ELECTRONICALLY SIGNED BY CYTOGENETICS: NORMAL
EOSINOPHIL # BLD AUTO: 0.07 X10*3/UL (ref 0–0.7)
EOSINOPHIL NFR BLD AUTO: 8.8 %
ERYTHROCYTE [DISTWIDTH] IN BLOOD BY AUTOMATED COUNT: 13.2 % (ref 11.5–14.5)
GFR SERPL CREATININE-BSD FRML MDRD: >90 ML/MIN/1.73M*2
GLUCOSE SERPL-MCNC: 91 MG/DL (ref 74–99)
HCT VFR BLD AUTO: 26.9 % (ref 41–52)
HGB BLD-MCNC: 9 G/DL (ref 13.5–17.5)
IMM GRANULOCYTES # BLD AUTO: 0.06 X10*3/UL (ref 0–0.7)
IMM GRANULOCYTES NFR BLD AUTO: 7.5 % (ref 0–0.9)
LDH SERPL L TO P-CCNC: 298 U/L (ref 84–246)
LYMPHOCYTES # BLD AUTO: 0.25 X10*3/UL (ref 1.2–4.8)
LYMPHOCYTES NFR BLD AUTO: 31.3 %
MCH RBC QN AUTO: 29.5 PG (ref 26–34)
MCHC RBC AUTO-ENTMCNC: 33.5 G/DL (ref 32–36)
MCV RBC AUTO: 88 FL (ref 80–100)
MONOCYTES # BLD AUTO: 0.02 X10*3/UL (ref 0.1–1)
MONOCYTES NFR BLD AUTO: 2.5 %
NEUTROPHILS # BLD AUTO: 0.39 X10*3/UL (ref 1.2–7.7)
NEUTROPHILS NFR BLD AUTO: 48.6 %
NRBC BLD-RTO: 0 /100 WBCS (ref 0–0)
PATH REPORT.ADDENDUM SPEC: NORMAL
PATH REPORT.COMMENTS IMP SPEC: NORMAL
PATH REPORT.FINAL DX SPEC: NORMAL
PATH REPORT.GROSS SPEC: NORMAL
PATH REPORT.MICROSCOPIC SPEC OTHER STN: NORMAL
PATH REPORT.RELEVANT HX SPEC: NORMAL
PATH REPORT.TOTAL CANCER: NORMAL
PATH REVIEW-CELL CT,CSF: NORMAL
PHOSPHATE SERPL-MCNC: 3.3 MG/DL (ref 2.5–4.9)
PLATELET # BLD AUTO: 220 X10*3/UL (ref 150–450)
POTASSIUM SERPL-SCNC: 3.9 MMOL/L (ref 3.5–5.3)
RBC # BLD AUTO: 3.05 X10*6/UL (ref 4.5–5.9)
RBC MORPH BLD: NORMAL
SODIUM SERPL-SCNC: 139 MMOL/L (ref 136–145)
STRUCT VAR ISCN NAME: NORMAL
URATE SERPL-MCNC: 1.9 MG/DL (ref 4–7.5)
WBC # BLD AUTO: 0.8 X10*3/UL (ref 4.4–11.3)

## 2023-11-07 PROCEDURE — 2500000004 HC RX 250 GENERAL PHARMACY W/ HCPCS (ALT 636 FOR OP/ED)

## 2023-11-07 PROCEDURE — 2500000004 HC RX 250 GENERAL PHARMACY W/ HCPCS (ALT 636 FOR OP/ED): Performed by: INTERNAL MEDICINE

## 2023-11-07 PROCEDURE — 85025 COMPLETE CBC W/AUTO DIFF WBC: CPT | Performed by: INTERNAL MEDICINE

## 2023-11-07 PROCEDURE — 83615 LACTATE (LD) (LDH) ENZYME: CPT | Performed by: INTERNAL MEDICINE

## 2023-11-07 PROCEDURE — 99232 SBSQ HOSP IP/OBS MODERATE 35: CPT | Performed by: INTERNAL MEDICINE

## 2023-11-07 PROCEDURE — 80069 RENAL FUNCTION PANEL: CPT | Performed by: INTERNAL MEDICINE

## 2023-11-07 PROCEDURE — 2500000001 HC RX 250 WO HCPCS SELF ADMINISTERED DRUGS (ALT 637 FOR MEDICARE OP): Performed by: INTERNAL MEDICINE

## 2023-11-07 PROCEDURE — 2500000004 HC RX 250 GENERAL PHARMACY W/ HCPCS (ALT 636 FOR OP/ED): Mod: JZ | Performed by: INTERNAL MEDICINE

## 2023-11-07 PROCEDURE — 96372 THER/PROPH/DIAG INJ SC/IM: CPT | Performed by: INTERNAL MEDICINE

## 2023-11-07 PROCEDURE — 84550 ASSAY OF BLOOD/URIC ACID: CPT | Performed by: INTERNAL MEDICINE

## 2023-11-07 PROCEDURE — 1200000002 HC GENERAL ROOM WITH TELEMETRY DAILY

## 2023-11-07 RX ORDER — LEVOFLOXACIN 500 MG/1
500 TABLET, FILM COATED ORAL
Status: DISCONTINUED | OUTPATIENT
Start: 2023-11-07 | End: 2023-11-11 | Stop reason: HOSPADM

## 2023-11-07 RX ORDER — ONDANSETRON HYDROCHLORIDE 2 MG/ML
8 INJECTION, SOLUTION INTRAVENOUS EVERY 8 HOURS PRN
Status: DISCONTINUED | OUTPATIENT
Start: 2023-11-07 | End: 2023-11-11 | Stop reason: HOSPADM

## 2023-11-07 RX ORDER — FLUCONAZOLE 150 MG/1
150 TABLET ORAL DAILY
Status: DISCONTINUED | OUTPATIENT
Start: 2023-11-07 | End: 2023-11-07 | Stop reason: DRUGHIGH

## 2023-11-07 RX ORDER — FLUCONAZOLE 200 MG/1
400 TABLET ORAL DAILY
Status: DISCONTINUED | OUTPATIENT
Start: 2023-11-08 | End: 2023-11-11 | Stop reason: HOSPADM

## 2023-11-07 RX ORDER — FLUCONAZOLE 200 MG/1
200 TABLET ORAL DAILY
Status: DISCONTINUED | OUTPATIENT
Start: 2023-11-07 | End: 2023-11-07

## 2023-11-07 RX ADMIN — ONDANSETRON 8 MG: 2 INJECTION INTRAMUSCULAR; INTRAVENOUS at 12:04

## 2023-11-07 RX ADMIN — FILGRASTIM-SNDZ 300 MCG: 300 INJECTION, SOLUTION INTRAVENOUS; SUBCUTANEOUS at 08:48

## 2023-11-07 RX ADMIN — ACYCLOVIR 400 MG: 400 TABLET ORAL at 08:48

## 2023-11-07 RX ADMIN — FLUCONAZOLE 200 MG: 200 TABLET ORAL at 10:47

## 2023-11-07 RX ADMIN — ACYCLOVIR 400 MG: 400 TABLET ORAL at 20:25

## 2023-11-07 RX ADMIN — PANTOPRAZOLE SODIUM 40 MG: 40 TABLET, DELAYED RELEASE ORAL at 08:48

## 2023-11-07 RX ADMIN — LEVOFLOXACIN 500 MG: 500 TABLET, FILM COATED ORAL at 10:47

## 2023-11-07 RX ADMIN — ONDANSETRON 8 MG: 2 INJECTION INTRAMUSCULAR; INTRAVENOUS at 04:46

## 2023-11-07 ASSESSMENT — COGNITIVE AND FUNCTIONAL STATUS - GENERAL
MOBILITY SCORE: 24
DAILY ACTIVITIY SCORE: 24
DAILY ACTIVITIY SCORE: 24
MOBILITY SCORE: 24
MOBILITY SCORE: 24
DAILY ACTIVITIY SCORE: 24

## 2023-11-07 ASSESSMENT — PAIN - FUNCTIONAL ASSESSMENT
PAIN_FUNCTIONAL_ASSESSMENT: 0-10

## 2023-11-07 ASSESSMENT — PAIN SCALES - GENERAL
PAINLEVEL_OUTOF10: 0 - NO PAIN

## 2023-11-07 NOTE — CARE PLAN
The patient's goals for the shift include  free from nausea/vomiting    The clinical goals for the shift include patient will remain free from nausea/vomiting this shift    Over the shift, the patient did not make progress toward the following goals. Barriers to progression include ongoing nausea.  Recommendations to address these barriers include scheduled zofran    Patient complained of a small nose bleed this morning. Stopped on its own with no interventions.

## 2023-11-07 NOTE — CONSULTS
"Nutrition Follow Up Assessment:   Nutrition Assessment      Since last nutrition visit pt. Has now began treatment for newly diagnosed burkitt lymphoma.     Nutrition History:    Food and Nutrient History: At time of visit with pt. Earlier today, Pt. Reports appetite is good. Was able to eat his breakfast this morning and had oatmeal. It does appear in review of EMR pt. With history of variable PO prior to today--Orders anywhere from 1-3 meals/day in review of meal orders.     Reports Has been drinking 2 cartons of ensure per day but prefers the chocolate flavor.    Food Allergies/Intolerances:  None  GI Symptoms: Previously had nausea and vomiting on 11/6 but pt. Stated that it has improved.     Oral Problems: None    Anthropometrics:  Height: 183.8 cm (6' 0.36\")   Weight: 67 kg (147 lb 11.3 oz)   BMI (Calculated): 19.83  IBW/kg (Dietitian Calculated): 80.9 kg  Percent of IBW: 82.8 %    Weight History:     Weight Change %:    Weights (since admit):  Date/Time Weight   11/07/23 1109 67 kg (147 lb 11.3 oz) (current wt)--4.7% wt loss from admit to present (significant)   11/04/23 0742 67 kg (147 lb 11.3 oz)   11/02/23 0737 62.6 kg (138 lb 0.1 oz)   11/01/23 0730 61.7 kg (136 lb 0.4 oz)   10/31/23 0928 63.6 kg (140 lb 3.4 oz)   10/31/23 0433 63.6 kg (140 lb 3.4 oz)   10/29/23 0854 67.7 kg (149 lb 4 oz)   10/29/23 0818 67.7 kg (149 lb 4 oz)   10/27/23 2100 70.3 kg (154 lb 15.7 oz)   10/27/23 1930 70.3 kg (154 lb 15.7 oz)  (admit wt)   Weight: Ht and wt verified with HANY Alcantara RN at 10/27/23 1930   10/26/23 1922 62.6 kg (138 lb)--? this     Nutrition Focused Physical Exam Findings:  Subcutaneous Fat Loss:   Orbital Fat Pads: Mild-Moderate (slight dark circles and slight hollowing)   Buccal Fat Pads: Mild-Moderate (flat cheeks, minimal bounce)   Triceps: Severe (negligible fat tissue)   Muscle Wasting:  Temporalis: Mild-Moderate (slight depression)  Interosseous: Mild-Moderate (slightly depressed area between thumb and " forefinger)  Trapezius/Infraspinatus/Supraspinatus (Scapular Region): Mild-Moderate (slight protrusion of scapula)  Gastrocnemius: Mild-Moderate (not well developed muscle)  Edema:  Edema: none   Physical Findings:  Hair: Negative  Eyes: Negative  Nails: Negative  Skin: Negative    Nutrition Significant Labs: labs reviewed by this writer and dietitian today    Nutrition Specific Medications: meds reviewed by dietitian today    I/O:     Intake/Output Summary (Last 24 hours) at 11/7/2023 1308  Last data filed at 11/6/2023 1820  Gross per 24 hour   Intake 419 ml   Output --   Net 419 ml   Last BM recorded on 11/3    Dietary Orders (From admission, onward)       Start     Ordered    10/31/23 1151  Adult diet Low pathogen  Diet effective now        Question:  Diet type  Answer:  Low pathogen    10/31/23 1154    10/28/23 1452  Oral nutritional supplements  Until discontinued        Comments: Please send with trays   Question Answer Comment   Deliver with Breakfast    Deliver with Lunch    Select supplement: Ensure Plus        10/28/23 1452                   Estimated Needs:   Total Energy Estimated Needs (kCal): 2200 kCal  Method for Estimating Needs: MSJ: 1739 x 1.3-1.4;   Total Protein Estimated Needs (g): 90 g  Method for Estimating Needs: 1.3 x ABW;   Total Fluid Estimated Needs (mL): 2200 mL  Method for Estimating Needs: 1ml/kcal or per MD team;     Nutrition Diagnosis   Nutrition Diagnosis:  Malnutrition Diagnosis  Patient has Malnutrition Diagnosis: Yes  Diagnosis Status: New  Malnutrition Diagnosis: Moderate malnutrition related to acute disease or injury  As Evidenced by: 2.7% wt loss x ~1 week PTA + 4.7% wt loss since admit (~2 weeks) and varying degrees of muscle and adipose losses.    Nutrition Diagnosis  Patient has Nutrition Diagnosis: Yes  Diagnosis Status (1): Ongoing  Nutrition Diagnosis 1: Increased nutrient needs  Related to (1): increased metabolic demand  As Evidenced by (1): new diagnosis of  burkitt's lymphoma    Additional Assessment Information: due to history of wt. loss + NFPE revealing muslce and adipose losses feel pt. presenting with moderate malnutrition. Low threshold for severe malnutrition pending on going PO and weights in house.  Considering malnutrition with hypermetabolic state feel patient continues to benefit from oral nutrition supplements aggreeable to continue ensure plus.      Nutrition Interventions/Recommendations   Nutrition Interventions and Recommendations:        Nutrition Prescription:  Individualized Nutrition Prescription Provided for : diet    Continue low pathogen diet as tolerated.   RDN to continue to order for ensure plus BID        Nutrition Interventions:   Food and/or Nutrient Delivery Interventions  Interventions: Meals and snacks, Medical food supplement  Meals and Snacks: General healthful diet  Goal: consume at least 75% of 3 meals/day  Medical Food Supplement: Commercial beverage  Goal: consume at least 2 cartons of ensure plus/day      Nutrition Education:   Pt. Denied questions at this time    Nutrition Monitoring and Evaluation   Monitoring/Evaluation:   Food/Nutrient Related History Monitoring  Monitoring and Evaluation Plan: Energy intake  Energy Intake: Estimated energy intake  Criteria: >75% of estimated nutritional needs via PO    Body Composition/Growth/Weight History  Monitoring and Evaluation Plan: Weight  Weight: Measured weight  Criteria: daily wts; maintain daily wts. while in house.    Biochemical Data, Medical Tests and Procedures  Monitoring and Evaluation Plan: Electrolyte/renal panel  Electrolyte and Renal Panel: Magnesium, Phosphorus, BUN  Criteria: Electrolytes WNL    Time Spent/Follow-up Reminder:   Follow Up  Time Spent (min): 120 minutes  Last Date of Nutrition Visit: 11/07/23  Nutrition Follow-Up Needed?: Dietitian to reassess per policy  Follow up Comment: continue ONS

## 2023-11-07 NOTE — CARE PLAN
Pulmonary signed off yesterday  In case of recurrence of effusion, please inform pulmonary team.   At this time no need for OP pulmonary follow up unless new or recurrent respiratory complaints or recurrence of effusions.

## 2023-11-07 NOTE — PROCEDURES
Lumbar Puncture    Date/Time: 11/6/2023 8:00 PM    Performed by: Roselia Shetty PA-C  Authorized by: Roselia Shetty PA-C    Consent:     Consent obtained:  Written    Consent given by:  Patient    Risks, benefits, and alternatives were discussed: yes      Risks discussed:  Bleeding, infection, pain and headache    Alternatives discussed:  No treatment  Universal protocol:     Procedure explained and questions answered to patient or proxy's satisfaction: yes      Relevant documents present and verified: yes      Test results available: yes      Imaging studies available: yes      Required blood products, implants, devices, and special equipment available: yes      Immediately prior to procedure a time out was called: yes      Site/side marked: yes      Patient identity confirmed:  Verbally with patient, arm band and hospital-assigned identification number  Pre-procedure details:     Procedure purpose:  Diagnostic    Preparation: Patient was prepped and draped in usual sterile fashion    Anesthesia:     Anesthesia method:  Local infiltration    Local anesthetic:  Lidocaine 1% w/o epi  Procedure details:     Lumbar space:  L3-L4 interspace    Patient position:  Sitting    Needle type:  Spinal needle - Quincke tip    Ultrasound guidance: no      Number of attempts:  1    Fluid appearance:  Clear    Tubes of fluid:  3    Total volume (ml):  9  Post-procedure details:     Puncture site:  Direct pressure applied and adhesive bandage applied    Procedure completion:  Tolerated well, no immediate complications  Comments:      Sent samples for cell count and diff, total protein and glucose and flow cytometry. Following collection of fluid, Cytarabine 100mg (in 2ml) was administered into the intrathecal space over 1-2 minutes.

## 2023-11-07 NOTE — PROGRESS NOTES
Shilo Thakkar is a 23 y.o. male on day 12 of admission presenting with Burkitt lymphoma (CMS/HCC).    Subjective   No complaints. Denies any fevers, chills, night sweats, SOB, CP, N/V, HA, cough, urinary symptoms, bleeding, bruising.     Objective   Physical Exam  HENT:      Head: Normocephalic and atraumatic.      Nose: Nose normal.      Mouth/Throat:      Mouth: Mucous membranes are moist.      Comments: Ulcerated oropharyngeal mass improved   Eyes:      Extraocular Movements: Extraocular movements intact.      Pupils: Pupils are equal, round, and reactive to light.   Cardiovascular:      Rate and Rhythm: Normal rate and regular rhythm.   Pulmonary:      Effort: Pulmonary effort is normal.      Comments: No chest tube  Abdominal:      General: Abdomen is flat. Bowel sounds are normal.      Palpations: Abdomen is soft.   Musculoskeletal:         General: Normal range of motion.      Cervical back: Normal range of motion and neck supple.   Skin:     General: Skin is warm and dry.   Neurological:      General: No focal deficit present.      Mental Status: He is alert and oriented to person, place, and time.   Psychiatric:         Mood and Affect: Mood normal.         Behavior: Behavior normal.        Assessment/Plan   Principal Problem:    Burkitt lymphoma (CMS/HCC)  Active Problems:    Generalized weakness    Acute hypoxic respiratory failure (CMS/HCC)    Neck mass    Shilo Thakkar is a 23 y.o. male with no significant medical history presenting to the ED as a referral from outpatient clinic with dysphagia and abdominal pain for the past 3 weeks. Patient initially presented to [facility] ED on 10/19 with 2 weeks of worsening abdominal pain, dysphagia, and 20lb weight loss. Imaging revealed a pancreatic mass, mesenteric LAD, left oropharyngeal mass, and cervical LAD. He had ultrasound guided mesenteric LN biopsy 10/20 and then patient was transferred to [facility] for further work-up. He had biopsy of the oropharyngeal mass and  cervical lymph node by ENT 10/23/23. He was discharged home and presented to outpatient clinic to establish care on 10/26. Given biopsy is concerning for lymphoma, patient was referred to the ED for further workup and management, now admitted to [facility] for further workup and management of new Burkitt Lymphoma.      ONC:  #Burkitt lymphoma, newly diagnosed  - 10/19/23 presented to [facility] ED with dysphagia and abdominal pain  - CT A/P with IV contrast (10/19) mass in the body of the pancreas measuring approximately 2.7 x 2.9 cm. The pancreatic duct is dilated. Multiple masses in the mesentery as well as omental caking consistent with carcinomatosis. Moderate amount of free fluid throughout the abdomen and pelvis.   - CT soft tissue neck with IV contrast (10/19): homogeneous soft tissue attenuation lesion in the L oropharynx which measures approximately 3.0 x 4.0 x 5.1 cm.   - CT Abdomen w/o IV contrast (10/20): Mesenteric lymphadenopathy.   - CT chest with IV contrast (10/20): Moderate bilateral pleural effusions.   - US guided mesenteric LN biopsy (10/20/23): HIGH GRADE B-CELL LYMPHOMA MORPHOLOGICALLY CONSISTENT WITH BURKITT LYMPHOMA; flow cytometry: No B cell population identified  - CT head w/o IV contrast (10/22): No acute intracranial hemorrhage or mass effect.   - Biopsy L oropharyngeal mass and L cervical LN by ENT (10/23/23) high grade B-cell burkitt lymphoma  - MR brain with and w/o IV contrast (10/27): No evidence of intracranial metastatic disease.     - Dexa 40 mg daily ended 11/1  - PET CT ordered 10/30 showed FDG avid left oropharyngeal mass and LAD above and below diaphragm  - CT abdo/pelvis showed no bowel involvement, b/l renal infarcts, left sided small pneumothorax,   - Started HyperCVAD 10/29  - Bone marrow bx completed 11/1, pending results  - completed LP + IT methotrexate 11/02 D5C1  - IT cytarabine 11/6 for D9C1  - Receiving rituximab 11/3 and 11/10 D13C1  - Daily neupogen    HEME:  #Pancytopenia  2/2 chemo  -Transfuse to keep Hgb>7, Plt>10  -Daily Neupogen  -ANC <500  -DVT ppx: SCDs     ID:  Allergies: NKDA  Ppx: acyclovir, fluconazole, levaquin; Start bactrim MWF at discharge  -Plan Levaquin for neutropenia  -Zosyn (10/30 -->11/2), Cefepime 1 gm q8 (11/2-->11/4)   -BCX x2 (10/21): NGTD, pleural cx gm +ve cocci in clusters likely contaminant  -Vancomycin 11/1-->11/3  -Started levaquin (11/7) and fluconazole (11/7) for ANC <500  -ID consulted recommend dc'ing all antibiotics  - lactate (10/27) 1.6 -->1.2 (11/2)  # viral workup  - Covid neg (10/26)  -Hepatitis serology negative (10/26/23), HIV 1/2 nonreactive (10/26), EBV neg (10/27)     FEN/GI  Admit weight: 62.6kg  Ppx: Protonix started on admission  Replete electrolytes as needed  #TLS monitoring  -Allopurinol 300mg daily dc'd 11/6  -G6PD normal  -IVF 75ml/hr, dc'd  -TLS labs   #Dysphagia, improved with dexamethasone   - Low pathogen diet  #Weight loss  -Secondary to dysphagia  -Nutrition consulted on admit  #Constipation, improved  -Bowel regimen with scheduled Colace and Miralax      CARDS/PULM  - Onco echo (10/27): LVSF normal with a 60-65% estimated EF. RVSP WNL. No prior echo available for comparison.  #b/l moderate to large pleural effusion s/p thoracocentesis and chest tube placement 10/29  #Sinus bradycardia  - (10/28) worsening O2 demand, 90% on 2L -> 92% on 5L -->Transferred to MICU -->10/31 saturating 100% on 8 NC -->transferred to S3, trying to wean oxygen  - CXR (10/28) showing Bilateral Pleural Effusions s/p thora and rt chest tube placement  - CTA chest (10/29) No evidence of acute pulmonary embolism. Persistent moderate-to-large sized bilateral pleural effusions  - Pleural fluid cytology showed highly atypical lymphocytes present consistent with patient's history of high-grade lymphoma, pleural fluid cx NGTD   - Chest tube pulled 11/5  -CXR 11/5 showed pneumothorax after pulling out chest tube. Asymptomatic and on room air. Repeat CXR  showing resolution.  - Pulm signed off       MISC:  #anxiety, agitation   - lorazepam 0.5mg prn q8hr  #Pain management  -Oxy PRN q6hr  #Fertility preservation  - onco-fertility consulted and following  - Syphilis nonreactive, gonorrhea neg, chlamydia neg  - per onco-fertility decided not to pursue sperm banking (10/28)    DISPO:  Full code  Primary Onc: Dr. Mccall  Access: DL PICC solo (10/28)  DVT ppx: ambulatory  NOK: Deidra Pickett (Mother) - [phone]     Patient seen, examined and discussed with Dr. Mccall.     Mariann Eduardo MD

## 2023-11-08 LAB
ALBUMIN SERPL BCP-MCNC: 3.2 G/DL (ref 3.4–5)
ALBUMIN SERPL BCP-MCNC: 3.3 G/DL (ref 3.4–5)
ALP SERPL-CCNC: 63 U/L (ref 33–120)
ALT SERPL W P-5'-P-CCNC: 80 U/L (ref 10–52)
ANION GAP SERPL CALC-SCNC: 12 MMOL/L (ref 10–20)
AST SERPL W P-5'-P-CCNC: 40 U/L (ref 9–39)
BASOPHILS # BLD MANUAL: 0.01 X10*3/UL (ref 0–0.1)
BASOPHILS NFR BLD MANUAL: 1.5 %
BILIRUB DIRECT SERPL-MCNC: 0.1 MG/DL (ref 0–0.3)
BILIRUB SERPL-MCNC: 0.6 MG/DL (ref 0–1.2)
BUN SERPL-MCNC: 16 MG/DL (ref 6–23)
CALCIUM SERPL-MCNC: 8.5 MG/DL (ref 8.6–10.6)
CHLORIDE SERPL-SCNC: 102 MMOL/L (ref 98–107)
CO2 SERPL-SCNC: 26 MMOL/L (ref 21–32)
CREAT SERPL-MCNC: 0.76 MG/DL (ref 0.5–1.3)
EOSINOPHIL # BLD MANUAL: 0.02 X10*3/UL (ref 0–0.7)
EOSINOPHIL NFR BLD MANUAL: 4.3 %
ERYTHROCYTE [DISTWIDTH] IN BLOOD BY AUTOMATED COUNT: 12.8 % (ref 11.5–14.5)
GFR SERPL CREATININE-BSD FRML MDRD: >90 ML/MIN/1.73M*2
GLUCOSE SERPL-MCNC: 92 MG/DL (ref 74–99)
HCT VFR BLD AUTO: 27.4 % (ref 41–52)
HGB BLD-MCNC: 9.2 G/DL (ref 13.5–17.5)
IMM GRANULOCYTES # BLD AUTO: 0 X10*3/UL (ref 0–0.7)
IMM GRANULOCYTES NFR BLD AUTO: 0 % (ref 0–0.9)
LDH SERPL L TO P-CCNC: 275 U/L (ref 84–246)
LYMPHOCYTES # BLD MANUAL: 0.34 X10*3/UL (ref 1.2–4.8)
LYMPHOCYTES NFR BLD MANUAL: 84.1 %
MCH RBC QN AUTO: 28.9 PG (ref 26–34)
MCHC RBC AUTO-ENTMCNC: 33.6 G/DL (ref 32–36)
MCV RBC AUTO: 86 FL (ref 80–100)
MONOCYTES # BLD MANUAL: 0.01 X10*3/UL (ref 0.1–1)
MONOCYTES NFR BLD MANUAL: 1.5 %
NEUTS SEG # BLD MANUAL: 0.03 X10*3/UL (ref 1.2–7)
NEUTS SEG NFR BLD MANUAL: 7.2 %
NRBC BLD-RTO: 0 /100 WBCS (ref 0–0)
OVALOCYTES BLD QL SMEAR: ABNORMAL
PHOSPHATE SERPL-MCNC: 3.2 MG/DL (ref 2.5–4.9)
PLATELET # BLD AUTO: 220 X10*3/UL (ref 150–450)
POTASSIUM SERPL-SCNC: 4 MMOL/L (ref 3.5–5.3)
PROT SERPL-MCNC: 4.9 G/DL (ref 6.4–8.2)
RBC # BLD AUTO: 3.18 X10*6/UL (ref 4.5–5.9)
RBC MORPH BLD: ABNORMAL
SODIUM SERPL-SCNC: 136 MMOL/L (ref 136–145)
TOTAL CELLS COUNTED BLD: 69
URATE SERPL-MCNC: 2 MG/DL (ref 4–7.5)
VARIANT LYMPHS # BLD MANUAL: 0.01 X10*3/UL (ref 0–0.5)
VARIANT LYMPHS NFR BLD: 1.4 %
WBC # BLD AUTO: 0.4 X10*3/UL (ref 4.4–11.3)

## 2023-11-08 PROCEDURE — 2500000004 HC RX 250 GENERAL PHARMACY W/ HCPCS (ALT 636 FOR OP/ED): Performed by: INTERNAL MEDICINE

## 2023-11-08 PROCEDURE — 2500000001 HC RX 250 WO HCPCS SELF ADMINISTERED DRUGS (ALT 637 FOR MEDICARE OP): Performed by: INTERNAL MEDICINE

## 2023-11-08 PROCEDURE — 85007 BL SMEAR W/DIFF WBC COUNT: CPT | Performed by: INTERNAL MEDICINE

## 2023-11-08 PROCEDURE — 82248 BILIRUBIN DIRECT: CPT | Performed by: INTERNAL MEDICINE

## 2023-11-08 PROCEDURE — 96372 THER/PROPH/DIAG INJ SC/IM: CPT | Performed by: INTERNAL MEDICINE

## 2023-11-08 PROCEDURE — 1200000002 HC GENERAL ROOM WITH TELEMETRY DAILY

## 2023-11-08 PROCEDURE — 83615 LACTATE (LD) (LDH) ENZYME: CPT | Performed by: INTERNAL MEDICINE

## 2023-11-08 PROCEDURE — 84100 ASSAY OF PHOSPHORUS: CPT | Performed by: INTERNAL MEDICINE

## 2023-11-08 PROCEDURE — 84550 ASSAY OF BLOOD/URIC ACID: CPT | Performed by: INTERNAL MEDICINE

## 2023-11-08 PROCEDURE — 85027 COMPLETE CBC AUTOMATED: CPT | Performed by: INTERNAL MEDICINE

## 2023-11-08 PROCEDURE — 99232 SBSQ HOSP IP/OBS MODERATE 35: CPT | Performed by: INTERNAL MEDICINE

## 2023-11-08 PROCEDURE — 2500000004 HC RX 250 GENERAL PHARMACY W/ HCPCS (ALT 636 FOR OP/ED)

## 2023-11-08 RX ORDER — DEXAMETHASONE 4 MG/1
40 TABLET ORAL EVERY MORNING
Status: COMPLETED | OUTPATIENT
Start: 2023-11-08 | End: 2023-11-11

## 2023-11-08 RX ADMIN — PANTOPRAZOLE SODIUM 40 MG: 40 TABLET, DELAYED RELEASE ORAL at 09:41

## 2023-11-08 RX ADMIN — FILGRASTIM-SNDZ 300 MCG: 300 INJECTION, SOLUTION INTRAVENOUS; SUBCUTANEOUS at 09:39

## 2023-11-08 RX ADMIN — FLUCONAZOLE 400 MG: 200 TABLET ORAL at 09:38

## 2023-11-08 RX ADMIN — ACYCLOVIR 400 MG: 400 TABLET ORAL at 20:23

## 2023-11-08 RX ADMIN — PROCHLORPERAZINE EDISYLATE 10 MG: 5 INJECTION INTRAMUSCULAR; INTRAVENOUS at 09:06

## 2023-11-08 RX ADMIN — LEVOFLOXACIN 500 MG: 500 TABLET, FILM COATED ORAL at 09:38

## 2023-11-08 RX ADMIN — ACYCLOVIR 400 MG: 400 TABLET ORAL at 09:38

## 2023-11-08 RX ADMIN — DEXAMETHASONE 40 MG: 4 TABLET ORAL at 14:22

## 2023-11-08 RX ADMIN — VINCRISTINE SULFATE 2 MG: 1 INJECTION, SOLUTION INTRAVENOUS at 14:23

## 2023-11-08 RX ADMIN — ONDANSETRON 8 MG: 2 INJECTION INTRAMUSCULAR; INTRAVENOUS at 06:02

## 2023-11-08 ASSESSMENT — COGNITIVE AND FUNCTIONAL STATUS - GENERAL
DAILY ACTIVITIY SCORE: 24
DAILY ACTIVITIY SCORE: 24
MOBILITY SCORE: 24
MOBILITY SCORE: 24

## 2023-11-08 ASSESSMENT — PAIN SCALES - GENERAL: PAINLEVEL_OUTOF10: 0 - NO PAIN

## 2023-11-08 NOTE — PROGRESS NOTES
Spiritual Care Visit    Clinical Encounter Type  Visited With: Patient and family together  Routine Visit: Introduction     introduced self and Spiritual Care services to patient Shilo Thakkar, his mother, and his significant other. Patient's mother shared that they have been [facility], which they have been appreciative of. She shared they are trying to process everything that has been happening, and that they are well supported by family and their [employer]. She was appreciative of visit and she, patient, and significant other did not have any needs at this time.  provided support through hospitality and assurance of prayer. Spiritual Care remains available as needed/requested.    Rev. Lisa Parker MDiv, BCC

## 2023-11-08 NOTE — PROGRESS NOTES
Shilo Thakkar is a 23 y.o. male on day 13 of admission presenting with Burkitt lymphoma (CMS/HCC).    Subjective   C/o nausea this morning. No vomiting. Denies any fevers, chills, night sweats, SOB, CP, HA, cough, urinary symptoms, bleeding, bruising.     Objective   Physical Exam  HENT:      Head: Normocephalic and atraumatic.      Nose: Nose normal.      Mouth/Throat:      Mouth: Mucous membranes are moist.      Comments: Ulcerated oropharyngeal mass improved   Eyes:      Extraocular Movements: Extraocular movements intact.      Pupils: Pupils are equal, round, and reactive to light.   Cardiovascular:      Rate and Rhythm: Normal rate and regular rhythm.   Pulmonary:      Effort: Pulmonary effort is normal.      Comments: No chest tube  Abdominal:      General: Abdomen is flat. Bowel sounds are normal.      Palpations: Abdomen is soft.   Musculoskeletal:         General: Normal range of motion.      Cervical back: Normal range of motion and neck supple.   Skin:     General: Skin is warm and dry.   Neurological:      General: No focal deficit present.      Mental Status: He is alert and oriented to person, place, and time.   Psychiatric:         Mood and Affect: Mood normal.         Behavior: Behavior normal.       Assessment/Plan   Principal Problem:    Burkitt lymphoma (CMS/HCC)  Active Problems:    Generalized weakness    Acute hypoxic respiratory failure (CMS/HCC)    Neck mass    Shilo Thakkar is a 23 y.o. male with no significant medical history presenting to the ED as a referral from outpatient clinic with dysphagia and abdominal pain for the past 3 weeks. Patient initially presented to [facility] ED on 10/19 with 2 weeks of worsening abdominal pain, dysphagia, and 20lb weight loss. Imaging revealed a pancreatic mass, mesenteric LAD, left oropharyngeal mass, and cervical LAD. He had ultrasound guided mesenteric LN biopsy 10/20 and then patient was transferred to [facility] for further work-up. He had biopsy of the  oropharyngeal mass and cervical lymph node by ENT 10/23/23. He was discharged home and presented to outpatient clinic to establish care on 10/26. Given biopsy is concerning for lymphoma, patient was referred to the ED for further workup and management, now admitted to [facility] for further workup and management of new Burkitt Lymphoma.      ONC:  #Burkitt lymphoma, high risk, MYC positive  - 10/19/23 presented to [facility] ED with dysphagia and abdominal pain  - CT A/P with IV contrast (10/19) mass in the body of the pancreas measuring approximately 2.7 x 2.9 cm. The pancreatic duct is dilated. Multiple masses in the mesentery as well as omental caking consistent with carcinomatosis. Moderate amount of free fluid throughout the abdomen and pelvis.   - CT soft tissue neck with IV contrast (10/19): homogeneous soft tissue attenuation lesion in the L oropharynx which measures approximately 3.0 x 4.0 x 5.1 cm.   - US guided mesenteric LN biopsy (10/20/23): HIGH GRADE B-CELL LYMPHOMA MORPHOLOGICALLY CONSISTENT WITH BURKITT LYMPHOMA; flow cytometry: No B cell population identified  - Biopsy L oropharyngeal mass and L cervical LN by ENT (10/23/23) high grade B-cell burkitt lymphoma, MYC positive  - MR brain with and w/o IV contrast (10/27): No evidence of intracranial metastatic disease.     - Dexa 40 mg daily ended 11/1  - PET CT ordered 10/30 showed FDG avid left oropharyngeal mass and LAD above and below diaphragm  - Started HyperCVAD 10/29  - Part A:   D1: CTX + mesna, dexamethasone, D2: CTX + mesna, D3: CTX + mesna, D4: Doxo, vinca, D6: Ritux, GCSF, D9: IT, D11: vinca, dexamethasone, D13: Ritux  Part B:   D0: Ritux, D1: MTX, leucovorin, D2: araC; IT, D3: araC; D6: ritux; GCSF, D7: IT  - Daily neupogen since 11/3/23  -Awaiting for ANC recovery for discharge. If counts do not recover before Part B, will keep him inpatient and start part B    HEME:  #Pancytopenia 2/2 chemo  -Transfuse to keep Hgb>7, Plt>10  -Daily Neupogen since  11/3/23  -ANC <500  -DVT ppx: ambulating     ID:  Allergies: NKDA  Ppx: acyclovir, fluconazole, levaquin; Start bactrim MWF at discharge  -Zosyn (10/30 -->11/2), Cefepime 1 gm q8 (11/2-->11/4)   -BCX x2 (10/21): NGTD, pleural cx gm +ve cocci in clusters likely contaminant  -Vancomycin 11/1-->11/3  -Started levaquin (11/7) and fluconazole (11/7) for ANC <500  # viral workup  - Covid neg (10/26)  -Hepatitis serology negative (10/26/23), HIV 1/2 nonreactive (10/26), EBV neg (10/27)     FEN/GI  Admit weight: 62.6kg  Ppx: Protonix started on admission  Replete electrolytes as needed  #TLS monitoring  -Allopurinol 300mg daily dc'd 11/6  -G6PD normal  -TLS labs order if needed  #Protein energy malnutrition  -Nutrition on board  -Ensure ordered     CARDS/PULM  - Onco echo (10/27): LVSF normal with a 60-65% estimated EF. RVSP WNL. No prior echo available for comparison.  #b/l moderate to large pleural effusion s/p thoracocentesis and chest tube placement 10/29  #Sinus bradycardia  - (10/28) worsening O2 demand, 90% on 2L -> 92% on 5L -->Transferred to MICU -->10/31 saturating 100% on 8 NC -->transferred to S3, trying to wean oxygen  - CXR (10/28) showing Bilateral Pleural Effusions s/p thora and rt chest tube placement  - CTA chest (10/29) No evidence of acute pulmonary embolism. Persistent moderate-to-large sized bilateral pleural effusions  - Pleural fluid cytology showed highly atypical lymphocytes present consistent with patient's history of high-grade lymphoma, pleural fluid cx NGTD   - Chest tube pulled 11/5  -CXR 11/5 showed pneumothorax after pulling out chest tube. Asymptomatic and on room air. Repeat CXR showing resolution.  - Pulm signed off       MISC:  #Pain management  -Oxy 10 mg and 5 mg PRN q6hr  #Fertility preservation  - Syphilis nonreactive, gonorrhea neg, chlamydia neg  - per onco-fertility decided not to pursue sperm banking (10/28)    DISPO:  Full code  Primary Onc: Dr. Mccall  [facility]  DVT ppx: ambulatory  NOK: Deidra Pickett (Mother) - [phone]     Patient seen, examined and discussed with Dr. Mccall.     Mariann Eduardo MD

## 2023-11-08 NOTE — PROGRESS NOTES
Music Therapy Note    Shilo Thakkar     Therapy Session  Referral Type: New referral this admission  Visit Type: New visit  Session Start Time: 1347  Session End Time: 1403  Intervention Delivery: In-person  Conflict of Service: None  Number of family members present: 1     Pre-assessment  Unable to Assess Reason: Outcomes not applicable  Mood/Affect: Appropriate, Calm, Cooperative         Treatment/Interventions  Music Therapy Interventions: Assessment  Interruption: No  Patient Fell Asleep at End of Session: No    Post-assessment  Unable to Assess Reason: Did not provide expressive therapy intervention  Mood/Affect: Appropriate, Calm, Cooperative  Continue Visiting: No  Total Session Time (min): 16 minutes    Narrative  Assessment Detail: Patient presented awake and alert, in bed with HOB raised, displaying calm affect. Patient was receptive to MT's introduction of self and services. Patient shared that he is feeling well all things considered and denied needs at this time. Patient expressed that he enjoys country music but stated he was not interested in music therapy services at this time. MT provided pt with information on how to reach out if he changes his mind. Patient appreciative.  Follow-up: MT to sign off per pt request.    Education Documentation  No documentation found.

## 2023-11-09 ENCOUNTER — APPOINTMENT (OUTPATIENT)
Dept: HEMATOLOGY/ONCOLOGY | Facility: HOSPITAL | Age: 23
End: 2023-11-09
Payer: COMMERCIAL

## 2023-11-09 LAB
ALBUMIN SERPL BCP-MCNC: 3.8 G/DL (ref 3.4–5)
ANION GAP SERPL CALC-SCNC: 11 MMOL/L (ref 10–20)
BASOPHILS # BLD MANUAL: 0.01 X10*3/UL (ref 0–0.1)
BASOPHILS NFR BLD MANUAL: 4 %
BUN SERPL-MCNC: 19 MG/DL (ref 6–23)
CALCIUM SERPL-MCNC: 9.3 MG/DL (ref 8.6–10.6)
CHLORIDE SERPL-SCNC: 102 MMOL/L (ref 98–107)
CO2 SERPL-SCNC: 26 MMOL/L (ref 21–32)
CREAT SERPL-MCNC: 0.65 MG/DL (ref 0.5–1.3)
EOSINOPHIL # BLD MANUAL: 0.02 X10*3/UL (ref 0–0.7)
EOSINOPHIL NFR BLD MANUAL: 8 %
ERYTHROCYTE [DISTWIDTH] IN BLOOD BY AUTOMATED COUNT: 12.6 % (ref 11.5–14.5)
GFR SERPL CREATININE-BSD FRML MDRD: >90 ML/MIN/1.73M*2
GLUCOSE SERPL-MCNC: 131 MG/DL (ref 74–99)
HCT VFR BLD AUTO: 29.5 % (ref 41–52)
HGB BLD-MCNC: 9.8 G/DL (ref 13.5–17.5)
IMM GRANULOCYTES # BLD AUTO: 0 X10*3/UL (ref 0–0.7)
IMM GRANULOCYTES NFR BLD AUTO: 0 % (ref 0–0.9)
LYMPHOCYTES # BLD MANUAL: 0.11 X10*3/UL (ref 1.2–4.8)
LYMPHOCYTES NFR BLD MANUAL: 56 %
MCH RBC QN AUTO: 28.7 PG (ref 26–34)
MCHC RBC AUTO-ENTMCNC: 33.2 G/DL (ref 32–36)
MCV RBC AUTO: 86 FL (ref 80–100)
MONOCYTES # BLD MANUAL: 0.04 X10*3/UL (ref 0.1–1)
MONOCYTES NFR BLD MANUAL: 20 %
NEUTROPHILS # BLD MANUAL: 0.03 X10*3/UL (ref 1.2–7.7)
NEUTS BAND # BLD MANUAL: 0.01 X10*3/UL (ref 0–0.7)
NEUTS BAND NFR BLD MANUAL: 4 %
NEUTS SEG # BLD MANUAL: 0.02 X10*3/UL (ref 1.2–7)
NEUTS SEG NFR BLD MANUAL: 8 %
NRBC BLD-RTO: 0 /100 WBCS (ref 0–0)
PHOSPHATE SERPL-MCNC: 3.2 MG/DL (ref 2.5–4.9)
PLATELET # BLD AUTO: 221 X10*3/UL (ref 150–450)
POTASSIUM SERPL-SCNC: 4.3 MMOL/L (ref 3.5–5.3)
RBC # BLD AUTO: 3.42 X10*6/UL (ref 4.5–5.9)
RBC MORPH BLD: ABNORMAL
SODIUM SERPL-SCNC: 135 MMOL/L (ref 136–145)
TOTAL CELLS COUNTED BLD: 25
WBC # BLD AUTO: 0.2 X10*3/UL (ref 4.4–11.3)

## 2023-11-09 PROCEDURE — 96372 THER/PROPH/DIAG INJ SC/IM: CPT | Performed by: INTERNAL MEDICINE

## 2023-11-09 PROCEDURE — 99232 SBSQ HOSP IP/OBS MODERATE 35: CPT | Performed by: INTERNAL MEDICINE

## 2023-11-09 PROCEDURE — 1200000002 HC GENERAL ROOM WITH TELEMETRY DAILY

## 2023-11-09 PROCEDURE — 85027 COMPLETE CBC AUTOMATED: CPT | Performed by: INTERNAL MEDICINE

## 2023-11-09 PROCEDURE — 2500000004 HC RX 250 GENERAL PHARMACY W/ HCPCS (ALT 636 FOR OP/ED): Performed by: INTERNAL MEDICINE

## 2023-11-09 PROCEDURE — 80069 RENAL FUNCTION PANEL: CPT | Performed by: INTERNAL MEDICINE

## 2023-11-09 PROCEDURE — 2500000001 HC RX 250 WO HCPCS SELF ADMINISTERED DRUGS (ALT 637 FOR MEDICARE OP): Performed by: INTERNAL MEDICINE

## 2023-11-09 PROCEDURE — 85007 BL SMEAR W/DIFF WBC COUNT: CPT | Performed by: INTERNAL MEDICINE

## 2023-11-09 RX ORDER — NYSTATIN 100000 [USP'U]/ML
5 SUSPENSION ORAL 4 TIMES DAILY
Status: DISCONTINUED | OUTPATIENT
Start: 2023-11-09 | End: 2023-11-11 | Stop reason: HOSPADM

## 2023-11-09 RX ADMIN — NYSTATIN 500000 UNITS: 100000 SUSPENSION ORAL at 17:50

## 2023-11-09 RX ADMIN — FILGRASTIM-SNDZ 300 MCG: 300 INJECTION, SOLUTION INTRAVENOUS; SUBCUTANEOUS at 09:51

## 2023-11-09 RX ADMIN — FLUCONAZOLE 400 MG: 200 TABLET ORAL at 09:49

## 2023-11-09 RX ADMIN — NYSTATIN 500000 UNITS: 100000 SUSPENSION ORAL at 14:08

## 2023-11-09 RX ADMIN — DEXAMETHASONE 40 MG: 4 TABLET ORAL at 09:49

## 2023-11-09 RX ADMIN — ACYCLOVIR 400 MG: 400 TABLET ORAL at 20:26

## 2023-11-09 RX ADMIN — LEVOFLOXACIN 500 MG: 500 TABLET, FILM COATED ORAL at 09:49

## 2023-11-09 RX ADMIN — ACYCLOVIR 400 MG: 400 TABLET ORAL at 09:49

## 2023-11-09 RX ADMIN — NYSTATIN 500000 UNITS: 100000 SUSPENSION ORAL at 20:26

## 2023-11-09 RX ADMIN — PANTOPRAZOLE SODIUM 40 MG: 40 TABLET, DELAYED RELEASE ORAL at 09:49

## 2023-11-09 ASSESSMENT — COGNITIVE AND FUNCTIONAL STATUS - GENERAL
MOBILITY SCORE: 24
DAILY ACTIVITIY SCORE: 24

## 2023-11-09 ASSESSMENT — PAIN - FUNCTIONAL ASSESSMENT: PAIN_FUNCTIONAL_ASSESSMENT: 0-10

## 2023-11-09 ASSESSMENT — PAIN SCALES - GENERAL
PAINLEVEL_OUTOF10: 0 - NO PAIN
PAINLEVEL_OUTOF10: 0 - NO PAIN

## 2023-11-09 NOTE — PROGRESS NOTES
Shilo Thakkar is a 23 y.o. male on day 14 of admission presenting with Burkitt lymphoma (CMS/HCC).    Subjective   Denies any fevers, chills, night sweats, SOB, CP, HA, cough, urinary symptoms, bleeding, bruising.     Objective   Physical Exam  HENT:      Head: Normocephalic and atraumatic.      Nose: Nose normal.      Mouth/Throat:      Mouth: Mucous membranes are moist.      Comments: Ulcerated oropharyngeal mass improved   Eyes:      Extraocular Movements: Extraocular movements intact.      Pupils: Pupils are equal, round, and reactive to light.   Cardiovascular:      Rate and Rhythm: Normal rate and regular rhythm.   Pulmonary:      Effort: Pulmonary effort is normal.      Comments: No chest tube  Abdominal:      General: Abdomen is flat. Bowel sounds are normal.      Palpations: Abdomen is soft.   Musculoskeletal:         General: Normal range of motion.      Cervical back: Normal range of motion and neck supple.   Skin:     General: Skin is warm and dry.   Neurological:      General: No focal deficit present.      Mental Status: He is alert and oriented to person, place, and time.   Psychiatric:         Mood and Affect: Mood normal.         Behavior: Behavior normal.       Assessment/Plan   Principal Problem:    Burkitt lymphoma (CMS/HCC)  Active Problems:    Generalized weakness    Acute hypoxic respiratory failure (CMS/HCC)    Neck mass    Shilo Thakkar is a 23 y.o. male with no significant medical history presenting to the ED as a referral from outpatient clinic with dysphagia and abdominal pain for the past 3 weeks. Patient initially presented to [facility] ED on 10/19 with 2 weeks of worsening abdominal pain, dysphagia, and 20lb weight loss. Imaging revealed a pancreatic mass, mesenteric LAD, left oropharyngeal mass, and cervical LAD. He had ultrasound guided mesenteric LN biopsy 10/20 and then patient was transferred to [facility] for further work-up. He had biopsy of the oropharyngeal mass and cervical lymph node  by ENT 10/23/23. He was discharged home and presented to outpatient clinic to establish care on 10/26. Given biopsy is concerning for lymphoma, patient was referred to the ED for further workup and management, now admitted to [facility] for further workup and management of new Burkitt Lymphoma.      ONC:  #Burkitt lymphoma, high risk, MYC positive  - 10/19/23 presented to [facility] ED with dysphagia and abdominal pain  - CT A/P with IV contrast (10/19) mass in the body of the pancreas measuring approximately 2.7 x 2.9 cm. The pancreatic duct is dilated. Multiple masses in the mesentery as well as omental caking consistent with carcinomatosis. Moderate amount of free fluid throughout the abdomen and pelvis.   - CT soft tissue neck with IV contrast (10/19): homogeneous soft tissue attenuation lesion in the L oropharynx which measures approximately 3.0 x 4.0 x 5.1 cm.   - US guided mesenteric LN biopsy (10/20/23): HIGH GRADE B-CELL LYMPHOMA MORPHOLOGICALLY CONSISTENT WITH BURKITT LYMPHOMA; flow cytometry: No B cell population identified  - Biopsy L oropharyngeal mass and L cervical LN by ENT (10/23/23) high grade B-cell burkitt lymphoma, MYC positive  - MR brain with and w/o IV contrast (10/27): No evidence of intracranial metastatic disease.     - Dexa 40 mg daily ended 11/1  - PET CT ordered 10/30 showed FDG avid left oropharyngeal mass and LAD above and below diaphragm  - Started HyperCVAD 10/29  - Part A:   D1: CTX + mesna, dexamethasone, D2: CTX + mesna, D3: CTX + mesna, D4: Doxo, vinca, D6: Ritux, GCSF, D9: IT, D11: vinca, dexamethasone, D13: Ritux  Part B:   D0: Ritux, D1: MTX, leucovorin, D2: araC; IT, D3: araC; D6: ritux; GCSF, D7: IT  - Daily neupogen since 11/3/23  -Awaiting for ANC recovery for discharge. If counts do not recover before Part B, will keep him inpatient and start part B    HEME:  #Pancytopenia 2/2 chemo  -Transfuse to keep Hgb>7, Plt>10  -Daily Neupogen since 11/3/23  -ANC <500  -DVT ppx: ambulating      ID:  Allergies: NKDA  Ppx: acyclovir, fluconazole, levaquin; Start bactrim MWF at discharge  -Zosyn (10/30 -->11/2), Cefepime 1 gm q8 (11/2-->11/4)   -BCX x2 (10/21): NGTD, pleural cx gm +ve cocci in clusters likely contaminant  -Vancomycin 11/1-->11/3  -Started levaquin (11/7) and fluconazole (11/7) for ANC <500  # viral workup  - Covid neg (10/26)  -Hepatitis serology negative (10/26/23), HIV 1/2 nonreactive (10/26), EBV neg (10/27)     FEN/GI  Admit weight: 62.6kg  Ppx: Protonix started on admission  Replete electrolytes as needed  #TLS monitoring  -Allopurinol 300mg daily dc'd 11/6  -G6PD normal  -TLS labs order if needed  #Protein energy malnutrition  -Nutrition on board  -Ensure ordered     CARDS/PULM  - Onco echo (10/27): LVSF normal with a 60-65% estimated EF. RVSP WNL. No prior echo available for comparison.  #b/l moderate to large pleural effusion s/p thoracocentesis and chest tube placement 10/29  #Sinus bradycardia  - (10/28) worsening O2 demand, 90% on 2L -> 92% on 5L -->Transferred to MICU -->10/31 saturating 100% on 8 NC -->transferred to S3, trying to wean oxygen  - CXR (10/28) showing Bilateral Pleural Effusions s/p thora and rt chest tube placement  - CTA chest (10/29) No evidence of acute pulmonary embolism. Persistent moderate-to-large sized bilateral pleural effusions  - Pleural fluid cytology showed highly atypical lymphocytes present consistent with patient's history of high-grade lymphoma, pleural fluid cx NGTD   - Chest tube pulled 11/5  -CXR 11/5 showed pneumothorax after pulling out chest tube. Asymptomatic and on room air. Repeat CXR showing resolution.  - Pulm signed off       MISC:  #Pain management  -Oxy 10 mg and 5 mg PRN q6hr  #Fertility preservation  - Syphilis nonreactive, gonorrhea neg, chlamydia neg  - per onco-fertility decided not to pursue sperm banking (10/28)    DISPO:  Full code  Primary Onc: Dr. Mccall  Access: DL PICC solo (10/28)  DVT ppx: ambulatory  NOK: Deidra Pickett  (Mother) - [phone]     Patient seen, examined and discussed with Dr. Mccall.     Mariann Eduardo MD

## 2023-11-10 PROBLEM — R22.1 NECK MASS: Status: RESOLVED | Noted: 2023-10-19 | Resolved: 2023-11-10

## 2023-11-10 PROBLEM — J96.01 ACUTE HYPOXIC RESPIRATORY FAILURE (MULTI): Status: RESOLVED | Noted: 2023-10-30 | Resolved: 2023-11-10

## 2023-11-10 PROBLEM — R53.1 GENERALIZED WEAKNESS: Status: RESOLVED | Noted: 2023-10-26 | Resolved: 2023-11-10

## 2023-11-10 LAB
ALBUMIN SERPL BCP-MCNC: 3.8 G/DL (ref 3.4–5)
ANION GAP SERPL CALC-SCNC: 15 MMOL/L (ref 10–20)
BASOPHILS # BLD MANUAL: 0 X10*3/UL (ref 0–0.1)
BASOPHILS NFR BLD MANUAL: 0 %
BUN SERPL-MCNC: 18 MG/DL (ref 6–23)
CALCIUM SERPL-MCNC: 9.3 MG/DL (ref 8.6–10.6)
CELL POPULATIONS: NORMAL
CHLORIDE SERPL-SCNC: 100 MMOL/L (ref 98–107)
CO2 SERPL-SCNC: 26 MMOL/L (ref 21–32)
CREAT SERPL-MCNC: 0.72 MG/DL (ref 0.5–1.3)
DIAGNOSIS: NORMAL
EOSINOPHIL # BLD MANUAL: 0.01 X10*3/UL (ref 0–0.7)
EOSINOPHIL NFR BLD MANUAL: 0.8 %
ERYTHROCYTE [DISTWIDTH] IN BLOOD BY AUTOMATED COUNT: 12.4 % (ref 11.5–14.5)
FLOW DIFFERENTIAL: NORMAL
FLOW TEST ORDERED: NORMAL
FLUID CELL COUNT: 2 /UL
GFR SERPL CREATININE-BSD FRML MDRD: >90 ML/MIN/1.73M*2
GLUCOSE SERPL-MCNC: 132 MG/DL (ref 74–99)
HCT VFR BLD AUTO: 27.4 % (ref 41–52)
HGB BLD-MCNC: 9.7 G/DL (ref 13.5–17.5)
IMM GRANULOCYTES # BLD AUTO: 0.02 X10*3/UL (ref 0–0.7)
IMM GRANULOCYTES NFR BLD AUTO: 1.5 % (ref 0–0.9)
LAB TEST METHOD: NORMAL
LYMPHOCYTES # BLD MANUAL: 0.29 X10*3/UL (ref 1.2–4.8)
LYMPHOCYTES NFR BLD MANUAL: 20.8 %
MCH RBC QN AUTO: 29.5 PG (ref 26–34)
MCHC RBC AUTO-ENTMCNC: 35.4 G/DL (ref 32–36)
MCV RBC AUTO: 83 FL (ref 80–100)
MONOCYTES # BLD MANUAL: 0.49 X10*3/UL (ref 0.1–1)
MONOCYTES NFR BLD MANUAL: 35.2 %
MYELOCYTES # BLD MANUAL: 0.11 X10*3/UL
MYELOCYTES NFR BLD MANUAL: 8 %
NEUTS SEG # BLD MANUAL: 0.47 X10*3/UL (ref 1.2–7)
NEUTS SEG NFR BLD MANUAL: 33.6 %
NRBC BLD-RTO: 0 /100 WBCS (ref 0–0)
NUMBER OF CELLS COLLECTED: NORMAL
OVALOCYTES BLD QL SMEAR: ABNORMAL
PATH REPORT.TOTAL CANCER: NORMAL
PATH REVIEW-CBC DIFFERENTIAL: NORMAL
PHOSPHATE SERPL-MCNC: 2.9 MG/DL (ref 2.5–4.9)
PLATELET # BLD AUTO: 239 X10*3/UL (ref 150–450)
POTASSIUM SERPL-SCNC: 4.1 MMOL/L (ref 3.5–5.3)
RBC # BLD AUTO: 3.29 X10*6/UL (ref 4.5–5.9)
RBC MORPH BLD: ABNORMAL
SIGNATURE COMMENT: NORMAL
SODIUM SERPL-SCNC: 137 MMOL/L (ref 136–145)
SPECIMEN VIABILITY: NORMAL
TOTAL CELLS COUNTED BLD: 125
VARIANT LYMPHS # BLD MANUAL: 0.02 X10*3/UL (ref 0–0.5)
VARIANT LYMPHS NFR BLD: 1.6 %
WBC # BLD AUTO: 1.4 X10*3/UL (ref 4.4–11.3)

## 2023-11-10 PROCEDURE — 99232 SBSQ HOSP IP/OBS MODERATE 35: CPT | Performed by: INTERNAL MEDICINE

## 2023-11-10 PROCEDURE — 2500000004 HC RX 250 GENERAL PHARMACY W/ HCPCS (ALT 636 FOR OP/ED): Performed by: INTERNAL MEDICINE

## 2023-11-10 PROCEDURE — 85027 COMPLETE CBC AUTOMATED: CPT | Performed by: INTERNAL MEDICINE

## 2023-11-10 PROCEDURE — 85060 BLOOD SMEAR INTERPRETATION: CPT | Performed by: INTERNAL MEDICINE

## 2023-11-10 PROCEDURE — 80069 RENAL FUNCTION PANEL: CPT | Performed by: INTERNAL MEDICINE

## 2023-11-10 PROCEDURE — 2500000001 HC RX 250 WO HCPCS SELF ADMINISTERED DRUGS (ALT 637 FOR MEDICARE OP): Performed by: INTERNAL MEDICINE

## 2023-11-10 PROCEDURE — 2500000004 HC RX 250 GENERAL PHARMACY W/ HCPCS (ALT 636 FOR OP/ED): Mod: JZ | Performed by: INTERNAL MEDICINE

## 2023-11-10 PROCEDURE — 96372 THER/PROPH/DIAG INJ SC/IM: CPT | Performed by: INTERNAL MEDICINE

## 2023-11-10 PROCEDURE — 1200000002 HC GENERAL ROOM WITH TELEMETRY DAILY

## 2023-11-10 PROCEDURE — 85007 BL SMEAR W/DIFF WBC COUNT: CPT | Performed by: INTERNAL MEDICINE

## 2023-11-10 RX ORDER — ACYCLOVIR 400 MG/1
400 TABLET ORAL 2 TIMES DAILY
Qty: 20 TABLET | Refills: 0 | Status: ON HOLD | OUTPATIENT
Start: 2023-11-10 | End: 2023-11-24

## 2023-11-10 RX ORDER — PANTOPRAZOLE SODIUM 40 MG/1
40 TABLET, DELAYED RELEASE ORAL
Qty: 30 TABLET | Refills: 11 | Status: ON HOLD | OUTPATIENT
Start: 2023-11-11 | End: 2023-12-15 | Stop reason: SDUPTHER

## 2023-11-10 RX ORDER — NYSTATIN 100000 [USP'U]/ML
5 SUSPENSION ORAL 4 TIMES DAILY
Qty: 200 ML | Refills: 0 | Status: SHIPPED | OUTPATIENT
Start: 2023-11-10 | End: 2023-11-24 | Stop reason: HOSPADM

## 2023-11-10 RX ORDER — DIPHENHYDRAMINE HCL 50 MG
50 CAPSULE ORAL ONCE
Status: COMPLETED | OUTPATIENT
Start: 2023-11-10 | End: 2023-11-10

## 2023-11-10 RX ORDER — FLUCONAZOLE 200 MG/1
400 TABLET ORAL DAILY
Qty: 28 TABLET | Refills: 0 | Status: ON HOLD | OUTPATIENT
Start: 2023-11-11 | End: 2023-11-24 | Stop reason: SDUPTHER

## 2023-11-10 RX ORDER — ACETAMINOPHEN 325 MG/1
650 TABLET ORAL ONCE
Status: COMPLETED | OUTPATIENT
Start: 2023-11-10 | End: 2023-11-10

## 2023-11-10 RX ORDER — LEVOFLOXACIN 500 MG/1
500 TABLET, FILM COATED ORAL
Qty: 10 TABLET | Refills: 0 | Status: ON HOLD | OUTPATIENT
Start: 2023-11-11 | End: 2023-11-24

## 2023-11-10 RX ADMIN — LEVOFLOXACIN 500 MG: 500 TABLET, FILM COATED ORAL at 08:45

## 2023-11-10 RX ADMIN — DEXAMETHASONE 40 MG: 4 TABLET ORAL at 08:44

## 2023-11-10 RX ADMIN — NYSTATIN 500000 UNITS: 100000 SUSPENSION ORAL at 17:25

## 2023-11-10 RX ADMIN — NYSTATIN 500000 UNITS: 100000 SUSPENSION ORAL at 06:02

## 2023-11-10 RX ADMIN — DIPHENHYDRAMINE HYDROCHLORIDE 50 MG: 50 CAPSULE ORAL at 13:25

## 2023-11-10 RX ADMIN — NYSTATIN 500000 UNITS: 100000 SUSPENSION ORAL at 20:30

## 2023-11-10 RX ADMIN — ACYCLOVIR 400 MG: 400 TABLET ORAL at 08:45

## 2023-11-10 RX ADMIN — ACETAMINOPHEN 650 MG: 325 TABLET ORAL at 13:25

## 2023-11-10 RX ADMIN — SODIUM CHLORIDE 700 MG: 9 INJECTION, SOLUTION INTRAVENOUS at 14:30

## 2023-11-10 RX ADMIN — NYSTATIN 500000 UNITS: 100000 SUSPENSION ORAL at 13:25

## 2023-11-10 RX ADMIN — FLUCONAZOLE 400 MG: 200 TABLET ORAL at 08:45

## 2023-11-10 RX ADMIN — PANTOPRAZOLE SODIUM 40 MG: 40 TABLET, DELAYED RELEASE ORAL at 06:02

## 2023-11-10 RX ADMIN — ACYCLOVIR 400 MG: 400 TABLET ORAL at 20:30

## 2023-11-10 RX ADMIN — FILGRASTIM-SNDZ 300 MCG: 300 INJECTION, SOLUTION INTRAVENOUS; SUBCUTANEOUS at 08:44

## 2023-11-10 ASSESSMENT — COGNITIVE AND FUNCTIONAL STATUS - GENERAL
MOVING FROM LYING ON BACK TO SITTING ON SIDE OF FLAT BED WITH BEDRAILS: A LITTLE
DAILY ACTIVITIY SCORE: 24
MOBILITY SCORE: 23
DAILY ACTIVITIY SCORE: 24
MOBILITY SCORE: 24

## 2023-11-10 ASSESSMENT — PAIN SCALES - GENERAL
PAINLEVEL_OUTOF10: 0 - NO PAIN
PAINLEVEL_OUTOF10: 0 - NO PAIN

## 2023-11-10 ASSESSMENT — PAIN - FUNCTIONAL ASSESSMENT: PAIN_FUNCTIONAL_ASSESSMENT: 0-10

## 2023-11-10 NOTE — CARE PLAN
Vitals stable, pt afebrile throughout shift. Denies pain when questioned. No complaints of N/V/D. Pt's family at bedside throughout shift, active in cares. Pt remains safe from falls/injury.

## 2023-11-10 NOTE — PROGRESS NOTES
Shilo Thakkar is a 23 y.o. male on day 15 of admission presenting with Burkitt lymphoma (CMS/HCC).    Subjective   Denies any fevers, chills, night sweats, SOB, CP, HA, cough, urinary symptoms, bleeding, bruising.     Objective   Physical Exam  HENT:      Head: Normocephalic and atraumatic.      Nose: Nose normal.      Mouth/Throat:      Mouth: Mucous membranes are moist.      Comments: Ulcerated oropharyngeal mass improved   Eyes:      Extraocular Movements: Extraocular movements intact.      Pupils: Pupils are equal, round, and reactive to light.   Cardiovascular:      Rate and Rhythm: Normal rate and regular rhythm.   Pulmonary:      Effort: Pulmonary effort is normal.      Comments: No chest tube  Abdominal:      General: Abdomen is flat. Bowel sounds are normal.      Palpations: Abdomen is soft.   Musculoskeletal:         General: Normal range of motion.      Cervical back: Normal range of motion and neck supple.   Skin:     General: Skin is warm and dry.   Neurological:      General: No focal deficit present.      Mental Status: He is alert and oriented to person, place, and time.   Psychiatric:         Mood and Affect: Mood normal.         Behavior: Behavior normal.       Assessment/Plan   Principal Problem:    Burkitt lymphoma (CMS/HCC)  Active Problems:    Generalized weakness    Acute hypoxic respiratory failure (CMS/HCC)    Neck mass    Shilo Thakkar is a 23 y.o. male with no significant medical history presenting to the ED as a referral from outpatient clinic with dysphagia and abdominal pain for the past 3 weeks. Patient initially presented to [facility] ED on 10/19 with 2 weeks of worsening abdominal pain, dysphagia, and 20lb weight loss. Imaging revealed a pancreatic mass, mesenteric LAD, left oropharyngeal mass, and cervical LAD. He had ultrasound guided mesenteric LN biopsy 10/20 and then patient was transferred to [facility] for further work-up. He had biopsy of the oropharyngeal mass and cervical lymph node  by ENT 10/23/23. He was discharged home and presented to outpatient clinic to establish care on 10/26. Given biopsy is concerning for lymphoma, patient was referred to the ED for further workup and management, now admitted to [facility] for further workup and management of new Burkitt Lymphoma.      ONC:  #Burkitt lymphoma, high risk, MYC positive  - 10/19/23 presented to [facility] ED with dysphagia and abdominal pain  - CT A/P with IV contrast (10/19) mass in the body of the pancreas measuring approximately 2.7 x 2.9 cm. The pancreatic duct is dilated. Multiple masses in the mesentery as well as omental caking consistent with carcinomatosis. Moderate amount of free fluid throughout the abdomen and pelvis.   - CT soft tissue neck with IV contrast (10/19): homogeneous soft tissue attenuation lesion in the L oropharynx which measures approximately 3.0 x 4.0 x 5.1 cm.   - US guided mesenteric LN biopsy (10/20/23): HIGH GRADE B-CELL LYMPHOMA MORPHOLOGICALLY CONSISTENT WITH BURKITT LYMPHOMA; flow cytometry: No B cell population identified  - Biopsy L oropharyngeal mass and L cervical LN by ENT (10/23/23) high grade B-cell burkitt lymphoma, MYC positive  - MR brain with and w/o IV contrast (10/27): No evidence of intracranial metastatic disease.     - Dexa 40 mg daily ended 11/1  - PET CT ordered 10/30 showed FDG avid left oropharyngeal mass and LAD above and below diaphragm  - Started HyperCVAD 10/29  - Part A:   D1: CTX + mesna, dexamethasone, D2: CTX + mesna, D3: CTX + mesna, D4: Doxo, vinca, D6: Ritux, GCSF, D9: IT, D11: vinca, dexamethasone, D13: Ritux 11/10  Part B:   D0: Ritux, D1: MTX, leucovorin, D2: araC; IT, D3: araC; D6: ritux; GCSF, D7: IT  - Daily neupogen since 11/3/23  - , plan for possible discharge tomorrow    HEME:  #Pancytopenia 2/2 chemo  -Transfuse to keep Hgb>7, Plt>10  -Daily Neupogen since 11/3/23  -ANC <500  -DVT ppx: ambulating     ID:  Allergies: NKDA  Ppx: acyclovir, fluconazole, levaquin; Start  bactrim MWF at discharge  -Zosyn (10/30 -->11/2), Cefepime 1 gm q8 (11/2-->11/4)   -BCX x2 (10/21): NGTD, pleural cx gm +ve cocci in clusters likely contaminant  -Vancomycin 11/1-->11/3  -Started levaquin (11/7) and fluconazole (11/7) for ANC <500  # viral workup  - Covid neg (10/26)  -Hepatitis serology negative (10/26/23), HIV 1/2 nonreactive (10/26), EBV neg (10/27)     FEN/GI  Admit weight: 62.6kg  Ppx: Protonix started on admission  Replete electrolytes as needed  #TLS monitoring  -Allopurinol 300mg daily dc'd 11/6  -G6PD normal  -TLS labs order if needed  #Protein energy malnutrition  -Nutrition on board  -Ensure ordered     CARDS/PULM  - Onco echo (10/27): LVSF normal with a 60-65% estimated EF. RVSP WNL. No prior echo available for comparison.  #b/l moderate to large pleural effusion s/p thoracocentesis and chest tube placement 10/29  #Sinus bradycardia  - (10/28) worsening O2 demand, 90% on 2L -> 92% on 5L -->Transferred to MICU -->10/31 saturating 100% on 8 NC -->transferred to S3, trying to wean oxygen  - CXR (10/28) showing Bilateral Pleural Effusions s/p thora and rt chest tube placement  - CTA chest (10/29) No evidence of acute pulmonary embolism. Persistent moderate-to-large sized bilateral pleural effusions  - Pleural fluid cytology showed highly atypical lymphocytes present consistent with patient's history of high-grade lymphoma, pleural fluid cx NGTD   - Chest tube pulled 11/5  -CXR 11/5 showed pneumothorax after pulling out chest tube. Asymptomatic and on room air. Repeat CXR showing resolution.  - Pulm signed off       MISC:  #Pain management  -Oxy 10 mg and 5 mg PRN q6hr  #Fertility preservation  - Syphilis nonreactive, gonorrhea neg, chlamydia neg  - per onco-fertility decided not to pursue sperm banking (10/28)    DISPO:  Full code  Primary Onc: Dr. Mccall  Access: DL PICC solo (10/28)  DVT ppx: ambulatory  NOK: Deidra Pickett (Mother) - [phone]   Discharge med rec completed  Outpatient  follow up appt requested for Dr. Klein for 11/15  Future admission order placed for 11/20/23    Patient seen, examined and discussed with Dr. Mccall.     Mariann Eduardo MD

## 2023-11-11 VITALS
SYSTOLIC BLOOD PRESSURE: 132 MMHG | OXYGEN SATURATION: 99 % | DIASTOLIC BLOOD PRESSURE: 64 MMHG | TEMPERATURE: 98.1 F | BODY MASS INDEX: 18.19 KG/M2 | WEIGHT: 134.26 LBS | RESPIRATION RATE: 18 BRPM | HEIGHT: 72 IN | HEART RATE: 69 BPM

## 2023-11-11 PROBLEM — L70.9 ACNE: Status: RESOLVED | Noted: 2023-10-25 | Resolved: 2023-11-11

## 2023-11-11 LAB
ALBUMIN SERPL BCP-MCNC: 3.5 G/DL (ref 3.4–5)
ANION GAP SERPL CALC-SCNC: 13 MMOL/L (ref 10–20)
BASOPHILS # BLD MANUAL: 0 X10*3/UL (ref 0–0.1)
BASOPHILS NFR BLD MANUAL: 0 %
BUN SERPL-MCNC: 18 MG/DL (ref 6–23)
CALCIUM SERPL-MCNC: 8.8 MG/DL (ref 8.6–10.6)
CHLORIDE SERPL-SCNC: 103 MMOL/L (ref 98–107)
CO2 SERPL-SCNC: 28 MMOL/L (ref 21–32)
CREAT SERPL-MCNC: 0.71 MG/DL (ref 0.5–1.3)
EOSINOPHIL # BLD MANUAL: 0 X10*3/UL (ref 0–0.7)
EOSINOPHIL NFR BLD MANUAL: 0 %
ERYTHROCYTE [DISTWIDTH] IN BLOOD BY AUTOMATED COUNT: 12.3 % (ref 11.5–14.5)
GFR SERPL CREATININE-BSD FRML MDRD: >90 ML/MIN/1.73M*2
GLUCOSE SERPL-MCNC: 124 MG/DL (ref 74–99)
HCT VFR BLD AUTO: 25.4 % (ref 41–52)
HGB BLD-MCNC: 8.6 G/DL (ref 13.5–17.5)
IMM GRANULOCYTES # BLD AUTO: 0.08 X10*3/UL (ref 0–0.7)
IMM GRANULOCYTES NFR BLD AUTO: 1.9 % (ref 0–0.9)
LYMPHOCYTES # BLD MANUAL: 0.28 X10*3/UL (ref 1.2–4.8)
LYMPHOCYTES NFR BLD MANUAL: 6.5 %
MCH RBC QN AUTO: 29.2 PG (ref 26–34)
MCHC RBC AUTO-ENTMCNC: 33.9 G/DL (ref 32–36)
MCV RBC AUTO: 86 FL (ref 80–100)
MONOCYTES # BLD MANUAL: 0.18 X10*3/UL (ref 0.1–1)
MONOCYTES NFR BLD MANUAL: 4.1 %
MYELOCYTES # BLD MANUAL: 0.03 X10*3/UL
MYELOCYTES NFR BLD MANUAL: 0.8 %
NEUTROPHILS # BLD MANUAL: 3.77 X10*3/UL (ref 1.2–7.7)
NEUTS BAND # BLD MANUAL: 0.42 X10*3/UL (ref 0–0.7)
NEUTS BAND NFR BLD MANUAL: 9.8 %
NEUTS SEG # BLD MANUAL: 3.35 X10*3/UL (ref 1.2–7)
NEUTS SEG NFR BLD MANUAL: 78 %
NRBC BLD-RTO: 0 /100 WBCS (ref 0–0)
PHOSPHATE SERPL-MCNC: 2.9 MG/DL (ref 2.5–4.9)
PLATELET # BLD AUTO: 207 X10*3/UL (ref 150–450)
POTASSIUM SERPL-SCNC: 3.9 MMOL/L (ref 3.5–5.3)
PROMYELOCYTES # BLD MANUAL: 0.03 X10*3/UL
PROMYELOCYTES NFR BLD MANUAL: 0.8 %
RBC # BLD AUTO: 2.95 X10*6/UL (ref 4.5–5.9)
RBC MORPH BLD: ABNORMAL
SODIUM SERPL-SCNC: 140 MMOL/L (ref 136–145)
TOTAL CELLS COUNTED BLD: 123
WBC # BLD AUTO: 4.3 X10*3/UL (ref 4.4–11.3)

## 2023-11-11 PROCEDURE — 85007 BL SMEAR W/DIFF WBC COUNT: CPT | Performed by: INTERNAL MEDICINE

## 2023-11-11 PROCEDURE — 2500000004 HC RX 250 GENERAL PHARMACY W/ HCPCS (ALT 636 FOR OP/ED): Performed by: INTERNAL MEDICINE

## 2023-11-11 PROCEDURE — 2500000001 HC RX 250 WO HCPCS SELF ADMINISTERED DRUGS (ALT 637 FOR MEDICARE OP): Performed by: INTERNAL MEDICINE

## 2023-11-11 PROCEDURE — 80069 RENAL FUNCTION PANEL: CPT | Performed by: INTERNAL MEDICINE

## 2023-11-11 PROCEDURE — 85027 COMPLETE CBC AUTOMATED: CPT | Performed by: INTERNAL MEDICINE

## 2023-11-11 PROCEDURE — 99238 HOSP IP/OBS DSCHRG MGMT 30/<: CPT | Performed by: INTERNAL MEDICINE

## 2023-11-11 RX ORDER — SULFAMETHOXAZOLE AND TRIMETHOPRIM 800; 160 MG/1; MG/1
160 TABLET ORAL 3 TIMES WEEKLY
Status: DISCONTINUED | OUTPATIENT
Start: 2023-11-13 | End: 2023-11-11 | Stop reason: HOSPADM

## 2023-11-11 RX ORDER — SULFAMETHOXAZOLE AND TRIMETHOPRIM 800; 160 MG/1; MG/1
1 TABLET ORAL
Qty: 12 TABLET | Refills: 0 | Status: ON HOLD | OUTPATIENT
Start: 2023-11-13 | End: 2023-12-15

## 2023-11-11 RX ADMIN — NYSTATIN 500000 UNITS: 100000 SUSPENSION ORAL at 06:19

## 2023-11-11 RX ADMIN — ACYCLOVIR 400 MG: 400 TABLET ORAL at 10:22

## 2023-11-11 RX ADMIN — DEXAMETHASONE 40 MG: 4 TABLET ORAL at 10:21

## 2023-11-11 RX ADMIN — LEVOFLOXACIN 500 MG: 500 TABLET, FILM COATED ORAL at 10:20

## 2023-11-11 RX ADMIN — PANTOPRAZOLE SODIUM 40 MG: 40 TABLET, DELAYED RELEASE ORAL at 06:19

## 2023-11-11 RX ADMIN — FLUCONAZOLE 400 MG: 200 TABLET ORAL at 10:21

## 2023-11-11 ASSESSMENT — COGNITIVE AND FUNCTIONAL STATUS - GENERAL
DAILY ACTIVITIY SCORE: 24
MOBILITY SCORE: 24

## 2023-11-11 ASSESSMENT — PAIN SCALES - GENERAL
PAINLEVEL_OUTOF10: 0 - NO PAIN

## 2023-11-11 ASSESSMENT — PAIN - FUNCTIONAL ASSESSMENT
PAIN_FUNCTIONAL_ASSESSMENT: 0-10

## 2023-11-11 NOTE — CARE PLAN
Problem: Safety  Goal: Patient will be injury free during hospitalization  Outcome: Progressing  Goal: I will remain free of falls  Outcome: Progressing     Problem: Daily Care  Goal: Daily care needs are met  Outcome: Progressing     Problem: Psychosocial Needs  Goal: Demonstrates ability to cope with hospitalization/illness  Outcome: Progressing  Goal: Collaborate with me, my family, and caregiver to identify my specific goals  Outcome: Progressing  Flowsheets (Taken 11/10/2023 1640)  Cultural Requests During Hospitalization: None  Spiritual Requests During Hospitalization: None     Problem: Discharge Barriers  Goal: My discharge needs are met  Outcome: Progressing     Problem: Fall/Injury  Goal: Not fall by end of shift  Outcome: Progressing  Goal: Be free from injury by end of the shift  Outcome: Progressing  Goal: Verbalize understanding of personal risk factors for fall in the hospital  Outcome: Progressing  Goal: Verbalize understanding of risk factor reduction measures to prevent injury from fall in the home  Outcome: Progressing  Goal: Use assistive devices by end of the shift  Outcome: Progressing  Goal: Pace activities to prevent fatigue by end of the shift  Outcome: Progressing     Problem: Pain - Adult  Goal: Verbalizes/displays adequate comfort level or baseline comfort level  Outcome: Progressing     Problem: Safety - Adult  Goal: Free from fall injury  Outcome: Progressing     Problem: Discharge Planning  Goal: Discharge to home or other facility with appropriate resources  Outcome: Progressing     Problem: Chronic Conditions and Co-morbidities  Goal: Patient's chronic conditions and co-morbidity symptoms are monitored and maintained or improved  Outcome: Progressing     Problem: Skin  Goal: Participates in plan/prevention/treatment measures  Outcome: Progressing  Goal: Prevent/manage excess moisture  Outcome: Progressing  Goal: Promote/optimize nutrition  Outcome: Progressing       The clinical  goals for the shift include Patient will remain afebrile throughout shift.    VSS, remains afebrile. Denies pain, N/V/D. Tolerated Rituxan dose this evening. No IVF ordered. Remained safe throughout shift. Continue to monitor.

## 2023-11-11 NOTE — CARE PLAN
Problem: Safety  Goal: Patient will be injury free during hospitalization  Outcome: Progressing  Goal: I will remain free of falls  Outcome: Progressing     Problem: Daily Care  Goal: Daily care needs are met  Outcome: Progressing     Problem: Psychosocial Needs  Goal: Demonstrates ability to cope with hospitalization/illness  Outcome: Progressing  Goal: Collaborate with me, my family, and caregiver to identify my specific goals  Outcome: Progressing     Problem: Discharge Barriers  Goal: My discharge needs are met  Outcome: Progressing     Problem: Fall/Injury  Goal: Not fall by end of shift  Outcome: Progressing  Goal: Be free from injury by end of the shift  Outcome: Progressing  Goal: Verbalize understanding of personal risk factors for fall in the hospital  Outcome: Progressing  Goal: Verbalize understanding of risk factor reduction measures to prevent injury from fall in the home  Outcome: Progressing  Goal: Use assistive devices by end of the shift  Outcome: Progressing  Goal: Pace activities to prevent fatigue by end of the shift  Outcome: Progressing     Problem: Pain - Adult  Goal: Verbalizes/displays adequate comfort level or baseline comfort level  Outcome: Progressing     Problem: Safety - Adult  Goal: Free from fall injury  Outcome: Progressing     Problem: Discharge Planning  Goal: Discharge to home or other facility with appropriate resources  Outcome: Progressing     Problem: Chronic Conditions and Co-morbidities  Goal: Patient's chronic conditions and co-morbidity symptoms are monitored and maintained or improved  Outcome: Progressing     Problem: Skin  Goal: Participates in plan/prevention/treatment measures  Outcome: Progressing  Goal: Prevent/manage excess moisture  Outcome: Progressing  Goal: Promote/optimize nutrition  Outcome: Progressing    The clinical goals for the shift include VSS, remain free from fall/injury, adequate rest    VSS on RA, reporting no pain. Resting well throughout  night. Remains free from fall/injury.

## 2023-11-11 NOTE — CARE PLAN
VSS. Afebrile. No complaints of N/V/D or pain throughout shift. Pt remained safe throughout shift. Discharge instructions reviewed with family and pt - no comments or concerns at this time. Pt safely discharged from [facility].

## 2023-11-11 NOTE — DISCHARGE SUMMARY
Discharge Diagnosis  Burkitt lymphoma (CMS/[facility])    Issues Requiring Follow-Up  Dr. Klein appointment was requested for 11/15 with count check at that time    Test Results Pending At Discharge  Pending Labs       Order Current Status    AFB Culture/Smear Preliminary result    Fungal Culture/Smear Preliminary result            Hospital Course  Shilo Thakkar is a 23 y.o. male with no significant medical history presenting to the ED as a referral from outpatient clinic with dysphagia and abdominal pain for the past 3 weeks. Patient initially presented to [facility] ED on 10/19 with 2 weeks of worsening abdominal pain, dysphagia, and 20lb weight loss. Imaging revealed a pancreatic mass, mesenteric LAD, left oropharyngeal mass, and cervical LAD. He had ultrasound guided mesenteric LN biopsy 10/20 and then patient was transferred to [facility] for further work-up. He had biopsy of the oropharyngeal mass and cervical lymph node by ENT 10/23/23. He was then discharged home and presented to outpatient clinic to establish care on 10/26. Given concern for lymphoma from biopsies, patient was referred to the ED for further workup and management. He was admitted to [facility] for further workup and management of suspected new Burkitt Lymphoma.     Hospital course complicated by transfer to MICU 10/29 for Acute hypoexemic respiratory failure likely due to bilateral pleural effusion and atelectasis. Thoracentesis with chest tube placement on right 10/29. He underwent bone marrow biopsy which confirmed Burkitt lymphoma. and received chemotherapy with hyperCVAD and was discharged home with outpatient follow-up with Dr. Klein.     Will go home on Acyclovir, Fluconazole, Bactrim and Levaquin.   Has a DL PICC SOLO at discharge  Follow up and med rec reviewed with attending before discharge     Pertinent Physical Exam At Time of Discharge  Physical Exam  HENT:      Head: Normocephalic and atraumatic.      Nose: Nose normal.      Mouth/Throat:       Mouth: Mucous membranes are moist.      Comments: Ulcerated oropharyngeal mass improved   Eyes:      Extraocular Movements: Extraocular movements intact.      Pupils: Pupils are equal, round, and reactive to light.   Cardiovascular:      Rate and Rhythm: Normal rate and regular rhythm.   Pulmonary:      Effort: Pulmonary effort is normal.      Comments: No chest tube  Abdominal:      General: Abdomen is flat. Bowel sounds are normal.      Palpations: Abdomen is soft.   Musculoskeletal:         General: Normal range of motion.      Cervical back: Normal range of motion and neck supple.   Skin:     General: Skin is warm and dry.   Neurological:      General: No focal deficit present.      Mental Status: He is alert and oriented to person, place, and time.   Psychiatric:         Mood and Affect: Mood normal.         Behavior: Behavior normal.     Home Medications     Medication List      START taking these medications     acyclovir 400 mg tablet; Commonly known as: Zovirax; Take 1 tablet (400   mg) by mouth 2 times a day for 10 days.   fluconazole 200 mg tablet; Commonly known as: Diflucan; Take 2 tablets   (400 mg) by mouth once daily for 14 days. Do not start before November 11, 2023.   levoFLOXacin 500 mg tablet; Commonly known as: Levaquin; Take 1 tablet   (500 mg) by mouth once every 24 hours for 10 days. Do not start before   November 11, 2023.   nystatin 100,000 unit/mL suspension; Commonly known as: Mycostatin;   Swish and swallow 5 mL (500,000 Units) 4 times a day for 10 days.   pantoprazole 40 mg EC tablet; Commonly known as: ProtoNix; Take 1 tablet   (40 mg) by mouth once daily in the morning. Take before meals. Do not   crush, chew, or split. Do not start before November 11, 2023.   sulfamethoxazole-trimethoprim 800-160 mg tablet; Commonly known as:   Bactrim DS; Take 1 tablet by mouth once a day on Monday, Wednesday, and   Friday.; Start taking on: November 13, 2023     CONTINUE taking these medications      acetaminophen 325 mg tablet; Commonly known as: Tylenol; Take 2 tablets   (650 mg) by mouth every 6 hours if needed for mild pain (1 - 3).   magic mouthwash (lidocaine, diphenhydrAMINE, Maalox 1:1:1); Swish and   spit 10 mL every 6 hours if needed for mucositis.   OLANZapine 5 mg tablet; Commonly known as: ZyPREXA; Take 1 tablet (5 mg)   by mouth once daily at bedtime.   polyethylene glycol 17 gram packet; Commonly known as: Glycolax,   Miralax; Take 17 g by mouth 2 times a day as needed (constipation).   sennosides-docusate sodium 8.6-50 mg tablet; Commonly known as:   Ashley-Colace; Take 2 tablets by mouth 2 times a day as needed for   constipation.     STOP taking these medications     ibuprofen 400 mg tablet   naloxone 4 mg/0.1 mL nasal spray; Commonly known as: Narcan   oxyCODONE 10 mg immediate release tablet; Commonly known as: Roxicodone   oxyCODONE 5 mg immediate release tablet; Commonly known as: Roxicodone       Outpatient Follow-Up  Future Appointments   Date Time Provider Department Center   11/28/2023  1:30 PM DEMETRIO Salgado-CNP [id number]   1/17/2024  2:45 PM Lisa Guardado [id number]       DEMETRIO Zuleta-CNP

## 2023-11-11 NOTE — DISCHARGE INSTRUCTIONS
"Following chemotherapy there may be a drop in your white blood cell count which makes you more susceptible to the bacteria and other germs. These exposures can cause you to have \"neutropenic\" fevers (fevers that may happen after chemotherapy from your immune system being low). Tylenol can mask fevers and we recommend that you do not take Tylenol once you go home for pain. You may take it if you develop a fever >100.4 F but please call your oncologist and go to the nearest emergency room. You may require intravenous antibiotics depending on your blood counts.     Please also avoid enemas/suppositories, flossing and shaving with a razor following chemo because this can expose you to harmful bacteria. Meticulous hand and oral hygiene are the best ways to prevent infections during this time. You should avoid alcohol based mouth washes at this can cause break down in your mouth as well. A salt and baking soda mouthwash is good for keeping the mouth clean. Please also avoid anyone who is sick or large crowds of people if possible. If traveling in large crowds, please wear a mask for your own protection.    "

## 2023-11-13 LAB
FUNGUS SPEC CULT: NORMAL
FUNGUS SPEC FUNGUS STN: NORMAL

## 2023-11-15 ENCOUNTER — HOSPITAL ENCOUNTER (OUTPATIENT)
Dept: RADIOLOGY | Facility: HOSPITAL | Age: 23
Discharge: HOME | End: 2023-11-15
Payer: COMMERCIAL

## 2023-11-15 ENCOUNTER — LAB (OUTPATIENT)
Dept: LAB | Facility: HOSPITAL | Age: 23
End: 2023-11-15
Payer: COMMERCIAL

## 2023-11-15 ENCOUNTER — OFFICE VISIT (OUTPATIENT)
Dept: HEMATOLOGY/ONCOLOGY | Facility: HOSPITAL | Age: 23
End: 2023-11-15
Payer: COMMERCIAL

## 2023-11-15 VITALS
BODY MASS INDEX: 17.61 KG/M2 | TEMPERATURE: 97.5 F | OXYGEN SATURATION: 100 % | RESPIRATION RATE: 16 BRPM | DIASTOLIC BLOOD PRESSURE: 61 MMHG | HEART RATE: 104 BPM | WEIGHT: 131.17 LBS | SYSTOLIC BLOOD PRESSURE: 102 MMHG

## 2023-11-15 DIAGNOSIS — C91.10 CLL (CHRONIC LYMPHOCYTIC LEUKEMIA) (MULTI): ICD-10-CM

## 2023-11-15 DIAGNOSIS — C83.70 BURKITT LYMPHOMA, UNSPECIFIED BODY REGION (MULTI): Primary | ICD-10-CM

## 2023-11-15 DIAGNOSIS — C83.78 BURKITT LYMPHOMA OF LYMPH NODES OF MULTIPLE REGIONS (MULTI): ICD-10-CM

## 2023-11-15 DIAGNOSIS — C83.70 BURKITT LYMPHOMA, UNSPECIFIED BODY REGION (MULTI): ICD-10-CM

## 2023-11-15 LAB
ALBUMIN SERPL BCP-MCNC: 4.1 G/DL (ref 3.4–5)
ALP SERPL-CCNC: 78 U/L (ref 33–120)
ALT SERPL W P-5'-P-CCNC: 104 U/L (ref 10–52)
ANION GAP SERPL CALC-SCNC: 12 MMOL/L (ref 10–20)
AST SERPL W P-5'-P-CCNC: 18 U/L (ref 9–39)
BASOPHILS # BLD MANUAL: 0 X10*3/UL (ref 0–0.1)
BASOPHILS NFR BLD MANUAL: 0 %
BILIRUB SERPL-MCNC: 0.3 MG/DL (ref 0–1.2)
BUN SERPL-MCNC: 29 MG/DL (ref 6–23)
CALCIUM SERPL-MCNC: 9.1 MG/DL (ref 8.6–10.3)
CHLORIDE SERPL-SCNC: 97 MMOL/L (ref 98–107)
CO2 SERPL-SCNC: 31 MMOL/L (ref 21–32)
CREAT SERPL-MCNC: 1.02 MG/DL (ref 0.5–1.3)
DACRYOCYTES BLD QL SMEAR: ABNORMAL
EOSINOPHIL # BLD MANUAL: 0 X10*3/UL (ref 0–0.7)
EOSINOPHIL NFR BLD MANUAL: 0 %
ERYTHROCYTE [DISTWIDTH] IN BLOOD BY AUTOMATED COUNT: 13.2 % (ref 11.5–14.5)
GFR SERPL CREATININE-BSD FRML MDRD: >90 ML/MIN/1.73M*2
GLUCOSE SERPL-MCNC: 106 MG/DL (ref 74–99)
HCT VFR BLD AUTO: 35 % (ref 41–52)
HGB BLD-MCNC: 11.7 G/DL (ref 13.5–17.5)
IMM GRANULOCYTES # BLD AUTO: 2.28 X10*3/UL (ref 0–0.7)
IMM GRANULOCYTES NFR BLD AUTO: 35.2 % (ref 0–0.9)
LYMPHOCYTES # BLD MANUAL: 0.72 X10*3/UL (ref 1.2–4.8)
LYMPHOCYTES NFR BLD MANUAL: 11 %
MCH RBC QN AUTO: 29.1 PG (ref 26–34)
MCHC RBC AUTO-ENTMCNC: 33.4 G/DL (ref 32–36)
MCV RBC AUTO: 87 FL (ref 80–100)
METAMYELOCYTES # BLD MANUAL: 0.13 X10*3/UL
METAMYELOCYTES NFR BLD MANUAL: 2 %
MONOCYTES # BLD MANUAL: 0.91 X10*3/UL (ref 0.1–1)
MONOCYTES NFR BLD MANUAL: 14 %
MYELOCYTES # BLD MANUAL: 0.98 X10*3/UL
MYELOCYTES NFR BLD MANUAL: 15 %
NEUTROPHILS # BLD MANUAL: 3.78 X10*3/UL (ref 1.2–7.7)
NEUTS BAND # BLD MANUAL: 0.46 X10*3/UL (ref 0–0.7)
NEUTS BAND NFR BLD MANUAL: 7 %
NEUTS SEG # BLD MANUAL: 3.32 X10*3/UL (ref 1.2–7)
NEUTS SEG NFR BLD MANUAL: 51 %
NRBC BLD-RTO: 0 /100 WBCS (ref 0–0)
OVALOCYTES BLD QL SMEAR: ABNORMAL
PLATELET # BLD AUTO: 130 X10*3/UL (ref 150–450)
POLYCHROMASIA BLD QL SMEAR: ABNORMAL
POTASSIUM SERPL-SCNC: 4.4 MMOL/L (ref 3.5–5.3)
PROT SERPL-MCNC: 6 G/DL (ref 6.4–8.2)
RBC # BLD AUTO: 4.02 X10*6/UL (ref 4.5–5.9)
RBC MORPH BLD: ABNORMAL
SCHISTOCYTES BLD QL SMEAR: ABNORMAL
SODIUM SERPL-SCNC: 136 MMOL/L (ref 136–145)
TOTAL CELLS COUNTED BLD: 100
WBC # BLD AUTO: 6.5 X10*3/UL (ref 4.4–11.3)

## 2023-11-15 PROCEDURE — 99215 OFFICE O/P EST HI 40 MIN: CPT | Performed by: INTERNAL MEDICINE

## 2023-11-15 PROCEDURE — 80053 COMPREHEN METABOLIC PANEL: CPT

## 2023-11-15 PROCEDURE — 74019 RADEX ABDOMEN 2 VIEWS: CPT | Mod: FY

## 2023-11-15 PROCEDURE — 1036F TOBACCO NON-USER: CPT | Performed by: INTERNAL MEDICINE

## 2023-11-15 PROCEDURE — 85027 COMPLETE CBC AUTOMATED: CPT

## 2023-11-15 PROCEDURE — 71045 X-RAY EXAM CHEST 1 VIEW: CPT

## 2023-11-15 PROCEDURE — 74022 RADEX COMPL AQT ABD SERIES: CPT | Performed by: RADIOLOGY

## 2023-11-15 PROCEDURE — 71045 X-RAY EXAM CHEST 1 VIEW: CPT | Performed by: RADIOLOGY

## 2023-11-15 PROCEDURE — 85007 BL SMEAR W/DIFF WBC COUNT: CPT

## 2023-11-15 RX ORDER — EPINEPHRINE 0.3 MG/.3ML
0.3 INJECTION SUBCUTANEOUS EVERY 5 MIN PRN
Status: CANCELLED | OUTPATIENT
Start: 2023-11-19

## 2023-11-15 RX ORDER — DIPHENHYDRAMINE HYDROCHLORIDE 50 MG/ML
50 INJECTION INTRAMUSCULAR; INTRAVENOUS AS NEEDED
Status: CANCELLED | OUTPATIENT
Start: 2023-11-20

## 2023-11-15 RX ORDER — ALBUTEROL SULFATE 0.83 MG/ML
3 SOLUTION RESPIRATORY (INHALATION) AS NEEDED
Status: CANCELLED | OUTPATIENT
Start: 2023-11-20

## 2023-11-15 RX ORDER — ACETAMINOPHEN 325 MG/1
650 TABLET ORAL ONCE
Status: CANCELLED | OUTPATIENT
Start: 2023-11-27

## 2023-11-15 RX ORDER — ALBUTEROL SULFATE 0.83 MG/ML
3 SOLUTION RESPIRATORY (INHALATION) AS NEEDED
Status: CANCELLED | OUTPATIENT
Start: 2023-11-19

## 2023-11-15 RX ORDER — EPINEPHRINE 0.3 MG/.3ML
0.3 INJECTION SUBCUTANEOUS EVERY 5 MIN PRN
Status: CANCELLED | OUTPATIENT
Start: 2023-11-26

## 2023-11-15 RX ORDER — ALBUTEROL SULFATE 0.83 MG/ML
3 SOLUTION RESPIRATORY (INHALATION) AS NEEDED
Status: CANCELLED | OUTPATIENT
Start: 2023-11-27

## 2023-11-15 RX ORDER — ALBUTEROL SULFATE 0.83 MG/ML
3 SOLUTION RESPIRATORY (INHALATION) AS NEEDED
Status: CANCELLED | OUTPATIENT
Start: 2023-11-26

## 2023-11-15 RX ORDER — DIPHENHYDRAMINE HCL 50 MG
50 CAPSULE ORAL ONCE
Status: CANCELLED | OUTPATIENT
Start: 2023-11-19

## 2023-11-15 RX ORDER — FAMOTIDINE 10 MG/ML
20 INJECTION INTRAVENOUS AS NEEDED
Status: CANCELLED | OUTPATIENT
Start: 2023-11-20

## 2023-11-15 RX ORDER — FAMOTIDINE 10 MG/ML
20 INJECTION INTRAVENOUS ONCE AS NEEDED
Status: CANCELLED | OUTPATIENT
Start: 2023-11-27

## 2023-11-15 RX ORDER — PROCHLORPERAZINE EDISYLATE 5 MG/ML
10 INJECTION INTRAMUSCULAR; INTRAVENOUS EVERY 6 HOURS PRN
Status: CANCELLED | OUTPATIENT
Start: 2023-11-20

## 2023-11-15 RX ORDER — DIPHENHYDRAMINE HYDROCHLORIDE 50 MG/ML
50 INJECTION INTRAMUSCULAR; INTRAVENOUS AS NEEDED
Status: CANCELLED | OUTPATIENT
Start: 2023-11-26

## 2023-11-15 RX ORDER — ACETAMINOPHEN 325 MG/1
650 TABLET ORAL ONCE
Status: CANCELLED | OUTPATIENT
Start: 2023-11-19

## 2023-11-15 RX ORDER — FAMOTIDINE 10 MG/ML
20 INJECTION INTRAVENOUS ONCE AS NEEDED
Status: CANCELLED | OUTPATIENT
Start: 2023-11-26

## 2023-11-15 RX ORDER — EPINEPHRINE 0.3 MG/.3ML
0.3 INJECTION SUBCUTANEOUS EVERY 5 MIN PRN
Status: CANCELLED | OUTPATIENT
Start: 2023-11-27

## 2023-11-15 RX ORDER — PROCHLORPERAZINE MALEATE 10 MG
10 TABLET ORAL EVERY 6 HOURS PRN
Status: CANCELLED | OUTPATIENT
Start: 2023-11-20

## 2023-11-15 RX ORDER — DIPHENHYDRAMINE HYDROCHLORIDE 50 MG/ML
50 INJECTION INTRAMUSCULAR; INTRAVENOUS AS NEEDED
Status: CANCELLED | OUTPATIENT
Start: 2023-11-19

## 2023-11-15 RX ORDER — EPINEPHRINE 1 MG/ML
0.3 INJECTION INTRAMUSCULAR; INTRAVENOUS; SUBCUTANEOUS EVERY 5 MIN PRN
Status: CANCELLED | OUTPATIENT
Start: 2023-11-20

## 2023-11-15 RX ORDER — DIPHENHYDRAMINE HCL 50 MG
50 CAPSULE ORAL ONCE
Status: CANCELLED | OUTPATIENT
Start: 2023-11-27

## 2023-11-15 RX ORDER — PREDNISOLONE ACETATE 10 MG/ML
2 SUSPENSION/ DROPS OPHTHALMIC EVERY 6 HOURS
Status: CANCELLED | OUTPATIENT
Start: 2023-11-20

## 2023-11-15 RX ORDER — DIPHENHYDRAMINE HYDROCHLORIDE 50 MG/ML
50 INJECTION INTRAMUSCULAR; INTRAVENOUS AS NEEDED
Status: CANCELLED | OUTPATIENT
Start: 2023-11-27

## 2023-11-15 RX ORDER — FAMOTIDINE 10 MG/ML
20 INJECTION INTRAVENOUS ONCE AS NEEDED
Status: CANCELLED | OUTPATIENT
Start: 2023-11-19

## 2023-11-15 ASSESSMENT — PAIN SCALES - GENERAL: PAINLEVEL: 0-NO PAIN

## 2023-11-15 NOTE — PATIENT INSTRUCTIONS
Suspend levofloxacin  Hospitalization Monday  After discharge you will get twice weekly labs  Office visit with the nurse practitioner  Day 8 rituximab and intrathecal chemotherapy

## 2023-11-20 ENCOUNTER — HOSPITAL ENCOUNTER (INPATIENT)
Facility: HOSPITAL | Age: 23
LOS: 4 days | Discharge: HOME | DRG: 847 | End: 2023-11-24
Attending: INTERNAL MEDICINE
Payer: COMMERCIAL

## 2023-11-20 ENCOUNTER — APPOINTMENT (OUTPATIENT)
Dept: HEMATOLOGY/ONCOLOGY | Facility: HOSPITAL | Age: 23
End: 2023-11-20
Payer: COMMERCIAL

## 2023-11-20 ENCOUNTER — APPOINTMENT (OUTPATIENT)
Dept: RADIOLOGY | Facility: HOSPITAL | Age: 23
DRG: 847 | End: 2023-11-20
Payer: COMMERCIAL

## 2023-11-20 ENCOUNTER — APPOINTMENT (OUTPATIENT)
Dept: HEMATOLOGY/ONCOLOGY | Facility: HOSPITAL | Age: 23
DRG: 847 | End: 2023-11-20
Payer: COMMERCIAL

## 2023-11-20 DIAGNOSIS — R22.1 NECK MASS: ICD-10-CM

## 2023-11-20 DIAGNOSIS — C25.9 PANCREATIC CANCER (MULTI): ICD-10-CM

## 2023-11-20 DIAGNOSIS — C83.78 BURKITT LYMPHOMA OF LYMPH NODES OF MULTIPLE REGIONS (MULTI): ICD-10-CM

## 2023-11-20 DIAGNOSIS — C25.9 METASTASIS FROM PANCREATIC CANCER (MULTI): ICD-10-CM

## 2023-11-20 DIAGNOSIS — J35.1 TONSILLAR HYPERTROPHY, UNILATERAL: ICD-10-CM

## 2023-11-20 DIAGNOSIS — C83.70 BURKITT LYMPHOMA, UNSPECIFIED BODY REGION (MULTI): ICD-10-CM

## 2023-11-20 DIAGNOSIS — C79.9 METASTASIS FROM PANCREATIC CANCER (MULTI): ICD-10-CM

## 2023-11-20 DIAGNOSIS — C83.70: Primary | ICD-10-CM

## 2023-11-20 LAB
ALBUMIN SERPL BCP-MCNC: 4 G/DL (ref 3.4–5)
ALP SERPL-CCNC: 89 U/L (ref 33–120)
ALT SERPL W P-5'-P-CCNC: 105 U/L (ref 10–52)
ANION GAP SERPL CALC-SCNC: 14 MMOL/L (ref 10–20)
APTT PPP: 30 SECONDS (ref 27–38)
AST SERPL W P-5'-P-CCNC: 37 U/L (ref 9–39)
BASOPHILS # BLD MANUAL: 0.17 X10*3/UL (ref 0–0.1)
BASOPHILS NFR BLD MANUAL: 2.6 %
BILIRUB DIRECT SERPL-MCNC: 0 MG/DL (ref 0–0.3)
BILIRUB SERPL-MCNC: 0.2 MG/DL (ref 0–1.2)
BUN SERPL-MCNC: 25 MG/DL (ref 6–23)
CALCIUM SERPL-MCNC: 9.3 MG/DL (ref 8.6–10.6)
CHLORIDE SERPL-SCNC: 104 MMOL/L (ref 98–107)
CO2 SERPL-SCNC: 28 MMOL/L (ref 21–32)
CREAT SERPL-MCNC: 0.74 MG/DL (ref 0.5–1.3)
EOSINOPHIL # BLD MANUAL: 0 X10*3/UL (ref 0–0.7)
EOSINOPHIL NFR BLD MANUAL: 0 %
ERYTHROCYTE [DISTWIDTH] IN BLOOD BY AUTOMATED COUNT: 16.3 % (ref 11.5–14.5)
FIBRINOGEN PPP-MCNC: 238 MG/DL (ref 200–400)
GFR SERPL CREATININE-BSD FRML MDRD: >90 ML/MIN/1.73M*2
GLUCOSE SERPL-MCNC: 85 MG/DL (ref 74–99)
HCT VFR BLD AUTO: 31 % (ref 41–52)
HGB BLD-MCNC: 9.7 G/DL (ref 13.5–17.5)
IMM GRANULOCYTES # BLD AUTO: 0.83 X10*3/UL (ref 0–0.7)
IMM GRANULOCYTES NFR BLD AUTO: 12.6 % (ref 0–0.9)
INR PPP: 0.9 (ref 0.9–1.1)
LDH SERPL L TO P-CCNC: 261 U/L (ref 84–246)
LYMPHOCYTES # BLD MANUAL: 0.23 X10*3/UL (ref 1.2–4.8)
LYMPHOCYTES NFR BLD MANUAL: 3.5 %
MAGNESIUM SERPL-MCNC: 2.03 MG/DL (ref 1.6–2.4)
MCH RBC QN AUTO: 29.5 PG (ref 26–34)
MCHC RBC AUTO-ENTMCNC: 31.3 G/DL (ref 32–36)
MCV RBC AUTO: 94 FL (ref 80–100)
MONOCYTES # BLD MANUAL: 0.98 X10*3/UL (ref 0.1–1)
MONOCYTES NFR BLD MANUAL: 14.9 %
MYELOCYTES # BLD MANUAL: 0.06 X10*3/UL
MYELOCYTES NFR BLD MANUAL: 0.9 %
NEUTROPHILS # BLD MANUAL: 5.15 X10*3/UL (ref 1.2–7.7)
NEUTS BAND # BLD MANUAL: 0.92 X10*3/UL (ref 0–0.7)
NEUTS BAND NFR BLD MANUAL: 14 %
NEUTS SEG # BLD MANUAL: 4.23 X10*3/UL (ref 1.2–7)
NEUTS SEG NFR BLD MANUAL: 64.1 %
NRBC BLD-RTO: 0.5 /100 WBCS (ref 0–0)
OVALOCYTES BLD QL SMEAR: ABNORMAL
PH, POC: 7
PH, POC: 8.5
PHOSPHATE SERPL-MCNC: 3.4 MG/DL (ref 2.5–4.9)
PLATELET # BLD AUTO: 314 X10*3/UL (ref 150–450)
POTASSIUM SERPL-SCNC: 4.5 MMOL/L (ref 3.5–5.3)
PROT SERPL-MCNC: 5.7 G/DL (ref 6.4–8.2)
PROTHROMBIN TIME: 10.5 SECONDS (ref 9.8–12.8)
RBC # BLD AUTO: 3.29 X10*6/UL (ref 4.5–5.9)
RBC MORPH BLD: ABNORMAL
SODIUM SERPL-SCNC: 141 MMOL/L (ref 136–145)
TOTAL CELLS COUNTED BLD: 114
WBC # BLD AUTO: 6.6 X10*3/UL (ref 4.4–11.3)

## 2023-11-20 PROCEDURE — 1170000001 HC PRIVATE ONCOLOGY ROOM DAILY

## 2023-11-20 PROCEDURE — 82248 BILIRUBIN DIRECT: CPT

## 2023-11-20 PROCEDURE — 83615 LACTATE (LD) (LDH) ENZYME: CPT

## 2023-11-20 PROCEDURE — 85007 BL SMEAR W/DIFF WBC COUNT: CPT

## 2023-11-20 PROCEDURE — 83735 ASSAY OF MAGNESIUM: CPT

## 2023-11-20 PROCEDURE — 2500000001 HC RX 250 WO HCPCS SELF ADMINISTERED DRUGS (ALT 637 FOR MEDICARE OP): Performed by: INTERNAL MEDICINE

## 2023-11-20 PROCEDURE — 71046 X-RAY EXAM CHEST 2 VIEWS: CPT | Performed by: RADIOLOGY

## 2023-11-20 PROCEDURE — 71046 X-RAY EXAM CHEST 2 VIEWS: CPT

## 2023-11-20 PROCEDURE — 2500000004 HC RX 250 GENERAL PHARMACY W/ HCPCS (ALT 636 FOR OP/ED): Performed by: INTERNAL MEDICINE

## 2023-11-20 PROCEDURE — 99223 1ST HOSP IP/OBS HIGH 75: CPT | Performed by: INTERNAL MEDICINE

## 2023-11-20 PROCEDURE — 85027 COMPLETE CBC AUTOMATED: CPT

## 2023-11-20 PROCEDURE — 84100 ASSAY OF PHOSPHORUS: CPT

## 2023-11-20 PROCEDURE — 85384 FIBRINOGEN ACTIVITY: CPT

## 2023-11-20 PROCEDURE — 81002 URINALYSIS NONAUTO W/O SCOPE: CPT | Performed by: INTERNAL MEDICINE

## 2023-11-20 PROCEDURE — 2500000001 HC RX 250 WO HCPCS SELF ADMINISTERED DRUGS (ALT 637 FOR MEDICARE OP)

## 2023-11-20 PROCEDURE — 2500000004 HC RX 250 GENERAL PHARMACY W/ HCPCS (ALT 636 FOR OP/ED)

## 2023-11-20 PROCEDURE — 85610 PROTHROMBIN TIME: CPT

## 2023-11-20 RX ORDER — ACETAMINOPHEN 325 MG/1
650 TABLET ORAL ONCE
Status: COMPLETED | OUTPATIENT
Start: 2023-11-20 | End: 2023-11-20

## 2023-11-20 RX ORDER — DIPHENHYDRAMINE HCL 50 MG
50 CAPSULE ORAL ONCE
Status: CANCELLED | OUTPATIENT
Start: 2023-11-20

## 2023-11-20 RX ORDER — PREDNISOLONE ACETATE 10 MG/ML
2 SUSPENSION/ DROPS OPHTHALMIC EVERY 6 HOURS
Status: DISCONTINUED | OUTPATIENT
Start: 2023-11-20 | End: 2023-11-24 | Stop reason: HOSPADM

## 2023-11-20 RX ORDER — ALBUTEROL SULFATE 0.83 MG/ML
3 SOLUTION RESPIRATORY (INHALATION) AS NEEDED
Status: DISCONTINUED | OUTPATIENT
Start: 2023-11-20 | End: 2023-11-24 | Stop reason: HOSPADM

## 2023-11-20 RX ORDER — FAMOTIDINE 10 MG/ML
20 INJECTION INTRAVENOUS AS NEEDED
Status: DISCONTINUED | OUTPATIENT
Start: 2023-11-20 | End: 2023-11-24 | Stop reason: HOSPADM

## 2023-11-20 RX ORDER — ONDANSETRON 4 MG/1
4 TABLET, FILM COATED ORAL EVERY 8 HOURS PRN
Status: DISCONTINUED | OUTPATIENT
Start: 2023-11-20 | End: 2023-11-24 | Stop reason: HOSPADM

## 2023-11-20 RX ORDER — SULFAMETHOXAZOLE AND TRIMETHOPRIM 800; 160 MG/1; MG/1
1 TABLET ORAL
Status: DISCONTINUED | OUTPATIENT
Start: 2023-11-20 | End: 2023-11-22

## 2023-11-20 RX ORDER — POLYETHYLENE GLYCOL 3350 17 G/17G
17 POWDER, FOR SOLUTION ORAL DAILY PRN
Status: DISCONTINUED | OUTPATIENT
Start: 2023-11-20 | End: 2023-11-24 | Stop reason: HOSPADM

## 2023-11-20 RX ORDER — PROCHLORPERAZINE EDISYLATE 5 MG/ML
10 INJECTION INTRAMUSCULAR; INTRAVENOUS EVERY 6 HOURS PRN
Status: DISCONTINUED | OUTPATIENT
Start: 2023-11-20 | End: 2023-11-24 | Stop reason: HOSPADM

## 2023-11-20 RX ORDER — DIPHENHYDRAMINE HCL 50 MG
50 CAPSULE ORAL ONCE
Status: COMPLETED | OUTPATIENT
Start: 2023-11-20 | End: 2023-11-20

## 2023-11-20 RX ORDER — ACYCLOVIR 400 MG/1
400 TABLET ORAL 2 TIMES DAILY
Status: DISCONTINUED | OUTPATIENT
Start: 2023-11-20 | End: 2023-11-24 | Stop reason: HOSPADM

## 2023-11-20 RX ORDER — FAMOTIDINE 20 MG/1
20 TABLET, FILM COATED ORAL 2 TIMES DAILY
Status: DISCONTINUED | OUTPATIENT
Start: 2023-11-20 | End: 2023-11-24 | Stop reason: HOSPADM

## 2023-11-20 RX ORDER — EPINEPHRINE 0.1 MG/ML
0.3 INJECTION INTRACARDIAC; INTRAVENOUS EVERY 5 MIN PRN
Status: DISCONTINUED | OUTPATIENT
Start: 2023-11-20 | End: 2023-11-24 | Stop reason: HOSPADM

## 2023-11-20 RX ORDER — DIPHENHYDRAMINE HYDROCHLORIDE 50 MG/ML
50 INJECTION INTRAMUSCULAR; INTRAVENOUS AS NEEDED
Status: DISCONTINUED | OUTPATIENT
Start: 2023-11-20 | End: 2023-11-24 | Stop reason: HOSPADM

## 2023-11-20 RX ORDER — PROCHLORPERAZINE MALEATE 10 MG
10 TABLET ORAL EVERY 6 HOURS PRN
Status: DISCONTINUED | OUTPATIENT
Start: 2023-11-20 | End: 2023-11-24 | Stop reason: HOSPADM

## 2023-11-20 RX ORDER — FLUCONAZOLE 200 MG/1
400 TABLET ORAL DAILY
Status: DISCONTINUED | OUTPATIENT
Start: 2023-11-20 | End: 2023-11-24 | Stop reason: HOSPADM

## 2023-11-20 RX ORDER — ACETAMINOPHEN 325 MG/1
650 TABLET ORAL ONCE
Status: CANCELLED | OUTPATIENT
Start: 2023-11-20

## 2023-11-20 RX ADMIN — PREDNISOLONE ACETATE 2 DROP: 10 SUSPENSION/ DROPS OPHTHALMIC at 19:29

## 2023-11-20 RX ADMIN — SODIUM CHLORIDE 700 MG: 9 INJECTION, SOLUTION INTRAVENOUS at 19:28

## 2023-11-20 RX ADMIN — ONDANSETRON 16 MG: 2 INJECTION INTRAMUSCULAR; INTRAVENOUS at 18:42

## 2023-11-20 RX ADMIN — ACETAMINOPHEN 650 MG: 325 TABLET ORAL at 17:32

## 2023-11-20 RX ADMIN — SODIUM BICARBONATE 200 ML/HR: 84 INJECTION, SOLUTION INTRAVENOUS at 14:40

## 2023-11-20 RX ADMIN — METHOTREXATE 358 MG: 25 INJECTION, SOLUTION INTRA-ARTERIAL; INTRAMUSCULAR; INTRATHECAL; INTRAVENOUS at 22:07

## 2023-11-20 RX ADMIN — ACYCLOVIR 400 MG: 400 TABLET ORAL at 20:14

## 2023-11-20 RX ADMIN — SODIUM BICARBONATE 200 ML/HR: 84 INJECTION, SOLUTION INTRAVENOUS at 20:44

## 2023-11-20 RX ADMIN — FAMOTIDINE 20 MG: 20 TABLET, FILM COATED ORAL at 20:14

## 2023-11-20 RX ADMIN — DEXAMETHASONE SODIUM PHOSPHATE 12 MG: 10 INJECTION, SOLUTION INTRAMUSCULAR; INTRAVENOUS at 19:05

## 2023-11-20 RX ADMIN — DIPHENHYDRAMINE HYDROCHLORIDE 50 MG: 50 CAPSULE ORAL at 17:32

## 2023-11-20 SDOH — SOCIAL STABILITY: SOCIAL INSECURITY: DOES ANYONE TRY TO KEEP YOU FROM HAVING/CONTACTING OTHER FRIENDS OR DOING THINGS OUTSIDE YOUR HOME?: NO

## 2023-11-20 SDOH — SOCIAL STABILITY: SOCIAL INSECURITY: WERE YOU ABLE TO COMPLETE ALL THE BEHAVIORAL HEALTH SCREENINGS?: YES

## 2023-11-20 SDOH — SOCIAL STABILITY: SOCIAL INSECURITY: ABUSE: ADULT

## 2023-11-20 SDOH — SOCIAL STABILITY: SOCIAL INSECURITY: DO YOU FEEL UNSAFE GOING BACK TO THE PLACE WHERE YOU ARE LIVING?: NO

## 2023-11-20 SDOH — SOCIAL STABILITY: SOCIAL INSECURITY: ARE THERE ANY APPARENT SIGNS OF INJURIES/BEHAVIORS THAT COULD BE RELATED TO ABUSE/NEGLECT?: NO

## 2023-11-20 SDOH — SOCIAL STABILITY: SOCIAL INSECURITY: HAVE YOU HAD THOUGHTS OF HARMING ANYONE ELSE?: NO

## 2023-11-20 SDOH — SOCIAL STABILITY: SOCIAL INSECURITY: HAS ANYONE EVER THREATENED TO HURT YOUR FAMILY OR YOUR PETS?: NO

## 2023-11-20 SDOH — SOCIAL STABILITY: SOCIAL INSECURITY: ARE YOU OR HAVE YOU BEEN THREATENED OR ABUSED PHYSICALLY, EMOTIONALLY, OR SEXUALLY BY ANYONE?: NO

## 2023-11-20 SDOH — SOCIAL STABILITY: SOCIAL INSECURITY: DO YOU FEEL ANYONE HAS EXPLOITED OR TAKEN ADVANTAGE OF YOU FINANCIALLY OR OF YOUR PERSONAL PROPERTY?: NO

## 2023-11-20 ASSESSMENT — ACTIVITIES OF DAILY LIVING (ADL)
ADEQUATE_TO_COMPLETE_ADL: YES
LACK_OF_TRANSPORTATION: NO
TOILETING: INDEPENDENT
FEEDING YOURSELF: INDEPENDENT
DRESSING YOURSELF: INDEPENDENT
JUDGMENT_ADEQUATE_SAFELY_COMPLETE_DAILY_ACTIVITIES: YES
BATHING: INDEPENDENT

## 2023-11-20 ASSESSMENT — COGNITIVE AND FUNCTIONAL STATUS - GENERAL
MOBILITY SCORE: 24
PATIENT BASELINE BEDBOUND: NO
MOBILITY SCORE: 24
DAILY ACTIVITIY SCORE: 24
DAILY ACTIVITIY SCORE: 24

## 2023-11-20 ASSESSMENT — ENCOUNTER SYMPTOMS
WHEEZING: 0
NAUSEA: 0
POLYDIPSIA: 0
LIGHT-HEADEDNESS: 0
POLYPHAGIA: 0
DIARRHEA: 0
HEADACHES: 0
FEVER: 0
COUGH: 0
DIZZINESS: 0
BACK PAIN: 0
DYSURIA: 0
EYE DISCHARGE: 0
ABDOMINAL DISTENTION: 0

## 2023-11-20 ASSESSMENT — PATIENT HEALTH QUESTIONNAIRE - PHQ9
SUM OF ALL RESPONSES TO PHQ9 QUESTIONS 1 & 2: 0
2. FEELING DOWN, DEPRESSED OR HOPELESS: NOT AT ALL
1. LITTLE INTEREST OR PLEASURE IN DOING THINGS: NOT AT ALL

## 2023-11-20 ASSESSMENT — PAIN - FUNCTIONAL ASSESSMENT
PAIN_FUNCTIONAL_ASSESSMENT: 0-10
PAIN_FUNCTIONAL_ASSESSMENT: 0-10

## 2023-11-20 ASSESSMENT — LIFESTYLE VARIABLES
HOW OFTEN DO YOU HAVE 6 OR MORE DRINKS ON ONE OCCASION: NEVER
AUDIT-C TOTAL SCORE: 0
AUDIT-C TOTAL SCORE: 0
HOW OFTEN DO YOU HAVE A DRINK CONTAINING ALCOHOL: NEVER
SKIP TO QUESTIONS 9-10: 1
HOW MANY STANDARD DRINKS CONTAINING ALCOHOL DO YOU HAVE ON A TYPICAL DAY: PATIENT DOES NOT DRINK

## 2023-11-20 ASSESSMENT — PAIN SCALES - GENERAL
PAINLEVEL_OUTOF10: 0 - NO PAIN
PAINLEVEL_OUTOF10: 0 - NO PAIN

## 2023-11-20 NOTE — SIGNIFICANT EVENT
11/20/23 1228   Prechemo Checklist   Has the patient been in the hospital, ED, or urgent care since last date of service No   Chemo/Immuno Consent Signed Yes  (10/28/2023)   Protocol/Indications Verified Yes  (High Risk Burkitt's Lymphoma)   Confirmed to previous date/time of medication Yes  (Cycle 1: 10/29/2023)   Compared to previous dose N/A  (Cycle 2 - First time receiving Part B of regimen)   All medications are dated accurately Yes   Pregnancy Test Negative Not applicable   Parameters Met Yes  (ANC = 4.23, Plts = 314, AST/ALT = 37/105, T. BIli = 0.2, SCr = 0.74, CXR = Negative, Urine pH = 7.0, Urine OP = met.)   BSA/Weight-Height Verified Yes  (Ht: 182cm, Wht: 64.9kg, BSA: 1.81m2 - Ordered: 1.79m2 = within 10% of today's BSA.)   Dose Calculations Verified Yes  (Rituxan: Ordered = 700mg, based on today's BSA dose would be 678.75mg & within 10%. Methotrexat: Ordered = 358mg/1,432mg, based on today's BSA doses would be 362mg/1,448mg & within 10%.)

## 2023-11-20 NOTE — H&P
History Of Present Illness  Shilo Thakkar is a 23 y.o. male with PMH of Burkitt Lymphoma (Dx 10/20/23; s/p 1 cycle HyperCVAD), malignant pleural effusions s/p CT (10/29/23), wrist fracture, ADHD, foot fracture admitted for cycle 2 HyperCVAD +Rituximab.    He states that he is doing well. No complaints. No acute events since discharge.     ROS: Unremarkable.    Social history:  He has never been exposed to tobacco smoke. He has never used smokeless tobacco. He reports that he does not currently use alcohol. He reports that he does not use drugs.    Surgical History  Chest tube in sertion 10/29/23  Left Foot fracture   US guided soft tissue Bx10/20/23    Family History  Mother,Brother and sister with ADHD     Allergies  Patient has no known allergies.    ONC:  #Burkitt lymphoma, newly diagnosed (10/20/23)  - 10/19/23 presented to OSH ED with dysphagia and abdominal pain  - CT A/P with IV contrast (10/19) mass in the body of the pancreas measuring approximately 2.7 x 2.9 cm. Multiple masses in the mesentery as well as omental caking consistent with carcinomatosis.   - CT soft tissue neck with IV contrast (10/19): homogeneous soft tissue attenuation lesion in the L oropharynx which measures approximately 3.0 x 4.0 x 5.1 cm.   - CT Abdomen w/o IV contrast (10/20): Mesenteric lymphadenopathy.   - US guided mesenteric LN biopsy (10/20/23): HIGH GRADE B-CELL LYMPHOMA MORPHOLOGICALLY CONSISTENT WITH BURKITT LYMPHOMA; flow cytometry: CD10+ kappa restricted B cells supportive of a B cell lymphoma with germinal center origin.   - Biopsy L oropharyngeal mass and L cervical LN by ENT (10/23/23) high grade B-cell burkitt lymphoma  - PET CT ordered 10/30 showed FDG avid left oropharyngeal mass and LAD above and below diaphragm  - completed HyperCVAD Day 1 cycle 1 on 10/29  - Bone marrow bx completed 11/1: HYPOCELLULAR BONE MARROW FOR AGE (40-50%) WITH MATURING TRILINEAGE HEMATOPOESIS. NO DEFINITE MORPHOLOGIC OR IMMUNOPHENOTYPIC  EVIDENCE OF B-CELL LYMPHOPROLIFERATIVE DISORDER.   -XR chest wnl 11/20  -Here to start HyperCVAD C2D1 11/20    Review of Systems   Constitutional:  Negative for fever.   HENT:  Negative for congestion.    Eyes:  Negative for discharge.   Respiratory:  Negative for cough and wheezing.    Cardiovascular:  Negative for leg swelling.   Gastrointestinal:  Negative for abdominal distention, diarrhea and nausea.   Endocrine: Negative for polydipsia, polyphagia and polyuria.   Genitourinary:  Negative for dysuria.   Musculoskeletal:  Negative for back pain and gait problem.   Allergic/Immunologic: Positive for immunocompromised state.   Neurological:  Negative for dizziness, light-headedness and headaches.   All other systems reviewed and are negative.       Physical Exam  Constitutional:       Appearance: Normal appearance.   HENT:      Head: Normocephalic and atraumatic.      Nose: Nose normal.      Mouth/Throat:      Mouth: Mucous membranes are moist.   Eyes:      Extraocular Movements: Extraocular movements intact.      Pupils: Pupils are equal, round, and reactive to light.   Cardiovascular:      Rate and Rhythm: Normal rate and regular rhythm.      Pulses: Normal pulses.      Heart sounds: Normal heart sounds.   Pulmonary:      Effort: Pulmonary effort is normal.      Breath sounds: Normal breath sounds.   Abdominal:      General: Abdomen is flat. Bowel sounds are normal. There is no distension.      Palpations: Abdomen is soft. There is no mass.   Musculoskeletal:         General: No tenderness. Normal range of motion.      Cervical back: Normal range of motion.      Right lower leg: No edema.      Left lower leg: No edema.   Skin:     General: Skin is warm and dry.      Findings: No rash.   Neurological:      General: No focal deficit present.      Mental Status: He is alert and oriented to person, place, and time.      Cranial Nerves: No cranial nerve deficit.      Sensory: No sensory deficit.      Motor: No  "weakness.      Gait: Gait normal.   Psychiatric:         Mood and Affect: Mood normal.         Behavior: Behavior normal.      Comments: Fluent speech, cooperative          Last Recorded Vitals  Height 1.82 m (5' 11.65\"), weight 64.9 kg (143 lb 1.3 oz).      Scheduled medications  acetaminophen, 650 mg, oral, Once  acyclovir, 400 mg, oral, BID  dexAMETHasone, 12 mg, intravenous, Once  diphenhydrAMINE, 50 mg, oral, Once  famotidine, 20 mg, oral, BID  fluconazole, 400 mg, oral, Daily  [START ON 11/22/2023] leucovorin, 15 mg, intravenous, q6h  [START ON 11/22/2023] leucovorin, 50 mg, intravenous, Once  methotrexate 1,432 mg in sodium chloride 0.9% 1,057.28 mL IV, 800 mg/m2 (Treatment Plan Recorded), intravenous, Once  methotrexate 358 mg in sodium chloride 0.9% 164.32 mL IV, 200 mg/m2 (Treatment Plan Recorded), intravenous, Once  ondansetron, 16 mg, intravenous, Once  prednisoLONE acetate, 2 drop, Both Eyes, q6h  riTUXimab or biosimilar, 375 mg/m2 (Treatment Plan Recorded), intravenous, Once  sulfamethoxazole-trimethoprim, 1 tablet, oral, Every Mon/Wed/Fri      Continuous medications  sodium bicarbonate, 200 mL/hr, Last Rate: 200 mL/hr (11/20/23 1440)  sodium bicarbonate 150 mEq in dextrose 5 % in water (D5W) 1,000 mL infusion, 200 mL/hr      PRN medications  PRN medications: albuterol, alteplase, dextrose, diphenhydrAMINE, EPINEPHrine, famotidine, methylPREDNISolone sodium succinate (PF), ondansetron, polyethylene glycol, prochlorperazine, prochlorperazine, sodium chloride    Results for orders placed or performed during the hospital encounter of 11/20/23 (from the past 24 hour(s))   CBC and Auto Differential   Result Value Ref Range    WBC 6.6 4.4 - 11.3 x10*3/uL    nRBC 0.5 (H) 0.0 - 0.0 /100 WBCs    RBC 3.29 (L) 4.50 - 5.90 x10*6/uL    Hemoglobin 9.7 (L) 13.5 - 17.5 g/dL    Hematocrit 31.0 (L) 41.0 - 52.0 %    MCV 94 80 - 100 fL    MCH 29.5 26.0 - 34.0 pg    MCHC 31.3 (L) 32.0 - 36.0 g/dL    RDW 16.3 (H) 11.5 - " 14.5 %    Platelets 314 150 - 450 x10*3/uL    Immature Granulocytes %, Automated 12.6 (H) 0.0 - 0.9 %    Immature Granulocytes Absolute, Automated 0.83 (H) 0.00 - 0.70 x10*3/uL   Magnesium   Result Value Ref Range    Magnesium 2.03 1.60 - 2.40 mg/dL   Coagulation Screen   Result Value Ref Range    Protime 10.5 9.8 - 12.8 seconds    INR 0.9 0.9 - 1.1    aPTT 30 27 - 38 seconds   Fibrinogen   Result Value Ref Range    Fibrinogen 238 200 - 400 mg/dL   Lactate Dehydrogenase   Result Value Ref Range     (H) 84 - 246 U/L   Hepatic function panel   Result Value Ref Range    Albumin 4.0 3.4 - 5.0 g/dL    Bilirubin, Total 0.2 0.0 - 1.2 mg/dL    Bilirubin, Direct 0.0 0.0 - 0.3 mg/dL    Alkaline Phosphatase 89 33 - 120 U/L     (H) 10 - 52 U/L    AST 37 9 - 39 U/L    Total Protein 5.7 (L) 6.4 - 8.2 g/dL   Phosphorus   Result Value Ref Range    Phosphorus 3.4 2.5 - 4.9 mg/dL   Basic Metabolic Panel   Result Value Ref Range    Glucose 85 74 - 99 mg/dL    Sodium 141 136 - 145 mmol/L    Potassium 4.5 3.5 - 5.3 mmol/L    Chloride 104 98 - 107 mmol/L    Bicarbonate 28 21 - 32 mmol/L    Anion Gap 14 10 - 20 mmol/L    Urea Nitrogen 25 (H) 6 - 23 mg/dL    Creatinine 0.74 0.50 - 1.30 mg/dL    eGFR >90 >60 mL/min/1.73m*2    Calcium 9.3 8.6 - 10.6 mg/dL   Manual Differential   Result Value Ref Range    Neutrophils %, Manual 64.1 40.0 - 80.0 %    Bands %, Manual 14.0 0.0 - 5.0 %    Lymphocytes %, Manual 3.5 13.0 - 44.0 %    Monocytes %, Manual 14.9 2.0 - 10.0 %    Eosinophils %, Manual 0.0 0.0 - 6.0 %    Basophils %, Manual 2.6 0.0 - 2.0 %    Myelocytes %, Manual 0.9 0.0 - 0.0 %    Seg Neutrophils Absolute, Manual 4.23 1.20 - 7.00 x10*3/uL    Bands Absolute, Manual 0.92 (H) 0.00 - 0.70 x10*3/uL    Lymphocytes Absolute, Manual 0.23 (L) 1.20 - 4.80 x10*3/uL    Monocytes Absolute, Manual 0.98 0.10 - 1.00 x10*3/uL    Eosinophils Absolute, Manual 0.00 0.00 - 0.70 x10*3/uL    Basophils Absolute, Manual 0.17 (H) 0.00 - 0.10 x10*3/uL     Myelocytes Absolute, Manual 0.06 0.00 - 0.00 x10*3/uL    Total Cells Counted 114     Neutrophils Absolute, Manual 5.15 1.20 - 7.70 x10*3/uL    RBC Morphology See Below     Ovalocytes Few    POCT pH, urine, qualitative, dipstick manually resulted   Result Value Ref Range    pH, UA 7.0      XR chest 2 views    Result Date: 11/20/2023  Interpreted By:  Fidel Pearson and Kamau Nyokabi STUDY: XR CHEST 2 VIEWS;  11/20/2023 12:43 pm   INDICATION: Signs/Symptoms:plueral flluid eval for high dose methotrexate.   COMPARISON: Chest x-ray 11/15/2023   ACCESSION NUMBER(S): [id number]   ORDERING CLINICIAN: SMILEY BARTHOLOMEW   FINDINGS: PA view of the chest.   CARDIOMEDIASTINAL SILHOUETTE: Cardiomediastinal silhouette is normal in size and configuration.   LUNGS: Lungs are clear. There is no focal consolidation, pleural effusion, or pneumothorax.   ABDOMEN: No remarkable upper abdominal findings.   BONES: No acute osseous changes.       1.  No evidence of acute cardiopulmonary process.   I personally reviewed the images/study and I agree with Dr. Tyrone Cochran findings as stated. This study was interpreted at [place], [place].   MACRO: None   Signed by: Fidel Pearson 11/20/2023 2:24 PM Dictation workstation:   [id number]         Assessment/Plan   Principal Problem:    Burkitt lymphoma (CMS/HCC)         Shilo Thakkar is a 23 y.o. male with PMH of Burkitt Lymphoma (Dx 10/20/23; s/p 1 cycle HyperCVAD), malignant pleural effusions s/p CT (10/29/23), wrist fracture, ADHD, foot fracture admitted for cycle 2 HyperCVAD +Rituximab.    D1C2 HyperCVAD    ONC:  #Burkitt lymphoma, newly diagnosed (10/20/23)  - 10/19/23 presented to OSH ED with dysphagia and abdominal pain  - CT A/P with IV contrast (10/19) mass in the body of the pancreas measuring approximately 2.7 x 2.9 cm. Multiple masses in the mesentery as well as omental caking consistent with carcinomatosis.   - CT soft tissue neck with IV  contrast (10/19): homogeneous soft tissue attenuation lesion in the L oropharynx which measures approximately 3.0 x 4.0 x 5.1 cm.   - CT Abdomen w/o IV contrast (10/20): Mesenteric lymphadenopathy.   - US guided mesenteric LN biopsy (10/20/23): HIGH GRADE B-CELL LYMPHOMA MORPHOLOGICALLY CONSISTENT WITH BURKITT LYMPHOMA; flow cytometry: CD10+ kappa restricted B cells supportive of a B cell lymphoma with germinal center origin.   - Biopsy L oropharyngeal mass and L cervical LN by ENT (10/23/23) high grade B-cell burkitt lymphoma  - PET CT ordered 10/30 showed FDG avid left oropharyngeal mass and LAD above and below diaphragm  - completed HyperCVAD Day 1 cycle 1 on 10/29  - Bone marrow bx completed 11/1 without evidence of disease  -XR chest neg 11/20  -Admitted for cycle 2 HyperCVAD     Heme  #Anemia, likely 2/2 disease  -No evidence of active bleeding   -Transfuse to keep Hgb>7, Plt>10  -Neupogen when ANC<500    CVS/RS  #Echo in Oct 2023   -EF 60-65%  -left ventricular systolic function is normal    -CXR on admission wnl    ID  Allergies: NKDA  Ppx: acyclovir; fluconazole bactrim (MWF)  -Plan Levaquin for neutropenia  -Hepatitis serology negative (10/26/23), HIV 1/2 nonreactive (10/26), EBV neg (10/27)     FEN/GI  Admit weight: 64.9kg  Ppx: Pepcid started on admission (methotrexate interaction with protonix)  Replete electrolytes as needed  -TLS monitoring  -G6PD normal  -started sodium bicarb fluids 11/20 per chemotherapy    Code: Full code  Access: PICC line (right)  DVT ppx: Ambulation  NOK: Ms Deidra Pickett [phone]      Pratibha Clayton, APRN-CNP

## 2023-11-20 NOTE — PROGRESS NOTES
Patient ID: Shilo Thakkar is a 23 y.o. male.  Oncologic history:  #Burkitt lymphoma, high risk, MYC positive  - 10/19/23 presented to OSH ED with dysphagia and abdominal pain  - CT A/P with IV contrast (10/19) mass in the body of the pancreas measuring approximately 2.7 x 2.9 cm. The pancreatic duct is dilated. Multiple masses in the mesentery as well as omental caking consistent with carcinomatosis. Moderate amount of free fluid throughout the abdomen and pelvis.   - CT soft tissue neck with IV contrast (10/19): homogeneous soft tissue attenuation lesion in the L oropharynx which measures approximately 3.0 x 4.0 x 5.1 cm.   - US guided mesenteric LN biopsy (10/20/23): HIGH GRADE B-CELL LYMPHOMA MORPHOLOGICALLY CONSISTENT WITH BURKITT LYMPHOMA; flow cytometry: No B cell population identified  - Bone marrow aspiration biopsy November 1, 2023 no evidence of lymphoma involvement.  CSF by flow cytometry showed no lymphoma 11/2/2023.  - Biopsy L oropharyngeal mass and L cervical LN by ENT (10/23/23) high grade B-cell burkitt lymphoma, MYC positive, Bcl-2 negative.  - MR brain with and w/o IV contrast (10/27): No evidence of intracranial metastatic disease.     - Dexa 40 mg daily ended 11/1  - PET CT ordered 10/30 showed FDG avid left oropharyngeal mass and LAD above and below diaphragm  - Started HyperCVAD 10/29  - Part A:   D1: CTX + mesna, dexamethasone, D2: CTX + mesna, D3: CTX + mesna, D4: Doxo, vinca, D6: Ritux, GCSF, D9: IT,   D2:  intrathecal methotrexate November 2, 2023, 12 mg, CSF shows no B cells by flow cytometry.  D6: Intrathecal cytarabine 100 mg on November 6, 2023.  D11: vinca, dexamethasone, D13: Ritux 11/10  D13: Rituximab therapy  - Daily neupogen since 11/3/23  -Discharged home November 11, 2023  -Evaluation November 15, 2023: Recovered thrombocytopenia, stop levofloxacin, continue prophylactic acyclovir and 3 times weekly Bactrim and fluconazole.  Depressed affect noted.  Subjective    HPI  Feeling  well since discharge.  He is somewhat fatigued but ambulatory and eating very good.  No oral sores diarrhea.  There is mild dyspnea on exertion.  He is somewhat on the constipated side with bowel movement every other day.  No paresthesias, no headache, no blurred vision or double vision or facial numbness.    Objective    BSA: 1.74 meters squared  /61   Pulse 104   Temp 36.4 °C (97.5 °F)   Resp 16   Wt 59.5 kg (131 lb 2.8 oz)   SpO2 100%   BMI 17.61 kg/m²      Physical Exam  Alert oriented not in distress not pale or jaundiced  Oral mucosa negative, no mucositis, mass in the left tonsillar area completely resolved.  Ulceration seen previously completely healed.  Head is normocephalic atraumatic, pupils equal reactive  Neck is supple no adenopathy, no thyromegaly  Lungs show equal air entry, no crackles or wheezing, equal percussion  Heart shows regular rate and rhythm normal S1-S2 no murmurs or gallop rhythm  Abdomen is soft nontender, moves with respiration, no hepatosplenomegaly or masses, positive bowel sounds, not distended.  Crepitus palpable on the right side of the chest wall and abdomen related to recent chest tube  Lower extremities show no edema  Neurologically grossly nonfocal  Psych: Somewhat depressed affect.  He looked like he was somewhat uncomfortable with the family in the room and I had the impression that he might have been trying to want to say something without them present but I did offer him further conversations and he was not agreeable.  Performance Status:  Symptomatic; fully ambulatory      Assessment/Plan   High risk Burkitt's lymphoma based on bulk and stage, at least stage III, disease site left cervical area, left bed, extensive mesenteric intra-abdominal adenopathy.  Completed cycle 1 part A hyper CVAD C1D1 10/29/2023 plus rituximab day 6 and day 13, and intrathecal methotrexate day 2 and intrathecal cytarabine day 6.  Appears to have achieved complete clinical response  after cycle 1.    He will be due for cycle 2 on 11/20/2023 consistent of part B, high-dose methotrexate plus high-dose cytarabine plus rituximab day 1 and day 8+ intrathecal cytarabine day 2 and intrathecal methotrexate day 8.    He needs a chest x-ray prior to initiation of high-dose methotrexate given his history of malignant pleural effusion 3 weeks ago as part of his presentation.  Progress Notes bowel regimen given his significant stool in the colon  Continue prophylactic acyclovir and fluconazole and Bactrim 3 times weekly, with pegfilgrastim support day 4 of the cycle before discharge  Will need levofloxacin between day 5 and 15 of the cycle  Will need twice weekly CBC BMP and magnesium after discharge  Will need day 2 intrathecal cytarabine 100 mg and day 8 to be scheduled as outpatient intrathecal methotrexate 12 mg  We will plan to obtain a PET scan following cycle 2 for restaging  I will see him back December 6, 2023 and plan to admit him for cycle 3 December 11, 2023.      Cancer Staging   No matching staging information was found for the patient.    Oncology History   Burkitt lymphoma (CMS/HCC)   10/27/2023 Initial Diagnosis    Burkitt lymphoma (CMS/HCC)     10/29/2023 -  Chemotherapy    HyperCVAD + RiTUXimab, 21 Day Cycles          Diagnoses and all orders for this visit:  Burkitt lymphoma, unspecified body region (CMS/HCC)  -     Clinic Appointment Request [facility]  -     XR chest 1 view; Standing  -     XR abdomen 2 views supine and decubitus; Future  -     CBC and Auto Differential; Future  -     Planned Future Admission >24 Hours  -     Lumbar Puncture; Future  -     CBC and Auto Differential; Future  -     Comprehensive Metabolic Panel; Future  -     Lactate dehydrogenase; Future  -     Clinic Appointment Request Follow up; [facility]; Future  -     Clinic Appointment Request Follow up; [facility]; Future  -     Infusion Appointment Request [facility]; Future  -      Lumbar Puncture; Future  -     FL guided lumbar puncture; Future  CLL (chronic lymphocytic leukemia) (CMS/HCC)  -     Comprehensive Metabolic Panel; Future  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)  -     Infusion Appointment Request; Future  -     CBC and Auto Differential; Future  -     Comprehensive metabolic panel; Future  -     Infusion Appointment Request; Future  -     CBC and Auto Differential; Future  -     Comprehensive metabolic panel; Future  -     Procedure Appointment Request; Future  Other orders  -     acetaminophen (Tylenol) tablet 650 mg  -     diphenhydrAMINE (BENADryl) capsule 50 mg  -     riTUXimab-pvvr (Ruxience) 700 mg in sodium chloride 0.9% 320 mL IV  -     sodium chloride 0.9 % bolus 500 mL  -     dextrose 5 % in water (D5W) bolus  -     diphenhydrAMINE (BENADryl) injection 50 mg  -     methylPREDNISolone sod succinate (PF) (SOLU-Medrol) 40 mg/mL injection 40 mg  -     famotidine PF (Pepcid) injection 20 mg  -     EPINEPHrine (Epipen) injection syringe 0.3 mg  -     albuterol 2.5 mg /3 mL (0.083 %) nebulizer solution 3 mL  -     sodium bicarbonate 150 mEq in dextrose 5 % in water (D5W) 1,000 mL infusion  -     prednisoLONE acetate (Pred-Forte) 1 % ophthalmic suspension 2 drop  -     ondansetron (Zofran) 16 mg in dextrose 5 % in water (D5W) 50 mL IVPB  -     dexAMETHasone (Decadron) in dextrose 5% 50 mL IV 12 mg  -     prochlorperazine (Compazine) tablet 10 mg  -     prochlorperazine (Compazine) injection 10 mg  -     methotrexate 358 mg in sodium chloride 0.9% 164.32 mL IV  -     methotrexate 1,432 mg in sodium chloride 0.9% 1,057.28 mL IV  -     leucovorin 50 mg in 5 mL IV  -     leucovorin 15 mg in 1.5 mL IV  -     sodium chloride 0.9 % bolus 500 mL  -     dextrose 5 % in water (D5W) bolus  -     diphenhydrAMINE (BENADryl) injection 50 mg  -     methylPREDNISolone sod succinate (PF) (SOLU-Medrol) 40 mg/mL injection 40 mg  -     famotidine PF (Pepcid) injection 20  mg  -     EPINEPHrine (Adrenalin) injection 0.3 mg  -     albuterol 2.5 mg /3 mL (0.083 %) nebulizer solution 3 mL  -     ondansetron (Zofran) 16 mg in dextrose 5 % in water (D5W) 50 mL IVPB  -     dexAMETHasone (Decadron) in dextrose 5% 50 mL IV 12 mg  -     cytarabine (PF) (Cytosar) 5,400 mg in sodium chloride 0.9% 304 mL IV  -     cytarabine PF (Cytosar) 100 mg in sodium chloride (PF) 0.9% 3 mL intrathecal injection  -     ondansetron (Zofran) 16 mg in dextrose 5 % in water (D5W) 50 mL IVPB  -     dexAMETHasone (Decadron) in dextrose 5% 50 mL IV 12 mg  -     cytarabine (PF) (Cytosar) 5,400 mg in sodium chloride 0.9% 304 mL IV  -     acetaminophen (Tylenol) tablet 650 mg  -     diphenhydrAMINE (BENADryl) capsule 50 mg  -     riTUXimab-pvvr (Ruxience) 700 mg in sodium chloride 0.9% 320 mL IV  -     pegfilgrastim-cbqv (Udenyca) injection 6 mg  -     sodium chloride 0.9 % bolus 500 mL  -     dextrose 5 % in water (D5W) bolus  -     diphenhydrAMINE (BENADryl) injection 50 mg  -     methylPREDNISolone sod succinate (PF) (SOLU-Medrol) 40 mg/mL injection 40 mg  -     famotidine PF (Pepcid) injection 20 mg  -     EPINEPHrine (Epipen) injection syringe 0.3 mg  -     albuterol 2.5 mg /3 mL (0.083 %) nebulizer solution 3 mL  -     methotrexate 12 mg in sodium chloride (PF) 0.9% 3 mL intrathecal injection  -     sodium chloride 0.9 % bolus 500 mL  -     dextrose 5 % in water (D5W) bolus  -     diphenhydrAMINE (BENADryl) injection 50 mg  -     methylPREDNISolone sod succinate (PF) (SOLU-Medrol) 40 mg/mL injection 40 mg  -     famotidine PF (Pepcid) injection 20 mg  -     EPINEPHrine (Epipen) injection syringe 0.3 mg  -     albuterol 2.5 mg /3 mL (0.083 %) nebulizer solution 3 mL           Olegario Klein MD

## 2023-11-21 LAB
ALBUMIN SERPL BCP-MCNC: 3.5 G/DL (ref 3.4–5)
ALP SERPL-CCNC: 69 U/L (ref 33–120)
ALT SERPL W P-5'-P-CCNC: 91 U/L (ref 10–52)
ANION GAP SERPL CALC-SCNC: 13 MMOL/L (ref 10–20)
APPEARANCE CSF: CLEAR
APTT PPP: 28 SECONDS (ref 27–38)
AST SERPL W P-5'-P-CCNC: 33 U/L (ref 9–39)
BASOPHILS # BLD MANUAL: 0 X10*3/UL (ref 0–0.1)
BASOPHILS NFR BLD MANUAL: 0 %
BASOPHILS NFR CSF MANUAL: 0 %
BILIRUB DIRECT SERPL-MCNC: 0 MG/DL (ref 0–0.3)
BILIRUB SERPL-MCNC: 0.2 MG/DL (ref 0–1.2)
BLASTS CSF MANUAL: 0 %
BUN SERPL-MCNC: 19 MG/DL (ref 6–23)
CALCIUM SERPL-MCNC: 8.9 MG/DL (ref 8.6–10.6)
CHLORIDE SERPL-SCNC: 101 MMOL/L (ref 98–107)
CO2 SERPL-SCNC: 30 MMOL/L (ref 21–32)
COLOR CSF: COLORLESS
COLOR SPUN CSF: COLORLESS
CREAT SERPL-MCNC: 0.77 MG/DL (ref 0.5–1.3)
EOSINOPHIL # BLD MANUAL: 0 X10*3/UL (ref 0–0.7)
EOSINOPHIL NFR BLD MANUAL: 0 %
EOSINOPHIL NFR CSF MANUAL: 0 %
ERYTHROCYTE [DISTWIDTH] IN BLOOD BY AUTOMATED COUNT: 16.1 % (ref 11.5–14.5)
FIBRINOGEN PPP-MCNC: 217 MG/DL (ref 200–400)
GFR SERPL CREATININE-BSD FRML MDRD: >90 ML/MIN/1.73M*2
GLUCOSE CSF-MCNC: 113 MG/DL (ref 40–70)
GLUCOSE SERPL-MCNC: 160 MG/DL (ref 74–99)
HCT VFR BLD AUTO: 29.1 % (ref 41–52)
HGB BLD-MCNC: 9.4 G/DL (ref 13.5–17.5)
HOLD SPECIMEN: NORMAL
IMM GRANULOCYTES # BLD AUTO: 0.86 X10*3/UL (ref 0–0.7)
IMM GRANULOCYTES NFR BLD AUTO: 11.9 % (ref 0–0.9)
IMM GRANULOCYTES NFR CSF: 0 %
INR PPP: 0.9 (ref 0.9–1.1)
LDH SERPL L TO P-CCNC: 216 U/L (ref 84–246)
LYMPHOCYTES # BLD MANUAL: 0.44 X10*3/UL (ref 1.2–4.8)
LYMPHOCYTES NFR BLD MANUAL: 6 %
LYMPHOCYTES NFR CSF MANUAL: 50 % (ref 28–96)
MAGNESIUM SERPL-MCNC: 1.79 MG/DL (ref 1.6–2.4)
MCH RBC QN AUTO: 30.2 PG (ref 26–34)
MCHC RBC AUTO-ENTMCNC: 32.3 G/DL (ref 32–36)
MCV RBC AUTO: 94 FL (ref 80–100)
METAMYELOCYTES # BLD MANUAL: 0.12 X10*3/UL
METAMYELOCYTES NFR BLD MANUAL: 1.7 %
MONOCYTES # BLD MANUAL: 0.12 X10*3/UL (ref 0.1–1)
MONOCYTES NFR BLD MANUAL: 1.7 %
MONOS+MACROS NFR CSF MANUAL: 50 % (ref 16–56)
NEUTS SEG # BLD MANUAL: 6.61 X10*3/UL (ref 1.2–7)
NEUTS SEG NFR BLD MANUAL: 90.6 %
NEUTS SEG NFR CSF MANUAL: 0 % (ref 0–5)
NRBC BLD-RTO: 0.3 /100 WBCS (ref 0–0)
OTHER CELLS NFR CSF MANUAL: 0 %
PH, POC: 8.5
PHOSPHATE SERPL-MCNC: 2.8 MG/DL (ref 2.5–4.9)
PLASMA CELLS NFR CSF MICRO: 0 %
PLATELET # BLD AUTO: 296 X10*3/UL (ref 150–450)
PLATELET CLUMP BLD QL SMEAR: PRESENT
POTASSIUM SERPL-SCNC: 4 MMOL/L (ref 3.5–5.3)
PROT CSF-MCNC: 23 MG/DL (ref 15–45)
PROT SERPL-MCNC: 5.3 G/DL (ref 6.4–8.2)
PROTHROMBIN TIME: 10.2 SECONDS (ref 9.8–12.8)
RBC # BLD AUTO: 3.11 X10*6/UL (ref 4.5–5.9)
RBC # CSF AUTO: 1 /UL (ref 0–5)
RBC MORPH BLD: ABNORMAL
SODIUM SERPL-SCNC: 140 MMOL/L (ref 136–145)
TOTAL CELLS COUNTED BLD: 117
TOTAL CELLS COUNTED CSF: 16
TUBE # CSF: ABNORMAL
WBC # BLD AUTO: 7.3 X10*3/UL (ref 4.4–11.3)
WBC # CSF AUTO: <1 /UL (ref 1–5)

## 2023-11-21 PROCEDURE — 85384 FIBRINOGEN ACTIVITY: CPT

## 2023-11-21 PROCEDURE — 88104 CYTOPATH FL NONGYN SMEARS: CPT | Performed by: PHYSICIAN ASSISTANT

## 2023-11-21 PROCEDURE — 81002 URINALYSIS NONAUTO W/O SCOPE: CPT | Performed by: INTERNAL MEDICINE

## 2023-11-21 PROCEDURE — 85027 COMPLETE CBC AUTOMATED: CPT

## 2023-11-21 PROCEDURE — 83735 ASSAY OF MAGNESIUM: CPT

## 2023-11-21 PROCEDURE — 85007 BL SMEAR W/DIFF WBC COUNT: CPT

## 2023-11-21 PROCEDURE — 009U3ZZ DRAINAGE OF SPINAL CANAL, PERCUTANEOUS APPROACH: ICD-10-PCS | Performed by: INTERNAL MEDICINE

## 2023-11-21 PROCEDURE — 84100 ASSAY OF PHOSPHORUS: CPT

## 2023-11-21 PROCEDURE — 84157 ASSAY OF PROTEIN OTHER: CPT | Performed by: PHYSICIAN ASSISTANT

## 2023-11-21 PROCEDURE — 85730 THROMBOPLASTIN TIME PARTIAL: CPT

## 2023-11-21 PROCEDURE — A4216 STERILE WATER/SALINE, 10 ML: HCPCS | Performed by: INTERNAL MEDICINE

## 2023-11-21 PROCEDURE — 82945 GLUCOSE OTHER FLUID: CPT | Performed by: PHYSICIAN ASSISTANT

## 2023-11-21 PROCEDURE — 89051 BODY FLUID CELL COUNT: CPT | Performed by: PHYSICIAN ASSISTANT

## 2023-11-21 PROCEDURE — 2500000004 HC RX 250 GENERAL PHARMACY W/ HCPCS (ALT 636 FOR OP/ED): Performed by: INTERNAL MEDICINE

## 2023-11-21 PROCEDURE — 62270 DX LMBR SPI PNXR: CPT | Performed by: PHYSICIAN ASSISTANT

## 2023-11-21 PROCEDURE — 1170000001 HC PRIVATE ONCOLOGY ROOM DAILY

## 2023-11-21 PROCEDURE — 99233 SBSQ HOSP IP/OBS HIGH 50: CPT | Performed by: INTERNAL MEDICINE

## 2023-11-21 PROCEDURE — 2500000001 HC RX 250 WO HCPCS SELF ADMINISTERED DRUGS (ALT 637 FOR MEDICARE OP)

## 2023-11-21 PROCEDURE — 88187 FLOWCYTOMETRY/READ 2-8: CPT | Performed by: PATHOLOGY

## 2023-11-21 PROCEDURE — 96450 CHEMOTHERAPY INTO CNS: CPT | Performed by: PHYSICIAN ASSISTANT

## 2023-11-21 PROCEDURE — 3E04305 INTRODUCTION OF OTHER ANTINEOPLASTIC INTO CENTRAL VEIN, PERCUTANEOUS APPROACH: ICD-10-PCS | Performed by: INTERNAL MEDICINE

## 2023-11-21 PROCEDURE — 80053 COMPREHEN METABOLIC PANEL: CPT

## 2023-11-21 PROCEDURE — 88184 FLOWCYTOMETRY/ TC 1 MARKER: CPT | Mod: TC | Performed by: PHYSICIAN ASSISTANT

## 2023-11-21 PROCEDURE — 83615 LACTATE (LD) (LDH) ENZYME: CPT

## 2023-11-21 RX ADMIN — SODIUM BICARBONATE 200 ML/HR: 84 INJECTION, SOLUTION INTRAVENOUS at 03:03

## 2023-11-21 RX ADMIN — METHOTREXATE 1432 MG: 25 INJECTION, SOLUTION INTRA-ARTERIAL; INTRAMUSCULAR; INTRATHECAL; INTRAVENOUS at 00:38

## 2023-11-21 RX ADMIN — DEXAMETHASONE SODIUM PHOSPHATE 12 MG: 10 INJECTION, SOLUTION INTRAMUSCULAR; INTRAVENOUS at 22:51

## 2023-11-21 RX ADMIN — SODIUM BICARBONATE 200 ML/HR: 84 INJECTION, SOLUTION INTRAVENOUS at 09:07

## 2023-11-21 RX ADMIN — FAMOTIDINE 20 MG: 20 TABLET, FILM COATED ORAL at 09:03

## 2023-11-21 RX ADMIN — ACYCLOVIR 400 MG: 400 TABLET ORAL at 09:03

## 2023-11-21 RX ADMIN — PREDNISOLONE ACETATE 2 DROP: 10 SUSPENSION/ DROPS OPHTHALMIC at 23:30

## 2023-11-21 RX ADMIN — PREDNISOLONE ACETATE 2 DROP: 10 SUSPENSION/ DROPS OPHTHALMIC at 05:17

## 2023-11-21 RX ADMIN — CYTARABINE 100 MG: 100 INJECTION, SOLUTION INTRATHECAL; INTRAVENOUS; SUBCUTANEOUS at 15:38

## 2023-11-21 RX ADMIN — PREDNISOLONE ACETATE 2 DROP: 10 SUSPENSION/ DROPS OPHTHALMIC at 11:30

## 2023-11-21 RX ADMIN — FLUCONAZOLE 400 MG: 200 TABLET ORAL at 09:03

## 2023-11-21 RX ADMIN — ONDANSETRON 16 MG: 2 INJECTION INTRAMUSCULAR; INTRAVENOUS at 22:25

## 2023-11-21 RX ADMIN — CYTARABINE 5400 MG: 100 INJECTION, SOLUTION INTRATHECAL; INTRAVENOUS; SUBCUTANEOUS at 23:36

## 2023-11-21 RX ADMIN — ACYCLOVIR 400 MG: 400 TABLET ORAL at 21:05

## 2023-11-21 RX ADMIN — FAMOTIDINE 20 MG: 20 TABLET, FILM COATED ORAL at 21:05

## 2023-11-21 RX ADMIN — PREDNISOLONE ACETATE 2 DROP: 10 SUSPENSION/ DROPS OPHTHALMIC at 00:35

## 2023-11-21 RX ADMIN — SODIUM BICARBONATE 200 ML/HR: 84 INJECTION, SOLUTION INTRAVENOUS at 14:49

## 2023-11-21 RX ADMIN — PREDNISOLONE ACETATE 2 DROP: 10 SUSPENSION/ DROPS OPHTHALMIC at 18:15

## 2023-11-21 ASSESSMENT — COGNITIVE AND FUNCTIONAL STATUS - GENERAL
DAILY ACTIVITIY SCORE: 24
MOBILITY SCORE: 24
MOBILITY SCORE: 24
DAILY ACTIVITIY SCORE: 24

## 2023-11-21 ASSESSMENT — ACTIVITIES OF DAILY LIVING (ADL): LACK_OF_TRANSPORTATION: NO

## 2023-11-21 ASSESSMENT — PAIN - FUNCTIONAL ASSESSMENT: PAIN_FUNCTIONAL_ASSESSMENT: 0-10

## 2023-11-21 ASSESSMENT — PAIN SCALES - GENERAL
PAINLEVEL_OUTOF10: 0 - NO PAIN
PAINLEVEL_OUTOF10: 0 - NO PAIN

## 2023-11-21 NOTE — CARE PLAN
VSS, patient remained afebrile during shift. Patient remained free from injuries and falls throughout shift. No complaints of pain or n/v/d. Patient received day 1 cycle 2 of hyperCVAD + rituximab. Tolerated well. Patient safe.    Problem: Fall/Injury  Goal: Not fall by end of shift  Outcome: Progressing  Goal: Be free from injury by end of the shift  Outcome: Progressing  Goal: Verbalize understanding of personal risk factors for fall in the hospital  Outcome: Progressing  Goal: Verbalize understanding of risk factor reduction measures to prevent injury from fall in the home  Outcome: Progressing  Goal: Use assistive devices by end of the shift  Outcome: Progressing  Goal: Pace activities to prevent fatigue by end of the shift  Outcome: Progressing

## 2023-11-21 NOTE — SIGNIFICANT EVENT
11/21/23 0328   Prechemo Checklist   Has the patient been in the hospital, ED, or urgent care since last date of service No   Chemo/Immuno Consent Signed Yes   Protocol/Indications Verified Yes   Confirmed to previous date/time of medication Yes   Compared to previous dose Yes   All medications are dated accurately Yes   Pregnancy Test Negative Not applicable   Parameters Met Yes   BSA/Weight-Height Verified Yes   Dose Calculations Verified Yes

## 2023-11-21 NOTE — CARE PLAN
The clinical goals for the shift include Patient will remain free of falls and hemodynamically stable throughout shift.    VSS, afebrile, no complaints N/V/D this shift. Pt remained safe and free of falls/injury. No pain reported this shift. Patient receiving Cycle 2 Hyper CVAD + Rituximab. Patient cleared urine pH and urine output, started cycle with rituxan, and methotrexate to follow this evening.

## 2023-11-21 NOTE — PROGRESS NOTES
11/21/23 1200   Discharge Planning   Living Arrangements Parent   Support Systems Parent;Family members   Assistance Needed none   Type of Residence Private residence   Number of Stairs Within Residence 12   Do you have animals or pets at home? Yes   Type of Animals or Pets 2 cats   Who is requesting discharge planning? Provider   Home or Post Acute Services None   Patient expects to be discharged to: home   Does the patient need discharge transport arranged? No   Financial Resource Strain   How hard is it for you to pay for the very basics like food, housing, medical care, and heating? Not hard   Housing Stability   In the last 12 months, was there a time when you were not able to pay the mortgage or rent on time? N   In the last 12 months, how many places have you lived? 1   In the last 12 months, was there a time when you did not have a steady place to sleep or slept in a shelter (including now)? N   Transportation Needs   In the past 12 months, has lack of transportation kept you from medical appointments or from getting medications? no   In the past 12 months, has lack of transportation kept you from meetings, work, or from getting things needed for daily living? No     TCC Note    Plan per Medical/Surgical Team: Cycle 2 hyperCVAD  Status: inpatient   Payor Source: [place]  Discharge disposition: home   Expected date of discharge: 11/27  Barriers: none    TCC met with patient and mother at bedside. TCC explained role and would assist patient with any discharge needs. Pt states he is independent before admission. Demographics and insurance verifed with patient. Pt states he currently does not have a PCP. Will continue to follow patient for any discharge needs.

## 2023-11-21 NOTE — PROCEDURES
Lumbar Puncture    Date/Time: 11/21/2023 4:38 PM    Performed by: Roselia Shetty PA-C  Authorized by: Roselia Shetty PA-C    Consent:     Consent obtained:  Written    Consent given by:  Patient    Risks, benefits, and alternatives were discussed: yes      Risks discussed:  Bleeding, infection, headache and pain    Alternatives discussed:  No treatment  Universal protocol:     Procedure explained and questions answered to patient or proxy's satisfaction: yes      Relevant documents present and verified: yes      Test results available: yes      Imaging studies available: yes      Required blood products, implants, devices, and special equipment available: yes      Immediately prior to procedure a time out was called: yes      Site/side marked: yes      Patient identity confirmed:  Verbally with patient, arm band and hospital-assigned identification number  Pre-procedure details:     Procedure purpose:  Therapeutic    Preparation: Patient was prepped and draped in usual sterile fashion    Anesthesia:     Anesthesia method:  Local infiltration    Local anesthetic:  Lidocaine 1% w/o epi  Procedure details:     Lumbar space:  L3-L4 interspace    Patient position:  Sitting    Needle type:  Spinal needle - Quincke tip    Ultrasound guidance: no      Number of attempts:  1    Fluid appearance:  Clear    Tubes of fluid:  4    Total volume (ml):  8  Post-procedure details:     Puncture site:  Adhesive bandage applied and direct pressure applied    Procedure completion:  Tolerated well, no immediate complications  Comments:      Sent samples for cell count and diff, total protein and glucose and flow cytometry

## 2023-11-21 NOTE — PROGRESS NOTES
"Shilo Thakkar is a 23 y.o. male on day 1 of admission presenting with Burkitt lymphoma (CMS/HCC).    Subjective   Pt states he is doing fine and no issues after the chemo infusion yesterday and last night.pt denies F/N/V/D CP,SOB,dizziness and HA.other ROS are stable.       Objective     Physical Exam  Constitutional:       Appearance: Normal appearance.   HENT:      Head: Normocephalic and atraumatic.      Nose: Nose normal.      Mouth/Throat:      Mouth: Mucous membranes are moist.   Eyes:      Extraocular Movements: Extraocular movements intact.      Conjunctiva/sclera: Conjunctivae normal.      Pupils: Pupils are equal, round, and reactive to light.   Cardiovascular:      Rate and Rhythm: Normal rate and regular rhythm.      Pulses: Normal pulses.      Heart sounds: Normal heart sounds.   Pulmonary:      Effort: Pulmonary effort is normal.      Breath sounds: Normal breath sounds. No rales.   Abdominal:      General: Abdomen is flat. Bowel sounds are normal. There is no distension.      Palpations: Abdomen is soft.   Musculoskeletal:         General: Normal range of motion.      Cervical back: Normal range of motion.      Right lower leg: No edema.      Left lower leg: No edema.   Skin:     General: Skin is warm.   Neurological:      General: No focal deficit present.      Mental Status: He is alert and oriented to person, place, and time. Mental status is at baseline.   Psychiatric:         Mood and Affect: Mood normal.         Behavior: Behavior normal.         Last Recorded Vitals  Blood pressure 119/73, pulse 64, temperature 36.4 °C (97.5 °F), temperature source Temporal, resp. rate 18, height 1.82 m (5' 11.65\"), weight 64.9 kg (143 lb 1.3 oz), SpO2 100 %.  Intake/Output last 3 Shifts:  I/O last 3 completed shifts:  In: 3394.1 (52.3 mL/kg) [I.V.:2990 (46.1 mL/kg); IV Piggyback:404.1]  Out: 2150 (33.1 mL/kg) [Urine:2150 (0.9 mL/kg/hr)]  Weight: 64.9 kg             Assessment/Plan   Principal Problem:    " Burkitt lymphoma (CMS/HCC)    Shilo Thakkar is a 23 y.o. male with PMH of Burkitt Lymphoma (Dx 10/20/23; s/p 1 cycle HyperCVAD), malignant pleural effusions s/p CT (10/29/23), wrist fracture, ADHD, foot fracture admitted for cycle 2 HyperCVAD +Rituximab.     D1C2 HyperCVAD     ONC:  #Burkitt lymphoma, newly diagnosed (10/20/23)  - 10/19/23 presented to OSH ED with dysphagia and abdominal pain  - CT A/P with IV contrast (10/19) mass in the body of the pancreas measuring approximately 2.7 x 2.9 cm. Multiple masses in the mesentery as well as omental caking consistent with carcinomatosis.   - CT soft tissue neck with IV contrast (10/19): homogeneous soft tissue attenuation lesion in the L oropharynx which measures approximately 3.0 x 4.0 x 5.1 cm.   - CT Abdomen w/o IV contrast (10/20): Mesenteric lymphadenopathy.   - US guided mesenteric LN biopsy (10/20/23): HIGH GRADE B-CELL LYMPHOMA MORPHOLOGICALLY CONSISTENT WITH BURKITT LYMPHOMA; flow cytometry: CD10+ kappa restricted B cells supportive of a B cell lymphoma with germinal center origin.   - Biopsy L oropharyngeal mass and L cervical LN by ENT (10/23/23) high grade B-cell burkitt lymphoma  - PET CT ordered 10/30 showed FDG avid left oropharyngeal mass and LAD above and below diaphragm  - completed HyperCVAD Day 1 cycle 1 on 10/29  - Bone marrow bx completed 11/1 without evidence of disease  -XR chest neg 11/20  -Admitted for cycle 2 HyperCVAD   -completed C2D1 on 11/20  -completed C2D2 on 11/21    CHEMO:  - Rituximab 700 mg x 1 on D1, D8   - Cytarabine 5400 mg x 1 on D2, D3  - IT Cytarabine 100 mg x 1 on D2  - methotrexate 358 mg, 1432 mg on D1    Supportive:   - Pegfilgrastim-cbqv 6 mg starting w/ treatment  - Dexamethasone 12 mg x 1 on D2, D3  - Ondanestron 16 mg x 1 on D2, D3  - Leucovorin 15 mg q6   - Prednisolone 1% ophthalmic q6 hours x 6 days    Heme  #Anemia, likely 2/2 disease  -No evidence of active bleeding, hypercoag state  -Transfuse to keep Hgb>7,  Plt>10     CVS/RS  -ECHO 10/2023: EF 60-65%, left ventricular systolic function is normal    -CXR on admission wnl     ID  Allergies: NKDA  - No evidence of infection admit, VSS, afebrile  #Ppx:   - Cont. acyclovir; fluconazole, bactrim ([facility])  -Plan Levaquin for neutropenia  -Hepatitis serology negative (10/26/23), HIV 1/2 nonreactive (10/26), EBV neg (10/27)      FEN/GI  Admit weight: 64.9kg  - Admit sCr 0.77  #Ppx:   - Pepcid started on admission (methotrexate interaction with protonix)  - Replete electrolytes as needed  -TLS monitoring  -G6PD normal  -started sodium bicarb fluids 11/20 per chemotherapy protocol     Code: Full code  Access: PICC line (right)  DVT ppx: Ambulation  NOK: Ms Deidra Pickett [phone]          Pratibha Clayton, APRN-CNP

## 2023-11-21 NOTE — NURSING NOTE
Chemotherapy note: day 1 cycle 2 (B1)      Rituximab-pvvr 700 mg in 320 mL given over 1.5 hrs via PICC. Dose up at 1928 and down at 2058.  Pre-medicated with oral tylenol 650 mg, oral benadryl 50 mg, and IV dexamethasone 12 mg.  Patient, dose and rate verified with second RNNaomi.  + BBR obtained via syringe aspiration before and after administration.  Patient with no side effects.       Methotrexate 358 mg in 164.32 mL given over 2 hrs via PICC. Dose up at 2207 and down at 0007. Pre-medicated with IV zofran 16 mg.  Patient, dose and rate verified with second RNDory.  + BBR obtained via syringe aspiration before and after administration.  Patient with no side effects.       Methotrexate 1432 mg in 1057.28 mL given over 22 hrs via PICC.  Dose up at 0038. Pre-medicated with IV zofran 16 mg.  Patient, dose and rate verified with second RNDebra.  + BBR obtained via syringe aspiration before administration.  Patient with no side effects.

## 2023-11-22 LAB
ALBUMIN SERPL BCP-MCNC: 3.4 G/DL (ref 3.4–5)
ALP SERPL-CCNC: 64 U/L (ref 33–120)
ALT SERPL W P-5'-P-CCNC: 109 U/L (ref 10–52)
ANION GAP SERPL CALC-SCNC: 12 MMOL/L (ref 10–20)
AST SERPL W P-5'-P-CCNC: 48 U/L (ref 9–39)
BASOPHILS # BLD AUTO: 0.06 X10*3/UL (ref 0–0.1)
BASOPHILS NFR BLD AUTO: 0.6 %
BILIRUB DIRECT SERPL-MCNC: 0.1 MG/DL (ref 0–0.3)
BILIRUB SERPL-MCNC: 0.3 MG/DL (ref 0–1.2)
BUN SERPL-MCNC: 13 MG/DL (ref 6–23)
CALCIUM SERPL-MCNC: 8.4 MG/DL (ref 8.6–10.6)
CHLORIDE SERPL-SCNC: 101 MMOL/L (ref 98–107)
CO2 SERPL-SCNC: 30 MMOL/L (ref 21–32)
CREAT SERPL-MCNC: 0.59 MG/DL (ref 0.5–1.3)
EOSINOPHIL # BLD AUTO: 0 X10*3/UL (ref 0–0.7)
EOSINOPHIL NFR BLD AUTO: 0 %
ERYTHROCYTE [DISTWIDTH] IN BLOOD BY AUTOMATED COUNT: 16.3 % (ref 11.5–14.5)
GFR SERPL CREATININE-BSD FRML MDRD: >90 ML/MIN/1.73M*2
GLUCOSE SERPL-MCNC: 152 MG/DL (ref 74–99)
HCT VFR BLD AUTO: 29.4 % (ref 41–52)
HGB BLD-MCNC: 9.4 G/DL (ref 13.5–17.5)
IMM GRANULOCYTES # BLD AUTO: 0.41 X10*3/UL (ref 0–0.7)
IMM GRANULOCYTES NFR BLD AUTO: 4.2 % (ref 0–0.9)
LDH SERPL L TO P-CCNC: 233 U/L (ref 84–246)
LYMPHOCYTES # BLD AUTO: 0.26 X10*3/UL (ref 1.2–4.8)
LYMPHOCYTES NFR BLD AUTO: 2.7 %
MAGNESIUM SERPL-MCNC: 1.83 MG/DL (ref 1.6–2.4)
MCH RBC QN AUTO: 30.7 PG (ref 26–34)
MCHC RBC AUTO-ENTMCNC: 32 G/DL (ref 32–36)
MCV RBC AUTO: 96 FL (ref 80–100)
MONOCYTES # BLD AUTO: 0.7 X10*3/UL (ref 0.1–1)
MONOCYTES NFR BLD AUTO: 7.1 %
MTX 24H P SERPL-SCNC: 0.19 UMOL/L
MTX 24H P SERPL-SCNC: 4 UMOL/L
NEUTROPHILS # BLD AUTO: 8.38 X10*3/UL (ref 1.2–7.7)
NEUTROPHILS NFR BLD AUTO: 85.4 %
NRBC BLD-RTO: 0 /100 WBCS (ref 0–0)
PATH REVIEW-CELL CT,CSF: NORMAL
PH, POC: 7.5
PH, POC: 8.5
PH, POC: 8.5
PHOSPHATE SERPL-MCNC: 3.1 MG/DL (ref 2.5–4.9)
PLATELET # BLD AUTO: 349 X10*3/UL (ref 150–450)
POTASSIUM SERPL-SCNC: 3.9 MMOL/L (ref 3.5–5.3)
PROT SERPL-MCNC: 5 G/DL (ref 6.4–8.2)
RBC # BLD AUTO: 3.06 X10*6/UL (ref 4.5–5.9)
SODIUM SERPL-SCNC: 139 MMOL/L (ref 136–145)
WBC # BLD AUTO: 9.8 X10*3/UL (ref 4.4–11.3)

## 2023-11-22 PROCEDURE — 80053 COMPREHEN METABOLIC PANEL: CPT

## 2023-11-22 PROCEDURE — 84100 ASSAY OF PHOSPHORUS: CPT

## 2023-11-22 PROCEDURE — 83615 LACTATE (LD) (LDH) ENZYME: CPT

## 2023-11-22 PROCEDURE — 2500000001 HC RX 250 WO HCPCS SELF ADMINISTERED DRUGS (ALT 637 FOR MEDICARE OP)

## 2023-11-22 PROCEDURE — 2500000004 HC RX 250 GENERAL PHARMACY W/ HCPCS (ALT 636 FOR OP/ED): Performed by: INTERNAL MEDICINE

## 2023-11-22 PROCEDURE — 80204 DRUG ASSAY METHOTREXATE: CPT | Performed by: INTERNAL MEDICINE

## 2023-11-22 PROCEDURE — 83735 ASSAY OF MAGNESIUM: CPT

## 2023-11-22 PROCEDURE — 99233 SBSQ HOSP IP/OBS HIGH 50: CPT | Performed by: INTERNAL MEDICINE

## 2023-11-22 PROCEDURE — 85025 COMPLETE CBC W/AUTO DIFF WBC: CPT

## 2023-11-22 PROCEDURE — 1170000001 HC PRIVATE ONCOLOGY ROOM DAILY

## 2023-11-22 PROCEDURE — 81002 URINALYSIS NONAUTO W/O SCOPE: CPT | Performed by: INTERNAL MEDICINE

## 2023-11-22 PROCEDURE — 2500000004 HC RX 250 GENERAL PHARMACY W/ HCPCS (ALT 636 FOR OP/ED)

## 2023-11-22 PROCEDURE — 2500000004 HC RX 250 GENERAL PHARMACY W/ HCPCS (ALT 636 FOR OP/ED): Performed by: STUDENT IN AN ORGANIZED HEALTH CARE EDUCATION/TRAINING PROGRAM

## 2023-11-22 PROCEDURE — 82248 BILIRUBIN DIRECT: CPT

## 2023-11-22 PROCEDURE — 80204 DRUG ASSAY METHOTREXATE: CPT

## 2023-11-22 RX ORDER — FUROSEMIDE 10 MG/ML
40 INJECTION INTRAMUSCULAR; INTRAVENOUS ONCE
Status: COMPLETED | OUTPATIENT
Start: 2023-11-22 | End: 2023-11-22

## 2023-11-22 RX ADMIN — LEUCOVORIN CALCIUM 50 MG: 100 INJECTION, POWDER, LYOPHILIZED, FOR SOLUTION INTRAMUSCULAR; INTRAVENOUS at 08:20

## 2023-11-22 RX ADMIN — CYTARABINE 5400 MG: 100 INJECTION, SOLUTION INTRATHECAL; INTRAVENOUS; SUBCUTANEOUS at 23:28

## 2023-11-22 RX ADMIN — LEUCOVORIN CALCIUM 15 MG: 10 INJECTION INTRAMUSCULAR; INTRAVENOUS at 20:16

## 2023-11-22 RX ADMIN — CYTARABINE 5400 MG: 100 INJECTION, SOLUTION INTRATHECAL; INTRAVENOUS; SUBCUTANEOUS at 10:49

## 2023-11-22 RX ADMIN — FUROSEMIDE 40 MG: 10 INJECTION, SOLUTION INTRAMUSCULAR; INTRAVENOUS at 18:36

## 2023-11-22 RX ADMIN — PREDNISOLONE ACETATE 2 DROP: 10 SUSPENSION/ DROPS OPHTHALMIC at 23:11

## 2023-11-22 RX ADMIN — PREDNISOLONE ACETATE 2 DROP: 10 SUSPENSION/ DROPS OPHTHALMIC at 10:57

## 2023-11-22 RX ADMIN — DEXAMETHASONE SODIUM PHOSPHATE 12 MG: 10 INJECTION, SOLUTION INTRAMUSCULAR; INTRAVENOUS at 23:10

## 2023-11-22 RX ADMIN — SULFAMETHOXAZOLE AND TRIMETHOPRIM 1 TABLET: 800; 160 TABLET ORAL at 14:18

## 2023-11-22 RX ADMIN — PREDNISOLONE ACETATE 2 DROP: 10 SUSPENSION/ DROPS OPHTHALMIC at 05:30

## 2023-11-22 RX ADMIN — ONDANSETRON 16 MG: 2 INJECTION INTRAMUSCULAR; INTRAVENOUS at 22:48

## 2023-11-22 RX ADMIN — ACYCLOVIR 400 MG: 400 TABLET ORAL at 20:16

## 2023-11-22 RX ADMIN — FAMOTIDINE 20 MG: 20 TABLET, FILM COATED ORAL at 20:16

## 2023-11-22 RX ADMIN — PREDNISOLONE ACETATE 2 DROP: 10 SUSPENSION/ DROPS OPHTHALMIC at 18:20

## 2023-11-22 RX ADMIN — ACYCLOVIR 400 MG: 400 TABLET ORAL at 08:20

## 2023-11-22 RX ADMIN — FAMOTIDINE 20 MG: 20 TABLET, FILM COATED ORAL at 08:20

## 2023-11-22 RX ADMIN — FLUCONAZOLE 400 MG: 200 TABLET ORAL at 08:20

## 2023-11-22 RX ADMIN — SODIUM BICARBONATE 200 ML/HR: 84 INJECTION, SOLUTION INTRAVENOUS at 08:20

## 2023-11-22 RX ADMIN — SODIUM BICARBONATE 200 ML/HR: 84 INJECTION, SOLUTION INTRAVENOUS at 20:21

## 2023-11-22 RX ADMIN — LEUCOVORIN CALCIUM 15 MG: 10 INJECTION INTRAMUSCULAR; INTRAVENOUS at 14:18

## 2023-11-22 RX ADMIN — SODIUM BICARBONATE 200 ML/HR: 84 INJECTION, SOLUTION INTRAVENOUS at 14:18

## 2023-11-22 ASSESSMENT — COGNITIVE AND FUNCTIONAL STATUS - GENERAL
DAILY ACTIVITIY SCORE: 24
MOBILITY SCORE: 24
DAILY ACTIVITIY SCORE: 24
MOBILITY SCORE: 24

## 2023-11-22 ASSESSMENT — PAIN SCALES - GENERAL
PAINLEVEL_OUTOF10: 0 - NO PAIN
PAINLEVEL_OUTOF10: 0 - NO PAIN

## 2023-11-22 ASSESSMENT — PAIN - FUNCTIONAL ASSESSMENT: PAIN_FUNCTIONAL_ASSESSMENT: 0-10

## 2023-11-22 NOTE — PROGRESS NOTES
"Shilo Thakkar is a 23 y.o. male on day 2 of admission presenting with Burkitt lymphoma (CMS/HCC).    Subjective   Pt is doing great as per him and no issues overnight.denies N/V/D/F constipation,SOB,CP or dizziness.       Objective     Physical Exam  Constitutional:       Appearance: Normal appearance. He is normal weight.   HENT:      Head: Normocephalic and atraumatic.      Nose: Nose normal.      Mouth/Throat:      Mouth: Mucous membranes are moist.   Eyes:      Extraocular Movements: Extraocular movements intact.      Conjunctiva/sclera: Conjunctivae normal.      Pupils: Pupils are equal, round, and reactive to light.   Cardiovascular:      Rate and Rhythm: Normal rate and regular rhythm.   Pulmonary:      Effort: Pulmonary effort is normal.      Breath sounds: Normal breath sounds.   Abdominal:      General: Abdomen is flat. Bowel sounds are normal.      Palpations: Abdomen is soft.   Musculoskeletal:         General: Normal range of motion.      Cervical back: Normal range of motion.   Skin:     General: Skin is warm.      Findings: No bruising.   Neurological:      General: No focal deficit present.      Mental Status: He is alert and oriented to person, place, and time. Mental status is at baseline.   Psychiatric:         Mood and Affect: Mood normal.         Behavior: Behavior normal.         Last Recorded Vitals  Blood pressure 113/74, pulse 64, temperature 36.5 °C (97.7 °F), temperature source Temporal, resp. rate 18, height 1.82 m (5' 11.65\"), weight 69.8 kg (153 lb 14.1 oz), SpO2 100 %.  Intake/Output last 3 Shifts:  I/O last 3 completed shifts:  In: 6218.3 (95.8 mL/kg) [I.V.:5266.7 (81.2 mL/kg); IV Piggyback:951.6]  Out: 4875 (75.1 mL/kg) [Urine:4875 (2.1 mL/kg/hr)]  Weight: 64.9 kg     Relevant Results     Scheduled medications  acyclovir, 400 mg, oral, BID  cytarabine, 3,000 mg/m2 (Treatment Plan Recorded), intravenous, q12h  dexAMETHasone, 12 mg, intravenous, Once  famotidine, 20 mg, oral, " BID  fluconazole, 400 mg, oral, Daily  leucovorin, 15 mg, intravenous, q6h  ondansetron, 16 mg, intravenous, Once  prednisoLONE acetate, 2 drop, Both Eyes, q6h      Continuous medications  sodium bicarbonate 150 mEq in dextrose 5 % in water (D5W) 1,000 mL infusion, 200 mL/hr, Last Rate: 200 mL/hr (11/22/23 1432)      PRN medications  PRN medications: albuterol, alteplase, dextrose, diphenhydrAMINE, EPINEPHrine, famotidine, methylPREDNISolone sodium succinate (PF), ondansetron, polyethylene glycol, prochlorperazine, prochlorperazine, sodium chloride       Results for orders placed or performed during the hospital encounter of 11/20/23 (from the past 24 hour(s))   POCT pH, urine, qualitative, dipstick manually resulted   Result Value Ref Range    pH, UA 8.5    POCT pH, urine, qualitative, dipstick manually resulted   Result Value Ref Range    pH, UA 8.5    CBC and Auto Differential   Result Value Ref Range    WBC 9.8 4.4 - 11.3 x10*3/uL    nRBC 0.0 0.0 - 0.0 /100 WBCs    RBC 3.06 (L) 4.50 - 5.90 x10*6/uL    Hemoglobin 9.4 (L) 13.5 - 17.5 g/dL    Hematocrit 29.4 (L) 41.0 - 52.0 %    MCV 96 80 - 100 fL    MCH 30.7 26.0 - 34.0 pg    MCHC 32.0 32.0 - 36.0 g/dL    RDW 16.3 (H) 11.5 - 14.5 %    Platelets 349 150 - 450 x10*3/uL    Neutrophils % 85.4 40.0 - 80.0 %    Immature Granulocytes %, Automated 4.2 (H) 0.0 - 0.9 %    Lymphocytes % 2.7 13.0 - 44.0 %    Monocytes % 7.1 2.0 - 10.0 %    Eosinophils % 0.0 0.0 - 6.0 %    Basophils % 0.6 0.0 - 2.0 %    Neutrophils Absolute 8.38 (H) 1.20 - 7.70 x10*3/uL    Immature Granulocytes Absolute, Automated 0.41 0.00 - 0.70 x10*3/uL    Lymphocytes Absolute 0.26 (L) 1.20 - 4.80 x10*3/uL    Monocytes Absolute 0.70 0.10 - 1.00 x10*3/uL    Eosinophils Absolute 0.00 0.00 - 0.70 x10*3/uL    Basophils Absolute 0.06 0.00 - 0.10 x10*3/uL   Phosphorus   Result Value Ref Range    Phosphorus 3.1 2.5 - 4.9 mg/dL   Basic Metabolic Panel   Result Value Ref Range    Glucose 152 (H) 74 - 99 mg/dL     Sodium 139 136 - 145 mmol/L    Potassium 3.9 3.5 - 5.3 mmol/L    Chloride 101 98 - 107 mmol/L    Bicarbonate 30 21 - 32 mmol/L    Anion Gap 12 10 - 20 mmol/L    Urea Nitrogen 13 6 - 23 mg/dL    Creatinine 0.59 0.50 - 1.30 mg/dL    eGFR >90 >60 mL/min/1.73m*2    Calcium 8.4 (L) 8.6 - 10.6 mg/dL   POCT pH, urine, qualitative, dipstick manually resulted   Result Value Ref Range    pH, UA 8.5    Hepatic function panel   Result Value Ref Range    Albumin 3.4 3.4 - 5.0 g/dL    Bilirubin, Total 0.3 0.0 - 1.2 mg/dL    Bilirubin, Direct 0.1 0.0 - 0.3 mg/dL    Alkaline Phosphatase 64 33 - 120 U/L     (H) 10 - 52 U/L    AST 48 (H) 9 - 39 U/L    Total Protein 5.0 (L) 6.4 - 8.2 g/dL   Magnesium   Result Value Ref Range    Magnesium 1.83 1.60 - 2.40 mg/dL   Lactate Dehydrogenase   Result Value Ref Range     84 - 246 U/L   POCT pH, urine, qualitative, dipstick manually resulted   Result Value Ref Range    pH, UA 8.5            Assessment/Plan   Principal Problem:    Burkitt lymphoma (CMS/HCC)       Shilo Thakkar is a 23 y.o. male with PMH of Burkitt Lymphoma (Dx 10/20/23; s/p 1 cycle HyperCVAD), malignant pleural effusions s/p CT (10/29/23), wrist fracture, ADHD, foot fracture admitted for cycle 2 HyperCVAD +Rituximab.     D3C2 HyperCVAD     ONC:  #Burkitt lymphoma, newly diagnosed (10/20/23)  - 10/19/23 presented to OSH ED with dysphagia and abdominal pain  - CT A/P with IV contrast (10/19) mass in the body of the pancreas measuring approximately 2.7 x 2.9 cm. Multiple masses in the mesentery as well as omental caking consistent with carcinomatosis.   - CT soft tissue neck with IV contrast (10/19): homogeneous soft tissue attenuation lesion in the L oropharynx which measures approximately 3.0 x 4.0 x 5.1 cm.   - CT Abdomen w/o IV contrast (10/20): Mesenteric lymphadenopathy.   - US guided mesenteric LN biopsy (10/20/23): HIGH GRADE B-CELL LYMPHOMA MORPHOLOGICALLY CONSISTENT WITH BURKITT LYMPHOMA; flow cytometry:  CD10+ kappa restricted B cells supportive of a B cell lymphoma with germinal center origin.   - Biopsy L oropharyngeal mass and L cervical LN by ENT (10/23/23) high grade B-cell burkitt lymphoma  - PET CT ordered 10/30 showed FDG avid left oropharyngeal mass and LAD above and below diaphragm  - completed HyperCVAD Day 1 cycle 1 on 10/29  - Bone marrow bx completed 11/1 without evidence of disease  -XR chest neg 11/20  -Admitted for cycle 2 HyperCVAD   -completed C2D1 on 11/20  -completed C2D2 on 11/21  -completed C2D3 on 11/22     CHEMO:  - Rituximab 700 mg x 1 on D1, D8   - Cytarabine 5400 mg x 1 on D2, D3  - IT Cytarabine 100 mg x 1 on D2  - methotrexate 358 mg, 1432 mg on D1     Supportive:   - Pegfilgrastim-cbqv 6 mg starting w/ treatment  - Dexamethasone 12 mg x 1 on D2, D3  - Ondanestron 16 mg x 1 on D2, D3  - Leucovorin 15 mg q6   - Prednisolone 1% ophthalmic q6 hours x 6 days     Heme  #Anemia, likely 2/2 disease  -No evidence of active bleeding, hypercoag state  -Transfuse to keep Hgb>7, Plt>10     CVS/RS  -ECHO 10/2023: EF 60-65%, left ventricular systolic function is normal    -CXR on admission wnl, no pleural effusions     ID  Allergies: NKDA  - No evidence of infection admit, VSS, afebrile  #Ppx:   - Cont. acyclovir; fluconazole, bactrim (MWF)  -Plan Levaquin for neutropenia  -Hepatitis serology negative (10/26/23), HIV 1/2 nonreactive (10/26), EBV neg (10/27)      FEN/GI  Admit weight: 64.9kg, current weight 69.8 kg  #Fluid overload  -increased 5kg   - 40 lasix 11/22  - Admit sCr 0.77, current 0.59  #Ppx:   - Pepcid started on admission (methotrexate interaction with protonix)  - Replete electrolytes as needed  - TLS monitoring  - G6PD normal  - started sodium bicarb fluids 11/20 per chemotherapy protocol     DISPO:  Code: Full code  Access: PICC line (right)  DVT ppx: Ambulation  Primary Onc:   NOK: Ms Deidra Pickett [phone]                    Pratibha Clayton APRN-CNP

## 2023-11-22 NOTE — CARE PLAN
Patient tolerating chemo well. 24 hour Methotrexate level to be drawn 11/22 at 2230. No complaints of N/V/D neuro checks WNL. Urine pH 8.5. Will continue to monitor

## 2023-11-22 NOTE — NURSING NOTE
Dose # of 1 of cytarabine 5400mg in  304 mL given over 3 hours via PICC catheter.  Dose up at 2330 and down at 0230.  Pre-medicated with zofran and dex IV.  Patient, dose and rate verified with second RN, .  + BBR obtained via syringe aspiration before and after administration.  Patient without side effects.

## 2023-11-23 LAB
ALBUMIN SERPL BCP-MCNC: 3.3 G/DL (ref 3.4–5)
ALP SERPL-CCNC: 56 U/L (ref 33–120)
ALT SERPL W P-5'-P-CCNC: 122 U/L (ref 10–52)
ANION GAP SERPL CALC-SCNC: 11 MMOL/L (ref 10–20)
APTT PPP: 25 SECONDS (ref 27–38)
AST SERPL W P-5'-P-CCNC: 54 U/L (ref 9–39)
BASOPHILS # BLD AUTO: 0.01 X10*3/UL (ref 0–0.1)
BASOPHILS NFR BLD AUTO: 0.1 %
BILIRUB DIRECT SERPL-MCNC: 0.1 MG/DL (ref 0–0.3)
BILIRUB SERPL-MCNC: 0.5 MG/DL (ref 0–1.2)
BUN SERPL-MCNC: 14 MG/DL (ref 6–23)
CALCIUM SERPL-MCNC: 8.9 MG/DL (ref 8.6–10.6)
CHLORIDE SERPL-SCNC: 98 MMOL/L (ref 98–107)
CO2 SERPL-SCNC: 35 MMOL/L (ref 21–32)
CREAT SERPL-MCNC: 0.79 MG/DL (ref 0.5–1.3)
EOSINOPHIL # BLD AUTO: 0 X10*3/UL (ref 0–0.7)
EOSINOPHIL NFR BLD AUTO: 0 %
ERYTHROCYTE [DISTWIDTH] IN BLOOD BY AUTOMATED COUNT: 16.5 % (ref 11.5–14.5)
FIBRINOGEN PPP-MCNC: 191 MG/DL (ref 200–400)
GFR SERPL CREATININE-BSD FRML MDRD: >90 ML/MIN/1.73M*2
GLUCOSE SERPL-MCNC: 118 MG/DL (ref 74–99)
HCT VFR BLD AUTO: 28.1 % (ref 41–52)
HGB BLD-MCNC: 9.1 G/DL (ref 13.5–17.5)
IMM GRANULOCYTES # BLD AUTO: 0.06 X10*3/UL (ref 0–0.7)
IMM GRANULOCYTES NFR BLD AUTO: 0.8 % (ref 0–0.9)
INR PPP: 1 (ref 0.9–1.1)
LDH SERPL L TO P-CCNC: 216 U/L (ref 84–246)
LYMPHOCYTES # BLD AUTO: 0.07 X10*3/UL (ref 1.2–4.8)
LYMPHOCYTES NFR BLD AUTO: 1 %
MAGNESIUM SERPL-MCNC: 1.97 MG/DL (ref 1.6–2.4)
MCH RBC QN AUTO: 30 PG (ref 26–34)
MCHC RBC AUTO-ENTMCNC: 32.4 G/DL (ref 32–36)
MCV RBC AUTO: 93 FL (ref 80–100)
MONOCYTES # BLD AUTO: 0.09 X10*3/UL (ref 0.1–1)
MONOCYTES NFR BLD AUTO: 1.3 %
MTX SERPL-SCNC: 0.1 UMOL/L
NEUTROPHILS # BLD AUTO: 6.85 X10*3/UL (ref 1.2–7.7)
NEUTROPHILS NFR BLD AUTO: 96.8 %
NRBC BLD-RTO: 0 /100 WBCS (ref 0–0)
PH, POC: 8.5
PH, POC: 8.5
PHOSPHATE SERPL-MCNC: 5.3 MG/DL (ref 2.5–4.9)
PLATELET # BLD AUTO: 333 X10*3/UL (ref 150–450)
POTASSIUM SERPL-SCNC: 3.9 MMOL/L (ref 3.5–5.3)
PROT SERPL-MCNC: 4.7 G/DL (ref 6.4–8.2)
PROTHROMBIN TIME: 11.2 SECONDS (ref 9.8–12.8)
RBC # BLD AUTO: 3.03 X10*6/UL (ref 4.5–5.9)
SODIUM SERPL-SCNC: 140 MMOL/L (ref 136–145)
WBC # BLD AUTO: 7.1 X10*3/UL (ref 4.4–11.3)

## 2023-11-23 PROCEDURE — 99233 SBSQ HOSP IP/OBS HIGH 50: CPT | Performed by: INTERNAL MEDICINE

## 2023-11-23 PROCEDURE — 80204 DRUG ASSAY METHOTREXATE: CPT | Performed by: INTERNAL MEDICINE

## 2023-11-23 PROCEDURE — 85025 COMPLETE CBC W/AUTO DIFF WBC: CPT

## 2023-11-23 PROCEDURE — 84100 ASSAY OF PHOSPHORUS: CPT

## 2023-11-23 PROCEDURE — 1170000001 HC PRIVATE ONCOLOGY ROOM DAILY

## 2023-11-23 PROCEDURE — 83735 ASSAY OF MAGNESIUM: CPT

## 2023-11-23 PROCEDURE — 85610 PROTHROMBIN TIME: CPT

## 2023-11-23 PROCEDURE — 81002 URINALYSIS NONAUTO W/O SCOPE: CPT | Performed by: INTERNAL MEDICINE

## 2023-11-23 PROCEDURE — 85384 FIBRINOGEN ACTIVITY: CPT

## 2023-11-23 PROCEDURE — 83615 LACTATE (LD) (LDH) ENZYME: CPT

## 2023-11-23 PROCEDURE — 82248 BILIRUBIN DIRECT: CPT

## 2023-11-23 PROCEDURE — 2500000001 HC RX 250 WO HCPCS SELF ADMINISTERED DRUGS (ALT 637 FOR MEDICARE OP)

## 2023-11-23 PROCEDURE — 2500000004 HC RX 250 GENERAL PHARMACY W/ HCPCS (ALT 636 FOR OP/ED): Performed by: INTERNAL MEDICINE

## 2023-11-23 RX ADMIN — SODIUM BICARBONATE 200 ML/HR: 84 INJECTION, SOLUTION INTRAVENOUS at 14:33

## 2023-11-23 RX ADMIN — SODIUM BICARBONATE 200 ML/HR: 84 INJECTION, SOLUTION INTRAVENOUS at 02:26

## 2023-11-23 RX ADMIN — LEUCOVORIN CALCIUM 15 MG: 10 INJECTION INTRAMUSCULAR; INTRAVENOUS at 14:33

## 2023-11-23 RX ADMIN — SODIUM BICARBONATE 200 ML/HR: 84 INJECTION, SOLUTION INTRAVENOUS at 20:39

## 2023-11-23 RX ADMIN — CYTARABINE 5400 MG: 100 INJECTION, SOLUTION INTRATHECAL; INTRAVENOUS; SUBCUTANEOUS at 10:51

## 2023-11-23 RX ADMIN — FAMOTIDINE 20 MG: 20 TABLET, FILM COATED ORAL at 20:40

## 2023-11-23 RX ADMIN — ACYCLOVIR 400 MG: 400 TABLET ORAL at 08:52

## 2023-11-23 RX ADMIN — LEUCOVORIN CALCIUM 15 MG: 10 INJECTION INTRAMUSCULAR; INTRAVENOUS at 20:40

## 2023-11-23 RX ADMIN — ACYCLOVIR 400 MG: 400 TABLET ORAL at 20:40

## 2023-11-23 RX ADMIN — PREDNISOLONE ACETATE 2 DROP: 10 SUSPENSION/ DROPS OPHTHALMIC at 17:19

## 2023-11-23 RX ADMIN — FLUCONAZOLE 400 MG: 200 TABLET ORAL at 08:52

## 2023-11-23 RX ADMIN — PREDNISOLONE ACETATE 2 DROP: 10 SUSPENSION/ DROPS OPHTHALMIC at 23:32

## 2023-11-23 RX ADMIN — PREDNISOLONE ACETATE 2 DROP: 10 SUSPENSION/ DROPS OPHTHALMIC at 10:59

## 2023-11-23 RX ADMIN — LEUCOVORIN CALCIUM 15 MG: 10 INJECTION INTRAMUSCULAR; INTRAVENOUS at 08:52

## 2023-11-23 RX ADMIN — SODIUM BICARBONATE 200 ML/HR: 84 INJECTION, SOLUTION INTRAVENOUS at 08:52

## 2023-11-23 RX ADMIN — LEUCOVORIN CALCIUM 15 MG: 10 INJECTION INTRAMUSCULAR; INTRAVENOUS at 02:00

## 2023-11-23 RX ADMIN — PREDNISOLONE ACETATE 2 DROP: 10 SUSPENSION/ DROPS OPHTHALMIC at 06:22

## 2023-11-23 RX ADMIN — FAMOTIDINE 20 MG: 20 TABLET, FILM COATED ORAL at 08:52

## 2023-11-23 ASSESSMENT — COGNITIVE AND FUNCTIONAL STATUS - GENERAL
DAILY ACTIVITIY SCORE: 24
DAILY ACTIVITIY SCORE: 24
MOBILITY SCORE: 24
MOBILITY SCORE: 24

## 2023-11-23 ASSESSMENT — PAIN - FUNCTIONAL ASSESSMENT
PAIN_FUNCTIONAL_ASSESSMENT: 0-10
PAIN_FUNCTIONAL_ASSESSMENT: 0-10

## 2023-11-23 ASSESSMENT — PAIN SCALES - GENERAL
PAINLEVEL_OUTOF10: 0 - NO PAIN

## 2023-11-23 NOTE — CARE PLAN
Care taken over at 1530. IV lasix given. Steroid eye drops continued. No other complaints at this time.

## 2023-11-23 NOTE — NURSING NOTE
V/s remained stable. Denied pain. He received his last dose of high dose cytarabine overnight. He continues with sodium bicarb infusing at 200ml/hr. His urine pH were 7.5 and 8.5 overnight.

## 2023-11-23 NOTE — PROGRESS NOTES
"Shilo Thakkar is a 23 y.o. male on day 3 of admission presenting with Burkitt lymphoma (CMS/HCC).    Subjective    Patient is bed resting well.  Patient states  he is  feeling well .    He offers no new complaints .        Objective     Physical Exam  Constitutional:       Appearance: Normal appearance. He is normal weight.   HENT:      Head: Normocephalic and atraumatic.      Nose: Nose normal.      Mouth/Throat:      Mouth: Mucous membranes are moist.   Eyes:      Extraocular Movements: Extraocular movements intact.      Conjunctiva/sclera: Conjunctivae normal.      Pupils: Pupils are equal, round, and reactive to light.   Cardiovascular:      Rate and Rhythm: Normal rate and regular rhythm.   Pulmonary:      Effort: Pulmonary effort is normal.      Breath sounds: Normal breath sounds.   Abdominal:      General: Abdomen is flat. Bowel sounds are normal.      Palpations: Abdomen is soft.   Musculoskeletal:         General: Normal range of motion.      Cervical back: Normal range of motion.   Skin:     General: Skin is warm.      Findings: No bruising.   Neurological:      General: No focal deficit present.      Mental Status: He is alert and oriented to person, place, and time. Mental status is at baseline.   Psychiatric:         Mood and Affect: Mood normal.         Behavior: Behavior normal.         Last Recorded Vitals  Blood pressure 120/68, pulse 68, temperature 36.3 °C (97.3 °F), temperature source Temporal, resp. rate 16, height 1.82 m (5' 11.65\"), weight 68.1 kg (150 lb 2.1 oz), SpO2 99 %.  Intake/Output last 3 Shifts:  I/O last 3 completed shifts:  In: 3962 (58.2 mL/kg) [I.V.:2950 (43.3 mL/kg); IV Piggyback:1012]  Out: 5550 (81.5 mL/kg) [Urine:5550 (2.3 mL/kg/hr)]  Weight: 68.1 kg     Relevant Results     Scheduled medications  acyclovir, 400 mg, oral, BID  famotidine, 20 mg, oral, BID  fluconazole, 400 mg, oral, Daily  leucovorin, 15 mg, intravenous, q6h  prednisoLONE acetate, 2 drop, Both Eyes, " q6h      Continuous medications  sodium bicarbonate 150 mEq in dextrose 5 % in water (D5W) 1,000 mL infusion, 200 mL/hr, Last Rate: 200 mL/hr (11/23/23 1433)      PRN medications  PRN medications: albuterol, alteplase, dextrose, diphenhydrAMINE, EPINEPHrine, famotidine, methylPREDNISolone sodium succinate (PF), ondansetron, polyethylene glycol, prochlorperazine, prochlorperazine, sodium chloride       Results for orders placed or performed during the hospital encounter of 11/20/23 (from the past 24 hour(s))   Methotrexate 24 Hours   Result Value Ref Range    Methotrexate, 24 HRS 0.19 umol/L   POCT pH, urine, qualitative, dipstick manually resulted   Result Value Ref Range    pH, UA 7.5    CBC and Auto Differential   Result Value Ref Range    WBC 7.1 4.4 - 11.3 x10*3/uL    nRBC 0.0 0.0 - 0.0 /100 WBCs    RBC 3.03 (L) 4.50 - 5.90 x10*6/uL    Hemoglobin 9.1 (L) 13.5 - 17.5 g/dL    Hematocrit 28.1 (L) 41.0 - 52.0 %    MCV 93 80 - 100 fL    MCH 30.0 26.0 - 34.0 pg    MCHC 32.4 32.0 - 36.0 g/dL    RDW 16.5 (H) 11.5 - 14.5 %    Platelets 333 150 - 450 x10*3/uL    Neutrophils % 96.8 40.0 - 80.0 %    Immature Granulocytes %, Automated 0.8 0.0 - 0.9 %    Lymphocytes % 1.0 13.0 - 44.0 %    Monocytes % 1.3 2.0 - 10.0 %    Eosinophils % 0.0 0.0 - 6.0 %    Basophils % 0.1 0.0 - 2.0 %    Neutrophils Absolute 6.85 1.20 - 7.70 x10*3/uL    Immature Granulocytes Absolute, Automated 0.06 0.00 - 0.70 x10*3/uL    Lymphocytes Absolute 0.07 (L) 1.20 - 4.80 x10*3/uL    Monocytes Absolute 0.09 (L) 0.10 - 1.00 x10*3/uL    Eosinophils Absolute 0.00 0.00 - 0.70 x10*3/uL    Basophils Absolute 0.01 0.00 - 0.10 x10*3/uL   Hepatic function panel   Result Value Ref Range    Albumin 3.3 (L) 3.4 - 5.0 g/dL    Bilirubin, Total 0.5 0.0 - 1.2 mg/dL    Bilirubin, Direct 0.1 0.0 - 0.3 mg/dL    Alkaline Phosphatase 56 33 - 120 U/L     (H) 10 - 52 U/L    AST 54 (H) 9 - 39 U/L    Total Protein 4.7 (L) 6.4 - 8.2 g/dL   Magnesium   Result Value Ref Range     Magnesium 1.97 1.60 - 2.40 mg/dL   Lactate Dehydrogenase   Result Value Ref Range     84 - 246 U/L   Fibrinogen   Result Value Ref Range    Fibrinogen 191 (L) 200 - 400 mg/dL   Coagulation Screen   Result Value Ref Range    Protime 11.2 9.8 - 12.8 seconds    INR 1.0 0.9 - 1.1    aPTT 25 (L) 27 - 38 seconds   Methotrexate level   Result Value Ref Range    Methotrexate 0.10 umol/L   Phosphorus   Result Value Ref Range    Phosphorus 5.3 (H) 2.5 - 4.9 mg/dL   Basic Metabolic Panel   Result Value Ref Range    Glucose 118 (H) 74 - 99 mg/dL    Sodium 140 136 - 145 mmol/L    Potassium 3.9 3.5 - 5.3 mmol/L    Chloride 98 98 - 107 mmol/L    Bicarbonate 35 (H) 21 - 32 mmol/L    Anion Gap 11 10 - 20 mmol/L    Urea Nitrogen 14 6 - 23 mg/dL    Creatinine 0.79 0.50 - 1.30 mg/dL    eGFR >90 >60 mL/min/1.73m*2    Calcium 8.9 8.6 - 10.6 mg/dL   POCT pH, urine, qualitative, dipstick manually resulted   Result Value Ref Range    pH, UA 8.5            Assessment/Plan   Principal Problem:    Burkitt lymphoma (CMS/HCC)       Shilo Thakkar is a 23 y.o. male with PMH of Burkitt Lymphoma (Dx 10/20/23; s/p 1 cycle HyperCVAD), malignant pleural effusions s/p CT (10/29/23), wrist fracture, ADHD, foot fracture admitted for cycle 2 HyperCVAD +Rituximab.     D4C2 HyperCVAD     ONC:  #Burkitt lymphoma, newly diagnosed (10/20/23)  - 10/19/23 presented to OSH ED with dysphagia and abdominal pain  - CT A/P with IV contrast (10/19) mass in the body of the pancreas measuring approximately 2.7 x 2.9 cm. Multiple masses in the mesentery as well as omental caking consistent with carcinomatosis.   - CT soft tissue neck with IV contrast (10/19): homogeneous soft tissue attenuation lesion in the L oropharynx which measures approximately 3.0 x 4.0 x 5.1 cm.   - CT Abdomen w/o IV contrast (10/20): Mesenteric lymphadenopathy.   - US guided mesenteric LN biopsy (10/20/23): HIGH GRADE B-CELL LYMPHOMA MORPHOLOGICALLY CONSISTENT WITH BURKITT LYMPHOMA; flow  cytometry: CD10+ kappa restricted B cells supportive of a B cell lymphoma with germinal center origin.   - Biopsy L oropharyngeal mass and L cervical LN by ENT (10/23/23) high grade B-cell burkitt lymphoma  - PET CT ordered 10/30 showed FDG avid left oropharyngeal mass and LAD above and below diaphragm  - completed HyperCVAD Day 1 cycle 1 on 10/29  - Bone marrow bx completed 11/1 without evidence of disease  -XR chest neg 11/20  -Admitted for cycle 2 HyperCVAD   -completed C2D1 on 11/20  -completed C2D2 on 11/21  -completed C2D3 on 11/22     CHEMO:  - Rituximab 700 mg x 1 on D1, D8   - Cytarabine 5400 mg x 1 on D2, D3  - IT Cytarabine 100 mg x 1 on D2  - methotrexate 358 mg, 1432 mg on D1     Methotrexate Level 11/23 : 0.1      Supportive:   - Pegfilgrastim-cbqv 6 mg starting w/ treatment  - Dexamethasone 12 mg x 1 on D2, D3  - Ondanestron 16 mg x 1 on D2, D3  - Leucovorin 15 mg q6   - Prednisolone 1% ophthalmic q6 hours x 6 days     Heme  #Anemia, likely 2/2 disease  -No evidence of active bleeding, hypercoag state  -Transfuse to keep Hgb>7, Plt>10     CVS/RS  -ECHO 10/2023: EF 60-65%, left ventricular systolic function is normal    -CXR on admission wnl, no pleural effusions     ID  Allergies: NKDA  - No evidence of infection admit, VSS, afebrile  #Ppx:   - Cont. acyclovir; fluconazole, bactrim (MWF)  -Plan Levaquin for neutropenia  -Hepatitis serology negative (10/26/23), HIV 1/2 nonreactive (10/26), EBV neg (10/27)      FEN/GI  Admit weight: 64.9kg, current weight 69.8 kg  #Fluid overload  -increased 5kg   - 40 lasix 11/22  - Admit sCr 0.77, current 0.59  #Ppx:   - Pepcid started on admission (methotrexate interaction with protonix)  - Replete electrolytes as needed  - TLS monitoring  - G6PD normal  - started sodium bicarb fluids 11/20 per chemotherapy protocol     DISPO:  Code: Full code  Access: PICC line (right)  DVT ppx: Ambulation  Primary Onc:   NOK: Ms Deidra Pickett [phone]                     Penny Mckeon, APRN-CNP

## 2023-11-24 ENCOUNTER — PHARMACY VISIT (OUTPATIENT)
Dept: PHARMACY | Facility: CLINIC | Age: 23
End: 2023-11-24
Payer: MEDICARE

## 2023-11-24 VITALS
WEIGHT: 151.24 LBS | HEIGHT: 72 IN | RESPIRATION RATE: 18 BRPM | SYSTOLIC BLOOD PRESSURE: 114 MMHG | OXYGEN SATURATION: 99 % | BODY MASS INDEX: 20.48 KG/M2 | DIASTOLIC BLOOD PRESSURE: 69 MMHG | HEART RATE: 68 BPM | TEMPERATURE: 97.5 F

## 2023-11-24 LAB
ALBUMIN SERPL BCP-MCNC: 3.2 G/DL (ref 3.4–5)
ALP SERPL-CCNC: 50 U/L (ref 33–120)
ALT SERPL W P-5'-P-CCNC: 154 U/L (ref 10–52)
ANION GAP SERPL CALC-SCNC: 15 MMOL/L (ref 10–20)
AST SERPL W P-5'-P-CCNC: 83 U/L (ref 9–39)
BASOPHILS # BLD AUTO: 0 X10*3/UL (ref 0–0.1)
BASOPHILS NFR BLD AUTO: 0 %
BILIRUB DIRECT SERPL-MCNC: 0.1 MG/DL (ref 0–0.3)
BILIRUB SERPL-MCNC: 0.7 MG/DL (ref 0–1.2)
BUN SERPL-MCNC: 15 MG/DL (ref 6–23)
CALCIUM SERPL-MCNC: 8.7 MG/DL (ref 8.6–10.6)
CHLORIDE SERPL-SCNC: 98 MMOL/L (ref 98–107)
CO2 SERPL-SCNC: 31 MMOL/L (ref 21–32)
CREAT SERPL-MCNC: 0.71 MG/DL (ref 0.5–1.3)
EOSINOPHIL # BLD AUTO: 0 X10*3/UL (ref 0–0.7)
EOSINOPHIL NFR BLD AUTO: 0 %
ERYTHROCYTE [DISTWIDTH] IN BLOOD BY AUTOMATED COUNT: 15.8 % (ref 11.5–14.5)
GFR SERPL CREATININE-BSD FRML MDRD: >90 ML/MIN/1.73M*2
GLUCOSE SERPL-MCNC: 112 MG/DL (ref 74–99)
HCT VFR BLD AUTO: 25.4 % (ref 41–52)
HGB BLD-MCNC: 8.3 G/DL (ref 13.5–17.5)
IMM GRANULOCYTES # BLD AUTO: 0.1 X10*3/UL (ref 0–0.7)
IMM GRANULOCYTES NFR BLD AUTO: 1.3 % (ref 0–0.9)
LDH SERPL L TO P-CCNC: 217 U/L (ref 84–246)
LYMPHOCYTES # BLD AUTO: 0.04 X10*3/UL (ref 1.2–4.8)
LYMPHOCYTES NFR BLD AUTO: 0.5 %
MAGNESIUM SERPL-MCNC: 1.93 MG/DL (ref 1.6–2.4)
MCH RBC QN AUTO: 30.2 PG (ref 26–34)
MCHC RBC AUTO-ENTMCNC: 32.7 G/DL (ref 32–36)
MCV RBC AUTO: 92 FL (ref 80–100)
MONOCYTES # BLD AUTO: 0.04 X10*3/UL (ref 0.1–1)
MONOCYTES NFR BLD AUTO: 0.5 %
MTX SERPL-SCNC: 0.04 UMOL/L
NEUTROPHILS # BLD AUTO: 7.81 X10*3/UL (ref 1.2–7.7)
NEUTROPHILS NFR BLD AUTO: 97.7 %
NRBC BLD-RTO: 0 /100 WBCS (ref 0–0)
PH, POC: 8.5
PHOSPHATE SERPL-MCNC: 4.8 MG/DL (ref 2.5–4.9)
PLATELET # BLD AUTO: 280 X10*3/UL (ref 150–450)
POTASSIUM SERPL-SCNC: 3.7 MMOL/L (ref 3.5–5.3)
PROT SERPL-MCNC: 4.6 G/DL (ref 6.4–8.2)
RBC # BLD AUTO: 2.75 X10*6/UL (ref 4.5–5.9)
SODIUM SERPL-SCNC: 140 MMOL/L (ref 136–145)
WBC # BLD AUTO: 8 X10*3/UL (ref 4.4–11.3)

## 2023-11-24 PROCEDURE — 85025 COMPLETE CBC W/AUTO DIFF WBC: CPT

## 2023-11-24 PROCEDURE — 80204 DRUG ASSAY METHOTREXATE: CPT | Performed by: INTERNAL MEDICINE

## 2023-11-24 PROCEDURE — 81002 URINALYSIS NONAUTO W/O SCOPE: CPT | Performed by: INTERNAL MEDICINE

## 2023-11-24 PROCEDURE — RXMED WILLOW AMBULATORY MEDICATION CHARGE

## 2023-11-24 PROCEDURE — 2500000001 HC RX 250 WO HCPCS SELF ADMINISTERED DRUGS (ALT 637 FOR MEDICARE OP)

## 2023-11-24 PROCEDURE — 82248 BILIRUBIN DIRECT: CPT

## 2023-11-24 PROCEDURE — 2500000004 HC RX 250 GENERAL PHARMACY W/ HCPCS (ALT 636 FOR OP/ED): Performed by: INTERNAL MEDICINE

## 2023-11-24 PROCEDURE — 83615 LACTATE (LD) (LDH) ENZYME: CPT

## 2023-11-24 PROCEDURE — 99239 HOSP IP/OBS DSCHRG MGMT >30: CPT | Performed by: INTERNAL MEDICINE

## 2023-11-24 PROCEDURE — 80053 COMPREHEN METABOLIC PANEL: CPT

## 2023-11-24 PROCEDURE — 83735 ASSAY OF MAGNESIUM: CPT

## 2023-11-24 PROCEDURE — 84100 ASSAY OF PHOSPHORUS: CPT

## 2023-11-24 RX ORDER — ACYCLOVIR 400 MG/1
400 TABLET ORAL 2 TIMES DAILY
Qty: 180 TABLET | Refills: 0 | Status: ON HOLD | OUTPATIENT
Start: 2023-11-24 | End: 2023-12-15 | Stop reason: SDUPTHER

## 2023-11-24 RX ORDER — LEVOFLOXACIN 500 MG/1
500 TABLET, FILM COATED ORAL
Qty: 30 TABLET | Refills: 0 | Status: SHIPPED | OUTPATIENT
Start: 2023-11-24 | End: 2023-12-06 | Stop reason: SDUPTHER

## 2023-11-24 RX ORDER — FLUCONAZOLE 200 MG/1
400 TABLET ORAL DAILY
Qty: 30 TABLET | Refills: 3 | Status: ON HOLD | OUTPATIENT
Start: 2023-11-24 | End: 2023-12-15 | Stop reason: SDUPTHER

## 2023-11-24 RX ADMIN — PREDNISOLONE ACETATE 2 DROP: 10 SUSPENSION/ DROPS OPHTHALMIC at 05:56

## 2023-11-24 RX ADMIN — PREDNISOLONE ACETATE 2 DROP: 10 SUSPENSION/ DROPS OPHTHALMIC at 11:11

## 2023-11-24 RX ADMIN — ACYCLOVIR 400 MG: 400 TABLET ORAL at 08:27

## 2023-11-24 RX ADMIN — SODIUM BICARBONATE 200 ML/HR: 84 INJECTION, SOLUTION INTRAVENOUS at 08:27

## 2023-11-24 RX ADMIN — FAMOTIDINE 20 MG: 20 TABLET, FILM COATED ORAL at 08:28

## 2023-11-24 RX ADMIN — SODIUM BICARBONATE 200 ML/HR: 84 INJECTION, SOLUTION INTRAVENOUS at 02:47

## 2023-11-24 RX ADMIN — FLUCONAZOLE 400 MG: 200 TABLET ORAL at 08:27

## 2023-11-24 RX ADMIN — LEUCOVORIN CALCIUM 15 MG: 10 INJECTION INTRAMUSCULAR; INTRAVENOUS at 02:47

## 2023-11-24 RX ADMIN — LEUCOVORIN CALCIUM 15 MG: 10 INJECTION INTRAMUSCULAR; INTRAVENOUS at 08:28

## 2023-11-24 ASSESSMENT — PAIN SCALES - GENERAL
PAINLEVEL_OUTOF10: 0 - NO PAIN
PAINLEVEL_OUTOF10: 0 - NO PAIN

## 2023-11-24 ASSESSMENT — PAIN - FUNCTIONAL ASSESSMENT
PAIN_FUNCTIONAL_ASSESSMENT: 0-10
PAIN_FUNCTIONAL_ASSESSMENT: 0-10

## 2023-11-24 NOTE — DISCHARGE SUMMARY
Discharge Diagnosis  Burkitt lymphoma (CMS/[facility])    Issues Requiring Follow-Up  Count Checks, LP, and follow up with oncology provider on 11/28.    Test Results Pending At Discharge  Pending Labs       Order Current Status    Flow Cytometry Test In process    Flow Cytometry Test In process    POCT pH, urine, qualitative, dipstick manually resulted In process            Hospital Course  Shilo Thakkar is a 23 y.o. male with PMH of Burkitt Lymphoma (Dx 10/20/23; s/p 1 cycle HyperCVAD), malignant pleural effusions s/p CT (10/29/23), wrist fracture, ADHD, foot fracture admitted for cycle 2 HyperCVAD +Rituximab. Course uncomplicated, 24hr methotrexate level was 0.3. On day of discharge methotrexate level was 0.04. Patient discharge home on 11/24 with plans for follow up at [facility] on Monday 11/27.     Pertinent Physical Exam At Time of Discharge  Physical Exam  Constitutional:       Appearance: Normal appearance. He is normal weight.   HENT:      Head: Normocephalic and atraumatic.      Mouth/Throat:      Mouth: Mucous membranes are moist.      Pharynx: Oropharynx is clear.   Eyes:      Extraocular Movements: Extraocular movements intact.      Conjunctiva/sclera: Conjunctivae normal.      Pupils: Pupils are equal, round, and reactive to light.   Cardiovascular:      Rate and Rhythm: Normal rate and regular rhythm.      Heart sounds: Normal heart sounds.   Pulmonary:      Effort: Pulmonary effort is normal.      Breath sounds: Normal breath sounds.   Abdominal:      General: Abdomen is flat. Bowel sounds are normal.      Palpations: Abdomen is soft.   Musculoskeletal:         General: Normal range of motion.      Cervical back: Normal range of motion and neck supple.   Skin:     General: Skin is warm and dry.   Neurological:      General: No focal deficit present.      Mental Status: He is alert and oriented to person, place, and time. Mental status is at baseline.   Psychiatric:         Mood and Affect: Mood normal.          Behavior: Behavior normal.         Home Medications     Medication List      CHANGE how you take these medications     levoFLOXacin 500 mg tablet; Commonly known as: Levaquin; Take 1 tablet   (500 mg) by mouth once every 24 hours. Take when neutropenic (ANC <500).   Your oncologist will inform you when to start and stop this medication.;   What changed: additional instructions     CONTINUE taking these medications     acetaminophen 325 mg tablet; Commonly known as: Tylenol; Take 2 tablets   (650 mg) by mouth every 6 hours if needed for mild pain (1 - 3).   acyclovir 400 mg tablet; Commonly known as: Zovirax; Take 1 tablet (400   mg) by mouth 2 times a day.   fluconazole 200 mg tablet; Commonly known as: Diflucan; Take 2 tablets   (400 mg) by mouth once daily.   pantoprazole 40 mg EC tablet; Commonly known as: ProtoNix; Take 1 tablet   (40 mg) by mouth once daily in the morning. Take before meals. Do not   crush, chew, or split. Do not start before November 11, 2023.   sennosides-docusate sodium 8.6-50 mg tablet; Commonly known as:   Ashley-Colace; Take 2 tablets by mouth 2 times a day as needed for   constipation.   sulfamethoxazole-trimethoprim 800-160 mg tablet; Commonly known as:   Bactrim DS; Take 1 tablet by mouth once a day on Monday, Wednesday, and   Friday.     STOP taking these medications     nystatin 100,000 unit/mL suspension; Commonly known as: Mycostatin   OLANZapine 5 mg tablet; Commonly known as: ZyPREXA   polyethylene glycol 17 gram packet; Commonly known as: Glycolax, Miralax       Outpatient Follow-Up  Future Appointments   Date Time Provider Department Center   11/27/2023  9:30 [facility]   11/28/2023  8:45 AM [facility]   11/28/2023  9:00 AM [facility]   11/28/2023 11:30 AM [facility]   11/28/2023  1:30 PM Claire Win, APRN-CNP [id number] [facility]   12/6/2023  1:40 PM Olegario Klein MD  [id number] [facility]   1/17/2024  2:45 PM Lisa Guardado, [id number]       Gilberto Pineda PA-C

## 2023-11-24 NOTE — HOSPITAL COURSE
Shilo Thakkar is a 23 y.o. male with PMH of Burkitt Lymphoma (Dx 10/20/23; s/p 1 cycle HyperCVAD), malignant pleural effusions s/p CT (10/29/23), wrist fracture, ADHD, foot fracture admitted for cycle 2 HyperCVAD +Rituximab. Course uncomplicated, 24hr methotrexate level was 0.3. On day of discharge methotrexate level was 0.04. Patient discharge home on 11/24 with plans for follow up at [facility] on Monday 11/27.

## 2023-11-24 NOTE — NURSING NOTE
V/s stable throughout the night. His urine pH was checked x2 and was 8.5 each time. He denied pain and has been steady on ambulation to the bathroom overnight.  He will likely discharge home today because his methotrexate level was 0.04.

## 2023-11-24 NOTE — CARE PLAN
The patient's goals for the shift include Get chemotherapy started    The clinical goals for the shift include Patient will be clinically appropiate for discharge.    VSS, afebrile. No c/o pain or nausea. Patient cleared methotrexate, ready for discharge. Belongings gathered by patient and mother. AVS reviewed with mother and son. Steroid eye drops given to mother, with instructions to instill eye drops every 6 hours for 2 more days. Patient and mother verbalized understanding. Meds to bed delivery completed. Pt walked out of room with mother at his side. No other complaints at this time.

## 2023-11-25 LAB
CELL POPULATIONS: NORMAL
DIAGNOSIS: NORMAL
FLOW DIFFERENTIAL: NORMAL
FLOW TEST ORDERED: NORMAL
FLUID CELL COUNT: <1 /UL
LAB TEST METHOD: NORMAL
NUMBER OF CELLS COLLECTED: NORMAL
PATH REPORT.TOTAL CANCER: NORMAL
SIGNATURE COMMENT: NORMAL
SPECIMEN VIABILITY: NORMAL

## 2023-11-27 ENCOUNTER — INFUSION (OUTPATIENT)
Dept: HEMATOLOGY/ONCOLOGY | Facility: HOSPITAL | Age: 23
End: 2023-11-27
Payer: COMMERCIAL

## 2023-11-27 VITALS
OXYGEN SATURATION: 100 % | DIASTOLIC BLOOD PRESSURE: 73 MMHG | WEIGHT: 145.72 LBS | BODY MASS INDEX: 19.96 KG/M2 | SYSTOLIC BLOOD PRESSURE: 124 MMHG | HEART RATE: 83 BPM | TEMPERATURE: 99 F | RESPIRATION RATE: 16 BRPM

## 2023-11-27 DIAGNOSIS — C83.78 BURKITT LYMPHOMA OF LYMPH NODES OF MULTIPLE REGIONS (MULTI): ICD-10-CM

## 2023-11-27 LAB
ALBUMIN SERPL BCP-MCNC: 4.2 G/DL (ref 3.4–5)
ALP SERPL-CCNC: 75 U/L (ref 33–120)
ALT SERPL W P-5'-P-CCNC: 187 U/L (ref 10–52)
ANION GAP SERPL CALC-SCNC: 13 MMOL/L (ref 10–20)
AST SERPL W P-5'-P-CCNC: 52 U/L (ref 9–39)
BASOPHILS # BLD AUTO: 0.02 X10*3/UL (ref 0–0.1)
BASOPHILS NFR BLD AUTO: 1.5 %
BILIRUB SERPL-MCNC: 0.6 MG/DL (ref 0–1.2)
BUN SERPL-MCNC: 22 MG/DL (ref 6–23)
CALCIUM SERPL-MCNC: 9.3 MG/DL (ref 8.6–10.3)
CHLORIDE SERPL-SCNC: 105 MMOL/L (ref 98–107)
CO2 SERPL-SCNC: 27 MMOL/L (ref 21–32)
CREAT SERPL-MCNC: 0.7 MG/DL (ref 0.5–1.3)
DACRYOCYTES BLD QL SMEAR: NORMAL
EOSINOPHIL # BLD AUTO: 0 X10*3/UL (ref 0–0.7)
EOSINOPHIL NFR BLD AUTO: 0 %
ERYTHROCYTE [DISTWIDTH] IN BLOOD BY AUTOMATED COUNT: 15.1 % (ref 11.5–14.5)
GFR SERPL CREATININE-BSD FRML MDRD: >90 ML/MIN/1.73M*2
GLUCOSE SERPL-MCNC: 99 MG/DL (ref 74–99)
HCT VFR BLD AUTO: 26.8 % (ref 41–52)
HGB BLD-MCNC: 9 G/DL (ref 13.5–17.5)
IMM GRANULOCYTES # BLD AUTO: 0.02 X10*3/UL (ref 0–0.7)
IMM GRANULOCYTES NFR BLD AUTO: 1.5 % (ref 0–0.9)
LYMPHOCYTES # BLD AUTO: 0.12 X10*3/UL (ref 1.2–4.8)
LYMPHOCYTES NFR BLD AUTO: 9 %
MCH RBC QN AUTO: 30.5 PG (ref 26–34)
MCHC RBC AUTO-ENTMCNC: 33.6 G/DL (ref 32–36)
MCV RBC AUTO: 91 FL (ref 80–100)
MONOCYTES # BLD AUTO: 0 X10*3/UL (ref 0.1–1)
MONOCYTES NFR BLD AUTO: 0 %
NEUTROPHILS # BLD AUTO: 1.17 X10*3/UL (ref 1.2–7.7)
NEUTROPHILS NFR BLD AUTO: 88 %
NRBC BLD-RTO: 0 /100 WBCS (ref 0–0)
OVALOCYTES BLD QL SMEAR: NORMAL
PLATELET # BLD AUTO: 154 X10*3/UL (ref 150–450)
POTASSIUM SERPL-SCNC: 3.9 MMOL/L (ref 3.5–5.3)
PROT SERPL-MCNC: 6.3 G/DL (ref 6.4–8.2)
RBC # BLD AUTO: 2.95 X10*6/UL (ref 4.5–5.9)
RBC MORPH BLD: NORMAL
SCHISTOCYTES BLD QL SMEAR: NORMAL
SODIUM SERPL-SCNC: 141 MMOL/L (ref 136–145)
WBC # BLD AUTO: 1.3 X10*3/UL (ref 4.4–11.3)

## 2023-11-27 PROCEDURE — 2500000004 HC RX 250 GENERAL PHARMACY W/ HCPCS (ALT 636 FOR OP/ED): Mod: JZ | Performed by: INTERNAL MEDICINE

## 2023-11-27 PROCEDURE — 96372 THER/PROPH/DIAG INJ SC/IM: CPT

## 2023-11-27 PROCEDURE — 96413 CHEMO IV INFUSION 1 HR: CPT

## 2023-11-27 PROCEDURE — 80053 COMPREHEN METABOLIC PANEL: CPT

## 2023-11-27 PROCEDURE — 85025 COMPLETE CBC W/AUTO DIFF WBC: CPT

## 2023-11-27 PROCEDURE — 2500000001 HC RX 250 WO HCPCS SELF ADMINISTERED DRUGS (ALT 637 FOR MEDICARE OP): Performed by: INTERNAL MEDICINE

## 2023-11-27 PROCEDURE — 96415 CHEMO IV INFUSION ADDL HR: CPT

## 2023-11-27 RX ORDER — ACETAMINOPHEN 325 MG/1
650 TABLET ORAL ONCE
Status: COMPLETED | OUTPATIENT
Start: 2023-11-27 | End: 2023-11-27

## 2023-11-27 RX ORDER — DIPHENHYDRAMINE HCL 50 MG
50 CAPSULE ORAL ONCE
Status: COMPLETED | OUTPATIENT
Start: 2023-11-27 | End: 2023-11-27

## 2023-11-27 RX ADMIN — ACETAMINOPHEN 650 MG: 325 TABLET ORAL at 10:03

## 2023-11-27 RX ADMIN — DIPHENHYDRAMINE HYDROCHLORIDE 50 MG: 50 CAPSULE ORAL at 10:03

## 2023-11-27 RX ADMIN — SODIUM CHLORIDE 700 MG: 9 INJECTION, SOLUTION INTRAVENOUS at 10:40

## 2023-11-27 RX ADMIN — PEGFILGRASTIM-CBQV 6 MG: 6 INJECTION, SOLUTION SUBCUTANEOUS at 13:35

## 2023-11-27 ASSESSMENT — PAIN SCALES - GENERAL: PAINLEVEL: 0-NO PAIN

## 2023-11-27 NOTE — PROGRESS NOTES
Patient ID: Shilo Thakkar is a 23 y.o. male.    Diagnosis: No matching staging information was found for the patient.,    Treatment:   Oncology History   Burkitt lymphoma (CMS/HCC)   10/27/2023 Initial Diagnosis    Burkitt lymphoma (CMS/HCC)     10/29/2023 -  Chemotherapy    HyperCVAD + RiTUXimab, 21 Day Cycles         Past Medical History:     Past Medical History:   Diagnosis Date    Colles' fracture of right radius, initial encounter for closed fracture 04/23/2015    Colles' fracture of right radius    Personal history of other mental and behavioral disorders 01/28/2014    History of attention deficit hyperactivity disorder    Personal history of other mental and behavioral disorders 05/10/2017    History of attention deficit hyperactivity disorder (ADHD)    Personal history of other specified conditions 05/03/2016    History of gynecomastia    Unspecified fracture of navicular (scaphoid) bone of left wrist, initial encounter for closed fracture 12/17/2015    Closed fracture of scaphoid bone of left wrist       Surgical History:     Past Surgical History:   Procedure Laterality Date    CHEST TUBE INSERTION  10/29/2023    FOOT FRACTURE SURGERY Left     OTHER SURGICAL HISTORY  10/03/2017    History Of Prior Surgery    US GUIDED SOFT TISSUE BIOPSY  10/20/2023    US GUIDED SOFT TISSUE BIOPSY 10/20/2023 [facility]         Family History:     Family History   Problem Relation Name Age of Onset    ADD / ADHD Mother      ADD / ADHD Sister      ADD / ADHD Brother         Social History:     Social History     Tobacco Use    Smoking status: Never     Passive exposure: Never    Smokeless tobacco: Never   Substance Use Topics    Alcohol use: Not Currently     Comment: rarely    Drug use: Never      -------------------------------------------------------------------------------------------------------  Subjective       The patient presents to the clinic today (11/27/23) for ***; overall, he is doing ***.    Review of Systems -  Oncology   -------------------------------------------------------------------------------------------------------  Objective   BSA: There is no height or weight on file to calculate BSA.  There were no vitals taken for this visit.    Physical Exam    Performance Status:  {ECOG performance status:20027}  -------------------------------------------------------------------------------------------------------  Assessment/Plan    No problem-specific Assessment & Plan notes found for this encounter.      RTC:  - ***  - ***  - ***  - ***  - ***    -------------------------------------------------------------------------------------------------------  [facility]

## 2023-11-28 ENCOUNTER — HOSPITAL ENCOUNTER (OUTPATIENT)
Dept: RADIOLOGY | Facility: HOSPITAL | Age: 23
Discharge: HOME | End: 2023-11-28
Payer: COMMERCIAL

## 2023-11-28 ENCOUNTER — OFFICE VISIT (OUTPATIENT)
Dept: HEMATOLOGY/ONCOLOGY | Facility: HOSPITAL | Age: 23
End: 2023-11-28
Payer: COMMERCIAL

## 2023-11-28 ENCOUNTER — INFUSION (OUTPATIENT)
Dept: HEMATOLOGY/ONCOLOGY | Facility: HOSPITAL | Age: 23
End: 2023-11-28
Payer: COMMERCIAL

## 2023-11-28 ENCOUNTER — PROCEDURE VISIT (OUTPATIENT)
Dept: HEMATOLOGY/ONCOLOGY | Facility: HOSPITAL | Age: 23
End: 2023-11-28
Payer: COMMERCIAL

## 2023-11-28 VITALS
BODY MASS INDEX: 20.09 KG/M2 | SYSTOLIC BLOOD PRESSURE: 130 MMHG | OXYGEN SATURATION: 100 % | RESPIRATION RATE: 18 BRPM | DIASTOLIC BLOOD PRESSURE: 68 MMHG | HEART RATE: 69 BPM | TEMPERATURE: 99.9 F | WEIGHT: 146.7 LBS

## 2023-11-28 VITALS
OXYGEN SATURATION: 100 % | SYSTOLIC BLOOD PRESSURE: 134 MMHG | DIASTOLIC BLOOD PRESSURE: 85 MMHG | BODY MASS INDEX: 20.55 KG/M2 | HEART RATE: 90 BPM | TEMPERATURE: 98.2 F | WEIGHT: 150.1 LBS | RESPIRATION RATE: 18 BRPM

## 2023-11-28 DIAGNOSIS — C83.79 BURKITT LYMPHOMA OF SOLID ORGAN EXCLUDING SPLEEN (MULTI): Primary | ICD-10-CM

## 2023-11-28 DIAGNOSIS — C83.70 BURKITT LYMPHOMA, UNSPECIFIED BODY REGION (MULTI): ICD-10-CM

## 2023-11-28 DIAGNOSIS — C83.78 BURKITT LYMPHOMA OF LYMPH NODES OF MULTIPLE REGIONS (MULTI): Primary | ICD-10-CM

## 2023-11-28 DIAGNOSIS — Z91.89 AT RISK FOR INFERTILITY: ICD-10-CM

## 2023-11-28 DIAGNOSIS — C83.79 BURKITT LYMPHOMA OF SOLID ORGAN EXCLUDING SPLEEN (MULTI): ICD-10-CM

## 2023-11-28 DIAGNOSIS — C83.78: ICD-10-CM

## 2023-11-28 DIAGNOSIS — Z51.5 ENCOUNTER FOR PALLIATIVE CARE: Primary | ICD-10-CM

## 2023-11-28 DIAGNOSIS — C83.78 BURKITT LYMPHOMA OF LYMPH NODES OF MULTIPLE REGIONS (MULTI): ICD-10-CM

## 2023-11-28 LAB
APPEARANCE CSF: CLEAR
BASOPHILS # BLD AUTO: 0.04 X10*3/UL (ref 0–0.1)
BASOPHILS NFR BLD AUTO: 2 %
BASOPHILS NFR CSF MANUAL: 0 %
BLASTS CSF MANUAL: 0 %
COLOR CSF: COLORLESS
COLOR SPUN CSF: COLORLESS
DACRYOCYTES BLD QL SMEAR: NORMAL
EOSINOPHIL # BLD AUTO: 0.01 X10*3/UL (ref 0–0.7)
EOSINOPHIL NFR BLD AUTO: 0.5 %
EOSINOPHIL NFR CSF MANUAL: 0 %
ERYTHROCYTE [DISTWIDTH] IN BLOOD BY AUTOMATED COUNT: 14.9 % (ref 11.5–14.5)
GLUCOSE CSF-MCNC: 63 MG/DL (ref 40–70)
HCT VFR BLD AUTO: 24.5 % (ref 41–52)
HGB BLD-MCNC: 8.2 G/DL (ref 13.5–17.5)
IMM GRANULOCYTES # BLD AUTO: 0.08 X10*3/UL (ref 0–0.7)
IMM GRANULOCYTES NFR BLD AUTO: 4 % (ref 0–0.9)
IMM GRANULOCYTES NFR CSF: 0 %
INR PPP: 1 (ref 0.9–1.1)
LYMPHOCYTES # BLD AUTO: 0.11 X10*3/UL (ref 1.2–4.8)
LYMPHOCYTES NFR BLD AUTO: 5.6 %
LYMPHOCYTES NFR CSF MANUAL: 0 % (ref 28–96)
MCH RBC QN AUTO: 30.4 PG (ref 26–34)
MCHC RBC AUTO-ENTMCNC: 33.5 G/DL (ref 32–36)
MCV RBC AUTO: 91 FL (ref 80–100)
MONOCYTES # BLD AUTO: 0.01 X10*3/UL (ref 0.1–1)
MONOCYTES NFR BLD AUTO: 0.5 %
MONOS+MACROS NFR CSF MANUAL: 0 % (ref 16–56)
NEUTROPHILS # BLD AUTO: 1.73 X10*3/UL (ref 1.2–7.7)
NEUTROPHILS NFR BLD AUTO: 87.4 %
NEUTS HYPERSEG BLD QL SMEAR: PRESENT
NEUTS SEG NFR CSF MANUAL: 0 % (ref 0–5)
NRBC BLD-RTO: 0 /100 WBCS (ref 0–0)
OTHER CELLS NFR CSF MANUAL: 0 %
OVALOCYTES BLD QL SMEAR: NORMAL
PLASMA CELLS NFR CSF MICRO: 0 %
PLATELET # BLD AUTO: 109 X10*3/UL (ref 150–450)
PROT CSF-MCNC: 24 MG/DL (ref 15–45)
PROTHROMBIN TIME: 11.1 SECONDS (ref 9.8–12.8)
RBC # BLD AUTO: 2.7 X10*6/UL (ref 4.5–5.9)
RBC # CSF AUTO: 3 /UL (ref 0–5)
RBC MORPH BLD: NORMAL
SCHISTOCYTES BLD QL SMEAR: NORMAL
TOTAL CELLS COUNTED CSF: 1
TUBE # CSF: ABNORMAL
WBC # BLD AUTO: 2 X10*3/UL (ref 4.4–11.3)
WBC # CSF AUTO: <1 /UL (ref 1–5)

## 2023-11-28 PROCEDURE — 88112 CYTOPATH CELL ENHANCE TECH: CPT | Performed by: PATHOLOGY

## 2023-11-28 PROCEDURE — 89051 BODY FLUID CELL COUNT: CPT | Performed by: PHYSICIAN ASSISTANT

## 2023-11-28 PROCEDURE — 62328 DX LMBR SPI PNXR W/FLUOR/CT: CPT | Performed by: RADIOLOGY

## 2023-11-28 PROCEDURE — 36591 DRAW BLOOD OFF VENOUS DEVICE: CPT

## 2023-11-28 PROCEDURE — 85025 COMPLETE CBC W/AUTO DIFF WBC: CPT

## 2023-11-28 PROCEDURE — 82945 GLUCOSE OTHER FLUID: CPT | Performed by: PHYSICIAN ASSISTANT

## 2023-11-28 PROCEDURE — 1036F TOBACCO NON-USER: CPT | Performed by: NURSE PRACTITIONER

## 2023-11-28 PROCEDURE — 88185 FLOWCYTOMETRY/TC ADD-ON: CPT | Mod: TC

## 2023-11-28 PROCEDURE — 88112 CYTOPATH CELL ENHANCE TECH: CPT | Mod: TC,MCY | Performed by: PHYSICIAN ASSISTANT

## 2023-11-28 PROCEDURE — 88187 FLOWCYTOMETRY/READ 2-8: CPT | Performed by: PATHOLOGY

## 2023-11-28 PROCEDURE — 88104 CYTOPATH FL NONGYN SMEARS: CPT | Performed by: PHYSICIAN ASSISTANT

## 2023-11-28 PROCEDURE — 62328 DX LMBR SPI PNXR W/FLUOR/CT: CPT

## 2023-11-28 PROCEDURE — 85610 PROTHROMBIN TIME: CPT

## 2023-11-28 PROCEDURE — A4216 STERILE WATER/SALINE, 10 ML: HCPCS | Performed by: INTERNAL MEDICINE

## 2023-11-28 PROCEDURE — 2500000004 HC RX 250 GENERAL PHARMACY W/ HCPCS (ALT 636 FOR OP/ED): Performed by: INTERNAL MEDICINE

## 2023-11-28 PROCEDURE — 99215 OFFICE O/P EST HI 40 MIN: CPT | Performed by: NURSE PRACTITIONER

## 2023-11-28 PROCEDURE — 84157 ASSAY OF PROTEIN OTHER: CPT | Performed by: PHYSICIAN ASSISTANT

## 2023-11-28 PROCEDURE — 96450 CHEMOTHERAPY INTO CNS: CPT | Performed by: PHYSICIAN ASSISTANT

## 2023-11-28 RX ORDER — LIDOCAINE HYDROCHLORIDE 20 MG/ML
5 INJECTION, SOLUTION INFILTRATION; PERINEURAL ONCE
Status: DISCONTINUED | OUTPATIENT
Start: 2023-11-28 | End: 2023-11-29 | Stop reason: HOSPADM

## 2023-11-28 RX ADMIN — SODIUM CHLORIDE 12 MG: 9 INJECTION INTRAMUSCULAR; INTRAVENOUS; SUBCUTANEOUS at 12:00

## 2023-11-28 ASSESSMENT — PAIN SCALES - GENERAL
PAINLEVEL: 0-NO PAIN
PAINLEVEL: 0-NO PAIN

## 2023-11-28 ASSESSMENT — ENCOUNTER SYMPTOMS
DEPRESSION: 0
OCCASIONAL FEELINGS OF UNSTEADINESS: 0
LOSS OF SENSATION IN FEET: 0
DEPRESSION: 0
OCCASIONAL FEELINGS OF UNSTEADINESS: 0
LOSS OF SENSATION IN FEET: 0

## 2023-11-28 NOTE — PROGRESS NOTES
Patient ID: Shilo Thakkar is a 23 y.o. male.    Date: 11/28/23  Time: 12:01 pm    Methotrexate 12 mg was instilled intrathecally (IT) over 4 minutes following a lumbar puncture performed by the radiology MD under fluoroscopic guidance. CSF return was verified before instillation, after each 1 mL of instillation, and after instillation. CSF was collected prior to the instillation of IT chemotherapy and was sent for cell count, protein/glucose, and flow cytometric analysis. The patient tolerated procedure well without difficulty. Following the procedure, the patient remained in IR recovery area to recover in a supine position.

## 2023-11-28 NOTE — POST-PROCEDURE NOTE
Fluoroscopic Guided Lumbar Puncture Postprocedure Note    Attending: Cecily    Fellow: Enid    Diagnosis: Burkitt lymphoma    Description of procedure: Written and verbal informed consent was obtained from the patient. The patient was placed in the prone position on the exam table. A timeout was performed prior to the procedure. Using fluoroscopic guidance, appropriate anatomic landmarks were identified and surgical site was marked. Site was prepped and draped in the usual sterile fashion. Local anesthesia was obtained using approximately 5 mL 1% Lidocaine.  Under intermittent fluoroscopic guidance, a 20 Gauge  3.5 inch spinal needle was advanced into the thecal sac at the L2-L3 until return of cerebral spinal fluid.    Opening pressure was not obtained.    A total of 11 mL clear cerebral spinal fluid was collected and submitted to the lab for analysis.    Intrathecal chemotherapy was then administered by the Oncology service.    The stylet was replaced and the needle was removed.    The patient tolerated procedure well without any immediate or clinically apparent complications.      The collected CSF was then sent to the lab for appropriate lab tests as ordered by the referring physician.    Estimated Blood Loss: None.    IMPRESSION:   Successful fluoroscopic guided lumbar puncture. See detailed result report with images in PACS.    The patient tolerated the procedure well without incident or complication and is in stable condition.      I was present during all critical and key portions of the procedure(s) and immediately available to furnish services the entire duration.  See resident note for details.     Maria Dolores Tony MD

## 2023-11-28 NOTE — PROGRESS NOTES
Patient ID: Shilo Thakkar is a 23 y.o. male with a recent diagnosis of burkitt's lymphoma currently receiving hyperCVAD chemotherapy.     Oncologic History:  10/2023 developed dysphagia and abdominal pain persisted over 3 weeks  10/19/23 presented to [facility] ED with worsening abdominal pain, dysphagia and 20lb weight loss  10/19/23 CT Neck/Abd/Pelvis demonstrated pancreatic mass, mesenteric LAD, cervical LAD and oropharyngeal mass.   10/20/23 US guided mesenteric LN biopsy preliminary pathology consistent with burkitt's lymphoma  10/23/23 oropharyngeal mass and cervical lymph node biopsy by ENT  10/26/23 contacted by outpt onc team Dr. Bella who instructed patient to present to ED for prelim path findings concerning for aggressive lymphoma.  10/29/23 C1D1 Hyper CVAD part A: cyclophosphamide, doxorubicin, vincristine, rituximab and IT cytarabine and methotrexate  11/20/23 C2D1 Hyper CVAD part B: high dose methotrexate, cytarabine, rituximab and IT cytarabine and methotrexate    Subjective    Patient returns today for outpatient follow up with young adult oncology services.     Overall he reports doing very well, notes significantly improved symptoms, denies further dysphagia or pain. Eating and drinking well, denies any GI symptoms, specifically no nausea, vomiting, diarrhea, or constipation. Appetite is much improved, reports diet much better than prior to cancer diagnosis, previously he ate convenience foods, fast foods, now eating more well balanced meals. He is not currently working, spending most of his time sitting around at home. Not doing any type of purposeful exercise, does have home exercise equipment stationary bicycle and weights. Not using tobacco, no vaping, or recreational drug use, abstaining from alcohol.     Emotionally, he relays feeling well supported by family, girlfriend and friends. Talking with friends/family about diagnosis. Denies feelings of anxiety, depression, denies feeling isolated. No  need for peer connection.     Not currently sexually active due, but has been in past with girlfriend. Denies concerns about sexual dysfunction.     Practically, he is getting paid through disability at his job. Denies concerns about finances, denies food insecurity, no issues with transportation or housing.     Relays having future genetic consult in December due to personal family history of cancer, father with pancreatic cancer in 30's, MGM unspecified malignancy of female origin, and colon cancer in both [facility], and [facility].        Objective    BSA: 1.83 meters squared  /68   Pulse 69   Temp 37.7 °C (99.9 °F) (Skin)   Resp 18   Wt 66.5 kg (146 lb 11.2 oz)   SpO2 100%   BMI 20.09 kg/m²     Lives with parents/siblings in [facility], graduated high school, no college, works in [employer], in relationship with girlfriend x 18 mo. Never smoker, no ETOH, no recreational drug use.     Physical Exam  Constitutional:       General: He is not in acute distress.     Appearance: Normal appearance. He is not ill-appearing.   Musculoskeletal:         General: Normal range of motion.   Neurological:      General: No focal deficit present.      Mental Status: He is alert and oriented to person, place, and time.   Psychiatric:         Mood and Affect: Mood normal.         Behavior: Behavior normal.         Thought Content: Thought content normal.         Judgment: Judgment normal.       Performance Status:  Asymptomatic    Assessment/Plan   Shilo Thakkar is a 23 y.o. M with diagnosis of high grade lymphoma burkitts type, currently receiving chemotherapy with hyperCVAD.    #Psychosocial: young adult with cancer  - No active issues or concerns  - Aware of peer support resources and not interested at this time  - Discussed available psychosocial support services including psychiatry, psychology, expressive therapies such as art, music, chaplaincy    #Sexual dysfunction/contraception:  - Discussed potential for cancer  treatments to effect male hormones, ejaculation and/or cause erectile dysfunction  - We discussed the seriousness of getting someone pregnant while receiving chemotherapy due to the harmful effects this could have on the fetus.   - We discussed that small amounts of chemotherapy may be found in his body secretions including the semen, and he should use a barrier form of contraception such as a male or female condom to protect his partner from chemotherapy exposure.   - Provided patient with patient education handout regarding safe sexual practices  - Instructed to abstain from intercourse when platelets <50k, or ANC <1000    #Diet/Exercise/Healthy Lifestyle:  - Discussed research demonstrating consumption of a healthy, plant based diet not only decreases cancer risk, but also has been found to improve survival in cancer patients who partake in a healthy diet.   - We reviewed the American Cancer Society guidelines for a healthy diet including mostly plant based foods  with incorporation of plant/lean animal proteins and healthy fats such as the mediterranean diet.   - Discussed research that demonstrates decreased cancer risk and improved survival in cancer patients who exercise and stay active throughout diagnosis and treatment.  - Encouraged patient to begin gentle exercise as tolerated with low impact activities such as walking, bicycling, or lifting light weights  - Encouraged patient to continue to abstain from alcohol, tobacco, and recreational drugs    #Genetic Risk: young adult with cancer and personal family history of cancer  - Discussed rationale for genetic risk consultation -- this has been scheduled for patient  - Discussed small percentage ~5% of all cancers attributed to inheritable mutation that predisposes an individual for increased cancer risk, genetic risk consultation will perform family pedigree and blood or tissue typing to analyze patient DNA for any identifiable mutations  - Should he have  an identified mutation this would not change his current treatment plan, but may have implications for earlier or more frequent screenings for other types of cancer, additionally siblings and offspring may also be offered genetic screening    #Risk for infertility:  - Previously discussed damaging effects cancer treatments can have on sperm production, quantity, and quality and the potential impact of infertility  - Current risk to fertility is INTERMEDIATE based on treatment of Cyclophosphamide -- discussed with patient/family that this is dose dependent and unable to predict how patients fertility may be affected, pending dose risk for infertility is within the range of 50-70%, with doses >7gm causing a higher risk for infertility   - Patient elected not to proceed with sperm banking prior to chemotherapy during initial consult 10/28/23  - Discussed possibility of long periods of azoospermia with some functional return following treatment  - Recommended waiting at least 12-24 months following chemo/radiation therapy before trying to conceive  - Discussed plan to evaluate fertility at one year post chemotherapy with semen analysis     Follow up in 3 months for ongoing support and needs evaluation     Cancer Staging   No matching staging information was found for the patient.    Oncology History   Burkitt lymphoma (CMS/HCC)   10/27/2023 Initial Diagnosis    Burkitt lymphoma (CMS/HCC)     10/29/2023 -  Chemotherapy    HyperCVAD + RiTUXimab, 21 Day Cycles                   DEMETRIO Salgado-CNP

## 2023-11-29 ENCOUNTER — DOCUMENTATION (OUTPATIENT)
Dept: OTHER | Facility: HOSPITAL | Age: 23
End: 2023-11-29
Payer: COMMERCIAL

## 2023-11-29 LAB
LABORATORY COMMENT REPORT: NORMAL
LABORATORY COMMENT REPORT: NORMAL
PATH REPORT.FINAL DX SPEC: NORMAL
PATH REPORT.GROSS SPEC: NORMAL
PATH REPORT.RELEVANT HX SPEC: NORMAL
PATH REPORT.TOTAL CANCER: NORMAL

## 2023-12-01 LAB
CELL POPULATIONS: NORMAL
DIAGNOSIS: NORMAL
FLOW DIFFERENTIAL: NORMAL
FLOW TEST ORDERED: NORMAL
FLUID CELL COUNT: <1 /UL
LAB TEST METHOD: NORMAL
NUMBER OF CELLS COLLECTED: NORMAL
PATH REPORT.TOTAL CANCER: NORMAL
PATH REVIEW-CELL CT,CSF: NORMAL
SIGNATURE COMMENT: NORMAL
SPECIMEN VIABILITY: NORMAL

## 2023-12-06 ENCOUNTER — OFFICE VISIT (OUTPATIENT)
Dept: HEMATOLOGY/ONCOLOGY | Facility: HOSPITAL | Age: 23
End: 2023-12-06
Payer: COMMERCIAL

## 2023-12-06 ENCOUNTER — LAB (OUTPATIENT)
Dept: LAB | Facility: HOSPITAL | Age: 23
End: 2023-12-06
Payer: COMMERCIAL

## 2023-12-06 ENCOUNTER — SOCIAL WORK (OUTPATIENT)
Dept: CASE MANAGEMENT | Facility: HOSPITAL | Age: 23
End: 2023-12-06

## 2023-12-06 VITALS
RESPIRATION RATE: 17 BRPM | BODY MASS INDEX: 20.27 KG/M2 | TEMPERATURE: 99.7 F | HEART RATE: 110 BPM | SYSTOLIC BLOOD PRESSURE: 129 MMHG | DIASTOLIC BLOOD PRESSURE: 71 MMHG | WEIGHT: 148 LBS | OXYGEN SATURATION: 100 %

## 2023-12-06 DIAGNOSIS — C83.78 BURKITT LYMPHOMA OF LYMPH NODES OF MULTIPLE REGIONS (MULTI): Primary | ICD-10-CM

## 2023-12-06 DIAGNOSIS — C83.70 BURKITT LYMPHOMA, UNSPECIFIED BODY REGION (MULTI): ICD-10-CM

## 2023-12-06 LAB
ALBUMIN SERPL BCP-MCNC: 4.5 G/DL (ref 3.4–5)
ALP SERPL-CCNC: 119 U/L (ref 33–120)
ALT SERPL W P-5'-P-CCNC: 62 U/L (ref 10–52)
ANION GAP SERPL CALC-SCNC: 14 MMOL/L (ref 10–20)
AST SERPL W P-5'-P-CCNC: 25 U/L (ref 9–39)
BASOPHILS # BLD MANUAL: 0 X10*3/UL (ref 0–0.1)
BASOPHILS NFR BLD MANUAL: 0 %
BILIRUB SERPL-MCNC: 0.6 MG/DL (ref 0–1.2)
BLASTS # BLD MANUAL: 0.64 X10*3/UL
BLASTS NFR BLD MANUAL: 9 %
BUN SERPL-MCNC: 18 MG/DL (ref 6–23)
CALCIUM SERPL-MCNC: 10.1 MG/DL (ref 8.6–10.3)
CHLORIDE SERPL-SCNC: 102 MMOL/L (ref 98–107)
CO2 SERPL-SCNC: 29 MMOL/L (ref 21–32)
CREAT SERPL-MCNC: 1.11 MG/DL (ref 0.5–1.3)
DOHLE BOD BLD QL SMEAR: PRESENT
EOSINOPHIL # BLD MANUAL: 0.07 X10*3/UL (ref 0–0.7)
EOSINOPHIL NFR BLD MANUAL: 1 %
ERYTHROCYTE [DISTWIDTH] IN BLOOD BY AUTOMATED COUNT: 13.1 % (ref 11.5–14.5)
GFR SERPL CREATININE-BSD FRML MDRD: >90 ML/MIN/1.73M*2
GIANT PLATELETS BLD QL SMEAR: NORMAL
GLUCOSE SERPL-MCNC: 114 MG/DL (ref 74–99)
HCT VFR BLD AUTO: 19.8 % (ref 41–52)
HGB BLD-MCNC: 6.9 G/DL (ref 13.5–17.5)
IMM GRANULOCYTES # BLD AUTO: 0.48 X10*3/UL (ref 0–0.7)
IMM GRANULOCYTES NFR BLD AUTO: 6.8 % (ref 0–0.9)
LDH SERPL L TO P-CCNC: 762 U/L (ref 84–246)
LYMPHOCYTES # BLD MANUAL: 1.56 X10*3/UL (ref 1.2–4.8)
LYMPHOCYTES NFR BLD MANUAL: 22 %
MCH RBC QN AUTO: 28.6 PG (ref 26–34)
MCHC RBC AUTO-ENTMCNC: 34.8 G/DL (ref 32–36)
MCV RBC AUTO: 82 FL (ref 80–100)
MONOCYTES # BLD MANUAL: 0.71 X10*3/UL (ref 0.1–1)
MONOCYTES NFR BLD MANUAL: 10 %
MYELOCYTES # BLD MANUAL: 0.99 X10*3/UL
MYELOCYTES NFR BLD MANUAL: 14 %
NEUTS SEG # BLD MANUAL: 2.77 X10*3/UL (ref 1.2–7)
NEUTS SEG NFR BLD MANUAL: 39 %
NRBC BLD-RTO: 1.6 /100 WBCS (ref 0–0)
PLATELET # BLD AUTO: 151 X10*3/UL (ref 150–450)
POLYCHROMASIA BLD QL SMEAR: NORMAL
POTASSIUM SERPL-SCNC: 3.9 MMOL/L (ref 3.5–5.3)
PROMYELOCYTES # BLD MANUAL: 0.14 X10*3/UL
PROMYELOCYTES NFR BLD MANUAL: 2 %
PROT SERPL-MCNC: 6.7 G/DL (ref 6.4–8.2)
RBC # BLD AUTO: 2.41 X10*6/UL (ref 4.5–5.9)
RBC MORPH BLD: NORMAL
SODIUM SERPL-SCNC: 141 MMOL/L (ref 136–145)
TOTAL CELLS COUNTED BLD: 100
VARIANT LYMPHS # BLD MANUAL: 0.21 X10*3/UL (ref 0–0.5)
VARIANT LYMPHS NFR BLD: 3 %
WBC # BLD AUTO: 7.1 X10*3/UL (ref 4.4–11.3)

## 2023-12-06 PROCEDURE — 85027 COMPLETE CBC AUTOMATED: CPT

## 2023-12-06 PROCEDURE — 80053 COMPREHEN METABOLIC PANEL: CPT

## 2023-12-06 PROCEDURE — 84075 ASSAY ALKALINE PHOSPHATASE: CPT

## 2023-12-06 PROCEDURE — 99215 OFFICE O/P EST HI 40 MIN: CPT | Performed by: INTERNAL MEDICINE

## 2023-12-06 PROCEDURE — 85007 BL SMEAR W/DIFF WBC COUNT: CPT

## 2023-12-06 PROCEDURE — 85060 BLOOD SMEAR INTERPRETATION: CPT | Performed by: PATHOLOGY

## 2023-12-06 PROCEDURE — 83615 LACTATE (LD) (LDH) ENZYME: CPT

## 2023-12-06 PROCEDURE — 1036F TOBACCO NON-USER: CPT | Performed by: INTERNAL MEDICINE

## 2023-12-06 RX ORDER — LEVOFLOXACIN 500 MG/1
500 TABLET, FILM COATED ORAL
Qty: 10 TABLET | Refills: 1 | Status: SHIPPED | OUTPATIENT
Start: 2023-12-06 | End: 2023-12-27 | Stop reason: SDUPTHER

## 2023-12-06 ASSESSMENT — PAIN SCALES - GENERAL: PAINLEVEL: 0-NO PAIN

## 2023-12-06 ASSESSMENT — ENCOUNTER SYMPTOMS
DEPRESSION: 0
OCCASIONAL FEELINGS OF UNSTEADINESS: 0
LOSS OF SENSATION IN FEET: 0

## 2023-12-07 ENCOUNTER — HOSPITAL ENCOUNTER (OUTPATIENT)
Dept: RADIOLOGY | Facility: HOSPITAL | Age: 23
Discharge: HOME | End: 2023-12-07
Payer: COMMERCIAL

## 2023-12-07 DIAGNOSIS — C83.70 BURKITT LYMPHOMA, UNSPECIFIED BODY REGION (MULTI): ICD-10-CM

## 2023-12-07 LAB — PATH REVIEW-CBC DIFFERENTIAL: NORMAL

## 2023-12-07 PROCEDURE — 74177 CT ABD & PELVIS W/CONTRAST: CPT

## 2023-12-07 PROCEDURE — 2550000001 HC RX 255 CONTRASTS: Performed by: INTERNAL MEDICINE

## 2023-12-07 PROCEDURE — 71260 CT THORAX DX C+: CPT | Performed by: RADIOLOGY

## 2023-12-07 PROCEDURE — 74177 CT ABD & PELVIS W/CONTRAST: CPT | Performed by: RADIOLOGY

## 2023-12-07 RX ADMIN — IOHEXOL 75 ML: 350 INJECTION, SOLUTION INTRAVENOUS at 12:28

## 2023-12-08 RX ORDER — HEPARIN SODIUM,PORCINE/PF 10 UNIT/ML
50 SYRINGE (ML) INTRAVENOUS AS NEEDED
Status: CANCELLED | OUTPATIENT
Start: 2023-12-08

## 2023-12-08 RX ORDER — FERROUS SULFATE 325(65) MG
325 TABLET ORAL 2 TIMES DAILY
Qty: 60 TABLET | Refills: 1 | Status: ON HOLD | OUTPATIENT
Start: 2023-12-08 | End: 2024-02-13

## 2023-12-08 NOTE — PROGRESS NOTES
Patient ID: Shilo Thakkar is a 23 y.o. male.    Oncologic History:  Burkitt lymphoma, high risk, MYC positive  - 10/19/23 presented to OSH ED with dysphagia and abdominal pain  - CT A/P with IV contrast (10/19) mass in the body of the pancreas measuring approximately 2.7 x 2.9 cm. The pancreatic duct is dilated. Multiple masses in the mesentery as well as omental caking consistent with carcinomatosis. Moderate amount of free fluid throughout the abdomen and pelvis.   - CT soft tissue neck with IV contrast (10/19): homogeneous soft tissue attenuation lesion in the L oropharynx which measures approximately 3.0 x 4.0 x 5.1 cm.   - US guided mesenteric LN biopsy (10/20/23): HIGH GRADE B-CELL LYMPHOMA MORPHOLOGICALLY CONSISTENT WITH BURKITT LYMPHOMA; flow cytometry: No B cell population identified  - Bone marrow aspiration biopsy November 1, 2023 no evidence of lymphoma involvement.  CSF by flow cytometry showed no lymphoma 11/2/2023.  - Biopsy L oropharyngeal mass and L cervical LN by ENT (10/23/23) high grade B-cell burkitt lymphoma, MYC positive, Bcl-2 negative.  - MR brain with and w/o IV contrast (10/27): No evidence of intracranial metastatic disease.     - Dexa 40 mg daily ended 11/1  - PET CT ordered 10/30 showed FDG avid left oropharyngeal mass and LAD above and below diaphragm  - Started HyperCVAD 10/29 cycle 1 day 1  - Part A:   D1: CTX + mesna, dexamethasone, D2: CTX + mesna, D3: CTX + mesna, D4: Doxo, vinca, D6: Ritux, GCSF, D9: IT,   D2:  intrathecal methotrexate November 2, 2023, 12 mg, CSF shows no B cells by flow cytometry.  D6: Intrathecal cytarabine 100 mg on November 6, 2023.  D11: vinca, dexamethasone, D13: Ritux 11/10  D13: Rituximab therapy  - Daily neupogen since 11/3/23  -Discharged home November 11, 2023  -Evaluation November 15, 2023: Recovered thrombocytopenia, stop levofloxacin, continue prophylactic acyclovir and 3 times weekly Bactrim and fluconazole.  Depressed affect noted.  -Cycle 2-day 1  part to be high-dose methotrexate plus high-dose cytarabine, November 20, 2023,  with 2 intrathecal chemotherapy administered  -Evaluation 12/6/2023 asymptomatic gaining weight doing well.  To schedule twice weekly CBC BMP Mondays and Thursdays after discharge, emphasized prophylactic levofloxacin between day 5 and day 15, acyclovir, fluconazole and 3 times a week Bactrim.  -Neulasta on 11/27/2023.  Subjective    HPI  Feels well overall.  Cycle 2 went without major difficulty.  He received his Neulasta on 11/27/2023.  Denies any dyspnea cough fever diarrhea nausea vomiting headache blurred vision paresthesias or abdominal pain.  Overall doing well and fairly active.    ROS  Head and neck: Other than HPI negative  CNS: Other than HPI negative  Cardiovascular: Other than HPI negative  Pulmonary: Other than HPI negative  GI: Other than HPI negative  : Other than HPI negative  Bleeding: Other than HPI negative  Constitutional: Other than HPI negative   Objective    BSA: 1.84 meters squared  /71   Pulse 110   Temp 37.6 °C (99.7 °F) (Skin)   Resp 17   Wt 67.1 kg (148 lb)   SpO2 100%   BMI 20.27 kg/m²      Physical Exam  Alert oriented not in distress not pale or jaundiced  Oral mucosa negative, no mucositis  Head is normocephalic atraumatic, pupils equal reactive  Neck is supple no adenopathy, no thyromegaly  Lungs show equal air entry, no crackles or wheezing, equal percussion  Heart shows regular rate and rhythm normal S1-S2 no murmurs or gallop rhythm  Abdomen is soft nontender, moves with respiration, no hepatosplenomegaly or masses, positive bowel sounds, not distended.  Lower extremities show no edema  Neurologically grossly nonfocal  Psych: Normal affect   Performance Status:  Symptomatic; fully ambulatory      Assessment/Plan   High risk Burkitt's lymphoma based on bulk and stage, at least stage III, disease site left cervical area, left bed, extensive mesenteric intra-abdominal adenopathy.   Cycle 1  hyper-CVAD and 10/29/2023 in the hospital  Cycle 2 hyper-CVAD part B 11/20/2023  Plan for admission for cycle 3 which is part a.  It should incorporate 2 doses of intrathecal chemotherapy, intrathecal cytarabine intrathecal methotrexate days 1 and 11.  I emphasized the need for twice weekly labs after discharge on Mondays and Thursdays, the need to watch for fever and bleeding, and the need to take prophylactic levofloxacin between days 5 and 15, as well as daily acyclovir, fluconazole, and 3 times weekly Bactrim.  He is planned to be admitted on December 11, 2023 for cycle 3.    His CBC subsequently came showing hemoglobin of 6.9.  I would like to repeat his CBC again on Friday, 12/8/2023 if at all possible.  Interestingly there is also some LDH.  I suspect this is mostly from the effect of the pegfilgrastim.  This certainly needs to be repeated on admission.  CT scan chest abdomen pelvis done on 12/7/2023 showed complete resolution of disease in the abdomen, but the spleen appears slightly more generous, longitudinal diameter up from 17 to 18 cm.    Cancer Staging   No matching staging information was found for the patient.    Oncology History   Burkitt lymphoma (CMS/[facility])   10/27/2023 Initial Diagnosis    Burkitt lymphoma (CMS/[facility])     10/29/2023 -  Chemotherapy    HyperCVAD + RiTUXimab, 21 Day Cycles          Diagnoses and all orders for this visit:  Burkitt lymphoma of lymph nodes of multiple regions (CMS/[facility])  -     Infusion Appointment Request; Future  -     Infusion Appointment Request; Future  -     Procedure Appointment Request; Future  -     Infusion Appointment Request; Future  -     CBC and Auto Differential; Future  -     Comprehensive metabolic panel; Future  -     Infusion Appointment Request; Future  -     CBC and Auto Differential; Future  -     Comprehensive metabolic panel; Future  -     Folate; Future  -     ferrous sulfate, 325 mg ferrous sulfate, tablet; Take 1 tablet by mouth 2 times a day.  -      Reticulocytes; Future  Burkitt lymphoma, unspecified body region (CMS/HCC)  -     Clinic Appointment Request Follow up; [facility]; Future  -     Infusion Appointment Request [facility] (count check/ritux); Future  -     Clinic Appointment Request Follow up; [facility]; Future  -     Clinic Appointment Request Follow up; [facility]  -     levoFLOXacin (Levaquin) 500 mg tablet; Take 1 tablet (500 mg) by mouth once every 24 hours for 20 days. Take when neutropenic (ANC <500). Your oncologist will inform you when to start and stop this medication.  -     CT chest abdomen pelvis w IV contrast; Future  -     Planned Future Admission >24 Hours  -     CBC and Auto Differential; Future  -     Basic Metabolic Panel; Future  -     CBC and Auto Differential; Future  -     Comprehensive Metabolic Panel; Future  -     Magnesium; Future  -     CBC and Auto Differential; Future  -     Basic Metabolic Panel; Future  -     Hepatic Function Panel; Future  -     CBC and Auto Differential; Future  -     Basic Metabolic Panel; Future  -     Clinic Appointment Request Follow up; [facility]; Future  -     CBC and Auto Differential; Future           [facility] Monique MD

## 2023-12-11 ENCOUNTER — APPOINTMENT (OUTPATIENT)
Dept: RADIOLOGY | Facility: HOSPITAL | Age: 23
DRG: 847 | End: 2023-12-11
Payer: COMMERCIAL

## 2023-12-11 ENCOUNTER — HOSPITAL ENCOUNTER (INPATIENT)
Facility: HOSPITAL | Age: 23
LOS: 5 days | Discharge: HOME | DRG: 847 | End: 2023-12-16
Attending: INTERNAL MEDICINE | Admitting: NURSE PRACTITIONER
Payer: COMMERCIAL

## 2023-12-11 DIAGNOSIS — C83.70 BURKITT LYMPHOMA, UNSPECIFIED BODY REGION (MULTI): ICD-10-CM

## 2023-12-11 DIAGNOSIS — Z51.11 ENCOUNTER FOR ANTINEOPLASTIC CHEMOTHERAPY: Primary | ICD-10-CM

## 2023-12-11 DIAGNOSIS — C83.78 BURKITT LYMPHOMA OF LYMPH NODES OF MULTIPLE REGIONS (MULTI): ICD-10-CM

## 2023-12-11 LAB
ABO GROUP (TYPE) IN BLOOD: NORMAL
ALBUMIN SERPL BCP-MCNC: 3.7 G/DL (ref 3.4–5)
ALP SERPL-CCNC: 121 U/L (ref 33–120)
ALT SERPL W P-5'-P-CCNC: 22 U/L (ref 10–52)
ANION GAP SERPL CALC-SCNC: 13 MMOL/L (ref 10–20)
ANTIBODY SCREEN: NORMAL
APPEARANCE UR: CLEAR
AST SERPL W P-5'-P-CCNC: 24 U/L (ref 9–39)
BASO STIPL BLD QL SMEAR: PRESENT
BASOPHILS # BLD MANUAL: 0 X10*3/UL (ref 0–0.1)
BASOPHILS NFR BLD MANUAL: 0 %
BILIRUB SERPL-MCNC: 0.3 MG/DL (ref 0–1.2)
BILIRUB UR STRIP.AUTO-MCNC: NEGATIVE MG/DL
BLASTS # BLD MANUAL: 0.97 X10*3/UL
BLASTS NFR BLD MANUAL: 3.2 %
BLOOD EXPIRATION DATE: NORMAL
BUN SERPL-MCNC: 11 MG/DL (ref 6–23)
CALCIUM SERPL-MCNC: 8.5 MG/DL (ref 8.6–10.6)
CHLORIDE SERPL-SCNC: 110 MMOL/L (ref 98–107)
CO2 SERPL-SCNC: 27 MMOL/L (ref 21–32)
COLOR UR: YELLOW
CREAT SERPL-MCNC: 0.96 MG/DL (ref 0.5–1.3)
DISPENSE STATUS: NORMAL
EOSINOPHIL # BLD MANUAL: 0 X10*3/UL (ref 0–0.7)
EOSINOPHIL NFR BLD MANUAL: 0 %
ERYTHROCYTE [DISTWIDTH] IN BLOOD BY AUTOMATED COUNT: 14.1 % (ref 11.5–14.5)
FLUAV RNA RESP QL NAA+PROBE: NOT DETECTED
FLUBV RNA RESP QL NAA+PROBE: NOT DETECTED
GFR SERPL CREATININE-BSD FRML MDRD: >90 ML/MIN/1.73M*2
GLUCOSE SERPL-MCNC: 100 MG/DL (ref 74–99)
GLUCOSE UR STRIP.AUTO-MCNC: NEGATIVE MG/DL
HCT VFR BLD AUTO: 16.5 % (ref 41–52)
HGB BLD-MCNC: 5.5 G/DL (ref 13.5–17.5)
HOWELL-JOLLY BOD BLD QL SMEAR: PRESENT
IMM GRANULOCYTES # BLD AUTO: 8.58 X10*3/UL (ref 0–0.7)
IMM GRANULOCYTES NFR BLD AUTO: 28.4 % (ref 0–0.9)
KETONES UR STRIP.AUTO-MCNC: NEGATIVE MG/DL
LDH SERPL L TO P-CCNC: 764 U/L (ref 84–246)
LEUKOCYTE ESTERASE UR QL STRIP.AUTO: NEGATIVE
LYMPHOCYTES # BLD MANUAL: 1.21 X10*3/UL (ref 1.2–4.8)
LYMPHOCYTES NFR BLD MANUAL: 4 %
MAGNESIUM SERPL-MCNC: 1.72 MG/DL (ref 1.6–2.4)
MCH RBC QN AUTO: 30.4 PG (ref 26–34)
MCHC RBC AUTO-ENTMCNC: 33.3 G/DL (ref 32–36)
MCV RBC AUTO: 91 FL (ref 80–100)
METAMYELOCYTES # BLD MANUAL: 1.45 X10*3/UL
METAMYELOCYTES NFR BLD MANUAL: 4.8 %
MONOCYTES # BLD MANUAL: 3.88 X10*3/UL (ref 0.1–1)
MONOCYTES NFR BLD MANUAL: 12.8 %
MYELOCYTES # BLD MANUAL: 2.18 X10*3/UL
MYELOCYTES NFR BLD MANUAL: 7.2 %
NEUTS SEG # BLD MANUAL: 19.88 X10*3/UL (ref 1.2–7)
NEUTS SEG NFR BLD MANUAL: 65.6 %
NITRITE UR QL STRIP.AUTO: NEGATIVE
NRBC BLD-RTO: 2.5 /100 WBCS (ref 0–0)
PH UR STRIP.AUTO: 6 [PH]
PLATELET # BLD AUTO: 257 X10*3/UL (ref 150–450)
POLYCHROMASIA BLD QL SMEAR: ABNORMAL
POTASSIUM SERPL-SCNC: 3.7 MMOL/L (ref 3.5–5.3)
PROCALCITONIN SERPL-MCNC: 0.05 NG/ML
PRODUCT BLOOD TYPE: 5100
PRODUCT CODE: NORMAL
PROMYELOCYTES # BLD MANUAL: 0.73 X10*3/UL
PROMYELOCYTES NFR BLD MANUAL: 2.4 %
PROT SERPL-MCNC: 5.1 G/DL (ref 6.4–8.2)
PROT UR STRIP.AUTO-MCNC: NEGATIVE MG/DL
RBC # BLD AUTO: 1.81 X10*6/UL (ref 4.5–5.9)
RBC # UR STRIP.AUTO: NEGATIVE /UL
RBC MORPH BLD: ABNORMAL
RH FACTOR (ANTIGEN D): NORMAL
RSV RNA RESP QL NAA+PROBE: NOT DETECTED
SARS-COV-2 RNA RESP QL NAA+PROBE: NOT DETECTED
SCHISTOCYTES BLD QL SMEAR: ABNORMAL
SODIUM SERPL-SCNC: 146 MMOL/L (ref 136–145)
SP GR UR STRIP.AUTO: 1.01
TOTAL CELLS COUNTED BLD: 125
UNIT ABO: NORMAL
UNIT NUMBER: NORMAL
UNIT RH: NORMAL
UNIT VOLUME: 350
URATE SERPL-MCNC: 6.9 MG/DL (ref 4–7.5)
UROBILINOGEN UR STRIP.AUTO-MCNC: <2 MG/DL
WBC # BLD AUTO: 30.3 X10*3/UL (ref 4.4–11.3)
XM INTEP: NORMAL

## 2023-12-11 PROCEDURE — 81003 URINALYSIS AUTO W/O SCOPE: CPT | Performed by: NURSE PRACTITIONER

## 2023-12-11 PROCEDURE — 87632 RESP VIRUS 6-11 TARGETS: CPT | Performed by: NURSE PRACTITIONER

## 2023-12-11 PROCEDURE — 84145 PROCALCITONIN (PCT): CPT | Performed by: NURSE PRACTITIONER

## 2023-12-11 PROCEDURE — 85007 BL SMEAR W/DIFF WBC COUNT: CPT | Performed by: NURSE PRACTITIONER

## 2023-12-11 PROCEDURE — 86901 BLOOD TYPING SEROLOGIC RH(D): CPT | Performed by: NURSE PRACTITIONER

## 2023-12-11 PROCEDURE — 36430 TRANSFUSION BLD/BLD COMPNT: CPT

## 2023-12-11 PROCEDURE — 71045 X-RAY EXAM CHEST 1 VIEW: CPT

## 2023-12-11 PROCEDURE — 83735 ASSAY OF MAGNESIUM: CPT | Performed by: NURSE PRACTITIONER

## 2023-12-11 PROCEDURE — 71045 X-RAY EXAM CHEST 1 VIEW: CPT | Performed by: RADIOLOGY

## 2023-12-11 PROCEDURE — 83615 LACTATE (LD) (LDH) ENZYME: CPT | Performed by: NURSE PRACTITIONER

## 2023-12-11 PROCEDURE — 84550 ASSAY OF BLOOD/URIC ACID: CPT | Performed by: NURSE PRACTITIONER

## 2023-12-11 PROCEDURE — 80053 COMPREHEN METABOLIC PANEL: CPT | Performed by: NURSE PRACTITIONER

## 2023-12-11 PROCEDURE — 86920 COMPATIBILITY TEST SPIN: CPT

## 2023-12-11 PROCEDURE — 85027 COMPLETE CBC AUTOMATED: CPT | Performed by: NURSE PRACTITIONER

## 2023-12-11 PROCEDURE — 99223 1ST HOSP IP/OBS HIGH 75: CPT | Performed by: NURSE PRACTITIONER

## 2023-12-11 PROCEDURE — P9040 RBC LEUKOREDUCED IRRADIATED: HCPCS

## 2023-12-11 PROCEDURE — 87637 SARSCOV2&INF A&B&RSV AMP PRB: CPT | Performed by: NURSE PRACTITIONER

## 2023-12-11 PROCEDURE — 2500000001 HC RX 250 WO HCPCS SELF ADMINISTERED DRUGS (ALT 637 FOR MEDICARE OP): Performed by: NURSE PRACTITIONER

## 2023-12-11 PROCEDURE — 83615 LACTATE (LD) (LDH) ENZYME: CPT

## 2023-12-11 PROCEDURE — 1170000001 HC PRIVATE ONCOLOGY ROOM DAILY

## 2023-12-11 RX ORDER — FLUCONAZOLE 200 MG/1
400 TABLET ORAL DAILY
Status: DISCONTINUED | OUTPATIENT
Start: 2023-12-11 | End: 2023-12-16 | Stop reason: HOSPADM

## 2023-12-11 RX ORDER — ALBUTEROL SULFATE 0.83 MG/ML
3 SOLUTION RESPIRATORY (INHALATION) AS NEEDED
Status: CANCELLED | OUTPATIENT
Start: 2023-12-23

## 2023-12-11 RX ORDER — EPINEPHRINE 0.3 MG/.3ML
0.3 INJECTION SUBCUTANEOUS EVERY 5 MIN PRN
Status: CANCELLED | OUTPATIENT
Start: 2023-12-19

## 2023-12-11 RX ORDER — ACYCLOVIR 400 MG/1
400 TABLET ORAL 2 TIMES DAILY
Status: CANCELLED | OUTPATIENT
Start: 2023-12-11

## 2023-12-11 RX ORDER — FERROUS SULFATE 325(65) MG
1 TABLET ORAL 2 TIMES DAILY
Status: DISCONTINUED | OUTPATIENT
Start: 2023-12-11 | End: 2023-12-16 | Stop reason: HOSPADM

## 2023-12-11 RX ORDER — ALBUTEROL SULFATE 0.83 MG/ML
3 SOLUTION RESPIRATORY (INHALATION) AS NEEDED
Status: CANCELLED | OUTPATIENT
Start: 2023-12-17

## 2023-12-11 RX ORDER — FAMOTIDINE 10 MG/ML
20 INJECTION INTRAVENOUS ONCE AS NEEDED
Status: CANCELLED | OUTPATIENT
Start: 2023-12-23

## 2023-12-11 RX ORDER — EPINEPHRINE 0.3 MG/.3ML
0.3 INJECTION SUBCUTANEOUS EVERY 5 MIN PRN
Status: CANCELLED | OUTPATIENT
Start: 2023-12-17

## 2023-12-11 RX ORDER — ACETAMINOPHEN 325 MG/1
650 TABLET ORAL ONCE
Status: CANCELLED | OUTPATIENT
Start: 2023-12-23

## 2023-12-11 RX ORDER — SULFAMETHOXAZOLE AND TRIMETHOPRIM 800; 160 MG/1; MG/1
1 TABLET ORAL
Status: DISCONTINUED | OUTPATIENT
Start: 2023-12-11 | End: 2023-12-16 | Stop reason: HOSPADM

## 2023-12-11 RX ORDER — PANTOPRAZOLE SODIUM 40 MG/1
40 TABLET, DELAYED RELEASE ORAL
Status: DISCONTINUED | OUTPATIENT
Start: 2023-12-12 | End: 2023-12-16 | Stop reason: HOSPADM

## 2023-12-11 RX ORDER — ACYCLOVIR 400 MG/1
400 TABLET ORAL 2 TIMES DAILY
Status: DISCONTINUED | OUTPATIENT
Start: 2023-12-11 | End: 2023-12-16 | Stop reason: HOSPADM

## 2023-12-11 RX ORDER — DIPHENHYDRAMINE HCL 50 MG
50 CAPSULE ORAL ONCE
Status: CANCELLED | OUTPATIENT
Start: 2023-12-23

## 2023-12-11 RX ORDER — DIPHENHYDRAMINE HYDROCHLORIDE 50 MG/ML
50 INJECTION INTRAMUSCULAR; INTRAVENOUS AS NEEDED
Status: CANCELLED | OUTPATIENT
Start: 2023-12-23

## 2023-12-11 RX ORDER — AMOXICILLIN 250 MG
2 CAPSULE ORAL 2 TIMES DAILY
Status: DISCONTINUED | OUTPATIENT
Start: 2023-12-11 | End: 2023-12-16 | Stop reason: HOSPADM

## 2023-12-11 RX ORDER — DIPHENHYDRAMINE HYDROCHLORIDE 50 MG/ML
50 INJECTION INTRAMUSCULAR; INTRAVENOUS AS NEEDED
Status: CANCELLED | OUTPATIENT
Start: 2023-12-19

## 2023-12-11 RX ORDER — EPINEPHRINE 0.3 MG/.3ML
0.3 INJECTION SUBCUTANEOUS EVERY 5 MIN PRN
Status: CANCELLED | OUTPATIENT
Start: 2023-12-23

## 2023-12-11 RX ORDER — ALBUTEROL SULFATE 0.83 MG/ML
3 SOLUTION RESPIRATORY (INHALATION) AS NEEDED
Status: CANCELLED | OUTPATIENT
Start: 2023-12-19

## 2023-12-11 RX ORDER — DIPHENHYDRAMINE HYDROCHLORIDE 50 MG/ML
50 INJECTION INTRAMUSCULAR; INTRAVENOUS AS NEEDED
Status: CANCELLED | OUTPATIENT
Start: 2023-12-17

## 2023-12-11 RX ORDER — FAMOTIDINE 10 MG/ML
20 INJECTION INTRAVENOUS ONCE AS NEEDED
Status: CANCELLED | OUTPATIENT
Start: 2023-12-17

## 2023-12-11 RX ORDER — FAMOTIDINE 10 MG/ML
20 INJECTION INTRAVENOUS ONCE AS NEEDED
Status: CANCELLED | OUTPATIENT
Start: 2023-12-19

## 2023-12-11 RX ADMIN — ACYCLOVIR 400 MG: 400 TABLET ORAL at 20:45

## 2023-12-11 RX ADMIN — FERROUS SULFATE TAB 325 MG (65 MG ELEMENTAL FE) 1 TABLET: 325 (65 FE) TAB at 20:45

## 2023-12-11 SDOH — SOCIAL STABILITY: SOCIAL INSECURITY: DO YOU FEEL ANYONE HAS EXPLOITED OR TAKEN ADVANTAGE OF YOU FINANCIALLY OR OF YOUR PERSONAL PROPERTY?: NO

## 2023-12-11 SDOH — SOCIAL STABILITY: SOCIAL INSECURITY: ARE THERE ANY APPARENT SIGNS OF INJURIES/BEHAVIORS THAT COULD BE RELATED TO ABUSE/NEGLECT?: NO

## 2023-12-11 SDOH — SOCIAL STABILITY: SOCIAL INSECURITY: HAS ANYONE EVER THREATENED TO HURT YOUR FAMILY OR YOUR PETS?: NO

## 2023-12-11 SDOH — SOCIAL STABILITY: SOCIAL INSECURITY: HAVE YOU HAD THOUGHTS OF HARMING ANYONE ELSE?: NO

## 2023-12-11 SDOH — SOCIAL STABILITY: SOCIAL INSECURITY: ARE YOU OR HAVE YOU BEEN THREATENED OR ABUSED PHYSICALLY, EMOTIONALLY, OR SEXUALLY BY ANYONE?: NO

## 2023-12-11 SDOH — SOCIAL STABILITY: SOCIAL INSECURITY: DO YOU FEEL UNSAFE GOING BACK TO THE PLACE WHERE YOU ARE LIVING?: NO

## 2023-12-11 SDOH — SOCIAL STABILITY: SOCIAL INSECURITY: WERE YOU ABLE TO COMPLETE ALL THE BEHAVIORAL HEALTH SCREENINGS?: YES

## 2023-12-11 SDOH — SOCIAL STABILITY: SOCIAL INSECURITY: DOES ANYONE TRY TO KEEP YOU FROM HAVING/CONTACTING OTHER FRIENDS OR DOING THINGS OUTSIDE YOUR HOME?: NO

## 2023-12-11 SDOH — SOCIAL STABILITY: SOCIAL INSECURITY: ABUSE: ADULT

## 2023-12-11 ASSESSMENT — COGNITIVE AND FUNCTIONAL STATUS - GENERAL
PATIENT BASELINE BEDBOUND: NO
MOBILITY SCORE: 24
DAILY ACTIVITIY SCORE: 24
DAILY ACTIVITIY SCORE: 24
MOBILITY SCORE: 24

## 2023-12-11 ASSESSMENT — ACTIVITIES OF DAILY LIVING (ADL)
DRESSING YOURSELF: INDEPENDENT
GROOMING: INDEPENDENT
BATHING: INDEPENDENT
JUDGMENT_ADEQUATE_SAFELY_COMPLETE_DAILY_ACTIVITIES: YES
LACK_OF_TRANSPORTATION: NO
HEARING - RIGHT EAR: FUNCTIONAL
PATIENT'S MEMORY ADEQUATE TO SAFELY COMPLETE DAILY ACTIVITIES?: YES
HEARING - LEFT EAR: FUNCTIONAL
FEEDING YOURSELF: INDEPENDENT
WALKS IN HOME: INDEPENDENT
TOILETING: INDEPENDENT
ADEQUATE_TO_COMPLETE_ADL: YES

## 2023-12-11 ASSESSMENT — COLUMBIA-SUICIDE SEVERITY RATING SCALE - C-SSRS
2. HAVE YOU ACTUALLY HAD ANY THOUGHTS OF KILLING YOURSELF?: NO
1. IN THE PAST MONTH, HAVE YOU WISHED YOU WERE DEAD OR WISHED YOU COULD GO TO SLEEP AND NOT WAKE UP?: NO
6. HAVE YOU EVER DONE ANYTHING, STARTED TO DO ANYTHING, OR PREPARED TO DO ANYTHING TO END YOUR LIFE?: NO

## 2023-12-11 ASSESSMENT — PAIN - FUNCTIONAL ASSESSMENT
PAIN_FUNCTIONAL_ASSESSMENT: 0-10
PAIN_FUNCTIONAL_ASSESSMENT: 0-10

## 2023-12-11 ASSESSMENT — LIFESTYLE VARIABLES
AUDIT-C TOTAL SCORE: 0
HOW OFTEN DO YOU HAVE 6 OR MORE DRINKS ON ONE OCCASION: NEVER
HOW OFTEN DO YOU HAVE A DRINK CONTAINING ALCOHOL: NEVER
HOW MANY STANDARD DRINKS CONTAINING ALCOHOL DO YOU HAVE ON A TYPICAL DAY: PATIENT DOES NOT DRINK
PRESCIPTION_ABUSE_PAST_12_MONTHS: NO
SUBSTANCE_ABUSE_PAST_12_MONTHS: NO
AUDIT-C TOTAL SCORE: 0
SKIP TO QUESTIONS 9-10: 1

## 2023-12-11 ASSESSMENT — PAIN SCALES - GENERAL
PAINLEVEL_OUTOF10: 0 - NO PAIN
PAINLEVEL_OUTOF10: 0 - NO PAIN

## 2023-12-11 ASSESSMENT — PATIENT HEALTH QUESTIONNAIRE - PHQ9
SUM OF ALL RESPONSES TO PHQ9 QUESTIONS 1 & 2: 0
1. LITTLE INTEREST OR PLEASURE IN DOING THINGS: NOT AT ALL
2. FEELING DOWN, DEPRESSED OR HOPELESS: NOT AT ALL

## 2023-12-11 NOTE — H&P
History Of Present Illness  Shilo Thakkar is a 23 y.o. male with PMH of Burkitt Lymphoma (Dx 10/20/23; s/p 2 cycles of HyperCVAD), malignant pleural effusions s/p CT (10/29/23), and ADHD who presents today for C3 HyperCVAD + Ritux.    Patient says he has overall been doing well at home. Eating/drinking well. Getting around well. No complaints or concerns and is eager to start chemo. Denies any HA, dizziness/lightheadedness, fevers/chills, cough, congestion, rhinorrhea, sore throat, SOB, CP, palpitations, abdominal pain, n/v/d/c, melena, dysuria, urgency/frequency, hematuria, numbness/tingling, bruising/bleeding or rashes. Denies any sick contacts. ROS otherwise negative.    Past Medical History  He has a past medical history of Colles' fracture of right radius, initial encounter for closed fracture (04/23/2015), Personal history of other mental and behavioral disorders (01/28/2014), Personal history of other mental and behavioral disorders (05/10/2017), Personal history of other specified conditions (05/03/2016), and Unspecified fracture of navicular (scaphoid) bone of left wrist, initial encounter for closed fracture (12/17/2015).    Surgical History  He has a past surgical history that includes Other surgical history (10/03/2017); US guided soft tissue biopsy (10/20/2023); Foot fracture surgery (Left); and Chest Tube Insertion (10/29/2023).    Oncology History   Burkitt lymphoma (CMS/[facility])   10/27/2023 Initial Diagnosis    Burkitt lymphoma (CMS/[facility])     10/29/2023 -  Chemotherapy    HyperCVAD + RiTUXimab, 21 Day Cycles          Social History  He reports that he has never smoked. He has never been exposed to tobacco smoke. He has never used smokeless tobacco. He reports that he does not currently use alcohol. He reports that he does not use drugs.     Allergies  Patient has no known allergies.    Physical Exam  Vitals reviewed.   Constitutional:       Appearance: Normal appearance.   HENT:      Head: Normocephalic  and atraumatic.      Nose: Nose normal.      Mouth/Throat:      Mouth: Mucous membranes are moist.      Pharynx: Oropharynx is clear.   Eyes:      Extraocular Movements: Extraocular movements intact.      Pupils: Pupils are equal, round, and reactive to light.   Cardiovascular:      Rate and Rhythm: Normal rate and regular rhythm.      Pulses: Normal pulses.      Heart sounds: Normal heart sounds.   Pulmonary:      Effort: Pulmonary effort is normal.      Breath sounds: Normal breath sounds.   Abdominal:      General: Bowel sounds are normal.      Palpations: Abdomen is soft.   Musculoskeletal:         General: Normal range of motion.   Skin:     General: Skin is warm.   Neurological:      General: No focal deficit present.      Mental Status: He is alert and oriented to person, place, and time. Mental status is at baseline.   Psychiatric:         Mood and Affect: Mood normal.         Behavior: Behavior normal.       Assessment/Plan   Principal Problem:    Encounter for antineoplastic chemotherapy  Active Problems:    Burkitt lymphoma (CMS/HCC)    Shilo Thakkar is a 23 y.o. male with PMH of Burkitt Lymphoma (Dx 10/20/23; s/p 2 cycles of HyperCVAD), malignant pleural effusions s/p CT (10/29/23), and ADHD who presents today for C3 HyperCVAD + Ritux.    CHEMO:  # C3 HyperCVAD + Ritux  - Cycle 3 part A; planned for IT MTX and IT Cytarabine during this admission  - NS @ 100mL/hr throughout chemo  - Premeds and reaction meds as ordered  - Dexamethasone PO 40mg daily x4 days  - Rituximab 700mg (rounded from 690mg = 375mg/m2 x 1.84m2) once on day 1, administered over 90mins with 20% of the dose administered over the first 30mins and the remaining 80% of the dose given over 60mins  - Mesna 1,100mg (rounded from 1,104mg = 600mg/m2 x 1.84m2) over 24hrs, once, 15mins prior to cyclophosphamide x3 doses  - Cyclophosphamide 552mg (300mg/m2 x 1.84m2) at 42.5mL/hr over 3hrs, every 12rs starting 2hrs 30mins after treatment start time,  x6 doses  - Doxorubicin 92mg (50mg/m2 x 1.84m2) at 43.6mL/hr, administered over 24hrs, once, 30mins after treatment start time (begin 12hrs after the 6th dose of cyclophosphamide begins)  - Vincristine 2mg once, administered over 10mins, starting 30mins after treatment start time (begin 12mins after the 6th dose of cyclophosphamide)  - Plan IT Methotrexate and IT Cytarabine to be given on day 2 (12/12)  - Neulasta 6mg subcutaneous to be given outpt 12/18    ONC:  # Burkitt lymphoma, newly diagnosed (10/20/23)  - Primary oncologist: Dr. Olegario Brandtiso  - 10/19/23 presented to OSH ED with dysphagia and abdominal pain  - CT A/P with IV contrast (10/19) mass in the body of the pancreas measuring approximately 2.7 x 2.9 cm. Multiple masses in the mesentery as well as omental caking consistent with carcinomatosis.   - CT soft tissue neck with IV contrast (10/19): homogeneous soft tissue attenuation lesion in the L oropharynx which measures approximately 3.0 x 4.0 x 5.1 cm.   - CT Abdomen w/o IV contrast (10/20): Mesenteric lymphadenopathy  - US guided mesenteric LN biopsy (10/20/23): HIGH GRADE B-CELL LYMPHOMA MORPHOLOGICALLY CONSISTENT WITH BURKITT LYMPHOMA; flow cytometry: CD10+ kappa restricted B cells supportive of a B cell lymphoma with germinal center origin.   - Biopsy L oropharyngeal mass and L cervical LN by ENT (10/23/23) high grade B-cell burkitt lymphoma  - PET CT ordered 10/30 showed FDG avid left oropharyngeal mass and LAD above and below diaphragm  - s/p 2 cycles of HyperCVAD  - Bone marrow bx completed 11/1 without evidence of disease  - XR chest neg 11/20  - Currently admitted for cycle 3 HyperCVAD      HEME:  # Anemia, likely 2/2 disease  - Continue home ferrous sulfate 325mg BID  - Transfuse to keep Hgb>7, Plt>10  - Hgb from 12/6 was 6.9, admit labs pending; will order 1unit PRBC if needed    CARDIAC:  - ECHO (10/2023): EF 60-65%  - No active issues     ID:  Allergies: NKDA  - Hepatitis serology negative  (10/26/23), HIV 1/2 nonreactive (10/26), EBV neg (10/27)   - Continue home Acyclovir 400mg BID, Fluconazole 400mg daily, and Bactrim M/W/F for prophy  - Plan Levaquin for neutropenia (day 5 through day 15 per outpt notes; 12/15-12/25)    FEN/GI:  - Admit weight: 71.7kg  - Continue home Protonix 40mg daily  - Replace electrolytes as needed  - Low pathogen diet  - TLS monitoring  - G6PD normal    DISPO:  - Full Code  - Access: PICC line (right)  - DVT ppx: Ambulation  - DC pending completion of chemo, possibly 12/16  - NOK: Ms Deidra Pickett [phone]    I spent >60  minutes in the professional and overall care of this patient.    Assessment and plan discussed with attending physician Dr. Abbi Hill, APRN-CNP

## 2023-12-11 NOTE — SIGNIFICANT EVENT
CHEMO NOTE   12/11/23 4704   Prechemo Checklist   Has the patient been in the hospital, ED, or urgent care since last date of service No   Chemo/Immuno Consent Signed Yes   Protocol/Indications Verified Yes   Confirmed to previous date/time of medication N/A   Compared to previous dose N/A   All medications are dated accurately N/A   Pregnancy Test Negative Not applicable   Parameters Met Yes  (See below.)   BSA/Weight-Height Verified Yes  (Double check with Cristela Cuellar RN)   Dose Calculations Verified Yes  (See Below.)     Rituxan - 700 mg ordered  375 mg/m2 = 375 x 1.9 = 712.5 mg  10% = 70, +/-12.5 mg = WDL    Mesna - 1100 ordered  600 mg/m2 = 600 x 1.9 = 1140 mg  10% = 110, +/- 40 mg = WDL     Cyclophosphamide - 552 mg ordered   300 mg/m2 = 300 x 1.9 = 570 mg   10% = 55.2, =/-18 mg = WDL    Parameters  ANC - 19.88, WDL  PLT - 257, WDL  AST - WNL, WDL  Total Toño - 0.3, WDL  LVEF - 55-60%, WDL  Doxo lifetime dose - 50.56 mg/m2, WDL

## 2023-12-11 NOTE — PROGRESS NOTES
Pharmacy Medication History Review    Shilo Thakkar is a 23 y.o. male admitted for Encounter for antineoplastic chemotherapy. Pharmacy reviewed the patient's oijim-ep-wvybzkaxl medications and allergies for accuracy.    The list below reflects the updated PTA list. Comments regarding how patient may be taking medications differently can be found in the Admit Orders Activity  Facility-Administered Medications Prior to Admission   Medication Dose Route Frequency Provider Last Rate Last Admin    ondansetron (Zofran) tablet 4 mg  4 mg oral q8h PRN Luma Trent MD         Medications Prior to Admission   Medication Sig Dispense Refill Last Dose    acetaminophen (Tylenol) 325 mg tablet Take 2 tablets (650 mg) by mouth every 6 hours if needed for mild pain (1 - 3). 30 tablet 0     acyclovir (Zovirax) 400 mg tablet Take 1 tablet (400 mg) by mouth 2 times a day. 180 tablet 0 12/11/2023    ferrous sulfate, 325 mg ferrous sulfate, tablet Take 1 tablet by mouth 2 times a day. (Patient not taking: Reported on 12/11/2023) 60 tablet 1 Not Taking    fluconazole (Diflucan) 200 mg tablet Take 2 tablets (400 mg) by mouth once daily. 30 tablet 3 12/11/2023    levoFLOXacin (Levaquin) 500 mg tablet Take 1 tablet (500 mg) by mouth once every 24 hours for 20 days. Take when neutropenic (ANC <500). Your oncologist will inform you when to start and stop this medication. (Patient not taking: Reported on 12/11/2023) 10 tablet 1 Not Taking    pantoprazole (ProtoNix) 40 mg EC tablet Take 1 tablet (40 mg) by mouth once daily in the morning. Take before meals. Do not crush, chew, or split. Do not start before November 11, 2023. 30 tablet 11 12/11/2023    sennosides-docusate sodium (Ashley-Colace) 8.6-50 mg tablet Take 2 tablets by mouth 2 times a day as needed for constipation. (Patient not taking: Reported on 12/11/2023) 60 tablet 0 Not Taking    sulfamethoxazole-trimethoprim (Bactrim DS) 800-160 mg tablet Take 1 tablet by mouth once a day on Monday,  Wednesday, and Friday. 12 tablet 0 12/11/2023        The list below reflects the updated allergy list. Please review each documented allergy for additional clarification and justification.  Allergies  Reviewed by DEMETRIO Weaver-CNP on 12/11/2023   No Known Allergies         Patient accepts M2B at discharge. Pharmacy has been updated to [facility].    Sources used to complete the med history include out patient fill history, OARRS, and patient interview - reliable historian, medication list at bedside    Below are additional concerns with the patient's PTA list.  Patient thought ferrous sulfate complete  Patient reported levofloxacin discontinued    Suhas Ribera, PharmD  Transitions of Care Pharmacist  [facility]  Please reach out via Secure Chat for questions, or if no response call [employer] [place] or [facility]

## 2023-12-12 LAB
ALBUMIN SERPL BCP-MCNC: 3.5 G/DL (ref 3.4–5)
ALP SERPL-CCNC: 104 U/L (ref 33–120)
ALT SERPL W P-5'-P-CCNC: 20 U/L (ref 10–52)
ANION GAP SERPL CALC-SCNC: 12 MMOL/L (ref 10–20)
APTT PPP: 29 SECONDS (ref 27–38)
AST SERPL W P-5'-P-CCNC: 20 U/L (ref 9–39)
BASOPHILS # BLD MANUAL: 0 X10*3/UL (ref 0–0.1)
BASOPHILS NFR BLD MANUAL: 0 %
BILIRUB SERPL-MCNC: 0.3 MG/DL (ref 0–1.2)
BLASTS # BLD MANUAL: 1.33 X10*3/UL
BLASTS NFR BLD MANUAL: 4.2 %
BLOOD EXPIRATION DATE: NORMAL
BUN SERPL-MCNC: 11 MG/DL (ref 6–23)
CALCIUM SERPL-MCNC: 8.2 MG/DL (ref 8.6–10.6)
CHLORIDE SERPL-SCNC: 110 MMOL/L (ref 98–107)
CO2 SERPL-SCNC: 28 MMOL/L (ref 21–32)
CREAT SERPL-MCNC: 0.95 MG/DL (ref 0.5–1.3)
DISPENSE STATUS: NORMAL
EOSINOPHIL # BLD MANUAL: 0 X10*3/UL (ref 0–0.7)
EOSINOPHIL NFR BLD MANUAL: 0 %
ERYTHROCYTE [DISTWIDTH] IN BLOOD BY AUTOMATED COUNT: 13.9 % (ref 11.5–14.5)
GFR SERPL CREATININE-BSD FRML MDRD: >90 ML/MIN/1.73M*2
GLUCOSE SERPL-MCNC: 88 MG/DL (ref 74–99)
HCT VFR BLD AUTO: 19.2 % (ref 41–52)
HGB BLD-MCNC: 6.7 G/DL (ref 13.5–17.5)
IMM GRANULOCYTES # BLD AUTO: 8.64 X10*3/UL (ref 0–0.7)
IMM GRANULOCYTES NFR BLD AUTO: 27.4 % (ref 0–0.9)
INR PPP: 1.1 (ref 0.9–1.1)
LDH SERPL L TO P-CCNC: 750 U/L (ref 84–246)
LYMPHOCYTES # BLD MANUAL: 1.33 X10*3/UL (ref 1.2–4.8)
LYMPHOCYTES NFR BLD MANUAL: 4.2 %
MAGNESIUM SERPL-MCNC: 1.79 MG/DL (ref 1.6–2.4)
MCH RBC QN AUTO: 31.5 PG (ref 26–34)
MCHC RBC AUTO-ENTMCNC: 34.9 G/DL (ref 32–36)
MCV RBC AUTO: 90 FL (ref 80–100)
MONOCYTES # BLD MANUAL: 5.06 X10*3/UL (ref 0.1–1)
MONOCYTES NFR BLD MANUAL: 16 %
MYELOCYTES # BLD MANUAL: 3.19 X10*3/UL
MYELOCYTES NFR BLD MANUAL: 10.1 %
NEUTS SEG # BLD MANUAL: 20.45 X10*3/UL (ref 1.2–7)
NEUTS SEG NFR BLD MANUAL: 64.7 %
NRBC BLD-RTO: 3.1 /100 WBCS (ref 0–0)
PLATELET # BLD AUTO: 206 X10*3/UL (ref 150–450)
POTASSIUM SERPL-SCNC: 3.6 MMOL/L (ref 3.5–5.3)
PRODUCT BLOOD TYPE: 5100
PRODUCT CODE: NORMAL
PROMYELOCYTES # BLD MANUAL: 0.25 X10*3/UL
PROMYELOCYTES NFR BLD MANUAL: 0.8 %
PROT SERPL-MCNC: 4.9 G/DL (ref 6.4–8.2)
PROTHROMBIN TIME: 12.2 SECONDS (ref 9.8–12.8)
RBC # BLD AUTO: 2.13 X10*6/UL (ref 4.5–5.9)
RBC MORPH BLD: ABNORMAL
SODIUM SERPL-SCNC: 146 MMOL/L (ref 136–145)
TOTAL CELLS COUNTED BLD: 119
UNIT ABO: NORMAL
UNIT NUMBER: NORMAL
UNIT RH: NORMAL
UNIT VOLUME: 284
WBC # BLD AUTO: 31.6 X10*3/UL (ref 4.4–11.3)
XM INTEP: NORMAL

## 2023-12-12 PROCEDURE — 83735 ASSAY OF MAGNESIUM: CPT | Performed by: NURSE PRACTITIONER

## 2023-12-12 PROCEDURE — 36415 COLL VENOUS BLD VENIPUNCTURE: CPT

## 2023-12-12 PROCEDURE — 30233N1 TRANSFUSION OF NONAUTOLOGOUS RED BLOOD CELLS INTO PERIPHERAL VEIN, PERCUTANEOUS APPROACH: ICD-10-PCS | Performed by: INTERNAL MEDICINE

## 2023-12-12 PROCEDURE — 85730 THROMBOPLASTIN TIME PARTIAL: CPT | Performed by: NURSE PRACTITIONER

## 2023-12-12 PROCEDURE — 1170000001 HC PRIVATE ONCOLOGY ROOM DAILY

## 2023-12-12 PROCEDURE — 36430 TRANSFUSION BLD/BLD COMPNT: CPT

## 2023-12-12 PROCEDURE — 2500000001 HC RX 250 WO HCPCS SELF ADMINISTERED DRUGS (ALT 637 FOR MEDICARE OP): Performed by: INTERNAL MEDICINE

## 2023-12-12 PROCEDURE — 99233 SBSQ HOSP IP/OBS HIGH 50: CPT

## 2023-12-12 PROCEDURE — P9040 RBC LEUKOREDUCED IRRADIATED: HCPCS

## 2023-12-12 PROCEDURE — 80053 COMPREHEN METABOLIC PANEL: CPT | Performed by: NURSE PRACTITIONER

## 2023-12-12 PROCEDURE — 2500000001 HC RX 250 WO HCPCS SELF ADMINISTERED DRUGS (ALT 637 FOR MEDICARE OP): Performed by: NURSE PRACTITIONER

## 2023-12-12 PROCEDURE — 87040 BLOOD CULTURE FOR BACTERIA: CPT

## 2023-12-12 PROCEDURE — 2500000004 HC RX 250 GENERAL PHARMACY W/ HCPCS (ALT 636 FOR OP/ED): Performed by: NURSE PRACTITIONER

## 2023-12-12 PROCEDURE — 2500000004 HC RX 250 GENERAL PHARMACY W/ HCPCS (ALT 636 FOR OP/ED): Performed by: INTERNAL MEDICINE

## 2023-12-12 PROCEDURE — 85027 COMPLETE CBC AUTOMATED: CPT | Performed by: NURSE PRACTITIONER

## 2023-12-12 PROCEDURE — 3E0330M INTRODUCTION OF MONOCLONAL ANTIBODY INTO PERIPHERAL VEIN, PERCUTANEOUS APPROACH: ICD-10-PCS | Performed by: INTERNAL MEDICINE

## 2023-12-12 PROCEDURE — 85007 BL SMEAR W/DIFF WBC COUNT: CPT | Performed by: NURSE PRACTITIONER

## 2023-12-12 RX ORDER — PROCHLORPERAZINE EDISYLATE 5 MG/ML
10 INJECTION INTRAMUSCULAR; INTRAVENOUS EVERY 6 HOURS PRN
Status: DISCONTINUED | OUTPATIENT
Start: 2023-12-12 | End: 2023-12-16 | Stop reason: HOSPADM

## 2023-12-12 RX ORDER — DEXAMETHASONE 4 MG/1
40 TABLET ORAL EVERY MORNING
Status: COMPLETED | OUTPATIENT
Start: 2023-12-13 | End: 2023-12-15

## 2023-12-12 RX ORDER — SODIUM CHLORIDE 9 MG/ML
100 INJECTION, SOLUTION INTRAVENOUS CONTINUOUS
Status: DISCONTINUED | OUTPATIENT
Start: 2023-12-12 | End: 2023-12-16 | Stop reason: HOSPADM

## 2023-12-12 RX ORDER — ACETAMINOPHEN 325 MG/1
650 TABLET ORAL ONCE
Status: COMPLETED | OUTPATIENT
Start: 2023-12-12 | End: 2023-12-12

## 2023-12-12 RX ORDER — EPINEPHRINE 0.1 MG/ML
0.3 INJECTION INTRACARDIAC; INTRAVENOUS EVERY 5 MIN PRN
Status: DISCONTINUED | OUTPATIENT
Start: 2023-12-12 | End: 2023-12-16 | Stop reason: HOSPADM

## 2023-12-12 RX ORDER — ALBUTEROL SULFATE 0.83 MG/ML
3 SOLUTION RESPIRATORY (INHALATION) AS NEEDED
Status: DISCONTINUED | OUTPATIENT
Start: 2023-12-12 | End: 2023-12-16 | Stop reason: HOSPADM

## 2023-12-12 RX ORDER — FAMOTIDINE 10 MG/ML
20 INJECTION INTRAVENOUS AS NEEDED
Status: DISCONTINUED | OUTPATIENT
Start: 2023-12-12 | End: 2023-12-16 | Stop reason: HOSPADM

## 2023-12-12 RX ORDER — DIPHENHYDRAMINE HCL 50 MG
50 CAPSULE ORAL ONCE
Status: COMPLETED | OUTPATIENT
Start: 2023-12-12 | End: 2023-12-12

## 2023-12-12 RX ORDER — DEXAMETHASONE 4 MG/1
40 TABLET ORAL ONCE
Status: COMPLETED | OUTPATIENT
Start: 2023-12-12 | End: 2023-12-12

## 2023-12-12 RX ORDER — DIPHENHYDRAMINE HYDROCHLORIDE 50 MG/ML
50 INJECTION INTRAMUSCULAR; INTRAVENOUS AS NEEDED
Status: DISCONTINUED | OUTPATIENT
Start: 2023-12-12 | End: 2023-12-16 | Stop reason: HOSPADM

## 2023-12-12 RX ORDER — PROCHLORPERAZINE MALEATE 10 MG
10 TABLET ORAL EVERY 6 HOURS PRN
Status: DISCONTINUED | OUTPATIENT
Start: 2023-12-12 | End: 2023-12-16 | Stop reason: HOSPADM

## 2023-12-12 RX ADMIN — ACYCLOVIR 400 MG: 400 TABLET ORAL at 09:20

## 2023-12-12 RX ADMIN — DIPHENHYDRAMINE HYDROCHLORIDE 50 MG: 50 CAPSULE ORAL at 09:20

## 2023-12-12 RX ADMIN — FERROUS SULFATE TAB 325 MG (65 MG ELEMENTAL FE) 1 TABLET: 325 (65 FE) TAB at 21:08

## 2023-12-12 RX ADMIN — FERROUS SULFATE TAB 325 MG (65 MG ELEMENTAL FE) 1 TABLET: 325 (65 FE) TAB at 09:20

## 2023-12-12 RX ADMIN — ACETAMINOPHEN 650 MG: 325 TABLET ORAL at 09:20

## 2023-12-12 RX ADMIN — PANTOPRAZOLE SODIUM 40 MG: 40 TABLET, DELAYED RELEASE ORAL at 11:09

## 2023-12-12 RX ADMIN — CYCLOPHOSPHAMIDE 552 MG: 1 INJECTION, POWDER, FOR SOLUTION INTRAVENOUS; ORAL at 13:17

## 2023-12-12 RX ADMIN — ACYCLOVIR 400 MG: 400 TABLET ORAL at 21:08

## 2023-12-12 RX ADMIN — SODIUM CHLORIDE 100 ML/HR: 9 INJECTION, SOLUTION INTRAVENOUS at 09:17

## 2023-12-12 RX ADMIN — SENNOSIDES AND DOCUSATE SODIUM 2 TABLET: 8.6; 5 TABLET ORAL at 21:12

## 2023-12-12 RX ADMIN — MESNA 1100 MG: 100 INJECTION, SOLUTION INTRAVENOUS at 13:12

## 2023-12-12 RX ADMIN — FLUCONAZOLE 400 MG: 200 TABLET ORAL at 09:20

## 2023-12-12 RX ADMIN — ONDANSETRON 16 MG: 2 INJECTION INTRAMUSCULAR; INTRAVENOUS at 12:34

## 2023-12-12 RX ADMIN — RITUXIMAB 700 MG: 10 INJECTION, SOLUTION INTRAVENOUS at 10:48

## 2023-12-12 RX ADMIN — DEXAMETHASONE 40 MG: 4 TABLET ORAL at 09:20

## 2023-12-12 ASSESSMENT — COGNITIVE AND FUNCTIONAL STATUS - GENERAL
DAILY ACTIVITIY SCORE: 24
MOBILITY SCORE: 24

## 2023-12-12 ASSESSMENT — ACTIVITIES OF DAILY LIVING (ADL): LACK_OF_TRANSPORTATION: NO

## 2023-12-12 ASSESSMENT — PAIN SCALES - GENERAL
PAINLEVEL_OUTOF10: 0 - NO PAIN
PAINLEVEL_OUTOF10: 0 - NO PAIN

## 2023-12-12 ASSESSMENT — PAIN - FUNCTIONAL ASSESSMENT
PAIN_FUNCTIONAL_ASSESSMENT: 0-10
PAIN_FUNCTIONAL_ASSESSMENT: 0-10

## 2023-12-12 NOTE — CARE PLAN
vss, afebrile, no complaints N/V/D this shift. Pt remained safe and free of falls/injury. No pain reported this shift.       Problem: Pain  Goal: My pain/discomfort is manageable  Outcome: Progressing     Problem: Safety  Goal: Patient will be injury free during hospitalization  Outcome: Progressing  Goal: I will remain free of falls  Outcome: Progressing     Problem: Daily Care  Goal: Daily care needs are met  Outcome: Progressing     Problem: Psychosocial Needs  Goal: Demonstrates ability to cope with hospitalization/illness  Outcome: Progressing  Goal: Collaborate with me, my family, and caregiver to identify my specific goals  Outcome: Progressing     Problem: Discharge Barriers  Goal: My discharge needs are met  Outcome: Progressing

## 2023-12-12 NOTE — NURSING NOTE
Attending signed chemo orders at 2030. This RN called pharmacy and was notified that it is too late for the medications to be made. Dr. Blanchard made aware. The patient was made aware and is okay with starting in the morning.     Droy Peterson RN

## 2023-12-12 NOTE — PROGRESS NOTES
"Shilo Thakkar is a 23 y.o. male on day 1 of admission presenting with Encounter for antineoplastic chemotherapy.    Subjective   Seen at bedside this morning. Overall, he says he is feeling good with no complaints this morning. We discussed getting another unit of blood this morning and starting the chemo today. He is aware that his LP with cytarabine and methotrexate will be Thursday. He had no further questions. He denies fever, chills, CP, SOB, cough, abd pain, N/V/C/D, hematuria, bleeding, bruising or new rashes.     Objective     Physical Exam  Vitals reviewed.   Constitutional:       Appearance: Normal appearance.   HENT:      Head: Normocephalic and atraumatic.      Nose: Nose normal.      Mouth/Throat:      Mouth: Mucous membranes are moist.      Pharynx: Oropharynx is clear.   Eyes:      Extraocular Movements: Extraocular movements intact.      Pupils: Pupils are equal, round, and reactive to light.   Cardiovascular:      Rate and Rhythm: Normal rate and regular rhythm.      Pulses: Normal pulses.      Heart sounds: Normal heart sounds.   Pulmonary:      Effort: Pulmonary effort is normal.      Breath sounds: Normal breath sounds.   Abdominal:      General: Bowel sounds are normal.      Palpations: Abdomen is soft.   Musculoskeletal:         General: Normal range of motion.   Skin:     General: Skin is warm.   Neurological:      General: No focal deficit present.      Mental Status: He is alert and oriented to person, place, and time. Mental status is at baseline.   Psychiatric:         Mood and Affect: Mood normal.         Behavior: Behavior normal.     Last Recorded Vitals  Blood pressure 138/81, pulse 65, temperature 37.1 °C (98.8 °F), temperature source Temporal, resp. rate 18, height 1.822 m (5' 11.73\"), weight 71.8 kg (158 lb 4.6 oz), SpO2 100 %.  Intake/Output last 3 Shifts:  I/O last 3 completed shifts:  In: 350 (4.9 mL/kg) [Blood:350]  Out: - (0 mL/kg)   Weight: 71.8 kg     Relevant " Results  Scheduled medications  acyclovir, 400 mg, oral, BID  cyclophosphamide, 300 mg/m2 (Treatment Plan Recorded), intravenous, q12h  [START ON 12/13/2023] dexAMETHasone, 40 mg, oral, q AM  ferrous sulfate (325 mg ferrous sulfate), 1 tablet, oral, BID  fluconazole, 400 mg, oral, Daily  mesna, 600 mg/m2 (Treatment Plan Recorded), intravenous, Once  ondansetron, 16 mg, intravenous, Once  pantoprazole, 40 mg, oral, Daily before breakfast  riTUXimab or biosimilar, 375 mg/m2 (Treatment Plan Recorded), intravenous, Once  sennosides-docusate sodium, 2 tablet, oral, BID  sulfamethoxazole-trimethoprim, 1 tablet, oral, Every Mon/Wed/Fri    Continuous medications  sodium chloride 0.9%, 100 mL/hr, Last Rate: 100 mL/hr (12/12/23 0917)    PRN medications  PRN medications: albuterol, alteplase, dextrose, diphenhydrAMINE, EPINEPHrine, famotidine, methylPREDNISolone sodium succinate (PF), prochlorperazine, prochlorperazine, sodium chloride    This patient has a central line   Reason for the central line remaining today?  Chemotherapy    Assessment/Plan   Principal Problem:    Encounter for antineoplastic chemotherapy  Active Problems:    Burkitt lymphoma (CMS/HCC)    Shilo Thakkar is a 23 y.o. male with PMH of Burkitt Lymphoma (Dx 10/20/23; s/p 2 cycles of HyperCVAD), malignant pleural effusions s/p CT (10/29/23), and ADHD who presents 12/11 for C3 HyperCVAD + Ritux. Chemo started a day late on 12/12 d/t presentation of hgb 5.5 (requiring pRBCs and WBC 30.1). Discharge pending completion of chemotherapy.      CHEMO:  # C3 HyperCVAD + Ritux  - Cycle 3 part A; planned for IT MTX and IT Cytarabine during this admission (planned for Thursday, 12/14, at the bedside with Roselia)   - Started chemo regimen 12/12   - NS @ 100mL/hr throughout chemo  - Premeds and reaction meds as ordered  - Dexamethasone PO 40mg daily x4 days  - Rituximab 700mg (rounded from 690mg = 375mg/m2 x 1.84m2) once on day 1, administered over 90mins with 20% of the  dose administered over the first 30mins and the remaining 80% of the dose given over 60mins  - Mesna 1,100mg (rounded from 1,104mg = 600mg/m2 x 1.84m2) over 24hrs, once, 15mins prior to cyclophosphamide x3 doses  - Cyclophosphamide 552mg (300mg/m2 x 1.84m2) at 42.5mL/hr over 3hrs, every 12rs starting 2hrs 30mins after treatment start time, x6 doses  - Doxorubicin 92mg (50mg/m2 x 1.84m2) at 43.6mL/hr, administered over 24hrs, once, 30mins after treatment start time (begin 12hrs after the 6th dose of cyclophosphamide begins)  - Vincristine 2mg once, administered over 10mins, starting 30mins after treatment start time (begin 12mins after the 6th dose of cyclophosphamide)  - Plan IT Methotrexate and IT Cytarabine to be given on day 3 (12/14, planned with Roselia at the bedside)   - Neulasta 6mg subcutaneous to be given outpt 12/18     ONC:  # Burkitt lymphoma, newly diagnosed (10/20/23)  - Primary oncologist: Dr. Olegario Klein  - 10/19/23 presented to OSH ED with dysphagia and abdominal pain  - CT A/P with IV contrast (10/19) mass in the body of the pancreas measuring approximately 2.7 x 2.9 cm. Multiple masses in the mesentery as well as omental caking consistent with carcinomatosis.   - CT soft tissue neck with IV contrast (10/19): homogeneous soft tissue attenuation lesion in the L oropharynx which measures approximately 3.0 x 4.0 x 5.1 cm.   - CT Abdomen w/o IV contrast (10/20): Mesenteric lymphadenopathy  - US guided mesenteric LN biopsy (10/20/23): HIGH GRADE B-CELL LYMPHOMA MORPHOLOGICALLY CONSISTENT WITH BURKITT LYMPHOMA; flow cytometry: CD10+ kappa restricted B cells supportive of a B cell lymphoma with germinal center origin.   - Biopsy L oropharyngeal mass and L cervical LN by ENT (10/23/23) high grade B-cell burkitt lymphoma  - PET CT ordered 10/30 showed FDG avid left oropharyngeal mass and LAD above and below diaphragm  - s/p 2 cycles of HyperCVAD  - Bone marrow bx completed 11/1 without evidence of  disease  - XR chest neg 11/20  - Currently admitted for cycle 3 HyperCVAD (Started on 12/12 d/t presentation of hgb 5.5 and WBC 30.1)      HEME:  # Anemia, likely 2/2 disease  - Continue home ferrous sulfate 325mg BID  - Hgb 5.5 on admission (12/11) s/p 1 unit pRBCs, repeat hgb 6.7 12/12  - Additional 1 unit pRBCs ordered 12/12   - Discussed with Dr. Coffey and Dr. Klein on 12/11; okay to start chemo 12/12   - Transfuse to keep Hgb>7, Plt>10    CARDIAC:  - ECHO (10/2023): EF 60-65%  - No active issues     ID:  Allergies: NKDA  # Moderate Leukocytosis   - Likely in setting of recent neulasta from last cycle (given on 11/27) vs less likely infection   - On admit (12/11), WBC 30.3 - now increased to 31.6 (12/12)  - Discussed with Dr. Coffey and Dr. Klein on 12/11; okay to start chemo 12/12. Most likely a robust WBC response from recent neulasta (11/27) given young age and lack of clinical evidence to support infection    - Denies fever, chills, sinus pain, Sob, dyspnea, new cough   - UA negative   - CXR (12/11): pending   - Flu/COVID/RSV: negative (12/11)   - Resp viral panel pending   - Blood cultures x2 pending 12/12   - Consider further infectious workup if patient becomes symptomatic   - Plan to start Zosyn if patient becomes febrile   - Hepatitis serology negative (10/26/23), HIV 1/2 nonreactive (10/26), EBV neg (10/27)   # Prophylaxis   - Continue home Acyclovir 400mg BID, Fluconazole 400mg daily, and Bactrim M/W/F for prophy  - Plan Levaquin for neutropenia (day 5 through day 15 per outpt notes; 12/15-12/25)  - Encourage IS     FEN/GI:  - Admit weight: 71.8kg (12/11)  - 12/12 weight pending   - Daily weights   - On continuous fluids with chemo regimen, give lasix if needed   - Continue home Protonix 40mg daily  - Replace electrolytes as needed  - Low pathogen diet  - TLS monitoring  - G6PD normal (10/26/23)  -  on admission (12/11). Baseline fluctuates (~300-500), continue to monitor       DISPO:  - Full Code  - Access: PICC line (right)  - DVT ppx: Ambulation  - DC pending completion of chemo, possibly 12/16  - NOK: Ms Deidra Pickett [phone]    I spent >60 minutes in the professional and overall care of this patient.    Assessment and plan discussed with attending physician Dr. Piero Cohen, APRN-CNP

## 2023-12-12 NOTE — PROGRESS NOTES
12/12/23 1139   Discharge Planning   Living Arrangements Parent   Support Systems Parent;Friends/neighbors;Family members   Assistance Needed none   Type of Residence Private residence   Number of Stairs Within Residence 12   Do you have animals or pets at home? Yes   Type of Animals or Pets 2 cats   Home or Post Acute Services None   Patient expects to be discharged to: home   Does the patient need discharge transport arranged? No   Financial Resource Strain   How hard is it for you to pay for the very basics like food, housing, medical care, and heating? Not hard   Housing Stability   In the last 12 months, was there a time when you were not able to pay the mortgage or rent on time? N   In the last 12 months, how many places have you lived? 1   In the last 12 months, was there a time when you did not have a steady place to sleep or slept in a shelter (including now)? N   Transportation Needs   In the past 12 months, has lack of transportation kept you from meetings, work, or from getting things needed for daily living? No     Pt with Burkitt Lymphoma was admitted for cycle # 3 of HyperCVAD and Rituxan.  He will return to home where he lives with his mother. He is still independent- no home care or DME needed.  He has a SOLO PICC which is cared for in the outpt infusion center.  His MD is Dr. Olegario Klein.  Infusion appts are scheduled at [facility]. Will continue to follow for any discharge needs.

## 2023-12-12 NOTE — PROGRESS NOTES
SW received consult from RIGO Win CNP to meet with pt. SW met with pt, mother, and girlfriend during visit with provider to follow up. SW introduced self to pt and reviewed SW role within interdisciplinary team and services provided. SW explained financial assistance and transportation support if needed. SW also reviewed the YA program and potential upcoming events. Pt did not express interest in YA. SW provided pt's mother with contact info and encouraged family to reach out with needs as they arise. SW to follow up as needed.     Judy LOMBARDI  12/12/23

## 2023-12-13 LAB
ADENOVIRUS RVP, VIRC: NOT DETECTED
ALBUMIN SERPL BCP-MCNC: 3.9 G/DL (ref 3.4–5)
ALP SERPL-CCNC: 125 U/L (ref 33–120)
ALT SERPL W P-5'-P-CCNC: 31 U/L (ref 10–52)
ANION GAP SERPL CALC-SCNC: 16 MMOL/L (ref 10–20)
AST SERPL W P-5'-P-CCNC: 33 U/L (ref 9–39)
BASOPHILS # BLD MANUAL: 0 X10*3/UL (ref 0–0.1)
BASOPHILS NFR BLD MANUAL: 0 %
BILIRUB SERPL-MCNC: 0.4 MG/DL (ref 0–1.2)
BUN SERPL-MCNC: 10 MG/DL (ref 6–23)
CALCIUM SERPL-MCNC: 8.7 MG/DL (ref 8.6–10.6)
CHLORIDE SERPL-SCNC: 106 MMOL/L (ref 98–107)
CO2 SERPL-SCNC: 22 MMOL/L (ref 21–32)
CREAT SERPL-MCNC: 0.78 MG/DL (ref 0.5–1.3)
ENTEROVIRUS/RHINOVIRUS RVP, VIRC: NOT DETECTED
EOSINOPHIL # BLD MANUAL: 0 X10*3/UL (ref 0–0.7)
EOSINOPHIL NFR BLD MANUAL: 0 %
ERYTHROCYTE [DISTWIDTH] IN BLOOD BY AUTOMATED COUNT: 15.1 % (ref 11.5–14.5)
GFR SERPL CREATININE-BSD FRML MDRD: >90 ML/MIN/1.73M*2
GLUCOSE SERPL-MCNC: 145 MG/DL (ref 74–99)
HCT VFR BLD AUTO: 23.7 % (ref 41–52)
HGB BLD-MCNC: 7.9 G/DL (ref 13.5–17.5)
HUMAN BOCAVIRUS RVP, VIRC: NOT DETECTED
HUMAN CORONAVIRUS RVP, VIRC: NOT DETECTED
IMM GRANULOCYTES # BLD AUTO: 7 X10*3/UL (ref 0–0.7)
IMM GRANULOCYTES NFR BLD AUTO: 18 % (ref 0–0.9)
INFLUENZA A , VIRC: NOT DETECTED
INFLUENZA A H1N1-09 , VIRC: NOT DETECTED
INFLUENZA B PCR, VIRC: NOT DETECTED
LDH SERPL L TO P-CCNC: 747 U/L (ref 84–246)
LYMPHOCYTES # BLD MANUAL: 0.62 X10*3/UL (ref 1.2–4.8)
LYMPHOCYTES NFR BLD MANUAL: 1.6 %
MAGNESIUM SERPL-MCNC: 1.83 MG/DL (ref 1.6–2.4)
MCH RBC QN AUTO: 30.3 PG (ref 26–34)
MCHC RBC AUTO-ENTMCNC: 33.3 G/DL (ref 32–36)
MCV RBC AUTO: 91 FL (ref 80–100)
METAMYELOCYTES # BLD MANUAL: 2.22 X10*3/UL
METAMYELOCYTES NFR BLD MANUAL: 5.7 %
METAPNEUMOVIRUS , VIRC: NOT DETECTED
MONOCYTES # BLD MANUAL: 1.24 X10*3/UL (ref 0.1–1)
MONOCYTES NFR BLD MANUAL: 3.2 %
MYELOCYTES # BLD MANUAL: 0.62 X10*3/UL
MYELOCYTES NFR BLD MANUAL: 1.6 %
NEUTS SEG # BLD MANUAL: 32.95 X10*3/UL (ref 1.2–7)
NEUTS SEG NFR BLD MANUAL: 84.7 %
NRBC BLD-RTO: 1.7 /100 WBCS (ref 0–0)
PARAINFLUENZA PCR, VIRC: NOT DETECTED
PLATELET # BLD AUTO: 207 X10*3/UL (ref 150–450)
POTASSIUM SERPL-SCNC: 4 MMOL/L (ref 3.5–5.3)
PROMYELOCYTES # BLD MANUAL: 0.93 X10*3/UL
PROMYELOCYTES NFR BLD MANUAL: 2.4 %
PROT SERPL-MCNC: 5.6 G/DL (ref 6.4–8.2)
RBC # BLD AUTO: 2.61 X10*6/UL (ref 4.5–5.9)
RBC MORPH BLD: ABNORMAL
RSV PCR, RVP, VIRC: NOT DETECTED
SODIUM SERPL-SCNC: 140 MMOL/L (ref 136–145)
TOTAL CELLS COUNTED BLD: 124
VARIANT LYMPHS # BLD MANUAL: 0.31 X10*3/UL (ref 0–0.5)
VARIANT LYMPHS NFR BLD: 0.8 %
WBC # BLD AUTO: 38.9 X10*3/UL (ref 4.4–11.3)

## 2023-12-13 PROCEDURE — 2500000004 HC RX 250 GENERAL PHARMACY W/ HCPCS (ALT 636 FOR OP/ED): Mod: JZ | Performed by: INTERNAL MEDICINE

## 2023-12-13 PROCEDURE — 83615 LACTATE (LD) (LDH) ENZYME: CPT

## 2023-12-13 PROCEDURE — 2500000004 HC RX 250 GENERAL PHARMACY W/ HCPCS (ALT 636 FOR OP/ED)

## 2023-12-13 PROCEDURE — 2500000004 HC RX 250 GENERAL PHARMACY W/ HCPCS (ALT 636 FOR OP/ED): Performed by: NURSE PRACTITIONER

## 2023-12-13 PROCEDURE — 2500000001 HC RX 250 WO HCPCS SELF ADMINISTERED DRUGS (ALT 637 FOR MEDICARE OP): Performed by: NURSE PRACTITIONER

## 2023-12-13 PROCEDURE — 80053 COMPREHEN METABOLIC PANEL: CPT | Performed by: NURSE PRACTITIONER

## 2023-12-13 PROCEDURE — 99233 SBSQ HOSP IP/OBS HIGH 50: CPT

## 2023-12-13 PROCEDURE — 85027 COMPLETE CBC AUTOMATED: CPT | Performed by: NURSE PRACTITIONER

## 2023-12-13 PROCEDURE — 1170000001 HC PRIVATE ONCOLOGY ROOM DAILY

## 2023-12-13 PROCEDURE — 83735 ASSAY OF MAGNESIUM: CPT | Performed by: NURSE PRACTITIONER

## 2023-12-13 PROCEDURE — 85007 BL SMEAR W/DIFF WBC COUNT: CPT | Performed by: NURSE PRACTITIONER

## 2023-12-13 RX ORDER — FUROSEMIDE 10 MG/ML
40 INJECTION INTRAMUSCULAR; INTRAVENOUS ONCE
Status: COMPLETED | OUTPATIENT
Start: 2023-12-13 | End: 2023-12-13

## 2023-12-13 RX ADMIN — PANTOPRAZOLE SODIUM 40 MG: 40 TABLET, DELAYED RELEASE ORAL at 08:30

## 2023-12-13 RX ADMIN — DEXAMETHASONE 40 MG: 4 TABLET ORAL at 08:30

## 2023-12-13 RX ADMIN — SULFAMETHOXAZOLE AND TRIMETHOPRIM 1 TABLET: 800; 160 TABLET ORAL at 17:13

## 2023-12-13 RX ADMIN — FERROUS SULFATE TAB 325 MG (65 MG ELEMENTAL FE) 1 TABLET: 325 (65 FE) TAB at 20:46

## 2023-12-13 RX ADMIN — FLUCONAZOLE 400 MG: 200 TABLET ORAL at 08:30

## 2023-12-13 RX ADMIN — FUROSEMIDE 40 MG: 10 INJECTION, SOLUTION INTRAVENOUS at 08:30

## 2023-12-13 RX ADMIN — SENNOSIDES AND DOCUSATE SODIUM 2 TABLET: 8.6; 5 TABLET ORAL at 08:30

## 2023-12-13 RX ADMIN — ACYCLOVIR 400 MG: 400 TABLET ORAL at 20:46

## 2023-12-13 RX ADMIN — MESNA 1100 MG: 100 INJECTION, SOLUTION INTRAVENOUS at 13:01

## 2023-12-13 RX ADMIN — CYCLOPHOSPHAMIDE 552 MG: 1 INJECTION, POWDER, FOR SOLUTION INTRAVENOUS; ORAL at 00:22

## 2023-12-13 RX ADMIN — Medication 552 MG: at 12:59

## 2023-12-13 RX ADMIN — ACYCLOVIR 400 MG: 400 TABLET ORAL at 08:30

## 2023-12-13 RX ADMIN — SODIUM CHLORIDE 100 ML/HR: 9 INJECTION, SOLUTION INTRAVENOUS at 15:00

## 2023-12-13 RX ADMIN — FERROUS SULFATE TAB 325 MG (65 MG ELEMENTAL FE) 1 TABLET: 325 (65 FE) TAB at 08:30

## 2023-12-13 RX ADMIN — ONDANSETRON 16 MG: 2 INJECTION INTRAMUSCULAR; INTRAVENOUS at 12:17

## 2023-12-13 ASSESSMENT — COGNITIVE AND FUNCTIONAL STATUS - GENERAL
MOBILITY SCORE: 24
DAILY ACTIVITIY SCORE: 24

## 2023-12-13 ASSESSMENT — PAIN - FUNCTIONAL ASSESSMENT: PAIN_FUNCTIONAL_ASSESSMENT: 0-10

## 2023-12-13 ASSESSMENT — PAIN SCALES - GENERAL: PAINLEVEL_OUTOF10: 0 - NO PAIN

## 2023-12-13 NOTE — SIGNIFICANT EVENT
12/13/23 0302   Prechemo Checklist   Has the patient been in the hospital, ED, or urgent care since last date of service N/A   Chemo/Immuno Consent Signed Yes   Protocol/Indications Verified Yes   Confirmed to previous date/time of medication Yes   Compared to previous dose N/A   All medications are dated accurately Yes   Pregnancy Test Negative Not applicable   Parameters Met Yes   BSA/Weight-Height Verified Yes   Dose Calculations Verified Yes

## 2023-12-13 NOTE — PROGRESS NOTES
"Shilo Thakkar is a 23 y.o. male on day 2 of admission presenting with Encounter for antineoplastic chemotherapy.    Subjective   Seen at bedside this morning. Overall, he says he is feeling good with no complaints this morning. He denies any signs or symptoms of infection. We discussed improvement in his hemoglobin after 2 units of blood. He is aware that his LP with cytarabine and methotrexate will be Thursday. He denies fever, chills, CP, SOB, cough, abd pain, N/V/C/D, hematuria, bleeding, bruising or new rashes.     Objective     Physical Exam  Vitals reviewed.   Constitutional:       Appearance: Normal appearance.   HENT:      Head: Normocephalic and atraumatic.      Nose: Nose normal.      Mouth/Throat:      Mouth: Mucous membranes are moist.      Pharynx: Oropharynx is clear.   Eyes:      Extraocular Movements: Extraocular movements intact.      Pupils: Pupils are equal, round, and reactive to light.   Cardiovascular:      Rate and Rhythm: Normal rate and regular rhythm.      Pulses: Normal pulses.      Heart sounds: Normal heart sounds.   Pulmonary:      Effort: Pulmonary effort is normal.      Breath sounds: Normal breath sounds.   Abdominal:      General: Bowel sounds are normal.      Palpations: Abdomen is soft.   Musculoskeletal:         General: Normal range of motion.   Skin:     General: Skin is warm.   Neurological:      General: No focal deficit present.      Mental Status: He is alert and oriented to person, place, and time. Mental status is at baseline.   Psychiatric:         Mood and Affect: Mood normal.         Behavior: Behavior normal.     Last Recorded Vitals  Blood pressure 131/84, pulse 51, temperature 36.4 °C (97.5 °F), temperature source Temporal, resp. rate 18, height 1.822 m (5' 11.73\"), weight 74.2 kg (163 lb 9.3 oz), SpO2 99 %.  Intake/Output last 3 Shifts:  I/O last 3 completed shifts:  In: 1197.5 (16.1 mL/kg) [Blood:700; IV Piggyback:497.5]  Out: - (0 mL/kg)   Weight: 74.2 kg "     Relevant Results  Scheduled medications  acyclovir, 400 mg, oral, BID  cyclophosphamide, 300 mg/m2 (Treatment Plan Recorded), intravenous, q12h  dexAMETHasone, 40 mg, oral, q AM  ferrous sulfate (325 mg ferrous sulfate), 1 tablet, oral, BID  fluconazole, 400 mg, oral, Daily  furosemide, 40 mg, intravenous, Once  mesna, 600 mg/m2 (Treatment Plan Recorded), intravenous, Once  mesna, 600 mg/m2 (Treatment Plan Recorded), intravenous, Once  methotrexate (PF), 12 mg, intrathecal, Once  ondansetron, 16 mg, intravenous, Once  pantoprazole, 40 mg, oral, Daily before breakfast  sennosides-docusate sodium, 2 tablet, oral, BID  sulfamethoxazole-trimethoprim, 1 tablet, oral, Every Mon/Wed/Fri    Continuous medications  sodium chloride 0.9%, 100 mL/hr, Last Rate: 100 mL/hr (12/12/23 0917)    PRN medications  PRN medications: albuterol, alteplase, dextrose, diphenhydrAMINE, EPINEPHrine, famotidine, methylPREDNISolone sodium succinate (PF), prochlorperazine, prochlorperazine, sodium chloride    This patient has a central line   Reason for the central line remaining today?  Chemotherapy    Assessment/Plan   Principal Problem:    Encounter for antineoplastic chemotherapy  Active Problems:    Burkitt lymphoma (CMS/HCC)    Shilo Thakkar is a 23 y.o. male with PMH of Burkitt Lymphoma (Dx 10/20/23; s/p 2 cycles of HyperCVAD), malignant pleural effusions s/p CT (10/29/23), and ADHD who presents 12/11 for C3 HyperCVAD + Ritux. Chemo had a delayed start, started on 12/12, due to presentation of elevated WBC (30.3 on 12/11) and hgb 5.5, s/p 2 units of pRBCs, now hgb 7.9 on 12/13. So far, tolerating chemo well. Discharge pending completion of chemotherapy, most likely 12/16.      CHEMO:  # C3 HyperCVAD + Ritux  - Cycle 3 part A; planned for IT MTX and IT Cytarabine during this admission (planned for Thursday, 12/14, at the bedside with Roselia)   - Started chemo regimen 12/12   - NS @ 100mL/hr throughout chemo  - Premeds and reaction meds as  ordered  - Dexamethasone PO 40mg daily x4 days  - Rituximab 700mg (rounded from 690mg = 375mg/m2 x 1.84m2) once on day 1, administered over 90mins with 20% of the dose administered over the first 30mins and the remaining 80% of the dose given over 60mins  - Mesna 1,100mg (rounded from 1,104mg = 600mg/m2 x 1.84m2) over 24hrs, once, 15mins prior to cyclophosphamide x3 doses  - Cyclophosphamide 552mg (300mg/m2 x 1.84m2) at 42.5mL/hr over 3hrs, every 12rs starting 2hrs 30mins after treatment start time, x6 doses  - Doxorubicin 92mg (50mg/m2 x 1.84m2) at 43.6mL/hr, administered over 24hrs, once, 30mins after treatment start time (begin 12hrs after the 6th dose of cyclophosphamide begins)  - Vincristine 2mg once, administered over 10mins, starting 30mins after treatment start time (begin 12mins after the 6th dose of cyclophosphamide)  - Plan IT Methotrexate and IT Cytarabine to be given on day 3 (12/14, planned with Roselia at the bedside)   - Neulasta 6mg subcutaneous to be given outpt 12/18     ONC:  # Burkitt lymphoma, newly diagnosed (10/20/23)  - Primary oncologist: Dr. Olegario Klein  - 10/19/23 presented to OSH ED with dysphagia and abdominal pain  - CT A/P with IV contrast (10/19) mass in the body of the pancreas measuring approximately 2.7 x 2.9 cm. Multiple masses in the mesentery as well as omental caking consistent with carcinomatosis.   - CT soft tissue neck with IV contrast (10/19): homogeneous soft tissue attenuation lesion in the L oropharynx which measures approximately 3.0 x 4.0 x 5.1 cm.   - CT Abdomen w/o IV contrast (10/20): Mesenteric lymphadenopathy  - US guided mesenteric LN biopsy (10/20/23): HIGH GRADE B-CELL LYMPHOMA MORPHOLOGICALLY CONSISTENT WITH BURKITT LYMPHOMA; flow cytometry: CD10+ kappa restricted B cells supportive of a B cell lymphoma with germinal center origin.   - Biopsy L oropharyngeal mass and L cervical LN by ENT (10/23/23) high grade B-cell burkitt lymphoma  - PET CT ordered 10/30  showed FDG avid left oropharyngeal mass and LAD above and below diaphragm  - s/p 2 cycles of HyperCVAD  - Bone marrow bx completed 11/1 without evidence of disease  - XR chest neg 11/20  - Currently admitted for cycle 3 HyperCVAD (Started on 12/12 d/t presentation of hgb 5.5 and WBC 30.1); Discussed with Dr. Coffey and Dr. Klein on 12/11; okay to start chemo 12/12     HEME:  # Anemia, likely 2/2 disease  - Continue home ferrous sulfate 325mg BID  - Hgb 5.5 on admission (12/11) s/p 2 units pRBCs; hgb 7.9 12/13   - Discussed with Dr. Coffey and Dr. Klein on 12/11; okay to start chemo 12/12   - Transfuse to keep Hgb>7, Plt>10    CARDIAC:  - ECHO (10/2023): EF 60-65%  - Baseline HR 50s  - Overnight 12/12, HR 45, EKG sinus bradycardia   - Patient asymptomatic, denies lightheadedness, dizziness, or palpitations      ID:  Allergies: NKDA  # Moderate Leukocytosis   - Likely in setting of recent neulasta from last cycle (given on 11/27) vs less likely infection   - On admit (12/11), WBC 30.3 - now increased to 38.9 (12/13)  - Discussed with Dr. Coffey and Dr. Klein on 12/11; okay to start chemo 12/12. Most likely a robust WBC response from recent neulasta (11/27) given young age and lack of clinical evidence to support infection    - Denies fever, chills, sinus pain, Sob, dyspnea, new cough   - UA (12/11) negative   - CXR (12/11): negative for acute process   - Flu/COVID/RSV (12/11): negative   - Resp viral panel (12/11): pending   - Blood cultures x2 12/12; NGTD  - Consider further infectious workup if patient becomes symptomatic   - Plan to start Zosyn if patient becomes febrile   - Hepatitis serology negative (10/26/23), HIV 1/2 nonreactive (10/26), EBV neg (10/27)   # Prophylaxis   - Continue home Acyclovir 400mg BID, Fluconazole 400mg daily, and Bactrim M/W/F for prophy  - Plan Levaquin for neutropenia (day 5 through day 15 per outpt notes; 12/15-12/25)  - Encourage IS     FEN/GI:  - Admit weight: 71.8kg  (12/11)  - Weight (12/13) 74.2 kg   - On continuous fluids with chemo regimen, give lasix if needed   - s/p 40mg IVP lasix 12/13   - Daily weights   - Continue home Protonix 40mg daily  - Replace electrolytes as needed  - Low pathogen diet  - TLS monitoring  - G6PD normal (10/26/23)  -  on admission (12/11),  12/13. Baseline fluctuates (~300-500), continue to monitor      DISPO:  - Full Code  - Access: PICC line (right)  - DVT ppx: Ambulation  - DC pending completion of chemo, most likely 12/16  - NOK: Ms Deidra Pickett [phone]    I spent >60 minutes in the professional and overall care of this patient.    Assessment and plan discussed with attending physician Dr. Piero Cohen, APRN-CNP

## 2023-12-13 NOTE — CARE PLAN
Pt tolerated chemotherapy this shift with no adverse effects. Pt comfortable with no complaints in room, mesna infusing per orders.

## 2023-12-14 LAB
ALBUMIN SERPL BCP-MCNC: 3.8 G/DL (ref 3.4–5)
ALP SERPL-CCNC: 97 U/L (ref 33–120)
ALT SERPL W P-5'-P-CCNC: 34 U/L (ref 10–52)
ANION GAP SERPL CALC-SCNC: 12 MMOL/L (ref 10–20)
APPEARANCE CSF: ABNORMAL
AST SERPL W P-5'-P-CCNC: 24 U/L (ref 9–39)
BASOPHILS # BLD MANUAL: 0 X10*3/UL (ref 0–0.1)
BASOPHILS NFR BLD MANUAL: 0 %
BASOPHILS NFR CSF MANUAL: 0 %
BILIRUB SERPL-MCNC: 0.3 MG/DL (ref 0–1.2)
BLASTS CSF MANUAL: 0 %
BUN SERPL-MCNC: 15 MG/DL (ref 6–23)
CALCIUM SERPL-MCNC: 8.4 MG/DL (ref 8.6–10.6)
CHLORIDE SERPL-SCNC: 105 MMOL/L (ref 98–107)
CO2 SERPL-SCNC: 27 MMOL/L (ref 21–32)
COLOR CSF: ABNORMAL
COLOR SPUN CSF: COLORLESS
CREAT SERPL-MCNC: 0.87 MG/DL (ref 0.5–1.3)
EOSINOPHIL # BLD MANUAL: 0 X10*3/UL (ref 0–0.7)
EOSINOPHIL NFR BLD MANUAL: 0 %
EOSINOPHIL NFR CSF MANUAL: 0 %
ERYTHROCYTE [DISTWIDTH] IN BLOOD BY AUTOMATED COUNT: 16.5 % (ref 11.5–14.5)
GFR SERPL CREATININE-BSD FRML MDRD: >90 ML/MIN/1.73M*2
GLUCOSE CSF-MCNC: 107 MG/DL (ref 40–70)
GLUCOSE SERPL-MCNC: 126 MG/DL (ref 74–99)
HCT VFR BLD AUTO: 22 % (ref 41–52)
HGB BLD-MCNC: 7.3 G/DL (ref 13.5–17.5)
IMM GRANULOCYTES # BLD AUTO: 4.44 X10*3/UL (ref 0–0.7)
IMM GRANULOCYTES NFR BLD AUTO: 12.2 % (ref 0–0.9)
IMM GRANULOCYTES NFR CSF: 0 %
LDH SERPL L TO P-CCNC: 565 U/L (ref 84–246)
LYMPHOCYTES # BLD MANUAL: 0.62 X10*3/UL (ref 1.2–4.8)
LYMPHOCYTES NFR BLD MANUAL: 1.7 %
LYMPHOCYTES NFR CSF MANUAL: 1 % (ref 28–96)
MAGNESIUM SERPL-MCNC: 1.77 MG/DL (ref 1.6–2.4)
MCH RBC QN AUTO: 30.3 PG (ref 26–34)
MCHC RBC AUTO-ENTMCNC: 33.2 G/DL (ref 32–36)
MCV RBC AUTO: 91 FL (ref 80–100)
MONOCYTES # BLD MANUAL: 0.62 X10*3/UL (ref 0.1–1)
MONOCYTES NFR BLD MANUAL: 1.7 %
MONOS+MACROS NFR CSF MANUAL: 6 % (ref 16–56)
MYELOCYTES # BLD MANUAL: 1.57 X10*3/UL
MYELOCYTES NFR BLD MANUAL: 4.3 %
NEUTS SEG # BLD MANUAL: 33.07 X10*3/UL (ref 1.2–7)
NEUTS SEG NFR BLD MANUAL: 90.6 %
NEUTS SEG NFR CSF MANUAL: 93 % (ref 0–5)
NRBC BLD-RTO: 1.3 /100 WBCS (ref 0–0)
OTHER CELLS NFR CSF MANUAL: 0 %
PLASMA CELLS NFR CSF MICRO: 0 %
PLATELET # BLD AUTO: 207 X10*3/UL (ref 150–450)
POTASSIUM SERPL-SCNC: 3.8 MMOL/L (ref 3.5–5.3)
PROMYELOCYTES # BLD MANUAL: 0.33 X10*3/UL
PROMYELOCYTES NFR BLD MANUAL: 0.9 %
PROT CSF-MCNC: 22 MG/DL (ref 15–45)
PROT SERPL-MCNC: 5.3 G/DL (ref 6.4–8.2)
RBC # BLD AUTO: 2.41 X10*6/UL (ref 4.5–5.9)
RBC # CSF AUTO: 2000 /UL (ref 0–5)
RBC MORPH BLD: ABNORMAL
SCHISTOCYTES BLD QL SMEAR: ABNORMAL
SODIUM SERPL-SCNC: 140 MMOL/L (ref 136–145)
TOTAL CELLS COUNTED BLD: 117
TOTAL CELLS COUNTED CSF: 100
TUBE # CSF: ABNORMAL
VARIANT LYMPHS # BLD MANUAL: 0.29 X10*3/UL (ref 0–0.5)
VARIANT LYMPHS NFR BLD: 0.8 %
WBC # BLD AUTO: 36.5 X10*3/UL (ref 4.4–11.3)
WBC # CSF AUTO: 26 /UL (ref 1–5)

## 2023-12-14 PROCEDURE — 99233 SBSQ HOSP IP/OBS HIGH 50: CPT

## 2023-12-14 PROCEDURE — 62270 DX LMBR SPI PNXR: CPT | Performed by: PHYSICIAN ASSISTANT

## 2023-12-14 PROCEDURE — A4216 STERILE WATER/SALINE, 10 ML: HCPCS | Performed by: INTERNAL MEDICINE

## 2023-12-14 PROCEDURE — 89050 BODY FLUID CELL COUNT: CPT | Performed by: PHYSICIAN ASSISTANT

## 2023-12-14 PROCEDURE — 2500000004 HC RX 250 GENERAL PHARMACY W/ HCPCS (ALT 636 FOR OP/ED): Performed by: NURSE PRACTITIONER

## 2023-12-14 PROCEDURE — 1170000001 HC PRIVATE ONCOLOGY ROOM DAILY

## 2023-12-14 PROCEDURE — 88187 FLOWCYTOMETRY/READ 2-8: CPT | Performed by: PATHOLOGY

## 2023-12-14 PROCEDURE — 89051 BODY FLUID CELL COUNT: CPT | Performed by: PHYSICIAN ASSISTANT

## 2023-12-14 PROCEDURE — 2500000004 HC RX 250 GENERAL PHARMACY W/ HCPCS (ALT 636 FOR OP/ED)

## 2023-12-14 PROCEDURE — 82945 GLUCOSE OTHER FLUID: CPT | Performed by: PHYSICIAN ASSISTANT

## 2023-12-14 PROCEDURE — 009U3ZZ DRAINAGE OF SPINAL CANAL, PERCUTANEOUS APPROACH: ICD-10-PCS | Performed by: PHYSICIAN ASSISTANT

## 2023-12-14 PROCEDURE — 83615 LACTATE (LD) (LDH) ENZYME: CPT

## 2023-12-14 PROCEDURE — 85007 BL SMEAR W/DIFF WBC COUNT: CPT | Performed by: NURSE PRACTITIONER

## 2023-12-14 PROCEDURE — 84157 ASSAY OF PROTEIN OTHER: CPT | Performed by: PHYSICIAN ASSISTANT

## 2023-12-14 PROCEDURE — 88104 CYTOPATH FL NONGYN SMEARS: CPT | Performed by: PHYSICIAN ASSISTANT

## 2023-12-14 PROCEDURE — 85027 COMPLETE CBC AUTOMATED: CPT | Performed by: NURSE PRACTITIONER

## 2023-12-14 PROCEDURE — 2500000004 HC RX 250 GENERAL PHARMACY W/ HCPCS (ALT 636 FOR OP/ED): Performed by: INTERNAL MEDICINE

## 2023-12-14 PROCEDURE — 83735 ASSAY OF MAGNESIUM: CPT | Performed by: NURSE PRACTITIONER

## 2023-12-14 PROCEDURE — 2500000001 HC RX 250 WO HCPCS SELF ADMINISTERED DRUGS (ALT 637 FOR MEDICARE OP): Performed by: NURSE PRACTITIONER

## 2023-12-14 PROCEDURE — 80053 COMPREHEN METABOLIC PANEL: CPT | Performed by: NURSE PRACTITIONER

## 2023-12-14 PROCEDURE — 88185 FLOWCYTOMETRY/TC ADD-ON: CPT | Mod: TC | Performed by: PHYSICIAN ASSISTANT

## 2023-12-14 RX ORDER — FUROSEMIDE 10 MG/ML
20 INJECTION INTRAMUSCULAR; INTRAVENOUS ONCE
Status: COMPLETED | OUTPATIENT
Start: 2023-12-14 | End: 2023-12-14

## 2023-12-14 RX ADMIN — FERROUS SULFATE TAB 325 MG (65 MG ELEMENTAL FE) 1 TABLET: 325 (65 FE) TAB at 21:27

## 2023-12-14 RX ADMIN — CYCLOPHOSPHAMIDE 552 MG: 1 INJECTION, POWDER, FOR SOLUTION INTRAVENOUS; ORAL at 12:36

## 2023-12-14 RX ADMIN — FERROUS SULFATE TAB 325 MG (65 MG ELEMENTAL FE) 1 TABLET: 325 (65 FE) TAB at 09:29

## 2023-12-14 RX ADMIN — MESNA 1100 MG: 100 INJECTION, SOLUTION INTRAVENOUS at 13:26

## 2023-12-14 RX ADMIN — SENNOSIDES AND DOCUSATE SODIUM 2 TABLET: 8.6; 5 TABLET ORAL at 09:29

## 2023-12-14 RX ADMIN — ONDANSETRON 16 MG: 2 INJECTION INTRAMUSCULAR; INTRAVENOUS at 11:51

## 2023-12-14 RX ADMIN — ACYCLOVIR 400 MG: 400 TABLET ORAL at 21:27

## 2023-12-14 RX ADMIN — FLUCONAZOLE 400 MG: 200 TABLET ORAL at 09:28

## 2023-12-14 RX ADMIN — METHOTREXATE 12 MG: 25 INJECTION, SOLUTION INTRA-ARTERIAL; INTRAMUSCULAR; INTRATHECAL; INTRAVENOUS at 15:53

## 2023-12-14 RX ADMIN — Medication 552 MG: at 00:21

## 2023-12-14 RX ADMIN — ACYCLOVIR 400 MG: 400 TABLET ORAL at 09:28

## 2023-12-14 RX ADMIN — PANTOPRAZOLE SODIUM 40 MG: 40 TABLET, DELAYED RELEASE ORAL at 09:34

## 2023-12-14 RX ADMIN — FUROSEMIDE 20 MG: 10 INJECTION, SOLUTION INTRAVENOUS at 17:07

## 2023-12-14 RX ADMIN — DEXAMETHASONE 40 MG: 4 TABLET ORAL at 09:28

## 2023-12-14 ASSESSMENT — COGNITIVE AND FUNCTIONAL STATUS - GENERAL
MOBILITY SCORE: 24
DAILY ACTIVITIY SCORE: 24

## 2023-12-14 ASSESSMENT — PAIN SCALES - GENERAL: PAINLEVEL_OUTOF10: 0 - NO PAIN

## 2023-12-14 NOTE — SIGNIFICANT EVENT
12/14/23 0600   Prechemo Checklist   Has the patient been in the hospital, ED, or urgent care since last date of service N/A   Chemo/Immuno Consent Signed Yes   Protocol/Indications Verified Yes   Confirmed to previous date/time of medication Yes   Compared to previous dose Yes   All medications are dated accurately Yes   Pregnancy Test Negative Not applicable   Parameters Met Yes   BSA/Weight-Height Verified Yes   Dose Calculations Verified Yes

## 2023-12-14 NOTE — PROGRESS NOTES
"Shilo Thakkar is a 23 y.o. male on day 3 of admission presenting with Encounter for antineoplastic chemotherapy.    Subjective   Patient seen at bedside with his mother present. States that he is feeling well today. Discussed his LP with IT methotrexate today. Patient denies SOB, CP, heart palpitations, fever/chills, N/V/D/C, headache dizziness or vision changes        Objective     Physical Exam  Constitutional:       Appearance: Normal appearance.   HENT:      Head: Normocephalic.      Nose: Nose normal.      Mouth/Throat:      Mouth: Mucous membranes are moist.   Eyes:      Pupils: Pupils are equal, round, and reactive to light.   Cardiovascular:      Rate and Rhythm: Normal rate.   Pulmonary:      Effort: Pulmonary effort is normal.   Abdominal:      Palpations: Abdomen is soft.   Musculoskeletal:         General: Normal range of motion.      Cervical back: Normal range of motion.   Skin:     General: Skin is warm.   Neurological:      General: No focal deficit present.      Mental Status: He is alert.   Psychiatric:         Behavior: Behavior normal.         Last Recorded Vitals  Blood pressure 150/90, pulse (!) 47, temperature 36.7 °C (98.1 °F), temperature source Temporal, resp. rate 18, height 1.822 m (5' 11.73\"), weight 71.4 kg (157 lb 6.5 oz), SpO2 98 %.  Intake/Output last 3 Shifts:  I/O last 3 completed shifts:  In: 4506.2 (63.1 mL/kg) [I.V.:3356.7 (47 mL/kg); IV Piggyback:1149.5]  Out: - (0 mL/kg)   Weight: 71.4 kg     Relevant Results                        Malnutrition          I agree with the dietitian's malnutrition diagnosis.      Assessment/Plan   Principal Problem:    Encounter for antineoplastic chemotherapy  Active Problems:    Burkitt lymphoma (CMS/HCC)    Shilo Thakkar is a 23 y.o. male with PMH of Burkitt Lymphoma (Dx 10/20/23; s/p 2 cycles of HyperCVAD), malignant pleural effusions s/p CT (10/29/23), and ADHD who presents 12/11 for C3 HyperCVAD + Ritux. Chemo had a delayed start, started on " 12/12, due to presentation of elevated WBC (30.3 on 12/11) and hgb 5.5, s/p 2 units of pRBCs, now hgb 7.9 on 12/13. So far, tolerating chemo well. Discharge pending completion of chemotherapy, most likely 12/16.      CHEMO:  # C3 HyperCVAD + Ritux  - Cycle 3 part A; planned for IT MTX and IT Cytarabine during this admission (planned for Thursday, 12/14, at the bedside with Roselia)   - Started chemo regimen 12/12   - NS @ 100mL/hr throughout chemo  - Premeds and reaction meds as ordered  - Dexamethasone PO 40mg daily x4 days  - Rituximab 700mg (rounded from 690mg = 375mg/m2 x 1.84m2) once on day 1, administered over 90mins with 20% of the dose administered over the first 30mins and the remaining 80% of the dose given over 60mins  - Mesna 1,100mg (rounded from 1,104mg = 600mg/m2 x 1.84m2) over 24hrs, once, 15mins prior to cyclophosphamide x3 doses  - Cyclophosphamide 552mg (300mg/m2 x 1.84m2) at 42.5mL/hr over 3hrs, every 12rs starting 2hrs 30mins after treatment start time, x6 doses  - Doxorubicin 92mg (50mg/m2 x 1.84m2) at 43.6mL/hr, administered over 24hrs, once, 30mins after treatment start time (begin 12hrs after the 6th dose of cyclophosphamide begins)  - Vincristine 2mg once, administered over 10mins, starting 30mins after treatment start time (begin 12mins after the 6th dose of cyclophosphamide)  - IT Methotrexate given on day 3 12/14  - Neulasta 6mg subcutaneous to be given outpt 12/18     ONC:  # Burkitt lymphoma, newly diagnosed (10/20/23)  - Primary oncologist: Dr. Olgeario Klein  - 10/19/23 presented to OSH ED with dysphagia and abdominal pain  - CT A/P with IV contrast (10/19) mass in the body of the pancreas measuring approximately 2.7 x 2.9 cm. Multiple masses in the mesentery as well as omental caking consistent with carcinomatosis.   - CT soft tissue neck with IV contrast (10/19): homogeneous soft tissue attenuation lesion in the L oropharynx which measures approximately 3.0 x 4.0 x 5.1 cm.   - CT  Abdomen w/o IV contrast (10/20): Mesenteric lymphadenopathy  - US guided mesenteric LN biopsy (10/20/23): HIGH GRADE B-CELL LYMPHOMA MORPHOLOGICALLY CONSISTENT WITH BURKITT LYMPHOMA; flow cytometry: CD10+ kappa restricted B cells supportive of a B cell lymphoma with germinal center origin.   - Biopsy L oropharyngeal mass and L cervical LN by ENT (10/23/23) high grade B-cell burkitt lymphoma  - PET CT ordered 10/30 showed FDG avid left oropharyngeal mass and LAD above and below diaphragm  - s/p 2 cycles of HyperCVAD  - Bone marrow bx completed 11/1 without evidence of disease  - XR chest neg 11/20  - Currently admitted for cycle 3 HyperCVAD (Started on 12/12 d/t presentation of hgb 5.5 and WBC 30.1); Discussed with Dr. Coffey and Dr. Klein on 12/11; okay to start chemo 12/12     HEME:  # Anemia, likely 2/2 disease  - Continue home ferrous sulfate 325mg BID  - Hgb 5.5 on admission (12/11) s/p 2 units pRBCs; hgb 7.9 12/13   - Discussed with Dr. Coffey and Dr. Klein on 12/11; okay to start chemo 12/12   - Transfuse to keep Hgb>7, Plt>10     CARDIAC:  - ECHO (10/2023): EF 60-65%  - Baseline HR 50s  - Overnight 12/12, HR 45, EKG sinus bradycardia   - Patient asymptomatic, denies lightheadedness, dizziness, or palpitations      ID:  Allergies: NKDA  # Moderate Leukocytosis   - Likely in setting of recent neulasta from last cycle (given on 11/27) vs less likely infection   - On admit (12/11), WBC 30.3 - now increased to 38.9 (12/13)  - Discussed with Dr. Coffey and Dr. Klein on 12/11; okay to start chemo 12/12. Most likely a robust WBC response from recent neulasta (11/27) given young age and lack of clinical evidence to support infection    - Denies fever, chills, sinus pain, Sob, dyspnea, new cough   - UA (12/11) negative   - CXR (12/11): negative for acute process   - Flu/COVID/RSV (12/11): negative   - Resp viral panel (12/11): pending   - Blood cultures x2 12/12; NGTD  - Consider further infectious workup  if patient becomes symptomatic   - Plan to start Zosyn if patient becomes febrile   - Hepatitis serology negative (10/26/23), HIV 1/2 nonreactive (10/26), EBV neg (10/27)   # Prophylaxis   - Continue home Acyclovir 400mg BID, Fluconazole 400mg daily, and Bactrim M/W/F for prophy  - Plan Levaquin for neutropenia (day 5 through day 15 per outpt notes; 12/15-12/25)  - Encourage IS     FEN/GI:  - Admit weight: 71.8kg (12/11)  - Weight (12/13) 74.2 kg   - On continuous fluids with chemo regimen, give lasix if needed   - s/p 40mg IVP lasix 12/13   - Daily weights   - Continue home Protonix 40mg daily  - Replace electrolytes as needed  - Low pathogen diet  - TLS monitoring  - G6PD normal (10/26/23)  -  on admission (12/11),  12/13. Baseline fluctuates (~300-500), continue to monitor      DISPO:  - Full Code  - Access: PICC line (right)  - DVT ppx: Ambulation  - DC pending completion of chemo, most likely 12/16  - NOK: Ms Deidra Pickett [phone]       I spent 60 minutes in the professional and overall care of this patient.      DEMETRIO Nunez-CNP

## 2023-12-15 LAB
ALBUMIN SERPL BCP-MCNC: 3.6 G/DL (ref 3.4–5)
ALP SERPL-CCNC: 91 U/L (ref 33–120)
ALT SERPL W P-5'-P-CCNC: 28 U/L (ref 10–52)
ANION GAP SERPL CALC-SCNC: 16 MMOL/L (ref 10–20)
AST SERPL W P-5'-P-CCNC: 23 U/L (ref 9–39)
BASOPHILS # BLD MANUAL: 0 X10*3/UL (ref 0–0.1)
BASOPHILS NFR BLD MANUAL: 0 %
BILIRUB SERPL-MCNC: 0.4 MG/DL (ref 0–1.2)
BUN SERPL-MCNC: 21 MG/DL (ref 6–23)
CALCIUM SERPL-MCNC: 8.5 MG/DL (ref 8.6–10.6)
CHLORIDE SERPL-SCNC: 104 MMOL/L (ref 98–107)
CO2 SERPL-SCNC: 26 MMOL/L (ref 21–32)
CREAT SERPL-MCNC: 0.91 MG/DL (ref 0.5–1.3)
EOSINOPHIL # BLD MANUAL: 0 X10*3/UL (ref 0–0.7)
EOSINOPHIL NFR BLD MANUAL: 0 %
ERYTHROCYTE [DISTWIDTH] IN BLOOD BY AUTOMATED COUNT: 17.4 % (ref 11.5–14.5)
GFR SERPL CREATININE-BSD FRML MDRD: >90 ML/MIN/1.73M*2
GLUCOSE SERPL-MCNC: 112 MG/DL (ref 74–99)
HCT VFR BLD AUTO: 22.1 % (ref 41–52)
HGB BLD-MCNC: 7.3 G/DL (ref 13.5–17.5)
IMM GRANULOCYTES # BLD AUTO: 2.19 X10*3/UL (ref 0–0.7)
IMM GRANULOCYTES NFR BLD AUTO: 8.8 % (ref 0–0.9)
LDH SERPL L TO P-CCNC: 638 U/L (ref 84–246)
LYMPHOCYTES # BLD MANUAL: 0.43 X10*3/UL (ref 1.2–4.8)
LYMPHOCYTES NFR BLD MANUAL: 1.7 %
MAGNESIUM SERPL-MCNC: 1.86 MG/DL (ref 1.6–2.4)
MCH RBC QN AUTO: 30.3 PG (ref 26–34)
MCHC RBC AUTO-ENTMCNC: 33 G/DL (ref 32–36)
MCV RBC AUTO: 92 FL (ref 80–100)
METAMYELOCYTES # BLD MANUAL: 0.43 X10*3/UL
METAMYELOCYTES NFR BLD MANUAL: 1.7 %
MONOCYTES # BLD MANUAL: 1.93 X10*3/UL (ref 0.1–1)
MONOCYTES NFR BLD MANUAL: 7.7 %
MYELOCYTES # BLD MANUAL: 0.23 X10*3/UL
MYELOCYTES NFR BLD MANUAL: 0.9 %
NEUTROPHILS # BLD MANUAL: 22.01 X10*3/UL (ref 1.2–7.7)
NEUTS BAND # BLD MANUAL: 1.93 X10*3/UL (ref 0–0.7)
NEUTS BAND NFR BLD MANUAL: 7.7 %
NEUTS SEG # BLD MANUAL: 20.08 X10*3/UL (ref 1.2–7)
NEUTS SEG NFR BLD MANUAL: 80.3 %
NRBC BLD-RTO: 0.8 /100 WBCS (ref 0–0)
PATH REVIEW-CELL CT,CSF: NORMAL
PLATELET # BLD AUTO: 203 X10*3/UL (ref 150–450)
POLYCHROMASIA BLD QL SMEAR: ABNORMAL
POTASSIUM SERPL-SCNC: 3.7 MMOL/L (ref 3.5–5.3)
PROT SERPL-MCNC: 5.1 G/DL (ref 6.4–8.2)
RBC # BLD AUTO: 2.41 X10*6/UL (ref 4.5–5.9)
RBC MORPH BLD: ABNORMAL
SODIUM SERPL-SCNC: 142 MMOL/L (ref 136–145)
TOTAL CELLS COUNTED BLD: 117
WBC # BLD AUTO: 25 X10*3/UL (ref 4.4–11.3)

## 2023-12-15 PROCEDURE — 83735 ASSAY OF MAGNESIUM: CPT | Performed by: NURSE PRACTITIONER

## 2023-12-15 PROCEDURE — 99233 SBSQ HOSP IP/OBS HIGH 50: CPT | Performed by: HOME HEALTH AIDE

## 2023-12-15 PROCEDURE — 85007 BL SMEAR W/DIFF WBC COUNT: CPT | Performed by: NURSE PRACTITIONER

## 2023-12-15 PROCEDURE — 1170000001 HC PRIVATE ONCOLOGY ROOM DAILY

## 2023-12-15 PROCEDURE — 2500000004 HC RX 250 GENERAL PHARMACY W/ HCPCS (ALT 636 FOR OP/ED): Performed by: INTERNAL MEDICINE

## 2023-12-15 PROCEDURE — 80053 COMPREHEN METABOLIC PANEL: CPT | Performed by: NURSE PRACTITIONER

## 2023-12-15 PROCEDURE — 2500000001 HC RX 250 WO HCPCS SELF ADMINISTERED DRUGS (ALT 637 FOR MEDICARE OP): Performed by: NURSE PRACTITIONER

## 2023-12-15 PROCEDURE — 2500000004 HC RX 250 GENERAL PHARMACY W/ HCPCS (ALT 636 FOR OP/ED): Performed by: NURSE PRACTITIONER

## 2023-12-15 PROCEDURE — 83615 LACTATE (LD) (LDH) ENZYME: CPT

## 2023-12-15 PROCEDURE — 85027 COMPLETE CBC AUTOMATED: CPT | Performed by: NURSE PRACTITIONER

## 2023-12-15 RX ORDER — PANTOPRAZOLE SODIUM 40 MG/1
40 TABLET, DELAYED RELEASE ORAL
Qty: 30 TABLET | Refills: 0 | Status: SHIPPED | OUTPATIENT
Start: 2023-12-15 | End: 2024-02-02 | Stop reason: SDUPTHER

## 2023-12-15 RX ORDER — FLUCONAZOLE 200 MG/1
400 TABLET ORAL DAILY
Qty: 60 TABLET | Refills: 0 | Status: ON HOLD | OUTPATIENT
Start: 2023-12-15 | End: 2024-01-21

## 2023-12-15 RX ORDER — SULFAMETHOXAZOLE AND TRIMETHOPRIM 800; 160 MG/1; MG/1
1 TABLET ORAL
Qty: 12 TABLET | Refills: 0 | Status: ON HOLD | OUTPATIENT
Start: 2023-12-15 | End: 2024-01-21

## 2023-12-15 RX ORDER — ACYCLOVIR 400 MG/1
400 TABLET ORAL 2 TIMES DAILY
Qty: 180 TABLET | Refills: 0 | Status: SHIPPED | OUTPATIENT
Start: 2023-12-15 | End: 2024-03-12 | Stop reason: SDUPTHER

## 2023-12-15 RX ADMIN — SODIUM CHLORIDE 100 ML/HR: 9 INJECTION, SOLUTION INTRAVENOUS at 08:53

## 2023-12-15 RX ADMIN — FERROUS SULFATE TAB 325 MG (65 MG ELEMENTAL FE) 1 TABLET: 325 (65 FE) TAB at 21:31

## 2023-12-15 RX ADMIN — ACYCLOVIR 400 MG: 400 TABLET ORAL at 08:54

## 2023-12-15 RX ADMIN — ONDANSETRON 16 MG: 2 INJECTION INTRAMUSCULAR; INTRAVENOUS at 12:04

## 2023-12-15 RX ADMIN — PANTOPRAZOLE SODIUM 40 MG: 40 TABLET, DELAYED RELEASE ORAL at 09:01

## 2023-12-15 RX ADMIN — FERROUS SULFATE TAB 325 MG (65 MG ELEMENTAL FE) 1 TABLET: 325 (65 FE) TAB at 08:54

## 2023-12-15 RX ADMIN — DEXAMETHASONE 40 MG: 4 TABLET ORAL at 08:54

## 2023-12-15 RX ADMIN — DOXORUBICIN HYDROCHLORIDE 92 MG: 2 INJECTION, SOLUTION INTRAVENOUS at 12:33

## 2023-12-15 RX ADMIN — FLUCONAZOLE 400 MG: 200 TABLET ORAL at 08:54

## 2023-12-15 RX ADMIN — SENNOSIDES AND DOCUSATE SODIUM 2 TABLET: 8.6; 5 TABLET ORAL at 21:31

## 2023-12-15 RX ADMIN — CYCLOPHOSPHAMIDE 552 MG: 1 INJECTION, POWDER, FOR SOLUTION INTRAVENOUS; ORAL at 00:08

## 2023-12-15 RX ADMIN — SULFAMETHOXAZOLE AND TRIMETHOPRIM 1 TABLET: 800; 160 TABLET ORAL at 21:31

## 2023-12-15 RX ADMIN — VINCRISTINE SULFATE 2 MG: 1 INJECTION, SOLUTION INTRAVENOUS at 12:32

## 2023-12-15 RX ADMIN — ACYCLOVIR 400 MG: 400 TABLET ORAL at 21:31

## 2023-12-15 ASSESSMENT — COGNITIVE AND FUNCTIONAL STATUS - GENERAL
DAILY ACTIVITIY SCORE: 24
MOBILITY SCORE: 24
DAILY ACTIVITIY SCORE: 24
MOBILITY SCORE: 24
DAILY ACTIVITIY SCORE: 24
MOBILITY SCORE: 24

## 2023-12-15 ASSESSMENT — PAIN SCALES - GENERAL
PAINLEVEL_OUTOF10: 0 - NO PAIN
PAINLEVEL_OUTOF10: 0 - NO PAIN

## 2023-12-15 NOTE — CARE PLAN
The clinical goals for the shift include Pt will safely receive chemotherapy this shift    Afebrile, VSS except for BP. BP high, pt asymptomatic, but felt swollen. Weight increased from morning weight,provider ordered lasix. Pt stated they felt relief. Pt received chemotherapy safely, see MAR for details. Pt continues to receive NS 100ml/hr, and 24 hr mesna. Pt expressed interest in using gym, PT to consult pt.  Pt remains free from injury/falls.

## 2023-12-15 NOTE — SIGNIFICANT EVENT
12/15/23 0036   Prechemo Checklist   Has the patient been in the hospital, ED, or urgent care since last date of service N/A   Chemo/Immuno Consent Signed Yes   Protocol/Indications Verified Yes   Confirmed to previous date/time of medication Yes   Compared to previous dose Yes   All medications are dated accurately Yes   Pregnancy Test Negative Not applicable   Parameters Met Yes   BSA/Weight-Height Verified Yes   Dose Calculations Verified Yes

## 2023-12-15 NOTE — PROCEDURES
Lumbar Puncture    Date/Time: 12/14/2023 7:58 PM    Performed by: Roselia Shetty PA-C  Authorized by: Roselia Shetty PA-C    Consent:     Consent obtained:  Written    Consent given by:  Patient    Risks, benefits, and alternatives were discussed: yes      Risks discussed:  Bleeding, headache, infection and pain    Alternatives discussed:  No treatment  Universal protocol:     Procedure explained and questions answered to patient or proxy's satisfaction: yes      Relevant documents present and verified: yes      Test results available: yes      Immediately prior to procedure a time out was called: yes      Site/side marked: yes      Patient identity confirmed:  Verbally with patient, hospital-assigned identification number and arm band  Pre-procedure details:     Procedure purpose:  Therapeutic    Preparation: Patient was prepped and draped in usual sterile fashion    Anesthesia:     Anesthesia method:  Local infiltration    Local anesthetic:  Lidocaine 1% w/o epi  Procedure details:     Lumbar space:  L3-L4 interspace    Patient position:  Sitting    Needle type:  Spinal needle - Quincke tip    Ultrasound guidance: no      Number of attempts:  1    Fluid appearance:  Blood-tinged then clearing    Tubes of fluid:  3    Total volume (ml):  9  Post-procedure details:     Puncture site:  Adhesive bandage applied and direct pressure applied    Procedure completion:  Tolerated well, no immediate complications  Comments:      Samples sent for cell count and diff, total protein and glucose and flow cytometry

## 2023-12-15 NOTE — PROGRESS NOTES
Physical Therapy                 Therapy Communication Note    Patient Name: Shilo Thakkar  MRN: [id number]  Today's Date: 12/15/2023     Discipline: Physical Therapy    Missed Visit Reason: Missed Visit Reason: Other (Comment) (Pt up ad kerrie with no diffulty with mobility per RN. Pt wanting to utilize exercise gym, however lab values are too low at this time. Will defer PT eval at this time. Will re-attempt as able and appropriate).    Missed Time: Attempt

## 2023-12-15 NOTE — PROGRESS NOTES
Shilo Thakkar is a 23 y.o. male on day 4 of admission presenting with Encounter for antineoplastic chemotherapy.    Subjective   Patient states he feels well today. Noted 1 episode of emesis this morning, no current nausea. LBM last PM, soft and formed. Denies HA, dizziness, CP, SOB, N/D/C. All other ROS otherwise negative.        Objective     Physical Exam  Constitutional:       General: He is not in acute distress.     Appearance: Normal appearance. He is not ill-appearing.   HENT:      Head: Normocephalic and atraumatic.      Nose: Nose normal.      Mouth/Throat:      Mouth: Mucous membranes are moist.   Eyes:      General: No scleral icterus.     Extraocular Movements: Extraocular movements intact.      Pupils: Pupils are equal, round, and reactive to light.   Cardiovascular:      Rate and Rhythm: Normal rate and regular rhythm.      Pulses: Normal pulses.      Heart sounds: Normal heart sounds. No murmur heard.     No gallop.   Pulmonary:      Effort: Pulmonary effort is normal. No respiratory distress.      Breath sounds: Normal breath sounds. No wheezing, rhonchi or rales.   Abdominal:      General: Abdomen is flat. Bowel sounds are normal. There is no distension.      Palpations: Abdomen is soft. There is no mass.      Tenderness: There is no abdominal tenderness.   Musculoskeletal:         General: No swelling, deformity or signs of injury. Normal range of motion.      Cervical back: Normal range of motion and neck supple.      Right lower leg: No edema.      Left lower leg: No edema.   Skin:     General: Skin is warm and dry.      Findings: No erythema, lesion or rash.   Neurological:      General: No focal deficit present.      Mental Status: He is alert and oriented to person, place, and time.      Cranial Nerves: No cranial nerve deficit.      Sensory: No sensory deficit.      Motor: No weakness.   Psychiatric:         Mood and Affect: Mood normal.         Behavior: Behavior normal.      Comments:  "Fluent speech, cooperative          Last Recorded Vitals  Blood pressure (!) 154/94, pulse (!) 45, temperature 36.8 °C (98.2 °F), temperature source Temporal, resp. rate 16, height 1.822 m (5' 11.73\"), weight 73 kg (160 lb 15 oz), SpO2 100 %.  Intake/Output last 3 Shifts:  I/O last 3 completed shifts:  In: 688.5 (9.2 mL/kg) [IV Piggyback:688.5]  Out: 0 (0 mL/kg)   Weight: 74.9 kg     Relevant Results  Scheduled medications  acyclovir, 400 mg, oral, BID  DOXOrubicin, 50 mg/m2 (Treatment Plan Recorded), intravenous, Once  ferrous sulfate (325 mg ferrous sulfate), 1 tablet, oral, BID  fluconazole, 400 mg, oral, Daily  mesna, 600 mg/m2 (Treatment Plan Recorded), intravenous, Once  ondansetron, 16 mg, intravenous, Once  pantoprazole, 40 mg, oral, Daily before breakfast  sennosides-docusate sodium, 2 tablet, oral, BID  sulfamethoxazole-trimethoprim, 1 tablet, oral, Every Mon/Wed/Fri  vinCRIStine, 2 mg, intravenous, Once      Continuous medications  sodium chloride 0.9%, 100 mL/hr, Last Rate: 100 mL/hr (12/15/23 0853)      PRN medications  PRN medications: albuterol, alteplase, dextrose, diphenhydrAMINE, EPINEPHrine, famotidine, methylPREDNISolone sodium succinate (PF), prochlorperazine, prochlorperazine, sodium chloride    Results for orders placed or performed during the hospital encounter of 12/11/23 (from the past 24 hour(s))   Glucose, CSF   Result Value Ref Range    Glucose,  (H) 40 - 70 mg/dL   Protein, CSF   Result Value Ref Range    Total Protein, CSF 22 15 - 45 mg/dL   CSF Cell Count   Result Value Ref Range    Tube Number, CSF Tube 1       Color, CSF Pink (A) Colorless    Clarity, CSF Hazy (A) Clear    Color, Supernatant CSF Colorless      WBC, CSF 26 (H) 1 - 5 /uL    RBC, CSF 2,000 (H) 0 - 5 /uL   CSF Differential   Result Value Ref Range    Neutrophils %, Manual, CSF 93 (H) 0 - 5 %    Lymphocytes %, Manual, CSF 1 (L) 28 - 96 %    Mono/Macrophages %, Manual, CSF 6 (L) 16 - 56 %    Eosinophils %, Manual, " CSF 0 Rare %    Basophils %, Manual, CSF 0 Not Established %    Immature Granulocytes %, Manual, CSF 0 Not Established %    Blasts %, Manual, CSF 0 Not Established %    Unclassified Cells %, Manual, CSF 0 Not Established %    Plasma Cells %, Manual, CSF 0 Not Established %    Total Cells Counted,     CBC and Auto Differential   Result Value Ref Range    WBC 25.0 (H) 4.4 - 11.3 x10*3/uL    nRBC 0.8 (H) 0.0 - 0.0 /100 WBCs    RBC 2.41 (L) 4.50 - 5.90 x10*6/uL    Hemoglobin 7.3 (L) 13.5 - 17.5 g/dL    Hematocrit 22.1 (L) 41.0 - 52.0 %    MCV 92 80 - 100 fL    MCH 30.3 26.0 - 34.0 pg    MCHC 33.0 32.0 - 36.0 g/dL    RDW 17.4 (H) 11.5 - 14.5 %    Platelets 203 150 - 450 x10*3/uL    Immature Granulocytes %, Automated 8.8 (H) 0.0 - 0.9 %    Immature Granulocytes Absolute, Automated 2.19 (H) 0.00 - 0.70 x10*3/uL   Comprehensive Metabolic Panel   Result Value Ref Range    Glucose 112 (H) 74 - 99 mg/dL    Sodium 142 136 - 145 mmol/L    Potassium 3.7 3.5 - 5.3 mmol/L    Chloride 104 98 - 107 mmol/L    Bicarbonate 26 21 - 32 mmol/L    Anion Gap 16 10 - 20 mmol/L    Urea Nitrogen 21 6 - 23 mg/dL    Creatinine 0.91 0.50 - 1.30 mg/dL    eGFR >90 >60 mL/min/1.73m*2    Calcium 8.5 (L) 8.6 - 10.6 mg/dL    Albumin 3.6 3.4 - 5.0 g/dL    Alkaline Phosphatase 91 33 - 120 U/L    Total Protein 5.1 (L) 6.4 - 8.2 g/dL    AST 23 9 - 39 U/L    Bilirubin, Total 0.4 0.0 - 1.2 mg/dL    ALT 28 10 - 52 U/L   Magnesium   Result Value Ref Range    Magnesium 1.86 1.60 - 2.40 mg/dL   Lactate dehydrogenase   Result Value Ref Range     (H) 84 - 246 U/L   Manual Differential   Result Value Ref Range    Neutrophils %, Manual 80.3 40.0 - 80.0 %    Bands %, Manual 7.7 0.0 - 5.0 %    Lymphocytes %, Manual 1.7 13.0 - 44.0 %    Monocytes %, Manual 7.7 2.0 - 10.0 %    Eosinophils %, Manual 0.0 0.0 - 6.0 %    Basophils %, Manual 0.0 0.0 - 2.0 %    Metamyelocytes %, Manual 1.7 0.0 - 0.0 %    Myelocytes %, Manual 0.9 0.0 - 0.0 %    Seg Neutrophils  Absolute, Manual 20.08 (H) 1.20 - 7.00 x10*3/uL    Bands Absolute, Manual 1.93 (H) 0.00 - 0.70 x10*3/uL    Lymphocytes Absolute, Manual 0.43 (L) 1.20 - 4.80 x10*3/uL    Monocytes Absolute, Manual 1.93 (H) 0.10 - 1.00 x10*3/uL    Eosinophils Absolute, Manual 0.00 0.00 - 0.70 x10*3/uL    Basophils Absolute, Manual 0.00 0.00 - 0.10 x10*3/uL    Metamyelocytes Absolute, Manual 0.43 0.00 - 0.00 x10*3/uL    Myelocytes Absolute, Manual 0.23 0.00 - 0.00 x10*3/uL    Total Cells Counted 117     Neutrophils Absolute, Manual 22.01 (H) 1.20 - 7.70 x10*3/uL    RBC Morphology See Below     Polychromasia Mild                     Malnutrition          I agree with the dietitian's malnutrition diagnosis.      Assessment/Plan   Principal Problem:    Encounter for antineoplastic chemotherapy  Active Problems:    Burkitt lymphoma (CMS/HCC)    Shilo Thakkar is a 23 y.o. male with PMH of Burkitt Lymphoma (Dx 10/20/23; s/p 2 cycles of HyperCVAD), malignant pleural effusions s/p CT (10/29/23), and ADHD who presents 12/11 for C3 HyperCVAD + Ritux. Chemo had a delayed start, started on 12/12, due to presentation of elevated WBC (30.3 on 12/11) and hgb 5.5, s/p 2 units of pRBCs, now hgb 7.9 on 12/13. So far, tolerating chemo well. Discharge pending completion of chemotherapy on 12/16.     D4C3 HyperCVAD      CHEMO:  # C3 HyperCVAD + Ritux  - Cycle 3 part A; planned for IT MTX and IT Cytarabine during this admission (planned for Thursday, 12/14, at the bedside with Roselia)   - Started chemo regimen 12/12   - NS @ 100mL/hr throughout chemo  - Premeds and reaction meds as ordered  - Dexamethasone PO 40mg daily x4 days  - Rituximab 700mg (rounded from 690mg = 375mg/m2 x 1.84m2) once on day 1, administered over 90mins with 20% of the dose administered over the first 30mins and the remaining 80% of the dose given over 60mins  - Mesna 1,100mg (rounded from 1,104mg = 600mg/m2 x 1.84m2) over 24hrs, once, 15mins prior to cyclophosphamide x3 doses  -  Cyclophosphamide 552mg (300mg/m2 x 1.84m2) at 42.5mL/hr over 3hrs, every 12rs starting 2hrs 30mins after treatment start time, x6 doses  - Doxorubicin 92mg (50mg/m2 x 1.84m2) at 43.6mL/hr, administered over 24hrs, once, 30mins after treatment start time (begin 12hrs after the 6th dose of cyclophosphamide begins)  - Vincristine 2mg once, administered over 10mins, starting 30mins after treatment start time (begin 12mins after the 6th dose of cyclophosphamide)  - IT Methotrexate given on day 3 12/14  - Neulasta 6mg subcutaneous to be given outpt 12/18     ONC:  # Burkitt lymphoma, newly diagnosed (10/20/23)  - Primary oncologist: Dr. Olegario Klein  - 10/19/23 presented to OSH ED with dysphagia and abdominal pain  - CT A/P with IV contrast (10/19) mass in the body of the pancreas measuring approximately 2.7 x 2.9 cm. Multiple masses in the mesentery as well as omental caking consistent with carcinomatosis.   - CT soft tissue neck with IV contrast (10/19): homogeneous soft tissue attenuation lesion in the L oropharynx which measures approximately 3.0 x 4.0 x 5.1 cm.   - CT Abdomen w/o IV contrast (10/20): Mesenteric lymphadenopathy  - US guided mesenteric LN biopsy (10/20/23): HIGH GRADE B-CELL LYMPHOMA MORPHOLOGICALLY CONSISTENT WITH BURKITT LYMPHOMA; flow cytometry: CD10+ kappa restricted B cells supportive of a B cell lymphoma with germinal center origin.   - Biopsy L oropharyngeal mass and L cervical LN by ENT (10/23/23) high grade B-cell burkitt lymphoma  - PET CT ordered 10/30 showed FDG avid left oropharyngeal mass and LAD above and below diaphragm  - s/p 2 cycles of HyperCVAD  - Bone marrow bx completed 11/1 without evidence of disease  - XR chest neg 11/20  - Currently admitted for cycle 3 HyperCVAD (Started on 12/12 d/t presentation of hgb 5.5 and WBC 30.1); Discussed with Dr. Coffey and Dr. Klein on 12/11; okay to start chemo 12/12     HEME:  # Anemia, likely 2/2 disease  - Continue home ferrous sulfate  325mg BID  - Hgb 5.5 on admission (12/11) s/p 2 units pRBCs; hgb 7.9 12/13   - Discussed with Dr. Coffey and Dr. Klein on 12/11; okay to start chemo 12/12   - Transfuse to keep Hgb>7, Plt>10     CARDIAC:  - ECHO (10/2023): EF 60-65%  - Baseline HR 50s  - Overnight 12/12, HR 45, EKG sinus bradycardia (could be 2/2 zofran premedications)   - Patient asymptomatic, denies lightheadedness, dizziness, or palpitations      ID:  Allergies: NKDA  # Moderate Leukocytosis, improving   - Likely in setting of recent neulasta from last cycle (given on 11/27) vs less likely infection   - On admit (12/11), WBC 30.3 - now increased to 38.9 (12/13)  - Discussed with Dr. Coffey and Dr. Klein on 12/11; okay to start chemo 12/12. Most likely a robust WBC response from recent neulasta (11/27) given young age and lack of clinical evidence to support infection    - Denies fever, chills, sinus pain, Sob, dyspnea, new cough   - UA (12/11) negative   - CXR (12/11): negative for acute process   - Flu/COVID/RSV (12/11): negative   - Resp viral panel (12/11): negative   - Blood cultures x2 12/12; NGTD  - Consider further infectious workup if patient becomes symptomatic   - Plan to start Zosyn if patient becomes febrile   - Hepatitis serology negative (10/26/23), HIV 1/2 nonreactive (10/26), EBV neg (10/27)   # Prophylaxis   - Continue home Acyclovir 400mg BID, Fluconazole 400mg daily, and Bactrim M/W/F for prophy  - Plan Levaquin for neutropenia (day 5 through day 15 per outpt notes; 12/15-12/25)  - Encourage IS     FEN/GI:  - Admit weight: 71.8kg (12/11), current wt 73kg (12/15)   - On continuous fluids with chemo regimen, give lasix if needed   - s/p 40mg IVP lasix 12/13   - Daily weights   - Continue home Protonix 40mg daily  - Replace electrolytes as needed  - Low pathogen diet  - TLS monitoring  - G6PD normal (10/26/23)     DISPO:  - Full Code  - Access: PICC line (right)  - DVT ppx: Ambulation  - DC pending completion of chemo,  most likely 12/16  - NOK: Ms Deidra Pickett [phone]        Silva Ortega PA-C

## 2023-12-16 VITALS
DIASTOLIC BLOOD PRESSURE: 89 MMHG | BODY MASS INDEX: 21.8 KG/M2 | OXYGEN SATURATION: 99 % | WEIGHT: 160.94 LBS | HEART RATE: 49 BPM | TEMPERATURE: 98.2 F | RESPIRATION RATE: 16 BRPM | SYSTOLIC BLOOD PRESSURE: 155 MMHG | HEIGHT: 72 IN

## 2023-12-16 LAB
ALBUMIN SERPL BCP-MCNC: 3.4 G/DL (ref 3.4–5)
ALP SERPL-CCNC: 72 U/L (ref 33–120)
ALT SERPL W P-5'-P-CCNC: 28 U/L (ref 10–52)
ANION GAP SERPL CALC-SCNC: 14 MMOL/L (ref 10–20)
AST SERPL W P-5'-P-CCNC: 23 U/L (ref 9–39)
BACTERIA BLD CULT: NORMAL
BACTERIA BLD CULT: NORMAL
BASOPHILS # BLD AUTO: 0.02 X10*3/UL (ref 0–0.1)
BASOPHILS NFR BLD AUTO: 0.2 %
BILIRUB SERPL-MCNC: 0.5 MG/DL (ref 0–1.2)
BUN SERPL-MCNC: 20 MG/DL (ref 6–23)
CALCIUM SERPL-MCNC: 8 MG/DL (ref 8.6–10.6)
CHLORIDE SERPL-SCNC: 103 MMOL/L (ref 98–107)
CO2 SERPL-SCNC: 25 MMOL/L (ref 21–32)
CREAT SERPL-MCNC: 0.79 MG/DL (ref 0.5–1.3)
EOSINOPHIL # BLD AUTO: 0 X10*3/UL (ref 0–0.7)
EOSINOPHIL NFR BLD AUTO: 0 %
ERYTHROCYTE [DISTWIDTH] IN BLOOD BY AUTOMATED COUNT: 17 % (ref 11.5–14.5)
GFR SERPL CREATININE-BSD FRML MDRD: >90 ML/MIN/1.73M*2
GLUCOSE SERPL-MCNC: 107 MG/DL (ref 74–99)
HCT VFR BLD AUTO: 22.7 % (ref 41–52)
HGB BLD-MCNC: 7.6 G/DL (ref 13.5–17.5)
IMM GRANULOCYTES # BLD AUTO: 0.48 X10*3/UL (ref 0–0.7)
IMM GRANULOCYTES NFR BLD AUTO: 4.2 % (ref 0–0.9)
LYMPHOCYTES # BLD AUTO: 0.13 X10*3/UL (ref 1.2–4.8)
LYMPHOCYTES NFR BLD AUTO: 1.1 %
MAGNESIUM SERPL-MCNC: 1.93 MG/DL (ref 1.6–2.4)
MCH RBC QN AUTO: 29.9 PG (ref 26–34)
MCHC RBC AUTO-ENTMCNC: 33.5 G/DL (ref 32–36)
MCV RBC AUTO: 89 FL (ref 80–100)
MONOCYTES # BLD AUTO: 0.58 X10*3/UL (ref 0.1–1)
MONOCYTES NFR BLD AUTO: 5.1 %
NEUTROPHILS # BLD AUTO: 10.19 X10*3/UL (ref 1.2–7.7)
NEUTROPHILS NFR BLD AUTO: 89.4 %
NRBC BLD-RTO: 0.6 /100 WBCS (ref 0–0)
PLATELET # BLD AUTO: 198 X10*3/UL (ref 150–450)
POTASSIUM SERPL-SCNC: 3.7 MMOL/L (ref 3.5–5.3)
PROT SERPL-MCNC: 4.8 G/DL (ref 6.4–8.2)
RBC # BLD AUTO: 2.54 X10*6/UL (ref 4.5–5.9)
SODIUM SERPL-SCNC: 138 MMOL/L (ref 136–145)
WBC # BLD AUTO: 11.4 X10*3/UL (ref 4.4–11.3)

## 2023-12-16 PROCEDURE — 85025 COMPLETE CBC W/AUTO DIFF WBC: CPT | Performed by: NURSE PRACTITIONER

## 2023-12-16 PROCEDURE — 99238 HOSP IP/OBS DSCHRG MGMT 30/<: CPT | Performed by: NURSE PRACTITIONER

## 2023-12-16 PROCEDURE — 84075 ASSAY ALKALINE PHOSPHATASE: CPT | Performed by: NURSE PRACTITIONER

## 2023-12-16 PROCEDURE — 2500000004 HC RX 250 GENERAL PHARMACY W/ HCPCS (ALT 636 FOR OP/ED): Performed by: NURSE PRACTITIONER

## 2023-12-16 PROCEDURE — 83735 ASSAY OF MAGNESIUM: CPT | Performed by: NURSE PRACTITIONER

## 2023-12-16 PROCEDURE — 2500000001 HC RX 250 WO HCPCS SELF ADMINISTERED DRUGS (ALT 637 FOR MEDICARE OP): Performed by: NURSE PRACTITIONER

## 2023-12-16 RX ADMIN — ACYCLOVIR 400 MG: 400 TABLET ORAL at 08:49

## 2023-12-16 RX ADMIN — FERROUS SULFATE TAB 325 MG (65 MG ELEMENTAL FE) 1 TABLET: 325 (65 FE) TAB at 08:49

## 2023-12-16 RX ADMIN — PANTOPRAZOLE SODIUM 40 MG: 40 TABLET, DELAYED RELEASE ORAL at 06:10

## 2023-12-16 RX ADMIN — FLUCONAZOLE 400 MG: 200 TABLET ORAL at 08:49

## 2023-12-16 ASSESSMENT — COGNITIVE AND FUNCTIONAL STATUS - GENERAL
MOBILITY SCORE: 24
DAILY ACTIVITIY SCORE: 24

## 2023-12-16 ASSESSMENT — PAIN SCALES - GENERAL
PAINLEVEL_OUTOF10: 0 - NO PAIN

## 2023-12-16 ASSESSMENT — PAIN - FUNCTIONAL ASSESSMENT
PAIN_FUNCTIONAL_ASSESSMENT: 0-10

## 2023-12-16 NOTE — DISCHARGE SUMMARY
Discharge Diagnosis  Encounter for antineoplastic chemotherapy    Issues Requiring Follow-Up  None; Outpatient appointments arranged and Rx sent to patient home pharmacy    Test Results Pending At Discharge  Pending Labs       Order Current Status    Flow Cytometry Test In process    Flow Cytometry Test In process    Blood Culture Preliminary result    Blood Culture Preliminary result            Hospital Course   Shilo Thakkar is a 23 y.o. male with PMH of Burkitt Lymphoma (Dx 10/20/23; s/p 2 cycles of HyperCVAD), malignant pleural effusions s/p CT (10/29/23), and ADHD who was admitted on 12/11 for C3 HyperCVAD + Ritux.  Patient tolerated chemo without difficulty, including dose of IT Methotrexate on 12/13. Patient scheduled to receive IT Cytarabine in the outpatient area on 12/18. On day of discharge labs and vital signs stable, patient denied CP/SOB or pain. Follow-up Neulasta and outpatient appointments arranged, and prescriptions sent to home [facility] pharmacy.     Pertinent Physical Exam At Time of Discharge  Physical Exam  Constitutional:       Appearance: Normal appearance.   HENT:      Head: Normocephalic and atraumatic.      Mouth/Throat:      Mouth: Mucous membranes are moist.      Pharynx: Oropharynx is clear.   Eyes:      Pupils: Pupils are equal, round, and reactive to light.   Cardiovascular:      Rate and Rhythm: Normal rate and regular rhythm.      Pulses: Normal pulses.      Heart sounds: Normal heart sounds.   Pulmonary:      Effort: Pulmonary effort is normal.      Breath sounds: Normal breath sounds.   Abdominal:      General: Abdomen is flat. Bowel sounds are normal.      Palpations: Abdomen is soft.   Musculoskeletal:         General: Normal range of motion.      Cervical back: Normal range of motion.   Skin:     General: Skin is warm and dry.   Neurological:      General: No focal deficit present.      Mental Status: He is alert.      Cranial Nerves: Cranial nerves 2-12 are intact.    Psychiatric:         Mood and Affect: Mood and affect normal.         Home Medications     Medication List      CONTINUE taking these medications     acetaminophen 325 mg tablet; Commonly known as: Tylenol; Take 2 tablets   (650 mg) by mouth every 6 hours if needed for mild pain (1 - 3).   acyclovir 400 mg tablet; Commonly known as: Zovirax; Take 1 tablet (400   mg) by mouth 2 times a day.   ferrous sulfate (325 mg ferrous sulfate) tablet; Take 1 tablet by mouth   2 times a day.   fluconazole 200 mg tablet; Commonly known as: Diflucan; Take 2 tablets   (400 mg) by mouth once daily.   levoFLOXacin 500 mg tablet; Commonly known as: Levaquin; Take 1 tablet   (500 mg) by mouth once every 24 hours for 20 days. Take when neutropenic   (ANC <500). Your oncologist will inform you when to start and stop this   medication.   pantoprazole 40 mg EC tablet; Commonly known as: ProtoNix; Take 1 tablet   (40 mg) by mouth once daily in the morning. Take before meals. Do not   crush, chew, or split.   sennosides-docusate sodium 8.6-50 mg tablet; Commonly known as:   Ashley-Colace; Take 2 tablets by mouth 2 times a day as needed for   constipation.   sulfamethoxazole-trimethoprim 800-160 mg tablet; Commonly known as:   Bactrim DS; Take 1 tablet by mouth once a day on Monday, Wednesday, and   Friday.       Outpatient Follow-Up  Future Appointments   Date Time Provider Department Center   12/18/2023  2:45 PM INF 27 [facility]   12/22/2023  9:30 AM INF 21 [facility]   12/27/2023  9:40 AM Olegario Klein MD [id number] [facility]   1/17/2024  2:45 PM Lisa Guardado [facility] [id number] [facility]   1/26/2024  9:00 AM DEMETRIO Salgado-CNP [place] [id number] [address]       DEMETRIO Alston-CNP

## 2023-12-18 ENCOUNTER — INFUSION (OUTPATIENT)
Dept: HEMATOLOGY/ONCOLOGY | Facility: HOSPITAL | Age: 23
End: 2023-12-18
Payer: COMMERCIAL

## 2023-12-18 VITALS
DIASTOLIC BLOOD PRESSURE: 71 MMHG | BODY MASS INDEX: 21.05 KG/M2 | SYSTOLIC BLOOD PRESSURE: 126 MMHG | HEART RATE: 58 BPM | RESPIRATION RATE: 18 BRPM | TEMPERATURE: 97.3 F | OXYGEN SATURATION: 100 % | WEIGHT: 155.42 LBS | HEIGHT: 72 IN

## 2023-12-18 DIAGNOSIS — C83.78 BURKITT LYMPHOMA OF LYMPH NODES OF MULTIPLE REGIONS (MULTI): Primary | ICD-10-CM

## 2023-12-18 LAB
ALBUMIN SERPL BCP-MCNC: 3.7 G/DL (ref 3.4–5)
ALP SERPL-CCNC: 68 U/L (ref 33–120)
ALT SERPL W P-5'-P-CCNC: 41 U/L (ref 10–52)
ANION GAP SERPL CALC-SCNC: 10 MMOL/L (ref 10–20)
APPEARANCE CSF: CLEAR
AST SERPL W P-5'-P-CCNC: 16 U/L (ref 9–39)
BASOPHILS # BLD MANUAL: 0 X10*3/UL (ref 0–0.1)
BASOPHILS NFR BLD MANUAL: 0 %
BASOPHILS NFR CSF MANUAL: 0 %
BILIRUB SERPL-MCNC: 0.7 MG/DL (ref 0–1.2)
BLASTS CSF MANUAL: 0 %
BUN SERPL-MCNC: 16 MG/DL (ref 6–23)
CALCIUM SERPL-MCNC: 8.6 MG/DL (ref 8.6–10.3)
CELL POPULATIONS: NORMAL
CHLORIDE SERPL-SCNC: 99 MMOL/L (ref 98–107)
CO2 SERPL-SCNC: 28 MMOL/L (ref 21–32)
COLOR CSF: COLORLESS
COLOR SPUN CSF: COLORLESS
CREAT SERPL-MCNC: 0.62 MG/DL (ref 0.5–1.3)
DACRYOCYTES BLD QL SMEAR: ABNORMAL
DIAGNOSIS: NORMAL
EOSINOPHIL # BLD MANUAL: 0 X10*3/UL (ref 0–0.7)
EOSINOPHIL NFR BLD MANUAL: 0 %
EOSINOPHIL NFR CSF MANUAL: 0 %
ERYTHROCYTE [DISTWIDTH] IN BLOOD BY AUTOMATED COUNT: 16.5 % (ref 11.5–14.5)
FLOW DIFFERENTIAL: NORMAL
FLOW TEST ORDERED: NORMAL
GFR SERPL CREATININE-BSD FRML MDRD: >90 ML/MIN/1.73M*2
GLUCOSE CSF-MCNC: 57 MG/DL (ref 40–70)
GLUCOSE SERPL-MCNC: 82 MG/DL (ref 74–99)
HCT VFR BLD AUTO: 23.6 % (ref 41–52)
HGB BLD-MCNC: 8.5 G/DL (ref 13.5–17.5)
IMM GRANULOCYTES # BLD AUTO: 0.06 X10*3/UL (ref 0–0.7)
IMM GRANULOCYTES NFR BLD AUTO: 1.1 % (ref 0–0.9)
IMM GRANULOCYTES NFR CSF: 0 %
LAB TEST METHOD: NORMAL
LYMPHOCYTES # BLD MANUAL: 0.06 X10*3/UL (ref 1.2–4.8)
LYMPHOCYTES NFR BLD MANUAL: 1 %
LYMPHOCYTES NFR CSF MANUAL: 0 % (ref 28–96)
MCH RBC QN AUTO: 30.7 PG (ref 26–34)
MCHC RBC AUTO-ENTMCNC: 36 G/DL (ref 32–36)
MCV RBC AUTO: 85 FL (ref 80–100)
MONOCYTES # BLD MANUAL: 0.06 X10*3/UL (ref 0.1–1)
MONOCYTES NFR BLD MANUAL: 1 %
MONOS+MACROS NFR CSF MANUAL: 0 % (ref 16–56)
NEUTROPHILS # BLD MANUAL: 5.53 X10*3/UL (ref 1.2–7.7)
NEUTS BAND # BLD MANUAL: 0.4 X10*3/UL (ref 0–0.7)
NEUTS BAND NFR BLD MANUAL: 7 %
NEUTS SEG # BLD MANUAL: 5.13 X10*3/UL (ref 1.2–7)
NEUTS SEG NFR BLD MANUAL: 90 %
NEUTS SEG NFR CSF MANUAL: 100 % (ref 0–5)
NRBC BLD-RTO: 0 /100 WBCS (ref 0–0)
NUMBER OF CELLS COLLECTED: 1829 PER TUBE
OTHER CELLS NFR CSF MANUAL: 0 %
OVALOCYTES BLD QL SMEAR: ABNORMAL
PATH REPORT.TOTAL CANCER: NORMAL
PLASMA CELLS NFR CSF MICRO: 0 %
PLATELET # BLD AUTO: 205 X10*3/UL (ref 150–450)
POLYCHROMASIA BLD QL SMEAR: ABNORMAL
POTASSIUM SERPL-SCNC: 3.4 MMOL/L (ref 3.5–5.3)
PROT CSF-MCNC: 29 MG/DL (ref 15–45)
PROT SERPL-MCNC: 5.6 G/DL (ref 6.4–8.2)
RBC # BLD AUTO: 2.77 X10*6/UL (ref 4.5–5.9)
RBC # CSF AUTO: 1000 /UL (ref 0–5)
RBC MORPH BLD: ABNORMAL
SCHISTOCYTES BLD QL SMEAR: ABNORMAL
SIGNATURE COMMENT: NORMAL
SODIUM SERPL-SCNC: 134 MMOL/L (ref 136–145)
SPECIMEN VIABILITY: NORMAL
TOTAL CELLS COUNTED BLD: 100
TOTAL CELLS COUNTED CSF: 45
TUBE # CSF: ABNORMAL
VARIANT LYMPHS # BLD MANUAL: 0.06 X10*3/UL (ref 0–0.5)
VARIANT LYMPHS NFR BLD: 1 %
WBC # BLD AUTO: 5.7 X10*3/UL (ref 4.4–11.3)
WBC # CSF AUTO: 2 /UL (ref 1–5)

## 2023-12-18 PROCEDURE — 84157 ASSAY OF PROTEIN OTHER: CPT | Performed by: PHYSICIAN ASSISTANT

## 2023-12-18 PROCEDURE — 89051 BODY FLUID CELL COUNT: CPT | Performed by: PHYSICIAN ASSISTANT

## 2023-12-18 PROCEDURE — 2500000004 HC RX 250 GENERAL PHARMACY W/ HCPCS (ALT 636 FOR OP/ED): Mod: JZ | Performed by: INTERNAL MEDICINE

## 2023-12-18 PROCEDURE — A4216 STERILE WATER/SALINE, 10 ML: HCPCS | Performed by: INTERNAL MEDICINE

## 2023-12-18 PROCEDURE — 88104 CYTOPATH FL NONGYN SMEARS: CPT | Performed by: PHYSICIAN ASSISTANT

## 2023-12-18 PROCEDURE — 85007 BL SMEAR W/DIFF WBC COUNT: CPT

## 2023-12-18 PROCEDURE — 2500000004 HC RX 250 GENERAL PHARMACY W/ HCPCS (ALT 636 FOR OP/ED): Performed by: INTERNAL MEDICINE

## 2023-12-18 PROCEDURE — 62270 DX LMBR SPI PNXR: CPT | Performed by: PHYSICIAN ASSISTANT

## 2023-12-18 PROCEDURE — 82945 GLUCOSE OTHER FLUID: CPT | Performed by: PHYSICIAN ASSISTANT

## 2023-12-18 PROCEDURE — 80053 COMPREHEN METABOLIC PANEL: CPT

## 2023-12-18 PROCEDURE — 88185 FLOWCYTOMETRY/TC ADD-ON: CPT | Mod: TC | Performed by: PHYSICIAN ASSISTANT

## 2023-12-18 PROCEDURE — 96450 CHEMOTHERAPY INTO CNS: CPT | Performed by: PHYSICIAN ASSISTANT

## 2023-12-18 PROCEDURE — 85027 COMPLETE CBC AUTOMATED: CPT

## 2023-12-18 PROCEDURE — 88187 FLOWCYTOMETRY/READ 2-8: CPT | Performed by: PATHOLOGY

## 2023-12-18 PROCEDURE — 96372 THER/PROPH/DIAG INJ SC/IM: CPT | Mod: 59 | Performed by: INTERNAL MEDICINE

## 2023-12-18 RX ADMIN — SODIUM CHLORIDE 100 MG: 9 INJECTION INTRAMUSCULAR; INTRAVENOUS; SUBCUTANEOUS at 17:20

## 2023-12-18 RX ADMIN — PEGFILGRASTIM 6 MG: 6 INJECTION SUBCUTANEOUS at 16:36

## 2023-12-19 LAB
ACID FAST STN SPEC: NORMAL
MYCOBACTERIUM SPEC CULT: NORMAL
PATH REVIEW-CELL CT,CSF: NORMAL

## 2023-12-19 NOTE — PROGRESS NOTES
Patient ID: Shilo Thakkar is a 23 y.o. male.    Lumbar Puncture    Date/Time: 12/18/2023 7:41 PM    Performed by: Roselia hSetty PA-C  Authorized by: Roselia Shetty PA-C    Consent:     Consent obtained:  Written    Consent given by:  Patient    Risks, benefits, and alternatives were discussed: yes      Risks discussed:  Bleeding, infection, headache and pain    Alternatives discussed:  No treatment  Universal protocol:     Procedure explained and questions answered to patient or proxy's satisfaction: yes      Relevant documents present and verified: yes      Test results available: yes      Immediately prior to procedure a time out was called: yes      Site/side marked: yes      Patient identity confirmed:  Verbally with patient, hospital-assigned identification number and arm band  Pre-procedure details:     Procedure purpose:  Therapeutic    Preparation: Patient was prepped and draped in usual sterile fashion    Anesthesia:     Anesthesia method:  Local infiltration    Local anesthetic:  Lidocaine 1% w/o epi  Procedure details:     Lumbar space:  L4-L5 interspace    Patient position:  Sitting    Needle type:  Spinal needle - Quincke tip    Ultrasound guidance: no      Number of attempts:  1    Tubes of fluid:  3    Total volume (ml):  9  Post-procedure details:     Puncture site:  Adhesive bandage applied and direct pressure applied    Procedure completion:  Tolerated well, no immediate complications  Comments:      Sent samples for cell count and diff, total protein and glucose, flow cytometry.  Administered Cytarabine 100mg IT (3ml) x 1.

## 2023-12-21 LAB
CELL POPULATIONS: NORMAL
DIAGNOSIS: NORMAL
FLOW DIFFERENTIAL: NORMAL
FLOW TEST ORDERED: NORMAL
FLUID CELL COUNT: 2 /UL
LAB TEST METHOD: NORMAL
NUMBER OF CELLS COLLECTED: NORMAL
PATH REPORT.TOTAL CANCER: NORMAL
SIGNATURE COMMENT: NORMAL
SPECIMEN VIABILITY: NORMAL

## 2023-12-22 ENCOUNTER — INFUSION (OUTPATIENT)
Dept: HEMATOLOGY/ONCOLOGY | Facility: HOSPITAL | Age: 23
End: 2023-12-22
Payer: COMMERCIAL

## 2023-12-22 VITALS
HEART RATE: 81 BPM | SYSTOLIC BLOOD PRESSURE: 133 MMHG | OXYGEN SATURATION: 100 % | DIASTOLIC BLOOD PRESSURE: 57 MMHG | WEIGHT: 155.42 LBS | RESPIRATION RATE: 18 BRPM | BODY MASS INDEX: 21.24 KG/M2 | TEMPERATURE: 97.7 F

## 2023-12-22 DIAGNOSIS — C83.78 BURKITT LYMPHOMA OF LYMPH NODES OF MULTIPLE REGIONS (MULTI): ICD-10-CM

## 2023-12-22 DIAGNOSIS — C83.70 BURKITT LYMPHOMA, UNSPECIFIED BODY REGION (MULTI): Primary | ICD-10-CM

## 2023-12-22 DIAGNOSIS — C83.70 BURKITT LYMPHOMA, UNSPECIFIED BODY REGION (MULTI): ICD-10-CM

## 2023-12-22 LAB
ALBUMIN SERPL BCP-MCNC: 4.4 G/DL (ref 3.4–5)
ALP SERPL-CCNC: 77 U/L (ref 33–120)
ALT SERPL W P-5'-P-CCNC: 26 U/L (ref 10–52)
ANION GAP SERPL CALC-SCNC: 15 MMOL/L (ref 10–20)
AST SERPL W P-5'-P-CCNC: 12 U/L (ref 9–39)
BASOPHILS # BLD MANUAL: 0.01 X10*3/UL (ref 0–0.1)
BASOPHILS NFR BLD MANUAL: 1 %
BILIRUB SERPL-MCNC: 0.6 MG/DL (ref 0–1.2)
BUN SERPL-MCNC: 20 MG/DL (ref 6–23)
CALCIUM SERPL-MCNC: 9.3 MG/DL (ref 8.6–10.3)
CHLORIDE SERPL-SCNC: 102 MMOL/L (ref 98–107)
CO2 SERPL-SCNC: 27 MMOL/L (ref 21–32)
CREAT SERPL-MCNC: 0.72 MG/DL (ref 0.5–1.3)
DACRYOCYTES BLD QL SMEAR: ABNORMAL
EOSINOPHIL # BLD MANUAL: 0.01 X10*3/UL (ref 0–0.7)
EOSINOPHIL NFR BLD MANUAL: 1 %
ERYTHROCYTE [DISTWIDTH] IN BLOOD BY AUTOMATED COUNT: 16.1 % (ref 11.5–14.5)
GFR SERPL CREATININE-BSD FRML MDRD: >90 ML/MIN/1.73M*2
GLUCOSE SERPL-MCNC: 112 MG/DL (ref 74–99)
HCT VFR BLD AUTO: 21.7 % (ref 41–52)
HGB BLD-MCNC: 7.5 G/DL (ref 13.5–17.5)
IMM GRANULOCYTES # BLD AUTO: 0.07 X10*3/UL (ref 0–0.7)
IMM GRANULOCYTES NFR BLD AUTO: 9.5 % (ref 0–0.9)
LYMPHOCYTES # BLD MANUAL: 0.08 X10*3/UL (ref 1.2–4.8)
LYMPHOCYTES NFR BLD MANUAL: 11 %
MCH RBC QN AUTO: 30.5 PG (ref 26–34)
MCHC RBC AUTO-ENTMCNC: 34.6 G/DL (ref 32–36)
MCV RBC AUTO: 88 FL (ref 80–100)
MONOCYTES # BLD MANUAL: 0.08 X10*3/UL (ref 0.1–1)
MONOCYTES NFR BLD MANUAL: 12 %
NEUTROPHILS # BLD MANUAL: 0.53 X10*3/UL (ref 1.2–7.7)
NEUTS BAND # BLD MANUAL: 0.01 X10*3/UL (ref 0–0.7)
NEUTS BAND NFR BLD MANUAL: 1 %
NEUTS SEG # BLD MANUAL: 0.52 X10*3/UL (ref 1.2–7)
NEUTS SEG NFR BLD MANUAL: 74 %
NRBC BLD-RTO: 0 /100 WBCS (ref 0–0)
OVALOCYTES BLD QL SMEAR: ABNORMAL
PLATELET # BLD AUTO: 109 X10*3/UL (ref 150–450)
POTASSIUM SERPL-SCNC: 3.9 MMOL/L (ref 3.5–5.3)
PROT SERPL-MCNC: 6.1 G/DL (ref 6.4–8.2)
RBC # BLD AUTO: 2.46 X10*6/UL (ref 4.5–5.9)
RBC MORPH BLD: ABNORMAL
SCHISTOCYTES BLD QL SMEAR: ABNORMAL
SODIUM SERPL-SCNC: 140 MMOL/L (ref 136–145)
TOTAL CELLS COUNTED BLD: 100
WBC # BLD AUTO: 0.7 X10*3/UL (ref 4.4–11.3)

## 2023-12-22 PROCEDURE — 2500000004 HC RX 250 GENERAL PHARMACY W/ HCPCS (ALT 636 FOR OP/ED): Performed by: INTERNAL MEDICINE

## 2023-12-22 PROCEDURE — 96413 CHEMO IV INFUSION 1 HR: CPT

## 2023-12-22 PROCEDURE — 80053 COMPREHEN METABOLIC PANEL: CPT

## 2023-12-22 PROCEDURE — 2500000001 HC RX 250 WO HCPCS SELF ADMINISTERED DRUGS (ALT 637 FOR MEDICARE OP): Performed by: INTERNAL MEDICINE

## 2023-12-22 PROCEDURE — 85027 COMPLETE CBC AUTOMATED: CPT

## 2023-12-22 PROCEDURE — 83735 ASSAY OF MAGNESIUM: CPT

## 2023-12-22 PROCEDURE — 85007 BL SMEAR W/DIFF WBC COUNT: CPT

## 2023-12-22 PROCEDURE — 96415 CHEMO IV INFUSION ADDL HR: CPT

## 2023-12-22 PROCEDURE — 96375 TX/PRO/DX INJ NEW DRUG ADDON: CPT | Mod: INF

## 2023-12-22 PROCEDURE — 96411 CHEMO IV PUSH ADDL DRUG: CPT

## 2023-12-22 RX ORDER — DIPHENHYDRAMINE HCL 50 MG
50 CAPSULE ORAL ONCE
Status: COMPLETED | OUTPATIENT
Start: 2023-12-22 | End: 2023-12-22

## 2023-12-22 RX ORDER — ALBUTEROL SULFATE 0.83 MG/ML
3 SOLUTION RESPIRATORY (INHALATION) AS NEEDED
Status: DISCONTINUED | OUTPATIENT
Start: 2023-12-22 | End: 2023-12-22 | Stop reason: HOSPADM

## 2023-12-22 RX ORDER — FAMOTIDINE 10 MG/ML
20 INJECTION INTRAVENOUS ONCE AS NEEDED
Status: DISCONTINUED | OUTPATIENT
Start: 2023-12-22 | End: 2023-12-22 | Stop reason: HOSPADM

## 2023-12-22 RX ORDER — EPINEPHRINE 0.3 MG/.3ML
0.3 INJECTION SUBCUTANEOUS EVERY 5 MIN PRN
Status: DISCONTINUED | OUTPATIENT
Start: 2023-12-22 | End: 2023-12-22 | Stop reason: HOSPADM

## 2023-12-22 RX ORDER — DIPHENHYDRAMINE HYDROCHLORIDE 50 MG/ML
50 INJECTION INTRAMUSCULAR; INTRAVENOUS AS NEEDED
Status: DISCONTINUED | OUTPATIENT
Start: 2023-12-22 | End: 2023-12-22 | Stop reason: HOSPADM

## 2023-12-22 RX ORDER — ACETAMINOPHEN 325 MG/1
650 TABLET ORAL ONCE
Status: COMPLETED | OUTPATIENT
Start: 2023-12-22 | End: 2023-12-22

## 2023-12-22 RX ADMIN — ACETAMINOPHEN 650 MG: 325 TABLET ORAL at 11:32

## 2023-12-22 RX ADMIN — HYDROCORTISONE SODIUM SUCCINATE 100 MG: 100 INJECTION, POWDER, FOR SOLUTION INTRAMUSCULAR; INTRAVENOUS at 12:20

## 2023-12-22 RX ADMIN — DIPHENHYDRAMINE HYDROCHLORIDE 50 MG: 50 CAPSULE ORAL at 11:32

## 2023-12-22 RX ADMIN — RITUXIMAB 700 MG: 10 INJECTION, SOLUTION INTRAVENOUS at 12:48

## 2023-12-22 RX ADMIN — VINCRISTINE SULFATE 2 MG: 1 INJECTION, SOLUTION INTRAVENOUS at 12:25

## 2023-12-22 ASSESSMENT — PAIN SCALES - GENERAL: PAINLEVEL: 0-NO PAIN

## 2023-12-27 ENCOUNTER — LAB (OUTPATIENT)
Dept: LAB | Facility: HOSPITAL | Age: 23
End: 2023-12-27
Payer: COMMERCIAL

## 2023-12-27 ENCOUNTER — ONCOLOGY MEDICATION OUTREACH (OUTPATIENT)
Dept: HEMATOLOGY/ONCOLOGY | Facility: HOSPITAL | Age: 23
End: 2023-12-27
Payer: COMMERCIAL

## 2023-12-27 ENCOUNTER — OFFICE VISIT (OUTPATIENT)
Dept: HEMATOLOGY/ONCOLOGY | Facility: HOSPITAL | Age: 23
End: 2023-12-27
Payer: COMMERCIAL

## 2023-12-27 VITALS
SYSTOLIC BLOOD PRESSURE: 123 MMHG | HEIGHT: 72 IN | HEART RATE: 90 BPM | DIASTOLIC BLOOD PRESSURE: 65 MMHG | BODY MASS INDEX: 21.2 KG/M2 | OXYGEN SATURATION: 100 % | WEIGHT: 156.5 LBS | RESPIRATION RATE: 17 BRPM | TEMPERATURE: 99.5 F

## 2023-12-27 DIAGNOSIS — C83.70 BURKITT LYMPHOMA, UNSPECIFIED BODY REGION (MULTI): ICD-10-CM

## 2023-12-27 DIAGNOSIS — C83.78 BURKITT LYMPHOMA OF LYMPH NODES OF MULTIPLE REGIONS (MULTI): ICD-10-CM

## 2023-12-27 DIAGNOSIS — C83.70 BURKITT LYMPHOMA, UNSPECIFIED BODY REGION (MULTI): Primary | ICD-10-CM

## 2023-12-27 LAB
ALBUMIN SERPL BCP-MCNC: 4.7 G/DL (ref 3.4–5)
ALP SERPL-CCNC: 122 U/L (ref 33–120)
ALT SERPL W P-5'-P-CCNC: 35 U/L (ref 10–52)
ANION GAP SERPL CALC-SCNC: 13 MMOL/L (ref 10–20)
AST SERPL W P-5'-P-CCNC: 19 U/L (ref 9–39)
B2 MICROGLOB SERPL-MCNC: 2.9 MG/L (ref 0.7–2.2)
BASOPHILS # BLD MANUAL: 0 X10*3/UL (ref 0–0.1)
BASOPHILS NFR BLD MANUAL: 0 %
BILIRUB SERPL-MCNC: 0.5 MG/DL (ref 0–1.2)
BUN SERPL-MCNC: 9 MG/DL (ref 6–23)
CALCIUM SERPL-MCNC: 10 MG/DL (ref 8.6–10.3)
CHLORIDE SERPL-SCNC: 98 MMOL/L (ref 98–107)
CO2 SERPL-SCNC: 32 MMOL/L (ref 21–32)
CREAT SERPL-MCNC: 0.82 MG/DL (ref 0.5–1.3)
DACRYOCYTES BLD QL SMEAR: ABNORMAL
EOSINOPHIL # BLD MANUAL: 0 X10*3/UL (ref 0–0.7)
EOSINOPHIL NFR BLD MANUAL: 0 %
ERYTHROCYTE [DISTWIDTH] IN BLOOD BY AUTOMATED COUNT: 16.1 % (ref 11.5–14.5)
GFR SERPL CREATININE-BSD FRML MDRD: >90 ML/MIN/1.73M*2
GLUCOSE SERPL-MCNC: 98 MG/DL (ref 74–99)
HCT VFR BLD AUTO: 22.2 % (ref 41–52)
HGB BLD-MCNC: 7.8 G/DL (ref 13.5–17.5)
IGA SERPL-MCNC: 19 MG/DL (ref 70–400)
IGG SERPL-MCNC: 276 MG/DL (ref 700–1600)
IGM SERPL-MCNC: 9 MG/DL (ref 40–230)
IMM GRANULOCYTES # BLD AUTO: 5.51 X10*3/UL (ref 0–0.7)
IMM GRANULOCYTES NFR BLD AUTO: 22.7 % (ref 0–0.9)
LDH SERPL L TO P-CCNC: 305 U/L (ref 84–246)
LYMPHOCYTES # BLD MANUAL: 0 X10*3/UL (ref 1.2–4.8)
LYMPHOCYTES NFR BLD MANUAL: 0 %
MAGNESIUM SERPL-MCNC: 1.78 MG/DL (ref 1.6–2.4)
MCH RBC QN AUTO: 31.1 PG (ref 26–34)
MCHC RBC AUTO-ENTMCNC: 35.1 G/DL (ref 32–36)
MCV RBC AUTO: 88 FL (ref 80–100)
METAMYELOCYTES # BLD MANUAL: 0.24 X10*3/UL
METAMYELOCYTES NFR BLD MANUAL: 1 %
MONOCYTES # BLD MANUAL: 1.7 X10*3/UL (ref 0.1–1)
MONOCYTES NFR BLD MANUAL: 7 %
MYELOCYTES # BLD MANUAL: 1.46 X10*3/UL
MYELOCYTES NFR BLD MANUAL: 6 %
NEUTROPHILS # BLD MANUAL: 20.9 X10*3/UL (ref 1.2–7.7)
NEUTS BAND # BLD MANUAL: 3.4 X10*3/UL (ref 0–0.7)
NEUTS BAND NFR BLD MANUAL: 14 %
NEUTS SEG # BLD MANUAL: 17.5 X10*3/UL (ref 1.2–7)
NEUTS SEG NFR BLD MANUAL: 72 %
NRBC BLD-RTO: 0.3 /100 WBCS (ref 0–0)
OVALOCYTES BLD QL SMEAR: ABNORMAL
PLATELET # BLD AUTO: 49 X10*3/UL (ref 150–450)
POLYCHROMASIA BLD QL SMEAR: ABNORMAL
POTASSIUM SERPL-SCNC: 3.9 MMOL/L (ref 3.5–5.3)
PROT SERPL-MCNC: 6.4 G/DL (ref 6.4–8.2)
PROT SERPL-MCNC: 6.6 G/DL (ref 6.4–8.2)
RBC # BLD AUTO: 2.51 X10*6/UL (ref 4.5–5.9)
RBC MORPH BLD: ABNORMAL
SCHISTOCYTES BLD QL SMEAR: ABNORMAL
SODIUM SERPL-SCNC: 139 MMOL/L (ref 136–145)
TOTAL CELLS COUNTED BLD: 100
WBC # BLD AUTO: 24.3 X10*3/UL (ref 4.4–11.3)

## 2023-12-27 PROCEDURE — 85007 BL SMEAR W/DIFF WBC COUNT: CPT

## 2023-12-27 PROCEDURE — 1036F TOBACCO NON-USER: CPT | Performed by: INTERNAL MEDICINE

## 2023-12-27 PROCEDURE — 84165 PROTEIN E-PHORESIS SERUM: CPT | Performed by: STUDENT IN AN ORGANIZED HEALTH CARE EDUCATION/TRAINING PROGRAM

## 2023-12-27 PROCEDURE — 85027 COMPLETE CBC AUTOMATED: CPT

## 2023-12-27 PROCEDURE — 83521 IG LIGHT CHAINS FREE EACH: CPT

## 2023-12-27 PROCEDURE — 82232 ASSAY OF BETA-2 PROTEIN: CPT

## 2023-12-27 PROCEDURE — 84155 ASSAY OF PROTEIN SERUM: CPT

## 2023-12-27 PROCEDURE — 99215 OFFICE O/P EST HI 40 MIN: CPT | Performed by: INTERNAL MEDICINE

## 2023-12-27 PROCEDURE — 80053 COMPREHEN METABOLIC PANEL: CPT

## 2023-12-27 PROCEDURE — 82784 ASSAY IGA/IGD/IGG/IGM EACH: CPT

## 2023-12-27 PROCEDURE — 83615 LACTATE (LD) (LDH) ENZYME: CPT

## 2023-12-27 PROCEDURE — 86320 SERUM IMMUNOELECTROPHORESIS: CPT | Performed by: STUDENT IN AN ORGANIZED HEALTH CARE EDUCATION/TRAINING PROGRAM

## 2023-12-27 PROCEDURE — 86334 IMMUNOFIX E-PHORESIS SERUM: CPT

## 2023-12-27 RX ORDER — FAMOTIDINE 10 MG/ML
20 INJECTION INTRAVENOUS ONCE AS NEEDED
Status: CANCELLED | OUTPATIENT
Start: 2024-01-11

## 2023-12-27 RX ORDER — DIPHENHYDRAMINE HCL 50 MG
50 CAPSULE ORAL ONCE
Status: CANCELLED | OUTPATIENT
Start: 2024-01-08

## 2023-12-27 RX ORDER — EPINEPHRINE 0.3 MG/.3ML
0.3 INJECTION SUBCUTANEOUS EVERY 5 MIN PRN
Status: CANCELLED | OUTPATIENT
Start: 2024-01-03

## 2023-12-27 RX ORDER — EPINEPHRINE 0.3 MG/.3ML
0.3 INJECTION SUBCUTANEOUS EVERY 5 MIN PRN
Status: CANCELLED | OUTPATIENT
Start: 2024-01-08

## 2023-12-27 RX ORDER — DIPHENHYDRAMINE HYDROCHLORIDE 50 MG/ML
50 INJECTION INTRAMUSCULAR; INTRAVENOUS AS NEEDED
Status: CANCELLED | OUTPATIENT
Start: 2024-01-08

## 2023-12-27 RX ORDER — FAMOTIDINE 10 MG/ML
20 INJECTION INTRAVENOUS ONCE AS NEEDED
Status: CANCELLED | OUTPATIENT
Start: 2024-01-03

## 2023-12-27 RX ORDER — FAMOTIDINE 10 MG/ML
20 INJECTION INTRAVENOUS AS NEEDED
Status: CANCELLED | OUTPATIENT
Start: 2024-01-03

## 2023-12-27 RX ORDER — PROCHLORPERAZINE MALEATE 10 MG
10 TABLET ORAL EVERY 6 HOURS PRN
Status: CANCELLED | OUTPATIENT
Start: 2024-01-03

## 2023-12-27 RX ORDER — DIPHENHYDRAMINE HYDROCHLORIDE 50 MG/ML
50 INJECTION INTRAMUSCULAR; INTRAVENOUS AS NEEDED
Status: CANCELLED | OUTPATIENT
Start: 2024-01-03

## 2023-12-27 RX ORDER — ACETAMINOPHEN 325 MG/1
650 TABLET ORAL ONCE
Status: CANCELLED | OUTPATIENT
Start: 2024-01-08

## 2023-12-27 RX ORDER — FAMOTIDINE 10 MG/ML
20 INJECTION INTRAVENOUS ONCE AS NEEDED
Status: CANCELLED | OUTPATIENT
Start: 2024-01-08

## 2023-12-27 RX ORDER — DIPHENHYDRAMINE HCL 50 MG
50 CAPSULE ORAL ONCE
Status: CANCELLED | OUTPATIENT
Start: 2024-01-03

## 2023-12-27 RX ORDER — ALBUTEROL SULFATE 0.83 MG/ML
3 SOLUTION RESPIRATORY (INHALATION) AS NEEDED
Status: CANCELLED | OUTPATIENT
Start: 2024-01-11

## 2023-12-27 RX ORDER — LEVOFLOXACIN 500 MG/1
500 TABLET, FILM COATED ORAL
Qty: 10 TABLET | Refills: 1 | Status: SHIPPED | OUTPATIENT
Start: 2023-12-27 | End: 2024-01-23 | Stop reason: HOSPADM

## 2023-12-27 RX ORDER — EPINEPHRINE 0.3 MG/.3ML
0.3 INJECTION SUBCUTANEOUS EVERY 5 MIN PRN
Status: CANCELLED | OUTPATIENT
Start: 2024-01-11

## 2023-12-27 RX ORDER — ALBUTEROL SULFATE 0.83 MG/ML
3 SOLUTION RESPIRATORY (INHALATION) AS NEEDED
Status: CANCELLED | OUTPATIENT
Start: 2024-01-03

## 2023-12-27 RX ORDER — DIPHENHYDRAMINE HYDROCHLORIDE 50 MG/ML
50 INJECTION INTRAMUSCULAR; INTRAVENOUS AS NEEDED
Status: CANCELLED | OUTPATIENT
Start: 2024-01-11

## 2023-12-27 RX ORDER — ALBUTEROL SULFATE 0.83 MG/ML
3 SOLUTION RESPIRATORY (INHALATION) AS NEEDED
Status: CANCELLED | OUTPATIENT
Start: 2024-01-08

## 2023-12-27 RX ORDER — PROCHLORPERAZINE EDISYLATE 5 MG/ML
10 INJECTION INTRAMUSCULAR; INTRAVENOUS EVERY 6 HOURS PRN
Status: CANCELLED | OUTPATIENT
Start: 2024-01-03

## 2023-12-27 RX ORDER — EPINEPHRINE 1 MG/ML
0.3 INJECTION INTRAMUSCULAR; INTRAVENOUS; SUBCUTANEOUS EVERY 5 MIN PRN
Status: CANCELLED | OUTPATIENT
Start: 2024-01-03

## 2023-12-27 RX ORDER — PREDNISOLONE ACETATE 10 MG/ML
2 SUSPENSION/ DROPS OPHTHALMIC EVERY 6 HOURS
Status: CANCELLED | OUTPATIENT
Start: 2024-01-03

## 2023-12-27 RX ORDER — ACETAMINOPHEN 325 MG/1
650 TABLET ORAL ONCE
Status: CANCELLED | OUTPATIENT
Start: 2024-01-03

## 2023-12-27 ASSESSMENT — PAIN SCALES - GENERAL: PAINLEVEL: 0-NO PAIN

## 2023-12-27 ASSESSMENT — ENCOUNTER SYMPTOMS
DEPRESSION: 0
LOSS OF SENSATION IN FEET: 0
OCCASIONAL FEELINGS OF UNSTEADINESS: 0

## 2023-12-28 LAB
KAPPA LC SERPL-MCNC: 0.71 MG/DL (ref 0.33–1.94)
KAPPA LC/LAMBDA SER: 2.03 {RATIO} (ref 0.26–1.65)
LAMBDA LC SERPL-MCNC: 0.35 MG/DL (ref 0.57–2.63)

## 2023-12-28 NOTE — PROGRESS NOTES
ONCOLOGY CLINICAL PHARMACY NOTE     Subjective  Shilo Thakkar is a 23 y.o. male with Burkitt Lymphoma, here for patient outreach and supportive care / symptom management.        Treatment history  Treatment Details   Treatment goal Curative   Plan Name HyperCVAD + RiTUXimab, 21 Day Cycles   Status Active   Start Date 10/29/2023   End Date 4/7/2024 (Planned)   Provider Olegario Klein MD   Chemotherapy mesna (Mesnex) 1,070 mg in sodium chloride 0.9% 500 mL infusion, 600 mg/m2 = 1,070 mg, intravenous, Once, 2 of 4 cycles  Administration: 1,070 mg (10/29/2023), 1,070 mg (10/30/2023), 1,070 mg (10/31/2023), 1,100 mg (12/12/2023), 1,100 mg (12/13/2023), 1,100 mg (12/14/2023)    ondansetron (Zofran) 16 mg in dextrose 5 % in water (D5W) 50 mL IVPB, 16 mg (100 % of original dose 16 mg), intravenous, Once, 1 of 1 cycle  Dose modification: 16 mg (original dose 16 mg, Cycle 1)  Administration: 16 mg (10/29/2023), 16 mg (10/30/2023), 16 mg (10/31/2023)    methotrexate 358 mg in sodium chloride 0.9% 164.32 mL IV, 200 mg/m2 = 358 mg, intravenous, Once, 1 of 4 cycles  Administration: 358 mg (11/20/2023), 1,432 mg (11/21/2023)    DOXOrubicin (Adriamycin) 89.5 mg in sodium chloride 0.9% 1,044.75 mL IV, 50 mg/m2 = 44 mg (50 % of original dose 50 mg/m2), intravenous, Once, 2 of 4 cycles  Dose modification: 25 mg/m2 (original dose 50 mg/m2, Cycle 1)  Administration: 89.5 mg (11/1/2023), 92 mg (12/15/2023)    cytarabine (PF) (Cytosar) 100 mg in sodium chloride (PF) 0.9% 3 mL intrathecal injection, 100 mg, intrathecal, Once, 3 of 8 cycles  Administration: 100 mg (11/6/2023), 100 mg (11/21/2023), 100 mg (12/18/2023)    methotrexate 12 mg in sodium chloride (PF) 0.9% 3 mL intrathecal injection, 12 mg, intrathecal, Once, 3 of 8 cycles  Administration: 12 mg (11/2/2023), 12 mg (12/14/2023), 12 mg (11/28/2023)    filgrastim-sndz (Zarxio) injection 300 mcg, 300 mcg, subcutaneous, Daily, 1 of 1 cycle  Administration: 300 mcg (11/3/2023), 300 mcg  (11/4/2023), 300 mcg (11/5/2023), 300 mcg (11/6/2023), 300 mcg (11/7/2023), 300 mcg (11/8/2023), 300 mcg (11/9/2023), 300 mcg (11/10/2023)    leucovorin 50 mg in 5 mL IV, 50 mg, intravenous, Once, 1 of 4 cycles  Administration: 50 mg (11/22/2023), 15 mg (11/22/2023), 15 mg (11/22/2023), 15 mg (11/23/2023), 15 mg (11/23/2023), 15 mg (11/23/2023), 15 mg (11/23/2023), 15 mg (11/24/2023), 15 mg (11/24/2023)    cyclophosphamide (Cytoxan) 537 mg in sodium chloride 0.9% 126.85 mL IV, 300 mg/m2 = 537 mg, intravenous, Every 12 hours, 2 of 4 cycles  Administration: 537 mg (10/29/2023), 537 mg (10/30/2023), 537 mg (10/30/2023), 537 mg (10/31/2023), 537 mg (10/31/2023), 537 mg (11/1/2023), 552 mg (12/12/2023), 552 mg (12/13/2023), 552 mg (12/13/2023), 552 mg (12/14/2023), 552 mg (12/14/2023), 552 mg (12/15/2023)    cytarabine (PF) (Cytosar) 5,400 mg in sodium chloride 0.9% 304 mL IV, 3,000 mg/m2 = 5,400 mg, intravenous, Every 12 hours, 1 of 4 cycles  Administration: 5,400 mg (11/21/2023), 5,400 mg (11/22/2023), 5,400 mg (11/22/2023), 5,400 mg (11/23/2023)    rituximab-pvvr (Ruxience) 700 mg in sodium chloride 0.9% 320 mL IV, 375 mg/m2 = 700 mg, intravenous, Once, 3 of 4 cycles  Administration: 700 mg (11/3/2023), 700 mg (11/27/2023), 700 mg (11/10/2023), 700 mg (12/22/2023), 700 mg (11/20/2023), 700 mg (12/12/2023)    pegfilgrastim-cbqv (Udenyca) injection 6 mg, 6 mg, subcutaneous, Once, 2 of 7 cycles  Administration: 6 mg (11/27/2023), 6 mg (12/18/2023)    vinCRIStine (Oncovin) 2 mg in sodium chloride 0.9% 52 mL IV, 2 mg, intravenous, Once, 2 of 4 cycles  Administration: 2 mg (11/1/2023), 2 mg (11/8/2023), 2 mg (12/15/2023), 2 mg (12/22/2023)       Treatment Details   Treatment goal [No plan goal]   Plan Name Venous Access Orders   Status Active   Start Date 11/27/2023   End Date Until discontinued   Provider Olegario Klein MD   Chemotherapy [No matching medication found in this treatment plan]        Objective  There were no  vitals taken for this visit.  Lab Results   Component Value Date    WBC 24.3 (H) 12/27/2023    HGB 7.8 (L) 12/27/2023    HCT 22.2 (L) 12/27/2023    MCV 88 12/27/2023    PLT 49 (L) 12/27/2023      Lab Results   Component Value Date    GLUCOSE 98 12/27/2023    CALCIUM 10.0 12/27/2023     12/27/2023    K 3.9 12/27/2023    CO2 32 12/27/2023    CL 98 12/27/2023    BUN 9 12/27/2023    CREATININE 0.82 12/27/2023     Lab Results   Component Value Date    ALT 35 12/27/2023    AST 19 12/27/2023    ALKPHOS 122 (H) 12/27/2023    BILITOT 0.5 12/27/2023       Allergies and Medications   No Known Allergies    Current Outpatient Medications:     acetaminophen (Tylenol) 325 mg tablet, Take 2 tablets (650 mg) by mouth every 6 hours if needed for mild pain (1 - 3)., Disp: 30 tablet, Rfl: 0    acyclovir (Zovirax) 400 mg tablet, Take 1 tablet (400 mg) by mouth 2 times a day., Disp: 180 tablet, Rfl: 0    ferrous sulfate, 325 mg ferrous sulfate, tablet, Take 1 tablet by mouth 2 times a day., Disp: 60 tablet, Rfl: 1    fluconazole (Diflucan) 200 mg tablet, Take 2 tablets (400 mg) by mouth once daily., Disp: 60 tablet, Rfl: 0    levoFLOXacin (Levaquin) 500 mg tablet, Take 1 tablet (500 mg) by mouth once every 24 hours for 20 days. Take when neutropenic (ANC <500). Your oncologist will inform you when to start and stop this medication., Disp: 10 tablet, Rfl: 1    pantoprazole (ProtoNix) 40 mg EC tablet, Take 1 tablet (40 mg) by mouth once daily in the morning. Take before meals. Do not crush, chew, or split., Disp: 30 tablet, Rfl: 0    sennosides-docusate sodium (Ashley-Colace) 8.6-50 mg tablet, Take 2 tablets by mouth 2 times a day as needed for constipation., Disp: 60 tablet, Rfl: 0    sulfamethoxazole-trimethoprim (Bactrim DS) 800-160 mg tablet, Take 1 tablet by mouth once a day on Monday, Wednesday, and Friday., Disp: 12 tablet, Rfl: 0    Current Facility-Administered Medications:     ondansetron (Zofran) tablet 4 mg, 4 mg, oral, q8h  PRN, Luma Trent MD    Assessment and Plan  Shilo Thakkar is a 23 y.o. male with Burkitt Lymphoma, to be treated with HyperCVAD/Rituximab.    Met with patient in infusion suite on 12/22/23 to remind and encourage him to stay adherent on acyclovir, fluconazole, levofloxacin, and Bactrim DS, for infection prophylaxis in the context of low counts. Patient endorsed he has adequate supply of all and is remaining adherent and compliant with therapy.    Patient was provided with my contact information and was encouraged to reach out with any further questions or concerns.    Luiz Jackson, PharmD

## 2023-12-29 NOTE — PROGRESS NOTES
Patient ID: Shilo Thakkar is a 23 y.o. male.  Oncologic History:  Burkitt lymphoma, high risk, MYC positive  - 10/19/23 presented to OSH ED with dysphagia and abdominal pain  - CT A/P with IV contrast (10/19) mass in the body of the pancreas measuring approximately 2.7 x 2.9 cm. The pancreatic duct is dilated. Multiple masses in the mesentery as well as omental caking consistent with carcinomatosis. Moderate amount of free fluid throughout the abdomen and pelvis.   - CT soft tissue neck with IV contrast (10/19): homogeneous soft tissue attenuation lesion in the L oropharynx which measures approximately 3.0 x 4.0 x 5.1 cm.   - US guided mesenteric LN biopsy (10/20/23): HIGH GRADE B-CELL LYMPHOMA MORPHOLOGICALLY CONSISTENT WITH BURKITT LYMPHOMA; flow cytometry: No B cell population identified  - Bone marrow aspiration biopsy November 1, 2023 no evidence of lymphoma involvement.  CSF by flow cytometry showed no lymphoma 11/2/2023.  - Biopsy L oropharyngeal mass and L cervical LN by ENT (10/23/23) high grade B-cell burkitt lymphoma, MYC positive, Bcl-2 negative.  - MR brain with and w/o IV contrast (10/27): No evidence of intracranial metastatic disease.     - Dexa 40 mg daily ended 11/1  - PET CT ordered 10/30 showed FDG avid left oropharyngeal mass and LAD above and below diaphragm  - Started HyperCVAD 10/29 cycle 1 day 1  - Part A:   D1: CTX + mesna, dexamethasone, D2: CTX + mesna, D3: CTX + mesna, D4: Doxo, vinca, D6: Ritux, GCSF, D9: IT,   D2:  intrathecal methotrexate November 2, 2023, 12 mg, CSF shows no B cells by flow cytometry.  D6: Intrathecal cytarabine 100 mg on November 6, 2023.  D11: vinca, dexamethasone, D13: Ritux 11/10  D13: Rituximab therapy  - Daily neupogen since 11/3/23  -Discharged home November 11, 2023  -Evaluation November 15, 2023: Recovered thrombocytopenia, stop levofloxacin, continue prophylactic acyclovir and 3 times weekly Bactrim and fluconazole.  Depressed affect noted.  -Cycle 2-day 1  "part to be high-dose methotrexate plus high-dose cytarabine, November 20, 2023,  with 2 intrathecal chemotherapy administered  -Neulasta on 11/27/2023.  -Evaluation 12/6/2023 asymptomatic gaining weight doing well.  To schedule twice weekly CBC BMP Mondays and Thursdays after discharge, emphasized prophylactic levofloxacin between day 5 and day 15, acyclovir, fluconazole and 3 times a week Bactrim.  -CT scan of the chest abdomen or pelvis on December 6, 2023 shows complete resolution of intra-abdominal disease, with mildly worse splenomegaly most likely unrelated to his lymphoma.  -Cycle 3 hyper-CVAD regimen December 12, 2023, including Rituxan day 1 and day 11, intrathecal methotrexate 12/14/2023, intrathecal cytarabine December 18, 2023.  -December 27, 2023: Feeling well asymptomatic tolerated cycle 3 well, plan for cycle 4 January 2, 2024 consisting of high-dose methotrexate high-dose cytarabine, and includes 2 doses of rituximab day 1 day 8, and 2 doses of intrathecal chemotherapy, intrathecal cytarabine day 1 and intrathecal methotrexate day 8  Subjective    HPI  Feels well without new complaints.  Eating well maintaining weight.  No dyspnea cough fever chills abdominal pain oral sores diarrhea.  He is taking his prophylactic antibiotics without difficulty.  No paresthesias.  No headache or blurred vision or double vision.    Review of Systems    Head and neck: Other than HPI negative  CNS: Other than HPI negative  Cardiovascular: Other than HPI negative  Pulmonary: Other than HPI negative  GI: Other than HPI negative  : Other than HPI negative  Bleeding: Other than HPI negative  Constitutional: Other than HPI negative     Objective    BSA: 1.9 meters squared  /65   Pulse 90   Temp 37.5 °C (99.5 °F) (Skin)   Resp 17   Ht (S) 1.821 m (5' 11.69\") Comment: height verified  Wt 71 kg (156 lb 8 oz)   SpO2 100%   BMI 21.41 kg/m²      Physical Exam  Alert oriented not in distress not pale or " jaundiced  Lymph nodes: No adenopathy  Oral mucosa negative, no mucositis  Head is normocephalic atraumatic, pupils equal reactive  Neck is supple no adenopathy, no thyromegaly  PICC line in place, no erythema or discharge.  Lungs show equal air entry, no crackles or wheezing, equal percussion  Heart shows regular rate and rhythm normal S1-S2 no murmurs or gallop rhythm  Abdomen is soft nontender, moves with respiration, no hepatosplenomegaly or masses, positive bowel sounds, not distended.  Lower extremities show no edema  Neurologically grossly nonfocal  Psych: Normal affect,  Performance Status:  Symptomatic; fully ambulatory      Assessment/Plan   High risk Burkitt's lymphoma based on bulk and stage, at least stage III, disease site left cervical area, left bed, extensive mesenteric intra-abdominal adenopathy.    Completed 3 cycles of hyper-CVAD and rituximab the third cycle, started December 12, 2023.  Patient is day 16 today, recovering his counts with exception of persistent thrombocytopenia at around 49,000.  He is planned to come back on January 2, 2024 for cycle 4.  This will be the even regimen continue high-dose cytarabine and high-dose methotrexate.  Since creatinine and liver function appears normal today.  *Platelet count needs to recover to above 60,000 before cycle 4 starts.  *Patient should receive 2 doses of rituximab with this cycle, day 1 and day 8±1 or 2 days as needed per scheduling  *Patient should receive 2 doses of intrathecal chemotherapy with this cycle, day 1 and day 8.  Since he is receiving high-dose methotrexate starting day 1 I would probably give cytarabine intrathecally day 1 and reserve methotrexate today 8.  *After discharge he will need twice weekly  CBC and once weekly BMP and magnesium.  *After discharge he will need pegfilgrastim for white count support  *Post chemo he should start levofloxacin between day 5 and day 15.  He should continue without interruption fluconazole and  "acyclovir and 3 times weekly Bactrim.  *Before next cycle, cycle 5, we will obtain an echocardiogram and a PET scan for \"interim\" staging.  Overall he is doing very well  Cancer Staging   No matching staging information was found for the patient.    Oncology History   Burkitt lymphoma (CMS/HCC)   10/27/2023 Initial Diagnosis    Burkitt lymphoma (CMS/HCC)     10/29/2023 -  Chemotherapy    HyperCVAD + RiTUXimab, 21 Day Cycles          Diagnoses and all orders for this visit:  Burkitt lymphoma, unspecified body region (CMS/HCC)  -     Clinic Appointment Request Follow up; [facility]  -     levoFLOXacin (Levaquin) 500 mg tablet; Take 1 tablet (500 mg) by mouth once every 24 hours for 20 days. Take when neutropenic (ANC <500). Your oncologist will inform you when to start and stop this medication.  -     Lactate Dehydrogenase; Future  -     Comprehensive Metabolic Panel; Future  -     CBC and Auto Differential; Future  -     XR chest 2 views; Future  -     Lactate Dehydrogenase; Future  -     Comprehensive Metabolic Panel; Future  -     CBC and Auto Differential; Future  -     [place]/Lambda Free Light Chain, Serum; Future  -     Immunoglobulins (IgG, IgA, IgM); Future  -     Serum Protein Electrophoresis + Immunofixation; Future  -     Beta 2 Microglobuin; Future  -     Magnesium; Future  -     Clinic Appointment Request Follow up; [facility]; Future  -     NM PET CT lymphoma staging; Future  -     Onco-Echo Complete (Strain & 3D); Future  -     perflutren lipid microspheres (Definity) injection 0.5-10 mL of dilution  -     sulfur hexafluoride microsphr (Lumason) injection 24.28 mg  -     perflutren protein A microsphere (Optison) injection 0.5 mL  -     Planned Future Admission >24 Hours  -     Basic Metabolic Panel; Standing  -     CBC and Auto Differential; Standing  -     CBC and Auto Differential; Standing  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)  -     Infusion Appointment Request; " Future  -     CBC and Auto Differential; Future  -     Comprehensive metabolic panel; Future  -     Procedure Appointment Request; Future  -     Infusion Appointment Request; Future  -     CBC and Auto Differential; Future  -     Comprehensive metabolic panel; Future  Other orders  -     acetaminophen (Tylenol) tablet 650 mg  -     diphenhydrAMINE (BENADryl) capsule 50 mg  -     riTUXimab (Rituxan) 700 mg in sodium chloride 0.9% 320 mL IV  -     sodium chloride 0.9 % bolus 500 mL  -     dextrose 5 % in water (D5W) bolus  -     diphenhydrAMINE (BENADryl) injection 50 mg  -     methylPREDNISolone sod succinate (PF) (SOLU-Medrol) 40 mg/mL injection 40 mg  -     famotidine PF (Pepcid) injection 20 mg  -     EPINEPHrine (Epipen) injection syringe 0.3 mg  -     albuterol 2.5 mg /3 mL (0.083 %) nebulizer solution 3 mL  -     sodium bicarbonate 150 mEq in dextrose 5 % in water (D5W) 1,000 mL infusion  -     prednisoLONE acetate (Pred-Forte) 1 % ophthalmic suspension 2 drop  -     ondansetron (Zofran) 16 mg in dextrose 5 % in water (D5W) 50 mL IVPB  -     dexAMETHasone (Decadron) in dextrose 5% 50 mL IV 12 mg  -     prochlorperazine (Compazine) tablet 10 mg  -     prochlorperazine (Compazine) injection 10 mg  -     methotrexate 368 mg in sodium chloride 0.9% 164.72 mL IV  -     methotrexate 1,472 mg in sodium chloride 0.9% 1,058.88 mL IV  -     leucovorin 50 mg in 5 mL IV  -     leucovorin 15 mg in 1.5 mL IV  -     sodium chloride 0.9 % bolus 500 mL  -     dextrose 5 % in water (D5W) bolus  -     diphenhydrAMINE (BENADryl) injection 50 mg  -     methylPREDNISolone sod succinate (PF) (SOLU-Medrol) 40 mg/mL injection 40 mg  -     famotidine PF (Pepcid) injection 20 mg  -     EPINEPHrine (Adrenalin) injection 0.3 mg  -     albuterol 2.5 mg /3 mL (0.083 %) nebulizer solution 3 mL  -     ondansetron (Zofran) 16 mg in dextrose 5 % in water (D5W) 50 mL IVPB  -     dexAMETHasone (Decadron) in dextrose 5% 50 mL IV 12 mg  -      cytarabine (PF) (Cytosar) 5,500 mg in sodium chloride 0.9% 305 mL IV  -     cytarabine PF (Cytosar) 100 mg in sodium chloride (PF) 0.9% 3 mL intrathecal injection  -     ondansetron (Zofran) 16 mg in dextrose 5 % in water (D5W) 50 mL IVPB  -     dexAMETHasone (Decadron) in dextrose 5% 50 mL IV 12 mg  -     cytarabine (PF) (Cytosar) 5,500 mg in sodium chloride 0.9% 305 mL IV  -     acetaminophen (Tylenol) tablet 650 mg  -     diphenhydrAMINE (BENADryl) capsule 50 mg  -     riTUXimab (Rituxan) 700 mg in sodium chloride 0.9% 320 mL IV  -     pegfilgrastim (Neulasta) injection 6 mg  -     sodium chloride 0.9 % bolus 500 mL  -     dextrose 5 % in water (D5W) bolus  -     diphenhydrAMINE (BENADryl) injection 50 mg  -     methylPREDNISolone sod succinate (PF) (SOLU-Medrol) 40 mg/mL injection 40 mg  -     famotidine PF (Pepcid) injection 20 mg  -     EPINEPHrine (Epipen) injection syringe 0.3 mg  -     albuterol 2.5 mg /3 mL (0.083 %) nebulizer solution 3 mL  -     methotrexate 12 mg in sodium chloride (PF) 0.9% 3 mL intrathecal injection  -     sodium chloride 0.9 % bolus 500 mL  -     dextrose 5 % in water (D5W) bolus  -     diphenhydrAMINE (BENADryl) injection 50 mg  -     methylPREDNISolone sod succinate (PF) (SOLU-Medrol) 40 mg/mL injection 40 mg  -     famotidine PF (Pepcid) injection 20 mg  -     EPINEPHrine (Epipen) injection syringe 0.3 mg  -     albuterol 2.5 mg /3 mL (0.083 %) nebulizer solution 3 mL           Olegario Klein MD

## 2024-01-01 LAB
ALBUMIN: 4.3 G/DL (ref 3.4–5)
ALPHA 1 GLOBULIN: 0.4 G/DL (ref 0.2–0.6)
ALPHA 2 GLOBULIN: 0.8 G/DL (ref 0.4–1.1)
BETA GLOBULIN: 0.7 G/DL (ref 0.5–1.2)
GAMMA GLOBULIN: 0.3 G/DL (ref 0.5–1.4)
IMMUNOFIXATION COMMENT: ABNORMAL
M-PROTEIN 1: 0 G/DL
PATH REVIEW - SERUM IMMUNOFIXATION: ABNORMAL
PATH REVIEW-SERUM PROTEIN ELECTROPHORESIS: ABNORMAL
PROTEIN ELECTROPHORESIS COMMENT: ABNORMAL

## 2024-01-02 ENCOUNTER — APPOINTMENT (OUTPATIENT)
Dept: RADIOLOGY | Facility: HOSPITAL | Age: 24
DRG: 846 | End: 2024-01-02
Payer: COMMERCIAL

## 2024-01-02 ENCOUNTER — HOSPITAL ENCOUNTER (INPATIENT)
Facility: HOSPITAL | Age: 24
LOS: 5 days | Discharge: HOME | DRG: 846 | End: 2024-01-07
Attending: INTERNAL MEDICINE | Admitting: STUDENT IN AN ORGANIZED HEALTH CARE EDUCATION/TRAINING PROGRAM
Payer: COMMERCIAL

## 2024-01-02 DIAGNOSIS — C83.78 BURKITT LYMPHOMA OF LYMPH NODES OF MULTIPLE REGIONS (MULTI): ICD-10-CM

## 2024-01-02 DIAGNOSIS — Z51.11 ENCOUNTER FOR ANTINEOPLASTIC CHEMOTHERAPY: Primary | ICD-10-CM

## 2024-01-02 LAB
ABO GROUP (TYPE) IN BLOOD: NORMAL
ALBUMIN SERPL BCP-MCNC: 4.3 G/DL (ref 3.4–5)
ALP SERPL-CCNC: 95 U/L (ref 33–120)
ALT SERPL W P-5'-P-CCNC: 16 U/L (ref 10–52)
ANION GAP SERPL CALC-SCNC: 13 MMOL/L (ref 10–20)
ANTIBODY SCREEN: NORMAL
APTT PPP: 30 SECONDS (ref 27–38)
AST SERPL W P-5'-P-CCNC: 13 U/L (ref 9–39)
BASOPHILS # BLD MANUAL: 0 X10*3/UL (ref 0–0.1)
BASOPHILS NFR BLD MANUAL: 0 %
BILIRUB DIRECT SERPL-MCNC: 0.1 MG/DL (ref 0–0.3)
BILIRUB SERPL-MCNC: 0.4 MG/DL (ref 0–1.2)
BUN SERPL-MCNC: 11 MG/DL (ref 6–23)
CALCIUM SERPL-MCNC: 9 MG/DL (ref 8.6–10.6)
CHLORIDE SERPL-SCNC: 105 MMOL/L (ref 98–107)
CO2 SERPL-SCNC: 27 MMOL/L (ref 21–32)
CREAT SERPL-MCNC: 0.94 MG/DL (ref 0.5–1.3)
EOSINOPHIL # BLD MANUAL: 0 X10*3/UL (ref 0–0.7)
EOSINOPHIL NFR BLD MANUAL: 0 %
ERYTHROCYTE [DISTWIDTH] IN BLOOD BY AUTOMATED COUNT: 20.8 % (ref 11.5–14.5)
GFR SERPL CREATININE-BSD FRML MDRD: >90 ML/MIN/1.73M*2
GLUCOSE SERPL-MCNC: 116 MG/DL (ref 74–99)
HCT VFR BLD AUTO: 22 % (ref 41–52)
HGB BLD-MCNC: 7 G/DL (ref 13.5–17.5)
IMM GRANULOCYTES # BLD AUTO: 0.12 X10*3/UL (ref 0–0.7)
IMM GRANULOCYTES NFR BLD AUTO: 2.4 % (ref 0–0.9)
INR PPP: 1.1 (ref 0.9–1.1)
LDH SERPL L TO P-CCNC: 188 U/L (ref 84–246)
LYMPHOCYTES # BLD MANUAL: 0.54 X10*3/UL (ref 1.2–4.8)
LYMPHOCYTES NFR BLD MANUAL: 11.1 %
MAGNESIUM SERPL-MCNC: 1.84 MG/DL (ref 1.6–2.4)
MCH RBC QN AUTO: 30.3 PG (ref 26–34)
MCHC RBC AUTO-ENTMCNC: 31.8 G/DL (ref 32–36)
MCV RBC AUTO: 95 FL (ref 80–100)
MONOCYTES # BLD MANUAL: 0.04 X10*3/UL (ref 0.1–1)
MONOCYTES NFR BLD MANUAL: 0.9 %
MYELOCYTES # BLD MANUAL: 0.04 X10*3/UL
MYELOCYTES NFR BLD MANUAL: 0.9 %
NEUTROPHILS # BLD MANUAL: 4.27 X10*3/UL (ref 1.2–7.7)
NEUTS BAND # BLD MANUAL: 0.64 X10*3/UL (ref 0–0.7)
NEUTS BAND NFR BLD MANUAL: 13 %
NEUTS SEG # BLD MANUAL: 3.63 X10*3/UL (ref 1.2–7)
NEUTS SEG NFR BLD MANUAL: 74.1 %
NRBC BLD-RTO: 0 /100 WBCS (ref 0–0)
OVALOCYTES BLD QL SMEAR: ABNORMAL
PHOSPHATE SERPL-MCNC: 3.9 MG/DL (ref 2.5–4.9)
PLATELET # BLD AUTO: 100 X10*3/UL (ref 150–450)
POC APPEARANCE, URINE: NORMAL
POC BILIRUBIN, URINE: NORMAL
POC BLOOD, URINE: NORMAL
POC COLOR, URINE: NORMAL
POC GLUCOSE, URINE: NORMAL
POC KETONES, URINE: NORMAL
POC LEUKOCYTES, URINE: NORMAL
POC NITRITE,URINE: NORMAL
POC PH, URINE: NORMAL
POC PROTEIN, URINE: NORMAL
POC SPECIFIC GRAVITY, URINE: NORMAL
POC UROBILINOGEN, URINE: NORMAL
POTASSIUM SERPL-SCNC: 4.2 MMOL/L (ref 3.5–5.3)
PROT SERPL-MCNC: 5.8 G/DL (ref 6.4–8.2)
PROTHROMBIN TIME: 12.8 SECONDS (ref 9.8–12.8)
RBC # BLD AUTO: 2.31 X10*6/UL (ref 4.5–5.9)
RBC MORPH BLD: ABNORMAL
RH FACTOR (ANTIGEN D): NORMAL
SODIUM SERPL-SCNC: 141 MMOL/L (ref 136–145)
TOTAL CELLS COUNTED BLD: 108
URATE SERPL-MCNC: 5.9 MG/DL (ref 4–7.5)
WBC # BLD AUTO: 4.9 X10*3/UL (ref 4.4–11.3)

## 2024-01-02 PROCEDURE — 85007 BL SMEAR W/DIFF WBC COUNT: CPT

## 2024-01-02 PROCEDURE — 85027 COMPLETE CBC AUTOMATED: CPT

## 2024-01-02 PROCEDURE — 99222 1ST HOSP IP/OBS MODERATE 55: CPT | Performed by: STUDENT IN AN ORGANIZED HEALTH CARE EDUCATION/TRAINING PROGRAM

## 2024-01-02 PROCEDURE — 2500000004 HC RX 250 GENERAL PHARMACY W/ HCPCS (ALT 636 FOR OP/ED): Performed by: INTERNAL MEDICINE

## 2024-01-02 PROCEDURE — 83735 ASSAY OF MAGNESIUM: CPT

## 2024-01-02 PROCEDURE — 71046 X-RAY EXAM CHEST 2 VIEWS: CPT | Performed by: RADIOLOGY

## 2024-01-02 PROCEDURE — 83615 LACTATE (LD) (LDH) ENZYME: CPT | Performed by: NURSE PRACTITIONER

## 2024-01-02 PROCEDURE — 86901 BLOOD TYPING SEROLOGIC RH(D): CPT | Performed by: NURSE PRACTITIONER

## 2024-01-02 PROCEDURE — 85610 PROTHROMBIN TIME: CPT

## 2024-01-02 PROCEDURE — 71046 X-RAY EXAM CHEST 2 VIEWS: CPT

## 2024-01-02 PROCEDURE — 84550 ASSAY OF BLOOD/URIC ACID: CPT | Performed by: NURSE PRACTITIONER

## 2024-01-02 PROCEDURE — 86920 COMPATIBILITY TEST SPIN: CPT

## 2024-01-02 PROCEDURE — 2500000001 HC RX 250 WO HCPCS SELF ADMINISTERED DRUGS (ALT 637 FOR MEDICARE OP)

## 2024-01-02 PROCEDURE — 84100 ASSAY OF PHOSPHORUS: CPT

## 2024-01-02 PROCEDURE — 2500000001 HC RX 250 WO HCPCS SELF ADMINISTERED DRUGS (ALT 637 FOR MEDICARE OP): Performed by: NURSE PRACTITIONER

## 2024-01-02 PROCEDURE — 84075 ASSAY ALKALINE PHOSPHATASE: CPT

## 2024-01-02 PROCEDURE — 1170000001 HC PRIVATE ONCOLOGY ROOM DAILY

## 2024-01-02 PROCEDURE — 80048 BASIC METABOLIC PNL TOTAL CA: CPT

## 2024-01-02 RX ORDER — FAMOTIDINE 20 MG/1
20 TABLET, FILM COATED ORAL DAILY
Status: DISCONTINUED | OUTPATIENT
Start: 2024-01-02 | End: 2024-01-07 | Stop reason: HOSPADM

## 2024-01-02 RX ORDER — AMOXICILLIN 250 MG
2 CAPSULE ORAL 2 TIMES DAILY PRN
Status: DISCONTINUED | OUTPATIENT
Start: 2024-01-02 | End: 2024-01-07 | Stop reason: HOSPADM

## 2024-01-02 RX ORDER — FLUCONAZOLE 200 MG/1
400 TABLET ORAL DAILY
Status: DISCONTINUED | OUTPATIENT
Start: 2024-01-02 | End: 2024-01-07 | Stop reason: HOSPADM

## 2024-01-02 RX ORDER — PANTOPRAZOLE SODIUM 40 MG/1
40 TABLET, DELAYED RELEASE ORAL
Status: DISCONTINUED | OUTPATIENT
Start: 2024-01-03 | End: 2024-01-02

## 2024-01-02 RX ORDER — FERROUS SULFATE 325(65) MG
1 TABLET ORAL 2 TIMES DAILY
Status: DISCONTINUED | OUTPATIENT
Start: 2024-01-02 | End: 2024-01-07 | Stop reason: HOSPADM

## 2024-01-02 RX ORDER — POLYETHYLENE GLYCOL 3350 17 G/17G
17 POWDER, FOR SOLUTION ORAL DAILY
Status: DISCONTINUED | OUTPATIENT
Start: 2024-01-02 | End: 2024-01-07 | Stop reason: HOSPADM

## 2024-01-02 RX ORDER — ACYCLOVIR 400 MG/1
400 TABLET ORAL 2 TIMES DAILY
Status: DISCONTINUED | OUTPATIENT
Start: 2024-01-02 | End: 2024-01-02

## 2024-01-02 RX ORDER — FAMOTIDINE 20 MG/1
20 TABLET, FILM COATED ORAL 2 TIMES DAILY
Status: DISCONTINUED | OUTPATIENT
Start: 2024-01-02 | End: 2024-01-02

## 2024-01-02 RX ORDER — FAMOTIDINE 10 MG/ML
20 INJECTION INTRAVENOUS DAILY
Status: DISCONTINUED | OUTPATIENT
Start: 2024-01-02 | End: 2024-01-07 | Stop reason: HOSPADM

## 2024-01-02 RX ORDER — FAMOTIDINE 10 MG/ML
20 INJECTION INTRAVENOUS 2 TIMES DAILY
Status: DISCONTINUED | OUTPATIENT
Start: 2024-01-02 | End: 2024-01-02

## 2024-01-02 RX ORDER — ACYCLOVIR 400 MG/1
400 TABLET ORAL 2 TIMES DAILY
Status: DISCONTINUED | OUTPATIENT
Start: 2024-01-02 | End: 2024-01-07 | Stop reason: HOSPADM

## 2024-01-02 RX ORDER — SULFAMETHOXAZOLE AND TRIMETHOPRIM 800; 160 MG/1; MG/1
1 TABLET ORAL
Status: DISCONTINUED | OUTPATIENT
Start: 2024-01-03 | End: 2024-01-02

## 2024-01-02 RX ADMIN — SODIUM BICARBONATE 200 ML/HR: 84 INJECTION, SOLUTION INTRAVENOUS at 18:26

## 2024-01-02 RX ADMIN — ACYCLOVIR 400 MG: 400 TABLET ORAL at 20:15

## 2024-01-02 RX ADMIN — SODIUM BICARBONATE 200 ML/HR: 84 INJECTION, SOLUTION INTRAVENOUS at 13:40

## 2024-01-02 RX ADMIN — FAMOTIDINE 20 MG: 20 TABLET ORAL at 13:40

## 2024-01-02 RX ADMIN — FERROUS SULFATE TAB 325 MG (65 MG ELEMENTAL FE) 1 TABLET: 325 (65 FE) TAB at 20:15

## 2024-01-02 SDOH — ECONOMIC STABILITY: HOUSING INSECURITY: IN THE LAST 12 MONTHS, HOW MANY PLACES HAVE YOU LIVED?: 1

## 2024-01-02 SDOH — SOCIAL STABILITY: SOCIAL INSECURITY: ABUSE: ADULT

## 2024-01-02 SDOH — SOCIAL STABILITY: SOCIAL INSECURITY: DOES ANYONE TRY TO KEEP YOU FROM HAVING/CONTACTING OTHER FRIENDS OR DOING THINGS OUTSIDE YOUR HOME?: NO

## 2024-01-02 SDOH — ECONOMIC STABILITY: INCOME INSECURITY: IN THE LAST 12 MONTHS, WAS THERE A TIME WHEN YOU WERE NOT ABLE TO PAY THE MORTGAGE OR RENT ON TIME?: NO

## 2024-01-02 SDOH — SOCIAL STABILITY: SOCIAL INSECURITY: DO YOU FEEL UNSAFE GOING BACK TO THE PLACE WHERE YOU ARE LIVING?: NO

## 2024-01-02 SDOH — SOCIAL STABILITY: SOCIAL INSECURITY: WERE YOU ABLE TO COMPLETE ALL THE BEHAVIORAL HEALTH SCREENINGS?: YES

## 2024-01-02 SDOH — SOCIAL STABILITY: SOCIAL INSECURITY: DO YOU FEEL ANYONE HAS EXPLOITED OR TAKEN ADVANTAGE OF YOU FINANCIALLY OR OF YOUR PERSONAL PROPERTY?: NO

## 2024-01-02 SDOH — SOCIAL STABILITY: SOCIAL INSECURITY: HAS ANYONE EVER THREATENED TO HURT YOUR FAMILY OR YOUR PETS?: NO

## 2024-01-02 SDOH — SOCIAL STABILITY: SOCIAL INSECURITY: ARE THERE ANY APPARENT SIGNS OF INJURIES/BEHAVIORS THAT COULD BE RELATED TO ABUSE/NEGLECT?: NO

## 2024-01-02 SDOH — SOCIAL STABILITY: SOCIAL INSECURITY: HAVE YOU HAD THOUGHTS OF HARMING ANYONE ELSE?: YES

## 2024-01-02 SDOH — SOCIAL STABILITY: SOCIAL INSECURITY: ARE YOU OR HAVE YOU BEEN THREATENED OR ABUSED PHYSICALLY, EMOTIONALLY, OR SEXUALLY BY ANYONE?: NO

## 2024-01-02 SDOH — ECONOMIC STABILITY: INCOME INSECURITY: HOW HARD IS IT FOR YOU TO PAY FOR THE VERY BASICS LIKE FOOD, HOUSING, MEDICAL CARE, AND HEATING?: NOT HARD AT ALL

## 2024-01-02 ASSESSMENT — LIFESTYLE VARIABLES
SKIP TO QUESTIONS 9-10: 1
HOW OFTEN DO YOU HAVE 6 OR MORE DRINKS ON ONE OCCASION: NEVER
AUDIT-C TOTAL SCORE: 0
HOW OFTEN DO YOU HAVE A DRINK CONTAINING ALCOHOL: NEVER
AUDIT-C TOTAL SCORE: 0
HOW MANY STANDARD DRINKS CONTAINING ALCOHOL DO YOU HAVE ON A TYPICAL DAY: PATIENT DOES NOT DRINK

## 2024-01-02 ASSESSMENT — ACTIVITIES OF DAILY LIVING (ADL)
PATIENT'S MEMORY ADEQUATE TO SAFELY COMPLETE DAILY ACTIVITIES?: YES
LACK_OF_TRANSPORTATION: NO
DRESSING YOURSELF: INDEPENDENT
ADEQUATE_TO_COMPLETE_ADL: YES
TOILETING: INDEPENDENT
BATHING: INDEPENDENT
WALKS IN HOME: INDEPENDENT
HEARING - LEFT EAR: FUNCTIONAL
JUDGMENT_ADEQUATE_SAFELY_COMPLETE_DAILY_ACTIVITIES: YES
FEEDING YOURSELF: INDEPENDENT
GROOMING: INDEPENDENT
HEARING - RIGHT EAR: FUNCTIONAL

## 2024-01-02 ASSESSMENT — ENCOUNTER SYMPTOMS
FEVER: 0
NAUSEA: 0
MUSCULOSKELETAL NEGATIVE: 1
SHORTNESS OF BREATH: 0
WHEEZING: 0
PALPITATIONS: 0
WOUND: 0
APPETITE CHANGE: 0
PSYCHIATRIC NEGATIVE: 1
WEAKNESS: 0
DYSURIA: 0
HEMATOLOGIC/LYMPHATIC NEGATIVE: 1
HEADACHES: 0
DIARRHEA: 0
COUGH: 0
CHILLS: 0
ACTIVITY CHANGE: 0
ABDOMINAL PAIN: 0
NEUROLOGICAL NEGATIVE: 1
ABDOMINAL DISTENTION: 0
MYALGIAS: 0
CHEST TIGHTNESS: 0
RESPIRATORY NEGATIVE: 1
CONSTIPATION: 0
NUMBNESS: 0
ARTHRALGIAS: 0
BLOOD IN STOOL: 0
CONSTITUTIONAL NEGATIVE: 1
VOMITING: 0

## 2024-01-02 ASSESSMENT — COGNITIVE AND FUNCTIONAL STATUS - GENERAL
MOBILITY SCORE: 24
PATIENT BASELINE BEDBOUND: NO
DAILY ACTIVITIY SCORE: 24
DAILY ACTIVITIY SCORE: 24
MOBILITY SCORE: 24

## 2024-01-02 ASSESSMENT — PAIN SCALES - GENERAL
PAINLEVEL_OUTOF10: 0 - NO PAIN

## 2024-01-02 ASSESSMENT — PATIENT HEALTH QUESTIONNAIRE - PHQ9
2. FEELING DOWN, DEPRESSED OR HOPELESS: NOT AT ALL
1. LITTLE INTEREST OR PLEASURE IN DOING THINGS: NOT AT ALL
SUM OF ALL RESPONSES TO PHQ9 QUESTIONS 1 & 2: 0

## 2024-01-02 ASSESSMENT — PAIN - FUNCTIONAL ASSESSMENT: PAIN_FUNCTIONAL_ASSESSMENT: 0-10

## 2024-01-02 NOTE — NURSING NOTE
Pt receiving sodium bicarb at 200 mL's/hr. Labs and cxr resulted. Provider Gilberto Pineda contacted regarding releasing chemotherapy orders as order set dated to start 1/3. Per provider, RN not to release chemotherapy tonight.

## 2024-01-02 NOTE — CARE PLAN
Problem: Psychosocial Needs  Goal: Collaborate with me, my family, and caregiver to identify my specific goals  Recent Flowsheet Documentation  Taken 1/2/2024 1137 by Negar Oneil RN  Cultural Requests During Hospitalization: NO  Spiritual Requests During Hospitalization: NO   The patient's goals for the shift include      The clinical goals for the shift include remain HDS

## 2024-01-02 NOTE — NURSING NOTE
Patient admitted to [address] from home. Pt axox4, ambulatory, with no complaints of pain. Admission documents completed, vss, height and weight taken. Pt and friend oriented to room and surroundings.

## 2024-01-02 NOTE — H&P
History Of Present Illness  Shilo Thakkar is a 23 y.o. male with PMH of Burkitt Lymphoma (Dx 10/20/23; s/p 2 cycles of HyperCVAD), malignant pleural effusions s/p CT (10/29/23), and ADHD who presents 1/2/2024 for C4 (2B) HyperCVAD + Ritux.     Shilo is doing well, denies any acute complaints. Denies any issues or symptoms at home including nausea/vomiting, diarrhea, infection, fever, or neuropathy at home or today. He denies any recent sick contacts or close indicivudals with COVID+ diagnosis. He denies any injuries. He affirms his Full code status. He has no questions or concerns prior to started his HyperCVAD 2B cycle.     Denies headache, numbness/tingling, changes in vision, changes in hearing, congestion, runny nose, epistaxis, sore throat, mouth pain/sores, shortness of breath either at rest or activity, cough, chest pain, tachycardia, palpitations, abdominal pain, N/V, diarrhea, constipation, dysuria, hematuria, joint pain, limb weakness, rash, abrasions, or lacerations.     Past Medical History  He has a past medical history of Colles' fracture of right radius, initial encounter for closed fracture (04/23/2015), Personal history of other mental and behavioral disorders (01/28/2014), Personal history of other mental and behavioral disorders (05/10/2017), Personal history of other specified conditions (05/03/2016), and Unspecified fracture of navicular (scaphoid) bone of left wrist, initial encounter for closed fracture (12/17/2015).    Surgical History  He has a past surgical history that includes Other surgical history (10/03/2017); US guided soft tissue biopsy (10/20/2023); Foot fracture surgery (Left); and Chest Tube Insertion (10/29/2023).    Oncology History   Burkitt lymphoma (CMS/[facility])   10/27/2023 Initial Diagnosis    Burkitt lymphoma (CMS/[facility])     10/29/2023 -  Chemotherapy    HyperCVAD + RiTUXimab, 21 Day Cycles          Social History  He reports that he has never smoked. He has never been exposed  to tobacco smoke. He has never used smokeless tobacco. He reports that he does not currently use alcohol. He reports that he does not use drugs.     Allergies  Patient has no known allergies.    Review of Systems   Constitutional: Negative.  Negative for activity change, appetite change, chills and fever.   Respiratory: Negative.  Negative for cough, chest tightness, shortness of breath and wheezing.    Cardiovascular:  Negative for chest pain, palpitations and leg swelling.   Gastrointestinal:  Negative for abdominal distention, abdominal pain, blood in stool, constipation, diarrhea, nausea and vomiting.   Genitourinary: Negative.  Negative for dysuria.   Musculoskeletal: Negative.  Negative for arthralgias and myalgias.   Skin:  Negative for rash and wound.   Neurological: Negative.  Negative for weakness, numbness and headaches.   Hematological: Negative.    Psychiatric/Behavioral: Negative.          Physical Exam  Constitutional:       General: He is not in acute distress.     Appearance: Normal appearance. He is normal weight. He is not ill-appearing, toxic-appearing or diaphoretic.   HENT:      Head: Normocephalic.      Nose: Nose normal.      Mouth/Throat:      Mouth: Mucous membranes are moist.      Pharynx: Oropharynx is clear.   Eyes:      Extraocular Movements: Extraocular movements intact.      Conjunctiva/sclera: Conjunctivae normal.      Pupils: Pupils are equal, round, and reactive to light.   Cardiovascular:      Rate and Rhythm: Normal rate and regular rhythm.      Pulses: Normal pulses.      Heart sounds: Normal heart sounds.   Pulmonary:      Effort: Pulmonary effort is normal.      Breath sounds: Normal breath sounds.   Abdominal:      General: Abdomen is flat. Bowel sounds are normal.      Palpations: Abdomen is soft.   Musculoskeletal:         General: Normal range of motion.   Skin:     General: Skin is warm and dry.   Neurological:      General: No focal deficit present.      Mental Status:  "He is alert and oriented to person, place, and time. Mental status is at baseline.   Psychiatric:         Mood and Affect: Mood normal.         Behavior: Behavior normal.          Last Recorded Vitals  Blood pressure 133/82, pulse 72, temperature 36.9 °C (98.4 °F), temperature source Temporal, resp. rate 16, height 1.824 m (5' 11.81\"), weight 70.8 kg (156 lb 1.4 oz), SpO2 100 %.    Relevant Results        No results found for this or any previous visit (from the past 24 hour(s)).  No results found.       Assessment/Plan   Principal Problem:    Burkitt lymphoma (CMS/HCC)  Active Problems:    Encounter for antineoplastic chemotherapy      Shilo Thakkar is a 23 y.o. male with PMH of Burkitt Lymphoma (Dx 10/20/23; s/p 2 cycles of HyperCVAD), malignant pleural effusions s/p CT (10/29/23), and ADHD who presents 1/2/2024 for C4 (2B) HyperCVAD + Ritux.     D1C4 HyperCVAD (2B)     CHEMO:  # C4 HyperCVAD + Ritux  - Cycle 4 part B; planned for IV Rituxan and IV MTX on D1, IV and IT Cytarabine on D2, IV Cytarabine on D3, IV Rituxan on D6, and IT MTX on D7  - Plan to start chemo regimen on 1/2/2024  - NS @ 100mL/hr throughout chemo  - Premeds and reaction meds as ordered  - Decadrone 12mg IV daily x4 days  - Neulasta 6mg subcutaneous to be given outpt 1/8/2024     ONC:  # Burkitt lymphoma, newly diagnosed (10/20/23)  - Primary oncologist: Dr. Olegario Klein  - 10/19/23 presented to OSH ED with dysphagia and abdominal pain  - CT A/P with IV contrast (10/19) mass in the body of the pancreas measuring approximately 2.7 x 2.9 cm. Multiple masses in the mesentery as well as omental caking consistent with carcinomatosis.   - CT soft tissue neck with IV contrast (10/19): homogeneous soft tissue attenuation lesion in the L oropharynx which measures approximately 3.0 x 4.0 x 5.1 cm.   - CT Abdomen w/o IV contrast (10/20): Mesenteric lymphadenopathy  - US guided mesenteric LN biopsy (10/20/23): HIGH GRADE B-CELL LYMPHOMA MORPHOLOGICALLY " CONSISTENT WITH BURKITT LYMPHOMA; flow cytometry: CD10+ kappa restricted B cells supportive of a B cell lymphoma with germinal center origin.   - Biopsy L oropharyngeal mass and L cervical LN by ENT (10/23/23) high grade B-cell burkitt lymphoma  - PET CT ordered 10/30 showed FDG avid left oropharyngeal mass and LAD above and below diaphragm  - s/p 3 cycles of HyperCVAD  - Bone marrow bx completed 11/1 without evidence of disease  - XR chest neg 1/2  - Currently admitted for cycle 4 HyperCVAD; okay to start chemo 1/2/2024 pending urine alkalization.     HEME:  # Anemia, likely 2/2 disease  - Continue home ferrous sulfate 325mg BID  - Discussed with Dr. Coffey and Dr. Klein on 12/27; okay to start chemo 1/2   - Transfuse to keep Hgb>7, Plt>10     CARDIAC:  - ECHO (10/2023): EF 60-65%  - Baseline HR 50s  - Overnight 12/12, HR 45, EKG sinus bradycardia (could be 2/2 zofran premedications)   - Patient asymptomatic, denies lightheadedness, dizziness, or palpitations      ID:  Allergies: NKDA  Afebrile on admit  # Prophylaxis   - Continue home Acyclovir 400mg BID, Fluconazole 400mg daily. Hold bactrim ppx with HDMTX  - Plan Levaquin for neutropenia (day 5 through day 15 per outpt notes; 1/6-1/16)     FEN/GI:  - Admit weight: 70.8kg (1/2)  - On continuous fluids with chemo regimen, give lasix if needed   - Daily weights   - Holding home protonix with HDMTX, covering with Famotidine.   - Replace electrolytes as needed  - Low pathogen diet  - TLS monitoring  - G6PD normal (10/26/23)     DISPO:  - Full Code  - Access: PICC line (right)  - DVT ppx: Ambulation  - DC pending completion of chemo, most likely 12/16  - NOK: Ms Deidra Pickett [phone]       I spent 45 minutes in the professional and overall care of this patient.      Gilberto Pineda PA-C

## 2024-01-02 NOTE — CARE PLAN
Problem: Pain  Goal: My pain/discomfort is manageable  Outcome: Progressing     Problem: Safety  Goal: Patient will be injury free during hospitalization  Outcome: Progressing  Goal: I will remain free of falls  Outcome: Progressing     Problem: Daily Care  Goal: Daily care needs are met  Outcome: Progressing     Problem: Psychosocial Needs  Goal: Demonstrates ability to cope with hospitalization/illness  Outcome: Progressing  Goal: Collaborate with me, my family, and caregiver to identify my specific goals  Outcome: Progressing  Flowsheets (Taken 1/2/2024 113)  Cultural Requests During Hospitalization: NO  Spiritual Requests During Hospitalization: NO     Problem: Discharge Barriers  Goal: My discharge needs are met  Outcome: Progressing   The patient's goals for the shift include      The clinical goals for the shift include remain HDS  Pt remained safe and free of injury. Picc line dressing changed, started on sodium bicarb, infusing at 200 mL's/hr. No complaints of pain. VSS. All needs met at this time.

## 2024-01-03 LAB
ALBUMIN SERPL BCP-MCNC: 3.8 G/DL (ref 3.4–5)
ALP SERPL-CCNC: 83 U/L (ref 33–120)
ALT SERPL W P-5'-P-CCNC: 14 U/L (ref 10–52)
ANION GAP SERPL CALC-SCNC: 12 MMOL/L (ref 10–20)
AST SERPL W P-5'-P-CCNC: 12 U/L (ref 9–39)
BASOPHILS # BLD AUTO: 0.02 X10*3/UL (ref 0–0.1)
BASOPHILS NFR BLD AUTO: 0.6 %
BILIRUB SERPL-MCNC: 0.4 MG/DL (ref 0–1.2)
BUN SERPL-MCNC: 10 MG/DL (ref 6–23)
CALCIUM SERPL-MCNC: 8.8 MG/DL (ref 8.6–10.6)
CHLORIDE SERPL-SCNC: 103 MMOL/L (ref 98–107)
CO2 SERPL-SCNC: 33 MMOL/L (ref 21–32)
CREAT SERPL-MCNC: 0.88 MG/DL (ref 0.5–1.3)
EOSINOPHIL # BLD AUTO: 0.02 X10*3/UL (ref 0–0.7)
EOSINOPHIL NFR BLD AUTO: 0.6 %
ERYTHROCYTE [DISTWIDTH] IN BLOOD BY AUTOMATED COUNT: 21 % (ref 11.5–14.5)
GFR SERPL CREATININE-BSD FRML MDRD: >90 ML/MIN/1.73M*2
GLUCOSE SERPL-MCNC: 98 MG/DL (ref 74–99)
HCT VFR BLD AUTO: 22 % (ref 41–52)
HGB BLD-MCNC: 7.2 G/DL (ref 13.5–17.5)
IMM GRANULOCYTES # BLD AUTO: 0.06 X10*3/UL (ref 0–0.7)
IMM GRANULOCYTES NFR BLD AUTO: 1.8 % (ref 0–0.9)
LDH SERPL L TO P-CCNC: 150 U/L (ref 84–246)
LYMPHOCYTES # BLD AUTO: 0.59 X10*3/UL (ref 1.2–4.8)
LYMPHOCYTES NFR BLD AUTO: 17.8 %
MAGNESIUM SERPL-MCNC: 1.81 MG/DL (ref 1.6–2.4)
MCH RBC QN AUTO: 31 PG (ref 26–34)
MCHC RBC AUTO-ENTMCNC: 32.7 G/DL (ref 32–36)
MCV RBC AUTO: 95 FL (ref 80–100)
MONOCYTES # BLD AUTO: 0.34 X10*3/UL (ref 0.1–1)
MONOCYTES NFR BLD AUTO: 10.3 %
NEUTROPHILS # BLD AUTO: 2.28 X10*3/UL (ref 1.2–7.7)
NEUTROPHILS NFR BLD AUTO: 68.9 %
NRBC BLD-RTO: 0.9 /100 WBCS (ref 0–0)
PH, POC: 9
PHOSPHATE SERPL-MCNC: 4.6 MG/DL (ref 2.5–4.9)
PLATELET # BLD AUTO: 103 X10*3/UL (ref 150–450)
POC APPEARANCE, URINE: NORMAL
POC APPEARANCE, URINE: NORMAL
POC BILIRUBIN, URINE: NORMAL
POC BILIRUBIN, URINE: NORMAL
POC BLOOD, URINE: NORMAL
POC BLOOD, URINE: NORMAL
POC COLOR, URINE: NORMAL
POC COLOR, URINE: NORMAL
POC GLUCOSE, URINE: NORMAL
POC GLUCOSE, URINE: NORMAL
POC KETONES, URINE: NORMAL
POC KETONES, URINE: NORMAL
POC LEUKOCYTES, URINE: NORMAL
POC LEUKOCYTES, URINE: NORMAL
POC NITRITE,URINE: NORMAL
POC NITRITE,URINE: NORMAL
POC PH, URINE: NORMAL
POC PH, URINE: NORMAL
POC PROTEIN, URINE: NORMAL
POC PROTEIN, URINE: NORMAL
POC SPECIFIC GRAVITY, URINE: NORMAL
POC SPECIFIC GRAVITY, URINE: NORMAL
POC UROBILINOGEN, URINE: NORMAL
POC UROBILINOGEN, URINE: NORMAL
POTASSIUM SERPL-SCNC: 3.8 MMOL/L (ref 3.5–5.3)
PROT SERPL-MCNC: 5.1 G/DL (ref 6.4–8.2)
RBC # BLD AUTO: 2.32 X10*6/UL (ref 4.5–5.9)
SODIUM SERPL-SCNC: 144 MMOL/L (ref 136–145)
URATE SERPL-MCNC: 5.1 MG/DL (ref 4–7.5)
WBC # BLD AUTO: 3.3 X10*3/UL (ref 4.4–11.3)

## 2024-01-03 PROCEDURE — 83735 ASSAY OF MAGNESIUM: CPT | Performed by: NURSE PRACTITIONER

## 2024-01-03 PROCEDURE — 84550 ASSAY OF BLOOD/URIC ACID: CPT

## 2024-01-03 PROCEDURE — 85025 COMPLETE CBC W/AUTO DIFF WBC: CPT | Performed by: NURSE PRACTITIONER

## 2024-01-03 PROCEDURE — 2500000004 HC RX 250 GENERAL PHARMACY W/ HCPCS (ALT 636 FOR OP/ED): Mod: JZ | Performed by: INTERNAL MEDICINE

## 2024-01-03 PROCEDURE — 80053 COMPREHEN METABOLIC PANEL: CPT | Performed by: NURSE PRACTITIONER

## 2024-01-03 PROCEDURE — 2500000001 HC RX 250 WO HCPCS SELF ADMINISTERED DRUGS (ALT 637 FOR MEDICARE OP): Performed by: INTERNAL MEDICINE

## 2024-01-03 PROCEDURE — 83615 LACTATE (LD) (LDH) ENZYME: CPT

## 2024-01-03 PROCEDURE — 1170000001 HC PRIVATE ONCOLOGY ROOM DAILY

## 2024-01-03 PROCEDURE — 84100 ASSAY OF PHOSPHORUS: CPT

## 2024-01-03 PROCEDURE — 3E0430M INTRODUCTION OF MONOCLONAL ANTIBODY INTO CENTRAL VEIN, PERCUTANEOUS APPROACH: ICD-10-PCS | Performed by: STUDENT IN AN ORGANIZED HEALTH CARE EDUCATION/TRAINING PROGRAM

## 2024-01-03 PROCEDURE — 99232 SBSQ HOSP IP/OBS MODERATE 35: CPT | Performed by: STUDENT IN AN ORGANIZED HEALTH CARE EDUCATION/TRAINING PROGRAM

## 2024-01-03 PROCEDURE — 2500000001 HC RX 250 WO HCPCS SELF ADMINISTERED DRUGS (ALT 637 FOR MEDICARE OP): Performed by: NURSE PRACTITIONER

## 2024-01-03 PROCEDURE — 2500000001 HC RX 250 WO HCPCS SELF ADMINISTERED DRUGS (ALT 637 FOR MEDICARE OP)

## 2024-01-03 RX ORDER — DIPHENHYDRAMINE HCL 50 MG
50 CAPSULE ORAL ONCE
Status: COMPLETED | OUTPATIENT
Start: 2024-01-03 | End: 2024-01-03

## 2024-01-03 RX ORDER — PROCHLORPERAZINE MALEATE 10 MG
10 TABLET ORAL EVERY 6 HOURS PRN
Status: DISCONTINUED | OUTPATIENT
Start: 2024-01-03 | End: 2024-01-07 | Stop reason: HOSPADM

## 2024-01-03 RX ORDER — ALBUTEROL SULFATE 0.83 MG/ML
3 SOLUTION RESPIRATORY (INHALATION) AS NEEDED
Status: DISCONTINUED | OUTPATIENT
Start: 2024-01-03 | End: 2024-01-07 | Stop reason: HOSPADM

## 2024-01-03 RX ORDER — DIPHENHYDRAMINE HYDROCHLORIDE 50 MG/ML
50 INJECTION INTRAMUSCULAR; INTRAVENOUS AS NEEDED
Status: DISCONTINUED | OUTPATIENT
Start: 2024-01-03 | End: 2024-01-07 | Stop reason: HOSPADM

## 2024-01-03 RX ORDER — LEUCOVORIN CALCIUM 350 MG/17.5ML
15 INJECTION, POWDER, LYOPHILIZED, FOR SOLUTION INTRAMUSCULAR; INTRAVENOUS EVERY 6 HOURS
Status: DISCONTINUED | OUTPATIENT
Start: 2024-01-05 | End: 2024-01-07

## 2024-01-03 RX ORDER — ACETAMINOPHEN 325 MG/1
650 TABLET ORAL ONCE
Status: COMPLETED | OUTPATIENT
Start: 2024-01-03 | End: 2024-01-03

## 2024-01-03 RX ORDER — LEUCOVORIN CALCIUM 350 MG/17.5ML
50 INJECTION, POWDER, LYOPHILIZED, FOR SOLUTION INTRAMUSCULAR; INTRAVENOUS ONCE
Status: COMPLETED | OUTPATIENT
Start: 2024-01-04 | End: 2024-01-05

## 2024-01-03 RX ORDER — PROCHLORPERAZINE EDISYLATE 5 MG/ML
10 INJECTION INTRAMUSCULAR; INTRAVENOUS EVERY 6 HOURS PRN
Status: DISCONTINUED | OUTPATIENT
Start: 2024-01-03 | End: 2024-01-07 | Stop reason: HOSPADM

## 2024-01-03 RX ORDER — PREDNISOLONE ACETATE 10 MG/ML
2 SUSPENSION/ DROPS OPHTHALMIC EVERY 6 HOURS
Status: DISCONTINUED | OUTPATIENT
Start: 2024-01-03 | End: 2024-01-07 | Stop reason: HOSPADM

## 2024-01-03 RX ORDER — FAMOTIDINE 10 MG/ML
20 INJECTION INTRAVENOUS AS NEEDED
Status: DISCONTINUED | OUTPATIENT
Start: 2024-01-03 | End: 2024-01-07 | Stop reason: HOSPADM

## 2024-01-03 RX ORDER — EPINEPHRINE 0.1 MG/ML
0.3 INJECTION INTRACARDIAC; INTRAVENOUS EVERY 5 MIN PRN
Status: DISCONTINUED | OUTPATIENT
Start: 2024-01-03 | End: 2024-01-07 | Stop reason: HOSPADM

## 2024-01-03 RX ADMIN — METHOTREXATE 368 MG: 25 INJECTION, SOLUTION INTRA-ARTERIAL; INTRAMUSCULAR; INTRATHECAL; INTRAVENOUS at 11:58

## 2024-01-03 RX ADMIN — ACYCLOVIR 400 MG: 400 TABLET ORAL at 09:05

## 2024-01-03 RX ADMIN — FERROUS SULFATE TAB 325 MG (65 MG ELEMENTAL FE) 1 TABLET: 325 (65 FE) TAB at 20:08

## 2024-01-03 RX ADMIN — PREDNISOLONE ACETATE 2 DROP: 10 SUSPENSION/ DROPS OPHTHALMIC at 20:08

## 2024-01-03 RX ADMIN — PREDNISOLONE ACETATE 2 DROP: 10 SUSPENSION/ DROPS OPHTHALMIC at 14:14

## 2024-01-03 RX ADMIN — FLUCONAZOLE 400 MG: 200 TABLET ORAL at 09:05

## 2024-01-03 RX ADMIN — ACETAMINOPHEN 650 MG: 325 TABLET ORAL at 09:05

## 2024-01-03 RX ADMIN — SODIUM BICARBONATE 200 ML/HR: 84 INJECTION, SOLUTION INTRAVENOUS at 17:10

## 2024-01-03 RX ADMIN — FERROUS SULFATE TAB 325 MG (65 MG ELEMENTAL FE) 1 TABLET: 325 (65 FE) TAB at 09:05

## 2024-01-03 RX ADMIN — SODIUM BICARBONATE 200 ML/HR: 84 INJECTION, SOLUTION INTRAVENOUS at 00:34

## 2024-01-03 RX ADMIN — SODIUM BICARBONATE 200 ML/HR: 84 INJECTION, SOLUTION INTRAVENOUS at 22:28

## 2024-01-03 RX ADMIN — PREDNISOLONE ACETATE 2 DROP: 10 SUSPENSION/ DROPS OPHTHALMIC at 09:05

## 2024-01-03 RX ADMIN — METHOTREXATE 1472 MG: 25 INJECTION, SOLUTION INTRA-ARTERIAL; INTRAMUSCULAR; INTRATHECAL; INTRAVENOUS at 14:14

## 2024-01-03 RX ADMIN — SODIUM BICARBONATE 200 ML/HR: 84 INJECTION, SOLUTION INTRAVENOUS at 06:16

## 2024-01-03 RX ADMIN — SODIUM CHLORIDE 700 MG: 9 INJECTION, SOLUTION INTRAVENOUS at 09:49

## 2024-01-03 RX ADMIN — ONDANSETRON 16 MG: 2 INJECTION INTRAMUSCULAR; INTRAVENOUS at 11:32

## 2024-01-03 RX ADMIN — DIPHENHYDRAMINE HYDROCHLORIDE 50 MG: 50 CAPSULE ORAL at 09:05

## 2024-01-03 RX ADMIN — SODIUM BICARBONATE 200 ML/HR: 84 INJECTION, SOLUTION INTRAVENOUS at 11:30

## 2024-01-03 RX ADMIN — ACYCLOVIR 400 MG: 400 TABLET ORAL at 20:08

## 2024-01-03 RX ADMIN — FAMOTIDINE 20 MG: 20 TABLET ORAL at 09:05

## 2024-01-03 ASSESSMENT — COGNITIVE AND FUNCTIONAL STATUS - GENERAL
MOBILITY SCORE: 24
DAILY ACTIVITIY SCORE: 24

## 2024-01-03 ASSESSMENT — PAIN SCALES - GENERAL
PAINLEVEL_OUTOF10: 0 - NO PAIN
PAINLEVEL_OUTOF10: 0 - NO PAIN

## 2024-01-03 ASSESSMENT — PAIN - FUNCTIONAL ASSESSMENT
PAIN_FUNCTIONAL_ASSESSMENT: 0-10
PAIN_FUNCTIONAL_ASSESSMENT: 0-10

## 2024-01-03 ASSESSMENT — ACTIVITIES OF DAILY LIVING (ADL): LACK_OF_TRANSPORTATION: NO

## 2024-01-03 NOTE — PROGRESS NOTES
01/03/24 1800   Discharge Planning   Living Arrangements Parent   Support Systems Parent;Family members   Assistance Needed none   Type of Residence Private residence   Home or Post Acute Services None   Patient expects to be discharged to: none   Does the patient need discharge transport arranged? No   Financial Resource Strain   How hard is it for you to pay for the very basics like food, housing, medical care, and heating? Not hard   Housing Stability   In the last 12 months, was there a time when you were not able to pay the mortgage or rent on time? N   In the last 12 months, how many places have you lived? 1   In the last 12 months, was there a time when you did not have a steady place to sleep or slept in a shelter (including now)? N   Transportation Needs   In the past 12 months, has lack of transportation kept you from medical appointments or from getting medications? no   In the past 12 months, has lack of transportation kept you from meetings, work, or from getting things needed for daily living? No     Pt admitted for C4 HyperCVAD. Pt follows with Dr. Klein for dx of Burkitt's Lymphoma and has a SOLO PICC. Pt plans to return home with parents after completing chemotherapy, no discharge needs anticipated. LSW to cont to follow.

## 2024-01-03 NOTE — NURSING NOTE
Chemo Note  Pt received methotrexate 368 mg in sodium chloride for a TV of 164.72 mL's.  Chemo infused through red lumen of right double lumen picc. +BBR obtained via syringe aspiration prior to and post infusion.  Chemo up at 1158 and down at 1413. Pt premedicated with zofran. Urine pH 9.0. Chemo, rate and patient verified at the bedside and against protocol with 2nd RN, Denice Das.

## 2024-01-03 NOTE — CARE PLAN
Problem: Pain  Goal: My pain/discomfort is manageable  Outcome: Progressing     Problem: Safety  Goal: Patient will be injury free during hospitalization  Outcome: Progressing  Goal: I will remain free of falls  Outcome: Progressing     Problem: Daily Care  Goal: Daily care needs are met  Outcome: Progressing     Problem: Psychosocial Needs  Goal: Demonstrates ability to cope with hospitalization/illness  Outcome: Progressing  Goal: Collaborate with me, my family, and caregiver to identify my specific goals  Outcome: Progressing     Problem: Discharge Barriers  Goal: My discharge needs are met  Outcome: Progressing   The patient's goals for the shift include      The clinical goals for the shift include pt will tolerate chemotherapy and maintain urine pH >7.5 through 1/3 at 1900    Pt remained safe and free of injury throughout shift, tolerating chemo without issues, VSS, urine pH has remained >9.0. No complaints of pain. Sodium bicarb infusing at 200 mL's/hr and 22 hour methotrexate infusing at 48.1 mL's/hr.

## 2024-01-03 NOTE — NURSING NOTE
Chemo Note- pt receiving methotrexate 1472 mg in sodium chloride for a TV of 1058.88 mL's. Chemo infusing through red lumen of right double lumen PICC. +BBR obtained via syringe aspiration prior to infusion. Chemo, rate and patient verified at the bedside and against protocol with 2nd RN, Stanford Sherman.

## 2024-01-03 NOTE — PROGRESS NOTES
"  Assessment & Plan     Type 2 diabetes mellitus with hyperglycemia, without long-term current use of insulin (H)  She is doing well. Had her A1C in June and dropped under 7.0 sugars are coming down.  She feels well. Weight down 6 #.  Eye exam is done. And her foot exam is up to date.  Seeing Endocrine for her TSH but not diabetes.  She is going to be seeing us back in 6 months.   - Comprehensive metabolic panel (BMP + Alb, Alk Phos, ALT, AST, Total. Bili, TP); Future  - CBC with platelets and differential; Future    Hypothyroidism, acquired  Seeing Dr. Rojas at [facility]  she doesn't want her labs done here today.     HTN (hypertension)  She will be seeing us back in 6 months. Really abnormal labs 6 months ago. She is not fasting. She had elevated calcium.   - Comprehensive metabolic panel (BMP + Alb, Alk Phos, ALT, AST, Total. Bili, TP); Future  - CBC with platelets and differential; Future    Review of external notes as documented elsewhere in note  Ordering of each unique test  Prescription drug management  10v minutes spent by me on the date of the encounter doing chart review, history and exam, documentation and further activities per the note       BMI:   Estimated body mass index is 34.71 kg/m  as calculated from the following:    Height as of 6/22/23: 1.543 m (5' 0.75\").    Weight as of this encounter: 82.6 kg (182 lb 3.2 oz).   Weight management plan: Discussed healthy diet and exercise guidelines    See Patient Instructions    No follow-ups on file.    LALY Ortega  [facility] - ISABEL Abdi is a 51 year old, presenting for the following health issues:  Hypertension and Diabetes        9/6/2023     3:37 PM   Additional Questions   Roomed by Carol Renyolds   Accompanied by none         9/6/2023     3:37 PM   Patient Reported Additional Medications   Patient reports taking the following new medications none       HPI     Diabetes Follow-up    How often are you checking " "Shilo Thakkar is a 23 y.o. male on day 1 of admission presenting with Burkitt lymphoma (CMS/HCC).    Subjective   No acute complaints today, urin pH 7.0 and patient is ready for his chemo. Denies any symptoms while receiving his Rituxan this morning. Denies headache, numbness/tingling, changes in vision, changes in hearing, congestion, runny nose, epistaxis, sore throat, mouth pain/sores, shortness of breath either at rest or activity, cough, chest pain, tachycardia, palpitations, abdominal pain, N/V, diarrhea, constipation, dysuria, hematuria, joint pain, limb weakness, rash, abrasions, or lacerations.          Objective     Physical Exam  Constitutional:       Appearance: Normal appearance. He is normal weight.   HENT:      Head: Normocephalic and atraumatic.      Nose: Nose normal.      Mouth/Throat:      Mouth: Mucous membranes are moist.      Pharynx: Oropharynx is clear.   Eyes:      Extraocular Movements: Extraocular movements intact.      Conjunctiva/sclera: Conjunctivae normal.      Pupils: Pupils are equal, round, and reactive to light.   Cardiovascular:      Rate and Rhythm: Normal rate and regular rhythm.      Pulses: Normal pulses.      Heart sounds: Normal heart sounds.   Pulmonary:      Effort: Pulmonary effort is normal.      Breath sounds: Normal breath sounds.   Abdominal:      General: Abdomen is flat. Bowel sounds are normal.      Palpations: Abdomen is soft.   Skin:     General: Skin is warm and dry.      Comments: R arm PICC c/d/i   Neurological:      General: No focal deficit present.      Mental Status: He is alert. Mental status is at baseline.         Last Recorded Vitals  Blood pressure 127/81, pulse 61, temperature 36.3 °C (97.3 °F), temperature source Temporal, resp. rate 15, height 1.824 m (5' 11.81\"), weight 72.9 kg (160 lb 11.5 oz), SpO2 100 %.  Intake/Output last 3 Shifts:  I/O last 3 completed shifts:  In: 640 (9 mL/kg) [I.V.:640 (9 mL/kg)]  Out: 900 (12.7 mL/kg) [Urine:900 (0.4 " mL/kg/hr)]  Weight: 70.8 kg     Relevant Results              This patient has a central line   Reason for the central line remaining today? Parenteral medication    Results for orders placed or performed during the hospital encounter of 01/02/24 (from the past 24 hour(s))   Uric Acid   Result Value Ref Range    Uric Acid 5.9 4.0 - 7.5 mg/dL   Lactate Dehydrogenase   Result Value Ref Range     84 - 246 U/L   Type And Screen   Result Value Ref Range    ABO TYPE O     Rh TYPE POS     ANTIBODY SCREEN NEG    Basic Metabolic Panel   Result Value Ref Range    Glucose 116 (H) 74 - 99 mg/dL    Sodium 141 136 - 145 mmol/L    Potassium 4.2 3.5 - 5.3 mmol/L    Chloride 105 98 - 107 mmol/L    Bicarbonate 27 21 - 32 mmol/L    Anion Gap 13 10 - 20 mmol/L    Urea Nitrogen 11 6 - 23 mg/dL    Creatinine 0.94 0.50 - 1.30 mg/dL    eGFR >90 >60 mL/min/1.73m*2    Calcium 9.0 8.6 - 10.6 mg/dL   CBC and Auto Differential   Result Value Ref Range    WBC 4.9 4.4 - 11.3 x10*3/uL    nRBC 0.0 0.0 - 0.0 /100 WBCs    RBC 2.31 (L) 4.50 - 5.90 x10*6/uL    Hemoglobin 7.0 (L) 13.5 - 17.5 g/dL    Hematocrit 22.0 (L) 41.0 - 52.0 %    MCV 95 80 - 100 fL    MCH 30.3 26.0 - 34.0 pg    MCHC 31.8 (L) 32.0 - 36.0 g/dL    RDW 20.8 (H) 11.5 - 14.5 %    Platelets 100 (L) 150 - 450 x10*3/uL    Immature Granulocytes %, Automated 2.4 (H) 0.0 - 0.9 %    Immature Granulocytes Absolute, Automated 0.12 0.00 - 0.70 x10*3/uL   Coagulation Screen   Result Value Ref Range    Protime 12.8 9.8 - 12.8 seconds    INR 1.1 0.9 - 1.1    aPTT 30 27 - 38 seconds   Magnesium   Result Value Ref Range    Magnesium 1.84 1.60 - 2.40 mg/dL   Phosphorus   Result Value Ref Range    Phosphorus 3.9 2.5 - 4.9 mg/dL   Manual Differential   Result Value Ref Range    Neutrophils %, Manual 74.1 40.0 - 80.0 %    Bands %, Manual 13.0 0.0 - 5.0 %    Lymphocytes %, Manual 11.1 13.0 - 44.0 %    Monocytes %, Manual 0.9 2.0 - 10.0 %    Eosinophils %, Manual 0.0 0.0 - 6.0 %    Basophils %,  your blood sugar? One time daily  What time of day are you checking your blood sugars (select all that apply)?   In the morning before breakfast  Have you had any blood sugars above 200?  No  Have you had any blood sugars below 70?  No  What symptoms do you notice when your blood sugar is low?  Other: nausea  What concerns do you have today about your diabetes? None   Do you have any of these symptoms? (Select all that apply)  No numbness or tingling in feet.  No redness, sores or blisters on feet.  No complaints of excessive thirst.  No reports of blurry vision.  No significant changes to weight.      BP Readings from Last 2 Encounters:   09/06/23 112/81   06/22/23 122/74     Hemoglobin A1C (%)   Date Value   03/03/2023 7.1 (H)   04/13/2022 6.6 (H)   08/27/2020 7.1 (A)   08/07/2019 6.5 (A)     LDL Cholesterol Calculated (mg/dL)   Date Value   03/03/2023 52   09/23/2021 69   04/08/2019 78   08/15/2017 63             Hypertension Follow-up    Do you check your blood pressure regularly outside of the clinic? Yes   Are you following a low salt diet? Yes  Are your blood pressures ever more than 140 on the top number (systolic) OR more   than 90 on the bottom number (diastolic), for example 140/90? No  How many servings of fruits and vegetables do you eat daily?  2-3  On average, how many sweetened beverages do you drink each day (Examples: soda, juice, sweet tea, etc.  Do NOT count diet or artificially sweetened beverages)?   0  How many days per week do you exercise enough to make your heart beat faster? 3 or less  How many minutes a day do you exercise enough to make your heart beat faster? 30 - 60  How many days per week do you miss taking your medication? 0        Review of Systems   Constitutional, HEENT, cardiovascular, pulmonary, GI, , musculoskeletal, neuro, skin, endocrine and psych systems are negative, except as otherwise noted.      Objective    /81 (BP Location: Right arm, Patient Position: Sitting,  Cuff Size: Adult Large)   Pulse 83   Temp 97.8  F (36.6  C) (Tympanic)   Wt 82.6 kg (182 lb 3.2 oz)   LMP 03/07/2015 (Exact Date)   SpO2 97%   BMI 34.71 kg/m    Body mass index is 34.71 kg/m .  Physical Exam   GENERAL: healthy, alert and no distress  EYES: Eyes grossly normal to inspection, PERRL and conjunctivae and sclerae normal  HENT: ear canals and TM's normal, nose and mouth without ulcers or lesions  NECK: no adenopathy, no asymmetry, masses, or scars and thyroid normal to palpation  RESP: lungs clear to auscultation - no rales, rhonchi or wheezes  CV: regular rate and rhythm, normal S1 S2, no S3 or S4, no murmur, click or rub, no peripheral edema and peripheral pulses strong  ABDOMEN: soft, nontender, no hepatosplenomegaly, no masses and bowel sounds normal  MS: no gross musculoskeletal defects noted, no edema  SKIN: no suspicious lesions or rashes  PSYCH: mentation appears normal, affect normal/bright  LYMPH: no cervical, supraclavicular, axillary, or inguinal adenopathy    Hospital Outpatient Visit on 06/22/2023   Component Date Value Ref Range Status    Hemoglobin A1C POCT 06/22/2023 6.9 (A)  <5.7 % Final       Last Comprehensive Metabolic Panel:  Sodium   Date Value Ref Range Status   03/03/2023 140 136 - 145 mmol/L Final   06/25/2018 141 133 - 144 mmol/L Final     Potassium   Date Value Ref Range Status   03/03/2023 3.2 (L) 3.4 - 5.3 mmol/L Final   04/13/2022 3.5 3.4 - 5.3 mmol/L Final   06/25/2018 4.0 3.4 - 5.3 mmol/L Final     Chloride   Date Value Ref Range Status   03/03/2023 99 98 - 107 mmol/L Final   04/13/2022 104 94 - 109 mmol/L Final   06/25/2018 105 94 - 109 mmol/L Final     Carbon Dioxide   Date Value Ref Range Status   06/25/2018 27 20 - 32 mmol/L Final     Carbon Dioxide (CO2)   Date Value Ref Range Status   03/03/2023 27 22 - 29 mmol/L Final   04/13/2022 27 20 - 32 mmol/L Final     Anion Gap   Date Value Ref Range Status   03/03/2023 14 7 - 15 mmol/L Final   04/13/2022 5 3 - 14  Manual 0.0 0.0 - 2.0 %    Myelocytes %, Manual 0.9 0.0 - 0.0 %    Seg Neutrophils Absolute, Manual 3.63 1.20 - 7.00 x10*3/uL    Bands Absolute, Manual 0.64 0.00 - 0.70 x10*3/uL    Lymphocytes Absolute, Manual 0.54 (L) 1.20 - 4.80 x10*3/uL    Monocytes Absolute, Manual 0.04 (L) 0.10 - 1.00 x10*3/uL    Eosinophils Absolute, Manual 0.00 0.00 - 0.70 x10*3/uL    Basophils Absolute, Manual 0.00 0.00 - 0.10 x10*3/uL    Myelocytes Absolute, Manual 0.04 0.00 - 0.00 x10*3/uL    Total Cells Counted 108     Neutrophils Absolute, Manual 4.27 1.20 - 7.70 x10*3/uL    RBC Morphology See Below     Ovalocytes Few    Hepatic function panel   Result Value Ref Range    Albumin 4.3 3.4 - 5.0 g/dL    Bilirubin, Total 0.4 0.0 - 1.2 mg/dL    Bilirubin, Direct 0.1 0.0 - 0.3 mg/dL    Alkaline Phosphatase 95 33 - 120 U/L    ALT 16 10 - 52 U/L    AST 13 9 - 39 U/L    Total Protein 5.8 (L) 6.4 - 8.2 g/dL   POCT UA (nonautomated) manually resulted   Result Value Ref Range    POC Color, Urine      POC Appearance, Urine      POC Glucose, Urine      POC Bilirubin, Urine      POC Ketones, Urine      POC Specific Gravity, Urine      POC Blood, Urine      POC PH, Urine      POC Protein, Urine      POC Urobilinogen, Urine      Poc Nitrite, Urine      POC Leukocytes, Urine     CBC and Auto Differential   Result Value Ref Range    WBC 3.3 (L) 4.4 - 11.3 x10*3/uL    nRBC 0.9 (H) 0.0 - 0.0 /100 WBCs    RBC 2.32 (L) 4.50 - 5.90 x10*6/uL    Hemoglobin 7.2 (L) 13.5 - 17.5 g/dL    Hematocrit 22.0 (L) 41.0 - 52.0 %    MCV 95 80 - 100 fL    MCH 31.0 26.0 - 34.0 pg    MCHC 32.7 32.0 - 36.0 g/dL    RDW 21.0 (H) 11.5 - 14.5 %    Platelets 103 (L) 150 - 450 x10*3/uL    Neutrophils % 68.9 40.0 - 80.0 %    Immature Granulocytes %, Automated 1.8 (H) 0.0 - 0.9 %    Lymphocytes % 17.8 13.0 - 44.0 %    Monocytes % 10.3 2.0 - 10.0 %    Eosinophils % 0.6 0.0 - 6.0 %    Basophils % 0.6 0.0 - 2.0 %    Neutrophils Absolute 2.28 1.20 - 7.70 x10*3/uL    Immature Granulocytes  Absolute, Automated 0.06 0.00 - 0.70 x10*3/uL    Lymphocytes Absolute 0.59 (L) 1.20 - 4.80 x10*3/uL    Monocytes Absolute 0.34 0.10 - 1.00 x10*3/uL    Eosinophils Absolute 0.02 0.00 - 0.70 x10*3/uL    Basophils Absolute 0.02 0.00 - 0.10 x10*3/uL   Magnesium   Result Value Ref Range    Magnesium 1.81 1.60 - 2.40 mg/dL   Comprehensive Metabolic Panel   Result Value Ref Range    Glucose 98 74 - 99 mg/dL    Sodium 144 136 - 145 mmol/L    Potassium 3.8 3.5 - 5.3 mmol/L    Chloride 103 98 - 107 mmol/L    Bicarbonate 33 (H) 21 - 32 mmol/L    Anion Gap 12 10 - 20 mmol/L    Urea Nitrogen 10 6 - 23 mg/dL    Creatinine 0.88 0.50 - 1.30 mg/dL    eGFR >90 >60 mL/min/1.73m*2    Calcium 8.8 8.6 - 10.6 mg/dL    Albumin 3.8 3.4 - 5.0 g/dL    Alkaline Phosphatase 83 33 - 120 U/L    Total Protein 5.1 (L) 6.4 - 8.2 g/dL    AST 12 9 - 39 U/L    Bilirubin, Total 0.4 0.0 - 1.2 mg/dL    ALT 14 10 - 52 U/L   Lactate Dehydrogenase   Result Value Ref Range     84 - 246 U/L   Phosphorus   Result Value Ref Range    Phosphorus 4.6 2.5 - 4.9 mg/dL   Uric Acid   Result Value Ref Range    Uric Acid 5.1 4.0 - 7.5 mg/dL   POCT UA (nonautomated) manually resulted   Result Value Ref Range    POC Color, Urine      POC Appearance, Urine      POC Glucose, Urine      POC Bilirubin, Urine      POC Ketones, Urine      POC Specific Gravity, Urine      POC Blood, Urine      POC PH, Urine      POC Protein, Urine      POC Urobilinogen, Urine      Poc Nitrite, Urine      POC Leukocytes, Urine       XR chest 2 views    Result Date: 1/2/2024  Interpreted By:  Jae Licona,  and Deyanira Pizarro STUDY: XR CHEST 2 VIEWS;  1/2/2024 3:22 pm   INDICATION: Signs/Symptoms:Prior to high dose methotrexate chemotherapy.   COMPARISON: Chest radiograph 12/11/2023   ACCESSION NUMBER(S): [id number]   ORDERING CLINICIAN: KYLE ISAAC   FINDINGS: PA and lateral radiographs of the chest were provided.   Right-sided PICC line with tip overlying the SVC.    mmol/L Final   06/25/2018 9 3 - 14 mmol/L Final     Glucose   Date Value Ref Range Status   03/03/2023 144 (H) 70 - 99 mg/dL Final   04/13/2022 122 (H) 70 - 99 mg/dL Final   06/25/2018 158 (H) 70 - 99 mg/dL Final     Urea Nitrogen   Date Value Ref Range Status   03/03/2023 28.7 (H) 6.0 - 20.0 mg/dL Final   04/13/2022 13 7 - 30 mg/dL Final   06/25/2018 10 7 - 30 mg/dL Final     Creatinine   Date Value Ref Range Status   03/03/2023 1.09 (H) 0.51 - 0.95 mg/dL Final   06/25/2018 0.65 0.52 - 1.04 mg/dL Final     GFR Estimate   Date Value Ref Range Status   03/03/2023 61 >60 mL/min/1.73m2 Final     Comment:     eGFR calculated using 2021 CKD-EPI equation.   06/03/2019 >90 >60 mL/min/[1.73_m2] Final     Calcium   Date Value Ref Range Status   03/03/2023 10.5 (H) 8.6 - 10.0 mg/dL Final   06/25/2018 9.2 8.5 - 10.1 mg/dL Final     Bilirubin Total   Date Value Ref Range Status   03/03/2023 1.3 (H) <=1.2 mg/dL Final   06/10/2018 1.4 (H) 0.2 - 1.3 mg/dL Final     Alkaline Phosphatase   Date Value Ref Range Status   03/03/2023 54 35 - 104 U/L Final   06/10/2018 79 40 - 150 U/L Final     ALT   Date Value Ref Range Status   03/03/2023 42 (H) 10 - 35 U/L Final   06/10/2018 72 (H) 0 - 50 U/L Final     AST   Date Value Ref Range Status   03/03/2023 27 10 - 35 U/L Final   06/10/2018 53 (H) 0 - 45 U/L Final                              CARDIOMEDIASTINAL SILHOUETTE: Cardiomediastinal silhouette is normal in size and configuration.   LUNGS: Lungs are clear.   ABDOMEN: No remarkable upper abdominal findings.   BONES: No acute osseous changes.       1.  No evidence of acute cardiopulmonary process.   I personally reviewed the images/study and I agree with the findings as stated by Juarez Christie. This study was interpreted at [facility], [place], [place].   MACRO: None   Signed by: Jea Licona 1/2/2024 5:07 PM Dictation workstation:   [id number]           Malnutrition          I agree with the dietitian's malnutrition diagnosis.      Assessment/Plan   Principal Problem:    Burkitt lymphoma (CMS/HCC)  Active Problems:    Encounter for antineoplastic chemotherapy    Shilo Thakkar is a 23 y.o. male with PMH of Burkitt Lymphoma (Dx 10/20/23; s/p 2 cycles of HyperCVAD), malignant pleural effusions s/p CT (10/29/23), and ADHD who presents 1/2/2024 for C4 (2B) HyperCVAD + Ritux.      D2C4 HyperCVAD (2B)     CHEMO:  # C4 HyperCVAD + Ritux  - Cycle 4 part B; planned for IV Rituxan and IV MTX on D1, IV and IT Cytarabine on D2, IV Cytarabine on D3, IV Rituxan on D6, and IT MTX on D7  - Plan to start chemo regimen on 1/3/2024. 24-hour post methotrexate infusion lab due at 11:30 on 1/5.  - NS @ 100mL/hr throughout chemo  - Premeds and reaction meds as ordered  - Decadron 12mg IV daily x4 days  - Neulasta 6mg subcutaneous to be given outpt 1/8/2024     ONC:  # Burkitt lymphoma, newly diagnosed (10/20/23)  - Primary oncologist: Dr. Olegario Klein  - 10/19/23 presented to OSH ED with dysphagia and abdominal pain  - CT A/P with IV contrast (10/19) mass in the body of the pancreas measuring approximately 2.7 x 2.9 cm. Multiple masses in the mesentery as well as omental caking consistent with carcinomatosis.   - CT soft tissue neck with IV contrast (10/19): homogeneous soft tissue attenuation lesion in the L  oropharynx which measures approximately 3.0 x 4.0 x 5.1 cm.   - CT Abdomen w/o IV contrast (10/20): Mesenteric lymphadenopathy  - US guided mesenteric LN biopsy (10/20/23): HIGH GRADE B-CELL LYMPHOMA MORPHOLOGICALLY CONSISTENT WITH BURKITT LYMPHOMA; flow cytometry: CD10+ kappa restricted B cells supportive of a B cell lymphoma with germinal center origin.   - Biopsy L oropharyngeal mass and L cervical LN by ENT (10/23/23) high grade B-cell burkitt lymphoma  - PET CT ordered 10/30 showed FDG avid left oropharyngeal mass and LAD above and below diaphragm  - s/p 3 cycles of HyperCVAD  - Bone marrow bx completed 11/1 without evidence of disease  - XR chest neg 1/2  - Currently admitted for cycle 4 HyperCVAD; okay to start chemo 1/3/2024    HEME:  # Anemia, likely 2/2 disease  - Continue home ferrous sulfate 325mg BID  - Discussed with Dr. Coffey and Dr. Klein on 12/27; okay to start chemo 1/2   - Transfuse to keep Hgb>7, Plt>10     CARDIAC:  - ECHO (10/2023): EF 60-65%  - Baseline HR 50s  - Overnight 12/12, HR 45, EKG sinus bradycardia (could be 2/2 zofran premedications)   - Patient asymptomatic, denies lightheadedness, dizziness, or palpitations      ID:  Allergies: NKDA  Afebrile on admit  # Prophylaxis   - Continue home Acyclovir 400mg BID, Fluconazole 400mg daily. Hold bactrim ppx with HDMTX  - Plan Levaquin for neutropenia (day 5 through day 15 per outpt notes; 1/6-1/16)     FEN/GI:  - Admit weight: 70.8kg (1/2), Current weight: 72.9kg (1/3)  - On continuous fluids with chemo regimen, give lasix if needed   - Daily weights   - Holding home protonix with HDMTX, covering with Famotidine.   - Replace electrolytes as needed  - Low pathogen diet  - TLS monitoring  - G6PD normal (10/26/23)     DISPO:  - Full Code  - Access: PICC line (right)  - DVT ppx: Ambulation  - DC pending completion of chemo  - NOK: Ms Deidra Pickett [phone]     Patient seen, examined, and discussed with Dr. Rakesh Webster MD.     I  spent 35 minutes in the professional and overall care of this patient.      Gilberto Pineda PA-C

## 2024-01-03 NOTE — CARE PLAN
Problem: Pain  Goal: My pain/discomfort is manageable  Outcome: Progressing     Problem: Safety  Goal: Patient will be injury free during hospitalization  Outcome: Progressing  Goal: I will remain free of falls  Outcome: Progressing     Problem: Daily Care  Goal: Daily care needs are met  Outcome: Progressing     Problem: Psychosocial Needs  Goal: Demonstrates ability to cope with hospitalization/illness  Outcome: Progressing  Goal: Collaborate with me, my family, and caregiver to identify my specific goals  Outcome: Progressing     Problem: Discharge Barriers  Goal: My discharge needs are met  Outcome: Progressing   The patient's goals for the shift include      The clinical goals for the shift include patient will remain HDS  Patient denied N/V/D overnight. No complaints of pain. Patient remained afebrile

## 2024-01-04 LAB
ALBUMIN SERPL BCP-MCNC: 3.7 G/DL (ref 3.4–5)
ALP SERPL-CCNC: 68 U/L (ref 33–120)
ALT SERPL W P-5'-P-CCNC: 13 U/L (ref 10–52)
ANION GAP SERPL CALC-SCNC: 11 MMOL/L (ref 10–20)
APPEARANCE CSF: CLEAR
AST SERPL W P-5'-P-CCNC: 12 U/L (ref 9–39)
BASOPHILS # BLD AUTO: 0.01 X10*3/UL (ref 0–0.1)
BASOPHILS NFR BLD AUTO: 0.3 %
BASOPHILS NFR CSF MANUAL: 0 %
BILIRUB SERPL-MCNC: 0.7 MG/DL (ref 0–1.2)
BLASTS CSF MANUAL: 0 %
BLOOD EXPIRATION DATE: NORMAL
BUN SERPL-MCNC: 6 MG/DL (ref 6–23)
CALCIUM SERPL-MCNC: 9 MG/DL (ref 8.6–10.6)
CHLORIDE SERPL-SCNC: 101 MMOL/L (ref 98–107)
CO2 SERPL-SCNC: 34 MMOL/L (ref 21–32)
COLOR CSF: COLORLESS
COLOR SPUN CSF: COLORLESS
CREAT SERPL-MCNC: 0.85 MG/DL (ref 0.5–1.3)
DISPENSE STATUS: NORMAL
EOSINOPHIL # BLD AUTO: 0.01 X10*3/UL (ref 0–0.7)
EOSINOPHIL NFR BLD AUTO: 0.3 %
EOSINOPHIL NFR CSF MANUAL: 0 %
ERYTHROCYTE [DISTWIDTH] IN BLOOD BY AUTOMATED COUNT: 20 % (ref 11.5–14.5)
GFR SERPL CREATININE-BSD FRML MDRD: >90 ML/MIN/1.73M*2
GLUCOSE CSF-MCNC: 60 MG/DL (ref 40–70)
GLUCOSE SERPL-MCNC: 103 MG/DL (ref 74–99)
HCT VFR BLD AUTO: 20.5 % (ref 41–52)
HGB BLD-MCNC: 6.6 G/DL (ref 13.5–17.5)
IMM GRANULOCYTES # BLD AUTO: 0.02 X10*3/UL (ref 0–0.7)
IMM GRANULOCYTES NFR BLD AUTO: 0.7 % (ref 0–0.9)
IMM GRANULOCYTES NFR CSF: 0 %
LDH SERPL L TO P-CCNC: 151 U/L (ref 84–246)
LYMPHOCYTES # BLD AUTO: 0.48 X10*3/UL (ref 1.2–4.8)
LYMPHOCYTES NFR BLD AUTO: 16.2 %
LYMPHOCYTES NFR CSF MANUAL: 80 % (ref 28–96)
MAGNESIUM SERPL-MCNC: 1.84 MG/DL (ref 1.6–2.4)
MCH RBC QN AUTO: 30 PG (ref 26–34)
MCHC RBC AUTO-ENTMCNC: 32.2 G/DL (ref 32–36)
MCV RBC AUTO: 93 FL (ref 80–100)
MONOCYTES # BLD AUTO: 0.25 X10*3/UL (ref 0.1–1)
MONOCYTES NFR BLD AUTO: 8.4 %
MONOS+MACROS NFR CSF MANUAL: 0 % (ref 16–56)
NEUTROPHILS # BLD AUTO: 2.19 X10*3/UL (ref 1.2–7.7)
NEUTROPHILS NFR BLD AUTO: 74.1 %
NEUTS SEG NFR CSF MANUAL: 20 % (ref 0–5)
NRBC BLD-RTO: 0 /100 WBCS (ref 0–0)
OTHER CELLS NFR CSF MANUAL: 0 %
PH, POC: 8.5
PH, POC: NORMAL
PHOSPHATE SERPL-MCNC: 5.1 MG/DL (ref 2.5–4.9)
PLASMA CELLS NFR CSF MICRO: 0 %
PLATELET # BLD AUTO: 95 X10*3/UL (ref 150–450)
POTASSIUM SERPL-SCNC: 3.6 MMOL/L (ref 3.5–5.3)
PRODUCT BLOOD TYPE: 5100
PRODUCT CODE: NORMAL
PROT CSF-MCNC: 30 MG/DL (ref 15–45)
PROT SERPL-MCNC: 4.9 G/DL (ref 6.4–8.2)
RBC # BLD AUTO: 2.2 X10*6/UL (ref 4.5–5.9)
RBC # CSF AUTO: 66 /UL (ref 0–5)
SODIUM SERPL-SCNC: 142 MMOL/L (ref 136–145)
TOTAL CELLS COUNTED CSF: 5
TUBE # CSF: ABNORMAL
UNIT ABO: NORMAL
UNIT NUMBER: NORMAL
UNIT RH: NORMAL
UNIT VOLUME: 350
URATE SERPL-MCNC: 5.9 MG/DL (ref 4–7.5)
WBC # BLD AUTO: 3 X10*3/UL (ref 4.4–11.3)
WBC # CSF AUTO: <1 /UL (ref 1–5)
XM INTEP: NORMAL

## 2024-01-04 PROCEDURE — 88187 FLOWCYTOMETRY/READ 2-8: CPT | Performed by: PATHOLOGY

## 2024-01-04 PROCEDURE — 84550 ASSAY OF BLOOD/URIC ACID: CPT

## 2024-01-04 PROCEDURE — 83735 ASSAY OF MAGNESIUM: CPT | Performed by: NURSE PRACTITIONER

## 2024-01-04 PROCEDURE — A4216 STERILE WATER/SALINE, 10 ML: HCPCS | Performed by: INTERNAL MEDICINE

## 2024-01-04 PROCEDURE — 84157 ASSAY OF PROTEIN OTHER: CPT | Performed by: PHYSICIAN ASSISTANT

## 2024-01-04 PROCEDURE — 96450 CHEMOTHERAPY INTO CNS: CPT | Performed by: PHYSICIAN ASSISTANT

## 2024-01-04 PROCEDURE — 2500000001 HC RX 250 WO HCPCS SELF ADMINISTERED DRUGS (ALT 637 FOR MEDICARE OP)

## 2024-01-04 PROCEDURE — 88185 FLOWCYTOMETRY/TC ADD-ON: CPT | Mod: TC | Performed by: PHYSICIAN ASSISTANT

## 2024-01-04 PROCEDURE — 80053 COMPREHEN METABOLIC PANEL: CPT | Performed by: NURSE PRACTITIONER

## 2024-01-04 PROCEDURE — 83615 LACTATE (LD) (LDH) ENZYME: CPT

## 2024-01-04 PROCEDURE — 85025 COMPLETE CBC W/AUTO DIFF WBC: CPT | Performed by: NURSE PRACTITIONER

## 2024-01-04 PROCEDURE — 1170000001 HC PRIVATE ONCOLOGY ROOM DAILY

## 2024-01-04 PROCEDURE — 82945 GLUCOSE OTHER FLUID: CPT | Performed by: PHYSICIAN ASSISTANT

## 2024-01-04 PROCEDURE — 36430 TRANSFUSION BLD/BLD COMPNT: CPT

## 2024-01-04 PROCEDURE — 84100 ASSAY OF PHOSPHORUS: CPT

## 2024-01-04 PROCEDURE — 99232 SBSQ HOSP IP/OBS MODERATE 35: CPT | Performed by: STUDENT IN AN ORGANIZED HEALTH CARE EDUCATION/TRAINING PROGRAM

## 2024-01-04 PROCEDURE — 3E04305 INTRODUCTION OF OTHER ANTINEOPLASTIC INTO CENTRAL VEIN, PERCUTANEOUS APPROACH: ICD-10-PCS | Performed by: STUDENT IN AN ORGANIZED HEALTH CARE EDUCATION/TRAINING PROGRAM

## 2024-01-04 PROCEDURE — 88104 CYTOPATH FL NONGYN SMEARS: CPT | Performed by: PHYSICIAN ASSISTANT

## 2024-01-04 PROCEDURE — P9040 RBC LEUKOREDUCED IRRADIATED: HCPCS

## 2024-01-04 PROCEDURE — 2500000004 HC RX 250 GENERAL PHARMACY W/ HCPCS (ALT 636 FOR OP/ED): Performed by: INTERNAL MEDICINE

## 2024-01-04 PROCEDURE — 89051 BODY FLUID CELL COUNT: CPT | Performed by: PHYSICIAN ASSISTANT

## 2024-01-04 PROCEDURE — 2500000001 HC RX 250 WO HCPCS SELF ADMINISTERED DRUGS (ALT 637 FOR MEDICARE OP): Performed by: NURSE PRACTITIONER

## 2024-01-04 RX ADMIN — ACYCLOVIR 400 MG: 400 TABLET ORAL at 09:20

## 2024-01-04 RX ADMIN — CYTARABINE 100 MG: 20 INJECTION, SOLUTION INTRATHECAL; INTRAVENOUS; SUBCUTANEOUS at 15:00

## 2024-01-04 RX ADMIN — CYTARABINE 5500 MG: 100 INJECTION, SOLUTION INTRATHECAL; INTRAVENOUS; SUBCUTANEOUS at 13:58

## 2024-01-04 RX ADMIN — ONDANSETRON 16 MG: 2 INJECTION INTRAMUSCULAR; INTRAVENOUS at 13:18

## 2024-01-04 RX ADMIN — DEXAMETHASONE SODIUM PHOSPHATE 12 MG: 10 INJECTION, SOLUTION INTRAMUSCULAR; INTRAVENOUS at 13:45

## 2024-01-04 RX ADMIN — FAMOTIDINE 20 MG: 20 TABLET ORAL at 09:20

## 2024-01-04 RX ADMIN — ACYCLOVIR 400 MG: 400 TABLET ORAL at 20:19

## 2024-01-04 RX ADMIN — SODIUM BICARBONATE 200 ML/HR: 84 INJECTION, SOLUTION INTRAVENOUS at 16:06

## 2024-01-04 RX ADMIN — FLUCONAZOLE 400 MG: 200 TABLET ORAL at 09:20

## 2024-01-04 RX ADMIN — FERROUS SULFATE TAB 325 MG (65 MG ELEMENTAL FE) 1 TABLET: 325 (65 FE) TAB at 09:20

## 2024-01-04 RX ADMIN — SODIUM BICARBONATE 200 ML/HR: 84 INJECTION, SOLUTION INTRAVENOUS at 21:14

## 2024-01-04 RX ADMIN — FERROUS SULFATE TAB 325 MG (65 MG ELEMENTAL FE) 1 TABLET: 325 (65 FE) TAB at 20:19

## 2024-01-04 RX ADMIN — PREDNISOLONE ACETATE 2 DROP: 10 SUSPENSION/ DROPS OPHTHALMIC at 09:22

## 2024-01-04 RX ADMIN — PREDNISOLONE ACETATE 2 DROP: 10 SUSPENSION/ DROPS OPHTHALMIC at 03:09

## 2024-01-04 RX ADMIN — PREDNISOLONE ACETATE 2 DROP: 10 SUSPENSION/ DROPS OPHTHALMIC at 20:19

## 2024-01-04 RX ADMIN — PREDNISOLONE ACETATE 2 DROP: 10 SUSPENSION/ DROPS OPHTHALMIC at 16:05

## 2024-01-04 RX ADMIN — SODIUM BICARBONATE 200 ML/HR: 84 INJECTION, SOLUTION INTRAVENOUS at 04:03

## 2024-01-04 ASSESSMENT — COGNITIVE AND FUNCTIONAL STATUS - GENERAL
MOBILITY SCORE: 23
DAILY ACTIVITIY SCORE: 24
MOBILITY SCORE: 24
CLIMB 3 TO 5 STEPS WITH RAILING: A LITTLE
DAILY ACTIVITIY SCORE: 24

## 2024-01-04 ASSESSMENT — PAIN SCALES - GENERAL
PAINLEVEL_OUTOF10: 0 - NO PAIN

## 2024-01-04 ASSESSMENT — PAIN - FUNCTIONAL ASSESSMENT
PAIN_FUNCTIONAL_ASSESSMENT: 0-10
PAIN_FUNCTIONAL_ASSESSMENT: 0-10

## 2024-01-04 NOTE — PROCEDURES
Lumbar Puncture    Date/Time: 1/4/2024 3:26 PM    Performed by: Roselia Shetty PA-C  Authorized by: Roselia Shetty PA-C    Consent:     Consent obtained:  Written    Consent given by:  Patient    Risks, benefits, and alternatives were discussed: yes      Risks discussed:  Bleeding, infection, pain and headache    Alternatives discussed:  No treatment  Universal protocol:     Procedure explained and questions answered to patient or proxy's satisfaction: yes      Relevant documents present and verified: yes      Test results available: yes      Imaging studies available: no      Required blood products, implants, devices, and special equipment available: no      Immediately prior to procedure a time out was called: yes      Site/side marked: yes      Patient identity confirmed:  Verbally with patient, hospital-assigned identification number and arm band  Pre-procedure details:     Procedure purpose:  Therapeutic    Preparation: Patient was prepped and draped in usual sterile fashion    Anesthesia:     Anesthesia method:  Local infiltration    Local anesthetic:  Lidocaine 1% w/o epi  Procedure details:     Lumbar space:  L3-L4 interspace    Patient position:  Sitting    Needle type:  Spinal needle - Quincke tip    Ultrasound guidance: no      Number of attempts:  1    Fluid appearance:  Clear    Tubes of fluid:  3    Total volume (ml):  9  Post-procedure details:     Puncture site:  Adhesive bandage applied and direct pressure applied    Procedure completion:  Tolerated well, no immediate complications  Comments:      Sent samples for cell count and diff, protein, glucose and flow cytometry.  Following sample collection, Cytarabine 100mg (in 6ml) was administered over 2 min.

## 2024-01-04 NOTE — DOCUMENTATION CLARIFICATION NOTE
"    PATIENT:               MARVA TRAVIS  ACCT #:                  [id number]  MRN:                       [id number]  :                       2000  ADMIT DATE:       2024 11:30 AM  DISCH DATE:  RESPONDING PROVIDER #:        [id number]          PROVIDER RESPONSE TEXT:    Pancytopenia due to Brodie Lymphoma and Chemotherapy    CDI QUERY TEXT:    [facility]_Pancytopenia Diagnosis    Instruction:    Based on your assessment of the patient and the clinical information, please provide the requested documentation by clicking on the appropriate radio button and enter any additional information if prompted.    Question: Is there a diagnosis indicative of the above lab values    When answering this query, please exercise your independent professional judgment. The fact that a question is being asked, does not imply that any particular answer is desired or expected.    The patient's clinical indicators include:  Clinical Information: \"  23 y.o. male with PMH of Burkitt Lymphoma s/p 2 cycles of HyperCVAD, malignant pleural effusions s/p CT 10/29/23, and ADHD who presents 2024 for C4 2B HyperCVAD + Ritux. \"    Clinical Indicators: Burkitt Lymphoma, Chemotherapy, Vitals on admission 36.9, 72, 16, 133/82, 100%, Cbc 1/3/24: Wbc 3.3L, Rbc 2.32L, Hg 7.2L, Hct 22L, Pts 103L, 24: Wbc 3.0L, Rbc 2.2L, Hg 6.6L, Hct 20.5L, Plts 95L, anemia    Treatment: Daily count monitoring, Ferrous Sulfate 325 mg BID, Transfuse to keep Hgb>7, Plt>10    Risk Factors: Burkitt Lymphoma, Chemotherapy Cycle 4 HyperCVAD and IT Methotrexate  Options provided:  -- Pancytopenia due to chemotherapy  -- Pancytopenia due to Brodie Lymphoma  -- Pancytopenia due to Brodie Lymphoma and Chemotherapy  -- Pancytopenia due to other, Please specify additional information below  -- Other - I will add my own diagnosis  -- Refer to Clinical Documentation Reviewer    Query created by: Kaylyn Ricketts on 2024 4:29 PM      Electronically signed by:  KYLE ISAAC PA-C " 1/4/2024 4:34 PM

## 2024-01-04 NOTE — PROGRESS NOTES
"Shilo Thakkar is a 23 y.o. male on day 2 of admission presenting with Burkitt lymphoma (CMS/HCC).    Subjective   No acute complaints today, no acute complaints. Aware of the planned IT chemo today and agreeable. Remainder of ROS negative.         Objective     Physical Exam  Constitutional:       Appearance: Normal appearance. He is normal weight.   HENT:      Head: Normocephalic and atraumatic.      Nose: Nose normal.      Mouth/Throat:      Mouth: Mucous membranes are moist.      Pharynx: Oropharynx is clear.   Eyes:      Extraocular Movements: Extraocular movements intact.      Conjunctiva/sclera: Conjunctivae normal.      Pupils: Pupils are equal, round, and reactive to light.   Cardiovascular:      Rate and Rhythm: Normal rate and regular rhythm.      Pulses: Normal pulses.      Heart sounds: Normal heart sounds.   Pulmonary:      Effort: Pulmonary effort is normal.      Breath sounds: Normal breath sounds.   Abdominal:      General: Abdomen is flat. Bowel sounds are normal.      Palpations: Abdomen is soft.   Skin:     General: Skin is warm and dry.      Comments: R arm PICC c/d/i   Neurological:      General: No focal deficit present.      Mental Status: He is alert. Mental status is at baseline.         Last Recorded Vitals  Blood pressure 143/79, pulse 51, temperature 36.2 °C (97.2 °F), temperature source Temporal, resp. rate 18, height 1.824 m (5' 11.81\"), weight 73.2 kg (161 lb 6 oz), SpO2 100 %.  Intake/Output last 3 Shifts:  I/O last 3 completed shifts:  In: 8054.7 (110.5 mL/kg) [I.V.:6810 (93.4 mL/kg); IV Piggyback:1244.7]  Out: 4125 (56.6 mL/kg) [Urine:4125 (1.6 mL/kg/hr)]  Weight: 72.9 kg     Relevant Results              This patient has a central line   Reason for the central line remaining today? Parenteral medication    Results for orders placed or performed during the hospital encounter of 01/02/24 (from the past 24 hour(s))   POCT UA (nonautomated) manually resulted   Result Value Ref Range    " POC Color, Urine      POC Appearance, Urine      POC Glucose, Urine      POC Bilirubin, Urine      POC Ketones, Urine      POC Specific Gravity, Urine      POC Blood, Urine      POC PH, Urine      POC Protein, Urine      POC Urobilinogen, Urine      Poc Nitrite, Urine      POC Leukocytes, Urine     POCT pH, urine, qualitative, dipstick manually resulted   Result Value Ref Range    pH, UA 9.0    CBC and Auto Differential   Result Value Ref Range    WBC 3.0 (L) 4.4 - 11.3 x10*3/uL    nRBC 0.0 0.0 - 0.0 /100 WBCs    RBC 2.20 (L) 4.50 - 5.90 x10*6/uL    Hemoglobin 6.6 (L) 13.5 - 17.5 g/dL    Hematocrit 20.5 (L) 41.0 - 52.0 %    MCV 93 80 - 100 fL    MCH 30.0 26.0 - 34.0 pg    MCHC 32.2 32.0 - 36.0 g/dL    RDW 20.0 (H) 11.5 - 14.5 %    Platelets 95 (L) 150 - 450 x10*3/uL    Neutrophils % 74.1 40.0 - 80.0 %    Immature Granulocytes %, Automated 0.7 0.0 - 0.9 %    Lymphocytes % 16.2 13.0 - 44.0 %    Monocytes % 8.4 2.0 - 10.0 %    Eosinophils % 0.3 0.0 - 6.0 %    Basophils % 0.3 0.0 - 2.0 %    Neutrophils Absolute 2.19 1.20 - 7.70 x10*3/uL    Immature Granulocytes Absolute, Automated 0.02 0.00 - 0.70 x10*3/uL    Lymphocytes Absolute 0.48 (L) 1.20 - 4.80 x10*3/uL    Monocytes Absolute 0.25 0.10 - 1.00 x10*3/uL    Eosinophils Absolute 0.01 0.00 - 0.70 x10*3/uL    Basophils Absolute 0.01 0.00 - 0.10 x10*3/uL   Magnesium   Result Value Ref Range    Magnesium 1.84 1.60 - 2.40 mg/dL   Comprehensive Metabolic Panel   Result Value Ref Range    Glucose 103 (H) 74 - 99 mg/dL    Sodium 142 136 - 145 mmol/L    Potassium 3.6 3.5 - 5.3 mmol/L    Chloride 101 98 - 107 mmol/L    Bicarbonate 34 (H) 21 - 32 mmol/L    Anion Gap 11 10 - 20 mmol/L    Urea Nitrogen 6 6 - 23 mg/dL    Creatinine 0.85 0.50 - 1.30 mg/dL    eGFR >90 >60 mL/min/1.73m*2    Calcium 9.0 8.6 - 10.6 mg/dL    Albumin 3.7 3.4 - 5.0 g/dL    Alkaline Phosphatase 68 33 - 120 U/L    Total Protein 4.9 (L) 6.4 - 8.2 g/dL    AST 12 9 - 39 U/L    Bilirubin, Total 0.7 0.0 - 1.2  mg/dL    ALT 13 10 - 52 U/L   Lactate Dehydrogenase   Result Value Ref Range     84 - 246 U/L   Phosphorus   Result Value Ref Range    Phosphorus 5.1 (H) 2.5 - 4.9 mg/dL   Uric Acid   Result Value Ref Range    Uric Acid 5.9 4.0 - 7.5 mg/dL   Prepare RBC: 1 Units, Irradiated, Leukocytes Reduced (CMV reduced risk)   Result Value Ref Range    PRODUCT CODE P2120Y38     Unit Number [id number]     Unit ABO O     Unit RH POS     XM INTEP COMP     Dispense Status IS     Blood Expiration Date January 23, 2024 23:59 EST     PRODUCT BLOOD TYPE 5100     UNIT VOLUME 350    POCT pH, urine, qualitative, dipstick manually resulted   Result Value Ref Range    pH, UA 8.5    POCT pH, urine, qualitative, dipstick manually resulted   Result Value Ref Range    pH, UA       XR chest 2 views    Result Date: 1/2/2024  Interpreted By:  Jae Licona,  and Deyanira Pizarro STUDY: XR CHEST 2 VIEWS;  1/2/2024 3:22 pm   INDICATION: Signs/Symptoms:Prior to high dose methotrexate chemotherapy.   COMPARISON: Chest radiograph 12/11/2023   ACCESSION NUMBER(S): [id number]   ORDERING CLINICIAN: KYLE ISAAC   FINDINGS: PA and lateral radiographs of the chest were provided.   Right-sided PICC line with tip overlying the SVC.   CARDIOMEDIASTINAL SILHOUETTE: Cardiomediastinal silhouette is normal in size and configuration.   LUNGS: Lungs are clear.   ABDOMEN: No remarkable upper abdominal findings.   BONES: No acute osseous changes.       1.  No evidence of acute cardiopulmonary process.   I personally reviewed the images/study and I agree with the findings as stated by Juarez Christie. This study was interpreted at [place], [place].   MACRO: None   Signed by: Jae Licona 1/2/2024 5:07 PM Dictation workstation:   [id number]           Malnutrition          I agree with the dietitian's malnutrition diagnosis.      Assessment/Plan   Principal Problem:    Burkitt  lymphoma (CMS/HCC)  Active Problems:    Encounter for antineoplastic chemotherapy    Shilo Thakkar is a 23 y.o. male with PMH of Burkitt Lymphoma (Dx 10/20/23; s/p 2 cycles of HyperCVAD), malignant pleural effusions s/p CT (10/29/23), and ADHD who presents 1/2/2024 for C4 (2B) HyperCVAD + Ritux.      D3C4 HyperCVAD (2B)     CHEMO:  # C4 HyperCVAD + Ritux  - Cycle 4 part B; planned for IV Rituxan and IV MTX on D1, IV and IT Cytarabine on D2, IV Cytarabine on D3, IV Rituxan on D6, and IT MTX on D7  - Plan to start chemo regimen on 1/3/2024. 24-hour post methotrexate infusion lab due at 11:30 on 1/5.  - NS @ 100mL/hr throughout chemo  - Premeds and reaction meds as ordered  - Decadron 12mg IV daily x4 days  - Neulasta 6mg subcutaneous to be given outpt 1/8/2024     ONC:  # Burkitt lymphoma, newly diagnosed (10/20/23)  - Primary oncologist: Dr. Olegario Klein  - 10/19/23 presented to OSH ED with dysphagia and abdominal pain  - CT A/P with IV contrast (10/19) mass in the body of the pancreas measuring approximately 2.7 x 2.9 cm. Multiple masses in the mesentery as well as omental caking consistent with carcinomatosis.   - CT soft tissue neck with IV contrast (10/19): homogeneous soft tissue attenuation lesion in the L oropharynx which measures approximately 3.0 x 4.0 x 5.1 cm.   - CT Abdomen w/o IV contrast (10/20): Mesenteric lymphadenopathy  - US guided mesenteric LN biopsy (10/20/23): HIGH GRADE B-CELL LYMPHOMA MORPHOLOGICALLY CONSISTENT WITH BURKITT LYMPHOMA; flow cytometry: CD10+ kappa restricted B cells supportive of a B cell lymphoma with germinal center origin.   - Biopsy L oropharyngeal mass and L cervical LN by ENT (10/23/23) high grade B-cell burkitt lymphoma  - PET CT ordered 10/30 showed FDG avid left oropharyngeal mass and LAD above and below diaphragm  - s/p 3 cycles of HyperCVAD  - Bone marrow bx completed 11/1 without evidence of disease  - XR chest neg 1/2  - Currently admitted for cycle 4 HyperCVAD; okay  to start chemo 1/3/2024    HEME:  # Anemia, likely 2/2 disease  - Continue home ferrous sulfate 325mg BID  - Discussed with Dr. Coffey and Dr. Klein on 12/27; okay to start chemo 1/2   - Transfuse to keep Hgb>7, Plt>10     CARDIAC:  - ECHO (10/2023): EF 60-65%  - Baseline HR 50s  - Overnight 12/12, HR 45, EKG sinus bradycardia (could be 2/2 zofran premedications)   - Patient asymptomatic, denies lightheadedness, dizziness, or palpitations      ID:  Allergies: NKDA  Afebrile on admit  # Prophylaxis   - Continue home Acyclovir 400mg BID, Fluconazole 400mg daily. Hold bactrim ppx with HDMTX  - Plan Levaquin for neutropenia (day 5 through day 15 per outpt notes; 1/6-1/16)     FEN/GI:  - Admit weight: 70.8kg (1/2), Current weight: 73.2kg (1/4)  - On continuous fluids with chemo regimen, give lasix if needed   - Daily weights   - Holding home protonix with HDMTX, covering with Famotidine.   - Replace electrolytes as needed  - Low pathogen diet  - TLS monitoring  - G6PD normal (10/26/23)     DISPO:  - Full Code  - Access: PICC line (right)  - DVT ppx: Ambulation  - DC pending completion of chemo  - NOK: Ms Deidra Pickett [phone]     Patient seen, examined, and discussed with Dr. Rakesh Webster MD.     I spent 35 minutes in the professional and overall care of this patient.      Gilberto Pineda PA-C

## 2024-01-05 LAB
ABO GROUP (TYPE) IN BLOOD: NORMAL
ALBUMIN SERPL BCP-MCNC: 3.9 G/DL (ref 3.4–5)
ALP SERPL-CCNC: 77 U/L (ref 33–120)
ALT SERPL W P-5'-P-CCNC: 15 U/L (ref 10–52)
ANION GAP SERPL CALC-SCNC: 16 MMOL/L (ref 10–20)
ANTIBODY SCREEN: NORMAL
AST SERPL W P-5'-P-CCNC: 16 U/L (ref 9–39)
B-HCG SERPL-ACNC: <3 MIU/ML
BASOPHILS # BLD AUTO: 0 X10*3/UL (ref 0–0.1)
BASOPHILS NFR BLD AUTO: 0 %
BILIRUB SERPL-MCNC: 0.8 MG/DL (ref 0–1.2)
BUN SERPL-MCNC: 9 MG/DL (ref 6–23)
CALCIUM SERPL-MCNC: 9.3 MG/DL (ref 8.6–10.6)
CELL POPULATIONS: NORMAL
CHLORIDE SERPL-SCNC: 99 MMOL/L (ref 98–107)
CO2 SERPL-SCNC: 30 MMOL/L (ref 21–32)
CREAT SERPL-MCNC: 0.78 MG/DL (ref 0.5–1.3)
DIAGNOSIS: NORMAL
EOSINOPHIL # BLD AUTO: 0 X10*3/UL (ref 0–0.7)
EOSINOPHIL NFR BLD AUTO: 0 %
ERYTHROCYTE [DISTWIDTH] IN BLOOD BY AUTOMATED COUNT: 25.6 % (ref 11.5–14.5)
FLOW DIFFERENTIAL: NORMAL
FLOW TEST ORDERED: NORMAL
FLUID CELL COUNT: <1 /UL
GFR SERPL CREATININE-BSD FRML MDRD: >90 ML/MIN/1.73M*2
GLUCOSE SERPL-MCNC: 153 MG/DL (ref 74–99)
HCT VFR BLD AUTO: 24.2 % (ref 41–52)
HGB BLD-MCNC: 8.3 G/DL (ref 13.5–17.5)
HYPOCHROMIA BLD QL SMEAR: NORMAL
IMM GRANULOCYTES # BLD AUTO: 0.04 X10*3/UL (ref 0–0.7)
IMM GRANULOCYTES NFR BLD AUTO: 0.6 % (ref 0–0.9)
LAB TEST METHOD: NORMAL
LDH SERPL L TO P-CCNC: 227 U/L (ref 84–246)
LYMPHOCYTES # BLD AUTO: 0.17 X10*3/UL (ref 1.2–4.8)
LYMPHOCYTES NFR BLD AUTO: 2.5 %
MAGNESIUM SERPL-MCNC: 1.8 MG/DL (ref 1.6–2.4)
MCH RBC QN AUTO: 29.5 PG (ref 26–34)
MCHC RBC AUTO-ENTMCNC: 34.3 G/DL (ref 32–36)
MCV RBC AUTO: 86 FL (ref 80–100)
MONOCYTES # BLD AUTO: 0.08 X10*3/UL (ref 0.1–1)
MONOCYTES NFR BLD AUTO: 1.2 %
MTX 24H P SERPL-SCNC: 0.27 UMOL/L
NEUTROPHILS # BLD AUTO: 6.5 X10*3/UL (ref 1.2–7.7)
NEUTROPHILS NFR BLD AUTO: 95.7 %
NRBC BLD-RTO: 0 /100 WBCS (ref 0–0)
NUMBER OF CELLS COLLECTED: NORMAL
PATH REPORT.TOTAL CANCER: NORMAL
PATH REVIEW-CELL CT,CSF: NORMAL
PHOSPHATE SERPL-MCNC: 4.3 MG/DL (ref 2.5–4.9)
PLATELET # BLD AUTO: 116 X10*3/UL (ref 150–450)
POLYCHROMASIA BLD QL SMEAR: NORMAL
POTASSIUM SERPL-SCNC: 4 MMOL/L (ref 3.5–5.3)
PROT SERPL-MCNC: 5.3 G/DL (ref 6.4–8.2)
RBC # BLD AUTO: 2.81 X10*6/UL (ref 4.5–5.9)
RBC MORPH BLD: NORMAL
RH FACTOR (ANTIGEN D): NORMAL
SCHISTOCYTES BLD QL SMEAR: NORMAL
SIGNATURE COMMENT: NORMAL
SODIUM SERPL-SCNC: 141 MMOL/L (ref 136–145)
SPECIMEN VIABILITY: NORMAL
URATE SERPL-MCNC: 6 MG/DL (ref 4–7.5)
WBC # BLD AUTO: 6.8 X10*3/UL (ref 4.4–11.3)

## 2024-01-05 PROCEDURE — 83735 ASSAY OF MAGNESIUM: CPT | Performed by: NURSE PRACTITIONER

## 2024-01-05 PROCEDURE — 85025 COMPLETE CBC W/AUTO DIFF WBC: CPT | Performed by: NURSE PRACTITIONER

## 2024-01-05 PROCEDURE — 84702 CHORIONIC GONADOTROPIN TEST: CPT | Performed by: NURSE PRACTITIONER

## 2024-01-05 PROCEDURE — 84550 ASSAY OF BLOOD/URIC ACID: CPT

## 2024-01-05 PROCEDURE — 2500000001 HC RX 250 WO HCPCS SELF ADMINISTERED DRUGS (ALT 637 FOR MEDICARE OP)

## 2024-01-05 PROCEDURE — 84100 ASSAY OF PHOSPHORUS: CPT

## 2024-01-05 PROCEDURE — 86901 BLOOD TYPING SEROLOGIC RH(D): CPT | Performed by: NURSE PRACTITIONER

## 2024-01-05 PROCEDURE — 80053 COMPREHEN METABOLIC PANEL: CPT | Performed by: NURSE PRACTITIONER

## 2024-01-05 PROCEDURE — 99231 SBSQ HOSP IP/OBS SF/LOW 25: CPT | Performed by: STUDENT IN AN ORGANIZED HEALTH CARE EDUCATION/TRAINING PROGRAM

## 2024-01-05 PROCEDURE — 80204 DRUG ASSAY METHOTREXATE: CPT | Performed by: INTERNAL MEDICINE

## 2024-01-05 PROCEDURE — 2500000001 HC RX 250 WO HCPCS SELF ADMINISTERED DRUGS (ALT 637 FOR MEDICARE OP): Performed by: NURSE PRACTITIONER

## 2024-01-05 PROCEDURE — 86920 COMPATIBILITY TEST SPIN: CPT

## 2024-01-05 PROCEDURE — 83615 LACTATE (LD) (LDH) ENZYME: CPT

## 2024-01-05 PROCEDURE — 2500000004 HC RX 250 GENERAL PHARMACY W/ HCPCS (ALT 636 FOR OP/ED): Performed by: INTERNAL MEDICINE

## 2024-01-05 PROCEDURE — 1170000001 HC PRIVATE ONCOLOGY ROOM DAILY

## 2024-01-05 RX ADMIN — PREDNISOLONE ACETATE 2 DROP: 10 SUSPENSION/ DROPS OPHTHALMIC at 03:00

## 2024-01-05 RX ADMIN — PROCHLORPERAZINE EDISYLATE 10 MG: 5 INJECTION INTRAMUSCULAR; INTRAVENOUS at 05:54

## 2024-01-05 RX ADMIN — SODIUM BICARBONATE 200 ML/HR: 84 INJECTION, SOLUTION INTRAVENOUS at 15:26

## 2024-01-05 RX ADMIN — DEXAMETHASONE SODIUM PHOSPHATE 12 MG: 10 INJECTION, SOLUTION INTRAMUSCULAR; INTRAVENOUS at 13:41

## 2024-01-05 RX ADMIN — PREDNISOLONE ACETATE 2 DROP: 10 SUSPENSION/ DROPS OPHTHALMIC at 15:36

## 2024-01-05 RX ADMIN — LEUCOVORIN CALCIUM 15 MG: 350 INJECTION, POWDER, LYOPHILIZED, FOR SUSPENSION INTRAMUSCULAR; INTRAVENOUS at 06:36

## 2024-01-05 RX ADMIN — LEUCOVORIN CALCIUM 50 MG: 350 INJECTION, POWDER, LYOPHILIZED, FOR SOLUTION INTRAMUSCULAR; INTRAVENOUS at 01:00

## 2024-01-05 RX ADMIN — FAMOTIDINE 20 MG: 20 TABLET ORAL at 09:24

## 2024-01-05 RX ADMIN — ACYCLOVIR 400 MG: 400 TABLET ORAL at 09:24

## 2024-01-05 RX ADMIN — SODIUM BICARBONATE 200 ML/HR: 84 INJECTION, SOLUTION INTRAVENOUS at 21:25

## 2024-01-05 RX ADMIN — FERROUS SULFATE TAB 325 MG (65 MG ELEMENTAL FE) 1 TABLET: 325 (65 FE) TAB at 20:00

## 2024-01-05 RX ADMIN — ACYCLOVIR 400 MG: 400 TABLET ORAL at 20:00

## 2024-01-05 RX ADMIN — SODIUM BICARBONATE 200 ML/HR: 84 INJECTION, SOLUTION INTRAVENOUS at 09:23

## 2024-01-05 RX ADMIN — FERROUS SULFATE TAB 325 MG (65 MG ELEMENTAL FE) 1 TABLET: 325 (65 FE) TAB at 09:24

## 2024-01-05 RX ADMIN — SODIUM BICARBONATE 200 ML/HR: 84 INJECTION, SOLUTION INTRAVENOUS at 03:27

## 2024-01-05 RX ADMIN — LEUCOVORIN CALCIUM 15 MG: 350 INJECTION, POWDER, LYOPHILIZED, FOR SUSPENSION INTRAMUSCULAR; INTRAVENOUS at 19:00

## 2024-01-05 RX ADMIN — CYTARABINE 5500 MG: 2 INJECTION, SOLUTION INTRATHECAL; INTRAVENOUS; SUBCUTANEOUS at 15:26

## 2024-01-05 RX ADMIN — PREDNISOLONE ACETATE 2 DROP: 10 SUSPENSION/ DROPS OPHTHALMIC at 20:02

## 2024-01-05 RX ADMIN — LEUCOVORIN CALCIUM 15 MG: 350 INJECTION, POWDER, LYOPHILIZED, FOR SUSPENSION INTRAMUSCULAR; INTRAVENOUS at 15:26

## 2024-01-05 RX ADMIN — PREDNISOLONE ACETATE 2 DROP: 10 SUSPENSION/ DROPS OPHTHALMIC at 09:23

## 2024-01-05 RX ADMIN — CYTARABINE 5500 MG: 100 INJECTION, SOLUTION INTRATHECAL; INTRAVENOUS; SUBCUTANEOUS at 01:01

## 2024-01-05 RX ADMIN — ONDANSETRON 16 MG: 2 INJECTION INTRAMUSCULAR; INTRAVENOUS at 13:18

## 2024-01-05 RX ADMIN — FLUCONAZOLE 400 MG: 200 TABLET ORAL at 09:24

## 2024-01-05 ASSESSMENT — COGNITIVE AND FUNCTIONAL STATUS - GENERAL
MOBILITY SCORE: 24
MOBILITY SCORE: 24
DAILY ACTIVITIY SCORE: 24
DAILY ACTIVITIY SCORE: 24

## 2024-01-05 ASSESSMENT — PAIN SCALES - GENERAL
PAINLEVEL_OUTOF10: 0 - NO PAIN
PAINLEVEL_OUTOF10: 0 - NO PAIN

## 2024-01-05 ASSESSMENT — PAIN - FUNCTIONAL ASSESSMENT: PAIN_FUNCTIONAL_ASSESSMENT: 0-10

## 2024-01-05 NOTE — PROGRESS NOTES
"Shilo Thakkar is a 23 y.o. male on day 3 of admission presenting with Burkitt lymphoma (CMS/HCC).    Subjective   No acute complaints today, no acute complaints. Awaiting methotrexate level.     Remainder of ROS negative.         Objective     Physical Exam  Constitutional:       Appearance: Normal appearance. He is normal weight.   HENT:      Head: Normocephalic and atraumatic.      Nose: Nose normal.      Mouth/Throat:      Mouth: Mucous membranes are moist.      Pharynx: Oropharynx is clear.   Eyes:      Extraocular Movements: Extraocular movements intact.      Conjunctiva/sclera: Conjunctivae normal.      Pupils: Pupils are equal, round, and reactive to light.   Cardiovascular:      Rate and Rhythm: Normal rate and regular rhythm.      Pulses: Normal pulses.      Heart sounds: Normal heart sounds.   Pulmonary:      Effort: Pulmonary effort is normal.      Breath sounds: Normal breath sounds.   Abdominal:      General: Abdomen is flat. Bowel sounds are normal.      Palpations: Abdomen is soft.   Skin:     General: Skin is warm and dry.      Comments: R arm PICC c/d/i   Neurological:      General: No focal deficit present.      Mental Status: He is alert. Mental status is at baseline.         Last Recorded Vitals  Blood pressure 145/82, pulse 64, temperature 36.3 °C (97.3 °F), temperature source Temporal, resp. rate 18, height 1.824 m (5' 11.81\"), weight 73.3 kg (161 lb 9.6 oz), SpO2 98 %.  Intake/Output last 3 Shifts:  I/O last 3 completed shifts:  In: 53050.5 (178.1 mL/kg) [I.V.:93202 (145.2 mL/kg); Blood:300; IV Piggyback:2108.5]  Out: 4125 (56.4 mL/kg) [Urine:4125 (1.6 mL/kg/hr)]  Weight: 73.2 kg     Relevant Results  Scheduled medications  acyclovir, 400 mg, oral, BID  cytarabine, 3,000 mg/m2 (Treatment Plan Recorded), intravenous, q12h  [START ON 1/8/2024] dexAMETHasone, 12 mg, intravenous, Once  dexAMETHasone, 12 mg, intravenous, Once  famotidine, 20 mg, oral, Daily   Or  famotidine, 20 mg, intravenous, " Daily  ferrous sulfate (325 mg ferrous sulfate), 1 tablet, oral, BID  fluconazole, 400 mg, oral, Daily  leucovorin, 15 mg, intravenous, q6h  ondansetron, 16 mg, intravenous, Once  polyethylene glycol, 17 g, oral, Daily  prednisoLONE acetate, 2 drop, Both Eyes, q6h      Continuous medications  sodium bicarbonate, 200 mL/hr, Last Rate: 200 mL/hr (01/05/24 0923)  sodium bicarbonate 150 mEq in dextrose 5 % in water (D5W) 1,150 mL infusion, 200 mL/hr, Last Rate: 200 mL/hr (01/04/24 1606)      PRN medications  PRN medications: albuterol, alteplase, dextrose, diphenhydrAMINE, EPINEPHrine, famotidine, methylPREDNISolone sodium succinate (PF), prochlorperazine, prochlorperazine, sennosides-docusate sodium, sodium chloride                This patient has a central line   Reason for the central line remaining today? Parenteral medication    Results for orders placed or performed during the hospital encounter of 01/02/24 (from the past 24 hour(s))   CSF Cell Count   Result Value Ref Range    Tube Number, CSF Tube 1       Color, CSF Colorless Colorless    Clarity, CSF Clear Clear    Color, Supernatant CSF Colorless      WBC, CSF <1 (L) 1 - 5 /uL    RBC, CSF 66 (H) 0 - 5 /uL   CSF Differential   Result Value Ref Range    Neutrophils %, Manual, CSF 20 (H) 0 - 5 %    Lymphocytes %, Manual, CSF 80 28 - 96 %    Mono/Macrophages %, Manual, CSF 0 (L) 16 - 56 %    Eosinophils %, Manual, CSF 0 Rare %    Basophils %, Manual, CSF 0 Not Established %    Immature Granulocytes %, Manual, CSF 0 Not Established %    Blasts %, Manual, CSF 0 Not Established %    Unclassified Cells %, Manual, CSF 0 Not Established %    Plasma Cells %, Manual, CSF 0 Not Established %    Total Cells Counted, CSF 5    Pathologist Review-Cell Count,CSF   Result Value Ref Range    Pathologist Review-Cell Count, CSF No definite lymphoma cells seen.    POCT pH, urine, qualitative, dipstick manually resulted   Result Value Ref Range    pH, UA     Glucose, CSF   Result  Value Ref Range    Glucose, CSF 60 40 - 70 mg/dL   Protein, CSF   Result Value Ref Range    Total Protein, CSF 30 15 - 45 mg/dL   POCT pH, urine, qualitative, dipstick manually resulted   Result Value Ref Range    pH, UA     Human Chorionic Gonadotropin, Serum Quantitative   Result Value Ref Range    HCG, Beta-Quantitative <3 <5 mIU/mL   CBC and Auto Differential   Result Value Ref Range    WBC 6.8 4.4 - 11.3 x10*3/uL    nRBC 0.0 0.0 - 0.0 /100 WBCs    RBC 2.81 (L) 4.50 - 5.90 x10*6/uL    Hemoglobin 8.3 (L) 13.5 - 17.5 g/dL    Hematocrit 24.2 (L) 41.0 - 52.0 %    MCV 86 80 - 100 fL    MCH 29.5 26.0 - 34.0 pg    MCHC 34.3 32.0 - 36.0 g/dL    RDW 25.6 (H) 11.5 - 14.5 %    Platelets 116 (L) 150 - 450 x10*3/uL    Neutrophils % 95.7 40.0 - 80.0 %    Immature Granulocytes %, Automated 0.6 0.0 - 0.9 %    Lymphocytes % 2.5 13.0 - 44.0 %    Monocytes % 1.2 2.0 - 10.0 %    Eosinophils % 0.0 0.0 - 6.0 %    Basophils % 0.0 0.0 - 2.0 %    Neutrophils Absolute 6.50 1.20 - 7.70 x10*3/uL    Immature Granulocytes Absolute, Automated 0.04 0.00 - 0.70 x10*3/uL    Lymphocytes Absolute 0.17 (L) 1.20 - 4.80 x10*3/uL    Monocytes Absolute 0.08 (L) 0.10 - 1.00 x10*3/uL    Eosinophils Absolute 0.00 0.00 - 0.70 x10*3/uL    Basophils Absolute 0.00 0.00 - 0.10 x10*3/uL   Magnesium   Result Value Ref Range    Magnesium 1.80 1.60 - 2.40 mg/dL   Comprehensive Metabolic Panel   Result Value Ref Range    Glucose 153 (H) 74 - 99 mg/dL    Sodium 141 136 - 145 mmol/L    Potassium 4.0 3.5 - 5.3 mmol/L    Chloride 99 98 - 107 mmol/L    Bicarbonate 30 21 - 32 mmol/L    Anion Gap 16 10 - 20 mmol/L    Urea Nitrogen 9 6 - 23 mg/dL    Creatinine 0.78 0.50 - 1.30 mg/dL    eGFR >90 >60 mL/min/1.73m*2    Calcium 9.3 8.6 - 10.6 mg/dL    Albumin 3.9 3.4 - 5.0 g/dL    Alkaline Phosphatase 77 33 - 120 U/L    Total Protein 5.3 (L) 6.4 - 8.2 g/dL    AST 16 9 - 39 U/L    Bilirubin, Total 0.8 0.0 - 1.2 mg/dL    ALT 15 10 - 52 U/L   Lactate Dehydrogenase   Result Value  Ref Range     84 - 246 U/L   Phosphorus   Result Value Ref Range    Phosphorus 4.3 2.5 - 4.9 mg/dL   Uric Acid   Result Value Ref Range    Uric Acid 6.0 4.0 - 7.5 mg/dL   Morphology   Result Value Ref Range    RBC Morphology See Below     Polychromasia Mild     Hypochromia Mild     RBC Fragments Few      XR chest 2 views    Result Date: 1/2/2024  Interpreted By:  Jae Licona,  and Deyanira Pizarro STUDY: XR CHEST 2 VIEWS;  1/2/2024 3:22 pm   INDICATION: Signs/Symptoms:Prior to high dose methotrexate chemotherapy.   COMPARISON: Chest radiograph 12/11/2023   ACCESSION NUMBER(S): [id number]   ORDERING CLINICIAN: KYLE ISAAC   FINDINGS: PA and lateral radiographs of the chest were provided.   Right-sided PICC line with tip overlying the SVC.   CARDIOMEDIASTINAL SILHOUETTE: Cardiomediastinal silhouette is normal in size and configuration.   LUNGS: Lungs are clear.   ABDOMEN: No remarkable upper abdominal findings.   BONES: No acute osseous changes.       1.  No evidence of acute cardiopulmonary process.   I personally reviewed the images/study and I agree with the findings as stated by Juarez Christie. This study was interpreted at [place], [place].   MACRO: None   Signed by: Jae Licona 1/2/2024 5:07 PM Dictation workstation:   [id number]           Malnutrition          I agree with the dietitian's malnutrition diagnosis.      Assessment/Plan   Principal Problem:    Burkitt lymphoma (CMS/HCC)  Active Problems:    Encounter for antineoplastic chemotherapy    Shilo Thakkar is a 23 y.o. male with PMH of Burkitt Lymphoma (Dx 10/20/23; s/p 2 cycles of HyperCVAD), malignant pleural effusions s/p CT (10/29/23), and ADHD who presents 1/2/2024 for C4 (2B) HyperCVAD + Ritux.      Day 4 Cycle 4 HyperCVAD (2B) (started 1/3/24)     CHEMO:  # C4 HyperCVAD + Ritux  - Cycle 4 part B; planned for IV Rituxan and IV MTX on D1, IV and IT  Cytarabine on D2, IV Cytarabine on D3, IV Rituxan on D6, and IT MTX on D7  - Plan to start chemo regimen on 1/3/2024. 24-hour post methotrexate infusion lab due at 11:30 on 1/5, pending.  - NS @ 100mL/hr throughout chemo  - Premeds and reaction meds as ordered  - Decadron 12mg IV daily x4 days  - Neulasta 6mg subcutaneous to be given outpt 1/8/2024     ONC:  # Burkitt lymphoma, newly diagnosed (10/20/23)  - Primary oncologist: Dr. Olegario Klein  - 10/19/23 presented to OSH ED with dysphagia and abdominal pain  - CT A/P with IV contrast (10/19) mass in the body of the pancreas measuring approximately 2.7 x 2.9 cm. Multiple masses in the mesentery as well as omental caking consistent with carcinomatosis.   - CT soft tissue neck with IV contrast (10/19): homogeneous soft tissue attenuation lesion in the L oropharynx which measures approximately 3.0 x 4.0 x 5.1 cm.   - CT Abdomen w/o IV contrast (10/20): Mesenteric lymphadenopathy  - US guided mesenteric LN biopsy (10/20/23): HIGH GRADE B-CELL LYMPHOMA MORPHOLOGICALLY CONSISTENT WITH BURKITT LYMPHOMA; flow cytometry: CD10+ kappa restricted B cells supportive of a B cell lymphoma with germinal center origin.   - Biopsy L oropharyngeal mass and L cervical LN by ENT (10/23/23) high grade B-cell burkitt lymphoma  - PET CT ordered 10/30 showed FDG avid left oropharyngeal mass and LAD above and below diaphragm  - s/p 3 cycles of HyperCVAD  - Bone marrow bx completed 11/1 without evidence of disease  - XR chest neg 1/2  - Currently admitted for cycle 4 HyperCVAD; okay to start chemo 1/3/2024    HEME:  # Anemia, likely 2/2 disease  - Continue home ferrous sulfate 325mg BID  - Discussed with Dr. Coffey and Dr. Klein on 12/27; okay to start chemo 1/2   - Transfuse to keep Hgb>7, Plt>10     CARDIAC:  - ECHO (10/2023): EF 60-65%  - Baseline HR 50s  - Overnight 12/12, HR 45, EKG sinus bradycardia (could be 2/2 zofran premedications)   - Patient asymptomatic, denies  lightheadedness, dizziness, or palpitations      ID:  Allergies: NKDA  Afebrile on admit  # Prophylaxis   - Continue home Acyclovir 400mg BID, Fluconazole 400mg daily. Hold bactrim ppx with HDMTX  - Plan Levaquin for neutropenia (day 5 through day 15 per outpt notes; 1/6-1/16)     FEN/GI:  - Admit weight: 70.8kg (1/2), Current weight: 73.2kg (1/4)  - On continuous fluids with chemo regimen, give lasix if needed   - Daily weights   - Holding home protonix with HDMTX, covering with Famotidine.   - Replace electrolytes as needed  - Low pathogen diet  - TLS monitoring  - G6PD normal (10/26/23)     DISPO:  - Full Code  - Access: PICC line (right)  - DVT ppx: Ambulation  - DC pending completion of chemo  - NOK: Ms Deidra Pickett [phone]     Patient seen, examined, and discussed with Dr. Rakesh Webster MD.     I spent 35 minutes in the professional and overall care of this patient.      DEMETRIO Duff-CNP

## 2024-01-05 NOTE — NURSING NOTE
Patient completed 22-hour Methotrexate dose at 1315. 24-hour methotrexate level will need collected on 1/5 at 1315. Urine pH= 8.5-9.    Dose # 1 of 2 of Cytarabine 5,500mg in 305mL given over 3 hours via PICC line catheter.  Pre-medicated with zofran 16mg IVPB and decadron 12mg IVPB.  Patient, dose and rate verified with Stanford Sherman RN.  + BBR obtained via syringe aspiration before and after administration.  Patient with intermittent nausea and one episode of vomiting. Prednisone eye drops being administered per protocol. D5W with sodium bicarb infusing at 200mL/hr. Neuro check WNL.

## 2024-01-05 NOTE — CARE PLAN
The clinical goals for the shift include Patient will remain without N/V/D throughout shift.      Problem: Pain  Goal: My pain/discomfort is manageable  Outcome: Progressing     Problem: Safety  Goal: Patient will be injury free during hospitalization  Outcome: Progressing  Goal: I will remain free of falls  Outcome: Progressing     Problem: Daily Care  Goal: Daily care needs are met  Outcome: Progressing     Problem: Psychosocial Needs  Goal: Demonstrates ability to cope with hospitalization/illness  Outcome: Progressing  Goal: Collaborate with me, my family, and caregiver to identify my specific goals  Outcome: Progressing     Problem: Discharge Barriers  Goal: My discharge needs are met  Outcome: Progressing

## 2024-01-05 NOTE — CARE PLAN
Patient tolerating chemo well. Urine pH 9. Voidijng adequate amounts of urine. Medicated with 10mg IV compazine for emesis this morning. 24 hour methotrexate  level to be drawn today 1/5 at 1315.

## 2024-01-05 NOTE — NURSING NOTE
.     Dose # 2 of 2 of Cytarabine 5,500mg in 305mL given over 3 hours via PICC line catheter.  Patient, dose and rate verified with second RN. + BBR obtained via syringe aspiration before and after administration.  Patient denies N/V/D.  Prednisone eye drops being administered per protocol. D5W with sodium bicarb infusing at 200mL/hr. Neuro check WNL.

## 2024-01-06 LAB
ALBUMIN SERPL BCP-MCNC: 3.6 G/DL (ref 3.4–5)
ALP SERPL-CCNC: 61 U/L (ref 33–120)
ALT SERPL W P-5'-P-CCNC: 25 U/L (ref 10–52)
ANION GAP SERPL CALC-SCNC: 11 MMOL/L (ref 10–20)
AST SERPL W P-5'-P-CCNC: 23 U/L (ref 9–39)
BASOPHILS # BLD MANUAL: 0 X10*3/UL (ref 0–0.1)
BASOPHILS NFR BLD MANUAL: 0 %
BILIRUB SERPL-MCNC: 0.7 MG/DL (ref 0–1.2)
BUN SERPL-MCNC: 13 MG/DL (ref 6–23)
CALCIUM SERPL-MCNC: 8.8 MG/DL (ref 8.6–10.6)
CHLORIDE SERPL-SCNC: 102 MMOL/L (ref 98–107)
CO2 SERPL-SCNC: 33 MMOL/L (ref 21–32)
CREAT SERPL-MCNC: 0.66 MG/DL (ref 0.5–1.3)
DACRYOCYTES BLD QL SMEAR: ABNORMAL
EOSINOPHIL # BLD MANUAL: 0 X10*3/UL (ref 0–0.7)
EOSINOPHIL NFR BLD MANUAL: 0 %
ERYTHROCYTE [DISTWIDTH] IN BLOOD BY AUTOMATED COUNT: 23.9 % (ref 11.5–14.5)
GFR SERPL CREATININE-BSD FRML MDRD: >90 ML/MIN/1.73M*2
GLUCOSE SERPL-MCNC: 125 MG/DL (ref 74–99)
HCT VFR BLD AUTO: 20.9 % (ref 41–52)
HGB BLD-MCNC: 7.2 G/DL (ref 13.5–17.5)
IMM GRANULOCYTES # BLD AUTO: 0.05 X10*3/UL (ref 0–0.7)
IMM GRANULOCYTES NFR BLD AUTO: 0.7 % (ref 0–0.9)
LDH SERPL L TO P-CCNC: 189 U/L (ref 84–246)
LYMPHOCYTES # BLD MANUAL: 0 X10*3/UL (ref 1.2–4.8)
LYMPHOCYTES NFR BLD MANUAL: 0 %
MAGNESIUM SERPL-MCNC: 1.83 MG/DL (ref 1.6–2.4)
MCH RBC QN AUTO: 29.5 PG (ref 26–34)
MCHC RBC AUTO-ENTMCNC: 34.4 G/DL (ref 32–36)
MCV RBC AUTO: 86 FL (ref 80–100)
MONOCYTES # BLD MANUAL: 0 X10*3/UL (ref 0.1–1)
MONOCYTES NFR BLD MANUAL: 0 %
MTX SERPL-SCNC: 0.08 UMOL/L
NEUTS SEG # BLD MANUAL: 7.1 X10*3/UL (ref 1.2–7)
NEUTS SEG NFR BLD MANUAL: 100 %
NRBC BLD-RTO: 0 /100 WBCS (ref 0–0)
OVALOCYTES BLD QL SMEAR: ABNORMAL
PHOSPHATE SERPL-MCNC: 3.4 MG/DL (ref 2.5–4.9)
PLATELET # BLD AUTO: 99 X10*3/UL (ref 150–450)
POLYCHROMASIA BLD QL SMEAR: ABNORMAL
POTASSIUM SERPL-SCNC: 3.5 MMOL/L (ref 3.5–5.3)
PROT SERPL-MCNC: 4.9 G/DL (ref 6.4–8.2)
RBC # BLD AUTO: 2.44 X10*6/UL (ref 4.5–5.9)
RBC MORPH BLD: ABNORMAL
SODIUM SERPL-SCNC: 142 MMOL/L (ref 136–145)
TOTAL CELLS COUNTED BLD: 116
URATE SERPL-MCNC: 4.4 MG/DL (ref 4–7.5)
WBC # BLD AUTO: 7.1 X10*3/UL (ref 4.4–11.3)

## 2024-01-06 PROCEDURE — 99232 SBSQ HOSP IP/OBS MODERATE 35: CPT | Performed by: STUDENT IN AN ORGANIZED HEALTH CARE EDUCATION/TRAINING PROGRAM

## 2024-01-06 PROCEDURE — 83735 ASSAY OF MAGNESIUM: CPT | Performed by: NURSE PRACTITIONER

## 2024-01-06 PROCEDURE — 2500000004 HC RX 250 GENERAL PHARMACY W/ HCPCS (ALT 636 FOR OP/ED): Performed by: INTERNAL MEDICINE

## 2024-01-06 PROCEDURE — 83615 LACTATE (LD) (LDH) ENZYME: CPT

## 2024-01-06 PROCEDURE — 1170000001 HC PRIVATE ONCOLOGY ROOM DAILY

## 2024-01-06 PROCEDURE — 84100 ASSAY OF PHOSPHORUS: CPT

## 2024-01-06 PROCEDURE — 2500000001 HC RX 250 WO HCPCS SELF ADMINISTERED DRUGS (ALT 637 FOR MEDICARE OP): Performed by: NURSE PRACTITIONER

## 2024-01-06 PROCEDURE — 80053 COMPREHEN METABOLIC PANEL: CPT | Performed by: NURSE PRACTITIONER

## 2024-01-06 PROCEDURE — 85027 COMPLETE CBC AUTOMATED: CPT | Performed by: NURSE PRACTITIONER

## 2024-01-06 PROCEDURE — 85007 BL SMEAR W/DIFF WBC COUNT: CPT | Performed by: NURSE PRACTITIONER

## 2024-01-06 PROCEDURE — 80204 DRUG ASSAY METHOTREXATE: CPT | Performed by: INTERNAL MEDICINE

## 2024-01-06 PROCEDURE — 2500000001 HC RX 250 WO HCPCS SELF ADMINISTERED DRUGS (ALT 637 FOR MEDICARE OP): Performed by: STUDENT IN AN ORGANIZED HEALTH CARE EDUCATION/TRAINING PROGRAM

## 2024-01-06 PROCEDURE — 84550 ASSAY OF BLOOD/URIC ACID: CPT

## 2024-01-06 PROCEDURE — 2500000001 HC RX 250 WO HCPCS SELF ADMINISTERED DRUGS (ALT 637 FOR MEDICARE OP)

## 2024-01-06 RX ORDER — LEVOFLOXACIN 500 MG/1
500 TABLET, FILM COATED ORAL
Status: DISCONTINUED | OUTPATIENT
Start: 2024-01-06 | End: 2024-01-07 | Stop reason: HOSPADM

## 2024-01-06 RX ADMIN — SODIUM BICARBONATE 200 ML/HR: 84 INJECTION, SOLUTION INTRAVENOUS at 09:06

## 2024-01-06 RX ADMIN — SODIUM BICARBONATE 200 ML/HR: 84 INJECTION, SOLUTION INTRAVENOUS at 02:25

## 2024-01-06 RX ADMIN — SODIUM BICARBONATE 200 ML/HR: 84 INJECTION, SOLUTION INTRAVENOUS at 20:35

## 2024-01-06 RX ADMIN — LEUCOVORIN CALCIUM 15 MG: 350 INJECTION, POWDER, LYOPHILIZED, FOR SUSPENSION INTRAMUSCULAR; INTRAVENOUS at 01:02

## 2024-01-06 RX ADMIN — PREDNISOLONE ACETATE 2 DROP: 10 SUSPENSION/ DROPS OPHTHALMIC at 20:34

## 2024-01-06 RX ADMIN — FAMOTIDINE 20 MG: 20 TABLET ORAL at 08:37

## 2024-01-06 RX ADMIN — CYTARABINE 5500 MG: 2 INJECTION, SOLUTION INTRATHECAL; INTRAVENOUS; SUBCUTANEOUS at 02:40

## 2024-01-06 RX ADMIN — ACYCLOVIR 400 MG: 400 TABLET ORAL at 08:37

## 2024-01-06 RX ADMIN — FLUCONAZOLE 400 MG: 200 TABLET ORAL at 08:37

## 2024-01-06 RX ADMIN — PREDNISOLONE ACETATE 2 DROP: 10 SUSPENSION/ DROPS OPHTHALMIC at 14:41

## 2024-01-06 RX ADMIN — LEUCOVORIN CALCIUM 15 MG: 350 INJECTION, POWDER, LYOPHILIZED, FOR SUSPENSION INTRAMUSCULAR; INTRAVENOUS at 13:00

## 2024-01-06 RX ADMIN — SODIUM BICARBONATE 200 ML/HR: 84 INJECTION, SOLUTION INTRAVENOUS at 15:12

## 2024-01-06 RX ADMIN — LEVOFLOXACIN 500 MG: 500 TABLET, FILM COATED ORAL at 14:40

## 2024-01-06 RX ADMIN — PREDNISOLONE ACETATE 2 DROP: 10 SUSPENSION/ DROPS OPHTHALMIC at 02:52

## 2024-01-06 RX ADMIN — FERROUS SULFATE TAB 325 MG (65 MG ELEMENTAL FE) 1 TABLET: 325 (65 FE) TAB at 20:35

## 2024-01-06 RX ADMIN — FERROUS SULFATE TAB 325 MG (65 MG ELEMENTAL FE) 1 TABLET: 325 (65 FE) TAB at 08:37

## 2024-01-06 RX ADMIN — LEUCOVORIN CALCIUM 15 MG: 350 INJECTION, POWDER, LYOPHILIZED, FOR SUSPENSION INTRAMUSCULAR; INTRAVENOUS at 06:31

## 2024-01-06 RX ADMIN — LEUCOVORIN CALCIUM 15 MG: 350 INJECTION, POWDER, LYOPHILIZED, FOR SUSPENSION INTRAMUSCULAR; INTRAVENOUS at 20:35

## 2024-01-06 RX ADMIN — ACYCLOVIR 400 MG: 400 TABLET ORAL at 20:35

## 2024-01-06 RX ADMIN — PREDNISOLONE ACETATE 2 DROP: 10 SUSPENSION/ DROPS OPHTHALMIC at 08:37

## 2024-01-06 ASSESSMENT — COGNITIVE AND FUNCTIONAL STATUS - GENERAL
MOBILITY SCORE: 24
DAILY ACTIVITIY SCORE: 24

## 2024-01-06 ASSESSMENT — PAIN - FUNCTIONAL ASSESSMENT
PAIN_FUNCTIONAL_ASSESSMENT: 0-10

## 2024-01-06 ASSESSMENT — PAIN SCALES - GENERAL
PAINLEVEL_OUTOF10: 0 - NO PAIN

## 2024-01-06 NOTE — CARE PLAN
The patient's goals for the shift include      The clinical goals for the shift include Patient will remain afebrile and will be without nausea and vomiting until 1930

## 2024-01-06 NOTE — NURSING NOTE
Nursing Note  Patient remains afebrile, continue to receive PO antibiotics. Vital signs stable. Denies any pain , nausea, vomiting and diarrhea. PO intake fair. Patient continue to receive  Sodium Bicarbonate 150 MEQ in 1000 ML D5W at 200 ML/HR . Voids clear yellow urine. Urine PH-9.0, Roselia LINN notified about it. Will continue to monitor patient and will notify MD of acute changes.

## 2024-01-06 NOTE — PROGRESS NOTES
"Shilo Thakkar is a 23 y.o. male on day 4 of admission presenting with Burkitt lymphoma (CMS/HCC).    Subjective   Patient feels well today, no mouth sores, no leg swelling, no n/v/d/c, no rashes or lesions, no bruising or bleeding. No fevers or chills.       Objective     Physical Exam  Constitutional:       Appearance: Normal appearance. He is normal weight.   HENT:      Head: Normocephalic and atraumatic.      Nose: Nose normal.      Mouth/Throat:      Mouth: Mucous membranes are moist.      Pharynx: Oropharynx is clear.   Eyes:      Extraocular Movements: Extraocular movements intact.      Conjunctiva/sclera: Conjunctivae normal.      Pupils: Pupils are equal, round, and reactive to light.   Cardiovascular:      Rate and Rhythm: Normal rate and regular rhythm.      Pulses: Normal pulses.      Heart sounds: Normal heart sounds.   Pulmonary:      Effort: Pulmonary effort is normal.      Breath sounds: Normal breath sounds.   Abdominal:      General: Abdomen is flat. Bowel sounds are normal.      Palpations: Abdomen is soft.   Skin:     General: Skin is warm and dry.      Comments: R arm PICC c/d/i   Neurological:      General: No focal deficit present.      Mental Status: He is alert. Mental status is at baseline.         Last Recorded Vitals  Blood pressure 136/80, pulse 55, temperature 36.9 °C (98.4 °F), resp. rate 18, height 1.824 m (5' 11.81\"), weight 71.3 kg (157 lb 3 oz), SpO2 100 %.  Intake/Output last 3 Shifts:  I/O last 3 completed shifts:  In: 7432 (104.2 mL/kg) [I.V.:7027 (98.6 mL/kg); IV Piggyback:405]  Out: 3475 (48.7 mL/kg) [Urine:3475 (1.4 mL/kg/hr)]  Weight: 71.3 kg     Relevant Results  Scheduled medications  acyclovir, 400 mg, oral, BID  [START ON 1/8/2024] dexAMETHasone, 12 mg, intravenous, Once  famotidine, 20 mg, oral, Daily   Or  famotidine, 20 mg, intravenous, Daily  ferrous sulfate (325 mg ferrous sulfate), 1 tablet, oral, BID  fluconazole, 400 mg, oral, Daily  leucovorin, 15 mg, intravenous, " q6h  polyethylene glycol, 17 g, oral, Daily  prednisoLONE acetate, 2 drop, Both Eyes, q6h      Continuous medications  sodium bicarbonate, 200 mL/hr, Last Rate: 200 mL/hr (01/06/24 0906)  sodium bicarbonate 150 mEq in dextrose 5 % in water (D5W) 1,150 mL infusion, 200 mL/hr, Last Rate: 200 mL/hr (01/06/24 0225)      PRN medications  PRN medications: albuterol, alteplase, dextrose, diphenhydrAMINE, EPINEPHrine, famotidine, methylPREDNISolone sodium succinate (PF), prochlorperazine, prochlorperazine, sennosides-docusate sodium, sodium chloride                This patient has a central line   Reason for the central line remaining today? Parenteral medication    Results for orders placed or performed during the hospital encounter of 01/02/24 (from the past 24 hour(s))   Methotrexate-24 Hrs   Result Value Ref Range    Methotrexate, 24 HRS 0.27 umol/L   Type And Screen   Result Value Ref Range    ABO TYPE O     Rh TYPE POS     ANTIBODY SCREEN NEG    CBC and Auto Differential   Result Value Ref Range    WBC 7.1 4.4 - 11.3 x10*3/uL    nRBC 0.0 0.0 - 0.0 /100 WBCs    RBC 2.44 (L) 4.50 - 5.90 x10*6/uL    Hemoglobin 7.2 (L) 13.5 - 17.5 g/dL    Hematocrit 20.9 (L) 41.0 - 52.0 %    MCV 86 80 - 100 fL    MCH 29.5 26.0 - 34.0 pg    MCHC 34.4 32.0 - 36.0 g/dL    RDW 23.9 (H) 11.5 - 14.5 %    Platelets 99 (L) 150 - 450 x10*3/uL    Immature Granulocytes %, Automated 0.7 0.0 - 0.9 %    Immature Granulocytes Absolute, Automated 0.05 0.00 - 0.70 x10*3/uL   Magnesium   Result Value Ref Range    Magnesium 1.83 1.60 - 2.40 mg/dL   Comprehensive Metabolic Panel   Result Value Ref Range    Glucose 125 (H) 74 - 99 mg/dL    Sodium 142 136 - 145 mmol/L    Potassium 3.5 3.5 - 5.3 mmol/L    Chloride 102 98 - 107 mmol/L    Bicarbonate 33 (H) 21 - 32 mmol/L    Anion Gap 11 10 - 20 mmol/L    Urea Nitrogen 13 6 - 23 mg/dL    Creatinine 0.66 0.50 - 1.30 mg/dL    eGFR >90 >60 mL/min/1.73m*2    Calcium 8.8 8.6 - 10.6 mg/dL    Albumin 3.6 3.4 - 5.0 g/dL     Alkaline Phosphatase 61 33 - 120 U/L    Total Protein 4.9 (L) 6.4 - 8.2 g/dL    AST 23 9 - 39 U/L    Bilirubin, Total 0.7 0.0 - 1.2 mg/dL    ALT 25 10 - 52 U/L   Lactate Dehydrogenase   Result Value Ref Range     84 - 246 U/L   Phosphorus   Result Value Ref Range    Phosphorus 3.4 2.5 - 4.9 mg/dL   Uric Acid   Result Value Ref Range    Uric Acid 4.4 4.0 - 7.5 mg/dL   Methotrexate level   Result Value Ref Range    Methotrexate 0.08 umol/L   Manual Differential   Result Value Ref Range    Neutrophils %, Manual 100.0 40.0 - 80.0 %    Lymphocytes %, Manual 0.0 13.0 - 44.0 %    Monocytes %, Manual 0.0 2.0 - 10.0 %    Eosinophils %, Manual 0.0 0.0 - 6.0 %    Basophils %, Manual 0.0 0.0 - 2.0 %    Seg Neutrophils Absolute, Manual 7.10 (H) 1.20 - 7.00 x10*3/uL    Lymphocytes Absolute, Manual 0.00 (L) 1.20 - 4.80 x10*3/uL    Monocytes Absolute, Manual 0.00 (L) 0.10 - 1.00 x10*3/uL    Eosinophils Absolute, Manual 0.00 0.00 - 0.70 x10*3/uL    Basophils Absolute, Manual 0.00 0.00 - 0.10 x10*3/uL    Total Cells Counted 116     RBC Morphology See Below     Polychromasia Mild     Ovalocytes Few     Teardrop Cells Few      XR chest 2 views    Result Date: 1/2/2024  Interpreted By:  Jae Licona,  and Deyanira Pizarro STUDY: XR CHEST 2 VIEWS;  1/2/2024 3:22 pm   INDICATION: Signs/Symptoms:Prior to high dose methotrexate chemotherapy.   COMPARISON: Chest radiograph 12/11/2023   ACCESSION NUMBER(S): [id number]   ORDERING CLINICIAN: KYLE ISAAC   FINDINGS: PA and lateral radiographs of the chest were provided.   Right-sided PICC line with tip overlying the SVC.   CARDIOMEDIASTINAL SILHOUETTE: Cardiomediastinal silhouette is normal in size and configuration.   LUNGS: Lungs are clear.   ABDOMEN: No remarkable upper abdominal findings.   BONES: No acute osseous changes.       1.  No evidence of acute cardiopulmonary process.   I personally reviewed the images/study and I agree with the findings as stated by  Juarez Christie. This study was interpreted at [facility], [place], [place].   MACRO: None   Signed by: Jae Licona 1/2/2024 5:07 PM Dictation workstation:   [id number]           Malnutrition          I agree with the dietitian's malnutrition diagnosis.      Assessment/Plan   Principal Problem:    Burkitt lymphoma (CMS/HCC)  Active Problems:    Encounter for antineoplastic chemotherapy    Shilo Thakkar is a 23 y.o. male with PMH of Burkitt Lymphoma (Dx 10/20/23; s/p 2 cycles of HyperCVAD), malignant pleural effusions s/p CT (10/29/23), and ADHD who presents 1/2/2024 for C4 (2B) HyperCVAD + Ritux.      Day 5 Cycle 4 HyperCVAD (2B) (started 1/3/24)     CHEMO:  # C4 HyperCVAD + Ritux  - Cycle 4 part B; planned for IV Rituxan and IV MTX on D1, IV and IT Cytarabine on D2, IV Cytarabine on D3, IV Rituxan on D6, and IT MTX on D7  - Plan to start chemo regimen on 1/3/2024. 24-hour post methotrexate infusion lab on 1/5: 0.27  - NS @ 100mL/hr throughout chemo  - Premeds and reaction meds as ordered  - Decadron 12mg IV daily x4 days  - Neulasta 6mg subcutaneous to be given outpt 1/8/2024     ONC:  # Burkitt lymphoma, newly diagnosed (10/20/23)  - Primary oncologist: Dr. Olegario Klein  - 10/19/23 presented to OSH ED with dysphagia and abdominal pain  - CT A/P with IV contrast (10/19) mass in the body of the pancreas measuring approximately 2.7 x 2.9 cm. Multiple masses in the mesentery as well as omental caking consistent with carcinomatosis.   - CT soft tissue neck with IV contrast (10/19): homogeneous soft tissue attenuation lesion in the L oropharynx which measures approximately 3.0 x 4.0 x 5.1 cm.   - CT Abdomen w/o IV contrast (10/20): Mesenteric lymphadenopathy  - US guided mesenteric LN biopsy (10/20/23): HIGH GRADE B-CELL LYMPHOMA MORPHOLOGICALLY CONSISTENT WITH BURKITT LYMPHOMA; flow cytometry: CD10+ kappa restricted B cells supportive of a B cell lymphoma with  germinal center origin.   - Biopsy L oropharyngeal mass and L cervical LN by ENT (10/23/23) high grade B-cell burkitt lymphoma  - PET CT ordered 10/30 showed FDG avid left oropharyngeal mass and LAD above and below diaphragm  - s/p 3 cycles of HyperCVAD  - Bone marrow bx completed 11/1 without evidence of disease  - XR chest neg 1/2  - Currently admitted for cycle 4 HyperCVAD; okay to start chemo 1/3/2024    HEME:  # Anemia, likely 2/2 disease  - Continue home ferrous sulfate 325mg BID  - Discussed with Dr. Coffey and Dr. Klein on 12/27; okay to start chemo 1/2   - Transfuse to keep Hgb>7, Plt>10     CARDIAC:  - ECHO (10/2023): EF 60-65%  - Baseline HR 50s  - Overnight 12/12, HR 45, EKG sinus bradycardia (could be 2/2 zofran premedications)   - Patient asymptomatic, denies lightheadedness, dizziness, or palpitations      ID:  Allergies: NKDA  Afebrile on admit  # Prophylaxis   - Continue home Acyclovir 400mg BID, Fluconazole 400mg daily. Hold bactrim ppx with HDMTX  - Levaquin for neutropenia (day 5 through day 15 per outpt notes; 1/6-1/16)     FEN/GI:  - Admit weight: 70.8kg (1/2), Current weight: 73.2kg (1/4)  - On continuous fluids with chemo regimen, give lasix if needed   - Daily weights   - Holding home protonix with HDMTX, covering with Famotidine.   - Replace electrolytes as needed  - Low pathogen diet  - TLS monitoring  - G6PD normal (10/26/23)     DISPO:  - Full Code  - Access: PICC line (right)  - DVT ppx: Ambulation  - DC pending completion of chemo  - NOK: Ms Deidra Pickett [phone]     Patient seen, examined, and discussed with Dr. Rakesh Webster MD.

## 2024-01-07 VITALS
HEIGHT: 72 IN | TEMPERATURE: 98.4 F | HEART RATE: 73 BPM | DIASTOLIC BLOOD PRESSURE: 82 MMHG | WEIGHT: 157.19 LBS | BODY MASS INDEX: 21.29 KG/M2 | SYSTOLIC BLOOD PRESSURE: 146 MMHG | RESPIRATION RATE: 18 BRPM | OXYGEN SATURATION: 100 %

## 2024-01-07 LAB
ALBUMIN SERPL BCP-MCNC: 3.6 G/DL (ref 3.4–5)
ALP SERPL-CCNC: 58 U/L (ref 33–120)
ALT SERPL W P-5'-P-CCNC: 44 U/L (ref 10–52)
ANION GAP SERPL CALC-SCNC: 11 MMOL/L (ref 10–20)
AST SERPL W P-5'-P-CCNC: 36 U/L (ref 9–39)
BASOPHILS # BLD AUTO: 0 X10*3/UL (ref 0–0.1)
BASOPHILS NFR BLD AUTO: 0 %
BILIRUB SERPL-MCNC: 0.8 MG/DL (ref 0–1.2)
BUN SERPL-MCNC: 11 MG/DL (ref 6–23)
CALCIUM SERPL-MCNC: 8.7 MG/DL (ref 8.6–10.6)
CHLORIDE SERPL-SCNC: 99 MMOL/L (ref 98–107)
CO2 SERPL-SCNC: 33 MMOL/L (ref 21–32)
CREAT SERPL-MCNC: 0.73 MG/DL (ref 0.5–1.3)
EOSINOPHIL # BLD AUTO: 0 X10*3/UL (ref 0–0.7)
EOSINOPHIL NFR BLD AUTO: 0 %
ERYTHROCYTE [DISTWIDTH] IN BLOOD BY AUTOMATED COUNT: 23 % (ref 11.5–14.5)
GFR SERPL CREATININE-BSD FRML MDRD: >90 ML/MIN/1.73M*2
GLUCOSE SERPL-MCNC: 96 MG/DL (ref 74–99)
HCT VFR BLD AUTO: 19.6 % (ref 41–52)
HGB BLD-MCNC: 6.6 G/DL (ref 13.5–17.5)
IMM GRANULOCYTES # BLD AUTO: 0.03 X10*3/UL (ref 0–0.7)
IMM GRANULOCYTES NFR BLD AUTO: 0.5 % (ref 0–0.9)
LDH SERPL L TO P-CCNC: 192 U/L (ref 84–246)
LYMPHOCYTES # BLD AUTO: 0.07 X10*3/UL (ref 1.2–4.8)
LYMPHOCYTES NFR BLD AUTO: 1.1 %
MAGNESIUM SERPL-MCNC: 1.77 MG/DL (ref 1.6–2.4)
MCH RBC QN AUTO: 28.8 PG (ref 26–34)
MCHC RBC AUTO-ENTMCNC: 33.7 G/DL (ref 32–36)
MCV RBC AUTO: 86 FL (ref 80–100)
MONOCYTES # BLD AUTO: 0.01 X10*3/UL (ref 0.1–1)
MONOCYTES NFR BLD AUTO: 0.2 %
MTX SERPL-SCNC: 0.04 UMOL/L
NEUTROPHILS # BLD AUTO: 6.24 X10*3/UL (ref 1.2–7.7)
NEUTROPHILS NFR BLD AUTO: 98.2 %
NRBC BLD-RTO: 0 /100 WBCS (ref 0–0)
PHOSPHATE SERPL-MCNC: 4.2 MG/DL (ref 2.5–4.9)
PLATELET # BLD AUTO: 82 X10*3/UL (ref 150–450)
POTASSIUM SERPL-SCNC: 3.2 MMOL/L (ref 3.5–5.3)
PROT SERPL-MCNC: 4.9 G/DL (ref 6.4–8.2)
RBC # BLD AUTO: 2.29 X10*6/UL (ref 4.5–5.9)
SODIUM SERPL-SCNC: 140 MMOL/L (ref 136–145)
URATE SERPL-MCNC: 3.8 MG/DL (ref 4–7.5)
WBC # BLD AUTO: 6.4 X10*3/UL (ref 4.4–11.3)

## 2024-01-07 PROCEDURE — 2500000001 HC RX 250 WO HCPCS SELF ADMINISTERED DRUGS (ALT 637 FOR MEDICARE OP): Performed by: NURSE PRACTITIONER

## 2024-01-07 PROCEDURE — 84550 ASSAY OF BLOOD/URIC ACID: CPT

## 2024-01-07 PROCEDURE — 80053 COMPREHEN METABOLIC PANEL: CPT | Performed by: NURSE PRACTITIONER

## 2024-01-07 PROCEDURE — 85025 COMPLETE CBC W/AUTO DIFF WBC: CPT | Performed by: NURSE PRACTITIONER

## 2024-01-07 PROCEDURE — 2500000001 HC RX 250 WO HCPCS SELF ADMINISTERED DRUGS (ALT 637 FOR MEDICARE OP)

## 2024-01-07 PROCEDURE — P9040 RBC LEUKOREDUCED IRRADIATED: HCPCS

## 2024-01-07 PROCEDURE — 2500000004 HC RX 250 GENERAL PHARMACY W/ HCPCS (ALT 636 FOR OP/ED): Performed by: INTERNAL MEDICINE

## 2024-01-07 PROCEDURE — 83735 ASSAY OF MAGNESIUM: CPT | Performed by: NURSE PRACTITIONER

## 2024-01-07 PROCEDURE — 2500000004 HC RX 250 GENERAL PHARMACY W/ HCPCS (ALT 636 FOR OP/ED)

## 2024-01-07 PROCEDURE — 36430 TRANSFUSION BLD/BLD COMPNT: CPT

## 2024-01-07 PROCEDURE — 83615 LACTATE (LD) (LDH) ENZYME: CPT

## 2024-01-07 PROCEDURE — 2500000001 HC RX 250 WO HCPCS SELF ADMINISTERED DRUGS (ALT 637 FOR MEDICARE OP): Performed by: STUDENT IN AN ORGANIZED HEALTH CARE EDUCATION/TRAINING PROGRAM

## 2024-01-07 PROCEDURE — 80204 DRUG ASSAY METHOTREXATE: CPT | Performed by: INTERNAL MEDICINE

## 2024-01-07 PROCEDURE — 84100 ASSAY OF PHOSPHORUS: CPT

## 2024-01-07 PROCEDURE — 99238 HOSP IP/OBS DSCHRG MGMT 30/<: CPT | Performed by: STUDENT IN AN ORGANIZED HEALTH CARE EDUCATION/TRAINING PROGRAM

## 2024-01-07 RX ORDER — POTASSIUM CHLORIDE 20 MEQ/1
40 TABLET, EXTENDED RELEASE ORAL ONCE
Status: COMPLETED | OUTPATIENT
Start: 2024-01-07 | End: 2024-01-07

## 2024-01-07 RX ADMIN — FERROUS SULFATE TAB 325 MG (65 MG ELEMENTAL FE) 1 TABLET: 325 (65 FE) TAB at 08:47

## 2024-01-07 RX ADMIN — POTASSIUM CHLORIDE 40 MEQ: 1500 TABLET, EXTENDED RELEASE ORAL at 08:52

## 2024-01-07 RX ADMIN — LEVOFLOXACIN 500 MG: 500 TABLET, FILM COATED ORAL at 08:48

## 2024-01-07 RX ADMIN — ACYCLOVIR 400 MG: 400 TABLET ORAL at 08:48

## 2024-01-07 RX ADMIN — PREDNISOLONE ACETATE 2 DROP: 10 SUSPENSION/ DROPS OPHTHALMIC at 03:00

## 2024-01-07 RX ADMIN — LEUCOVORIN CALCIUM 15 MG: 350 INJECTION, POWDER, LYOPHILIZED, FOR SUSPENSION INTRAMUSCULAR; INTRAVENOUS at 01:54

## 2024-01-07 RX ADMIN — FLUCONAZOLE 400 MG: 200 TABLET ORAL at 08:48

## 2024-01-07 RX ADMIN — LEUCOVORIN CALCIUM 15 MG: 350 INJECTION, POWDER, LYOPHILIZED, FOR SUSPENSION INTRAMUSCULAR; INTRAVENOUS at 06:51

## 2024-01-07 RX ADMIN — PREDNISOLONE ACETATE 2 DROP: 10 SUSPENSION/ DROPS OPHTHALMIC at 08:53

## 2024-01-07 RX ADMIN — SODIUM BICARBONATE 200 ML/HR: 84 INJECTION, SOLUTION INTRAVENOUS at 01:55

## 2024-01-07 RX ADMIN — FAMOTIDINE 20 MG: 20 TABLET ORAL at 08:48

## 2024-01-07 ASSESSMENT — COGNITIVE AND FUNCTIONAL STATUS - GENERAL
DAILY ACTIVITIY SCORE: 24
MOBILITY SCORE: 24
MOBILITY SCORE: 24
DAILY ACTIVITIY SCORE: 24

## 2024-01-07 ASSESSMENT — PAIN - FUNCTIONAL ASSESSMENT
PAIN_FUNCTIONAL_ASSESSMENT: 0-10

## 2024-01-07 ASSESSMENT — PAIN SCALES - GENERAL
PAINLEVEL_OUTOF10: 0 - NO PAIN

## 2024-01-07 NOTE — NURSING NOTE
Nursing Note  Patient remains afebrile, continue to receive PO antibiotics.Vital signs stable. Denies any pain, nausea, vomiting and diarrhea. PO intake fair. Sodium Bicarbonate 150 MEQ in 1000 ML D5 W at 200 ML/HR discontinued as per MD order.  Methotrexate level -0.04. Packed red blood cells transfusion completed at 0740. Vital signs stable and no signs and symptoms of allergic reaction present. Discharge instructions reviewed with patient and family member and copy of written discharge instructions given to patient. Patient discharged with right upper arm PICC line as per Néstor Win DO . Patient discharged home at 1145.

## 2024-01-07 NOTE — CARE PLAN
The patient's goals for the shift include      The clinical goals for the shift include Patient will remain afebrile and will be without  nausea and vomiting until 1530

## 2024-01-08 DIAGNOSIS — C83.70 BURKITT LYMPHOMA, UNSPECIFIED BODY REGION (MULTI): Primary | ICD-10-CM

## 2024-01-08 LAB
BLOOD EXPIRATION DATE: NORMAL
DISPENSE STATUS: NORMAL
PRODUCT BLOOD TYPE: 5100
PRODUCT CODE: NORMAL
UNIT ABO: NORMAL
UNIT NUMBER: NORMAL
UNIT RH: NORMAL
UNIT VOLUME: 350
XM INTEP: NORMAL

## 2024-01-09 ENCOUNTER — INFUSION (OUTPATIENT)
Dept: HEMATOLOGY/ONCOLOGY | Facility: HOSPITAL | Age: 24
End: 2024-01-09
Payer: COMMERCIAL

## 2024-01-09 ENCOUNTER — OFFICE VISIT (OUTPATIENT)
Dept: HEMATOLOGY/ONCOLOGY | Facility: HOSPITAL | Age: 24
End: 2024-01-09
Payer: COMMERCIAL

## 2024-01-09 VITALS
TEMPERATURE: 97.7 F | BODY MASS INDEX: 22.06 KG/M2 | RESPIRATION RATE: 16 BRPM | OXYGEN SATURATION: 100 % | SYSTOLIC BLOOD PRESSURE: 133 MMHG | WEIGHT: 161.82 LBS | HEART RATE: 62 BPM | DIASTOLIC BLOOD PRESSURE: 77 MMHG

## 2024-01-09 DIAGNOSIS — C83.79 BURKITT LYMPHOMA OF SOLID ORGAN EXCLUDING SPLEEN (MULTI): Primary | ICD-10-CM

## 2024-01-09 DIAGNOSIS — C83.70 BURKITT LYMPHOMA, UNSPECIFIED BODY REGION (MULTI): ICD-10-CM

## 2024-01-09 DIAGNOSIS — C83.70 BURKITT LYMPHOMA, UNSPECIFIED BODY REGION (MULTI): Primary | ICD-10-CM

## 2024-01-09 DIAGNOSIS — C83.78 BURKITT LYMPHOMA OF LYMPH NODES OF MULTIPLE REGIONS (MULTI): ICD-10-CM

## 2024-01-09 LAB
ANION GAP SERPL CALC-SCNC: 13 MMOL/L (ref 10–20)
BASOPHILS # BLD AUTO: 0 X10*3/UL (ref 0–0.1)
BASOPHILS NFR BLD AUTO: 0 %
BUN SERPL-MCNC: 16 MG/DL (ref 6–23)
CALCIUM SERPL-MCNC: 9.2 MG/DL (ref 8.6–10.3)
CHLORIDE SERPL-SCNC: 106 MMOL/L (ref 98–107)
CO2 SERPL-SCNC: 27 MMOL/L (ref 21–32)
CREAT SERPL-MCNC: 0.74 MG/DL (ref 0.5–1.3)
DACRYOCYTES BLD QL SMEAR: NORMAL
EGFRCR SERPLBLD CKD-EPI 2021: >90 ML/MIN/1.73M*2
EOSINOPHIL # BLD AUTO: 0.01 X10*3/UL (ref 0–0.7)
EOSINOPHIL NFR BLD AUTO: 0.5 %
ERYTHROCYTE [DISTWIDTH] IN BLOOD BY AUTOMATED COUNT: 21.3 % (ref 11.5–14.5)
GLUCOSE SERPL-MCNC: 96 MG/DL (ref 74–99)
HCT VFR BLD AUTO: 21.5 % (ref 41–52)
HGB BLD-MCNC: 7.4 G/DL (ref 13.5–17.5)
IMM GRANULOCYTES # BLD AUTO: 0.04 X10*3/UL (ref 0–0.7)
IMM GRANULOCYTES NFR BLD AUTO: 2.1 % (ref 0–0.9)
LYMPHOCYTES # BLD AUTO: 0.12 X10*3/UL (ref 1.2–4.8)
LYMPHOCYTES NFR BLD AUTO: 6.3 %
MCH RBC QN AUTO: 29.2 PG (ref 26–34)
MCHC RBC AUTO-ENTMCNC: 34.4 G/DL (ref 32–36)
MCV RBC AUTO: 85 FL (ref 80–100)
MONOCYTES # BLD AUTO: 0 X10*3/UL (ref 0.1–1)
MONOCYTES NFR BLD AUTO: 0 %
NEUTROPHILS # BLD AUTO: 1.73 X10*3/UL (ref 1.2–7.7)
NEUTROPHILS NFR BLD AUTO: 91.1 %
NRBC BLD-RTO: 0 /100 WBCS (ref 0–0)
OVALOCYTES BLD QL SMEAR: NORMAL
PLATELET # BLD AUTO: 56 X10*3/UL (ref 150–450)
POTASSIUM SERPL-SCNC: 3.9 MMOL/L (ref 3.5–5.3)
RBC # BLD AUTO: 2.53 X10*6/UL (ref 4.5–5.9)
RBC MORPH BLD: NORMAL
SCHISTOCYTES BLD QL SMEAR: NORMAL
SODIUM SERPL-SCNC: 142 MMOL/L (ref 136–145)
WBC # BLD AUTO: 1.9 X10*3/UL (ref 4.4–11.3)

## 2024-01-09 PROCEDURE — 2500000004 HC RX 250 GENERAL PHARMACY W/ HCPCS (ALT 636 FOR OP/ED): Mod: JZ | Performed by: INTERNAL MEDICINE

## 2024-01-09 PROCEDURE — 96372 THER/PROPH/DIAG INJ SC/IM: CPT | Mod: 59 | Performed by: INTERNAL MEDICINE

## 2024-01-09 PROCEDURE — 1036F TOBACCO NON-USER: CPT | Performed by: NURSE PRACTITIONER

## 2024-01-09 PROCEDURE — 96415 CHEMO IV INFUSION ADDL HR: CPT

## 2024-01-09 PROCEDURE — 99215 OFFICE O/P EST HI 40 MIN: CPT | Mod: 25 | Performed by: NURSE PRACTITIONER

## 2024-01-09 PROCEDURE — 96372 THER/PROPH/DIAG INJ SC/IM: CPT

## 2024-01-09 PROCEDURE — 2500000001 HC RX 250 WO HCPCS SELF ADMINISTERED DRUGS (ALT 637 FOR MEDICARE OP): Performed by: INTERNAL MEDICINE

## 2024-01-09 PROCEDURE — 96413 CHEMO IV INFUSION 1 HR: CPT

## 2024-01-09 PROCEDURE — 85025 COMPLETE CBC W/AUTO DIFF WBC: CPT

## 2024-01-09 PROCEDURE — 82374 ASSAY BLOOD CARBON DIOXIDE: CPT

## 2024-01-09 RX ORDER — DIPHENHYDRAMINE HCL 50 MG
50 CAPSULE ORAL ONCE
Status: COMPLETED | OUTPATIENT
Start: 2024-01-09 | End: 2024-01-09

## 2024-01-09 RX ORDER — ACETAMINOPHEN 325 MG/1
650 TABLET ORAL ONCE
Status: COMPLETED | OUTPATIENT
Start: 2024-01-09 | End: 2024-01-09

## 2024-01-09 RX ADMIN — RITUXIMAB 700 MG: 10 INJECTION, SOLUTION INTRAVENOUS at 10:55

## 2024-01-09 RX ADMIN — ACETAMINOPHEN 650 MG: 325 TABLET ORAL at 10:22

## 2024-01-09 RX ADMIN — DIPHENHYDRAMINE HYDROCHLORIDE 50 MG: 50 CAPSULE ORAL at 10:22

## 2024-01-09 RX ADMIN — PEGFILGRASTIM 6 MG: 6 INJECTION SUBCUTANEOUS at 10:22

## 2024-01-09 ASSESSMENT — PAIN SCALES - GENERAL: PAINLEVEL: 0-NO PAIN

## 2024-01-11 ENCOUNTER — DOCUMENTATION (OUTPATIENT)
Dept: HEMATOLOGY/ONCOLOGY | Facility: HOSPITAL | Age: 24
End: 2024-01-11
Payer: COMMERCIAL

## 2024-01-11 ENCOUNTER — INFUSION (OUTPATIENT)
Dept: HEMATOLOGY/ONCOLOGY | Facility: HOSPITAL | Age: 24
End: 2024-01-11
Payer: COMMERCIAL

## 2024-01-11 ENCOUNTER — OFFICE VISIT (OUTPATIENT)
Dept: HEMATOLOGY/ONCOLOGY | Facility: HOSPITAL | Age: 24
End: 2024-01-11
Payer: COMMERCIAL

## 2024-01-11 VITALS
HEART RATE: 78 BPM | SYSTOLIC BLOOD PRESSURE: 126 MMHG | RESPIRATION RATE: 18 BRPM | BODY MASS INDEX: 21.8 KG/M2 | HEIGHT: 72 IN | OXYGEN SATURATION: 100 % | DIASTOLIC BLOOD PRESSURE: 78 MMHG | TEMPERATURE: 98.6 F | WEIGHT: 160.94 LBS

## 2024-01-11 VITALS
RESPIRATION RATE: 20 BRPM | HEART RATE: 69 BPM | TEMPERATURE: 98.2 F | SYSTOLIC BLOOD PRESSURE: 128 MMHG | DIASTOLIC BLOOD PRESSURE: 57 MMHG | OXYGEN SATURATION: 100 %

## 2024-01-11 DIAGNOSIS — C83.70 BURKITT LYMPHOMA, UNSPECIFIED BODY REGION (MULTI): ICD-10-CM

## 2024-01-11 DIAGNOSIS — C83.78 BURKITT LYMPHOMA OF LYMPH NODES OF MULTIPLE REGIONS (MULTI): Primary | ICD-10-CM

## 2024-01-11 LAB
APPEARANCE CSF: CLEAR
BASOPHILS # BLD MANUAL: 0 X10*3/UL (ref 0–0.1)
BASOPHILS NFR BLD MANUAL: 0 %
BASOPHILS NFR CSF MANUAL: 0 %
BLASTS CSF MANUAL: 0 %
BLOOD EXPIRATION DATE: NORMAL
COLOR CSF: COLORLESS
COLOR SPUN CSF: COLORLESS
DACRYOCYTES BLD QL SMEAR: ABNORMAL
DISPENSE STATUS: NORMAL
EOSINOPHIL # BLD MANUAL: 0 X10*3/UL (ref 0–0.7)
EOSINOPHIL NFR BLD MANUAL: 0 %
EOSINOPHIL NFR CSF MANUAL: 0 %
ERYTHROCYTE [DISTWIDTH] IN BLOOD BY AUTOMATED COUNT: 20.6 % (ref 11.5–14.5)
GLUCOSE CSF-MCNC: 61 MG/DL (ref 40–70)
HCT VFR BLD AUTO: 21.5 % (ref 41–52)
HGB BLD-MCNC: 7.3 G/DL (ref 13.5–17.5)
IMM GRANULOCYTES # BLD AUTO: 0.09 X10*3/UL (ref 0–0.7)
IMM GRANULOCYTES NFR BLD AUTO: 5.3 % (ref 0–0.9)
IMM GRANULOCYTES NFR CSF: 0 %
LYMPHOCYTES # BLD MANUAL: 0.22 X10*3/UL (ref 1.2–4.8)
LYMPHOCYTES NFR BLD MANUAL: 13 %
LYMPHOCYTES NFR CSF MANUAL: 20 % (ref 28–96)
MCH RBC QN AUTO: 28.4 PG (ref 26–34)
MCHC RBC AUTO-ENTMCNC: 34 G/DL (ref 32–36)
MCV RBC AUTO: 84 FL (ref 80–100)
MONOCYTES # BLD MANUAL: 0 X10*3/UL (ref 0.1–1)
MONOCYTES NFR BLD MANUAL: 0 %
MONOS+MACROS NFR CSF MANUAL: 0 % (ref 16–56)
NEUTS SEG # BLD MANUAL: 1.48 X10*3/UL (ref 1.2–7)
NEUTS SEG NFR BLD MANUAL: 87 %
NEUTS SEG NFR CSF MANUAL: 80 % (ref 0–5)
NRBC BLD-RTO: 0 /100 WBCS (ref 0–0)
OTHER CELLS NFR CSF MANUAL: 0 %
OVALOCYTES BLD QL SMEAR: ABNORMAL
PLASMA CELLS NFR CSF MICRO: 0 %
PLATELET # BLD AUTO: 20 X10*3/UL (ref 150–450)
PRODUCT BLOOD TYPE: 6200
PRODUCT CODE: NORMAL
PROT CSF-MCNC: 27 MG/DL (ref 15–45)
RBC # BLD AUTO: 2.57 X10*6/UL (ref 4.5–5.9)
RBC # CSF AUTO: 82 /UL (ref 0–5)
RBC MORPH BLD: ABNORMAL
SCHISTOCYTES BLD QL SMEAR: ABNORMAL
TOTAL CELLS COUNTED BLD: 100
TOTAL CELLS COUNTED CSF: 5
TUBE # CSF: ABNORMAL
UNIT ABO: NORMAL
UNIT NUMBER: NORMAL
UNIT RH: NORMAL
UNIT VOLUME: 286
WBC # BLD AUTO: 1.7 X10*3/UL (ref 4.4–11.3)
WBC # CSF AUTO: <1 /UL (ref 1–5)

## 2024-01-11 PROCEDURE — 62270 DX LMBR SPI PNXR: CPT | Performed by: PHYSICIAN ASSISTANT

## 2024-01-11 PROCEDURE — 89051 BODY FLUID CELL COUNT: CPT | Performed by: PHYSICIAN ASSISTANT

## 2024-01-11 PROCEDURE — 85027 COMPLETE CBC AUTOMATED: CPT

## 2024-01-11 PROCEDURE — 84157 ASSAY OF PROTEIN OTHER: CPT | Performed by: PHYSICIAN ASSISTANT

## 2024-01-11 PROCEDURE — 88104 CYTOPATH FL NONGYN SMEARS: CPT | Performed by: PHYSICIAN ASSISTANT

## 2024-01-11 PROCEDURE — 2500000004 HC RX 250 GENERAL PHARMACY W/ HCPCS (ALT 636 FOR OP/ED): Performed by: INTERNAL MEDICINE

## 2024-01-11 PROCEDURE — 36430 TRANSFUSION BLD/BLD COMPNT: CPT

## 2024-01-11 PROCEDURE — 88185 FLOWCYTOMETRY/TC ADD-ON: CPT | Mod: TC | Performed by: PHYSICIAN ASSISTANT

## 2024-01-11 PROCEDURE — A4216 STERILE WATER/SALINE, 10 ML: HCPCS | Performed by: INTERNAL MEDICINE

## 2024-01-11 PROCEDURE — 96450 CHEMOTHERAPY INTO CNS: CPT | Performed by: PHYSICIAN ASSISTANT

## 2024-01-11 PROCEDURE — 88187 FLOWCYTOMETRY/READ 2-8: CPT | Performed by: PATHOLOGY

## 2024-01-11 PROCEDURE — P9037 PLATE PHERES LEUKOREDU IRRAD: HCPCS

## 2024-01-11 PROCEDURE — 82945 GLUCOSE OTHER FLUID: CPT | Performed by: PHYSICIAN ASSISTANT

## 2024-01-11 PROCEDURE — 85007 BL SMEAR W/DIFF WBC COUNT: CPT

## 2024-01-11 RX ORDER — DIPHENHYDRAMINE HYDROCHLORIDE 50 MG/ML
50 INJECTION INTRAMUSCULAR; INTRAVENOUS AS NEEDED
Status: DISCONTINUED | OUTPATIENT
Start: 2024-01-11 | End: 2024-01-11 | Stop reason: HOSPADM

## 2024-01-11 RX ORDER — ALBUTEROL SULFATE 0.83 MG/ML
3 SOLUTION RESPIRATORY (INHALATION) AS NEEDED
Status: DISCONTINUED | OUTPATIENT
Start: 2024-01-11 | End: 2024-01-11 | Stop reason: HOSPADM

## 2024-01-11 RX ORDER — ALBUTEROL SULFATE 0.83 MG/ML
3 SOLUTION RESPIRATORY (INHALATION) AS NEEDED
Status: CANCELLED | OUTPATIENT
Start: 2024-01-11

## 2024-01-11 RX ORDER — EPINEPHRINE 0.3 MG/.3ML
0.3 INJECTION SUBCUTANEOUS EVERY 5 MIN PRN
Status: CANCELLED | OUTPATIENT
Start: 2024-01-11

## 2024-01-11 RX ORDER — FAMOTIDINE 10 MG/ML
20 INJECTION INTRAVENOUS ONCE AS NEEDED
Status: CANCELLED | OUTPATIENT
Start: 2024-01-11

## 2024-01-11 RX ORDER — DIPHENHYDRAMINE HYDROCHLORIDE 50 MG/ML
50 INJECTION INTRAMUSCULAR; INTRAVENOUS AS NEEDED
Status: CANCELLED | OUTPATIENT
Start: 2024-01-11

## 2024-01-11 RX ORDER — EPINEPHRINE 0.3 MG/.3ML
0.3 INJECTION SUBCUTANEOUS EVERY 5 MIN PRN
Status: DISCONTINUED | OUTPATIENT
Start: 2024-01-11 | End: 2024-01-11 | Stop reason: HOSPADM

## 2024-01-11 RX ORDER — FAMOTIDINE 10 MG/ML
20 INJECTION INTRAVENOUS ONCE AS NEEDED
Status: DISCONTINUED | OUTPATIENT
Start: 2024-01-11 | End: 2024-01-11 | Stop reason: HOSPADM

## 2024-01-11 RX ADMIN — SODIUM CHLORIDE 12 MG: 9 INJECTION, SOLUTION INTRAMUSCULAR; INTRAVENOUS; SUBCUTANEOUS at 13:54

## 2024-01-11 ASSESSMENT — PAIN SCALES - GENERAL: PAINLEVEL_OUTOF10: 0-NO PAIN

## 2024-01-11 NOTE — PROGRESS NOTES
Patient ID: Shilo Thakkar is a 23 y.o. male.    Lumbar Puncture    Date/Time: 1/11/2024 2:46 PM    Performed by: Roselia Shetty PA-C  Authorized by: Roselia Shetty PA-C    Consent:     Consent obtained:  Written    Consent given by:  Patient    Risks, benefits, and alternatives were discussed: yes      Risks discussed:  Bleeding, infection, pain and headache    Alternatives discussed:  No treatment  Universal protocol:     Procedure explained and questions answered to patient or proxy's satisfaction: yes      Relevant documents present and verified: yes      Test results available: yes      Imaging studies available: no      Required blood products, implants, devices, and special equipment available: yes      Immediately prior to procedure a time out was called: yes      Site/side marked: yes      Patient identity confirmed:  Verbally with patient, hospital-assigned identification number and arm band  Pre-procedure details:     Procedure purpose:  Therapeutic    Preparation: Patient was prepped and draped in usual sterile fashion    Anesthesia:     Anesthesia method:  Local infiltration    Local anesthetic:  Lidocaine 1% w/o epi  Procedure details:     Lumbar space:  L3-L4 interspace    Patient position:  Sitting    Needle type:  Spinal needle - Quincke tip    Ultrasound guidance: no      Number of attempts:  1    Fluid appearance:  Clear    Tubes of fluid:  3    Total volume (ml):  9  Post-procedure details:     Puncture site:  Adhesive bandage applied and direct pressure applied    Procedure completion:  Tolerated well, no immediate complications  Comments:      Samples sent for cell count and diff, protein and glucose and flow cytometry.  Following collection, methotrexate 12mg (in 3ml) was administered intrathecally over 2 min.

## 2024-01-12 LAB — PATH REVIEW-CELL CT,CSF: NORMAL

## 2024-01-15 ENCOUNTER — HOSPITAL ENCOUNTER (OUTPATIENT)
Dept: CARDIOLOGY | Facility: HOSPITAL | Age: 24
Discharge: HOME | End: 2024-01-15
Payer: COMMERCIAL

## 2024-01-15 ENCOUNTER — HOSPITAL ENCOUNTER (OUTPATIENT)
Dept: RADIOLOGY | Facility: HOSPITAL | Age: 24
Discharge: HOME | End: 2024-01-15
Payer: COMMERCIAL

## 2024-01-15 DIAGNOSIS — Z51.11 ENCOUNTER FOR ANTINEOPLASTIC CHEMOTHERAPY: ICD-10-CM

## 2024-01-15 DIAGNOSIS — C83.70 BURKITT LYMPHOMA, UNSPECIFIED BODY REGION (MULTI): ICD-10-CM

## 2024-01-15 LAB
AORTIC VALVE MEAN GRADIENT: 3
AORTIC VALVE PEAK VELOCITY: 1.15
AV PEAK GRADIENT: 5.3
AVA (PEAK VEL): 2.81
AVA (VTI): 2.87
CELL POPULATIONS: NORMAL
DIAGNOSIS: NORMAL
EJECTION FRACTION APICAL 4 CHAMBER: 50.5
EJECTION FRACTION: 51
FLOW DIFFERENTIAL: NORMAL
FLOW TEST ORDERED: NORMAL
FLUID CELL COUNT: <1 /UL
GLOBAL LONGITUDINAL STRAIN: 21
LAB TEST METHOD: NORMAL
LEFT ATRIUM VOLUME AREA LENGTH INDEX BSA: 12.9
LEFT VENTRICLE INTERNAL DIMENSION DIASTOLE: 4.53 (ref 3.5–6)
LEFT VENTRICULAR OUTFLOW TRACT DIAMETER: 2
MITRAL VALVE E/E' RATIO: 5.78
NUMBER OF CELLS COLLECTED: NORMAL
PATH REPORT.TOTAL CANCER: NORMAL
RIGHT VENTRICLE FREE WALL PEAK S': 13.2
SIGNATURE COMMENT: NORMAL
SPECIMEN VIABILITY: NORMAL
TRICUSPID ANNULAR PLANE SYSTOLIC EXCURSION: 1.7

## 2024-01-15 PROCEDURE — 3430000001 HC RX 343 DIAGNOSTIC RADIOPHARMACEUTICALS: Performed by: INTERNAL MEDICINE

## 2024-01-15 PROCEDURE — 78815 PET IMAGE W/CT SKULL-THIGH: CPT | Mod: PS

## 2024-01-15 PROCEDURE — 93306 TTE W/DOPPLER COMPLETE: CPT | Performed by: STUDENT IN AN ORGANIZED HEALTH CARE EDUCATION/TRAINING PROGRAM

## 2024-01-15 PROCEDURE — 76376 3D RENDER W/INTRP POSTPROCES: CPT

## 2024-01-15 PROCEDURE — A9552 F18 FDG: HCPCS | Performed by: INTERNAL MEDICINE

## 2024-01-15 PROCEDURE — 93356 MYOCRD STRAIN IMG SPCKL TRCK: CPT | Performed by: STUDENT IN AN ORGANIZED HEALTH CARE EDUCATION/TRAINING PROGRAM

## 2024-01-15 PROCEDURE — 78815 PET IMAGE W/CT SKULL-THIGH: CPT | Mod: PET TUMOR SUBSQ TX STRATEGY | Performed by: NUCLEAR MEDICINE

## 2024-01-15 PROCEDURE — 76376 3D RENDER W/INTRP POSTPROCES: CPT | Performed by: STUDENT IN AN ORGANIZED HEALTH CARE EDUCATION/TRAINING PROGRAM

## 2024-01-15 RX ORDER — FLUDEOXYGLUCOSE F 18 200 MCI/ML
14.5 INJECTION, SOLUTION INTRAVENOUS
Status: COMPLETED | OUTPATIENT
Start: 2024-01-15 | End: 2024-01-15

## 2024-01-15 RX ADMIN — FLUDEOXYGLUCOSE F 18 14.5 MILLICURIE: 200 INJECTION, SOLUTION INTRAVENOUS at 12:00

## 2024-01-16 NOTE — PROGRESS NOTES
Patient ID: Shilo Thakkar is a 23 y.o. male.    Subjective    HPI  Presents today for C4D6 Rituximab & Neulasta and post hospital discharge follow up in [facility], accompanied by his mom.  He was admitted for C4 HyperCVAD which he tolerated well. No new complaints or concerns.    Review of Systems   All other systems reviewed and are negative.      Objective    BSA: There is no height or weight on file to calculate BSA.  There were no vitals taken for this visit.  Vital signs reviewed today.      Physical Exam  Constitutional:       Appearance: Normal appearance.   HENT:      Head: Normocephalic.      Nose: Nose normal.      Mouth/Throat:      Mouth: Mucous membranes are moist.      Pharynx: Oropharynx is clear.   Eyes:      Extraocular Movements: Extraocular movements intact.      Conjunctiva/sclera: Conjunctivae normal.      Pupils: Pupils are equal, round, and reactive to light.   Cardiovascular:      Rate and Rhythm: Normal rate and regular rhythm.      Pulses: Normal pulses.      Heart sounds: Normal heart sounds.   Pulmonary:      Effort: Pulmonary effort is normal.      Breath sounds: Normal breath sounds.   Abdominal:      General: Abdomen is flat. Bowel sounds are normal.      Palpations: Abdomen is soft.   Musculoskeletal:         General: Normal range of motion.      Cervical back: Normal range of motion.   Skin:     General: Skin is warm and dry.      Capillary Refill: Capillary refill takes less than 2 seconds.   Neurological:      General: No focal deficit present.      Mental Status: He is alert and oriented to person, place, and time. Mental status is at baseline.   Psychiatric:         Mood and Affect: Mood normal.         Performance Status:  Karnofsky Score: 80 - Normal activity with effort; some signs or symptoms of disease      Assessment/Plan         Oncology History   Burkitt lymphoma (CMS/[facility])   10/27/2023 Initial Diagnosis    Burkitt lymphoma (CMS/[facility])     10/29/2023 -   "Chemotherapy    HyperCVAD + RiTUXimab, 21 Day Cycles       High risk Burkitt's lymphoma based on bulk and stage, at least stage III, disease site left cervical area, left bed, extensive mesenteric intra-abdominal adenopathy.      - S/P C4 HyperCVAD with D1 1/2/24.  - C4D2 LP with IT Cytarabine (1/4/24): CSF Flow Cytometry Negative  - C4D6 Rituximab + Neulasta today (1/9/24)  - C4D8 LP with IT methotrexate to be completed 1/11/24   - Post chemo he should start levofloxacin between day 5 and day 15.  He should continue without interruption fluconazole and acyclovir and 3 times weekly Bactrim.  - Before next cycle, cycle 5, we will obtain an echocardiogram and a PET scan for \"interim\" staging.  Overall he is doing very well    RTC  1/11 Count Check, LP with IT Methotrexate (Main - Infusion)  1/15 Count Check, Echo, PET-CT ([facility])  1/17 Follow up with Dr. Klein prior to C5          Marleni Deal, DEMETRIO-CNP            "

## 2024-01-17 ENCOUNTER — APPOINTMENT (OUTPATIENT)
Dept: GENETICS | Facility: CLINIC | Age: 24
End: 2024-01-17
Payer: COMMERCIAL

## 2024-01-17 ENCOUNTER — APPOINTMENT (OUTPATIENT)
Dept: RADIOLOGY | Facility: HOSPITAL | Age: 24
DRG: 116 | End: 2024-01-17
Payer: COMMERCIAL

## 2024-01-17 ENCOUNTER — CLINICAL SUPPORT (OUTPATIENT)
Dept: EMERGENCY MEDICINE | Facility: HOSPITAL | Age: 24
DRG: 116 | End: 2024-01-17
Payer: COMMERCIAL

## 2024-01-17 ENCOUNTER — DOCUMENTATION (OUTPATIENT)
Dept: HEMATOLOGY/ONCOLOGY | Facility: HOSPITAL | Age: 24
End: 2024-01-17
Payer: COMMERCIAL

## 2024-01-17 ENCOUNTER — NURSE TRIAGE (OUTPATIENT)
Dept: ADMISSION | Facility: HOSPITAL | Age: 24
End: 2024-01-17
Payer: COMMERCIAL

## 2024-01-17 ENCOUNTER — APPOINTMENT (OUTPATIENT)
Dept: HEMATOLOGY/ONCOLOGY | Facility: HOSPITAL | Age: 24
End: 2024-01-17
Payer: COMMERCIAL

## 2024-01-17 ENCOUNTER — HOSPITAL ENCOUNTER (INPATIENT)
Facility: HOSPITAL | Age: 24
LOS: 6 days | Discharge: HOME | DRG: 116 | End: 2024-01-23
Attending: EMERGENCY MEDICINE | Admitting: STUDENT IN AN ORGANIZED HEALTH CARE EDUCATION/TRAINING PROGRAM
Payer: COMMERCIAL

## 2024-01-17 DIAGNOSIS — H33.22 LEFT RETINAL DETACHMENT: Primary | ICD-10-CM

## 2024-01-17 DIAGNOSIS — R04.0 EPISTAXIS: ICD-10-CM

## 2024-01-17 DIAGNOSIS — H35.63 RETINAL HEMORRHAGE OF BOTH EYES: ICD-10-CM

## 2024-01-17 DIAGNOSIS — R22.1 NECK MASS: ICD-10-CM

## 2024-01-17 DIAGNOSIS — H53.8 BLURRY VISION: ICD-10-CM

## 2024-01-17 DIAGNOSIS — C83.79 BURKITT LYMPHOMA OF SOLID ORGAN EXCLUDING SPLEEN (MULTI): ICD-10-CM

## 2024-01-17 DIAGNOSIS — C79.9 METASTASIS FROM PANCREATIC CANCER (MULTI): ICD-10-CM

## 2024-01-17 DIAGNOSIS — C25.9 PANCREATIC CANCER (MULTI): ICD-10-CM

## 2024-01-17 DIAGNOSIS — C83.70 BURKITT LYMPHOMA, UNSPECIFIED BODY REGION (MULTI): ICD-10-CM

## 2024-01-17 DIAGNOSIS — J35.1 TONSILLAR HYPERTROPHY, UNILATERAL: ICD-10-CM

## 2024-01-17 DIAGNOSIS — D61.818 PANCYTOPENIA (MULTI): ICD-10-CM

## 2024-01-17 DIAGNOSIS — C25.9 METASTASIS FROM PANCREATIC CANCER (MULTI): ICD-10-CM

## 2024-01-17 DIAGNOSIS — C83.78 BURKITT LYMPHOMA OF LYMPH NODES OF MULTIPLE REGIONS (MULTI): ICD-10-CM

## 2024-01-17 LAB
ABO GROUP (TYPE) IN BLOOD: NORMAL
ALBUMIN SERPL BCP-MCNC: 4.2 G/DL (ref 3.4–5)
ALP SERPL-CCNC: 79 U/L (ref 33–120)
ALT SERPL W P-5'-P-CCNC: 19 U/L (ref 10–52)
ANION GAP BLDV CALCULATED.4IONS-SCNC: 13 MMOL/L (ref 10–25)
ANION GAP SERPL CALC-SCNC: 15 MMOL/L (ref 10–20)
ANTIBODY SCREEN: NORMAL
APTT PPP: 32 SECONDS (ref 27–38)
AST SERPL W P-5'-P-CCNC: 9 U/L (ref 9–39)
BASE EXCESS BLDV CALC-SCNC: 1.5 MMOL/L (ref -2–3)
BASOPHILS # BLD MANUAL: 0 X10*3/UL (ref 0–0.1)
BASOPHILS NFR BLD MANUAL: 0 %
BILIRUB SERPL-MCNC: 0.9 MG/DL (ref 0–1.2)
BLOOD EXPIRATION DATE: NORMAL
BODY TEMPERATURE: 37 DEGREES CELSIUS
BUN SERPL-MCNC: 21 MG/DL (ref 6–23)
CA-I BLDV-SCNC: 1.26 MMOL/L (ref 1.1–1.33)
CALCIUM SERPL-MCNC: 9.4 MG/DL (ref 8.6–10.6)
CARDIAC TROPONIN I PNL SERPL HS: 3 NG/L (ref 0–53)
CHLORIDE BLDV-SCNC: 100 MMOL/L (ref 98–107)
CHLORIDE SERPL-SCNC: 99 MMOL/L (ref 98–107)
CO2 SERPL-SCNC: 26 MMOL/L (ref 21–32)
CREAT SERPL-MCNC: 0.99 MG/DL (ref 0.5–1.3)
D DIMER PPP FEU-MCNC: 383 NG/ML FEU
DISPENSE STATUS: NORMAL
EGFRCR SERPLBLD CKD-EPI 2021: >90 ML/MIN/1.73M*2
EOSINOPHIL # BLD MANUAL: 0.02 X10*3/UL (ref 0–0.7)
EOSINOPHIL NFR BLD MANUAL: 5 %
ERYTHROCYTE [DISTWIDTH] IN BLOOD BY AUTOMATED COUNT: 19.1 % (ref 11.5–14.5)
FIBRINOGEN PPP-MCNC: 423 MG/DL (ref 200–400)
GLUCOSE BLD MANUAL STRIP-MCNC: 104 MG/DL (ref 74–99)
GLUCOSE BLDV-MCNC: 126 MG/DL (ref 74–99)
GLUCOSE SERPL-MCNC: 127 MG/DL (ref 74–99)
HCO3 BLDV-SCNC: 26.6 MMOL/L (ref 22–26)
HCT VFR BLD AUTO: 11.1 % (ref 41–52)
HCT VFR BLD EST: 17 % (ref 41–52)
HGB BLD-MCNC: 4.2 G/DL (ref 13.5–17.5)
HGB BLDV-MCNC: 5.5 G/DL (ref 13.5–17.5)
IMM GRANULOCYTES # BLD AUTO: 0 X10*3/UL (ref 0–0.7)
IMM GRANULOCYTES NFR BLD AUTO: 0 % (ref 0–0.9)
INHALED O2 CONCENTRATION: 21 %
INR PPP: 1.3 (ref 0.9–1.1)
LACTATE BLDV-SCNC: 2.2 MMOL/L (ref 0.4–2)
LYMPHOCYTES # BLD MANUAL: 0.11 X10*3/UL (ref 1.2–4.8)
LYMPHOCYTES NFR BLD MANUAL: 35 %
MCH RBC QN AUTO: 28.2 PG (ref 26–34)
MCHC RBC AUTO-ENTMCNC: 37.8 G/DL (ref 32–36)
MCV RBC AUTO: 75 FL (ref 80–100)
MONOCYTES # BLD MANUAL: 0.09 X10*3/UL (ref 0.1–1)
MONOCYTES NFR BLD MANUAL: 30 %
NEUTROPHILS # BLD MANUAL: 0.1 X10*3/UL (ref 1.2–7.7)
NEUTS BAND # BLD MANUAL: 0.02 X10*3/UL (ref 0–0.7)
NEUTS BAND NFR BLD MANUAL: 5 %
NEUTS SEG # BLD MANUAL: 0.08 X10*3/UL (ref 1.2–7)
NEUTS SEG NFR BLD MANUAL: 25 %
NRBC BLD-RTO: 0 /100 WBCS (ref 0–0)
OVALOCYTES BLD QL SMEAR: ABNORMAL
OXYHGB MFR BLDV: 13.7 % (ref 45–75)
PCO2 BLDV: 44 MM HG (ref 41–51)
PH BLDV: 7.39 PH (ref 7.33–7.43)
PLATELET # BLD AUTO: 1 X10*3/UL (ref 150–450)
PO2 BLDV: 16 MM HG (ref 35–45)
POTASSIUM BLDV-SCNC: 4.1 MMOL/L (ref 3.5–5.3)
POTASSIUM SERPL-SCNC: 3.9 MMOL/L (ref 3.5–5.3)
PRODUCT BLOOD TYPE: 6200
PRODUCT CODE: NORMAL
PROT SERPL-MCNC: 6 G/DL (ref 6.4–8.2)
PROTHROMBIN TIME: 14.6 SECONDS (ref 9.8–12.8)
RBC # BLD AUTO: 1.49 X10*6/UL (ref 4.5–5.9)
RBC MORPH BLD: ABNORMAL
RH FACTOR (ANTIGEN D): NORMAL
SAO2 % BLDV: 14 % (ref 45–75)
SODIUM BLDV-SCNC: 135 MMOL/L (ref 136–145)
SODIUM SERPL-SCNC: 136 MMOL/L (ref 136–145)
TOTAL CELLS COUNTED BLD: 20
UNIT ABO: NORMAL
UNIT NUMBER: NORMAL
UNIT RH: NORMAL
UNIT VOLUME: 288
WBC # BLD AUTO: 0.3 X10*3/UL (ref 4.4–11.3)

## 2024-01-17 PROCEDURE — 85025 COMPLETE CBC W/AUTO DIFF WBC: CPT | Performed by: EMERGENCY MEDICINE

## 2024-01-17 PROCEDURE — 85007 BL SMEAR W/DIFF WBC COUNT: CPT | Performed by: EMERGENCY MEDICINE

## 2024-01-17 PROCEDURE — 84132 ASSAY OF SERUM POTASSIUM: CPT | Performed by: EMERGENCY MEDICINE

## 2024-01-17 PROCEDURE — 86920 COMPATIBILITY TEST SPIN: CPT

## 2024-01-17 PROCEDURE — 70498 CT ANGIOGRAPHY NECK: CPT

## 2024-01-17 PROCEDURE — P9040 RBC LEUKOREDUCED IRRADIATED: HCPCS

## 2024-01-17 PROCEDURE — 85730 THROMBOPLASTIN TIME PARTIAL: CPT | Performed by: EMERGENCY MEDICINE

## 2024-01-17 PROCEDURE — 36430 TRANSFUSION BLD/BLD COMPNT: CPT

## 2024-01-17 PROCEDURE — 70496 CT ANGIOGRAPHY HEAD: CPT | Performed by: RADIOLOGY

## 2024-01-17 PROCEDURE — 85027 COMPLETE CBC AUTOMATED: CPT | Performed by: EMERGENCY MEDICINE

## 2024-01-17 PROCEDURE — 2020000001 HC ICU ROOM DAILY

## 2024-01-17 PROCEDURE — 87040 BLOOD CULTURE FOR BACTERIA: CPT | Performed by: EMERGENCY MEDICINE

## 2024-01-17 PROCEDURE — 99291 CRITICAL CARE FIRST HOUR: CPT | Mod: 25 | Performed by: EMERGENCY MEDICINE

## 2024-01-17 PROCEDURE — 99291 CRITICAL CARE FIRST HOUR: CPT | Performed by: EMERGENCY MEDICINE

## 2024-01-17 PROCEDURE — 84484 ASSAY OF TROPONIN QUANT: CPT | Performed by: EMERGENCY MEDICINE

## 2024-01-17 PROCEDURE — 70450 CT HEAD/BRAIN W/O DYE: CPT

## 2024-01-17 PROCEDURE — 99222 1ST HOSP IP/OBS MODERATE 55: CPT

## 2024-01-17 PROCEDURE — 30233R1 TRANSFUSION OF NONAUTOLOGOUS PLATELETS INTO PERIPHERAL VEIN, PERCUTANEOUS APPROACH: ICD-10-PCS | Performed by: STUDENT IN AN ORGANIZED HEALTH CARE EDUCATION/TRAINING PROGRAM

## 2024-01-17 PROCEDURE — 82947 ASSAY GLUCOSE BLOOD QUANT: CPT

## 2024-01-17 PROCEDURE — 2500000004 HC RX 250 GENERAL PHARMACY W/ HCPCS (ALT 636 FOR OP/ED): Performed by: EMERGENCY MEDICINE

## 2024-01-17 PROCEDURE — 85610 PROTHROMBIN TIME: CPT | Performed by: EMERGENCY MEDICINE

## 2024-01-17 PROCEDURE — 70498 CT ANGIOGRAPHY NECK: CPT | Performed by: RADIOLOGY

## 2024-01-17 PROCEDURE — 85384 FIBRINOGEN ACTIVITY: CPT | Performed by: EMERGENCY MEDICINE

## 2024-01-17 PROCEDURE — 85379 FIBRIN DEGRADATION QUANT: CPT | Performed by: EMERGENCY MEDICINE

## 2024-01-17 PROCEDURE — 30233N1 TRANSFUSION OF NONAUTOLOGOUS RED BLOOD CELLS INTO PERIPHERAL VEIN, PERCUTANEOUS APPROACH: ICD-10-PCS | Performed by: STUDENT IN AN ORGANIZED HEALTH CARE EDUCATION/TRAINING PROGRAM

## 2024-01-17 PROCEDURE — 93005 ELECTROCARDIOGRAM TRACING: CPT

## 2024-01-17 PROCEDURE — 36415 COLL VENOUS BLD VENIPUNCTURE: CPT | Performed by: EMERGENCY MEDICINE

## 2024-01-17 PROCEDURE — 86901 BLOOD TYPING SEROLOGIC RH(D): CPT | Performed by: EMERGENCY MEDICINE

## 2024-01-17 PROCEDURE — 96365 THER/PROPH/DIAG IV INF INIT: CPT

## 2024-01-17 PROCEDURE — 2550000001 HC RX 255 CONTRASTS: Performed by: EMERGENCY MEDICINE

## 2024-01-17 PROCEDURE — P9037 PLATE PHERES LEUKOREDU IRRAD: HCPCS

## 2024-01-17 RX ADMIN — IOHEXOL 90 ML: 350 INJECTION, SOLUTION INTRAVENOUS at 13:36

## 2024-01-17 RX ADMIN — Medication 1 G: at 15:24

## 2024-01-17 RX ADMIN — Medication 1 G: at 22:55

## 2024-01-17 NOTE — ED TRIAGE NOTES
Hx Burkitt's lymphoma, last chemo 1 week ago. Says went to bed last night vision was normal, woke up with bilat blurred vision, worse in L than R, epistaxis bilat nostrils since last night. LKW 2200 1/16. Evaluated by Donaldo MENSAH in triage, BAT paged [phone]

## 2024-01-17 NOTE — ED PROVIDER NOTES
Chief Complaint   Patient presents with    Loss of Vision    Epistaxis (Nose Bleed)     History of Present Illness   Shilo Thakkar   MRN [id number]    23-year-old presents for evaluation of headache, epistaxis, blurry vision of both eyes.  Epistaxis has been intermittent for the past several days.  Headache described as gradual onset mild.  Last night when he went to bed vision was normal and when he woke up today noticed blurry vision of both eyes mostly in the center of the visual field.  No double vision or eye pain.  No extremity weakness or numbness.  No nausea or vomiting.  No recent fever or chills.  Reports no chest pain, shortness of breath, abdominal pain.    External records reviewed including most recent discharge summary from 1/7/2024.  Chronic medical conditions including Burkitt lymphoma diagnosed 7/20/2023.  Most recent chemo 1/9/2024 Cover Rituxan and Neulasta and scheduled follow-up today 1/17/2024 with Dr. Klein.    Physical Exam     ED Triage Vitals [01/17/24 1236]   Temp Heart Rate Resp BP   36.9 °C (98.4 °F) (!) 120 16 114/67      SpO2 Temp Source Heart Rate Source Patient Position   98 % Temporal -- --      BP Location FiO2 (%)     -- --         General: Appears chronically ill.  Head: Atraumatic.  Eyes: No conjunctival injection.  No hyphema.  No eyelid or periorbital edema or bruising.  Extraocular movements normal.  No APD.  Ears Nose Throat: Hearing grossly intact. No epistaxis. Oral mucosa moist.  Neck: Supple.  Normal flexion and extension.  Able to touch chin to chest.  Cardiovascular: Extremities warm.  Tachycardic.  Pulmonary: No stridor. Normal respiratory effort.  Abdominal: No distention.  Musculoskeletal: No deformity or joint swelling.  Skin: No rash.  Pale.  Psychiatric: Does not appear to respond to internal stimuli.  Neurologic: Alert. Follows directions. Extraocular movements intact. Visual fields intact by finger counting. No nystagmus. Facial sensation to light touch intact.  Eyebrows and corners of the mouth elevate symmetrically. Tongue protrudes midline. No aphasia or dysarthria. Strength 5/5 upper and lower extremities. Extremity sensation to light touch intact. No finger nose dysmetria.  Ambulates with steady gait.    Interval: Baseline  Time: 6:48 PM  Person Administering Scale: Néstor Fulton MD     1a  Level of consciousness: 0=alert; keenly responsive   1b. LOC questions:  0=Performs both tasks correctly   1c. LOC commands: 0=Performs both tasks correctly   2.  Best Gaze: 0=normal   3. Visual: 0=No visual loss   4. Facial Palsy: 0=Normal symmetric movement   5a. Motor left arm: 0=No drift, limb holds 90 (or 45) degrees for full 10 seconds   5b.  Motor right arm: 0=No drift, limb holds 90 (or 45) degrees for full 10 seconds   6a. motor left le=No drift, limb holds 90 (or 45) degrees for full 10 seconds   6b  Motor right le=No drift, limb holds 90 (or 45) degrees for full 10 seconds   7. Limb Ataxia: 0=Absent   8.  Sensory: 0=Normal; no sensory loss   9. Best Language:  0=No aphasia, normal   10. Dysarthria: 0=Normal   11. Extinction and Inattention: 0=No abnormality   12. Distal motor function: 0=Normal     Total:   0     VAN: Negative      ED Course & Medical Decision Making   Seen in triage with hematologist oncologist Dr. Klein.  Activated BAT due to headache and vision changes in setting of known anemia and thrombocytopenia with worsening epistaxis to assess for possible intracranial hemorrhage.  STAT platelet transfusion ordered per request of Dr. Klein prior to lab results.  Patient verbally consented for transfusion before transport to CT in [place].    Patient was evaluated by stroke neurology team.  CT head without contrast and CT angio head and neck demonstrated no intracranial hemorrhage and no large vessel occlusion or significant stenosis.  Not a candidate for intravenous thrombolysis given working diagnosis unlikely to be acute ischemic stroke, time from  last well greater than 4.5 hours, and severe thrombocytopenia.    I agreed with recommendations from patient's hematologist oncologist for transfusion.  Patient completed written consent for transfusion of platelets x 2 and packed red blood cells x 2 for acute on chronic pancytopenia with WBC 0.3, hemoglobin 4.2, and platelets 1.  Initial tachycardia resolved.  On repeat assessment patient reported that headache was resolved and did not have further epistaxis.  Dr. Klein also requested blood cultures and empiric antibiotics given presenting tachycardia and neutropenia.    Ophthalmology was consulted and assessed that patient had bilateral retinal hemorrhages which likely account for vision changes.    I discussed with malignant hematologist oncologist on-call Dr. Stuart who requested posttransfusion CBC prior to admission.    Diagnoses as of 01/17/24 1848   Pancytopenia (CMS/HCC)   Retinal hemorrhage of both eyes   Blurry vision   Epistaxis            Néstor Fulton MD  01/17/24 1848

## 2024-01-17 NOTE — CONSULTS
"HPI:  23 YOM with PMH Burkitt's lymphoma stage III on chemotherapy most recently 1/11, next infusion session beginning on Monday and patient presenting with concerns for nosebleed headache and bilateral blurry vision. Initially concern for binasal hemianopia, neuro stroke assessed w CTH which was unremarkable for stroke. Pt states blurry vision OU  began upon waking today, feels like it is blurry especially centrally with left eye central grayed out spots. Denies eye pain, diplopia, flashes or floaters. Denies HA, diziness, nausea today (does report nausea episode last night + vomiting). Received intrathecal chemo 1/11/24 without initial complications, pt was given prednisolone forte drop to use 2 days after chemo and prn if blurry vision. Per chart review, his onc team recommends using pred drop for 1 wk following chemo.     Chemo:  - S/P C4 HyperCVAD with D1 1/2/24.  - C4D2 LP with IT Cytarabine (1/4/24): CSF Flow Cytometry Negative  - C4D6 Rituximab + Neulasta today (1/9/24)  - C4D8 LP with IT methotrexate to be completed 1/11/24   - Post chemo he should start levofloxacin between day 5 and day 15.  He should continue without interruption fluconazole and acyclovir and 3 times weekly Bactrim.  - Before next cycle, cycle 5, we will obtain an echocardiogram and a PET scan for \"interim\" staging.  Overall he is doing very well       PMH: As per HPI  Allergies: allergy list reviewed  Past Ocular Hx: As per HPI  Family Hx: No hx of glaucoma, age related macular degeneration, blindness  Social Hx: lives with family     Physical Exam  Base Eye Exam       Visual Acuity (Snellen - Linear)         Right Left    Near sc 20/50-2 PHNI (missed central letters) 20/50 -2 PHNI (missed central letters)              Pupils         Dark Light Shape React APD    Right 5 2 Round Brisk None    Left 5 2 Round Brisk None              Visual Fields         Left Right     Full Full              Extraocular Movement         Right Left     " Full Full              Neuro/Psych       Oriented x3: Yes              Dilation       Both eyes:                   Additional Tests       Color         Right Left    Ishihara 4/11 (ecc) 2/11 (ecc)                  Slit Lamp and Fundus Exam       External Exam         Right Left    External Normal Normal              Slit Lamp Exam         Right Left    Lids/Lashes Normal Normal    Conjunctiva/Sclera White and quiet White and quiet    Cornea Clear Clear    Anterior Chamber Deep and quiet Deep and quiet    Iris Round and reactive Round and reactive    Lens Clear Clear    Anterior Vitreous Vitreous (vit) hemorrhage overlying retinal hemorrhage Vit hemorrhage              Fundus Exam         Right Left    Disc crisp, partially obstructed temporally by overlying vit heme unable to view d/t overlying vit heme    C/D Ratio 0.3 0.3    Macula serous detachment vs retinoschisis including macula extending from optic nerve just posterior to sup and inf arcades about 3 disk diopters from fovea; with overlying retinal hemorrhage (dark blood, suspect chronic) serous detachment vs retinoschisis including macula extending from optic nerve just posterior to sup and inf arcades; with overlying retinal hemorrhage extending to disk (dark blood, suspect chronic), overlying vitreous (vit) hemorrhage    Vessels multiple flam hemorrhages sup and inf arcades multiple flame hemorrhages sup and inf arcades, largest at 2 oclock    Periphery multiple flame hemorrhages, Dot-blot hemorrhage Multiple flame hemorrhages, Dot-blot hemorrhage                        1/11/24  (LP): Hemorrhagic specimen, no lymphoma cells seen including on flow, glucose 61, protein 27    Light perception (LP) 1/4/24  No lymphoma cells seen on cell count or flow      CTA Head/neck 1/17/24  1. No evidence of hemodynamically significant stenosis or proximal large branch vessel cutoff on CTA of the head or neck.  2. No evidence of acute cortical infarct or acute intracranial  hemorrhage on head CT. MRI with diffusion-imaging would be a more  sensitive means of assessing for acute ischemic injury.  3. 6 mm nodule in the left lobe of the thyroid gland. In the absence of risk factors for malignancy it is likely of no clinical significance.    PET Scan 12/27/23  IMPRESSION:  1. Interval near-complete/complete resolution of metabolic activity of previously seen left oropharyngeal mass, and resolution previously seen hypermetabolic left level 2 and 3 cervical lymphadenopathy.  Minimal-mild hypermetabolic activity the left oropharynx is likely related to post treatment changes. Direct visualization is  recommended to exclude residual disease.. Deauville score 2.  2. Interval resolution of previously seen hypermetabolic left level 2 and level 3 cervical lymphadenopathy.  3. Interval complete resolution of previously seen moderate-large size left pleural effusion and trace right pleural effusion.  4. Interval near-complete resolution of previously seen abdominopelvic ascites.  5. Interval complete metabolic resolution of previously seen hypermetabolic large mass within pancreatic tail/body and the  previously seen hypermetabolic retroperitoneal and mesenteric lymphadenopathy.      MRI Brain 10/27/23    IMPRESSION:  1.  No evidence of intracranial metastatic disease.  2. Partially visualized mass located within the left oropharynx with  adjacent enlarged lymph nodes. These findings were better seen on the  prior CT neck study.      #Bilateral macula-involving serous vs exudative retinal fluid, c/f retinoschisis vs less likely detachments  #Bilateral chronic retinal hemorrhages, overlying vitreous (vit) heme  - Clear serous or exudative fluid accumulated over macula OU with clearly defined edges. Examined patient with Dr. Quevedo who confirms overall fluid/detachment accumulated is not hemorrhagic  -Additionally, retinal hemorrhages appear too dark to be acute, suspect present for >1 week.  -Suspect  retinoschisis given round edges, bilateral nature  -Ddx underlying etiology includes low platelets vs cannot rule out infiltrative lymphoma  -Discussed with Dr. Quevedo, would not offer urgent surgery at this time to repair/drain fluid, given pt's neutropenia/thrombocytopenia, recommend observation and treatment of platelets/low blood counts  -If does not improve in future with treatment of blood counts, may consider surgery in future  -Recommend IV solumedrol 1 g daily to aid in resolution if cleared by primary team  -Will continue to monitor while inpatient     Angela Hussein MD  Opthalmology PGY3    Patient was discussed and examined with Dr. Quevedo who agrees with assessment and plan as above.

## 2024-01-17 NOTE — ED NOTES
Rn attempted to scan 1st unit PRBC for administration. Unit number will not scan or let manual entry. Edward DUNBAR called blood bank, blood bank explained this is a recurring problem and to document blood administration in downtime. Blood product verification done and initiated at 1605 by Ambar DUNBAR and witnessed by Edward DUNBAR.     Ambar Morse RN  01/17/24 [id number]

## 2024-01-17 NOTE — ED PROCEDURE NOTE
Procedure  Critical Care    Performed by: Néstor Fulton MD  Authorized by: Néstor Fulton MD    Critical care provider statement:     Critical care time (minutes):  45    Critical care time was exclusive of:  Separately billable procedures and treating other patients and teaching time    Critical care was necessary to treat or prevent imminent or life-threatening deterioration of the following conditions: severe pancytopenia requiring transfusion of multiple blood products.    Critical care was time spent personally by me on the following activities:  Development of treatment plan with patient or surrogate, discussions with consultants, evaluation of patient's response to treatment, examination of patient, ordering and performing treatments and interventions, ordering and review of laboratory studies, ordering and review of radiographic studies, pulse oximetry, re-evaluation of patient's condition and review of old charts    I assumed direction of critical care for this patient from another provider in my specialty: no      Care discussed with: admitting provider                 Néstor Fulton MD  01/17/24 2286

## 2024-01-17 NOTE — TREATMENT PLAN
Addendum: CT showed no bleed. Profoundly pancytopenic,. Needs 2 Unites platelets 2 units PRBC keep PLTS >50 for next day or 2 until we rule out a bleed causing this presentation. BC x 2 being obtained in ER and emperic cefepime or zosyn being started. Patient will need to continue acyclovir and fluconazole, should be on tele. Received peg-filgrastin 1/9/2024. PCP prophylaxis.     Supportive care through recovery  Ophthalmology and neurology seeing him in ER.

## 2024-01-17 NOTE — PROGRESS NOTES
Patient with BL was supposed to see me today. He did not do his CBC Monday (ordered as BIW) as he was at PET scan. He is Day 15 of Cycle 4. Platelets were 20K day 9 but received plat transfusioon before LP. Monday would have been day 13.  He developed epistaxix last night and visual chamges this AM. I instructed him to go to ER immediately. Concerned about IC bleed. I suspect his platelets at esthela <10K. CBC ordered and DIC screen. Spoke with ER MD about Immediate plt TX of 2 SDP units without waiting for labs, and will likely need 2 U PRBC. Looks very pale.  Head CT was ordered. He has no focal signs and pupils were equal reactive, was fully alert and oriented, communicative and steady on feet. His vision was very limited however in both eyes.  His labs and appointment here in clinic will be cancelled for today.

## 2024-01-17 NOTE — CONSULTS
"History Of Present Illness  Shilo Thakkar is a 23 y.o. male with history of Burkitt Lymphoma (Dx 10/20/23; s/p 4 cycles of HyperCVAD last cycle 1/7/24), malignant pleural effusions s/p CT (10/29/23), and ADHD who presents 1/2/2024 for C4 (2B) HyperCVAD + Ritux, presenting with painless vision loss.    Last known well: 2200 1/16  Had stroke symptoms resolved at time of presentation: No    Pt went to sleep 2200 on 1/16 without symptoms. However, he had profuse epistaxis with clots passing. He had a headache and vomiting. He had no visual symptoms then. He woke up 0900 on 1/17 and noticed he had an area at the center of his left eye visual field that he cannot see. His right eye was blurry overall. He was however on the bed and sleepy until 1100 when he told his partner and he informed his oncologist who advised him to go to the ED given risk of ICH.     BAT was called 1244 for \"blurring vision and dizziness.\" Upon neurology team arrived 1247 while patient is on the scanner. Peripheral vision was intact upon simultaneous eye exam. No facial or limb weakness or numbness detected. CTH showed no signs of bleed or acute ischemic stroke. CTA showed no vascular disease.    No TNK indicated given presentation out of window. No EVT given no LVO.    On further examination, pt noted to have complete loss of vision in the center of his left eye visual field. He also had decreased acuity in the center/nasal field of right eye. At 6ft away, he was able to count fingers in peripheral visual field of the right eye but not the left eye. Pupils were reactive without RAPD. Color vision was intact in right eye, unable to see object in left eye. Temporal artery pulses were intact bilaterally and there was no scalp tenderness. He had no other focal neurologic deficits.    Pt is fully independent at baseline.    ED course:  , /67, RR 16, T 36.9, SpO2 98%    WBC 0.3, Hgb 4.2, platelets 1  INR 1.3, fibrinogen 423, aPTT 32, protime " 14.6, D-dimer 383    Past Medical History  Past Medical History:   Diagnosis Date    Colles' fracture of right radius, initial encounter for closed fracture 04/23/2015    Colles' fracture of right radius    Personal history of other mental and behavioral disorders 01/28/2014    History of attention deficit hyperactivity disorder    Personal history of other mental and behavioral disorders 05/10/2017    History of attention deficit hyperactivity disorder (ADHD)    Personal history of other specified conditions 05/03/2016    History of gynecomastia    Unspecified fracture of navicular (scaphoid) bone of left wrist, initial encounter for closed fracture 12/17/2015    Closed fracture of scaphoid bone of left wrist     Surgical History  Past Surgical History:   Procedure Laterality Date    CHEST TUBE INSERTION  10/29/2023    FOOT FRACTURE SURGERY Left     OTHER SURGICAL HISTORY  10/03/2017    History Of Prior Surgery    US GUIDED SOFT TISSUE BIOPSY  10/20/2023    US GUIDED SOFT TISSUE BIOPSY 10/20/2023 [facility]      Family History  No stroke or autoimmune disease    Social History  Social History     Tobacco Use    Smoking status: Never     Passive exposure: Never    Smokeless tobacco: Never   Substance Use Topics    Alcohol use: Not Currently     Comment: rarely    Drug use: Never     Allergies  Patient has no known allergies.  Home Medications  (Not in a hospital admission)      Review of Systems  Neurological Exam  Mental Status  Awake, alert and oriented to person, place and time. Speech is normal. Language is fluent with no aphasia.    Cranial Nerves  CN II: Hand motion in nasal field/center, able to count fingers at 6ft. No light perception centrally, able to count fingers in near vision only peripherally.  CN III, IV, VI: Extraocular movements intact bilaterally.   Right pupil: 4 mm.   Left pupil: 4 mm.  Relative afferent pupillary defect absent.  CN V: Facial sensation is normal.  CN VII: Full and symmetric facial  movement.  CN VIII: Hearing is normal.  CN IX, X: Palate elevates symmetrically  CN XI: Shoulder shrug strength is normal.  CN XII: Tongue midline without atrophy or fasciculations.    Motor   Strength is 5/5 throughout all four extremities.    Sensory  Light touch is normal in upper and lower extremities.     Coordination  Right: Finger-to-nose normal. Rapid alternating movement normal. Heel-to-shin normal.Left: Finger-to-nose normal. Rapid alternating movement normal. Heel-to-shin normal.    Physical Exam  Eyes:      Extraocular Movements: Extraocular movements intact.   Neurological:      Motor: Motor strength is normal.  Psychiatric:         Speech: Speech normal.         NIHSS on Arrival:  1a  Level of consciousness: 0=alert; keenly responsive   1b. LOC questions:  0=Performs both tasks correctly   1c. LOC commands: 0=Performs both tasks correctly   2.  Best Gaze: 0=normal   3. Visual: 0=No visual loss   4. Facial Palsy: 0=Normal symmetric movement   5a. Motor Left Arm: 0=No drift, limb holds 90 (or 45) degrees for full 10 seconds   5b.  Motor Right Arm: 0=No drift, limb holds 90 (or 45) degrees for full 10 seconds   6a. Motor Left Le=No drift, limb holds 90 (or 45) degrees for full 10 seconds   6b  Motor Right Le=No drift, limb holds 90 (or 45) degrees for full 10 seconds   7. Limb Ataxia: 0=Absent   8.  Sensory: 0=Normal; no sensory loss   9. Best Language:  0=No aphasia, normal   10. Dysarthria: 0=Normal   11. Extinction and Inattention: 0=No abnormality          Total:   0         NIHSS after Intervention: NA       Last Recorded Vitals  Blood pressure 130/63, pulse 82, temperature 36.9 °C (98.4 °F), temperature source Temporal, resp. rate 14, SpO2 100 %.          Relevant Results  Results for orders placed or performed during the hospital encounter of 24 (from the past 24 hour(s))   POCT GLUCOSE   Result Value Ref Range    POCT Glucose 104 (H) 74 - 99 mg/dL   Prepare Platelets: 1 Units,  Irradiated, Leukocytes Reduced (CMV reduced risk)   Result Value Ref Range    PRODUCT CODE S2938Z77     Unit Number [id number]     Unit ABO A     Unit RH POS     Dispense Status IS     Blood Expiration Date January 17, 2024 23:59 EST     PRODUCT BLOOD TYPE 6200     UNIT VOLUME 288    CBC and Auto Differential   Result Value Ref Range    WBC 0.3 (LL) 4.4 - 11.3 x10*3/uL    nRBC 0.0 0.0 - 0.0 /100 WBCs    RBC 1.49 (L) 4.50 - 5.90 x10*6/uL    Hemoglobin 4.2 (LL) 13.5 - 17.5 g/dL    Hematocrit 11.1 (L) 41.0 - 52.0 %    MCV 75 (L) 80 - 100 fL    MCH 28.2 26.0 - 34.0 pg    MCHC 37.8 (H) 32.0 - 36.0 g/dL    RDW 19.1 (H) 11.5 - 14.5 %    Platelets 1 (LL) 150 - 450 x10*3/uL    Immature Granulocytes %, Automated 0.0 0.0 - 0.9 %    Immature Granulocytes Absolute, Automated 0.00 0.00 - 0.70 x10*3/uL   Comprehensive metabolic panel   Result Value Ref Range    Glucose 127 (H) 74 - 99 mg/dL    Sodium 136 136 - 145 mmol/L    Potassium 3.9 3.5 - 5.3 mmol/L    Chloride 99 98 - 107 mmol/L    Bicarbonate 26 21 - 32 mmol/L    Anion Gap 15 10 - 20 mmol/L    Urea Nitrogen 21 6 - 23 mg/dL    Creatinine 0.99 0.50 - 1.30 mg/dL    eGFR >90 >60 mL/min/1.73m*2    Calcium 9.4 8.6 - 10.6 mg/dL    Albumin 4.2 3.4 - 5.0 g/dL    Alkaline Phosphatase 79 33 - 120 U/L    Total Protein 6.0 (L) 6.4 - 8.2 g/dL    AST 9 9 - 39 U/L    Bilirubin, Total 0.9 0.0 - 1.2 mg/dL    ALT 19 10 - 52 U/L   Troponin I, High Sensitivity   Result Value Ref Range    Troponin I, High Sensitivity 3 0 - 53 ng/L   Protime-INR   Result Value Ref Range    Protime 14.6 (H) 9.8 - 12.8 seconds    INR 1.3 (H) 0.9 - 1.1   APTT   Result Value Ref Range    aPTT 32 27 - 38 seconds   Type and Screen   Result Value Ref Range    ABO TYPE O     Rh TYPE POS     ANTIBODY SCREEN NEG    D-dimer, quantitative   Result Value Ref Range    D-Dimer Non VTE, Quant (ng/mL FEU) 383 <=500 ng/mL FEU   Fibrinogen   Result Value Ref Range    Fibrinogen 423 (H) 200 - 400 mg/dL   BLOOD GAS VENOUS FULL  PANEL   Result Value Ref Range    POCT pH, Venous 7.39 7.33 - 7.43 pH    POCT pCO2, Venous 44 41 - 51 mm Hg    POCT pO2, Venous 16 (L) 35 - 45 mm Hg    POCT SO2, Venous 14 (L) 45 - 75 %    POCT Oxy Hemoglobin, Venous 13.7 (L) 45.0 - 75.0 %    POCT Hematocrit Calculated, Venous 17.0 (L) 41.0 - 52.0 %    POCT Sodium, Venous 135 (L) 136 - 145 mmol/L    POCT Potassium, Venous 4.1 3.5 - 5.3 mmol/L    POCT Chloride, Venous 100 98 - 107 mmol/L    POCT Ionized Calicum, Venous 1.26 1.10 - 1.33 mmol/L    POCT Glucose, Venous 126 (H) 74 - 99 mg/dL    POCT Lactate, Venous 2.2 (H) 0.4 - 2.0 mmol/L    POCT Base Excess, Venous 1.5 -2.0 - 3.0 mmol/L    POCT HCO3 Calculated, Venous 26.6 (H) 22.0 - 26.0 mmol/L    POCT Hemoglobin, Venous 5.5 (LL) 13.5 - 17.5 g/dL    POCT Anion Gap, Venous 13.0 10.0 - 25.0 mmol/L    Patient Temperature 37.0 degrees Celsius    FiO2 21 %   Manual Differential   Result Value Ref Range    Neutrophils %, Manual 25.0 40.0 - 80.0 %    Bands %, Manual 5.0 0.0 - 5.0 %    Lymphocytes %, Manual 35.0 13.0 - 44.0 %    Monocytes %, Manual 30.0 2.0 - 10.0 %    Eosinophils %, Manual 5.0 0.0 - 6.0 %    Basophils %, Manual 0.0 0.0 - 2.0 %    Seg Neutrophils Absolute, Manual 0.08 (L) 1.20 - 7.00 x10*3/uL    Bands Absolute, Manual 0.02 0.00 - 0.70 x10*3/uL    Lymphocytes Absolute, Manual 0.11 (L) 1.20 - 4.80 x10*3/uL    Monocytes Absolute, Manual 0.09 (L) 0.10 - 1.00 x10*3/uL    Eosinophils Absolute, Manual 0.02 0.00 - 0.70 x10*3/uL    Basophils Absolute, Manual 0.00 0.00 - 0.10 x10*3/uL    Total Cells Counted 20     Neutrophils Absolute, Manual 0.10 (L) 1.20 - 7.70 x10*3/uL    RBC Morphology See Below     Ovalocytes Few    Prepare RBC: 2 Units   Result Value Ref Range    PRODUCT CODE [id number]     Unit Number [id number]     Unit ABO O     Unit RH NEG     XM INTEP COMP     Dispense Status IS     Blood Expiration Date January 19, 2024 23:59 EST     PRODUCT BLOOD TYPE 9500     UNIT VOLUME 284     PRODUCT CODE R8717U54   "   Unit Number [id number]     Unit ABO O     Unit RH POS     XM INTEP COMP     Dispense Status XM     Blood Expiration Date January 24, 2024 23:59 EST     PRODUCT BLOOD TYPE 5100     UNIT VOLUME 350        Scheduled medications  cefepime, 1 g, intravenous, q8h      Continuous medications     PRN medications  PRN medications: ondansetron    NIH Stroke Scale  1A. Level of Consciousness: Alert, Keenly Responsive  1B. Ask Month and Age: Both Questions Right  1C. Blink Eyes & Squeeze Hands: Performs Both Tasks  2. Best Gaze: Normal  3. Visual: Partial Hemianopia  4. Facial Palsy: Normal Symmetrical Movements  5A. Motor - Left Arm: No Drift  5B. Motor - Right Arm: No Drift  6A. Motor - Left Leg: No Drift  6B. Motor - Right Leg: No Drift  7. Limb Ataxia: Absent  8. Sensory Loss: Normal  9. Best Language: No Aphasia  10. Dysarthria: Normal  11. Extinction and Inattention: No Abnormality  NIH Stroke Scale: 1    No echocardiogram results found for the past 14 days        No results found for: \"BNP\"    Stroke Alert CT/MRI review: Was interpreted as it was being performed     IV Thrombolysis  IV Thrombolysis Given: No; Thrombolysis contraindication reason: Working diagnosis is NOT a suspected ischemic stroke and Time from Last Known Well (or stroke onset) is >4.5 hours      Assessment/Plan   Active Problems:  There are no active Hospital Problems.    Shilo Thakkar is a 23 y.o. male with history of Burkitt Lymphoma (Dx 10/20/23; s/p 4 cycles of HyperCVAD last cycle 1/7/24 + Rituximab), malignant pleural effusions s/p thoracentesis (10/29/23), and ADHD, presenting with painless vision loss. Exam reveals bilateral central scotomas, more obvious on the left (no light perception). Platelet count was 1, hemoglobin 4.2. Symptomatic anemia, CRAO, optic neuritis vs primary ocular pathology suspected. Ophthalmology evaluation was recommended.     Upon discussion with the ophthalmology team, dilated exam revealed large retinal " hemorrhages and detachment in both eyes.     Stroke Metrics:  EMS pre-notification?: No  Time of stroke team arrival: 1247  Direct to CT?: Yes  Time of CTH read by stroke team: 1250  Time of TNK: NA   Time stroke team called IR: NA  Time pt arrived to angio suite: NA  Time of groin puncture: NA  Time of recanalization: NA  Number of passes: NA  Device used: NA  Any delays in the process (if door to TNK >30 min): NA    Stroke Classification: NA       Recommendations:  - No further neurologic workup indicated  - Appreciate ophthalmology recommendations    Do not hesitate to to page with any concerns.    Gary Donohue MD  Neurology PGY-2  General Neurology team pager [phone]

## 2024-01-17 NOTE — PROGRESS NOTES
Patient was signed out to me by Dr. Fulton at approximately 1500. For full history, physical, and prior ED course, please see previous provider note prior to patient handoff. This is an addendum to the record.     MDM:  Shilo Thakkar is a 23 y.o. male presenting to the ED due to headache, epistaxis, blurry vision in both of his eyes.  He has a history of Burkitt's lymphoma currently on chemo.  At time of signout was pending ophthalmology and neurostroke evaluation.  Neuro initially requested MRI however as the symptoms are limited to his eyes, do not feel that MRI is needed.  As such, MRI was canceled.  Patient does however have bilateral retinal detachments and hemorrhages per ophthalmology.  Unfortunately due to his pancytopenia and severe thrombocytopenia, no further interventions are required.  The patient was placed on neutropenic precautions.  Platelets were transfused prior to my arrival.  Head of bed was elevated per ophthalmology recommendations.  Following this, patient was admitted to [facility] for further treatment of his pancytopenia and retinal hemorrhages.    Final diagnoses:   [D61.818] Pancytopenia (CMS/HCC)   [H35.63] Retinal hemorrhage of both eyes   [H53.8] Blurry vision   [R04.0] Epistaxis       Disposition:  Admit to floor    Charley Barriga MD   Emergency Medicine Resident, PGY3  [facility]    ED Course:  Diagnoses as of 01/20/24 0803   Pancytopenia (CMS/HCC)   Retinal hemorrhage of both eyes   Blurry vision   Epistaxis       Procedure  Procedures

## 2024-01-17 NOTE — TELEPHONE ENCOUNTER
Provider would like patient to come to visit first.     Call back to roland, they are aware and will go to office. Team aware.

## 2024-01-17 NOTE — PROGRESS NOTES
Patient was seen in conjunction with ED attending.  23-year-old male with history of Burkitt's lymphoma on chemotherapy most recently 1/9, next infusion session beginning on Monday and patient presenting with concerns for nosebleed headache and bilateral blurry vision.  Patient is accompanied by family bedside who is primary story initially however history was confirmed by the patient.  Patient stated that he was had persistent nosebleed yesterday evening and through the night with diffuse headache.  This morning had persistent headache with off-and-on nosebleed and now blurry vision.  Vision is worse in the left however also present on the right.  States the central/medial vision bilaterally is the worst area.  States that he also felt very dizzy like he was going to fall.  States his headache is mild to moderate.  Did speak with his oncologist who met him at the ED.      Physical Exam:   GENERAL.: Vitals noted. No distress.  Normocephalic atraumatic.   EYES:  PERRLA, EOMs intact  OROPHARYNX:  No erythema or exudate.  Mucosa moist.  NECK: Supple. No adenopathy.  Nose: Bilateral nares with mild dried blood  CARDIAC: Regular rate rhythm. No murmur noted.  PULMONARY: Equal breath sounds bilaterally.  No wheezes rales or rhonchi  ABDOMEN: Soft, nontender, nonsurgical. No guarding. Normoactive bowel sounds. No bruits, no masses  EXTREMITIES: Full ROM, no pitting edema,   SKIN: Pale appearing  NEURO: Alert and oriented x 3, CN II through XII intact, no nystagmus, bilateral medial hemianopsia    MDM:  23-year-old male with Burkitt's lymphoma on chemotherapy presenting with blurry vision and dizziness with headache.  On exam he is pale appearing, mildly tachycardic, no hypoxia or hypotension.  Lungs CTABL.  Neuro with bilateral medial hemianopsia otherwise unremarkable exam.  Bilateral nares with dried blood without any active bleeding visualized.  Was brought back to [facility] with neurostroke to perform exam.  They  recommended CT angiogram of head and neck which is performed as well.  Per neurostroke no obvious signs of CVA, BAT is called off.  On review of recent labs did have thrombocytopenia with platelets at 20, venous blood gas here with hemoglobin calculated at 5.5.  He is consented to receive platelets and blood products.  Will transfuse 2 units of packed red blood cells and 1 unit of platelets.  CT with incidental finding of a thyroid nodule which is communicated to the patient.  Patient to be seen by neuro and ophthalmology per neurostroke recommendations.  At the end of my shift awaiting their recommendations and transfusion of platelets and blood products, signout given to oncoming resident.

## 2024-01-17 NOTE — TELEPHONE ENCOUNTER
"Nose Bleeding/epistaxis started last night. Has been going on every 20 mins, \"its a lot more than usual with clots.\" Family expresses that epistaxis has been an ongoing issue since treatment    Vision changes are new, started this morning.  eye [place] in the middle- left eye   Blurry in right eye    Denies fall/injury to head     States headache last night, resolved on his own. Eyes look more glossy, sclera appears swollen.     [phone] [employer]- [place] phone that will be on patient and family during transport.   Additional Information   Commented on: Please tell me more about the problem you are having. the more detail you provide, the better.     Epistaxis and vision changes   Commented on: When did these problems start?     Last night   Commented on: Are you having any pain? Where? On a scale or 0 - 10 with 0 being no pain and 10 being the worst pain ever, how would you rate your pain?     Headache last night, resolved on its own.    Protocols used: Other    "

## 2024-01-18 DIAGNOSIS — H33.103 BILATERAL RETINOSCHISIS: Primary | ICD-10-CM

## 2024-01-18 LAB
ALBUMIN SERPL BCP-MCNC: 4.3 G/DL (ref 3.4–5)
ANION GAP SERPL CALC-SCNC: 12 MMOL/L (ref 10–20)
ATRIAL RATE: 73 BPM
BASOPHILS # BLD AUTO: 0 X10*3/UL (ref 0–0.1)
BASOPHILS # BLD AUTO: 0 X10*3/UL (ref 0–0.1)
BASOPHILS # BLD MANUAL: 0 X10*3/UL (ref 0–0.1)
BASOPHILS NFR BLD AUTO: 0 %
BASOPHILS NFR BLD AUTO: 0 %
BASOPHILS NFR BLD MANUAL: 0 %
BLOOD EXPIRATION DATE: NORMAL
BUN SERPL-MCNC: 20 MG/DL (ref 6–23)
CALCIUM SERPL-MCNC: 9.4 MG/DL (ref 8.6–10.6)
CHLORIDE SERPL-SCNC: 103 MMOL/L (ref 98–107)
CO2 SERPL-SCNC: 28 MMOL/L (ref 21–32)
CREAT SERPL-MCNC: 0.84 MG/DL (ref 0.5–1.3)
DACRYOCYTES BLD QL SMEAR: ABNORMAL
DISPENSE STATUS: NORMAL
EGFRCR SERPLBLD CKD-EPI 2021: >90 ML/MIN/1.73M*2
EOSINOPHIL # BLD AUTO: 0 X10*3/UL (ref 0–0.7)
EOSINOPHIL # BLD AUTO: 0.01 X10*3/UL (ref 0–0.7)
EOSINOPHIL # BLD MANUAL: 0 X10*3/UL (ref 0–0.7)
EOSINOPHIL NFR BLD AUTO: 0 %
EOSINOPHIL NFR BLD AUTO: 4.2 %
EOSINOPHIL NFR BLD MANUAL: 0 %
ERYTHROCYTE [DISTWIDTH] IN BLOOD BY AUTOMATED COUNT: 15.9 % (ref 11.5–14.5)
ERYTHROCYTE [DISTWIDTH] IN BLOOD BY AUTOMATED COUNT: 16.1 % (ref 11.5–14.5)
ERYTHROCYTE [DISTWIDTH] IN BLOOD BY AUTOMATED COUNT: 16.3 % (ref 11.5–14.5)
FLUAV RNA RESP QL NAA+PROBE: NOT DETECTED
FLUBV RNA RESP QL NAA+PROBE: NOT DETECTED
GLUCOSE SERPL-MCNC: 105 MG/DL (ref 74–99)
HCT VFR BLD AUTO: 13.9 % (ref 41–52)
HCT VFR BLD AUTO: 14.8 % (ref 41–52)
HCT VFR BLD AUTO: 18.1 % (ref 41–52)
HGB BLD-MCNC: 5.3 G/DL (ref 13.5–17.5)
HGB BLD-MCNC: 5.6 G/DL (ref 13.5–17.5)
HGB BLD-MCNC: 6.4 G/DL (ref 13.5–17.5)
HYPOCHROMIA BLD QL SMEAR: ABNORMAL
HYPOCHROMIA BLD QL SMEAR: NORMAL
IMM GRANULOCYTES # BLD AUTO: 0 X10*3/UL (ref 0–0.7)
IMM GRANULOCYTES # BLD AUTO: 0 X10*3/UL (ref 0–0.7)
IMM GRANULOCYTES # BLD AUTO: 0.07 X10*3/UL (ref 0–0.7)
IMM GRANULOCYTES NFR BLD AUTO: 0 % (ref 0–0.9)
IMM GRANULOCYTES NFR BLD AUTO: 0 % (ref 0–0.9)
IMM GRANULOCYTES NFR BLD AUTO: 24.1 % (ref 0–0.9)
LDH SERPL L TO P-CCNC: 112 U/L (ref 84–246)
LYMPHOCYTES # BLD AUTO: 0.06 X10*3/UL (ref 1.2–4.8)
LYMPHOCYTES # BLD AUTO: 0.11 X10*3/UL (ref 1.2–4.8)
LYMPHOCYTES # BLD MANUAL: 0.13 X10*3/UL (ref 1.2–4.8)
LYMPHOCYTES NFR BLD AUTO: 20.7 %
LYMPHOCYTES NFR BLD AUTO: 45.8 %
LYMPHOCYTES NFR BLD MANUAL: 44 %
MAGNESIUM SERPL-MCNC: 1.87 MG/DL (ref 1.6–2.4)
MCH RBC QN AUTO: 27.1 PG (ref 26–34)
MCH RBC QN AUTO: 27.7 PG (ref 26–34)
MCH RBC QN AUTO: 27.9 PG (ref 26–34)
MCHC RBC AUTO-ENTMCNC: 35.4 G/DL (ref 32–36)
MCHC RBC AUTO-ENTMCNC: 37.8 G/DL (ref 32–36)
MCHC RBC AUTO-ENTMCNC: 38.1 G/DL (ref 32–36)
MCV RBC AUTO: 73 FL (ref 80–100)
MCV RBC AUTO: 74 FL (ref 80–100)
MCV RBC AUTO: 77 FL (ref 80–100)
MONOCYTES # BLD AUTO: 0.05 X10*3/UL (ref 0.1–1)
MONOCYTES # BLD AUTO: 0.07 X10*3/UL (ref 0.1–1)
MONOCYTES # BLD MANUAL: 0.08 X10*3/UL (ref 0.1–1)
MONOCYTES NFR BLD AUTO: 20.8 %
MONOCYTES NFR BLD AUTO: 24.1 %
MONOCYTES NFR BLD MANUAL: 28 %
NEUTROPHILS # BLD AUTO: 0.07 X10*3/UL (ref 1.2–7.7)
NEUTROPHILS # BLD AUTO: 0.09 X10*3/UL (ref 1.2–7.7)
NEUTROPHILS # BLD MANUAL: 0.09 X10*3/UL (ref 1.2–7.7)
NEUTROPHILS NFR BLD AUTO: 29.2 %
NEUTROPHILS NFR BLD AUTO: 31.1 %
NEUTS BAND # BLD MANUAL: 0.02 X10*3/UL (ref 0–0.7)
NEUTS BAND NFR BLD MANUAL: 6 %
NEUTS SEG # BLD MANUAL: 0.07 X10*3/UL (ref 1.2–7)
NEUTS SEG NFR BLD MANUAL: 22 %
NRBC BLD-RTO: 0 /100 WBCS (ref 0–0)
P AXIS: 51 DEGREES
P OFFSET: 187 MS
P ONSET: 137 MS
PHOSPHATE SERPL-MCNC: 4.7 MG/DL (ref 2.5–4.9)
PLATELET # BLD AUTO: 10 X10*3/UL (ref 150–450)
PLATELET # BLD AUTO: 21 X10*3/UL (ref 150–450)
PLATELET # BLD AUTO: 49 X10*3/UL (ref 150–450)
POLYCHROMASIA BLD QL SMEAR: ABNORMAL
POLYCHROMASIA BLD QL SMEAR: NORMAL
POTASSIUM SERPL-SCNC: 3.8 MMOL/L (ref 3.5–5.3)
PR INTERVAL: 158 MS
PRODUCT BLOOD TYPE: 5100
PRODUCT BLOOD TYPE: 6200
PRODUCT BLOOD TYPE: 7300
PRODUCT BLOOD TYPE: 9500
PRODUCT CODE: NORMAL
Q ONSET: 216 MS
QRS COUNT: 12 BEATS
QRS DURATION: 100 MS
QT INTERVAL: 368 MS
QTC CALCULATION(BAZETT): 405 MS
QTC FREDERICIA: 393 MS
R AXIS: 66 DEGREES
RBC # BLD AUTO: 1.91 X10*6/UL (ref 4.5–5.9)
RBC # BLD AUTO: 2.01 X10*6/UL (ref 4.5–5.9)
RBC # BLD AUTO: 2.36 X10*6/UL (ref 4.5–5.9)
RBC MORPH BLD: ABNORMAL
RBC MORPH BLD: NORMAL
SARS-COV-2 RNA RESP QL NAA+PROBE: NOT DETECTED
SCHISTOCYTES BLD QL SMEAR: NORMAL
SODIUM SERPL-SCNC: 139 MMOL/L (ref 136–145)
T AXIS: 58 DEGREES
T OFFSET: 400 MS
TARGETS BLD QL SMEAR: ABNORMAL
TARGETS BLD QL SMEAR: NORMAL
TOTAL CELLS COUNTED BLD: 50
UNIT ABO: NORMAL
UNIT NUMBER: NORMAL
UNIT RH: NORMAL
UNIT VOLUME: 276
UNIT VOLUME: 284
UNIT VOLUME: 290
UNIT VOLUME: 292
UNIT VOLUME: 350
URATE SERPL-MCNC: 4.5 MG/DL (ref 4–7.5)
VENTRICULAR RATE: 73 BPM
WBC # BLD AUTO: 0.2 X10*3/UL (ref 4.4–11.3)
WBC # BLD AUTO: 0.3 X10*3/UL (ref 4.4–11.3)
WBC # BLD AUTO: 0.3 X10*3/UL (ref 4.4–11.3)
XM INTEP: NORMAL

## 2024-01-18 PROCEDURE — 87636 SARSCOV2 & INF A&B AMP PRB: CPT | Performed by: STUDENT IN AN ORGANIZED HEALTH CARE EDUCATION/TRAINING PROGRAM

## 2024-01-18 PROCEDURE — 83010 ASSAY OF HAPTOGLOBIN QUANT: CPT

## 2024-01-18 PROCEDURE — P9040 RBC LEUKOREDUCED IRRADIATED: HCPCS

## 2024-01-18 PROCEDURE — 85027 COMPLETE CBC AUTOMATED: CPT

## 2024-01-18 PROCEDURE — 2500000004 HC RX 250 GENERAL PHARMACY W/ HCPCS (ALT 636 FOR OP/ED)

## 2024-01-18 PROCEDURE — 2500000001 HC RX 250 WO HCPCS SELF ADMINISTERED DRUGS (ALT 637 FOR MEDICARE OP)

## 2024-01-18 PROCEDURE — 83615 LACTATE (LD) (LDH) ENZYME: CPT

## 2024-01-18 PROCEDURE — 85007 BL SMEAR W/DIFF WBC COUNT: CPT

## 2024-01-18 PROCEDURE — 84550 ASSAY OF BLOOD/URIC ACID: CPT

## 2024-01-18 PROCEDURE — 83735 ASSAY OF MAGNESIUM: CPT

## 2024-01-18 PROCEDURE — P9073 PLATELETS PHERESIS PATH REDU: HCPCS

## 2024-01-18 PROCEDURE — 80069 RENAL FUNCTION PANEL: CPT

## 2024-01-18 PROCEDURE — 2500000004 HC RX 250 GENERAL PHARMACY W/ HCPCS (ALT 636 FOR OP/ED): Performed by: EMERGENCY MEDICINE

## 2024-01-18 PROCEDURE — P9037 PLATE PHERES LEUKOREDU IRRAD: HCPCS

## 2024-01-18 PROCEDURE — 99223 1ST HOSP IP/OBS HIGH 75: CPT

## 2024-01-18 PROCEDURE — 36430 TRANSFUSION BLD/BLD COMPNT: CPT

## 2024-01-18 PROCEDURE — 85025 COMPLETE CBC W/AUTO DIFF WBC: CPT

## 2024-01-18 PROCEDURE — 2020000001 HC ICU ROOM DAILY

## 2024-01-18 RX ORDER — ACYCLOVIR 400 MG/1
400 TABLET ORAL 2 TIMES DAILY
Status: DISCONTINUED | OUTPATIENT
Start: 2024-01-18 | End: 2024-01-23 | Stop reason: HOSPADM

## 2024-01-18 RX ORDER — SULFAMETHOXAZOLE AND TRIMETHOPRIM 800; 160 MG/1; MG/1
1 TABLET ORAL
Status: DISCONTINUED | OUTPATIENT
Start: 2024-01-19 | End: 2024-01-23 | Stop reason: HOSPADM

## 2024-01-18 RX ORDER — FERROUS SULFATE 325(65) MG
1 TABLET ORAL 2 TIMES DAILY
Status: DISCONTINUED | OUTPATIENT
Start: 2024-01-18 | End: 2024-01-23 | Stop reason: HOSPADM

## 2024-01-18 RX ORDER — ONDANSETRON HYDROCHLORIDE 2 MG/ML
4 INJECTION, SOLUTION INTRAVENOUS ONCE
Status: COMPLETED | OUTPATIENT
Start: 2024-01-18 | End: 2024-01-18

## 2024-01-18 RX ORDER — PANTOPRAZOLE SODIUM 40 MG/1
40 TABLET, DELAYED RELEASE ORAL
Status: DISCONTINUED | OUTPATIENT
Start: 2024-01-18 | End: 2024-01-23 | Stop reason: HOSPADM

## 2024-01-18 RX ORDER — FLUCONAZOLE 200 MG/1
400 TABLET ORAL DAILY
Status: DISCONTINUED | OUTPATIENT
Start: 2024-01-18 | End: 2024-01-23 | Stop reason: HOSPADM

## 2024-01-18 RX ORDER — AMOXICILLIN 250 MG
2 CAPSULE ORAL 2 TIMES DAILY PRN
Status: DISCONTINUED | OUTPATIENT
Start: 2024-01-18 | End: 2024-01-23 | Stop reason: HOSPADM

## 2024-01-18 RX ORDER — ONDANSETRON 4 MG/1
4 TABLET, FILM COATED ORAL EVERY 8 HOURS PRN
Status: DISCONTINUED | OUTPATIENT
Start: 2024-01-18 | End: 2024-01-23 | Stop reason: HOSPADM

## 2024-01-18 RX ORDER — CEFEPIME 1 G/50ML
1 INJECTION, SOLUTION INTRAVENOUS EVERY 8 HOURS
Status: DISCONTINUED | OUTPATIENT
Start: 2024-01-18 | End: 2024-01-20

## 2024-01-18 RX ADMIN — FERROUS SULFATE TAB 325 MG (65 MG ELEMENTAL FE) 1 TABLET: 325 (65 FE) TAB at 20:08

## 2024-01-18 RX ADMIN — ONDANSETRON 4 MG: 2 INJECTION INTRAMUSCULAR; INTRAVENOUS at 00:36

## 2024-01-18 RX ADMIN — Medication 1 G: at 07:03

## 2024-01-18 RX ADMIN — FLUCONAZOLE 400 MG: 150 TABLET ORAL at 11:43

## 2024-01-18 RX ADMIN — DEXTROSE MONOHYDRATE 1000 MG: 50 INJECTION, SOLUTION INTRAVENOUS at 11:43

## 2024-01-18 RX ADMIN — ACYCLOVIR 400 MG: 400 TABLET ORAL at 11:42

## 2024-01-18 RX ADMIN — PANTOPRAZOLE SODIUM 40 MG: 40 TABLET, DELAYED RELEASE ORAL at 07:03

## 2024-01-18 RX ADMIN — ACYCLOVIR 400 MG: 400 TABLET ORAL at 20:08

## 2024-01-18 RX ADMIN — CEFEPIME 1 G: 1 INJECTION, SOLUTION INTRAVENOUS at 18:20

## 2024-01-18 RX ADMIN — FERROUS SULFATE TAB 325 MG (65 MG ELEMENTAL FE) 1 TABLET: 325 (65 FE) TAB at 11:43

## 2024-01-18 SDOH — SOCIAL STABILITY: SOCIAL INSECURITY: DO YOU FEEL ANYONE HAS EXPLOITED OR TAKEN ADVANTAGE OF YOU FINANCIALLY OR OF YOUR PERSONAL PROPERTY?: NO

## 2024-01-18 SDOH — SOCIAL STABILITY: SOCIAL INSECURITY: DO YOU FEEL UNSAFE GOING BACK TO THE PLACE WHERE YOU ARE LIVING?: NO

## 2024-01-18 SDOH — SOCIAL STABILITY: SOCIAL INSECURITY: HAVE YOU HAD THOUGHTS OF HARMING ANYONE ELSE?: YES

## 2024-01-18 SDOH — SOCIAL STABILITY: SOCIAL INSECURITY: DOES ANYONE TRY TO KEEP YOU FROM HAVING/CONTACTING OTHER FRIENDS OR DOING THINGS OUTSIDE YOUR HOME?: NO

## 2024-01-18 SDOH — SOCIAL STABILITY: SOCIAL INSECURITY: ABUSE: ADULT

## 2024-01-18 SDOH — SOCIAL STABILITY: SOCIAL INSECURITY: ARE THERE ANY APPARENT SIGNS OF INJURIES/BEHAVIORS THAT COULD BE RELATED TO ABUSE/NEGLECT?: NO

## 2024-01-18 SDOH — SOCIAL STABILITY: SOCIAL INSECURITY: HAS ANYONE EVER THREATENED TO HURT YOUR FAMILY OR YOUR PETS?: NO

## 2024-01-18 SDOH — SOCIAL STABILITY: SOCIAL INSECURITY: WERE YOU ABLE TO COMPLETE ALL THE BEHAVIORAL HEALTH SCREENINGS?: YES

## 2024-01-18 SDOH — SOCIAL STABILITY: SOCIAL INSECURITY: ARE YOU OR HAVE YOU BEEN THREATENED OR ABUSED PHYSICALLY, EMOTIONALLY, OR SEXUALLY BY ANYONE?: NO

## 2024-01-18 ASSESSMENT — ENCOUNTER SYMPTOMS
RESPIRATORY NEGATIVE: 1
MUSCULOSKELETAL NEGATIVE: 1
PSYCHIATRIC NEGATIVE: 1
NAUSEA: 1
WEAKNESS: 1
DIZZINESS: 1
HEADACHES: 1
FATIGUE: 1
CARDIOVASCULAR NEGATIVE: 1
ENDOCRINE NEGATIVE: 1
ALLERGIC/IMMUNOLOGIC NEGATIVE: 1
HEMATOLOGIC/LYMPHATIC NEGATIVE: 1

## 2024-01-18 ASSESSMENT — ACTIVITIES OF DAILY LIVING (ADL)
GROOMING: NEEDS ASSISTANCE
LACK_OF_TRANSPORTATION: NO
HEARING - RIGHT EAR: FUNCTIONAL
BATHING: NEEDS ASSISTANCE
FEEDING YOURSELF: NEEDS ASSISTANCE
DRESSING YOURSELF: NEEDS ASSISTANCE
PATIENT'S MEMORY ADEQUATE TO SAFELY COMPLETE DAILY ACTIVITIES?: YES
ADEQUATE_TO_COMPLETE_ADL: NO
HEARING - LEFT EAR: FUNCTIONAL
JUDGMENT_ADEQUATE_SAFELY_COMPLETE_DAILY_ACTIVITIES: YES
TOILETING: NEEDS ASSISTANCE
WALKS IN HOME: NEEDS ASSISTANCE

## 2024-01-18 ASSESSMENT — COGNITIVE AND FUNCTIONAL STATUS - GENERAL
WALKING IN HOSPITAL ROOM: A LITTLE
MOBILITY SCORE: 22
DAILY ACTIVITIY SCORE: 18
DRESSING REGULAR UPPER BODY CLOTHING: A LITTLE
DRESSING REGULAR UPPER BODY CLOTHING: A LITTLE
DRESSING REGULAR LOWER BODY CLOTHING: A LITTLE
CLIMB 3 TO 5 STEPS WITH RAILING: A LITTLE
WALKING IN HOSPITAL ROOM: A LITTLE
MOBILITY SCORE: 22
TOILETING: A LITTLE
HELP NEEDED FOR BATHING: A LITTLE
DRESSING REGULAR LOWER BODY CLOTHING: A LITTLE
PERSONAL GROOMING: A LITTLE
PERSONAL GROOMING: A LITTLE
CLIMB 3 TO 5 STEPS WITH RAILING: A LITTLE
EATING MEALS: A LITTLE
DAILY ACTIVITIY SCORE: 18
EATING MEALS: A LITTLE
PATIENT BASELINE BEDBOUND: NO
HELP NEEDED FOR BATHING: A LITTLE
TOILETING: A LITTLE

## 2024-01-18 ASSESSMENT — LIFESTYLE VARIABLES
AUDIT-C TOTAL SCORE: 0
HOW MANY STANDARD DRINKS CONTAINING ALCOHOL DO YOU HAVE ON A TYPICAL DAY: PATIENT DOES NOT DRINK
HOW OFTEN DO YOU HAVE A DRINK CONTAINING ALCOHOL: NEVER
SKIP TO QUESTIONS 9-10: 1
HOW OFTEN DO YOU HAVE 6 OR MORE DRINKS ON ONE OCCASION: NEVER
AUDIT-C TOTAL SCORE: 0

## 2024-01-18 ASSESSMENT — PAIN SCALES - GENERAL
PAINLEVEL_OUTOF10: 0 - NO PAIN
PAINLEVEL_OUTOF10: 0 - NO PAIN

## 2024-01-18 ASSESSMENT — PAIN - FUNCTIONAL ASSESSMENT: PAIN_FUNCTIONAL_ASSESSMENT: 0-10

## 2024-01-18 NOTE — SIGNIFICANT EVENT
Shilo Thakkar is a 23 y.o. male with history of Burkitt Lymphoma (Dx 10/20/23; s/p 4 cycles of HyperCVAD last cycle 1/7/24 + Rituximab), malignant pleural effusions s/p thoracentesis (10/29/23), and ADHD, presenting with painless vision loss. Exam reveals bilateral central scotomas, more obvious on the left (no light perception). Platelet count was 1, hemoglobin 4.2. Symptomatic anemia, CRAO, optic neuritis vs primary ocular pathology suspected. Ophthalmology evaluation was recommended. Funduscopic examination significant for bilateral retinoschisis vs retinal attachment and bilateral retinal hemorrhages. MRI brain without significant intracranial findings.     Impression: Vision changes are due to primary ophthalmologic issues, no concern for stroke     Recommendations:  - Neurology will sign off. Please do not hesitate to reach back out with any additional questions or concerns.     Eligio Rosado MD   Neurology PGY-4

## 2024-01-18 NOTE — PROGRESS NOTES
"HPI:  23 YOM with PMH Burkitt's lymphoma stage III on chemotherapy most recently 1/11, next infusion session beginning on Monday and patient presenting with concerns for nosebleed headache and bilateral blurry vision. Initially concern for binasal hemianopia, neuro stroke assessed w [facility] which was unremarkable for stroke. Pt states blurry vision OU  began upon waking today, feels like it is blurry especially centrally with left eye central grayed out spots. Denies eye pain, diplopia, flashes or floaters. Denies HA, diziness, nausea today (does report nausea episode last night + vomiting). Received intrathecal chemo 1/11/24 without initial complications, pt was given prednisolone forte drop to use 2 days after chemo and prn if blurry vision. Per chart review, his onc team recommends using pred drop for 1 wk following chemo.      Chemo:  - S/P C4 HyperCVAD with D1 1/2/24.  - C4D2 LP with IT Cytarabine (1/4/24): CSF Flow Cytometry Negative  - C4D6 Rituximab + Neulasta today (1/9/24)  - C4D8 LP with IT methotrexate to be completed 1/11/24   - Post chemo he should start levofloxacin between day 5 and day 15.  He should continue without interruption fluconazole and acyclovir and 3 times weekly Bactrim.  - Before next cycle, cycle 5, we will obtain an echocardiogram and a PET scan for \"interim\" staging.  Overall he is doing very well        PMH: As per HPI  Allergies: allergy list reviewed  Past Ocular Hx: As per HPI  Family Hx: No hx of glaucoma, age related macular degeneration, blindness  Social Hx: lives with family    Physical Exam    Prior exam from 1/18/24  Base Eye Exam       Visual Acuity (Snellen - Linear)         Right Left    Near sc 20/50-2 PHNI (missed central letters) 20/50 -2 PHNI (missed central letters)              Pupils         Dark Light Shape React APD    Right 5 2 Round Brisk None    Left 5 2 Round Brisk None              Visual Fields         Left Right     Full Full              Extraocular " Movement         Right Left     Full Full              Neuro/Psych       Oriented x3: Yes              Dilation       Both eyes:                   Additional Tests       Color         Right Left    [place] 4/11 (ecc) 2/11 (ecc)                  Slit Lamp and Fundus Exam       External Exam         Right Left    External Normal Normal              Slit Lamp Exam         Right Left    Lids/Lashes Normal Normal    Conjunctiva/Sclera White and quiet White and quiet    Cornea Clear Clear    Anterior Chamber Deep and quiet Deep and quiet    Iris Round and reactive Round and reactive    Lens Clear Clear    Anterior Vitreous Vitreous (vit) hemorrhage overlying retinal hemorrhage Vit hemorrhage              Fundus Exam         Right Left    Disc crisp, partially obstructed temporally by overlying vit heme unable to view d/t overlying vit heme    C/D Ratio 0.3 0.3    Macula serous detachment vs retinoschisis including macula extending from optic nerve just posterior to sup and inf arcades about 3 disk diopters from fovea; with overlying retinal hemorrhage (dark blood, suspect chronic) serous detachment vs retinoschisis including macula extending from optic nerve just posterior to sup and inf arcades; with overlying retinal hemorrhage extending to disk (dark blood, suspect chronic), overlying vitreous (vit) hemorrhage    Vessels multiple flam hemorrhages sup and inf arcades multiple flame hemorrhages sup and inf arcades, largest at 2 oclock    Periphery multiple flame hemorrhages, Dot-blot hemorrhage Multiple flame hemorrhages, Dot-blot hemorrhage                          1/11/24  (LP): Hemorrhagic specimen, no lymphoma cells seen including on flow, glucose 61, protein 27     Light perception (LP) 1/4/24  No lymphoma cells seen on cell count or flow        CTA Head/neck 1/17/24  1. No evidence of hemodynamically significant stenosis or proximal large branch vessel cutoff on CTA of the head or neck.  2. No evidence of acute  cortical infarct or acute intracranial hemorrhage on head CT. MRI with diffusion-imaging would be a more  sensitive means of assessing for acute ischemic injury.  3. 6 mm nodule in the left lobe of the thyroid gland. In the absence of risk factors for malignancy it is likely of no clinical significance.     PET Scan 12/27/23  IMPRESSION:  1. Interval near-complete/complete resolution of metabolic activity of previously seen left oropharyngeal mass, and resolution previously seen hypermetabolic left level 2 and 3 cervical lymphadenopathy.  Minimal-mild hypermetabolic activity the left oropharynx is likely related to post treatment changes. Direct visualization is  recommended to exclude residual disease.. Deauville score 2.  2. Interval resolution of previously seen hypermetabolic left level 2 and level 3 cervical lymphadenopathy.  3. Interval complete resolution of previously seen moderate-large size left pleural effusion and trace right pleural effusion.  4. Interval near-complete resolution of previously seen abdominopelvic ascites.  5. Interval complete metabolic resolution of previously seen hypermetabolic large mass within pancreatic tail/body and the  previously seen hypermetabolic retroperitoneal and mesenteric lymphadenopathy.      MRI Brain 10/27/23    IMPRESSION:  1.  No evidence of intracranial metastatic disease.  2. Partially visualized mass located within the left oropharynx with  adjacent enlarged lymph nodes. These findings were better seen on the  prior CT neck study.    B-scan 1/18/24:  right eye: no obvious retinal detachment, appreciated subretinal and subhyaloid hemes (multilayered hemes)  left eye: no obvious retinal detachment, appreciated large subhyaloid heme (multilayered hemes)    Macula OCT 1/18/24:  Right eye: poor quality, preretinal and subhyaloid hemes (multilayered hemes)  Left eye: poor quality, preretinal and subhyaloid hemes (multilayered hemes)    [place] fundus pictures taken  1/18/24    Update 1/18/23:  Reports vision is stable compared to yesterday. Patient taken to [facility] for fundus pictures, macula OCT and B-scan. Images reviewed with retina fellow and retina attending Dr. Canales. Findings suggestive of multilayered retinal  hemorrhages (more in left > right eye). Patient will likely need surgery but it is not urgent at this time and more important is to treat his pancytopenia before any type of intervention.       #Bilateral macula-involving serous vs exudative retinal fluid, c/f retinoschisis vs less likely detachments  #Bilateral chronic retinal hemorrhages, overlying vitreous (vit) heme  - Clear serous or exudative fluid accumulated over macula OU with clearly defined edges. Examined patient with Dr. Quevedo who confirms overall fluid/detachment accumulated is not hemorrhagic  -Additionally, retinal hemorrhages appear too dark to be acute, suspect present for >1 week.  -Suspect retinoschisis given round edges, bilateral nature  -Ddx underlying etiology includes low platelets vs cannot rule out infiltrative lymphoma  -Discussed with Dr. Quevedo, would not offer urgent surgery at this time to repair/drain fluid, given pt's neutropenia/thrombocytopenia, recommend observation and treatment of platelets/low blood counts  -If does not improve in future with treatment of blood counts, may consider surgery in future  -Recommend IV solumedrol 1 g daily to aid in resolution if cleared by primary team (started on 1/18/ - )  -Will continue to monitor while inpatient     Discussed with Dr. Adithya Foote (retina fellow), Dr. Quevedo and Dr. Ally Marley MD  Ophthalmology PGY-3    Ophthalmology Adult Pager - [phone]  Ophthalmology Pediatrics Pager - [phone]    For adult follow-up appointments, call: [phone]  For pediatric follow-up appointments, call: [phone]

## 2024-01-18 NOTE — H&P
History Of Present Illness  Shilo Thakkar is a 23 y.o. male with PMH of Burkitt Lymphoma (Dx 10/20/23; s/p 4 cycles of HyperCVAD), malignant pleural effusions s/p CT (10/29/23), and ADHD who presents to the ED with blurry vision, dizziness, epistaxis, and headache. Initially a BAT was called and CTH showed no signs of bleed or acute ischemic stroke. CTA showed no vascular disease. Neurology saw patient in ED and indicated that no further neurologic workup was indicated. Ophthalmology (with Dr. Quevedo) saw patient in ED and noted clear serous or exudative fluid/detachment accumulated over macula OU with clearly defined edges that is not hemorrhagic in nature and bilateral retinal hemorrhages that have been present for >1 week (appear too dark to be acute). 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 No urgent surgery at this time due to patient's neutropenia/thrombocytopenia. They additionally recommend Solumedrol 1gm IV daily. He is now admitted for further management to [facility].    Upon admission, patient reports having worsening headache and blurry vision bilaterally with pressure behind eyes and intermittent epistaxis with clots and increasing fatigue. Pt denies eye pain, numbness and tingling, dizziness, SOB, diarrhea, cough, rash, abdominal pain and blood in stool or urine.        ED COURSE:  Vitals: (most recent)-36.7, 71, 12, 128/76  Labs: CBC-WBC 0.3, H&H 4.2/11.1, plt 1. CMP unremarkable.  Imaging: CT Angio/CT head- No evidence of hemodynamically significant stenosis or proximal large branch vessel cutoff on CTA of the head or neck. No evidence of acute cortical infarct or acute intracranial hemorrhage on head CT. 6 mm nodule in the left lobe of the thyroid gland.  Interventions: 2 units prbc given (1/17), 2 units plts ordered; 1 unit given (1/17), Cefepime 1gm IV, blood cultures sent          Past Medical History  He has a past medical history of Colles' fracture of right radius,  initial encounter for closed fracture (04/23/2015), Personal history of other mental and behavioral disorders (01/28/2014), Personal history of other mental and behavioral disorders (05/10/2017), Personal history of other specified conditions (05/03/2016), and Unspecified fracture of navicular (scaphoid) bone of left wrist, initial encounter for closed fracture (12/17/2015).     Surgical History  He has a past surgical history that includes Other surgical history (10/03/2017); US guided soft tissue biopsy (10/20/2023); Foot fracture surgery (Left); and Chest Tube Insertion (10/29/2023).       Oncology History   Burkitt lymphoma (CMS/[facility])   10/27/2023 Initial Diagnosis    Burkitt lymphoma (CMS/[facility])     10/29/2023 -  Chemotherapy    HyperCVAD + RiTUXimab, 21 Day Cycles          Social History  He reports that he has never smoked. He has never been exposed to tobacco smoke. He has never used smokeless tobacco. He reports that he does not currently use alcohol. He reports that he does not use drugs.     Allergies  Patient has no known allergies.    Review of Systems   Constitutional:  Positive for fatigue.   HENT:  Positive for nosebleeds.    Eyes:  Positive for visual disturbance.   Respiratory: Negative.     Cardiovascular: Negative.    Gastrointestinal:  Positive for nausea.   Endocrine: Negative.    Genitourinary: Negative.    Musculoskeletal: Negative.    Skin: Negative.    Allergic/Immunologic: Negative.    Neurological:  Positive for dizziness, weakness and headaches.   Hematological: Negative.    Psychiatric/Behavioral: Negative.          Physical Exam  Constitutional:       Appearance: He is ill-appearing.   HENT:      Head: Normocephalic and atraumatic.      Mouth/Throat:      Mouth: Mucous membranes are moist.   Eyes:      Extraocular Movements: Extraocular movements intact.   Cardiovascular:      Rate and Rhythm: Regular rhythm. Bradycardia present.   Pulmonary:      Effort: Pulmonary effort is normal.       Breath sounds: Normal breath sounds.   Abdominal:      General: Abdomen is flat.      Tenderness: There is no abdominal tenderness.   Musculoskeletal:         General: Normal range of motion.   Skin:     Coloration: Skin is pale.   Neurological:      General: No focal deficit present.      Mental Status: He is oriented to person, place, and time.          Last Recorded Vitals  Blood pressure 128/74, pulse 73, temperature 36.7 °C (98 °F), temperature source Oral, resp. rate 12, SpO2 99 %.    Relevant Results        Scheduled medications  acyclovir, 400 mg, oral, BID  cefepime, 1 g, intravenous, q8h  ferrous sulfate (325 mg ferrous sulfate), 1 tablet, oral, BID  fluconazole, 400 mg, oral, Daily  pantoprazole, 40 mg, oral, Daily before breakfast  [START ON 1/19/2024] sulfamethoxazole-trimethoprim, 1 tablet, oral, Every Mon/Wed/Fri      Continuous medications     PRN medications  PRN medications: alteplase, ondansetron, ondansetron, sennosides-docusate sodium       Results for orders placed or performed during the hospital encounter of 01/17/24 (from the past 24 hour(s))   POCT GLUCOSE   Result Value Ref Range    POCT Glucose 104 (H) 74 - 99 mg/dL   Prepare Platelets: 1 Units, Irradiated, Leukocytes Reduced (CMV reduced risk)   Result Value Ref Range    PRODUCT CODE P2714L63     Unit Number [id number]     Unit ABO A     Unit RH POS     Dispense Status TR     Blood Expiration Date January 17, 2024 23:59 EST     PRODUCT BLOOD TYPE 6200     UNIT VOLUME 288    CBC and Auto Differential   Result Value Ref Range    WBC 0.3 (LL) 4.4 - 11.3 x10*3/uL    nRBC 0.0 0.0 - 0.0 /100 WBCs    RBC 1.49 (L) 4.50 - 5.90 x10*6/uL    Hemoglobin 4.2 (LL) 13.5 - 17.5 g/dL    Hematocrit 11.1 (L) 41.0 - 52.0 %    MCV 75 (L) 80 - 100 fL    MCH 28.2 26.0 - 34.0 pg    MCHC 37.8 (H) 32.0 - 36.0 g/dL    RDW 19.1 (H) 11.5 - 14.5 %    Platelets 1 (LL) 150 - 450 x10*3/uL    Immature Granulocytes %, Automated 0.0 0.0 - 0.9 %    Immature Granulocytes  Absolute, Automated 0.00 0.00 - 0.70 x10*3/uL   Comprehensive metabolic panel   Result Value Ref Range    Glucose 127 (H) 74 - 99 mg/dL    Sodium 136 136 - 145 mmol/L    Potassium 3.9 3.5 - 5.3 mmol/L    Chloride 99 98 - 107 mmol/L    Bicarbonate 26 21 - 32 mmol/L    Anion Gap 15 10 - 20 mmol/L    Urea Nitrogen 21 6 - 23 mg/dL    Creatinine 0.99 0.50 - 1.30 mg/dL    eGFR >90 >60 mL/min/1.73m*2    Calcium 9.4 8.6 - 10.6 mg/dL    Albumin 4.2 3.4 - 5.0 g/dL    Alkaline Phosphatase 79 33 - 120 U/L    Total Protein 6.0 (L) 6.4 - 8.2 g/dL    AST 9 9 - 39 U/L    Bilirubin, Total 0.9 0.0 - 1.2 mg/dL    ALT 19 10 - 52 U/L   Troponin I, High Sensitivity   Result Value Ref Range    Troponin I, High Sensitivity 3 0 - 53 ng/L   Protime-INR   Result Value Ref Range    Protime 14.6 (H) 9.8 - 12.8 seconds    INR 1.3 (H) 0.9 - 1.1   APTT   Result Value Ref Range    aPTT 32 27 - 38 seconds   Type and Screen   Result Value Ref Range    ABO TYPE O     Rh TYPE POS     ANTIBODY SCREEN NEG    D-dimer, quantitative   Result Value Ref Range    D-Dimer Non VTE, Quant (ng/mL FEU) 383 <=500 ng/mL FEU   Fibrinogen   Result Value Ref Range    Fibrinogen 423 (H) 200 - 400 mg/dL   BLOOD GAS VENOUS FULL PANEL   Result Value Ref Range    POCT pH, Venous 7.39 7.33 - 7.43 pH    POCT pCO2, Venous 44 41 - 51 mm Hg    POCT pO2, Venous 16 (L) 35 - 45 mm Hg    POCT SO2, Venous 14 (L) 45 - 75 %    POCT Oxy Hemoglobin, Venous 13.7 (L) 45.0 - 75.0 %    POCT Hematocrit Calculated, Venous 17.0 (L) 41.0 - 52.0 %    POCT Sodium, Venous 135 (L) 136 - 145 mmol/L    POCT Potassium, Venous 4.1 3.5 - 5.3 mmol/L    POCT Chloride, Venous 100 98 - 107 mmol/L    POCT Ionized Calicum, Venous 1.26 1.10 - 1.33 mmol/L    POCT Glucose, Venous 126 (H) 74 - 99 mg/dL    POCT Lactate, Venous 2.2 (H) 0.4 - 2.0 mmol/L    POCT Base Excess, Venous 1.5 -2.0 - 3.0 mmol/L    POCT HCO3 Calculated, Venous 26.6 (H) 22.0 - 26.0 mmol/L    POCT Hemoglobin, Venous 5.5 (LL) 13.5 - 17.5 g/dL     POCT Anion Gap, Venous 13.0 10.0 - 25.0 mmol/L    Patient Temperature 37.0 degrees Celsius    FiO2 21 %   Manual Differential   Result Value Ref Range    Neutrophils %, Manual 25.0 40.0 - 80.0 %    Bands %, Manual 5.0 0.0 - 5.0 %    Lymphocytes %, Manual 35.0 13.0 - 44.0 %    Monocytes %, Manual 30.0 2.0 - 10.0 %    Eosinophils %, Manual 5.0 0.0 - 6.0 %    Basophils %, Manual 0.0 0.0 - 2.0 %    Seg Neutrophils Absolute, Manual 0.08 (L) 1.20 - 7.00 x10*3/uL    Bands Absolute, Manual 0.02 0.00 - 0.70 x10*3/uL    Lymphocytes Absolute, Manual 0.11 (L) 1.20 - 4.80 x10*3/uL    Monocytes Absolute, Manual 0.09 (L) 0.10 - 1.00 x10*3/uL    Eosinophils Absolute, Manual 0.02 0.00 - 0.70 x10*3/uL    Basophils Absolute, Manual 0.00 0.00 - 0.10 x10*3/uL    Total Cells Counted 20     Neutrophils Absolute, Manual 0.10 (L) 1.20 - 7.70 x10*3/uL    RBC Morphology See Below     Ovalocytes Few    Prepare RBC: 2 Units   Result Value Ref Range    PRODUCT CODE N1759V15     Unit Number [id number]     Unit ABO O     Unit RH NEG     XM INTEP COMP     Dispense Status IS     Blood Expiration Date January 19, 2024 23:59 EST     PRODUCT BLOOD TYPE 9500     UNIT VOLUME 284     PRODUCT CODE R2726F10     Unit Number [id number]     Unit ABO O     Unit RH POS     XM INTEP COMP     Dispense Status TR     Blood Expiration Date January 24, 2024 23:59 EST     PRODUCT BLOOD TYPE 5100     UNIT VOLUME 350    Prepare Platelets: 1 Units, Irradiated, Leukocytes Reduced (CMV reduced risk)   Result Value Ref Range    PRODUCT CODE H4500M89     Unit Number [id number]     Unit ABO A     Unit RH POS     Dispense Status IS     Blood Expiration Date January 18, 2024 23:59 EST     PRODUCT BLOOD TYPE 6200     UNIT VOLUME 290    Blood Culture    Specimen: Peripheral Venipuncture; Blood culture   Result Value Ref Range    Blood Culture Loaded on Instrument - Culture in progress    Blood Culture    Specimen: Peripheral Venipuncture; Blood culture   Result  Value Ref Range    Blood Culture Loaded on Instrument - Culture in progress    ECG 12 lead   Result Value Ref Range    Ventricular Rate 73 BPM    Atrial Rate 73 BPM    VT Interval 158 ms    QRS Duration 100 ms    QT Interval 368 ms    QTC Calculation(Bazett) 405 ms    P Axis 51 degrees    R Axis 66 degrees    T Axis 58 degrees    QRS Count 12 beats    Q Onset 216 ms    P Onset 137 ms    P Offset 187 ms    T Offset 400 ms    QTC Fredericia 393 ms   CBC and Auto Differential   Result Value Ref Range    WBC 0.2 (LL) 4.4 - 11.3 x10*3/uL    nRBC 0.0 0.0 - 0.0 /100 WBCs    RBC 2.01 (L) 4.50 - 5.90 x10*6/uL    Hemoglobin 5.6 (LL) 13.5 - 17.5 g/dL    Hematocrit 14.8 (L) 41.0 - 52.0 %    MCV 74 (L) 80 - 100 fL    MCH 27.9 26.0 - 34.0 pg    MCHC 37.8 (H) 32.0 - 36.0 g/dL    RDW 15.9 (H) 11.5 - 14.5 %    Platelets 10 (LL) 150 - 450 x10*3/uL    Neutrophils % 29.2 40.0 - 80.0 %    Immature Granulocytes %, Automated 0.0 0.0 - 0.9 %    Lymphocytes % 45.8 13.0 - 44.0 %    Monocytes % 20.8 2.0 - 10.0 %    Eosinophils % 4.2 0.0 - 6.0 %    Basophils % 0.0 0.0 - 2.0 %    Neutrophils Absolute 0.07 (L) 1.20 - 7.70 x10*3/uL    Immature Granulocytes Absolute, Automated 0.00 0.00 - 0.70 x10*3/uL    Lymphocytes Absolute 0.11 (L) 1.20 - 4.80 x10*3/uL    Monocytes Absolute 0.05 (L) 0.10 - 1.00 x10*3/uL    Eosinophils Absolute 0.01 0.00 - 0.70 x10*3/uL    Basophils Absolute 0.00 0.00 - 0.10 x10*3/uL   Morphology   Result Value Ref Range    RBC Morphology See Below     Polychromasia Mild     Hypochromia Mild     RBC Fragments Few     Target Cells Few    Prepare RBC: 1 Units, Irradiated, Leukocytes Reduced (CMV reduced risk)   Result Value Ref Range    PRODUCT CODE V9431V35     Unit Number [id number]     Unit ABO O     Unit RH POS     XM INTEP COMP     Dispense Status XM     Blood Expiration Date January 29, 2024 23:59 EST     PRODUCT BLOOD TYPE 5100     UNIT VOLUME 350     ECG 12 lead    Result Date: 1/18/2024  See ED provider note for full  interpretation and clinical correlation Confirmed by Rakesh Kat ([id number]) on 1/18/2024 2:13:29 AM    CT brain attack head wo IV contrast    Result Date: 1/17/2024  Interpreted By:  Kaci Vitale, STUDY: CT BRAIN ATTACK HEAD WO IV CONTRAST; CT ANGIO HEAD AND NECK W AND WO IV CONTRAST;  1/17/2024 1:33 pm   INDICATION: Signs/Symptoms:Stroke Evaluation; Signs/Symptoms:bat.   COMPARISON: None.   ACCESSION NUMBER(S): [id number]; [id number]   ORDERING CLINICIAN: JAYMIE ABARCA   TECHNIQUE: Unenhanced CT images of the head were obtained. Subsequently, 90 mL Omnipaque 350 was administered intravenously and axial images of the head and neck were acquired.  Coronal, sagittal, and 3-D reconstructions were provided for review. 3D reconstructions were performed on an independent workstation.   FINDINGS: Head CT:   The ventricular system is nondilated. There is no mass effect, midline shift, or acute intracranial hemorrhage. The gray/white matter differentiation is preserved.   CTA HEAD FINDINGS:   Anterior circulation:  The bilateral intracranial internal carotid arteries, bilateral carotid terminals, bilateral proximal anterior and middle cerebral arteries are patent.   Posterior circulation:  Bilateral intracranial vertebral arteries, vertebrobasilar junction, basilar artery and proximal posterior cerebral arteries are patent.   CTA NECK FINDINGS:   There is a three-vessel aortic arch.   Carotid vessels:   The common carotid arteries, carotid bifurcations, and cervical segments of the ICAs are patent without measurable luminal narrowing.   Vertebral vessels: The visualized cervical segments of the vertebral arteries are patent.   There is a partially imaged central venous line extending into the SVC and beyond the obtained field-of-view.   There is a hypodense nodule in the left lobe of the thyroid gland measuring 6 mm.   There are degenerative changes of the cervical spine primarily involving C5-6.    Mucosal thickening and retention cysts or polyps are noted in the maxillary sinuses, right greater than left. There is also a small amount of fluid in the right maxillary sinus. The mastoid air cells are clear.         1. No evidence of hemodynamically significant stenosis or proximal large branch vessel cutoff on CTA of the head or neck. 2. No evidence of acute cortical infarct or acute intracranial hemorrhage on head CT. MRI with diffusion-imaging would be a more sensitive means of assessing for acute ischemic injury. 3. 6 mm nodule in the left lobe of the thyroid gland. In the absence of risk factors for malignancy it is likely of no clinical significance.     MACRO: Kaci Vitale discussed the significance and urgency of this critical finding by telephone with  JAYMIE BLACKWOOD; on 1/17/2024 at 1:57 pm.  (**-RCF-**) Findings:  See findings.     Signed by: Kaci Vitale 1/17/2024 1:59 PM Dictation workstation:   [id number]    CT angio head and neck w and wo IV contrast    Result Date: 1/17/2024  Interpreted By:  Kaci Vitale, STUDY: CT BRAIN ATTACK HEAD WO IV CONTRAST; CT ANGIO HEAD AND NECK W AND WO IV CONTRAST;  1/17/2024 1:33 pm   INDICATION: Signs/Symptoms:Stroke Evaluation; Signs/Symptoms:bat.   COMPARISON: None.   ACCESSION NUMBER(S): [id number]; [id number]   ORDERING CLINICIAN: JAYMIE ABARCA   TECHNIQUE: Unenhanced CT images of the head were obtained. Subsequently, 90 mL Omnipaque 350 was administered intravenously and axial images of the head and neck were acquired.  Coronal, sagittal, and 3-D reconstructions were provided for review. 3D reconstructions were performed on an independent workstation.   FINDINGS: Head CT:   The ventricular system is nondilated. There is no mass effect, midline shift, or acute intracranial hemorrhage. The gray/white matter differentiation is preserved.   CTA HEAD FINDINGS:   Anterior circulation:  The bilateral intracranial internal carotid  arteries, bilateral carotid terminals, bilateral proximal anterior and middle cerebral arteries are patent.   Posterior circulation:  Bilateral intracranial vertebral arteries, vertebrobasilar junction, basilar artery and proximal posterior cerebral arteries are patent.   CTA NECK FINDINGS:   There is a three-vessel aortic arch.   Carotid vessels:   The common carotid arteries, carotid bifurcations, and cervical segments of the ICAs are patent without measurable luminal narrowing.   Vertebral vessels: The visualized cervical segments of the vertebral arteries are patent.   There is a partially imaged central venous line extending into the SVC and beyond the obtained field-of-view.   There is a hypodense nodule in the left lobe of the thyroid gland measuring 6 mm.   There are degenerative changes of the cervical spine primarily involving C5-6.   Mucosal thickening and retention cysts or polyps are noted in the maxillary sinuses, right greater than left. There is also a small amount of fluid in the right maxillary sinus. The mastoid air cells are clear.         1. No evidence of hemodynamically significant stenosis or proximal large branch vessel cutoff on CTA of the head or neck. 2. No evidence of acute cortical infarct or acute intracranial hemorrhage on head CT. MRI with diffusion-imaging would be a more sensitive means of assessing for acute ischemic injury. 3. 6 mm nodule in the left lobe of the thyroid gland. In the absence of risk factors for malignancy it is likely of no clinical significance.     MACRO: Kaci Vitale discussed the significance and urgency of this critical finding by telephone with  JAYMIE BLACKWOOD; on 1/17/2024 at 1:57 pm.  (**-RCF-**) Findings:  See findings.     Signed by: Kaci Vitale 1/17/2024 1:59 PM Dictation workstation:   [id number]    NM PET CT lymphoma staging    Result Date: 1/16/2024  Interpreted By:  Mac Valero and Osman Sena STUDY: NM PET CT LYMPHOMA STAGING;   1/15/2024 1:28 pm   INDICATION: Signs/Symptoms: 23-year-old male with mesenteric lymph node biopsy-proven Burkitt's lymphoma initially diagnosed on 10/20/2023, involving the left cervical area, extensive mesenteric intra-abdominal adenopathy, status post chemoradiation, re-stage lymphoma. Chemotherapy completed last week.   COMPARISON: FDG PET-CT on 10/30/2023   ACCESSION NUMBER(S): [id number]   ORDERING CLINICIAN: MARBIN MONIQUE   TECHNIQUE: DIVISION OF NUCLEAR MEDICINE POSITRON EMISSION TOMOGRAPHY (PET-CT)   The patient received an intravenous dose of 14.5 mCi of Fluorine-18 fluorodeoxyglucose (FDG). Positron emission tomographic (PET) images from mid-thigh to skull base were then acquired after a one hour delay. Also acquired was a contemporaneous low dose non-contrast CT scan performed for attenuation correction of PET images and anatomic localization.  The PET and CT images were digitally fused for display.  All images were acquired on a combined PET-CT scanner unit. Some areas of FDG accumulation may be described in standardized uptake value (SUV) units.   CODING: Subsequent Treatment Strategy (PS)   CALIBRATION: Dose Injection-to-Scan Interval (mins): 56 min Mediastinal blood pool SUV (normal 1.5-2.5): 2.3 Blood glucose: 94 mg/dL   FINDINGS: NECK: Interval near-complete/complete resolution of metabolic activity of previously seen left oropharyngeal mass now demonstrating SUV max of 2.7 versus 7.0 in the prior study. Interval resolution of previously seen hypermetabolic left level 2 and level 3 cervical lymphadenopathy.     CHEST: No focal hypermetabolic lesion is seen in the lung parenchyma. Interval complete resolution of previously seen moderate-large size left pleural effusion and trace right pleural effusion. No evidence of hypermetabolic mediastinal, hilar or axillary lymphadenopathy. Interval complete resolution of previously seen hypermetabolic anterior mediastinal lymph node. Interval complete  resolution of previously seen 2 hypermetabolic right cardiophrenic lymph nodes   ABDOMEN AND PELVIS: No new hypermetabolic soft tissue lesion is present in the abdomen and pelvis. No new abdominal or pelvic lymphadenopathy. Interval increase in splenic size now measuring 16.0 cm craniocaudally in comparison to 13.0 in the prior study. Interval near-complete resolution of previously seen abdominopelvic ascites. No evidence of hypermetabolic lymphadenopathy. Interval complete metabolic resolution of previously seen hypermetabolic large mass within pancreatic tail/body and the previously seen hypermetabolic retroperitoneal and mesenteric lymphadenopathy. Physiologic radiotracer uptake is present in the liver and spleen with excretion into the bowel loops and the genitourinary tract.   MUSCULOSKELETAL: There is no focal hypermetabolic lesion to suggest osseous metastasis. Diffusely mild increased FDG uptake is seen in the bone marrow, likely representing marrow hyperplasia related to the patient's history of recent chemotherapy.       1. Interval near-complete/complete resolution of metabolic activity of previously seen left oropharyngeal mass, and resolution previously seen hypermetabolic left level 2 and 3 cervical lymphadenopathy. Minimal-mild hypermetabolic activity the left oropharynx is likely related to post treatment changes. Direct visualization is recommended to exclude residual disease.. Deauville score 2. 2. Interval resolution of previously seen hypermetabolic left level 2 and level 3 cervical lymphadenopathy. 3. Interval complete resolution of previously seen moderate-large size left pleural effusion and trace right pleural effusion. 4. Interval near-complete resolution of previously seen abdominopelvic ascites. 5. Interval complete metabolic resolution of previously seen hypermetabolic large mass within pancreatic tail/body and the previously seen hypermetabolic retroperitoneal and mesenteric  lymphadenopathy.     I personally reviewed the image(s) / study and agree with the findings and interpretation as stated. This study was interpreted at [facility].   MACRO: None   Signed by: Mac Valero 1/16/2024 3:12 PM Dictation workstation:   [id number]    Onco-Echo Complete (Strain & 3D)    Result Date: 1/15/2024   [facility], [address] [place] [address]               Tel [phone] and Fax [phone] TRANSTHORACIC ECHOCARDIOGRAM REPORT  Patient Name:     MARVA THADDEUS       Reading Physician:  [id number] David Lou MD Study Date:       1/15/2024          Ordering Provider:  [id number] MARBIN MONIQUE MRN/PID:          [id number]           Fellow: Accession#:       [id number]       Nurse: Date of           2000 / 23      Sonographer:        Kelle Maria RDCS Birth/Age:        years Gender:           M                  Additional Staff: Height:           180.34 cm          Admit Date: Weight:           72.58 kg           Admission Status:   Outpatient BSA:              1.92 m2            Encounter#:         [id number]                                      [facility]                                      Location:           Invasive Blood Pressure: 128 /57 mmHg Study Type:    ONCO-ECHO COMPLETE (STRAIN AND 3D) Diagnosis/ICD: Encounter for antineoplastic chemotherapy-Z51.11 Indication:    Chemotherapy CPT Code:      Echo Complete w Full Doppler-39044; 3D Rendering w/o independent                workstation-[id number]; Myocardial Strain Imaging-[id number] Patient History: Pertinent History: Cancer. Chemotherapy, Rt pleural centesis. Study Detail: The following Echo studies were performed: 2D, M-Mode, Doppler,               color flow, Strain and 3D. The patient was awake.  PHYSICIAN INTERPRETATION: Left Ventricle: The left ventricular systolic function is low normal, with an estimated ejection fraction of 50-55%. There are no regional wall  motion abnormalities. The left ventricular cavity size is normal. Left Ventricular Global Longitudinal Strain - 21.0 %. Spectral Doppler shows a normal pattern of left ventricular diastolic filling. Strain values are normal, which imply normal myocardial function. Left Atrium: The left atrium is normal in size. Right Ventricle: The right ventricle is normal in size. There is normal right ventricular global systolic function. Right Atrium: The right atrium is normal in size. Aortic Valve: The aortic valve is trileaflet. There is no evidence of aortic valve regurgitation. The peak instantaneous gradient of the aortic valve is 5.3 mmHg. The mean gradient of the aortic valve is 3.0 mmHg. Mitral Valve: The mitral valve is normal in structure. There is trace mitral valve regurgitation. Tricuspid Valve: The tricuspid valve is structurally normal. There is trace tricuspid regurgitation. The right ventricular systolic pressure is unable to be estimated. Pulmonic Valve: The pulmonic valve is structurally normal. There is physiologic pulmonic valve regurgitation. Pericardium: There is a trivial pericardial effusion. Aorta: The aortic root is normal. Systemic Veins: The inferior vena cava appears to be of normal size. There is IVC inspiratory collapse greater than 50%. In comparison to the previous echocardiogram(s): Compared with study from 10/30/2023,. Compared to prior echo has decreased from LVEF 55-60% to 50-55% (non significant). No LV GLS done on prior echo.  ONCO-CARDIOLOGY: Vital Signs: The patients heart rate during the study was 95 beats per minute. The patients blood pressure was 128/57 during the study. Machine: This study was performed on the [facility]. Previous Study: The patient had a previous Onco-Cardiology echocardiogram dated 10/27/2023. The patient's previous study was performed on the [facility].  Onco-Cardiology Measurements: Historical Measurements from Previous Study 2D EF (Biplane)                      65% 3D EF                               57% Global Longitudinal Strain (GLS) -25.3% Current Measurements 2D EF (Biplane)                  51.4% 3D EF                              48% Global Longitudinal Strain (GLS)  -21% GLS Tracking Quality: Fair  CONCLUSIONS:  1. Left ventricular systolic function is low normal with a 50-55% estimated ejection fraction.  2. Strain values are normal, which imply normal myocardial function. QUANTITATIVE DATA SUMMARY: 2D MEASUREMENTS:                          Normal Ranges: Ao Root d:     3.10 cm   (2.0-3.7cm) IVSd:          0.83 cm   (0.6-1.1cm) LVPWd:         0.94 cm   (0.6-1.1cm) LVIDd:         4.53 cm   (3.9-5.9cm) LVIDs:         3.24 cm LV Mass Index: 68.7 g/m2 LV % FS        28.5 % LA VOLUME:                               Normal Ranges: LA Vol A4C:        25.3 ml    (22+/-6mL/m2) LA Vol A2C:        24.2 ml LA Vol BP:         24.8 ml LA Vol Index A4C:  13.2ml/m2 LA Vol Index A2C:  12.6 ml/m2 LA Vol Index BP:   12.9 ml/m2 LA Area A4C:       12.0 cm2 LA Area A2C:       11.7 cm2 LA Major Axis A4C: 4.8 cm LA Major Axis A2C: 4.8 cm LA Volume Index:   12.3 ml/m2 LA Vol A4C:        23.9 ml LA Vol A2C:        23.5 ml RA VOLUME BY A/L METHOD:                      Normal Ranges: RA Area A4C: 9.8 cm2 M-MODE MEASUREMENTS:                  Normal Ranges: Ao Root: 3.30 cm (2.0-3.7cm) LAs:     2.70 cm (2.7-4.0cm) AORTA MEASUREMENTS:                      Normal Ranges: Ao Sinus, d: 3.10 cm (2.1-3.5cm) LV SYSTOLIC FUNCTION BY 2D PLANIMETRY (MOD):                                          Normal Ranges: EF-A4C View:                      50.5 % (>=55%) EF-A2C View:                      51.4 % EF-Biplane:                       51.4 % Global Longitudinal Strain (GLS): 21.0 % LV DIASTOLIC FUNCTION:                              Normal Ranges: MV Peak E:        0.81 m/s   (0.7-1.2 m/s) MV e'             0.14 m/s   (>8.0) MV lateral e'     0.14 m/s MV medial e'      0.12 m/s E/e' Ratio:       5.78        (<8.0) PulmV Sys Baudilio:    41.60 cm/s PulmV Goncalves Baudilio:   64.30 cm/s PulmV S/D Baudilio:    0.60 PulmV A Revs Baudilio: 34.30 cm/s PulmV A Revs Dur: 98.00 msec MITRAL VALVE:                 Normal Ranges: MV DT: 216 msec (150-240msec) AORTIC VALVE:                                   Normal Ranges: AoV Vmax:                1.15 m/s (<=1.7m/s) AoV Peak P.3 mmHg (<20mmHg) AoV Mean PG:             3.0 mmHg (1.7-11.5mmHg) LVOT Max Baudilio:            1.03 m/s (<=1.1m/s) AoV VTI:                 18.40 cm (18-25cm) LVOT VTI:                16.80 cm LVOT Diameter:           2.00 cm  (1.8-2.4cm) AoV Area, VTI:           2.87 cm2 (2.5-5.5cm2) AoV Area,Vmax:           2.81 cm2 (2.5-4.5cm2) AoV Dimensionless Index: 0.91  RIGHT VENTRICLE: RV Basal 3.09 cm RV Mid   2.54 cm RV Major 7.6 cm TAPSE:   16.7 mm RV s'    0.13 m/s TRICUSPID VALVE/RVSP:                   Normal Ranges: IVC Diam: 1.58 cm PULMONIC VALVE:                      Normal Ranges: PV Max Baudilio: 0.9 m/s  (0.6-0.9m/s) PV Max PG:  3.1 mmHg Pulmonary Veins: PulmV A Revs Dur: 98.00 msec PulmV A Revs Baudilio: 34.30 cm/s PulmV Goncalves Baudilio:   64.30 cm/s PulmV S/D Baudilio:    0.60 PulmV Sys Baudilio:    41.60 cm/s  [id number] David Lou MD Electronically signed on 1/15/2024 at 2:54:21 PM  ** Final **     XR chest 2 views    Result Date: 2024  Interpreted By:  Jae Licona,  and Deyanira Pizarro STUDY: XR CHEST 2 VIEWS;  2024 3:22 pm   INDICATION: Signs/Symptoms:Prior to high dose methotrexate chemotherapy.   COMPARISON: Chest radiograph 2023   ACCESSION NUMBER(S): [id number]   ORDERING CLINICIAN: KYLE ISAAC   FINDINGS: PA and lateral radiographs of the chest were provided.   Right-sided PICC line with tip overlying the SVC.   CARDIOMEDIASTINAL SILHOUETTE: Cardiomediastinal silhouette is normal in size and configuration.   LUNGS: Lungs are clear.   ABDOMEN: No remarkable upper abdominal findings.   BONES: No acute osseous changes.       1.  No evidence of  acute cardiopulmonary process.   I personally reviewed the images/study and I agree with the findings as stated by Juarez Christie. This study was interpreted at [facility], [place], [place].   MACRO: None   Signed by: Jae Licona 1/2/2024 5:07 PM Dictation workstation:   [id number] ;  Assessment/Plan   Principal Problem:    Blurry vision    Shilo Thakkar is a 23 y.o. male with PMH of Burkitt Lymphoma (Dx 10/20/23; s/p 4 cycles of HyperCVAD), malignant pleural effusions s/p CT (10/29/23), and ADHD who presents to the ED with blurry vision, dizziness, epistaxis, and headache. Initially a BAT was called and CTH showed no signs of bleed or acute ischemic stroke. CTA showed no vascular disease. Neurology saw patient in ED and indicated that no further neurologic workup was indicated. Ophthalmology saw patient in ED and noted clear serous or exudative fluid accumulated over macula OU with clearly defined edges that is not hemorrhagic in nature and bilateral retinal hemorrhages that have been present for >1 week. 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 No urgent surgery at this time due to patient's neutropenia/thrombocytopenia. They additionally recommend Solumedrol 1gm IV daily. He is now admitted for further management to [facility].       ONC:  # Burkitt lymphoma, newly diagnosed (10/20/23)  - Primary oncologist: Dr. Olegario Klein  - 10/19/23 presented to [facility] ED with dysphagia and abdominal pain  - CT A/P with IV contrast (10/19) mass in the body of the pancreas measuring approximately 2.7 x 2.9 cm. Multiple masses in the mesentery as well as omental caking consistent with carcinomatosis.   - CT soft tissue neck with IV contrast (10/19): homogeneous soft tissue attenuation lesion in the L oropharynx which measures approximately 3.0 x 4.0 x 5.1 cm.   - CT Abdomen w/o IV contrast (10/20): Mesenteric lymphadenopathy  - US guided mesenteric  LN biopsy (10/20/23): HIGH GRADE B-CELL LYMPHOMA MORPHOLOGICALLY CONSISTENT WITH BURKITT LYMPHOMA; flow cytometry: CD10+ kappa restricted B cells supportive of a B cell lymphoma with germinal center origin.   - Biopsy L oropharyngeal mass and L cervical LN by ENT (10/23/23) high grade B-cell burkitt lymphoma  - PET CT ordered 10/30 showed FDG avid left oropharyngeal mass and LAD above and below diaphragm  - s/p 4 cycles of HyperCVAD (last 1/2/24-  - Bone marrow bx completed 11/1 without evidence of disease  - PET scan done on Monday 1/15 shows following-Complete resolution of metabolic activity of previously seen left oropharyngeal mass, and resolution previously seen hypermetabolic left level 2 and 3 cervical lymphadenopathy. Complete resolution of previously seen moderate-large size left pleural effusion and trace right pleural effusion and abdominopelvic ascites. Complete metabolic resolution of previously seen hypermetabolic large mass within pancreatic tail/body and the previously seen hypermetabolic retroperitoneal and mesenteric lymphadenopathy.  -Follows with Dr. Latoya COBB:  # Pancytopenia likely 2/2 disease  - WBC 0.3, H&H 4.2/11.1, plt 1  - S/p 2 units PRBC given (1/17), 2 unit's plts ordered 1unit given (1/17) in ED  - Continue home ferrous sulfate 325mg BID  - Transfuse to keep Hgb>7, Plt>50 given recent epistaxis and concern for bleed  -Follow up CBC after PRBC in ED-H&H 5.6, plt 10   -Ordered 1 more unit of PRBC and plts on 1/18    CARDIAC:  - ECHO (10/2023): EF 60-65%  - Baseline HR 50s     ID:  Allergies: NKDA  Afebrile on admit  -Blood cultures sent in ED  -Initiated on Cefepime 1gm Q8  # Prophylaxis   - Continue home Acyclovir 400mg BID, Fluconazole 400mg daily, Bactrim MWF  - Levaquin for neutropenia (day 5 through day 15 per outpt notes; 1/6-1/16)     FEN/GI:  - Admit weight: 73kg   - Replace electrolytes as needed  - G6PD normal (10/26/23)  - Ordered Zofran for  Nausea    ENT:  #Epistaxis  -Epistaxis (with clots) started overnight on 1/16 and continued all night long  -plts on admission-1, Hgb 4.2  -Repleted 1 unit plt, 2 units PRBC; has since slowed  -Consider ENT consult if worsens     NEURO:  -Presented to ED with blurry vision, headache, and dizziness; BAT called  -CTH-No evidence of hemodynamically significant stenosis or proximal large branch vessel cutoff on CTA of the head or neck. No evidence of acute cortical infarct or acute intracranial hemorrhage on head CT  -Neurology consulted in ED-No acute surgical intervention; follow Ophthalmology recommendations     OPTHO:  -Pt Presented with blurry vision and states blurry vision OU began upon waking today (1/17), feels like it is blurry especially centrally with left eye central grayed out spots. Denies eye pain, diplopia, flashes or floaters.   -Ophthalmology was consulted in ED  -Recommended Solumedrol 1gm IV QD Discuss Solumedrol on rounds in am.    DISPO:  - Full Code  - Access: PICC line (right), PIV  - DVT ppx: held d/t thrombocytopenia  - NOK: Ms Deidra Pickett [phone]           Mirella Hankins, APRN-CNP

## 2024-01-18 NOTE — PROGRESS NOTES
Shilo Thakkar is a 23 y.o. male on day 1 of admission presenting with Blurry vision.    Subjective   Vision stable. No other complaints. Denies HA, eye pressure, epistaxis, eye pain, numbness and tingling, dizziness, SOB, diarrhea, cough, rash, abd pain, hematuria, hematochezia.     Objective   Physical Exam  Constitutional:       General: He is not in acute distress.     Appearance: He is ill-appearing.   HENT:      Head: Normocephalic and atraumatic.      Nose: Nose normal.      Mouth/Throat:      Mouth: Mucous membranes are moist.      Pharynx: Oropharynx is clear.   Eyes:      Extraocular Movements: Extraocular movements intact.      Pupils: Pupils are equal, round, and reactive to light.   Cardiovascular:      Rate and Rhythm: Normal rate and regular rhythm.   Pulmonary:      Effort: Pulmonary effort is normal. No respiratory distress.      Breath sounds: Normal breath sounds.   Abdominal:      General: Bowel sounds are normal.      Palpations: Abdomen is soft.      Tenderness: There is no abdominal tenderness.   Musculoskeletal:         General: Normal range of motion.      Cervical back: Normal range of motion.   Skin:     General: Skin is warm and dry.      Coloration: Skin is pale.   Neurological:      General: No focal deficit present.      Mental Status: He is alert and oriented to person, place, and time.   Psychiatric:         Mood and Affect: Mood normal.         Behavior: Behavior normal.     Last Recorded Vitals  Blood pressure 125/76, pulse 64, temperature 36.3 °C (97.4 °F), temperature source Oral, resp. rate 18, SpO2 98 %.  Intake/Output last 3 Shifts:  I/O last 3 completed shifts:  In: 1566 [Blood:1466; IV Piggyback:100]  Out: -     Assessment/Plan   Principal Problem:    Blurry vision    Shilo Thakkar is a 23 y.o. male with PMH of Burkitt Lymphoma (Dx 10/20/23; s/p 4 cycles of HyperCVAD), malignant pleural effusions s/p CT (10/29/23), and ADHD who presented to the ED 1/17 with blurry vision,  dizziness, epistaxis, and headache. Initially a BAT was called; CTH showed no signs of bleed or acute ischemic stroke; CTA showed no vascular disease. Neurology saw patient in ED and indicated that no further neurologic workup was indicated. Ophtho consulted and following. He is now admitted for further management to [facility].      ONC:  # Burkitt lymphoma, newly diagnosed (10/20/23)  - 10/19/23 presented to [facility] ED with dysphagia and abdominal pain  - CT A/P with IV contrast (10/19) mass in the body of the pancreas measuring approximately 2.7 x 2.9 cm. Multiple masses in the mesentery as well as omental caking consistent with carcinomatosis.   - CT soft tissue neck with IV contrast (10/19): homogeneous soft tissue attenuation lesion in the L oropharynx which measures approximately 3.0 x 4.0 x 5.1 cm.   - CT Abdomen w/o IV contrast (10/20): Mesenteric lymphadenopathy  - US guided mesenteric LN biopsy (10/20/23): HIGH GRADE B-CELL LYMPHOMA MORPHOLOGICALLY CONSISTENT WITH BURKITT LYMPHOMA; flow cytometry: CD10+ kappa restricted B cells supportive of a B cell lymphoma with germinal center origin.   - Biopsy L oropharyngeal mass and L cervical LN by ENT (10/23/23) high grade B-cell burkitt lymphoma  - PET CT (10/30) showed FDG avid left oropharyngeal mass and LAD above and below diaphragm  - BMBx (11/1) without evidence of disease  - s/p 4 cycles of HyperCVAD (10/28, 11/19, 12/11, 1/2/24), next cycle due 1/25/24  - s/p neulasta 1/9/24  - PET scan (1/15/24): [place] 2. Complete resolution of metabolic activity of previously seen left oropharyngeal mass, resolution of previously seen hypermetabolic left level 2 and 3 cervical lymphadenopathy, resolution of previously seen moderate-large size left pleural effusion and trace right pleural effusion and abdominopelvic ascites, resolution of previously seen hypermetabolic large mass within pancreatic tail/body and the previously seen hypermetabolic retroperitoneal and  mesenteric lymphadenopathy.    OPTHO:  # retinoschisis   -Pt Presented with blurry vision and states blurry vision OU began upon waking on 1/17 (was fine at bedtime 1/16), feels like it is blurry especially centrally with left eye central grayed out spots. Denies eye pain, diplopia, flashes or floaters.   -Ophthalmology was consulted in ED: noted clear serous or exudative fluid accumulated over macula OU with clearly defined edges that is not hemorrhagic in nature and bilateral retinal hemorrhages that have been present for >1 week, suspect retinoschisis given round edges and bilateral nature, no urgent surgery at this time due to patient's pancytopenia but can consider sx in future if no improvement, recommend Solumedrol 1gm IV daily.   -Solumedrol 1gm IV QD (1/18-p)  -ophtho took for additional imaging 1/18; no indication for an urgent intervention but awaiting attending input; they are hoping it could resolve on its own but it will probably need surgery but not urgently, probably outpatient    HEME:  # Pancytopenia likely 2/2 disease  - WBC 0.3, H&H 4.2/11.1, plt 1  - S/p 3 units PRBC, 3 units plts total this admission as of 1/18 PM  - Continue home ferrous sulfate 325mg BID  - Transfuse to keep Hgb>7, Plt>50 given recent epistaxis and concern for bleed  - haptoglobin (1/18) pending  # DVT ppx: held d/t thrombocytopenia    CARDIAC:  - ECHO (10/2023): EF 60-65%  - Baseline HR 50s  - tele ordered per Bseiso     ID:  Allergies: NKDA  PPX: Acyclovir 400mg BID, Fluconazole 400mg daily, Bactrim MWF  - s/p Levaquin for neutropenia (day 5 through day 15 per outpt notes; 1/6-1/16)  - flu A, flu B, COVID all negative (1/18)  - Blood cx (1/17) NGTD  - Cefepime 1gm Q8 (1/17-p)     FEN/GI:  - Admit weight: 73 kg. Current weight: 70.4 kg (1/18)  - Replace electrolytes as needed  - G6PD normal (10/26/23)  - Ordered Zofran for Nausea     ENT:  #Epistaxis  -Epistaxis (with clots) started overnight on 1/16 and continued all night  long  -plts on admission-1, Hgb 4.2  -Repleted prbc and plt, has not recurred   -Consider ENT consult if worsens     NEURO:  -Presented to ED with blurry vision, headache, and dizziness; BAT called  -CTH-No evidence of hemodynamically significant stenosis or proximal large branch vessel cutoff on CTA of the head or neck. No evidence of acute cortical infarct or acute intracranial hemorrhage on head CT  -Neurology consulted in ED-No acute surgical intervention; follow Ophthalmology recommendations; signed off.     DISPO:  - Full Code  - Access: PICC line (right), PIV  - Primary Onc: Dr. Latoya MEJIAS: Ms Deidra Pickett [phone]    Patient seen, examined, discussed with JOSELYN DavisC

## 2024-01-19 LAB
ALBUMIN SERPL BCP-MCNC: 4.3 G/DL (ref 3.4–5)
ANION GAP SERPL CALC-SCNC: 15 MMOL/L (ref 10–20)
BASOPHILS # BLD MANUAL: 0 X10*3/UL (ref 0–0.1)
BASOPHILS # BLD MANUAL: 0 X10*3/UL (ref 0–0.1)
BASOPHILS NFR BLD MANUAL: 0 %
BASOPHILS NFR BLD MANUAL: 0 %
BLOOD EXPIRATION DATE: NORMAL
BLOOD EXPIRATION DATE: NORMAL
BUN SERPL-MCNC: 23 MG/DL (ref 6–23)
CALCIUM SERPL-MCNC: 9.7 MG/DL (ref 8.6–10.6)
CHLORIDE SERPL-SCNC: 103 MMOL/L (ref 98–107)
CO2 SERPL-SCNC: 24 MMOL/L (ref 21–32)
CREAT SERPL-MCNC: 0.78 MG/DL (ref 0.5–1.3)
DISPENSE STATUS: NORMAL
DISPENSE STATUS: NORMAL
EGFRCR SERPLBLD CKD-EPI 2021: >90 ML/MIN/1.73M*2
EOSINOPHIL # BLD MANUAL: 0 X10*3/UL (ref 0–0.7)
EOSINOPHIL # BLD MANUAL: 0 X10*3/UL (ref 0–0.7)
EOSINOPHIL NFR BLD MANUAL: 0 %
EOSINOPHIL NFR BLD MANUAL: 0 %
ERYTHROCYTE [DISTWIDTH] IN BLOOD BY AUTOMATED COUNT: 15.4 % (ref 11.5–14.5)
ERYTHROCYTE [DISTWIDTH] IN BLOOD BY AUTOMATED COUNT: 15.7 % (ref 11.5–14.5)
GLUCOSE SERPL-MCNC: 139 MG/DL (ref 74–99)
HAPTOGLOB SERPL-MCNC: 212 MG/DL (ref 37–246)
HCT VFR BLD AUTO: 19.4 % (ref 41–52)
HCT VFR BLD AUTO: 21.8 % (ref 41–52)
HGB BLD-MCNC: 6.9 G/DL (ref 13.5–17.5)
HGB BLD-MCNC: 7.9 G/DL (ref 13.5–17.5)
IMM GRANULOCYTES # BLD AUTO: 0 X10*3/UL (ref 0–0.7)
IMM GRANULOCYTES # BLD AUTO: 0.01 X10*3/UL (ref 0–0.7)
IMM GRANULOCYTES NFR BLD AUTO: 0 % (ref 0–0.9)
IMM GRANULOCYTES NFR BLD AUTO: 0.6 % (ref 0–0.9)
LDH SERPL L TO P-CCNC: 133 U/L (ref 84–246)
LYMPHOCYTES # BLD MANUAL: 0.15 X10*3/UL (ref 1.2–4.8)
LYMPHOCYTES # BLD MANUAL: 0.17 X10*3/UL (ref 1.2–4.8)
LYMPHOCYTES NFR BLD MANUAL: 28.8 %
LYMPHOCYTES NFR BLD MANUAL: 9.1 %
MAGNESIUM SERPL-MCNC: 1.87 MG/DL (ref 1.6–2.4)
MCH RBC QN AUTO: 27.6 PG (ref 26–34)
MCH RBC QN AUTO: 28.2 PG (ref 26–34)
MCHC RBC AUTO-ENTMCNC: 35.6 G/DL (ref 32–36)
MCHC RBC AUTO-ENTMCNC: 36.2 G/DL (ref 32–36)
MCV RBC AUTO: 78 FL (ref 80–100)
MCV RBC AUTO: 78 FL (ref 80–100)
MONOCYTES # BLD MANUAL: 0.08 X10*3/UL (ref 0.1–1)
MONOCYTES # BLD MANUAL: 0.35 X10*3/UL (ref 0.1–1)
MONOCYTES NFR BLD MANUAL: 13.6 %
MONOCYTES NFR BLD MANUAL: 22 %
MYELOCYTES # BLD MANUAL: 0.02 X10*3/UL
MYELOCYTES NFR BLD MANUAL: 1.5 %
NEUTROPHILS # BLD MANUAL: 1.08 X10*3/UL (ref 1.2–7.7)
NEUTS BAND # BLD MANUAL: 0.24 X10*3/UL (ref 0–0.7)
NEUTS BAND NFR BLD MANUAL: 15.1 %
NEUTS SEG # BLD MANUAL: 0.35 X10*3/UL (ref 1.2–7)
NEUTS SEG # BLD MANUAL: 0.84 X10*3/UL (ref 1.2–7)
NEUTS SEG NFR BLD MANUAL: 52.3 %
NEUTS SEG NFR BLD MANUAL: 57.6 %
NRBC BLD-RTO: 0 /100 WBCS (ref 0–0)
NRBC BLD-RTO: 0 /100 WBCS (ref 0–0)
OVALOCYTES BLD QL SMEAR: ABNORMAL
PHOSPHATE SERPL-MCNC: 3.9 MG/DL (ref 2.5–4.9)
PLATELET # BLD AUTO: 74 X10*3/UL (ref 150–450)
PLATELET # BLD AUTO: 86 X10*3/UL (ref 150–450)
POTASSIUM SERPL-SCNC: 4.2 MMOL/L (ref 3.5–5.3)
PRODUCT BLOOD TYPE: 5100
PRODUCT BLOOD TYPE: 9500
PRODUCT CODE: NORMAL
PRODUCT CODE: NORMAL
RBC # BLD AUTO: 2.5 X10*6/UL (ref 4.5–5.9)
RBC # BLD AUTO: 2.8 X10*6/UL (ref 4.5–5.9)
RBC MORPH BLD: ABNORMAL
RBC MORPH BLD: ABNORMAL
SCHISTOCYTES BLD QL SMEAR: ABNORMAL
SODIUM SERPL-SCNC: 138 MMOL/L (ref 136–145)
TOTAL CELLS COUNTED BLD: 132
TOTAL CELLS COUNTED BLD: 59
UNIT ABO: NORMAL
UNIT ABO: NORMAL
UNIT NUMBER: NORMAL
UNIT NUMBER: NORMAL
UNIT RH: NORMAL
UNIT RH: NORMAL
UNIT VOLUME: 228
UNIT VOLUME: 274
URATE SERPL-MCNC: 3.6 MG/DL (ref 4–7.5)
WBC # BLD AUTO: 0.6 X10*3/UL (ref 4.4–11.3)
WBC # BLD AUTO: 1.6 X10*3/UL (ref 4.4–11.3)
XM INTEP: NORMAL

## 2024-01-19 PROCEDURE — 2020000001 HC ICU ROOM DAILY

## 2024-01-19 PROCEDURE — 36430 TRANSFUSION BLD/BLD COMPNT: CPT

## 2024-01-19 PROCEDURE — 85027 COMPLETE CBC AUTOMATED: CPT

## 2024-01-19 PROCEDURE — 83615 LACTATE (LD) (LDH) ENZYME: CPT

## 2024-01-19 PROCEDURE — 83735 ASSAY OF MAGNESIUM: CPT

## 2024-01-19 PROCEDURE — 85007 BL SMEAR W/DIFF WBC COUNT: CPT

## 2024-01-19 PROCEDURE — P9040 RBC LEUKOREDUCED IRRADIATED: HCPCS

## 2024-01-19 PROCEDURE — 2500000004 HC RX 250 GENERAL PHARMACY W/ HCPCS (ALT 636 FOR OP/ED): Performed by: EMERGENCY MEDICINE

## 2024-01-19 PROCEDURE — 2500000004 HC RX 250 GENERAL PHARMACY W/ HCPCS (ALT 636 FOR OP/ED)

## 2024-01-19 PROCEDURE — P9037 PLATE PHERES LEUKOREDU IRRAD: HCPCS

## 2024-01-19 PROCEDURE — 99233 SBSQ HOSP IP/OBS HIGH 50: CPT | Performed by: STUDENT IN AN ORGANIZED HEALTH CARE EDUCATION/TRAINING PROGRAM

## 2024-01-19 PROCEDURE — 84550 ASSAY OF BLOOD/URIC ACID: CPT

## 2024-01-19 PROCEDURE — 2500000001 HC RX 250 WO HCPCS SELF ADMINISTERED DRUGS (ALT 637 FOR MEDICARE OP)

## 2024-01-19 PROCEDURE — 84100 ASSAY OF PHOSPHORUS: CPT

## 2024-01-19 RX ADMIN — CEFEPIME 1 G: 1 INJECTION, SOLUTION INTRAVENOUS at 01:35

## 2024-01-19 RX ADMIN — ACYCLOVIR 400 MG: 400 TABLET ORAL at 09:05

## 2024-01-19 RX ADMIN — CEFEPIME 1 G: 1 INJECTION, SOLUTION INTRAVENOUS at 09:05

## 2024-01-19 RX ADMIN — FERROUS SULFATE TAB 325 MG (65 MG ELEMENTAL FE) 1 TABLET: 325 (65 FE) TAB at 21:45

## 2024-01-19 RX ADMIN — PANTOPRAZOLE SODIUM 40 MG: 40 TABLET, DELAYED RELEASE ORAL at 09:06

## 2024-01-19 RX ADMIN — FLUCONAZOLE 400 MG: 150 TABLET ORAL at 09:05

## 2024-01-19 RX ADMIN — SULFAMETHOXAZOLE AND TRIMETHOPRIM 1 TABLET: 800; 160 TABLET ORAL at 09:05

## 2024-01-19 RX ADMIN — FERROUS SULFATE TAB 325 MG (65 MG ELEMENTAL FE) 1 TABLET: 325 (65 FE) TAB at 09:06

## 2024-01-19 RX ADMIN — DEXTROSE MONOHYDRATE 1000 MG: 50 INJECTION, SOLUTION INTRAVENOUS at 06:07

## 2024-01-19 RX ADMIN — CEFEPIME 1 G: 1 INJECTION, SOLUTION INTRAVENOUS at 17:17

## 2024-01-19 RX ADMIN — ACYCLOVIR 400 MG: 400 TABLET ORAL at 21:45

## 2024-01-19 ASSESSMENT — COGNITIVE AND FUNCTIONAL STATUS - GENERAL
WALKING IN HOSPITAL ROOM: A LITTLE
DRESSING REGULAR LOWER BODY CLOTHING: A LITTLE
PERSONAL GROOMING: A LITTLE
EATING MEALS: A LITTLE
DAILY ACTIVITIY SCORE: 18
DRESSING REGULAR UPPER BODY CLOTHING: A LITTLE
MOBILITY SCORE: 22
TOILETING: A LITTLE
HELP NEEDED FOR BATHING: A LITTLE
CLIMB 3 TO 5 STEPS WITH RAILING: A LITTLE

## 2024-01-19 ASSESSMENT — PAIN SCALES - GENERAL
PAINLEVEL_OUTOF10: 10 - WORST POSSIBLE PAIN
PAINLEVEL_OUTOF10: 0 - NO PAIN

## 2024-01-19 ASSESSMENT — PAIN - FUNCTIONAL ASSESSMENT
PAIN_FUNCTIONAL_ASSESSMENT: 0-10
PAIN_FUNCTIONAL_ASSESSMENT: 0-10

## 2024-01-19 NOTE — CARE PLAN
The patient's goals for the shift include      The clinical goals for the shift include Patient will remain free from injury this shift.    Patient admitted for vision changes. States cannot see from L eye, and his R eye is blurry. Patient oriented to unit, admission database completed. 1 unit of blood and platelets ordered at end of shift.. Oncoming nurse to give. PICC line dressing and microclave caps changed. Girlfriend at bedside. No other changes at this moment.

## 2024-01-19 NOTE — PROGRESS NOTES
"Shilo Thakkar is a 23 y.o. male on day 2 of admission presenting with Blurry vision.    Subjective   Afebrile. Denies CP SOB palpitations HA vision changes no abdominal pain no NVDC appetite stable. No bleeding noted. Patient noted vision is somewhat better today in his central vision field. Discussed plan of care with patient and sig. other     ROS otherwise unremarkable     Objective     Physical Exam  Constitutional:       General: He is not in acute distress.     Appearance: He is not ill-appearing.   HENT:      Mouth/Throat:      Mouth: Mucous membranes are moist.      Pharynx: Oropharynx is clear.   Eyes:      Pupils: Pupils are equal, round, and reactive to light.   Cardiovascular:      Rate and Rhythm: Normal rate and regular rhythm.   Pulmonary:      Effort: Pulmonary effort is normal.      Breath sounds: Normal breath sounds.   Abdominal:      General: Bowel sounds are normal.      Palpations: Abdomen is soft.   Musculoskeletal:         General: No swelling or tenderness. Normal range of motion.      Cervical back: Normal range of motion and neck supple.   Skin:     General: Skin is warm and dry.      Capillary Refill: Capillary refill takes less than 2 seconds.      Coloration: Skin is pale. Skin is not jaundiced.   Neurological:      General: No focal deficit present.      Mental Status: He is alert.   Psychiatric:         Mood and Affect: Mood normal.         Last Recorded Vitals  Blood pressure 124/69, pulse 74, temperature 36.2 °C (97.2 °F), resp. rate 18, height 1.854 m (6' 1\"), weight 70.4 kg (155 lb 3.3 oz), SpO2 100 %.  Intake/Output last 3 Shifts:  I/O last 3 completed shifts:  In: 2901 (41.2 mL/kg) [Blood:2751; IV Piggyback:150]  Out: - (0 mL/kg)   Weight: 70.4 kg     Relevant Results  Scheduled medications  acyclovir, 400 mg, oral, BID  cefepime, 1 g, intravenous, q8h  ferrous sulfate (325 mg ferrous sulfate), 1 tablet, oral, BID  fluconazole, 400 mg, oral, Daily  methylPREDNISolone sodium " succinate (PF), 1,000 mg, intravenous, q24h  pantoprazole, 40 mg, oral, Daily before breakfast  sulfamethoxazole-trimethoprim, 1 tablet, oral, Every Mon/Wed/Fri          Assessment/Plan   Principal Problem:    Blurry vision  Shilo Thakkar is a 23 y.o. male with PMH of Burkitt Lymphoma (Dx 10/20/23; s/p 4 cycles of HyperCVAD), malignant pleural effusions s/p CT (10/29/23), and ADHD who presented to the ED 1/17 with blurry vision, dizziness, epistaxis, and headache. Initially a BAT was called; CTH showed no signs of bleed or acute ischemic stroke; CTA showed no vascular disease. Neurology saw patient in ED and indicated that no further neurologic workup was indicated. Ophtho consulted and following. He is now admitted for further management to [facility].      ONC:  # Burkitt lymphoma, newly diagnosed (10/20/23)  - 10/19/23 presented to OSH ED with dysphagia and abdominal pain  - CT A/P with IV contrast (10/19) mass in the body of the pancreas measuring approximately 2.7 x 2.9 cm. Multiple masses in the mesentery as well as omental caking consistent with carcinomatosis.   - CT soft tissue neck with IV contrast (10/19): homogeneous soft tissue attenuation lesion in the L oropharynx which measures approximately 3.0 x 4.0 x 5.1 cm.   - CT Abdomen w/o IV contrast (10/20): Mesenteric lymphadenopathy  - US guided mesenteric LN biopsy (10/20/23): HIGH GRADE B-CELL LYMPHOMA MORPHOLOGICALLY CONSISTENT WITH BURKITT LYMPHOMA; flow cytometry: CD10+ kappa restricted B cells supportive of a B cell lymphoma with germinal center origin.   - Biopsy L oropharyngeal mass and L cervical LN by ENT (10/23/23) high grade B-cell burkitt lymphoma  - PET CT (10/30) showed FDG avid left oropharyngeal mass and LAD above and below diaphragm  - BMBx (11/1) without evidence of disease  - s/p 4 cycles of HyperCVAD (10/28, 11/19, 12/11, 1/2/24), next cycle due 1/26/24  - s/p neulasta 1/9/24  - PET scan (1/15/24): Rudolph 2. Complete resolution of  metabolic activity of previously seen left oropharyngeal mass, resolution of previously seen hypermetabolic left level 2 and 3 cervical lymphadenopathy, resolution of previously seen moderate-large size left pleural effusion and trace right pleural effusion and abdominopelvic ascites, resolution of previously seen hypermetabolic large mass within pancreatic tail/body and the previously seen hypermetabolic retroperitoneal and mesenteric lymphadenopathy.     OPTHO:  # retinoschisis   -Pt Presented with blurry vision and states blurry vision OU began upon waking on 1/17 (was fine at bedtime 1/16), feels like it is blurry especially centrally with left eye central grayed out spots. Denies eye pain, diplopia, flashes or floaters.   -Ophthalmology was consulted in ED: noted clear serous or exudative fluid accumulated over macula OU with clearly defined edges that is not hemorrhagic in nature and bilateral retinal hemorrhages that have been present for >1 week, suspect retinoschisis given round edges and bilateral nature, no urgent surgery at this time due to patient's pancytopenia but can consider sx in future if no improvement, recommend Solumedrol 1gm IV daily.   -Solumedrol 1gm IV QD (1/18-p)  -ophtho took for additional imaging 1/18; no indication for an urgent intervention but awaiting attending input; they are hoping it could resolve on its own but it will probably need surgery but not urgently, probably outpatient  - Plan to take to OR early in week of 1/22, keep Plt>80k with no post procedure plt goal per optho     HEME:  # Pancytopenia likely 2/2 disease  - WBC 0.3, H&H 4.2/11.1, plt 1  - S/p 3 units PRBC, 3 units plts total this admission as of 1/18 PM    -Transfused RBC and PLTs 1/19  - Continue home ferrous sulfate 325mg BID  - Transfuse to keep Hgb>7, Plt>100 due to large bilateral hemorrhage   - haptoglobin (1/18) 212  # DVT ppx: held d/t thrombocytopenia     CARDIAC:  - ECHO (10/2023): EF 60-65%  - Baseline  HR 50s  - tele ordered per Latoya     ID:  Allergies: NKDA  PPX: Acyclovir 400mg BID, Fluconazole 400mg daily, Bactrim MWF  - s/p Levaquin for neutropenia (day 5 through day 15 per outpt notes; 1/6-1/16)  - flu A, flu B, COVID all negative (1/18)  - Blood cx (1/17) NGTD  - Cefepime 1gm Q8 (1/17-p); please stop with ANC recovery      FEN/GI:  - Admit weight: 73 kg. Current weight: 70.4 kg (1/18)  - Replace electrolytes as needed  - G6PD normal (10/26/23)  - Ordered Zofran for Nausea     ENT:  #Epistaxis; resolved   -Epistaxis (with clots) started overnight on 1/16 and continued all night long  -plts on admission-1, Hgb 4.2  -Repleted prbc and plt, has not recurred   -Consider ENT consult if worsens     NEURO:  -Presented to ED with blurry vision, headache, and dizziness; BAT called  -CTH-No evidence of hemodynamically significant stenosis or proximal large branch vessel cutoff on CTA of the head or neck. No evidence of acute cortical infarct or acute intracranial hemorrhage on head CT  -Neurology consulted in ED-No acute surgical intervention; follow Ophthalmology recommendations; signed off.     ENDO:  [ ] imaging found 6 mm nodule in the left lobe of the thyroid gland ->        - TSH level (1/20): PENDING       DISPO:  - Full Code  - Access: PICC line (right), PIV  - Primary Onc: Dr. Klein  - NOK: Ms Deidra Pickett [phone]    Patient seen, discussed and examined with Dr. Katherine Nguyen, APRN-CNP

## 2024-01-19 NOTE — PROGRESS NOTES
"HPI:  23 YOM with PMH Burkitt's lymphoma stage III on chemotherapy most recently 1/11, next infusion session beginning on Monday and patient presenting with concerns for nosebleed headache and bilateral blurry vision. Initially concern for binasal hemianopia, neuro stroke assessed w CTH which was unremarkable for stroke. Pt states blurry vision OU  began upon waking today, feels like it is blurry especially centrally with left eye central grayed out spots. Denies eye pain, diplopia, flashes or floaters. Denies HA, diziness, nausea today (does report nausea episode last night + vomiting). Received intrathecal chemo 1/11/24 without initial complications, pt was given prednisolone forte drop to use 2 days after chemo and prn if blurry vision. Per chart review, his onc team recommends using pred drop for 1 wk following chemo.      Chemo:  - S/P C4 HyperCVAD with D1 1/2/24.  - C4D2 LP with IT Cytarabine (1/4/24): CSF Flow Cytometry Negative  - C4D6 Rituximab + Neulasta today (1/9/24)  - C4D8 LP with IT methotrexate to be completed 1/11/24   - Post chemo he should start levofloxacin between day 5 and day 15.  He should continue without interruption fluconazole and acyclovir and 3 times weekly Bactrim.  - Before next cycle, cycle 5, we will obtain an echocardiogram and a PET scan for \"interim\" staging.  Overall he is doing very well        PMH: As per HPI  Allergies: allergy list reviewed  Past Ocular Hx: As per HPI  Family Hx: No hx of glaucoma, age related macular degeneration, blindness  Social Hx: lives with family    Physical Exam  Base Eye Exam       Visual Acuity (Snellen - Linear)         Right Left    Near sc 20/70+2 20/70-2              Tonometry (Tonopen, 2:29 PM)         Right Left    Pressure 17 17              Pupils         Dark Light Shape React APD    Right 5 2 Round Brisk None    Left 5 2 Round Brisk None              Visual Fields         Left Right     Full Full              Extraocular Movement         " Right Left     Full Full              Neuro/Psych       Oriented x3: Yes              Dilation       Both eyes: 1% Mydriacyl & 2.5% Augustine  @ 2:29 PM                  Additional Tests       Color         Right Left    Ishihara 8/11 3/11                  Slit Lamp and Fundus Exam       External Exam         Right Left    External Normal Normal              Slit Lamp Exam         Right Left    Lids/Lashes Normal Normal    Conjunctiva/Sclera White and quiet White and quiet    Cornea Clear Clear    Anterior Chamber Deep and quiet Deep and quiet    Iris Round and reactive Round and reactive    Lens Clear Clear    Anterior Vitreous Vitreous (vit) hemorrhage overlying retinal hemorrhage Vit hemorrhage              Fundus Exam         Right Left    Disc crisp, partially obstructed temporally by overlying vit heme unable to view d/t overlying vit heme    C/D Ratio 0.3 0.3    Macula serous detachment vs retinoschisis including macula extending from optic nerve just posterior to sup and inf arcades about 3 disk diopters from fovea; with overlying retinal hemorrhage (dark blood, suspect chronic) serous detachment vs retinoschisis including macula extending from optic nerve just posterior to sup and inf arcades; with overlying retinal hemorrhage extending to disk (dark blood, suspect chronic), overlying vitreous (vit) hemorrhage    Vessels multiple flam hemorrhages sup and inf arcades multiple flame hemorrhages sup and inf arcades, largest at 2 oclock    Periphery multiple flame hemorrhages, Dot-blot hemorrhage Multiple flame hemorrhages, Dot-blot hemorrhage                          1/11/24  (LP): Hemorrhagic specimen, no lymphoma cells seen including on flow, glucose 61, protein 27     Light perception (LP) 1/4/24  No lymphoma cells seen on cell count or flow        CTA Head/neck 1/17/24  1. No evidence of hemodynamically significant stenosis or proximal large branch vessel cutoff on CTA of the head or neck.  2. No evidence of  acute cortical infarct or acute intracranial hemorrhage on head CT. MRI with diffusion-imaging would be a more  sensitive means of assessing for acute ischemic injury.  3. 6 mm nodule in the left lobe of the thyroid gland. In the absence of risk factors for malignancy it is likely of no clinical significance.     PET Scan 12/27/23  IMPRESSION:  1. Interval near-complete/complete resolution of metabolic activity of previously seen left oropharyngeal mass, and resolution previously seen hypermetabolic left level 2 and 3 cervical lymphadenopathy.  Minimal-mild hypermetabolic activity the left oropharynx is likely related to post treatment changes. Direct visualization is  recommended to exclude residual disease.. Deauville score 2.  2. Interval resolution of previously seen hypermetabolic left level 2 and level 3 cervical lymphadenopathy.  3. Interval complete resolution of previously seen moderate-large size left pleural effusion and trace right pleural effusion.  4. Interval near-complete resolution of previously seen abdominopelvic ascites.  5. Interval complete metabolic resolution of previously seen hypermetabolic large mass within pancreatic tail/body and the  previously seen hypermetabolic retroperitoneal and mesenteric lymphadenopathy.      MRI Brain 10/27/23    IMPRESSION:  1.  No evidence of intracranial metastatic disease.  2. Partially visualized mass located within the left oropharynx with  adjacent enlarged lymph nodes. These findings were better seen on the  prior CT neck study.     B-scan 1/18/24:  right eye: no obvious retinal detachment, appreciated subretinal and subhyaloid hemes (multilayered hemes)  left eye: no obvious retinal detachment, appreciated large subhyaloid heme (multilayered hemes)     Macula OCT 1/18/24:  Right eye: poor quality, preretinal and subhyaloid hemes (multilayered hemes)  Left eye: poor quality, preretinal and subhyaloid hemes (multilayered hemes)     Clarus fundus pictures  taken 1/18/24     Update 1/18/23:  Reports vision is stable compared to yesterday. Patient taken to [facility] for fundus pictures, macula OCT and B-scan. Images reviewed with retina fellow and retina attending Dr. Canales. Findings suggestive of multilayered retinal  hemorrhages (more in left > right eye). Patient will likely need surgery but it is not urgent at this time and more important is to treat his pancytopenia before any type of intervention.     Update 1/19/23:  Reports central vision has slightly improved today. Denying any other new ocular symptoms. Platelet counts going up. After discussion with primary team and Dr. Quevedo, we will try to get patient to surgery next week as long as platelets are >80 at pre op      #Bilateral macula-involving serous vs exudative retinal fluid, c/f retinoschisis vs less likely detachments  #Bilateral chronic retinal hemorrhages, overlying vitreous (vit) heme  - Clear serous or exudative fluid accumulated over macula OU with clearly defined edges. Examined patient with Dr. Quevedo who confirms overall fluid/detachment accumulated is not hemorrhagic  -Additionally, retinal hemorrhages appear too dark to be acute, suspect present for >1 week.  -Suspect retinoschisis given round edges, bilateral nature  -Ddx underlying etiology includes low platelets vs cannot rule out infiltrative lymphoma  -Discussed with Dr. Quevedo, would not offer urgent surgery at this time to repair/drain fluid, given pt's neutropenia/thrombocytopenia, recommend observation and treatment of platelets/low blood counts  -If does not improve in future with treatment of blood counts, may consider surgery in future  -Recommend IV solumedrol 1 g daily to aid in resolution if cleared by primary team (started on 1/18/ - )  -Will continue to monitor while inpatient     Discussed with Dr. Sae Marley MD  Ophthalmology PGY-3     Ophthalmology Adult Pager - [phone]  Ophthalmology Pediatrics  Pager - [phone]     For adult follow-up appointments, call: [phone]  For pediatric follow-up appointments, call: [phone]

## 2024-01-19 NOTE — CARE PLAN
The patient's goals for the shift include  getting better and going home.    The clinical goals for the shift include Patient will remain free from injury this shift.    Vss, afebrile. No c/o pain or nausea. Pt states vision in left eye may be slightly improved. Seen by ophthalmology team today. 1 unit of blood and 1 unit of platelets given without premedications. Tolerated well. CBC showed improvement of hgb, plt count, WBC and ANC. Step-father and girlfriend at bedside. No other needs at this time.

## 2024-01-20 LAB
ABO GROUP (TYPE) IN BLOOD: NORMAL
ALBUMIN SERPL BCP-MCNC: 4.1 G/DL (ref 3.4–5)
ANION GAP SERPL CALC-SCNC: 15 MMOL/L (ref 10–20)
ANTIBODY SCREEN: NORMAL
BASOPHILS # BLD MANUAL: 0 X10*3/UL (ref 0–0.1)
BASOPHILS # BLD MANUAL: 0 X10*3/UL (ref 0–0.1)
BASOPHILS NFR BLD MANUAL: 0 %
BASOPHILS NFR BLD MANUAL: 0 %
BLOOD EXPIRATION DATE: NORMAL
BLOOD EXPIRATION DATE: NORMAL
BUN SERPL-MCNC: 22 MG/DL (ref 6–23)
CALCIUM SERPL-MCNC: 9.9 MG/DL (ref 8.6–10.6)
CHLORIDE SERPL-SCNC: 104 MMOL/L (ref 98–107)
CO2 SERPL-SCNC: 24 MMOL/L (ref 21–32)
CREAT SERPL-MCNC: 0.94 MG/DL (ref 0.5–1.3)
DISPENSE STATUS: NORMAL
DISPENSE STATUS: NORMAL
EGFRCR SERPLBLD CKD-EPI 2021: >90 ML/MIN/1.73M*2
EOSINOPHIL # BLD MANUAL: 0 X10*3/UL (ref 0–0.7)
EOSINOPHIL # BLD MANUAL: 0 X10*3/UL (ref 0–0.7)
EOSINOPHIL NFR BLD MANUAL: 0 %
EOSINOPHIL NFR BLD MANUAL: 0 %
ERYTHROCYTE [DISTWIDTH] IN BLOOD BY AUTOMATED COUNT: 15.7 % (ref 11.5–14.5)
ERYTHROCYTE [DISTWIDTH] IN BLOOD BY AUTOMATED COUNT: 15.7 % (ref 11.5–14.5)
GLUCOSE SERPL-MCNC: 146 MG/DL (ref 74–99)
HCT VFR BLD AUTO: 21.2 % (ref 41–52)
HCT VFR BLD AUTO: 22.2 % (ref 41–52)
HGB BLD-MCNC: 7.6 G/DL (ref 13.5–17.5)
HGB BLD-MCNC: 7.8 G/DL (ref 13.5–17.5)
IMM GRANULOCYTES # BLD AUTO: 0.01 X10*3/UL (ref 0–0.7)
IMM GRANULOCYTES # BLD AUTO: 0.18 X10*3/UL (ref 0–0.7)
IMM GRANULOCYTES NFR BLD AUTO: 0.4 % (ref 0–0.9)
IMM GRANULOCYTES NFR BLD AUTO: 4.3 % (ref 0–0.9)
LDH SERPL L TO P-CCNC: 137 U/L (ref 84–246)
LYMPHOCYTES # BLD MANUAL: 0.29 X10*3/UL (ref 1.2–4.8)
LYMPHOCYTES # BLD MANUAL: 0.41 X10*3/UL (ref 1.2–4.8)
LYMPHOCYTES NFR BLD MANUAL: 11 %
LYMPHOCYTES NFR BLD MANUAL: 9.9 %
MAGNESIUM SERPL-MCNC: 1.92 MG/DL (ref 1.6–2.4)
MCH RBC QN AUTO: 27.8 PG (ref 26–34)
MCH RBC QN AUTO: 28.6 PG (ref 26–34)
MCHC RBC AUTO-ENTMCNC: 35.1 G/DL (ref 32–36)
MCHC RBC AUTO-ENTMCNC: 35.8 G/DL (ref 32–36)
MCV RBC AUTO: 79 FL (ref 80–100)
MCV RBC AUTO: 80 FL (ref 80–100)
METAMYELOCYTES # BLD MANUAL: 0.03 X10*3/UL
METAMYELOCYTES NFR BLD MANUAL: 0.8 %
MONOCYTES # BLD MANUAL: 0.51 X10*3/UL (ref 0.1–1)
MONOCYTES # BLD MANUAL: 0.67 X10*3/UL (ref 0.1–1)
MONOCYTES NFR BLD MANUAL: 16.4 %
MONOCYTES NFR BLD MANUAL: 19.7 %
MYELOCYTES # BLD MANUAL: 0.03 X10*3/UL
MYELOCYTES NFR BLD MANUAL: 0.8 %
NEUTROPHILS # BLD MANUAL: 1.74 X10*3/UL (ref 1.2–7.7)
NEUTROPHILS # BLD MANUAL: 2.96 X10*3/UL (ref 1.2–7.7)
NEUTS BAND # BLD MANUAL: 0.31 X10*3/UL (ref 0–0.7)
NEUTS BAND # BLD MANUAL: 0.37 X10*3/UL (ref 0–0.7)
NEUTS BAND NFR BLD MANUAL: 11.8 %
NEUTS BAND NFR BLD MANUAL: 9 %
NEUTS SEG # BLD MANUAL: 1.43 X10*3/UL (ref 1.2–7)
NEUTS SEG # BLD MANUAL: 2.59 X10*3/UL (ref 1.2–7)
NEUTS SEG NFR BLD MANUAL: 55.1 %
NEUTS SEG NFR BLD MANUAL: 63.1 %
NRBC BLD MANUAL-RTO: 1.6 % (ref 0–0)
NRBC BLD-RTO: 0 /100 WBCS (ref 0–0)
NRBC BLD-RTO: 0 /100 WBCS (ref 0–0)
OVALOCYTES BLD QL SMEAR: ABNORMAL
OVALOCYTES BLD QL SMEAR: ABNORMAL
PHOSPHATE SERPL-MCNC: 3.9 MG/DL (ref 2.5–4.9)
PLATELET # BLD AUTO: 72 X10*3/UL (ref 150–450)
PLATELET # BLD AUTO: 79 X10*3/UL (ref 150–450)
POTASSIUM SERPL-SCNC: 4.1 MMOL/L (ref 3.5–5.3)
PRODUCT BLOOD TYPE: 6200
PRODUCT BLOOD TYPE: 6200
PRODUCT CODE: NORMAL
PRODUCT CODE: NORMAL
RBC # BLD AUTO: 2.66 X10*6/UL (ref 4.5–5.9)
RBC # BLD AUTO: 2.81 X10*6/UL (ref 4.5–5.9)
RBC MORPH BLD: ABNORMAL
RBC MORPH BLD: ABNORMAL
RH FACTOR (ANTIGEN D): NORMAL
SODIUM SERPL-SCNC: 139 MMOL/L (ref 136–145)
TOTAL CELLS COUNTED BLD: 122
TOTAL CELLS COUNTED BLD: 127
TSH SERPL-ACNC: 1.35 MIU/L (ref 0.44–3.98)
UNIT ABO: NORMAL
UNIT ABO: NORMAL
UNIT NUMBER: NORMAL
UNIT NUMBER: NORMAL
UNIT RH: NORMAL
UNIT RH: NORMAL
UNIT VOLUME: 298
UNIT VOLUME: 318
URATE SERPL-MCNC: 3.3 MG/DL (ref 4–7.5)
VARIANT LYMPHS # BLD MANUAL: 0.06 X10*3/UL (ref 0–0.5)
VARIANT LYMPHS NFR BLD: 2.4 %
WBC # BLD AUTO: 2.6 X10*3/UL (ref 4.4–11.3)
WBC # BLD AUTO: 4.1 X10*3/UL (ref 4.4–11.3)

## 2024-01-20 PROCEDURE — 84550 ASSAY OF BLOOD/URIC ACID: CPT

## 2024-01-20 PROCEDURE — 83615 LACTATE (LD) (LDH) ENZYME: CPT

## 2024-01-20 PROCEDURE — 2500000004 HC RX 250 GENERAL PHARMACY W/ HCPCS (ALT 636 FOR OP/ED)

## 2024-01-20 PROCEDURE — 99233 SBSQ HOSP IP/OBS HIGH 50: CPT | Performed by: STUDENT IN AN ORGANIZED HEALTH CARE EDUCATION/TRAINING PROGRAM

## 2024-01-20 PROCEDURE — P9073 PLATELETS PHERESIS PATH REDU: HCPCS

## 2024-01-20 PROCEDURE — 85027 COMPLETE CBC AUTOMATED: CPT

## 2024-01-20 PROCEDURE — 84443 ASSAY THYROID STIM HORMONE: CPT | Performed by: NURSE PRACTITIONER

## 2024-01-20 PROCEDURE — 86901 BLOOD TYPING SEROLOGIC RH(D): CPT

## 2024-01-20 PROCEDURE — 2500000001 HC RX 250 WO HCPCS SELF ADMINISTERED DRUGS (ALT 637 FOR MEDICARE OP)

## 2024-01-20 PROCEDURE — 36430 TRANSFUSION BLD/BLD COMPNT: CPT

## 2024-01-20 PROCEDURE — 83735 ASSAY OF MAGNESIUM: CPT

## 2024-01-20 PROCEDURE — 85007 BL SMEAR W/DIFF WBC COUNT: CPT

## 2024-01-20 PROCEDURE — 2020000001 HC ICU ROOM DAILY

## 2024-01-20 PROCEDURE — 2500000004 HC RX 250 GENERAL PHARMACY W/ HCPCS (ALT 636 FOR OP/ED): Performed by: EMERGENCY MEDICINE

## 2024-01-20 PROCEDURE — 80069 RENAL FUNCTION PANEL: CPT

## 2024-01-20 RX ADMIN — CEFEPIME 1 G: 1 INJECTION, SOLUTION INTRAVENOUS at 01:38

## 2024-01-20 RX ADMIN — ACYCLOVIR 400 MG: 400 TABLET ORAL at 09:08

## 2024-01-20 RX ADMIN — FERROUS SULFATE TAB 325 MG (65 MG ELEMENTAL FE) 1 TABLET: 325 (65 FE) TAB at 20:27

## 2024-01-20 RX ADMIN — ACYCLOVIR 400 MG: 400 TABLET ORAL at 20:27

## 2024-01-20 RX ADMIN — DEXTROSE MONOHYDRATE 1000 MG: 50 INJECTION, SOLUTION INTRAVENOUS at 05:22

## 2024-01-20 RX ADMIN — FERROUS SULFATE TAB 325 MG (65 MG ELEMENTAL FE) 1 TABLET: 325 (65 FE) TAB at 09:08

## 2024-01-20 RX ADMIN — FLUCONAZOLE 400 MG: 150 TABLET ORAL at 09:08

## 2024-01-20 ASSESSMENT — COGNITIVE AND FUNCTIONAL STATUS - GENERAL
DAILY ACTIVITIY SCORE: 24
MOBILITY SCORE: 24

## 2024-01-20 ASSESSMENT — PAIN - FUNCTIONAL ASSESSMENT
PAIN_FUNCTIONAL_ASSESSMENT: 0-10

## 2024-01-20 ASSESSMENT — PAIN SCALES - GENERAL
PAINLEVEL_OUTOF10: 0 - NO PAIN

## 2024-01-20 NOTE — CARE PLAN
The patient's goals for the shift include  remain free of harm and continue to participate in plan of care.    The clinical goals for the shift include Patient will remain free from injury this shift.

## 2024-01-20 NOTE — PROGRESS NOTES
"Shilo Thakkar is a 23 y.o. male on day 3 of admission presenting with Blurry vision.    Subjective   Afebrile. New floater in R eye, otherwise vision unchanged from yesterday, still blurry. Denies light flashes, eye pain, curtain across eye, eye pressure. Appetite stable. No bleeding noted. Denies CP SOB palpitations HA abd pain NVDC. ROS otherwise unremarkable. Of note, patient would like a break (discharge home) before next round of chemo. Discussed plan of care with patient and patient's girlfriend.      Objective   Physical Exam  Constitutional:       General: He is not in acute distress.  HENT:      Mouth/Throat:      Mouth: Mucous membranes are moist.      Pharynx: Oropharynx is clear.   Eyes:      Pupils: Pupils are equal, round, and reactive to light.   Cardiovascular:      Rate and Rhythm: Normal rate and regular rhythm.   Pulmonary:      Effort: Pulmonary effort is normal. No respiratory distress.      Breath sounds: Normal breath sounds.   Abdominal:      General: Bowel sounds are normal.      Palpations: Abdomen is soft.      Tenderness: There is no abdominal tenderness.   Musculoskeletal:         General: Normal range of motion.      Cervical back: Normal range of motion and neck supple.      Right lower leg: No edema.      Left lower leg: No edema.   Skin:     General: Skin is warm and dry.      Capillary Refill: Capillary refill takes less than 2 seconds.      Coloration: Skin is pale. Skin is not jaundiced.   Neurological:      General: No focal deficit present.      Mental Status: He is alert and oriented to person, place, and time.   Psychiatric:         Mood and Affect: Mood normal.     Last Recorded Vitals  Blood pressure 130/77, pulse 57, temperature 37.2 °C (99 °F), temperature source Temporal, resp. rate 16, height 1.854 m (6' 1\"), weight 70.4 kg (155 lb 3.3 oz), SpO2 100 %.  Intake/Output last 3 Shifts:  I/O last 3 completed shifts:  In: 2439 (34.6 mL/kg) [P.O.:240; I.V.:230 (3.3 mL/kg); " Blood:1537; IV Piggyback:432]  Out: 0 (0 mL/kg)   Weight: 70.4 kg     Relevant Results  Scheduled medications  acyclovir, 400 mg, oral, BID  ferrous sulfate (325 mg ferrous sulfate), 1 tablet, oral, BID  fluconazole, 400 mg, oral, Daily  methylPREDNISolone sodium succinate (PF), 1,000 mg, intravenous, q24h  pantoprazole, 40 mg, oral, Daily before breakfast  sulfamethoxazole-trimethoprim, 1 tablet, oral, Every Mon/Wed/Fri    Assessment/Plan   Principal Problem:    Blurry vision  Shilo Thakkar is a 23 y.o. male with PMH of Burkitt Lymphoma (Dx 10/20/23; s/p 4 cycles of HyperCVAD), malignant pleural effusions s/p CT (10/29/23), and ADHD who presented to the ED 1/17 with blurry vision, dizziness, epistaxis, and headache. Initially a BAT was called; CTH showed no signs of bleed or acute ischemic stroke; CTA showed no vascular disease. Neurology saw patient in ED and indicated that no further neurologic workup was indicated. Ophtho consulted and following. He is now admitted for further management to [facility].      ONC:  # Burkitt lymphoma, newly diagnosed (10/20/23)  - 10/19/23 presented to OSH ED with dysphagia and abdominal pain  - CT A/P with IV contrast (10/19) mass in the body of the pancreas measuring approximately 2.7 x 2.9 cm. Multiple masses in the mesentery as well as omental caking consistent with carcinomatosis.   - CT soft tissue neck with IV contrast (10/19): homogeneous soft tissue attenuation lesion in the L oropharynx which measures approximately 3.0 x 4.0 x 5.1 cm.   - CT Abdomen w/o IV contrast (10/20): Mesenteric lymphadenopathy  - US guided mesenteric LN biopsy (10/20/23): HIGH GRADE B-CELL LYMPHOMA MORPHOLOGICALLY CONSISTENT WITH BURKITT LYMPHOMA; flow cytometry: CD10+ kappa restricted B cells supportive of a B cell lymphoma with germinal center origin.   - Biopsy L oropharyngeal mass and L cervical LN by ENT (10/23/23) high grade B-cell burkitt lymphoma  - PET CT (10/30) showed FDG avid left  oropharyngeal mass and LAD above and below diaphragm  - BMBx (11/1) without evidence of disease  - s/p 4 cycles of HyperCVAD (10/28, 11/19, 12/11, 1/2/24), next cycle due 1/26/24  - s/p neulasta 1/9/24  - PET scan (1/15/24): Deauville 2. Complete resolution of metabolic activity of previously seen left oropharyngeal mass, resolution of previously seen hypermetabolic left level 2 and 3 cervical lymphadenopathy, resolution of previously seen moderate-large size left pleural effusion and trace right pleural effusion and abdominopelvic ascites, resolution of previously seen hypermetabolic large mass within pancreatic tail/body and the previously seen hypermetabolic retroperitoneal and mesenteric lymphadenopathy.     OPTHO:  # retinoschisis   -Pt Presented with blurry vision and states blurry vision OU began upon waking on 1/17 (was fine at bedtime 1/16), feels like it is blurry especially centrally with left eye central grayed out spots. Denies eye pain, diplopia, flashes or floaters.   -Ophthalmology was consulted in ED: noted clear serous or exudative fluid accumulated over macula OU with clearly defined edges that is not hemorrhagic in nature and bilateral retinal hemorrhages that have been present for >1 week, suspect retinoschisis given round edges and bilateral nature, no urgent surgery at this time due to patient's pancytopenia but can consider sx in future if no improvement, recommend Solumedrol 1gm IV daily.   -Solumedrol 1gm IV QD (1/18-p)  -ophtho took for additional imaging 1/18; no indication for an urgent intervention but awaiting attending input; they are hoping it could resolve on its own but it will probably need surgery but not urgently, probably outpatient  - Plan to take to OR early in week of 1/22, keep Plt>80k with no post procedure plt goal per optho     HEME:  # Pancytopenia likely 2/2 disease  - WBC 0.3, H&H 4.2/11.1, plt 1  - S/p 3 units PRBC, 3 units plts total this admission as of 1/18 PM     -Transfused RBC and PLTs 1/19  - Continue home ferrous sulfate 325mg BID started by Dr. Klein  - Transfuse to keep Hgb>7, Plt>80 per ophtho requirements for surgery  - haptoglobin (1/18) 212  # DVT ppx: held d/t thrombocytopenia     CARDIAC:  - ECHO (10/2023): EF 60-65%  - Baseline HR 50s  - tele ordered per Latoya     ID:  Allergies: NKDA  PPX: Acyclovir 400mg BID, Fluconazole 400mg daily, Bactrim MWF  - s/p Levaquin for neutropenia (day 5 through day 15 per outpt notes; 1/6-1/16)  - flu A, flu B, COVID all negative (1/18)  - Blood cx (1/17) NGTD  - s/p Cefepime 1gm Q8 (1/17-1/20), stopped with ANC recovery      FEN/GI:  - Admit weight: 73 kg. Current weight: 70.4 kg (1/18)  - Replace electrolytes as needed  - G6PD normal (10/26/23)  - Ordered Zofran for Nausea     ENT:  #Epistaxis; resolved   -Epistaxis (with clots) started overnight on 1/16 and continued all night long  -plts on admission-1, Hgb 4.2  -Repleted prbc and plt, has not recurred   -Consider ENT consult if worsens     NEURO:  -Presented to ED with blurry vision, headache, and dizziness; BAT called  -CTH-No evidence of hemodynamically significant stenosis or proximal large branch vessel cutoff on CTA of the head or neck. No evidence of acute cortical infarct or acute intracranial hemorrhage on head CT  -Neurology consulted in ED-No acute surgical intervention; follow Ophthalmology recommendations; signed off.     ENDO:  - CTA neck (1/17) with 6 mm nodule in the left lobe of the thyroid gland   - TSH level (1/20): 1.35       DISPO:  - Full Code  - Access: PICC line (right), PIV  - Primary Onc: Dr. Klein  - NOK: Ms Deidra Pickett [phone]    Patient seen, discussed and examined with Dr. Katherine Zimmerman PA-C

## 2024-01-20 NOTE — HOSPITAL COURSE
Shilo Thakkar is a 23 y.o. male with PMH of Burkitt Lymphoma (Dx 10/20/23; s/p 4 cycles of HyperCVAD), malignant pleural effusions s/p CT (10/29/23), and ADHD who presented to the ED 1/17 with blurry vision, dizziness, epistaxis, and headache. Initially a BAT was called; CTH showed no signs of bleed or acute ischemic stroke; CTA showed no vascular disease. Neurology saw patient in ED and indicated that no further neurologic workup was indicated. Ophtho consulted and following. He is now admitted for further management to [facility].    Hospital course notable for:   Bilateral macula-involving serous vs exudative retinal fluid, Left worse than Right, c/f retinoschisis vs less likely detachments. S/p 1g solumedrol IV daily (1/18-1/22). S/p Left eye pars plana vitrectomy (PPV)/endolaser (EL)/gas or oil (1/22). Ophtho will continue to monitor the Right eye and will consider surgery in the future if necessary.   Pancytopenia. ANC recovered without intervention. Platelets kept >80 until surgery per ophtho requirements. Hgb stable after transfusions.   3. Sinus bradycardia, patient stated he has a history of bradycardia, asymptomatic       Access: R picc SOLO  PPX: acyclovir, fluconazole, bactrim  Follow up:   - 1/24 Dr. Klein  - 1/26 Claire Win  - was supposed to have an appt with Genetics 1/22 virtually but was cancelled due to his admission; requested appointment to be rescheduled    Recommendations per Optho on 1/23  Patient now POD1 status post (s/p) pars plana vitrectomy (PPV)/C3F8/intraretinal tPA doing well  Will arrange for POW1 appointment with Dr. Canales at 9 AM on 01/26/24 at [facility]  Recommend Prednisolone, Ofloxacin, and artificial tears QID to the left eye, can remove patch and shield to adminster drops, 5 minutes apart  Recommend face down positioning for 2-3 days, avoid all face-up positioning  Patient should sleep with head of bed at 45 degrees   Written instructions given and reviewed with  patient    Patient seen, discussed and examined with Dr. Katherine Stuart

## 2024-01-21 ENCOUNTER — ANESTHESIA EVENT (OUTPATIENT)
Dept: OPERATING ROOM | Facility: HOSPITAL | Age: 24
DRG: 116 | End: 2024-01-21
Payer: COMMERCIAL

## 2024-01-21 PROBLEM — H33.22 LEFT RETINAL DETACHMENT: Status: ACTIVE | Noted: 2024-01-17

## 2024-01-21 PROBLEM — H35.63 RETINAL HEMORRHAGE OF BOTH EYES: Status: ACTIVE | Noted: 2024-01-17

## 2024-01-21 LAB
ALBUMIN SERPL BCP-MCNC: 3.9 G/DL (ref 3.4–5)
ANION GAP SERPL CALC-SCNC: 14 MMOL/L (ref 10–20)
BACTERIA BLD CULT: NORMAL
BACTERIA BLD CULT: NORMAL
BASOPHILS # BLD MANUAL: 0 X10*3/UL (ref 0–0.1)
BASOPHILS # BLD MANUAL: 0 X10*3/UL (ref 0–0.1)
BASOPHILS NFR BLD MANUAL: 0 %
BASOPHILS NFR BLD MANUAL: 0 %
BLOOD EXPIRATION DATE: NORMAL
BUN SERPL-MCNC: 22 MG/DL (ref 6–23)
CALCIUM SERPL-MCNC: 9.7 MG/DL (ref 8.6–10.6)
CHLORIDE SERPL-SCNC: 103 MMOL/L (ref 98–107)
CO2 SERPL-SCNC: 29 MMOL/L (ref 21–32)
CREAT SERPL-MCNC: 0.87 MG/DL (ref 0.5–1.3)
DACRYOCYTES BLD QL SMEAR: ABNORMAL
DISPENSE STATUS: NORMAL
EGFRCR SERPLBLD CKD-EPI 2021: >90 ML/MIN/1.73M*2
EOSINOPHIL # BLD MANUAL: 0 X10*3/UL (ref 0–0.7)
EOSINOPHIL # BLD MANUAL: 0 X10*3/UL (ref 0–0.7)
EOSINOPHIL NFR BLD MANUAL: 0 %
EOSINOPHIL NFR BLD MANUAL: 0 %
ERYTHROCYTE [DISTWIDTH] IN BLOOD BY AUTOMATED COUNT: 15.3 % (ref 11.5–14.5)
ERYTHROCYTE [DISTWIDTH] IN BLOOD BY AUTOMATED COUNT: 16 % (ref 11.5–14.5)
GLUCOSE SERPL-MCNC: 130 MG/DL (ref 74–99)
HCT VFR BLD AUTO: 20.9 % (ref 41–52)
HCT VFR BLD AUTO: 22.5 % (ref 41–52)
HGB BLD-MCNC: 7.3 G/DL (ref 13.5–17.5)
HGB BLD-MCNC: 8.2 G/DL (ref 13.5–17.5)
HYPOCHROMIA BLD QL SMEAR: ABNORMAL
IMM GRANULOCYTES # BLD AUTO: 0.67 X10*3/UL (ref 0–0.7)
IMM GRANULOCYTES # BLD AUTO: 0.72 X10*3/UL (ref 0–0.7)
IMM GRANULOCYTES NFR BLD AUTO: 10.4 % (ref 0–0.9)
IMM GRANULOCYTES NFR BLD AUTO: 8.9 % (ref 0–0.9)
LDH SERPL L TO P-CCNC: 141 U/L (ref 84–246)
LYMPHOCYTES # BLD MANUAL: 0.35 X10*3/UL (ref 1.2–4.8)
LYMPHOCYTES # BLD MANUAL: 0.5 X10*3/UL (ref 1.2–4.8)
LYMPHOCYTES NFR BLD MANUAL: 5.4 %
LYMPHOCYTES NFR BLD MANUAL: 6.2 %
MAGNESIUM SERPL-MCNC: 1.9 MG/DL (ref 1.6–2.4)
MCH RBC QN AUTO: 28.5 PG (ref 26–34)
MCH RBC QN AUTO: 29.3 PG (ref 26–34)
MCHC RBC AUTO-ENTMCNC: 34.9 G/DL (ref 32–36)
MCHC RBC AUTO-ENTMCNC: 36.4 G/DL (ref 32–36)
MCV RBC AUTO: 80 FL (ref 80–100)
MCV RBC AUTO: 82 FL (ref 80–100)
METAMYELOCYTES # BLD MANUAL: 0.13 X10*3/UL
METAMYELOCYTES NFR BLD MANUAL: 1.6 %
MONOCYTES # BLD MANUAL: 0.65 X10*3/UL (ref 0.1–1)
MONOCYTES # BLD MANUAL: 1.19 X10*3/UL (ref 0.1–1)
MONOCYTES NFR BLD MANUAL: 10 %
MONOCYTES NFR BLD MANUAL: 14.7 %
NEUTROPHILS # BLD MANUAL: 5.35 X10*3/UL (ref 1.2–7.7)
NEUTS BAND # BLD MANUAL: 0.15 X10*3/UL (ref 0–0.7)
NEUTS BAND NFR BLD MANUAL: 2.3 %
NEUTS SEG # BLD MANUAL: 5.2 X10*3/UL (ref 1.2–7)
NEUTS SEG # BLD MANUAL: 6.21 X10*3/UL (ref 1.2–7)
NEUTS SEG NFR BLD MANUAL: 76.7 %
NEUTS SEG NFR BLD MANUAL: 80 %
NRBC BLD-RTO: 0.5 /100 WBCS (ref 0–0)
NRBC BLD-RTO: 0.6 /100 WBCS (ref 0–0)
PHOSPHATE SERPL-MCNC: 4.2 MG/DL (ref 2.5–4.9)
PLATELET # BLD AUTO: 72 X10*3/UL (ref 150–450)
PLATELET # BLD AUTO: 82 X10*3/UL (ref 150–450)
POTASSIUM SERPL-SCNC: 3.8 MMOL/L (ref 3.5–5.3)
PRODUCT BLOOD TYPE: 6200
PRODUCT CODE: NORMAL
PROMYELOCYTES # BLD MANUAL: 0.05 X10*3/UL
PROMYELOCYTES # BLD MANUAL: 0.06 X10*3/UL
PROMYELOCYTES NFR BLD MANUAL: 0.8 %
PROMYELOCYTES NFR BLD MANUAL: 0.8 %
RBC # BLD AUTO: 2.56 X10*6/UL (ref 4.5–5.9)
RBC # BLD AUTO: 2.8 X10*6/UL (ref 4.5–5.9)
RBC MORPH BLD: ABNORMAL
RBC MORPH BLD: ABNORMAL
SCHISTOCYTES BLD QL SMEAR: ABNORMAL
SODIUM SERPL-SCNC: 142 MMOL/L (ref 136–145)
TOTAL CELLS COUNTED BLD: 129
TOTAL CELLS COUNTED BLD: 130
UNIT ABO: NORMAL
UNIT NUMBER: NORMAL
UNIT RH: NORMAL
UNIT VOLUME: 283
URATE SERPL-MCNC: 3.9 MG/DL (ref 4–7.5)
VARIANT LYMPHS # BLD MANUAL: 0.1 X10*3/UL (ref 0–0.5)
VARIANT LYMPHS NFR BLD: 1.5 %
WBC # BLD AUTO: 6.5 X10*3/UL (ref 4.4–11.3)
WBC # BLD AUTO: 8.1 X10*3/UL (ref 4.4–11.3)

## 2024-01-21 PROCEDURE — 80069 RENAL FUNCTION PANEL: CPT

## 2024-01-21 PROCEDURE — 2500000004 HC RX 250 GENERAL PHARMACY W/ HCPCS (ALT 636 FOR OP/ED)

## 2024-01-21 PROCEDURE — 84550 ASSAY OF BLOOD/URIC ACID: CPT

## 2024-01-21 PROCEDURE — 85027 COMPLETE CBC AUTOMATED: CPT

## 2024-01-21 PROCEDURE — 83735 ASSAY OF MAGNESIUM: CPT

## 2024-01-21 PROCEDURE — 2020000001 HC ICU ROOM DAILY

## 2024-01-21 PROCEDURE — P9037 PLATE PHERES LEUKOREDU IRRAD: HCPCS

## 2024-01-21 PROCEDURE — 85007 BL SMEAR W/DIFF WBC COUNT: CPT

## 2024-01-21 PROCEDURE — 2500000001 HC RX 250 WO HCPCS SELF ADMINISTERED DRUGS (ALT 637 FOR MEDICARE OP)

## 2024-01-21 PROCEDURE — 99233 SBSQ HOSP IP/OBS HIGH 50: CPT | Performed by: STUDENT IN AN ORGANIZED HEALTH CARE EDUCATION/TRAINING PROGRAM

## 2024-01-21 PROCEDURE — 36430 TRANSFUSION BLD/BLD COMPNT: CPT

## 2024-01-21 PROCEDURE — 83615 LACTATE (LD) (LDH) ENZYME: CPT

## 2024-01-21 RX ORDER — SULFAMETHOXAZOLE AND TRIMETHOPRIM 800; 160 MG/1; MG/1
1 TABLET ORAL
Qty: 12 TABLET | Refills: 0 | Status: SHIPPED | OUTPATIENT
Start: 2024-01-21 | End: 2024-02-16 | Stop reason: SDUPTHER

## 2024-01-21 RX ORDER — FLUCONAZOLE 200 MG/1
400 TABLET ORAL DAILY
Qty: 60 TABLET | Refills: 0 | Status: SHIPPED | OUTPATIENT
Start: 2024-01-21 | End: 2024-02-21

## 2024-01-21 RX ADMIN — ACYCLOVIR 400 MG: 400 TABLET ORAL at 20:44

## 2024-01-21 RX ADMIN — FLUCONAZOLE 400 MG: 150 TABLET ORAL at 09:22

## 2024-01-21 RX ADMIN — PANTOPRAZOLE SODIUM 40 MG: 40 TABLET, DELAYED RELEASE ORAL at 05:29

## 2024-01-21 RX ADMIN — FERROUS SULFATE TAB 325 MG (65 MG ELEMENTAL FE) 1 TABLET: 325 (65 FE) TAB at 09:22

## 2024-01-21 RX ADMIN — ACYCLOVIR 400 MG: 400 TABLET ORAL at 09:22

## 2024-01-21 RX ADMIN — DEXTROSE MONOHYDRATE 1000 MG: 50 INJECTION, SOLUTION INTRAVENOUS at 05:30

## 2024-01-21 RX ADMIN — FERROUS SULFATE TAB 325 MG (65 MG ELEMENTAL FE) 1 TABLET: 325 (65 FE) TAB at 20:44

## 2024-01-21 ASSESSMENT — COGNITIVE AND FUNCTIONAL STATUS - GENERAL
DAILY ACTIVITIY SCORE: 24
MOBILITY SCORE: 24
WALKING IN HOSPITAL ROOM: A LITTLE
MOBILITY SCORE: 22
CLIMB 3 TO 5 STEPS WITH RAILING: A LITTLE
DAILY ACTIVITIY SCORE: 24

## 2024-01-21 ASSESSMENT — PAIN - FUNCTIONAL ASSESSMENT
PAIN_FUNCTIONAL_ASSESSMENT: 0-10
PAIN_FUNCTIONAL_ASSESSMENT: 0-10

## 2024-01-21 ASSESSMENT — PAIN SCALES - GENERAL
PAINLEVEL_OUTOF10: 0 - NO PAIN
PAINLEVEL_OUTOF10: 0 - NO PAIN

## 2024-01-21 NOTE — CARE PLAN
Problem: Fall/Injury  Goal: Not fall by end of shift  Outcome: Progressing  Goal: Be free from injury by end of the shift  Outcome: Progressing  Goal: Verbalize understanding of personal risk factors for fall in the hospital  Outcome: Progressing  Goal: Verbalize understanding of risk factor reduction measures to prevent injury from fall in the home  Outcome: Progressing  Goal: Use assistive devices by end of the shift  Outcome: Progressing  Goal: Pace activities to prevent fatigue by end of the shift  Outcome: Progressing   The patient's goals for the shift include      The clinical goals for the shift include pt will remaion safe and free of injury and hacve no vision changes through 1/21 at 0700    Pt remained safe and free of injury throughout shift. Pt agreeable to calling out before getting OOB. VSS. No c/o pain. Pt with continued blurry vision bilaterally, however no headache or changes since beginning of shift.

## 2024-01-21 NOTE — PROGRESS NOTES
"Shilo Thakkar is a 23 y.o. male on day 4 of admission presenting with Blurry vision.    Subjective   Afebrile. Still has a floater in R eye, otherwise vision unchanged from yesterday, still blurry. Denies light flashes, eye pain, curtain coming down, eye pressure. Appetite stable. No bleeding noted. Denies CP SOB palpitations HA abd pain NVDC. ROS otherwise unremarkable.      Objective   Physical Exam  Constitutional:       General: He is not in acute distress.  HENT:      Head: Normocephalic and atraumatic.      Mouth/Throat:      Mouth: Mucous membranes are moist.      Pharynx: Oropharynx is clear.   Eyes:      Extraocular Movements: Extraocular movements intact.      Pupils: Pupils are equal, round, and reactive to light.   Cardiovascular:      Rate and Rhythm: Normal rate and regular rhythm.   Pulmonary:      Effort: Pulmonary effort is normal. No respiratory distress.      Breath sounds: Normal breath sounds.   Abdominal:      General: Bowel sounds are normal.      Palpations: Abdomen is soft.      Tenderness: There is no abdominal tenderness.   Musculoskeletal:         General: Normal range of motion.      Cervical back: Normal range of motion and neck supple.      Right lower leg: No edema.      Left lower leg: No edema.   Skin:     General: Skin is warm and dry.      Capillary Refill: Capillary refill takes less than 2 seconds.      Coloration: Skin is pale. Skin is not jaundiced.   Neurological:      General: No focal deficit present.      Mental Status: He is alert and oriented to person, place, and time.   Psychiatric:         Mood and Affect: Mood normal.     Last Recorded Vitals  Blood pressure 136/77, pulse (!) 47, temperature 36.6 °C (97.9 °F), resp. rate 16, height 1.854 m (6' 1\"), weight 70.4 kg (155 lb 3.3 oz), SpO2 99 %.  Intake/Output last 3 Shifts:  I/O last 3 completed shifts:  In: 1106.8 (15.7 mL/kg) [P.O.:240; I.V.:30 (0.4 mL/kg); Blood:559.8; IV Piggyback:277]  Out: 0 (0 mL/kg)   Weight: 70.4 " kg     Relevant Results  Scheduled medications  acyclovir, 400 mg, oral, BID  ferrous sulfate (325 mg ferrous sulfate), 1 tablet, oral, BID  fluconazole, 400 mg, oral, Daily  [START ON 1/22/2024] methylPREDNISolone sodium succinate (PF), 1,000 mg, intravenous, q24h  pantoprazole, 40 mg, oral, Daily before breakfast  sulfamethoxazole-trimethoprim, 1 tablet, oral, Every Mon/Wed/Fri    Assessment/Plan   Principal Problem:    Blurry vision  Active Problems:    Retinal hemorrhage of both eyes    Left retinal detachment    Shilo Thakkar is a 23 y.o. male with PMH of Burkitt Lymphoma (Dx 10/20/23; s/p 4 cycles of HyperCVAD), malignant pleural effusions s/p CT (10/29/23), and ADHD who presented to the ED 1/17 with blurry vision, dizziness, epistaxis, and headache. Initially a BAT was called; CTH showed no signs of bleed or acute ischemic stroke; CTA showed no vascular disease. Neurology saw patient in ED and indicated that no further neurologic workup was indicated. Ophtho consulted and following. He is now admitted for further management to [facility].      ONC:  # Burkitt lymphoma, newly diagnosed (10/20/23)  - 10/19/23 presented to OSH ED with dysphagia and abdominal pain  - CT A/P with IV contrast (10/19) mass in the body of the pancreas measuring approximately 2.7 x 2.9 cm. Multiple masses in the mesentery as well as omental caking consistent with carcinomatosis.   - CT soft tissue neck with IV contrast (10/19): homogeneous soft tissue attenuation lesion in the L oropharynx which measures approximately 3.0 x 4.0 x 5.1 cm.   - CT Abdomen w/o IV contrast (10/20): Mesenteric lymphadenopathy  - US guided mesenteric LN biopsy (10/20/23): HIGH GRADE B-CELL LYMPHOMA MORPHOLOGICALLY CONSISTENT WITH BURKITT LYMPHOMA; flow cytometry: CD10+ kappa restricted B cells supportive of a B cell lymphoma with germinal center origin.   - Biopsy L oropharyngeal mass and L cervical LN by ENT (10/23/23) high grade B-cell burkitt lymphoma  - PET  CT (10/30) showed FDG avid left oropharyngeal mass and LAD above and below diaphragm  - BMBx (11/1) without evidence of disease  - s/p 4 cycles of HyperCVAD (10/28, 11/19, 12/11, 1/2/24), next cycle due 1/26/24  - s/p neulasta 1/9/24  - PET scan (1/15/24): Deauville 2. Complete resolution of metabolic activity of previously seen left oropharyngeal mass, resolution of previously seen hypermetabolic left level 2 and 3 cervical lymphadenopathy, resolution of previously seen moderate-large size left pleural effusion and trace right pleural effusion and abdominopelvic ascites, resolution of previously seen hypermetabolic large mass within pancreatic tail/body and the previously seen hypermetabolic retroperitoneal and mesenteric lymphadenopathy.     OPTHO:  # retinoschisis   -Pt Presented with blurry vision and states blurry vision OU began upon waking on 1/17 (was fine at bedtime 1/16), feels like it is blurry especially centrally with left eye central grayed out spots. Denies eye pain, diplopia, flashes or floaters.   -Ophthalmology was consulted in ED: noted clear serous or exudative fluid accumulated over macula OU with clearly defined edges that is not hemorrhagic in nature and bilateral retinal hemorrhages that have been present for >1 week, suspect retinoschisis given round edges and bilateral nature, no urgent surgery at this time due to patient's pancytopenia but can consider sx in future if no improvement, recommend Solumedrol 1gm IV daily.   -Solumedrol 1gm IV QD (1/18-1/22) per ophtho  -ophtho took for additional imaging 1/18; no indication for an urgent intervention but awaiting attending input; they are hoping it could resolve on its own but it will probably need surgery but not urgently, probably outpatient  - NPO at midnight for OR tomorrow 1/22 (in afternoon) for L eye   - Keep Plt>80k with no post procedure plt goal per optho     HEME:  # Pancytopenia likely 2/2 disease  - WBC 0.3, H&H 4.2/11.1, plt 1  -  S/p 3 units PRBC, 3 units plts total this admission as of 1/18 PM    -Transfused RBC and PLTs 1/19  - Continue home ferrous sulfate 325mg BID started by Dr. Klein  - Transfuse to keep Hgb>7, Plt>80 per ophtho requirements for surgery (no post procedure plt goal)  - haptoglobin (1/18) 212  # DVT ppx: held d/t thrombocytopenia     CARDIAC:  - ECHO (10/2023): EF 60-65%  - Baseline HR 50s  - tele ordered per Latoya     ID:  Allergies: NKDA  PPX: Acyclovir 400mg BID, Fluconazole 400mg daily, Bactrim MWF  - s/p Levaquin for neutropenia (day 5 through day 15 per outpt notes; 1/6-1/16)  - flu A, flu B, COVID all negative (1/18)  - Blood cx (1/17) NGTD  - s/p Cefepime 1gm Q8 (1/17-1/20), stopped with ANC recovery      FEN/GI:  - Admit weight: 73 kg. Current weight: 70.4 kg (1/18)  - Replace electrolytes as needed  - G6PD normal (10/26/23)  - Ordered Zofran for Nausea     ENT:  #Epistaxis; resolved   -Epistaxis (with clots) started overnight on 1/16 and continued all night long  -plts on admission-1, Hgb 4.2  -Repleted prbc and plt, has not recurred   -Consider ENT consult if worsens     NEURO:  -Presented to ED with blurry vision, headache, and dizziness; BAT called  -CTH-No evidence of hemodynamically significant stenosis or proximal large branch vessel cutoff on CTA of the head or neck. No evidence of acute cortical infarct or acute intracranial hemorrhage on head CT  -Neurology consulted in ED-No acute surgical intervention; follow Ophthalmology recommendations; signed off.     ENDO:  -CTA neck (1/17) with 6 mm nodule in the left lobe of the thyroid gland   -TSH level (1/20): 1.35       DISPO:  - Full Code  - Access: PICC line (right), PIV  - Primary Onc: Dr. Klein  - NOK: Ms Deidra Pickett [phone]    Patient seen, discussed and examined with Dr. Katherine Zimmerman PA-C

## 2024-01-21 NOTE — CONSULTS
"History of Present Illness:  23 YOM with PMH Burkitt's lymphoma stage III on chemotherapy most recently 1/11, next infusion session beginning on Monday and patient presenting with concerns for nosebleed headache and bilateral blurry vision. Initially concern for binasal hemianopia, neuro stroke assessed w CTH which was unremarkable for stroke. Pt states blurry vision OU  began upon waking today, feels like it is blurry especially centrally with left eye central grayed out spots. Denies eye pain, diplopia, flashes or floaters. Denies HA, diziness, nausea today (does report nausea episode last night + vomiting). Received intrathecal chemo 1/11/24 without initial complications, pt was given prednisolone forte drop to use 2 days after chemo and prn if blurry vision. Per chart review, his onc team recommends using pred drop for 1 wk following chemo.     Seen at bedside 1/21/24 and reports a new floater in his right eye but otherwise no changes. No eye pain, flashes, redness or diplopia. He is anxious to have surgery. Platelets are 82 this AM.     Chemo:  - S/P C4 HyperCVAD with D1 1/2/24.  - C4D2 LP with IT Cytarabine (1/4/24): CSF Flow Cytometry Negative  - C4D6 Rituximab + Neulasta today (1/9/24)  - C4D8 LP with IT methotrexate to be completed 1/11/24   - Post chemo he should start levofloxacin between day 5 and day 15.  He should continue without interruption fluconazole and acyclovir and 3 times weekly Bactrim.  - Before next cycle, cycle 5, we will obtain an echocardiogram and a PET scan for \"interim\" staging.  Overall he is doing very well       PMH: As per HPI  Allergies: allergy list reviewed  Past Ocular Hx: As per HPI  Family Hx: No hx of glaucoma, age related macular degeneration, blindness  Social Hx: lives with family    Examination:     Base Eye Exam       Visual Acuity (Snellen - Linear)         Right Left    Near sc 20/200 20/200              Tonometry (Tonopen, 4:00 PM)         Right Left    Pressure 21 " 17              Pupils         Pupils Dark Light APD    Right PERRL, No APD 4 2 None    Left PERRL, No APD 4 2 None              Visual Fields         Left Right     Full Full              Extraocular Movement         Right Left     Full Full              Neuro/Psych       Oriented x3: Yes                  Slit Lamp and Fundus Exam       External Exam         Right Left    External Normal Normal              Slit Lamp Exam         Right Left    Lids/Lashes Normal Normal    Conjunctiva/Sclera White and quiet White and quiet    Cornea Clear Clear    Anterior Chamber Deep and quiet Deep and quiet    Iris Round and reactive Round and reactive    Lens Clear Clear    Anterior Vitreous Vitreous (vit) hemorrhage overlying retinal hemorrhage Vit hemorrhage              Fundus Exam         Right Left    Disc crisp, partially obstructed temporally by overlying vit heme unable to view d/t overlying vit heme    C/D Ratio 0.3 0.3    Macula serous detachment vs retinoschisis including macula extending from optic nerve just posterior to sup and inf arcades about 3 disk diopters from fovea; with overlying retinal hemorrhage (dark blood, suspect chronic) serous detachment vs retinoschisis including macula extending from optic nerve just posterior to sup and inf arcades; with overlying retinal hemorrhage extending to disk (dark blood, suspect chronic), overlying vitreous (vit) hemorrhage    Vessels multiple flam hemorrhages sup and inf arcades multiple flame hemorrhages sup and inf arcades, largest at 2 oclock    Periphery multiple flame hemorrhages, Dot-blot hemorrhage Multiple flame hemorrhages, Dot-blot hemorrhage; superior and temporal schisis vs shallow detachments                            1/11/24  (LP): Hemorrhagic specimen, no lymphoma cells seen including on flow, glucose 61, protein 27     Light perception (LP) 1/4/24  No lymphoma cells seen on cell count or flow        CTA Head/neck 1/17/24  1. No evidence of  hemodynamically significant stenosis or proximal large branch vessel cutoff on CTA of the head or neck.  2. No evidence of acute cortical infarct or acute intracranial hemorrhage on head CT. MRI with diffusion-imaging would be a more  sensitive means of assessing for acute ischemic injury.  3. 6 mm nodule in the left lobe of the thyroid gland. In the absence of risk factors for malignancy it is likely of no clinical significance.     PET Scan 12/27/23  IMPRESSION:  1. Interval near-complete/complete resolution of metabolic activity of previously seen left oropharyngeal mass, and resolution previously seen hypermetabolic left level 2 and 3 cervical lymphadenopathy.  Minimal-mild hypermetabolic activity the left oropharynx is likely related to post treatment changes. Direct visualization is  recommended to exclude residual disease.. Deauville score 2.  2. Interval resolution of previously seen hypermetabolic left level 2 and level 3 cervical lymphadenopathy.  3. Interval complete resolution of previously seen moderate-large size left pleural effusion and trace right pleural effusion.  4. Interval near-complete resolution of previously seen abdominopelvic ascites.  5. Interval complete metabolic resolution of previously seen hypermetabolic large mass within pancreatic tail/body and the  previously seen hypermetabolic retroperitoneal and mesenteric lymphadenopathy.      MRI Brain 10/27/23    IMPRESSION:  1.  No evidence of intracranial metastatic disease.  2. Partially visualized mass located within the left oropharynx with  adjacent enlarged lymph nodes. These findings were better seen on the  prior CT neck study.     B-scan 1/18/24:  right eye: no obvious retinal detachment, appreciated subretinal and subhyaloid hemes (multilayered hemes)  left eye: no obvious retinal detachment, appreciated large subhyaloid heme (multilayered hemes)     Macula OCT 1/18/24:  Right eye: poor quality, preretinal and subhyaloid hemes  (multilayered hemes)  Left eye: poor quality, preretinal and subhyaloid hemes (multilayered hemes)     Clarus fundus pictures taken 1/18/24       Update 1/21/24:  No significant changes in patients symptoms. Platelet counts going up - 82 this AM. After discussion with primary team and Dr. Quevedo, will plan on taking patient to OR tomorrow 1/22/24 in the afternoon for pars plana vitrectomy (PPV)/endolaser (EL)/gas or oil left eye.  NPO at midnight.  Case request has been placed.      #Bilateral macula-involving serous vs exudative retinal fluid, c/f retinoschisis vs less likely detachments  #Bilateral chronic retinal hemorrhages, overlying vitreous (vit) heme  - Clear serous or exudative fluid accumulated over macula OU with clearly defined edges. Examined patient with Dr. Quevedo who confirms overall fluid/detachment accumulated is not hemorrhagic  -Additionally, retinal hemorrhages appear too dark to be acute, suspect present for >1 week.  -Suspect retinoschisis given round edges, bilateral nature  -Ddx underlying etiology includes low platelets vs cannot rule out infiltrative lymphoma  -Discussed with Dr. Quevedo, would hold off on surgery until blood counts, platelets in particular, improve. At this time, platelets are 82 and thus will plan on taking him to the OR 1/22/24  -Recommend continued IV solumedrol 1 g daily to aid in resolution if cleared by primary team (started on 1/18/ - )  - Patient should sleep with head of bed at 45 degrees  -Will continue to monitor while inpatient      Discussed with Dr. Quevedo.    Tobi Mcdonald MD  Ophthalmology PGY-2      Ophthalmology Adult Pager - [phone]  Ophthalmology Pediatrics Pager - [phone]    For adult follow-up appointments, call: [phone]  For pediatric follow-up appointments, call: [phone]      NOTE: This note is not finalized until attending reviews and signs.

## 2024-01-22 ENCOUNTER — PHARMACY VISIT (OUTPATIENT)
Dept: PHARMACY | Facility: CLINIC | Age: 24
End: 2024-01-22
Payer: MEDICARE

## 2024-01-22 ENCOUNTER — ANESTHESIA (OUTPATIENT)
Dept: OPERATING ROOM | Facility: HOSPITAL | Age: 24
DRG: 116 | End: 2024-01-22
Payer: COMMERCIAL

## 2024-01-22 ENCOUNTER — APPOINTMENT (OUTPATIENT)
Dept: GENETICS | Facility: HOSPITAL | Age: 24
End: 2024-01-22
Payer: COMMERCIAL

## 2024-01-22 PROBLEM — D63.0 ANEMIA IN NEOPLASTIC DISEASE: Status: ACTIVE | Noted: 2024-01-22

## 2024-01-22 PROBLEM — D69.6 THROMBOCYTOPENIA (CMS-HCC): Status: ACTIVE | Noted: 2024-01-22

## 2024-01-22 LAB
ALBUMIN SERPL BCP-MCNC: 3.9 G/DL (ref 3.4–5)
ANION GAP SERPL CALC-SCNC: 12 MMOL/L (ref 10–20)
APTT PPP: 25 SECONDS (ref 27–38)
BASOPHILS # BLD MANUAL: 0 X10*3/UL (ref 0–0.1)
BASOPHILS NFR BLD MANUAL: 0 %
BUN SERPL-MCNC: 23 MG/DL (ref 6–23)
CALCIUM SERPL-MCNC: 9.3 MG/DL (ref 8.6–10.6)
CHLORIDE SERPL-SCNC: 102 MMOL/L (ref 98–107)
CO2 SERPL-SCNC: 30 MMOL/L (ref 21–32)
CREAT SERPL-MCNC: 0.99 MG/DL (ref 0.5–1.3)
DACRYOCYTES BLD QL SMEAR: ABNORMAL
EGFRCR SERPLBLD CKD-EPI 2021: >90 ML/MIN/1.73M*2
EOSINOPHIL # BLD MANUAL: 0 X10*3/UL (ref 0–0.7)
EOSINOPHIL NFR BLD MANUAL: 0 %
ERYTHROCYTE [DISTWIDTH] IN BLOOD BY AUTOMATED COUNT: 15.5 % (ref 11.5–14.5)
GLUCOSE SERPL-MCNC: 150 MG/DL (ref 74–99)
HCT VFR BLD AUTO: 20.7 % (ref 41–52)
HGB BLD-MCNC: 7.4 G/DL (ref 13.5–17.5)
IMM GRANULOCYTES # BLD AUTO: 1.03 X10*3/UL (ref 0–0.7)
IMM GRANULOCYTES NFR BLD AUTO: 10.7 % (ref 0–0.9)
INR PPP: 1.2 (ref 0.9–1.1)
LDH SERPL L TO P-CCNC: 194 U/L (ref 84–246)
LYMPHOCYTES # BLD MANUAL: 0.24 X10*3/UL (ref 1.2–4.8)
LYMPHOCYTES NFR BLD MANUAL: 2.5 %
MAGNESIUM SERPL-MCNC: 1.97 MG/DL (ref 1.6–2.4)
MCH RBC QN AUTO: 29 PG (ref 26–34)
MCHC RBC AUTO-ENTMCNC: 35.7 G/DL (ref 32–36)
MCV RBC AUTO: 81 FL (ref 80–100)
MONOCYTES # BLD MANUAL: 1.45 X10*3/UL (ref 0.1–1)
MONOCYTES NFR BLD MANUAL: 15.1 %
MYELOCYTES # BLD MANUAL: 0.16 X10*3/UL
MYELOCYTES NFR BLD MANUAL: 1.7 %
NEUTROPHILS # BLD MANUAL: 7.58 X10*3/UL (ref 1.2–7.7)
NEUTS BAND # BLD MANUAL: 0.48 X10*3/UL (ref 0–0.7)
NEUTS BAND NFR BLD MANUAL: 5 %
NEUTS SEG # BLD MANUAL: 7.1 X10*3/UL (ref 1.2–7)
NEUTS SEG NFR BLD MANUAL: 74 %
NRBC BLD-RTO: 0.6 /100 WBCS (ref 0–0)
OVALOCYTES BLD QL SMEAR: ABNORMAL
PHOSPHATE SERPL-MCNC: 4.3 MG/DL (ref 2.5–4.9)
PLATELET # BLD AUTO: 88 X10*3/UL (ref 150–450)
POTASSIUM SERPL-SCNC: 3.9 MMOL/L (ref 3.5–5.3)
PROTHROMBIN TIME: 13.6 SECONDS (ref 9.8–12.8)
RBC # BLD AUTO: 2.55 X10*6/UL (ref 4.5–5.9)
RBC MORPH BLD: ABNORMAL
SODIUM SERPL-SCNC: 140 MMOL/L (ref 136–145)
TOTAL CELLS COUNTED BLD: 119
URATE SERPL-MCNC: 4.5 MG/DL (ref 4–7.5)
VARIANT LYMPHS # BLD MANUAL: 0.16 X10*3/UL (ref 0–0.5)
VARIANT LYMPHS NFR BLD: 1.7 %
WBC # BLD AUTO: 9.6 X10*3/UL (ref 4.4–11.3)

## 2024-01-22 PROCEDURE — 2500000004 HC RX 250 GENERAL PHARMACY W/ HCPCS (ALT 636 FOR OP/ED)

## 2024-01-22 PROCEDURE — 2500000001 HC RX 250 WO HCPCS SELF ADMINISTERED DRUGS (ALT 637 FOR MEDICARE OP): Performed by: STUDENT IN AN ORGANIZED HEALTH CARE EDUCATION/TRAINING PROGRAM

## 2024-01-22 PROCEDURE — 83615 LACTATE (LD) (LDH) ENZYME: CPT

## 2024-01-22 PROCEDURE — 84550 ASSAY OF BLOOD/URIC ACID: CPT

## 2024-01-22 PROCEDURE — 67036 REMOVAL OF INNER EYE FLUID: CPT

## 2024-01-22 PROCEDURE — 80069 RENAL FUNCTION PANEL: CPT

## 2024-01-22 PROCEDURE — RXMED WILLOW AMBULATORY MEDICATION CHARGE

## 2024-01-22 PROCEDURE — 3700000001 HC GENERAL ANESTHESIA TIME - INITIAL BASE CHARGE

## 2024-01-22 PROCEDURE — 85610 PROTHROMBIN TIME: CPT

## 2024-01-22 PROCEDURE — 2500000001 HC RX 250 WO HCPCS SELF ADMINISTERED DRUGS (ALT 637 FOR MEDICARE OP)

## 2024-01-22 PROCEDURE — 7100000002 HC RECOVERY ROOM TIME - EACH INCREMENTAL 1 MINUTE

## 2024-01-22 PROCEDURE — 08B53ZZ EXCISION OF LEFT VITREOUS, PERCUTANEOUS APPROACH: ICD-10-PCS

## 2024-01-22 PROCEDURE — 2720000007 HC OR 272 NO HCPCS

## 2024-01-22 PROCEDURE — 2020000001 HC ICU ROOM DAILY

## 2024-01-22 PROCEDURE — 3600000008 HC OR TIME - EACH INCREMENTAL 1 MINUTE - PROCEDURE LEVEL THREE

## 2024-01-22 PROCEDURE — 3600000003 HC OR TIME - INITIAL BASE CHARGE - PROCEDURE LEVEL THREE

## 2024-01-22 PROCEDURE — 83735 ASSAY OF MAGNESIUM: CPT

## 2024-01-22 PROCEDURE — A67036 PR VITRECTOMY,MECHANICAL: Performed by: ANESTHESIOLOGY

## 2024-01-22 PROCEDURE — 85007 BL SMEAR W/DIFF WBC COUNT: CPT

## 2024-01-22 PROCEDURE — 2500000005 HC RX 250 GENERAL PHARMACY W/O HCPCS

## 2024-01-22 PROCEDURE — 3700000002 HC GENERAL ANESTHESIA TIME - EACH INCREMENTAL 1 MINUTE

## 2024-01-22 PROCEDURE — 67036 REMOVAL OF INNER EYE FLUID: CPT | Performed by: STUDENT IN AN ORGANIZED HEALTH CARE EDUCATION/TRAINING PROGRAM

## 2024-01-22 PROCEDURE — 85027 COMPLETE CBC AUTOMATED: CPT

## 2024-01-22 PROCEDURE — 7100000001 HC RECOVERY ROOM TIME - INITIAL BASE CHARGE

## 2024-01-22 PROCEDURE — 2500000004 HC RX 250 GENERAL PHARMACY W/ HCPCS (ALT 636 FOR OP/ED): Performed by: OPHTHALMOLOGY

## 2024-01-22 RX ORDER — PROPOFOL 10 MG/ML
INJECTION, EMULSION INTRAVENOUS AS NEEDED
Status: DISCONTINUED | OUTPATIENT
Start: 2024-01-22 | End: 2024-01-22

## 2024-01-22 RX ORDER — OXYCODONE HYDROCHLORIDE 5 MG/1
10 TABLET ORAL EVERY 4 HOURS PRN
Status: DISCONTINUED | OUTPATIENT
Start: 2024-01-22 | End: 2024-01-23 | Stop reason: HOSPADM

## 2024-01-22 RX ORDER — ACETAMINOPHEN 325 MG/1
650 TABLET ORAL EVERY 4 HOURS PRN
Status: DISCONTINUED | OUTPATIENT
Start: 2024-01-22 | End: 2024-01-23 | Stop reason: HOSPADM

## 2024-01-22 RX ORDER — TROPICAMIDE 5 MG/ML
1 SOLUTION/ DROPS OPHTHALMIC
Status: COMPLETED | OUTPATIENT
Start: 2024-01-22 | End: 2024-01-22

## 2024-01-22 RX ORDER — HYDRALAZINE HYDROCHLORIDE 20 MG/ML
5 INJECTION INTRAMUSCULAR; INTRAVENOUS EVERY 30 MIN PRN
Status: DISCONTINUED | OUTPATIENT
Start: 2024-01-22 | End: 2024-01-23 | Stop reason: HOSPADM

## 2024-01-22 RX ORDER — ONDANSETRON HYDROCHLORIDE 2 MG/ML
4 INJECTION, SOLUTION INTRAVENOUS ONCE AS NEEDED
Status: DISCONTINUED | OUTPATIENT
Start: 2024-01-22 | End: 2024-01-23 | Stop reason: HOSPADM

## 2024-01-22 RX ORDER — SODIUM CHLORIDE, SODIUM LACTATE, POTASSIUM CHLORIDE, CALCIUM CHLORIDE 600; 310; 30; 20 MG/100ML; MG/100ML; MG/100ML; MG/100ML
INJECTION, SOLUTION INTRAVENOUS CONTINUOUS PRN
Status: DISCONTINUED | OUTPATIENT
Start: 2024-01-22 | End: 2024-01-22

## 2024-01-22 RX ORDER — OXYCODONE HYDROCHLORIDE 5 MG/1
5 TABLET ORAL EVERY 4 HOURS PRN
Status: DISCONTINUED | OUTPATIENT
Start: 2024-01-22 | End: 2024-01-23 | Stop reason: HOSPADM

## 2024-01-22 RX ORDER — DEXAMETHASONE SODIUM PHOSPHATE 100 MG/10ML
INJECTION INTRAMUSCULAR; INTRAVENOUS AS NEEDED
Status: DISCONTINUED | OUTPATIENT
Start: 2024-01-22 | End: 2024-01-22 | Stop reason: HOSPADM

## 2024-01-22 RX ORDER — CEFAZOLIN 1 G/1
INJECTION, POWDER, FOR SOLUTION INTRAVENOUS AS NEEDED
Status: DISCONTINUED | OUTPATIENT
Start: 2024-01-22 | End: 2024-01-22 | Stop reason: HOSPADM

## 2024-01-22 RX ORDER — SODIUM CHLORIDE, SODIUM LACTATE, POTASSIUM CHLORIDE, CALCIUM CHLORIDE 600; 310; 30; 20 MG/100ML; MG/100ML; MG/100ML; MG/100ML
100 INJECTION, SOLUTION INTRAVENOUS CONTINUOUS
Status: DISCONTINUED | OUTPATIENT
Start: 2024-01-23 | End: 2024-01-23 | Stop reason: HOSPADM

## 2024-01-22 RX ORDER — GLYCOPYRROLATE 0.2 MG/ML
INJECTION INTRAMUSCULAR; INTRAVENOUS AS NEEDED
Status: DISCONTINUED | OUTPATIENT
Start: 2024-01-22 | End: 2024-01-22

## 2024-01-22 RX ORDER — HYDROMORPHONE HYDROCHLORIDE 1 MG/ML
0.5 INJECTION, SOLUTION INTRAMUSCULAR; INTRAVENOUS; SUBCUTANEOUS EVERY 5 MIN PRN
Status: DISCONTINUED | OUTPATIENT
Start: 2024-01-22 | End: 2024-01-23 | Stop reason: HOSPADM

## 2024-01-22 RX ORDER — PREDNISOLONE ACETATE 10 MG/ML
1 SUSPENSION/ DROPS OPHTHALMIC 4 TIMES DAILY
Qty: 10 ML | Refills: 1 | Status: SHIPPED | OUTPATIENT
Start: 2024-01-23 | End: 2024-02-19 | Stop reason: SDUPTHER

## 2024-01-22 RX ORDER — HYDROMORPHONE HYDROCHLORIDE 1 MG/ML
0.2 INJECTION, SOLUTION INTRAMUSCULAR; INTRAVENOUS; SUBCUTANEOUS EVERY 5 MIN PRN
Status: DISCONTINUED | OUTPATIENT
Start: 2024-01-22 | End: 2024-01-23 | Stop reason: HOSPADM

## 2024-01-22 RX ORDER — ROCURONIUM BROMIDE 10 MG/ML
INJECTION, SOLUTION INTRAVENOUS AS NEEDED
Status: DISCONTINUED | OUTPATIENT
Start: 2024-01-22 | End: 2024-01-22

## 2024-01-22 RX ORDER — MIDAZOLAM HYDROCHLORIDE 1 MG/ML
INJECTION, SOLUTION INTRAMUSCULAR; INTRAVENOUS AS NEEDED
Status: DISCONTINUED | OUTPATIENT
Start: 2024-01-22 | End: 2024-01-22

## 2024-01-22 RX ORDER — FENTANYL CITRATE 50 UG/ML
INJECTION, SOLUTION INTRAMUSCULAR; INTRAVENOUS AS NEEDED
Status: DISCONTINUED | OUTPATIENT
Start: 2024-01-22 | End: 2024-01-22

## 2024-01-22 RX ORDER — ONDANSETRON HYDROCHLORIDE 2 MG/ML
INJECTION, SOLUTION INTRAVENOUS AS NEEDED
Status: DISCONTINUED | OUTPATIENT
Start: 2024-01-22 | End: 2024-01-22

## 2024-01-22 RX ORDER — PHENYLEPHRINE HYDROCHLORIDE 25 MG/ML
1 SOLUTION/ DROPS OPHTHALMIC
Status: COMPLETED | OUTPATIENT
Start: 2024-01-22 | End: 2024-01-22

## 2024-01-22 RX ORDER — PROPARACAINE HYDROCHLORIDE 5 MG/ML
1 SOLUTION/ DROPS OPHTHALMIC ONCE
Status: COMPLETED | OUTPATIENT
Start: 2024-01-22 | End: 2024-01-22

## 2024-01-22 RX ORDER — EPINEPHRINE 1 MG/ML
INJECTION, SOLUTION, CONCENTRATE INTRAVENOUS AS NEEDED
Status: DISCONTINUED | OUTPATIENT
Start: 2024-01-22 | End: 2024-01-22 | Stop reason: HOSPADM

## 2024-01-22 RX ORDER — TRIAMCINOLONE ACETONIDE 40 MG/ML
INJECTION, SUSPENSION INTRA-ARTICULAR; INTRAMUSCULAR AS NEEDED
Status: DISCONTINUED | OUTPATIENT
Start: 2024-01-22 | End: 2024-01-22 | Stop reason: HOSPADM

## 2024-01-22 RX ORDER — LIDOCAINE HYDROCHLORIDE 20 MG/ML
INJECTION, SOLUTION INFILTRATION; PERINEURAL AS NEEDED
Status: DISCONTINUED | OUTPATIENT
Start: 2024-01-22 | End: 2024-01-22

## 2024-01-22 RX ORDER — DEXAMETHASONE SODIUM PHOSPHATE 4 MG/ML
INJECTION, SOLUTION INTRA-ARTICULAR; INTRALESIONAL; INTRAMUSCULAR; INTRAVENOUS; SOFT TISSUE AS NEEDED
Status: DISCONTINUED | OUTPATIENT
Start: 2024-01-22 | End: 2024-01-22

## 2024-01-22 RX ORDER — OFLOXACIN 3 MG/ML
1 SOLUTION/ DROPS OPHTHALMIC 4 TIMES DAILY
Qty: 5 ML | Refills: 1 | Status: SHIPPED | OUTPATIENT
Start: 2024-01-23 | End: 2024-02-14 | Stop reason: HOSPADM

## 2024-01-22 RX ADMIN — FENTANYL CITRATE 50 MCG: 50 INJECTION, SOLUTION INTRAMUSCULAR; INTRAVENOUS at 20:44

## 2024-01-22 RX ADMIN — PROPOFOL 20 MG: 10 INJECTION, EMULSION INTRAVENOUS at 20:38

## 2024-01-22 RX ADMIN — TROPICAMIDE 1 DROP: 5 SOLUTION/ DROPS OPHTHALMIC at 20:02

## 2024-01-22 RX ADMIN — DEXTROSE MONOHYDRATE 1000 MG: 50 INJECTION, SOLUTION INTRAVENOUS at 06:06

## 2024-01-22 RX ADMIN — PHENYLEPHRINE HYDROCHLORIDE 1 DROP: 2.5 SOLUTION/ DROPS OPHTHALMIC at 19:48

## 2024-01-22 RX ADMIN — SULFAMETHOXAZOLE AND TRIMETHOPRIM 1 TABLET: 800; 160 TABLET ORAL at 01:36

## 2024-01-22 RX ADMIN — MIDAZOLAM 2 MG: 1 INJECTION INTRAMUSCULAR; INTRAVENOUS at 20:16

## 2024-01-22 RX ADMIN — FENTANYL CITRATE 50 MCG: 50 INJECTION, SOLUTION INTRAMUSCULAR; INTRAVENOUS at 20:28

## 2024-01-22 RX ADMIN — LIDOCAINE HYDROCHLORIDE 30 MG: 20 INJECTION, SOLUTION INFILTRATION; PERINEURAL at 20:28

## 2024-01-22 RX ADMIN — ROCURONIUM BROMIDE 10 MG: 10 INJECTION INTRAVENOUS at 22:09

## 2024-01-22 RX ADMIN — PROPOFOL 80 MG: 10 INJECTION, EMULSION INTRAVENOUS at 20:29

## 2024-01-22 RX ADMIN — TROPICAMIDE 1 DROP: 5 SOLUTION/ DROPS OPHTHALMIC at 20:07

## 2024-01-22 RX ADMIN — SODIUM CHLORIDE, POTASSIUM CHLORIDE, SODIUM LACTATE AND CALCIUM CHLORIDE: 600; 310; 30; 20 INJECTION, SOLUTION INTRAVENOUS at 20:26

## 2024-01-22 RX ADMIN — PROPOFOL 100 MG: 10 INJECTION, EMULSION INTRAVENOUS at 20:28

## 2024-01-22 RX ADMIN — PHENYLEPHRINE HYDROCHLORIDE 1 DROP: 2.5 SOLUTION/ DROPS OPHTHALMIC at 19:53

## 2024-01-22 RX ADMIN — FENTANYL CITRATE 50 MCG: 50 INJECTION, SOLUTION INTRAMUSCULAR; INTRAVENOUS at 23:01

## 2024-01-22 RX ADMIN — PROPARACAINE HYDROCHLORIDE 1 DROP: 5 SOLUTION/ DROPS OPHTHALMIC at 19:57

## 2024-01-22 RX ADMIN — DEXAMETHASONE SODIUM PHOSPHATE 8 MG: 4 INJECTION, SOLUTION INTRA-ARTICULAR; INTRALESIONAL; INTRAMUSCULAR; INTRAVENOUS; SOFT TISSUE at 20:29

## 2024-01-22 RX ADMIN — PHENYLEPHRINE HYDROCHLORIDE 1 DROP: 2.5 SOLUTION/ DROPS OPHTHALMIC at 19:42

## 2024-01-22 RX ADMIN — PANTOPRAZOLE SODIUM 40 MG: 40 TABLET, DELAYED RELEASE ORAL at 06:06

## 2024-01-22 RX ADMIN — ROCURONIUM BROMIDE 50 MG: 10 INJECTION INTRAVENOUS at 20:29

## 2024-01-22 RX ADMIN — GLYCOPYRROLATE 0.4 MG: 0.2 INJECTION, SOLUTION INTRAMUSCULAR; INTRAVENOUS at 20:28

## 2024-01-22 RX ADMIN — ONDANSETRON 4 MG: 2 INJECTION INTRAMUSCULAR; INTRAVENOUS at 23:13

## 2024-01-22 RX ADMIN — GLYCOPYRROLATE 0.1 MG: 0.2 INJECTION, SOLUTION INTRAMUSCULAR; INTRAVENOUS at 22:12

## 2024-01-22 RX ADMIN — TROPICAMIDE 1 DROP: 5 SOLUTION/ DROPS OPHTHALMIC at 20:12

## 2024-01-22 SDOH — HEALTH STABILITY: MENTAL HEALTH: CURRENT SMOKER: 0

## 2024-01-22 ASSESSMENT — COGNITIVE AND FUNCTIONAL STATUS - GENERAL
DRESSING REGULAR LOWER BODY CLOTHING: A LITTLE
MOBILITY SCORE: 20
DRESSING REGULAR UPPER BODY CLOTHING: A LITTLE
DAILY ACTIVITIY SCORE: 18
WALKING IN HOSPITAL ROOM: A LITTLE
PERSONAL GROOMING: A LITTLE
MOVING TO AND FROM BED TO CHAIR: A LITTLE
HELP NEEDED FOR BATHING: A LITTLE
TOILETING: A LITTLE
CLIMB 3 TO 5 STEPS WITH RAILING: A LOT
EATING MEALS: A LITTLE

## 2024-01-22 ASSESSMENT — PAIN - FUNCTIONAL ASSESSMENT
PAIN_FUNCTIONAL_ASSESSMENT: 0-10

## 2024-01-22 ASSESSMENT — PAIN SCALES - GENERAL
PAINLEVEL_OUTOF10: 0 - NO PAIN
PAIN_LEVEL: 0

## 2024-01-22 NOTE — PROGRESS NOTES
"Shilo Thakkar is a 23 y.o. male on day 5 of admission presenting with Blurry vision.    Subjective   Afebrile. Vision stable, unchanged from yesterday. Denies light flashes, eye pain, curtain coming down, eye pressure. No bleeding noted. Denies CP SOB palpitations HA abd pain NVDC. ROS otherwise unremarkable.      Objective   Physical Exam  Constitutional:       General: He is not in acute distress.  HENT:      Head: Normocephalic and atraumatic.      Mouth/Throat:      Mouth: Mucous membranes are moist.      Pharynx: Oropharynx is clear.   Eyes:      Extraocular Movements: Extraocular movements intact.   Cardiovascular:      Rate and Rhythm: Regular rhythm. Bradycardia present.   Pulmonary:      Effort: Pulmonary effort is normal. No respiratory distress.      Breath sounds: Normal breath sounds.   Abdominal:      General: Bowel sounds are normal.      Palpations: Abdomen is soft.      Tenderness: There is no abdominal tenderness.   Musculoskeletal:         General: Normal range of motion.      Cervical back: Normal range of motion and neck supple.      Right lower leg: No edema.      Left lower leg: No edema.   Skin:     General: Skin is warm and dry.      Capillary Refill: Capillary refill takes less than 2 seconds.   Neurological:      General: No focal deficit present.      Mental Status: He is alert and oriented to person, place, and time.   Psychiatric:         Mood and Affect: Mood normal.     Last Recorded Vitals  Blood pressure 153/87, pulse (!) 45, temperature 35.7 °C (96.3 °F), temperature source Temporal, resp. rate 18, height 1.854 m (6' 1\"), weight 70.4 kg (155 lb 3.3 oz), SpO2 98 %.  Intake/Output last 3 Shifts:  I/O last 3 completed shifts:  In: 627.4 (8.9 mL/kg) [P.O.:200; Blood:316.4; IV Piggyback:111]  Out: - (0 mL/kg)   Weight: 70.4 kg     Relevant Results  Scheduled medications  acyclovir, 400 mg, oral, BID  ferrous sulfate (325 mg ferrous sulfate), 1 tablet, oral, BID  fluconazole, 400 mg, " oral, Daily  pantoprazole, 40 mg, oral, Daily before breakfast  sulfamethoxazole-trimethoprim, 1 tablet, oral, Every Mon/Wed/Fri    Assessment/Plan   Principal Problem:    Blurry vision  Active Problems:    Retinal hemorrhage of both eyes    Left retinal detachment    Shilo Thakkar is a 23 y.o. male with PMH of Burkitt Lymphoma (Dx 10/20/23; s/p 4 cycles of HyperCVAD), malignant pleural effusions s/p CT (10/29/23), and ADHD who presented to the ED 1/17 with blurry vision, dizziness, epistaxis, and headache. Initially a BAT was called; CTH showed no signs of bleed or acute ischemic stroke; CTA showed no vascular disease. Neurology saw patient in ED and indicated that no further neurologic workup was indicated. Ophtho consulted and following. He is now admitted for further management to [facility].      ONC:  # Burkitt lymphoma, newly diagnosed (10/20/23)  - 10/19/23 presented to [facility] ED with dysphagia and abdominal pain  - CT A/P with IV contrast (10/19) mass in the body of the pancreas measuring approximately 2.7 x 2.9 cm. Multiple masses in the mesentery as well as omental caking consistent with carcinomatosis.   - CT soft tissue neck with IV contrast (10/19): homogeneous soft tissue attenuation lesion in the L oropharynx which measures approximately 3.0 x 4.0 x 5.1 cm.   - CT Abdomen w/o IV contrast (10/20): Mesenteric lymphadenopathy  - US guided mesenteric LN biopsy (10/20/23): HIGH GRADE B-CELL LYMPHOMA MORPHOLOGICALLY CONSISTENT WITH BURKITT LYMPHOMA; flow cytometry: CD10+ kappa restricted B cells supportive of a B cell lymphoma with germinal center origin.   - Biopsy L oropharyngeal mass and L cervical LN by ENT (10/23/23) high grade B-cell burkitt lymphoma  - PET CT (10/30) showed FDG avid left oropharyngeal mass and LAD above and below diaphragm  - BMBx (11/1) without evidence of disease  - s/p 4 cycles of HyperCVAD (10/28, 11/19, 12/11, 1/2/24), next cycle tentatively due 1/30/24 (delayed one week) pending  outpatient discussion with Dr. Klein 1/24/24  - s/p neulasta 1/9/24  - PET scan (1/15/24): Rudolph 2. Complete resolution of metabolic activity of previously seen left oropharyngeal mass, resolution of previously seen hypermetabolic left level 2 and 3 cervical lymphadenopathy, resolution of previously seen moderate-large size left pleural effusion and trace right pleural effusion and abdominopelvic ascites, resolution of previously seen hypermetabolic large mass within pancreatic tail/body and the previously seen hypermetabolic retroperitoneal and mesenteric lymphadenopathy.     OPTHO:  #Bilateral macula-involving serous vs exudative retinal fluid, c/f retinoschisis vs less likely detachments   -Pt Presented with blurry vision and states blurry vision OU began upon waking on 1/17 (was fine at bedtime 1/16), feels like it is blurry especially centrally with left eye central grayed out spots. Denies eye pain, diplopia, flashes or floaters.   -Ophthalmology was consulted in ED: noted clear serous or exudative fluid accumulated over macula OU with clearly defined edges that is not hemorrhagic in nature and bilateral retinal hemorrhages that have been present for >1 week, suspect retinoschisis given round edges and bilateral nature, no urgent surgery at this time due to patient's pancytopenia but can consider sx in future if no improvement, recommend Solumedrol 1gm IV daily.   -Solumedrol 1gm IV QD (1/18-1/22) per ophtho  -ophtho took for additional imaging 1/18; no indication for an urgent intervention but awaiting attending input; they are hoping it could resolve on its own but it will probably need surgery but not urgently, probably outpatient  - NPO for OR on 1/22 for L eye pars plana vitrectomy (PPV)/endolaser (EL)/gas or oil  - ophtho will continue to monitor R eye and consider sx in future if necessary  - plan discharge to home Tuesday 1/23 after ophtho sees pt for POD#1     HEME:  # Pancytopenia likely 2/2  disease  - WBC 0.3, H&H 4.2/11.1, plt 1  - S/p 3 units PRBC, 3 units plts total this admission as of 1/18 PM    -Transfused RBC and PLTs 1/19  - Continue home ferrous sulfate 325mg BID started by Dr. lKein  - Transfuse to keep Hgb>7, Plt>80 per ophtho requirements for surgery (no post procedure plt goal)  - haptoglobin (1/18) 212  # DVT ppx: held d/t thrombocytopenia     CARDIAC:  - ECHO (10/2023): EF 60-65%  - Baseline HR 50s  - tele ordered per Latoya     ID:  Allergies: NKDA  PPX: Acyclovir 400mg BID, Fluconazole 400mg daily, Bactrim MWF  - s/p Levaquin for neutropenia (day 5 through day 15 per outpt notes; 1/6-1/16)  - flu A, flu B, COVID all negative (1/18)  - Blood cx (1/17) NGTD  - s/p Cefepime 1gm Q8 (1/17-1/20), stopped with ANC recovery      FEN/GI:  - Admit weight: 73 kg. Current weight: 70.4 kg (1/18)  - Replace electrolytes as needed  - G6PD normal (10/26/23)  - Ordered Zofran for Nausea     ENT:  #Epistaxis; resolved   -Epistaxis (with clots) started overnight on 1/16 and continued all night long  -plts on admission-1, Hgb 4.2  -Repleted prbc and plt, has not recurred   -Consider ENT consult if worsens     NEURO:  -Presented to ED with blurry vision, headache, and dizziness; BAT called  -CT-No evidence of hemodynamically significant stenosis or proximal large branch vessel cutoff on CTA of the head or neck. No evidence of acute cortical infarct or acute intracranial hemorrhage on head CT  -Neurology consulted in ED-No acute surgical intervention; follow Ophthalmology recommendations; signed off.     ENDO:  -CTA neck (1/17) with 6 mm nodule in the left lobe of the thyroid gland   -TSH level (1/20): 1.35       DISPO:  - Full Code  - Access: PICC line (right), PIV  - Primary Onc: Dr. Klein  - NOK: Ms Deidra Pickett [phone]    Patient seen, discussed and examined with Dr. Katherine Zimmerman PA-C

## 2024-01-22 NOTE — CARE PLAN
Problem: Fall/Injury  Goal: Not fall by end of shift  Outcome: Progressing  Goal: Be free from injury by end of the shift  Outcome: Progressing  Goal: Verbalize understanding of personal risk factors for fall in the hospital  Outcome: Progressing  Goal: Verbalize understanding of risk factor reduction measures to prevent injury from fall in the home  Outcome: Progressing  Goal: Use assistive devices by end of the shift  Outcome: Progressing  Goal: Pace activities to prevent fatigue by end of the shift  Outcome: Progressing   The patient's goals for the shift include      The clinical goals for the shift include pt will remain free of injury and have no additional vision changes through 1/22 at 0700    Pt remained safe and free of injury. No c/o pain. VSS. Has been NPO since midnight. HOB elevated at 45 degrees throughout shift.

## 2024-01-22 NOTE — CARE PLAN
Problem: Fall/Injury  Goal: Not fall by end of shift  Outcome: Progressing  Goal: Be free from injury by end of the shift  Outcome: Progressing  Goal: Verbalize understanding of personal risk factors for fall in the hospital  Outcome: Progressing  Goal: Verbalize understanding of risk factor reduction measures to prevent injury from fall in the home  Outcome: Progressing  Goal: Use assistive devices by end of the shift  Outcome: Progressing  Goal: Pace activities to prevent fatigue by end of the shift  Outcome: Progressing     Problem: Pain - Adult  Goal: Verbalizes/displays adequate comfort level or baseline comfort level  Outcome: Progressing     Problem: Safety - Adult  Goal: Free from fall injury  Outcome: Progressing     Problem: Discharge Planning  Goal: Discharge to home or other facility with appropriate resources  Outcome: Progressing     Problem: Chronic Conditions and Co-morbidities  Goal: Patient's chronic conditions and co-morbidity symptoms are monitored and maintained or improved  Outcome: Progressing       The clinical goals for the shift include Patient will remain safe throughout shift

## 2024-01-22 NOTE — H&P
History Of Present Illness  Shilo Thakkar is a 23 y.o. male presenting with bilateral serous retinal fluid and chronic retinal hemorrhages with plans for pars plana vitrectomy (PPV) and subretinal tPA injection today for the left eye.     Past Medical History  He has a past medical history of Colles' fracture of right radius, initial encounter for closed fracture (04/23/2015), Personal history of other mental and behavioral disorders (01/28/2014), Personal history of other mental and behavioral disorders (05/10/2017), Personal history of other specified conditions (05/03/2016), and Unspecified fracture of navicular (scaphoid) bone of left wrist, initial encounter for closed fracture (12/17/2015).    Surgical History  He has a past surgical history that includes Other surgical history (10/03/2017); US guided soft tissue biopsy (10/20/2023); Foot fracture surgery (Left); and Chest Tube Insertion (10/29/2023).     Social History  He reports that he has never smoked. He has never been exposed to tobacco smoke. He has never used smokeless tobacco. He reports that he does not currently use alcohol. He reports that he does not use drugs.     Allergies  Patient has no known allergies.    ROS  Patient denies ocular pain, redness, discharge, decreased vision, double vision, blind spots, flashes, or floaters.     Physical Exam  Base Eye Exam       Visual Acuity (Snellen - Linear)         Right Left    Near sc 20/200 20/200              Tonometry (Tonopen, 4:00 PM)         Right Left    Pressure 21 17              Pupils         Pupils Dark Light APD    Right PERRL, No APD 4 2 None    Left PERRL, No APD 4 2 None              Visual Fields         Left Right     Full Full              Extraocular Movement         Right Left     Full Full              Neuro/Psych       Oriented x3: Yes                  Slit Lamp and Fundus Exam       External Exam         Right Left    External Normal Normal              Slit Lamp Exam          Right Left    Lids/Lashes Normal Normal    Conjunctiva/Sclera White and quiet White and quiet    Cornea Clear Clear    Anterior Chamber Deep and quiet Deep and quiet    Iris Round and reactive Round and reactive    Lens Clear Clear    Anterior Vitreous Vitreous (vit) hemorrhage overlying retinal hemorrhage Vit hemorrhage              Fundus Exam         Right Left    Disc crisp, partially obstructed temporally by overlying vit heme unable to view d/t overlying vit heme    C/D Ratio 0.3 0.3    Macula serous detachment vs retinoschisis including macula extending from optic nerve just posterior to sup and inf arcades about 3 disk diopters from fovea; with overlying retinal hemorrhage (dark blood, suspect chronic) serous detachment vs retinoschisis including macula extending from optic nerve just posterior to sup and inf arcades; with overlying retinal hemorrhage extending to disk (dark blood, suspect chronic), overlying vitreous (vit) hemorrhage    Vessels multiple flam hemorrhages sup and inf arcades multiple flame hemorrhages sup and inf arcades, largest at 2 oclock    Periphery multiple flame hemorrhages, Dot-blot hemorrhage Multiple flame hemorrhages, Dot-blot hemorrhage; superior and temporal schisis vs shallow detachments                           Assessment/Plan   Principal Problem:    Blurry vision  Active Problems:    ADHD    Anemia in neoplastic disease    Thrombocytopenia (CMS/HCC)    Retinal hemorrhage of both eyes    Left retinal detachment      #Bilateral macula-involving serous vs exudative retinal fluid, c/f retinoschisis vs less likely detachments  #Bilateral chronic retinal hemorrhages, overlying vitreous (vit) heme  - Patient seen and discussed with Dr. Canales today  - Plan for pars plana vitrectomy (PPV) with subretinal tPA today  - Will follow-up while inpatient tomorrow  - Post-operative drops and instructions per post-operative note            Miguelina Olguin MD

## 2024-01-22 NOTE — ANESTHESIA PREPROCEDURE EVALUATION
Patient: Shilo Thakkar    Procedure Information       Date/Time: 01/22/24 1900    Procedure: Vitrectomy Pars Plana (Left)    Location: [facility] OR 02 / [facility] OR    Surgeons: Nasim Quevedo MD            Relevant Problems   Anesthesia (within normal limits)      Cardiovascular (within normal limits)      GI (within normal limits)      /Renal (within normal limits)      Neuro/Psych   (+) ADHD      Pulmonary (within normal limits)      Hematology   (+) Anemia in neoplastic disease   (+) Burkitt lymphoma (CMS/HCC)   (+) Thrombocytopenia (CMS/HCC)      Eyes, Ears, Nose, and Throat  Bilateral retinal hemorrhages        Clinical information reviewed:   Tobacco  Allergies  Meds  Problems  Med Hx  Surg Hx   Fam Hx  Soc   Hx        Vitals:    01/22/24 1905   BP: 154/87   Pulse: (!) 45   Resp: 15   Temp: 36.1 °C (97 °F)   SpO2: 100%       Chemistry    Lab Results   Component Value Date/Time     01/22/2024 0147    K 3.9 01/22/2024 0147     01/22/2024 0147    CO2 30 01/22/2024 0147    BUN 23 01/22/2024 0147    CREATININE 0.99 01/22/2024 0147    Lab Results   Component Value Date/Time    CALCIUM 9.3 01/22/2024 0147    ALKPHOS 79 01/17/2024 1312    AST 9 01/17/2024 1312    ALT 19 01/17/2024 1312    BILITOT 0.9 01/17/2024 1312          Lab Results   Component Value Date/Time    WBC 9.6 01/22/2024 0147    HGB 7.4 (L) 01/22/2024 0147    HCT 20.7 (L) 01/22/2024 0147    PLT 88 (L) 01/22/2024 0147     Lab Results   Component Value Date/Time    PROTIME 13.6 (H) 01/22/2024 0147    INR 1.2 (H) 01/22/2024 0147     Encounter Date: 01/17/24   ECG 12 lead   Result Value    Ventricular Rate 73    Atrial Rate 73    WY Interval 158    QRS Duration 100    QT Interval 368    QTC Calculation(Bazett) 405    P Axis 51    R Axis 66    T Axis 58    QRS Count 12    Q Onset 216    P Onset 137    P Offset 187    T Offset 400    QTC Fredericia 393    Narrative    See ED provider note for full interpretation and clinical  correlation  Confirmed by Rakesh Kat ([id number]) on 1/18/2024 2:13:29 AM       NPO Detail:  No data recorded     Physical Exam    Airway  Mallampati: III  Neck ROM: full  Comments: Small mouth opening    Cardiovascular   Rhythm: regular  Rate: abnormal  Comments: Sinus bradycardia    Dental - normal exam     Pulmonary   Breath sounds clear to auscultation     Abdominal        Easy airway per history    Anesthesia Plan    History of general anesthesia?: yes  History of complications of general anesthesia?: no    ASA 3     general     The patient is not a current smoker.    intravenous induction   Anesthetic plan and risks discussed with patient.  Use of blood products discussed with patient who consented to blood products.    Plan discussed with attending.    Plan general endotracheal anesthesia with peripheral IV placement and ASA standard noninvasive monitors.  The possibility of blood product transfusion was also described in detail.  Risks, benefits, alternatives of this plan were described in detail to the patient, who indicated understanding and agreed to proceed.    Wiliam Sparrow MD

## 2024-01-23 ENCOUNTER — APPOINTMENT (OUTPATIENT)
Dept: CARDIOLOGY | Facility: HOSPITAL | Age: 24
End: 2024-01-23
Payer: COMMERCIAL

## 2024-01-23 ENCOUNTER — PHARMACY VISIT (OUTPATIENT)
Dept: PHARMACY | Facility: CLINIC | Age: 24
End: 2024-01-23
Payer: MEDICARE

## 2024-01-23 VITALS
DIASTOLIC BLOOD PRESSURE: 72 MMHG | HEART RATE: 42 BPM | BODY MASS INDEX: 21.2 KG/M2 | OXYGEN SATURATION: 100 % | SYSTOLIC BLOOD PRESSURE: 121 MMHG | HEIGHT: 73 IN | RESPIRATION RATE: 16 BRPM | WEIGHT: 160 LBS | TEMPERATURE: 97.5 F

## 2024-01-23 LAB
ALBUMIN SERPL BCP-MCNC: 3.7 G/DL (ref 3.4–5)
ANION GAP SERPL CALC-SCNC: 13 MMOL/L (ref 10–20)
BASOPHILS # BLD MANUAL: 0 X10*3/UL (ref 0–0.1)
BASOPHILS NFR BLD MANUAL: 0 %
BUN SERPL-MCNC: 30 MG/DL (ref 6–23)
CALCIUM SERPL-MCNC: 9.1 MG/DL (ref 8.6–10.6)
CHLORIDE SERPL-SCNC: 103 MMOL/L (ref 98–107)
CO2 SERPL-SCNC: 30 MMOL/L (ref 21–32)
CREAT SERPL-MCNC: 0.96 MG/DL (ref 0.5–1.3)
EGFRCR SERPLBLD CKD-EPI 2021: >90 ML/MIN/1.73M*2
EOSINOPHIL # BLD MANUAL: 0 X10*3/UL (ref 0–0.7)
EOSINOPHIL NFR BLD MANUAL: 0 %
ERYTHROCYTE [DISTWIDTH] IN BLOOD BY AUTOMATED COUNT: 15.5 % (ref 11.5–14.5)
GLUCOSE SERPL-MCNC: 153 MG/DL (ref 74–99)
HCT VFR BLD AUTO: 21 % (ref 41–52)
HGB BLD-MCNC: 7.5 G/DL (ref 13.5–17.5)
IMM GRANULOCYTES # BLD AUTO: 1.04 X10*3/UL (ref 0–0.7)
IMM GRANULOCYTES NFR BLD AUTO: 11.5 % (ref 0–0.9)
LDH SERPL L TO P-CCNC: 198 U/L (ref 84–246)
LYMPHOCYTES # BLD MANUAL: 0.31 X10*3/UL (ref 1.2–4.8)
LYMPHOCYTES NFR BLD MANUAL: 3.4 %
MAGNESIUM SERPL-MCNC: 2.03 MG/DL (ref 1.6–2.4)
MCH RBC QN AUTO: 28.1 PG (ref 26–34)
MCHC RBC AUTO-ENTMCNC: 35.7 G/DL (ref 32–36)
MCV RBC AUTO: 79 FL (ref 80–100)
MONOCYTES # BLD MANUAL: 0.6 X10*3/UL (ref 0.1–1)
MONOCYTES NFR BLD MANUAL: 6.7 %
MYELOCYTES # BLD MANUAL: 0.15 X10*3/UL
MYELOCYTES NFR BLD MANUAL: 1.7 %
NEUTS SEG # BLD MANUAL: 7.94 X10*3/UL (ref 1.2–7)
NEUTS SEG NFR BLD MANUAL: 88.2 %
NRBC BLD-RTO: 0.7 /100 WBCS (ref 0–0)
PHOSPHATE SERPL-MCNC: 5.9 MG/DL (ref 2.5–4.9)
PLATELET # BLD AUTO: 84 X10*3/UL (ref 150–450)
POTASSIUM SERPL-SCNC: 3.9 MMOL/L (ref 3.5–5.3)
RBC # BLD AUTO: 2.67 X10*6/UL (ref 4.5–5.9)
RBC MORPH BLD: ABNORMAL
SODIUM SERPL-SCNC: 142 MMOL/L (ref 136–145)
TOTAL CELLS COUNTED BLD: 119
URATE SERPL-MCNC: 5 MG/DL (ref 4–7.5)
WBC # BLD AUTO: 9 X10*3/UL (ref 4.4–11.3)

## 2024-01-23 PROCEDURE — RXMED WILLOW AMBULATORY MEDICATION CHARGE

## 2024-01-23 PROCEDURE — 93010 ELECTROCARDIOGRAM REPORT: CPT | Performed by: INTERNAL MEDICINE

## 2024-01-23 PROCEDURE — 85027 COMPLETE CBC AUTOMATED: CPT

## 2024-01-23 PROCEDURE — 2500000001 HC RX 250 WO HCPCS SELF ADMINISTERED DRUGS (ALT 637 FOR MEDICARE OP)

## 2024-01-23 PROCEDURE — 80069 RENAL FUNCTION PANEL: CPT

## 2024-01-23 PROCEDURE — 84550 ASSAY OF BLOOD/URIC ACID: CPT

## 2024-01-23 PROCEDURE — 85007 BL SMEAR W/DIFF WBC COUNT: CPT

## 2024-01-23 PROCEDURE — 83735 ASSAY OF MAGNESIUM: CPT

## 2024-01-23 PROCEDURE — 93005 ELECTROCARDIOGRAM TRACING: CPT

## 2024-01-23 PROCEDURE — 2500000004 HC RX 250 GENERAL PHARMACY W/ HCPCS (ALT 636 FOR OP/ED)

## 2024-01-23 PROCEDURE — 83615 LACTATE (LD) (LDH) ENZYME: CPT

## 2024-01-23 RX ORDER — TRAMADOL HYDROCHLORIDE 50 MG/1
25 TABLET ORAL ONCE
Status: COMPLETED | OUTPATIENT
Start: 2024-01-23 | End: 2024-01-23

## 2024-01-23 RX ORDER — GLYCERIN, HYPROMELLOSE, POLYETHYLENE GLYCOL 400 2; 2; 10 MG/ML; MG/ML; MG/ML
2 SOLUTION/ DROPS OPHTHALMIC EVERY 6 HOURS
Qty: 15 ML | Refills: 0 | Status: SHIPPED | OUTPATIENT
Start: 2024-01-23 | End: 2024-02-14 | Stop reason: HOSPADM

## 2024-01-23 RX ORDER — ACETAMINOPHEN 325 MG/1
650 TABLET ORAL EVERY 6 HOURS PRN
Status: DISCONTINUED | OUTPATIENT
Start: 2024-01-23 | End: 2024-01-23 | Stop reason: HOSPADM

## 2024-01-23 RX ADMIN — SODIUM CHLORIDE 500 ML: 9 INJECTION, SOLUTION INTRAVENOUS at 01:58

## 2024-01-23 RX ADMIN — ACETAMINOPHEN 650 MG: 325 TABLET ORAL at 02:14

## 2024-01-23 RX ADMIN — TRAMADOL HYDROCHLORIDE 25 MG: 50 TABLET, COATED ORAL at 06:40

## 2024-01-23 RX ADMIN — FERROUS SULFATE TAB 325 MG (65 MG ELEMENTAL FE) 1 TABLET: 325 (65 FE) TAB at 08:11

## 2024-01-23 RX ADMIN — ACYCLOVIR 400 MG: 400 TABLET ORAL at 08:11

## 2024-01-23 RX ADMIN — FLUCONAZOLE 400 MG: 150 TABLET ORAL at 08:11

## 2024-01-23 RX ADMIN — PANTOPRAZOLE SODIUM 40 MG: 40 TABLET, DELAYED RELEASE ORAL at 06:40

## 2024-01-23 ASSESSMENT — ACTIVITIES OF DAILY LIVING (ADL): LACK_OF_TRANSPORTATION: NO

## 2024-01-23 ASSESSMENT — PAIN - FUNCTIONAL ASSESSMENT
PAIN_FUNCTIONAL_ASSESSMENT: 0-10

## 2024-01-23 ASSESSMENT — COGNITIVE AND FUNCTIONAL STATUS - GENERAL
MOBILITY SCORE: 20
DRESSING REGULAR UPPER BODY CLOTHING: A LITTLE
PERSONAL GROOMING: A LITTLE
MOVING TO AND FROM BED TO CHAIR: A LITTLE
PERSONAL GROOMING: A LITTLE
CLIMB 3 TO 5 STEPS WITH RAILING: A LOT
EATING MEALS: A LITTLE
HELP NEEDED FOR BATHING: A LITTLE
MOBILITY SCORE: 20
TOILETING: A LITTLE
WALKING IN HOSPITAL ROOM: A LITTLE
HELP NEEDED FOR BATHING: A LITTLE
WALKING IN HOSPITAL ROOM: A LITTLE
DRESSING REGULAR LOWER BODY CLOTHING: A LITTLE
EATING MEALS: A LITTLE
TOILETING: A LITTLE
DRESSING REGULAR LOWER BODY CLOTHING: A LITTLE
MOVING TO AND FROM BED TO CHAIR: A LITTLE
DAILY ACTIVITIY SCORE: 18
CLIMB 3 TO 5 STEPS WITH RAILING: A LITTLE
DAILY ACTIVITIY SCORE: 18
STANDING UP FROM CHAIR USING ARMS: A LITTLE
DRESSING REGULAR UPPER BODY CLOTHING: A LITTLE

## 2024-01-23 ASSESSMENT — PAIN SCALES - GENERAL
PAINLEVEL_OUTOF10: 0 - NO PAIN
PAINLEVEL_OUTOF10: 0 - NO PAIN
PAINLEVEL_OUTOF10: 7
PAINLEVEL_OUTOF10: 2
PAINLEVEL_OUTOF10: 7
PAINLEVEL_OUTOF10: 0 - NO PAIN
PAINLEVEL_OUTOF10: 8

## 2024-01-23 ASSESSMENT — PAIN DESCRIPTION - LOCATION
LOCATION: EYE
LOCATION: EYE

## 2024-01-23 ASSESSMENT — PAIN DESCRIPTION - ORIENTATION
ORIENTATION: LEFT
ORIENTATION: LEFT

## 2024-01-23 NOTE — CONSULTS
"History of Present Illness:  23 YOM with PMH Burkitt's lymphoma stage III on chemotherapy most recently 1/11, next infusion session beginning on Monday and patient presenting with concerns for nosebleed headache and bilateral blurry vision. Initially concern for binasal hemianopia, neuro stroke assessed w CTH which was unremarkable for stroke. Pt states blurry vision OU  began upon waking today, feels like it is blurry especially centrally with left eye central grayed out spots. Denies eye pain, diplopia, flashes or floaters. Denies HA, diziness, nausea today (does report nausea episode last night + vomiting). Received intrathecal chemo 1/11/24 without initial complications, pt was given prednisolone forte drop to use 2 days after chemo and prn if blurry vision. Per chart review, his onc team recommends using pred drop for 1 wk following chemo.     Seen for POD1 status post (s/p) pars plana vitrectomy (PPV)/C3F8/intraretinal tPA, doing well, complaining of eye irritation     Chemo:  - S/P C4 HyperCVAD with D1 1/2/24.  - C4D2 LP with IT Cytarabine (1/4/24): CSF Flow Cytometry Negative  - C4D6 Rituximab + Neulasta today (1/9/24)  - C4D8 LP with IT methotrexate to be completed 1/11/24   - Post chemo he should start levofloxacin between day 5 and day 15.  He should continue without interruption fluconazole and acyclovir and 3 times weekly Bactrim.  - Before next cycle, cycle 5, we will obtain an echocardiogram and a PET scan for \"interim\" staging.  Overall he is doing very well       PMH: As per HPI  Allergies: allergy list reviewed  Past Ocular Hx: As per HPI  Family Hx: No hx of glaucoma, age related macular degeneration, blindness  Social Hx: lives with family    Examination:     Base Eye Exam       Visual Acuity (Snellen - Linear)         Right Left    Near sc 20/200 HM              Tonometry (Tonopen, 4:00 PM)         Right Left    Pressure 15 18              Pupils         Pupils Dark Light APD    Right PERRL, No " APD 4 2 None    Left PERRL, No APD 4 2 None              Visual Fields         Left Right     Full Full              Extraocular Movement         Right Left     Full Full              Neuro/Psych       Oriented x3: Yes                  Slit Lamp and Fundus Exam       External Exam         Right Left    External Normal Normal              Slit Lamp Exam         Right Left    Lids/Lashes Normal Normal    Conjunctiva/Sclera White and quiet 1+ injection with chemosis    Cornea Clear 2+ SPK    Anterior Chamber Deep and quiet 2+ cell    Iris Round and reactive Round and reactive    Lens Clear Clear    Anterior Vitreous Vitreous (vit) hemorrhage overlying retinal hemorrhage Vit hemorrhage              Fundus Exam         Right Left    Periphery  Poor view but grossly attached                            1/11/24  (LP): Hemorrhagic specimen, no lymphoma cells seen including on flow, glucose 61, protein 27     Light perception (LP) 1/4/24  No lymphoma cells seen on cell count or flow        CTA Head/neck 1/17/24  1. No evidence of hemodynamically significant stenosis or proximal large branch vessel cutoff on CTA of the head or neck.  2. No evidence of acute cortical infarct or acute intracranial hemorrhage on head CT. MRI with diffusion-imaging would be a more  sensitive means of assessing for acute ischemic injury.  3. 6 mm nodule in the left lobe of the thyroid gland. In the absence of risk factors for malignancy it is likely of no clinical significance.     PET Scan 12/27/23  IMPRESSION:  1. Interval near-complete/complete resolution of metabolic activity of previously seen left oropharyngeal mass, and resolution previously seen hypermetabolic left level 2 and 3 cervical lymphadenopathy.  Minimal-mild hypermetabolic activity the left oropharynx is likely related to post treatment changes. Direct visualization is  recommended to exclude residual disease.. Deauville score 2.  2. Interval resolution of previously seen  hypermetabolic left level 2 and level 3 cervical lymphadenopathy.  3. Interval complete resolution of previously seen moderate-large size left pleural effusion and trace right pleural effusion.  4. Interval near-complete resolution of previously seen abdominopelvic ascites.  5. Interval complete metabolic resolution of previously seen hypermetabolic large mass within pancreatic tail/body and the  previously seen hypermetabolic retroperitoneal and mesenteric lymphadenopathy.      MRI Brain 10/27/23    IMPRESSION:  1.  No evidence of intracranial metastatic disease.  2. Partially visualized mass located within the left oropharynx with  adjacent enlarged lymph nodes. These findings were better seen on the  prior CT neck study.     B-scan 1/18/24:  right eye: no obvious retinal detachment, appreciated subretinal and subhyaloid hemes (multilayered hemes)  left eye: no obvious retinal detachment, appreciated large subhyaloid heme (multilayered hemes)     Macula OCT 1/18/24:  Right eye: poor quality, preretinal and subhyaloid hemes (multilayered hemes)  Left eye: poor quality, preretinal and subhyaloid hemes (multilayered hemes)    Macula OCT 1/22/24:  Right eye: poor quality, preretinal and subhyaloid hemes (multilayered hemes)  Left eye: poor quality, preretinal, intraretinal, and subhyaloid hemes (multilayered hemes)         Update 1/23/24:  Patient now POD1 status post (s/p) pars plana vitrectomy (PPV)/C3F8/intraretinal tPA doing well  Expect blurry vision at this time  Will arrange for POW1 appointment with Dr. Canales at 9 AM on 01/26/24 at [facility]  Recommend Prednisolone, Ofloxacin, and artificial tears QID to the left eye, can remove patch and shield to adminster drops, 5 minutes apart  Recommend face down positioning for 2-3 days, avoid all face-up positioning  Written instructions given and reviewed with patient  Prior A&P pre-op below      #Bilateral macula-involving serous vs exudative retinal fluid, c/f  retinoschisis vs less likely detachments  #Bilateral chronic retinal hemorrhages, overlying vitreous (vit) heme  - Clear serous or exudative fluid accumulated over macula OU with clearly defined edges. Examined patient with Dr. Quevedo who confirms overall fluid/detachment accumulated is not hemorrhagic  -Additionally, retinal hemorrhages appear too dark to be acute, suspect present for >1 week.  -Suspect retinoschisis given round edges, bilateral nature  -Ddx underlying etiology includes low platelets vs cannot rule out infiltrative lymphoma  -Recommend continued IV solumedrol 1 g daily to aid in resolution if cleared by primary team (started on 1/18/ - 1/22 )  - Patient should sleep with head of bed at 45 degrees      Discussed with Dr. Foote and primary team    Allyson Olguin MD  Ophthalmology PGY-3      Ophthalmology Adult Pager - [phone]  Ophthalmology Pediatrics Pager - [phone]    For adult follow-up appointments, call: [phone]  For pediatric follow-up appointments, call: [phone]      NOTE: This note is not finalized until attending reviews and signs.

## 2024-01-23 NOTE — DISCHARGE SUMMARY
Discharge Diagnosis  Blurry vision s/p Right eye hematoma with removal     Issues Requiring Follow-Up  Follow up with opthalmology      Test Results Pending At Discharge: None     Hospital Course  Shilo Thakkar is a 23 y.o. male with PMH of Burkitt Lymphoma (Dx 10/20/23; s/p 4 cycles of HyperCVAD), malignant pleural effusions s/p CT (10/29/23), and ADHD who presented to the ED 1/17 with blurry vision, dizziness, epistaxis, and headache. Initially a BAT was called; CTH showed no signs of bleed or acute ischemic stroke; CTA showed no vascular disease. Neurology saw patient in ED and indicated that no further neurologic workup was indicated. Ophtho consulted and following. He is now admitted for further management to [facility].    Hospital course notable for:   Bilateral macula-involving serous vs exudative retinal fluid, Left worse than Right, c/f retinoschisis vs less likely detachments. S/p 1g solumedrol IV daily (1/18-1/22). S/p Left eye pars plana vitrectomy (PPV)/endolaser (EL)/gas or oil (1/22). Ophtho will continue to monitor the Right eye and will consider surgery in the future if necessary.   Pancytopenia. ANC recovered without intervention. Platelets kept >80 until surgery per ophtho requirements. Hgb stable after transfusions.   3. Sinus bradycardia, patient stated he has a history of bradycardia, asymptomatic       Access: R picc SOLO  PPX: acyclovir, fluconazole, bactrim  Follow up:   - 1/24 Dr. Klein  - 1/26 Claire Win  - was supposed to have an appt with Genetics 1/22 virtually but was cancelled due to his admission; requested appointment to be rescheduled    Recommendations per Optho on 1/23  Patient now POD1 status post (s/p) pars plana vitrectomy (PPV)/C3F8/intraretinal tPA doing well  Will arrange for POW1 appointment with Dr. Canales at 9 AM on 01/26/24 at [facility]  Recommend Prednisolone, Ofloxacin, and artificial tears QID to the left eye, can remove patch and shield to adminster drops,  5 minutes apart  Recommend face down positioning for 2-3 days, avoid all face-up positioning  Patient should sleep with head of bed at 45 degrees   Written instructions given and reviewed with patient    Patient seen, discussed and examined with Dr. Katherine Stuart     Pertinent Physical Exam At Time of Discharge  Physical Exam  Constitutional:       General: He is not in acute distress.     Appearance: He is not ill-appearing or toxic-appearing.   HENT:      Head: Normocephalic.      Mouth/Throat:      Lips: Pink.      Mouth: Mucous membranes are dry.   Eyes:      Extraocular Movements:      Left eye: Left eye nystagmus: Left.      Comments: Left eye patched s/p procedure    Neck:      Comments: Per optho strict prone positioning   Cardiovascular:      Rate and Rhythm: Bradycardia present.      Heart sounds: Normal heart sounds. Heart sounds not distant. No murmur heard.  Pulmonary:      Effort: Pulmonary effort is normal.      Breath sounds: Normal breath sounds and air entry.   Abdominal:      General: Abdomen is flat. Bowel sounds are decreased. There is no distension.      Palpations: Abdomen is soft.      Tenderness: There is no abdominal tenderness. There is no guarding or rebound.   Skin:     General: Skin is warm and dry.      Capillary Refill: Capillary refill takes less than 2 seconds.      Coloration: Skin is pale.   Neurological:      Mental Status: He is alert.           Home Medications     Medication List      START taking these medications     Artificial Tears(pg-hypm-glyc) 1-0.2-0.2 % drops ophthalmic drops;   Generic drug: peg 400-hypromellose-glycerin; Administer 2 drops into the   left eye every 6 hours for 7 days.   ofloxacin 0.3 % ophthalmic solution; Commonly known as: Ocuflox;   Administer 1 drop into the left eye 4 times a day for 7 days. Do not start   before January 23, 2024.   prednisoLONE acetate 1 % ophthalmic suspension; Commonly known as:   Pred-Forte; Administer 1 drop into the left  eye 4 times a day. Do not   start before January 23, 2024.     CONTINUE taking these medications     acyclovir 400 mg tablet; Commonly known as: Zovirax; Take 1 tablet (400   mg) by mouth 2 times a day.   ferrous sulfate (325 mg ferrous sulfate) tablet; Take 1 tablet by mouth   2 times a day.   fluconazole 200 mg tablet; Commonly known as: Diflucan; Take 2 tablets   (400 mg) by mouth once daily.   pantoprazole 40 mg EC tablet; Commonly known as: ProtoNix; Take 1 tablet   (40 mg) by mouth once daily in the morning. Take before meals. Do not   crush, chew, or split.   sennosides-docusate sodium 8.6-50 mg tablet; Commonly known as:   Ashley-Colace; Take 2 tablets by mouth 2 times a day as needed for   constipation.   sulfamethoxazole-trimethoprim 800-160 mg tablet; Commonly known as:   Bactrim DS; Take 1 tablet by mouth once a day on Monday, Wednesday, and   Friday.     STOP taking these medications     acetaminophen 325 mg tablet; Commonly known as: Tylenol   levoFLOXacin 500 mg tablet; Commonly known as: Levaquin       Outpatient Follow-Up  Future Appointments   Date Time Provider Department Center   1/24/2024  3:00 PM Olegario Klein MD [id number] Academic   1/26/2024  9:00 AM DEMETRIO Salgado-CNP [id number] Academic       Grace Nguyen APRN-CNP

## 2024-01-23 NOTE — BRIEF OP NOTE
Date: 2024 - 2024  OR Location: [facility] OR    Name: Shilo Thakkar : 2000, Age: 23 y.o., MRN: [id number], Sex: male    Diagnosis  Pre-op Diagnosis     * Retinal hemorrhage of both eyes [H35.63]     * Left retinal detachment [H33.22] Post-op Diagnosis     * Retinal hemorrhage of both eyes [H35.63]     * Left retinal detachment [H33.22]     Procedures  Vitrectomy Pars Plana  27190 - [place] VITRECTOMY MECHANICAL PARS PLANA      Surgeons   Abbi Canales MD    Resident/Fellow/Other Assistant:  Adithya Foote MD (fellow)    Procedure Summary  Anesthesia: General  ASA: III  Anesthesia Staff: Anesthesiologist: Wiliam Sparrow MD  Anesthesia Resident: Crys Shah DO  Estimated Blood Loss: 0 mL  Intra-op Medications:   Medication Name Total Dose   phenylephrine (Mydfrin) 2.5 % ophthalmic solution 1 drop 3 drop   proparacaine (Alcaine) 0.5 % ophthalmic solution 1 drop 1 drop   tropicamide (Mydriacyl) 0.5 % ophthalmic solution 1 drop 2 drop              Anesthesia Record               Intraprocedure I/O Totals          Intake    LR infusion 1000.00 mL    Total Intake 1000 mL          Specimen: No specimens collected     Staff:   Circulator: Digna Salazar RN  Scrub Person: Latesha Fleming RN      Findings: vitreous hemorrhage, subhyaloid hemorrhage and multi-layered clot - left eye    Complications:  None; patient tolerated the procedure well.     Disposition: PACU - hemodynamically stable.  Condition: stable  Specimens Collected: No specimens collected  Attending Attestation:     A qualified resident physician was not available and the use of a skilled assistant surgeon, Adithya Foote MD, was required for the following portions of this case: Pars plana vitrectomy with scleral depression, membrane peel, air-fluid exchange, intravitreal alteplase inejction, 16% C3F8 gas left eye      Abbi Canales  Phone Number: [phone]

## 2024-01-23 NOTE — H&P
History Of Present Illness  Shilo Thakkar is a 23 y.o. male presenting with retinal hemorrhage OS in the setting of Burkitt's Lymphoma and thrombocytopenia. His vision has declined significant due to hemorrhage. Plan to day for pars plana vitrectomy with injection of TPA - left eye     Past Medical History  He has a past medical history of Colles' fracture of right radius, initial encounter for closed fracture (04/23/2015), Personal history of other mental and behavioral disorders (01/28/2014), Personal history of other mental and behavioral disorders (05/10/2017), Personal history of other specified conditions (05/03/2016), and Unspecified fracture of navicular (scaphoid) bone of left wrist, initial encounter for closed fracture (12/17/2015).    Surgical History  He has a past surgical history that includes Other surgical history (10/03/2017); US guided soft tissue biopsy (10/20/2023); Foot fracture surgery (Left); and Chest Tube Insertion (10/29/2023).     Social History  He reports that he has never smoked. He has never been exposed to tobacco smoke. He has never used smokeless tobacco. He reports that he does not currently use alcohol. He reports that he does not use drugs.     Allergies  Patient has no known allergies.    ROS  Patient denies ocular pain, redness, discharge, decreased vision, double vision, blind spots, flashes, or floaters.     Physical Exam   Alert & oriented x 3  Normal chest rise with respirations  Regular Pulses       Assessment/Plan   Principal Problem:    Blurry vision  Active Problems:    ADHD    Anemia in neoplastic disease    Thrombocytopenia (CMS/HCC)    Retinal hemorrhage of both eyes    Left retinal detachment      Will proceed with pars plana vitrectomy with subretinal TPA injection  - LEFT EYE       I spent 10 minutes in the professional and overall care of this patient.      Adithya Foote MD

## 2024-01-23 NOTE — OP NOTE
Vitrectomy Pars Plana left eye, membrane peel, intravitreal TPA, C3F8 Gas (L) Operative Note     Date: 2024 - 2024  OR Location: [facility] OR    Name: Shilo Thakkar : 2000, Age: 23 y.o., MRN: [id number], Sex: male    Diagnosis  Pre-op Diagnosis     * Retinal hemorrhage of both eyes [H35.63]     * Left retinal detachment [H33.22] Post-op Diagnosis     * Retinal hemorrhage of both eyes [H35.63]     * Left retinal detachment [H33.22]     Procedures  Vitrectomy Pars Plana left eye, membrane peel, intravitreal TPA, C3F8 Gas  98923 - [place] VITRECTOMY MECHANICAL PARS PLANA      Surgeons   Abbi Canales MD    Resident/Fellow/Other Assistant:  Adithya Foote MD (fellow)    Procedure Summary  Anesthesia: General  ASA: III  Anesthesia Staff: Anesthesiologist: Wiliam Sparrow MD  Anesthesia Resident: Crys Shah DO  Estimated Blood Loss: 0 mL  Intra-op Medications:   Medication Name Total Dose   phenylephrine (Mydfrin) 2.5 % ophthalmic solution 1 drop 3 drop   proparacaine (Alcaine) 0.5 % ophthalmic solution 1 drop 1 drop   tropicamide (Mydriacyl) 0.5 % ophthalmic solution 1 drop 2 drop              Anesthesia Record               Intraprocedure I/O Totals          Intake    LR infusion 1000.00 mL    Total Intake 1000 mL          Specimen: No specimens collected     Staff:   Circulator: Digna Salazar RN  Scrub Person: Latesha Fleming RN    Drains and/or Catheters: * None in log *    Tourniquet Times: n/a        Implants: n/a    Findings: Vitreous hemorrhage, subhyaloid hemorrhage & clot - left eye    Indications: Shilo Thakkar is an 23 y.o. male who is having surgery for Retinal hemorrhage of both eyes [H35.63]  Left retinal detachment [H33.22].     The patient was seen in the preoperative area. The risks, benefits, complications, treatment options, non-operative alternatives, expected recovery and outcomes were discussed with the patient. The possibilities of reaction to medication, pulmonary  aspiration, injury to surrounding structures, bleeding, recurrent infection, the need for additional procedures, failure to diagnose a condition, and creating a complication requiring transfusion or operation were discussed with the patient. The patient concurred with the proposed plan, giving informed consent.  The site of surgery was properly noted/marked if necessary per policy. The patient has been actively warmed in preoperative area. Preoperative antibiotics are not indicated. Venous thrombosis prophylaxis are not indicated.    Procedure Details:  DATE OF SURGERY: 1/22/2024     SURGEON: Abbi Canales MD     ASSISTANT: Adithya Foote MD      PREOPERATIVE DIAGNOSIS  Pre-Op Diagnosis Codes:     * Retinal hemorrhage of both eyes [H35.63]     * Left retinal detachment [H33.22]     POSTOPERATIVE DIAGNOSIS   Post-op Diagnosis     * Retinal hemorrhage of both eyes [H35.63]     * Left retinal detachment [H33.22]     OPERATION PERFORMED  25 -gauge pars plana vitrectomy, membrane peel, air-fluid exchange, intravitreal TPA, and 16% C3F8 gas to the left eye     ANESTHESIA  General Anesthesia      FINDINGS  As detailed below      COMPLICATIONS  None      ESTIMATED BLOOD LOSS   Minimal      SPECIMENS REMOVED  None      JUSTIFICATION  This is a 23 y.o. year old patient with dense subhyaloid hemorrhage and clot, left eye, in the setting of Burkitt's Lymphoma and thrombocytopenia. This was causing decreased visual function. The risks, benefits, and alternatives to the procedure were discussed with the patient including the risk for vision loss, blindness, retinal detachment, infection, bleeding, pain, high or low pressure in the eye, double vision, no benefit, need for additional procedures, and droopy eyelid, amongst others. The patient had a full opportunity to have all questions answered in clinic prior to surgery. Afterwards, the patient requested that we perform surgery and signed the consent form.      PROCEDURE:   The  patient was brought to the preoperative holding area where the correct eye was confirmed and marked. The patient was then brought to the operating room where a secondary time-out was performed to identify the correct patient, eye, procedure, and any allergies. The patient then underwent intravenous sedation by the anesthesia team followed by a block as described above.  The eye was prepped and draped in the usual sterile ophthalmic fashion followed by placement of a lid speculum.      A  25 -gauge trocar was placed in the inferotemporal quadrant 4 mm posterior to the limbus after conjunctival displacement.  A 4 mm infusion cannula was placed through this trocar, and the infusion cannula was confirmed in the vitreous cavity prior to turning it on. Two additional  25 -gauge trocars were placed in a similar fashion in the superonasal and superotemporal quadrants 4 mm posterior to the limbus after conjunctival displacement. A fourth trocar was placed 4 mm from the limbus in the inferonasal quadrant and an illuminated chandelier was inserted to improve visualization     At this time, a standard three-port pars plana vitrectomy was performed using the light pipe, the cutter, and the BIOM viewing system. A core vitreous dissection was performed and vitreous hemorrhage was evacuated. The retina was inspected and was found to be attached with a large subhyaloid multilayer hemorrhagic clot. The clot was extensive and covered the entire macula, vascular arcades and optic nerve. There were also scattered round retinal hemorrhages in the midperiphery. A posterior vitreous detachment was performed with aspiration of the hyaloid over the optic nerve after injection of diluted intravitreal triamcinolone.  A thorough peripheral vitreous dissection was performed. Despite lifting the hyaloid from the retinal surface, the hemorrhagic  clot remained tightly adherent to the posterior pole. The multilayer hemorrhagic clot was carefully  dissected from the posterior pole with a combination of 25 gauge vitrector along with a bimanual technique utilizing 25 gauge endo-scissor and 25 gauge max- forceps. Approximately 75% of the clot was successfully removed which provided visualization of the optic nerve, macula and inferior arcade. The remaining hemorrhagic clot was tightly adherent to the superior perifovea and arcade. Two small retinal holes formed in the adjacent superior perifovea, and the decision was made to defer any further dissection.      At this time an air-fluid exchange of approximately 80-90% was performed with the soft tip cannula on extrusion. Next, 0.30 cc of alteplase  ( concentration 0.1mg/1mL) (LOT: [id number] ) was injected into the mid vitreous cavity through the superonasal trocar on a soft tip cannula; the alteplase was utilized to facilitate further degradation of the remaining hemorrhagic clot.       The inferonasal and superonasal trocars were removed and their respective sclerotomy sites were sutured with 7-0 vicryl. Next an exchange of 16% C3F8 was performed; the gas was injected through the infusion line and the air was vented through the superotemporal trocar. The superotemporal trocar was removed and was sutured with an interrupted 7-0 Vicryl. The infusion cannula and associated trocar were then removed and sutured with an interrupted 7-0 Vicryl. The eye was confirmed to be at a physiologic level by digital palpation after removal of the trocars.      Subconjunctival injection of Cefazolin and Dexamethasone were administered. The lid speculum and drapes were removed. Antibiotic ointment was applied. The eye was patched and shielded. The patient tolerated the procedure well without any intraoperative or immediate postoperative complications. The patient was taken to the recovery room in good condition. The patient was instructed to maintain strict face-down positioning. The patient will be transferred back to the  medicine floor and will be seen by the consult resident tomorrow prior to home discharge.           Complications:  None; patient tolerated the procedure well.    Disposition: PACU - hemodynamically stable.  Condition: stable         Additional Details:     A qualified resident physician was not available and the use of a skilled assistant surgeon, Adithya Foote MD, was required for the following portions of this case: Pars plana vitrectomy with scleral depression, membrane peel, air-fluid exchange, intravitreal TPA injection, 16% C3F8 gas - left eye      Attending Attestation:     Abbi Canales  Phone Number: [phone]

## 2024-01-23 NOTE — CARE PLAN
Problem: Fall/Injury  Goal: Not fall by end of shift  Outcome: Progressing  Goal: Be free from injury by end of the shift  Outcome: Progressing  Goal: Verbalize understanding of personal risk factors for fall in the hospital  Outcome: Progressing  Goal: Verbalize understanding of risk factor reduction measures to prevent injury from fall in the home  Outcome: Progressing  Goal: Use assistive devices by end of the shift  Outcome: Progressing  Goal: Pace activities to prevent fatigue by end of the shift  Outcome: Progressing     Problem: Pain - Adult  Goal: Verbalizes/displays adequate comfort level or baseline comfort level  Outcome: Progressing     Problem: Safety - Adult  Goal: Free from fall injury  Outcome: Progressing     Problem: Discharge Planning  Goal: Discharge to home or other facility with appropriate resources  Outcome: Progressing     Problem: Chronic Conditions and Co-morbidities  Goal: Patient's chronic conditions and co-morbidity symptoms are monitored and maintained or improved  Outcome: Progressing   The patient's goals for the shift include      The clinical goals for the shift include VSS, remain free from fall/injury, adequate pain management    Returned to floor from OR/PACU @ 01:40. VSS on RA. Pt reporting surgical eye pain, administered PRN tylenol x1 and one time dose of tramadol; Huber Waller PA-C notified of pain. Pt resting in prone position d/t surgical instruction. 500ml NS bolus administered as ordered, tolerated well. Family at bedside. Remains free from fall/injury.

## 2024-01-23 NOTE — DISCHARGE INSTRUCTIONS
"Please keep the eye patched/shielded until your post op day 1 appointment tomorrow.    Please do not perform any strenuous activity, bending below the waist, until you are cleared by your doctor.      You have gas in the the eye after surgery. You are not permitted to fly in airplanes or experience any significant change in elevation until your are cleared by your doctor. You must wear your gas bracelet until your are cleared by your doctor.     Positioning: Please maintain strict face down positioning during the day for the next 3 days. You may take one 10-15 break per hour to sit upright/stand to eat and use the bathroom. When sitting or standing upright, look down at the floor to maintain facedown positioning.  At night, lay belly down if possible. If not then lay on your left or right side with  your face down and into the pillow. DO NOT LIE FLAT ON YOUR BACK       Following chemotherapy there may be a drop in your white blood cell count which makes you more susceptible to the bacteria and other germs. These exposures can cause you to have \"neutropenic\" fevers (fevers that may happen after chemotherapy from your immune system being low). Tylenol can mask fevers and we recommend that you do not take Tylenol once you go home for pain. You may take it if you develop a fever >100.4 F but please call your oncologist and go to the nearest emergency room. You may require intravenous antibiotics depending on your blood counts.     Please also avoid enemas/suppositories, flossing and shaving with a razor following chemo because this can expose you to harmful bacteria. Meticulous hand and oral hygiene are the best ways to prevent infections during this time. You should avoid alcohol based mouth washes at this can cause break down in your mouth as well. A salt and baking soda mouthwash is good for keeping the mouth clean. Please also avoid anyone who is sick or large crowds of people if possible. If traveling in large " crowds, please wear a mask for your own protection.

## 2024-01-23 NOTE — PROGRESS NOTES
01/23/24 1503   Discharge Planning   Living Arrangements Parent   Support Systems Parent;Family members   Type of Residence Private residence   Number of Stairs Within Residence 12   Do you have animals or pets at home? Yes   Type of Animals or Pets 2 cats   Home or Post Acute Services None   Patient expects to be discharged to: home   Does the patient need discharge transport arranged? No   Financial Resource Strain   How hard is it for you to pay for the very basics like food, housing, medical care, and heating? Not very   Housing Stability   In the last 12 months, was there a time when you were not able to pay the mortgage or rent on time? N   In the last 12 months, how many places have you lived? 1   In the last 12 months, was there a time when you did not have a steady place to sleep or slept in a shelter (including now)? N   Transportation Needs   In the past 12 months, has lack of transportation kept you from medical appointments or from getting medications? no   In the past 12 months, has lack of transportation kept you from meetings, work, or from getting things needed for daily living? No     Pt with Burkitt's Lymphoma was admitted for blurry vision, dizziness, H/A.  He had surgery today on his left eye for retinoschisis.  He will discharge this evening and return to home with his mother. He is still independent and has no home care or DME needs.  He has a SOLO PICC which is cared for in the infusion center.  His MD is Olegario Klein.

## 2024-01-23 NOTE — ANESTHESIA POSTPROCEDURE EVALUATION
Patient: Shilo Thakkar    Procedure Summary       Date: 01/22/24 Room / Location: [facility] OR 03 / Virtual [facility] [place] [facility] OR    Anesthesia Start: 2021 Anesthesia Stop: 2352    Procedure: Vitrectomy Pars Plana left eye, membrane peel, intravitreal TPA, C3F8 Gas (Left) Diagnosis:       Retinal hemorrhage of both eyes      Left retinal detachment      (Retinal hemorrhage of both eyes [H35.63])      (Left retinal detachment [H33.22])    Surgeons: Abbi Canales MD PhD Responsible Provider: Wiliam Sparrow MD    Anesthesia Type: general ASA Status: 3            Anesthesia Type: general    Vitals Value Taken Time   /74 01/22/24 2352   Temp 36.3 01/22/24 2352   Pulse 48 01/22/24 2352   Resp 15 01/22/24 2352   SpO2 100 01/22/24 2352       Anesthesia Post Evaluation    Patient location during evaluation: PACU  Patient participation: complete - patient participated  Level of consciousness: sleepy but conscious and awake  Pain score: 0  Pain management: satisfactory to patient  Multimodal analgesia pain management approach  Airway patency: patent  Cardiovascular status: acceptable and blood pressure returned to baseline  Respiratory status: acceptable and face mask  Hydration status: acceptable  Postoperative Nausea and Vomiting: none        No notable events documented.

## 2024-01-24 ENCOUNTER — OFFICE VISIT (OUTPATIENT)
Dept: HEMATOLOGY/ONCOLOGY | Facility: HOSPITAL | Age: 24
DRG: 116 | End: 2024-01-24
Payer: COMMERCIAL

## 2024-01-24 ENCOUNTER — TELEPHONE (OUTPATIENT)
Dept: HEMATOLOGY/ONCOLOGY | Facility: HOSPITAL | Age: 24
End: 2024-01-24
Payer: COMMERCIAL

## 2024-01-24 VITALS
WEIGHT: 163.4 LBS | SYSTOLIC BLOOD PRESSURE: 138 MMHG | DIASTOLIC BLOOD PRESSURE: 83 MMHG | BODY MASS INDEX: 21.56 KG/M2 | TEMPERATURE: 97.3 F | OXYGEN SATURATION: 100 % | HEART RATE: 51 BPM | RESPIRATION RATE: 16 BRPM

## 2024-01-24 DIAGNOSIS — C83.78 BURKITT LYMPHOMA OF LYMPH NODES OF MULTIPLE REGIONS (MULTI): ICD-10-CM

## 2024-01-24 DIAGNOSIS — C83.79 BURKITT LYMPHOMA OF SOLID ORGAN EXCLUDING SPLEEN (MULTI): ICD-10-CM

## 2024-01-24 DIAGNOSIS — C83.70 BURKITT LYMPHOMA, UNSPECIFIED BODY REGION (MULTI): Primary | ICD-10-CM

## 2024-01-24 DIAGNOSIS — C83.70 BURKITT LYMPHOMA, UNSPECIFIED BODY REGION (MULTI): ICD-10-CM

## 2024-01-24 LAB
ALBUMIN SERPL BCP-MCNC: 4 G/DL (ref 3.4–5)
ANION GAP SERPL CALC-SCNC: 13 MMOL/L (ref 10–20)
BASOPHILS # BLD MANUAL: 0 X10*3/UL (ref 0–0.1)
BASOPHILS NFR BLD MANUAL: 0 %
BUN SERPL-MCNC: 31 MG/DL (ref 6–23)
CALCIUM SERPL-MCNC: 8.9 MG/DL (ref 8.6–10.6)
CHLORIDE SERPL-SCNC: 99 MMOL/L (ref 98–107)
CO2 SERPL-SCNC: 30 MMOL/L (ref 21–32)
CREAT SERPL-MCNC: 0.93 MG/DL (ref 0.5–1.3)
DACRYOCYTES BLD QL SMEAR: ABNORMAL
EGFRCR SERPLBLD CKD-EPI 2021: >90 ML/MIN/1.73M*2
EOSINOPHIL # BLD MANUAL: 0 X10*3/UL (ref 0–0.7)
EOSINOPHIL NFR BLD MANUAL: 0 %
ERYTHROCYTE [DISTWIDTH] IN BLOOD BY AUTOMATED COUNT: 15.3 % (ref 11.5–14.5)
GLUCOSE SERPL-MCNC: 99 MG/DL (ref 74–99)
HCT VFR BLD AUTO: 25.2 % (ref 41–52)
HGB BLD-MCNC: 9 G/DL (ref 13.5–17.5)
IMM GRANULOCYTES # BLD AUTO: 2.49 X10*3/UL (ref 0–0.7)
IMM GRANULOCYTES NFR BLD AUTO: 17.5 % (ref 0–0.9)
LDH SERPL L TO P-CCNC: 284 U/L (ref 84–246)
LYMPHOCYTES # BLD MANUAL: 0.71 X10*3/UL (ref 1.2–4.8)
LYMPHOCYTES NFR BLD MANUAL: 5 %
MAGNESIUM SERPL-MCNC: 2.16 MG/DL (ref 1.6–2.4)
MCH RBC QN AUTO: 28.7 PG (ref 26–34)
MCHC RBC AUTO-ENTMCNC: 35.7 G/DL (ref 32–36)
MCV RBC AUTO: 80 FL (ref 80–100)
METAMYELOCYTES # BLD MANUAL: 0.57 X10*3/UL
METAMYELOCYTES NFR BLD MANUAL: 4 %
MONOCYTES # BLD MANUAL: 1.14 X10*3/UL (ref 0.1–1)
MONOCYTES NFR BLD MANUAL: 8 %
MYELOCYTES # BLD MANUAL: 0.14 X10*3/UL
MYELOCYTES NFR BLD MANUAL: 1 %
NEUTROPHILS # BLD MANUAL: 11.64 X10*3/UL (ref 1.2–7.7)
NEUTS BAND # BLD MANUAL: 0.99 X10*3/UL (ref 0–0.7)
NEUTS BAND NFR BLD MANUAL: 7 %
NEUTS SEG # BLD MANUAL: 10.65 X10*3/UL (ref 1.2–7)
NEUTS SEG NFR BLD MANUAL: 75 %
NRBC BLD-RTO: 0.9 /100 WBCS (ref 0–0)
OVALOCYTES BLD QL SMEAR: ABNORMAL
PHOSPHATE SERPL-MCNC: 3.8 MG/DL (ref 2.5–4.9)
PLATELET # BLD AUTO: 89 X10*3/UL (ref 150–450)
POLYCHROMASIA BLD QL SMEAR: ABNORMAL
POTASSIUM SERPL-SCNC: 3.4 MMOL/L (ref 3.5–5.3)
RBC # BLD AUTO: 3.14 X10*6/UL (ref 4.5–5.9)
RBC MORPH BLD: ABNORMAL
SODIUM SERPL-SCNC: 139 MMOL/L (ref 136–145)
TOTAL CELLS COUNTED BLD: 100
URATE SERPL-MCNC: 4.7 MG/DL (ref 4–7.5)
WBC # BLD AUTO: 14.2 X10*3/UL (ref 4.4–11.3)

## 2024-01-24 PROCEDURE — 99215 OFFICE O/P EST HI 40 MIN: CPT | Mod: 27 | Performed by: INTERNAL MEDICINE

## 2024-01-24 PROCEDURE — 85007 BL SMEAR W/DIFF WBC COUNT: CPT

## 2024-01-24 PROCEDURE — 1036F TOBACCO NON-USER: CPT | Performed by: INTERNAL MEDICINE

## 2024-01-24 PROCEDURE — 83735 ASSAY OF MAGNESIUM: CPT

## 2024-01-24 PROCEDURE — 84550 ASSAY OF BLOOD/URIC ACID: CPT

## 2024-01-24 PROCEDURE — 83615 LACTATE (LD) (LDH) ENZYME: CPT

## 2024-01-24 PROCEDURE — 80069 RENAL FUNCTION PANEL: CPT

## 2024-01-24 PROCEDURE — 85027 COMPLETE CBC AUTOMATED: CPT

## 2024-01-24 PROCEDURE — 99215 OFFICE O/P EST HI 40 MIN: CPT | Performed by: INTERNAL MEDICINE

## 2024-01-24 PROCEDURE — 36591 DRAW BLOOD OFF VENOUS DEVICE: CPT

## 2024-01-24 RX ORDER — POTASSIUM CHLORIDE 20 MEQ/1
20 TABLET, EXTENDED RELEASE ORAL DAILY
Qty: 30 TABLET | Refills: 0 | Status: SHIPPED | OUTPATIENT
Start: 2024-01-24 | End: 2024-02-14 | Stop reason: HOSPADM

## 2024-01-24 ASSESSMENT — ENCOUNTER SYMPTOMS
OCCASIONAL FEELINGS OF UNSTEADINESS: 0
DEPRESSION: 0
LOSS OF SENSATION IN FEET: 0

## 2024-01-24 ASSESSMENT — PAIN SCALES - GENERAL: PAINLEVEL: 0-NO PAIN

## 2024-01-24 NOTE — TELEPHONE ENCOUNTER
RN called patient and left message about appt at 3pm today. Patient was just discharged out of hospital on 01/23. RN told patient to call office back to update if coming in today or needing to reschedule. No further need necessary. Awaiting call back from patient.

## 2024-01-25 NOTE — PROGRESS NOTES
Bilateral macula-involving sub ILM hemorrhage with serous detachment OS>OD  Multilayer retinal hemorrhage, left eye  S/p pars plana vitrectomy (PPV)/C3F8/intraretinal tPA  Gas is 65% fill  VH below the bubble  Retina is flat  No signs of infection    OCT 01/26/24     - Right eye (OD): No veiw    - Left eye (OS): SubLM Pre-retinal hemorrhage with posteiror shadowing     Optos 01/26/24    - Right eye (OD): 65% gas fill, retina is attached, residual preretinal blood clot with fibrin membranes in the temporal retina     - Left eye (OS): SubLM subfoveal clotted heme with scattered pre-retinal hemorrhage with in the posterior pole; stable    Plan:  - Continue Prednisolone, and artificial tears QID to the left eye,   - Still have significant difficulties with ADLs with current vision.   - Discussed r/b/a of proceeding PPV with OD prior to repeat OS surgery;   - Postioning restrictions discussed with the patient  - Discussed with oncology regarding timing for surgery and the initiation of another cycle of chemo. Will proceed with the surgery and postpone the next cycle for 1-2 weeks.

## 2024-01-26 ENCOUNTER — OFFICE VISIT (OUTPATIENT)
Dept: OPHTHALMOLOGY | Facility: CLINIC | Age: 24
End: 2024-01-26
Payer: COMMERCIAL

## 2024-01-26 ENCOUNTER — INFUSION (OUTPATIENT)
Dept: HEMATOLOGY/ONCOLOGY | Facility: HOSPITAL | Age: 24
End: 2024-01-26
Payer: COMMERCIAL

## 2024-01-26 ENCOUNTER — OFFICE VISIT (OUTPATIENT)
Dept: HEMATOLOGY/ONCOLOGY | Facility: HOSPITAL | Age: 24
End: 2024-01-26
Payer: COMMERCIAL

## 2024-01-26 VITALS
HEART RATE: 73 BPM | TEMPERATURE: 99 F | BODY MASS INDEX: 20.56 KG/M2 | DIASTOLIC BLOOD PRESSURE: 74 MMHG | SYSTOLIC BLOOD PRESSURE: 130 MMHG | OXYGEN SATURATION: 100 % | WEIGHT: 155.87 LBS | RESPIRATION RATE: 16 BRPM

## 2024-01-26 DIAGNOSIS — D69.6 THROMBOCYTOPENIA (CMS-HCC): ICD-10-CM

## 2024-01-26 DIAGNOSIS — C83.70 BURKITT LYMPHOMA, UNSPECIFIED BODY REGION (MULTI): ICD-10-CM

## 2024-01-26 DIAGNOSIS — C83.78 BURKITT LYMPHOMA OF LYMPH NODES OF MULTIPLE REGIONS (MULTI): Primary | ICD-10-CM

## 2024-01-26 DIAGNOSIS — H33.103 BILATERAL RETINOSCHISIS: ICD-10-CM

## 2024-01-26 DIAGNOSIS — H35.63 RETINAL HEMORRHAGE OF BOTH EYES: Primary | ICD-10-CM

## 2024-01-26 PROBLEM — H33.22 LEFT RETINAL DETACHMENT: Status: RESOLVED | Noted: 2024-01-17 | Resolved: 2024-01-26

## 2024-01-26 PROBLEM — H53.8 BLURRY VISION: Status: RESOLVED | Noted: 2024-01-17 | Resolved: 2024-01-26

## 2024-01-26 PROBLEM — Z55.3 ACADEMIC UNDERACHIEVEMENT: Status: RESOLVED | Noted: 2023-10-25 | Resolved: 2024-01-26

## 2024-01-26 PROBLEM — Z97.3 WEARS GLASSES: Status: RESOLVED | Noted: 2023-10-25 | Resolved: 2024-01-26

## 2024-01-26 LAB
ALBUMIN SERPL BCP-MCNC: 3.9 G/DL (ref 3.4–5)
ALP SERPL-CCNC: 90 U/L (ref 33–120)
ALT SERPL W P-5'-P-CCNC: 20 U/L (ref 10–52)
ANION GAP SERPL CALC-SCNC: 13 MMOL/L (ref 10–20)
APTT PPP: 26 SECONDS (ref 27–38)
AST SERPL W P-5'-P-CCNC: 14 U/L (ref 9–39)
BASOPHILS # BLD MANUAL: 0 X10*3/UL (ref 0–0.1)
BASOPHILS NFR BLD MANUAL: 0 %
BILIRUB SERPL-MCNC: 0.5 MG/DL (ref 0–1.2)
BUN SERPL-MCNC: 18 MG/DL (ref 6–23)
CALCIUM SERPL-MCNC: 9.1 MG/DL (ref 8.6–10.6)
CHLORIDE SERPL-SCNC: 100 MMOL/L (ref 98–107)
CO2 SERPL-SCNC: 28 MMOL/L (ref 21–32)
CREAT SERPL-MCNC: 0.93 MG/DL (ref 0.5–1.3)
DACRYOCYTES BLD QL SMEAR: ABNORMAL
EGFRCR SERPLBLD CKD-EPI 2021: >90 ML/MIN/1.73M*2
EOSINOPHIL # BLD MANUAL: 0 X10*3/UL (ref 0–0.7)
EOSINOPHIL NFR BLD MANUAL: 0 %
ERYTHROCYTE [DISTWIDTH] IN BLOOD BY AUTOMATED COUNT: 15.2 % (ref 11.5–14.5)
FIBRINOGEN PPP-MCNC: 232 MG/DL (ref 200–400)
GLUCOSE SERPL-MCNC: 118 MG/DL (ref 74–99)
HCT VFR BLD AUTO: 27.4 % (ref 41–52)
HGB BLD-MCNC: 9.5 G/DL (ref 13.5–17.5)
IMM GRANULOCYTES # BLD AUTO: 2.41 X10*3/UL (ref 0–0.7)
IMM GRANULOCYTES NFR BLD AUTO: 18.2 % (ref 0–0.9)
INR PPP: 1.1 (ref 0.9–1.1)
LDH SERPL L TO P-CCNC: 260 U/L (ref 84–246)
LYMPHOCYTES # BLD MANUAL: 0.4 X10*3/UL (ref 1.2–4.8)
LYMPHOCYTES NFR BLD MANUAL: 3 %
MCH RBC QN AUTO: 28.4 PG (ref 26–34)
MCHC RBC AUTO-ENTMCNC: 34.7 G/DL (ref 32–36)
MCV RBC AUTO: 82 FL (ref 80–100)
METAMYELOCYTES # BLD MANUAL: 0.8 X10*3/UL
METAMYELOCYTES NFR BLD MANUAL: 6 %
MONOCYTES # BLD MANUAL: 0.4 X10*3/UL (ref 0.1–1)
MONOCYTES NFR BLD MANUAL: 3 %
MYELOCYTES # BLD MANUAL: 0.13 X10*3/UL
MYELOCYTES NFR BLD MANUAL: 1 %
NEUTROPHILS # BLD MANUAL: 11.57 X10*3/UL (ref 1.2–7.7)
NEUTS BAND # BLD MANUAL: 0.93 X10*3/UL (ref 0–0.7)
NEUTS BAND NFR BLD MANUAL: 7 %
NEUTS SEG # BLD MANUAL: 10.64 X10*3/UL (ref 1.2–7)
NEUTS SEG NFR BLD MANUAL: 80 %
NRBC BLD-RTO: 0.2 /100 WBCS (ref 0–0)
OVALOCYTES BLD QL SMEAR: ABNORMAL
PLATELET # BLD AUTO: 85 X10*3/UL (ref 150–450)
POLYCHROMASIA BLD QL SMEAR: ABNORMAL
POTASSIUM SERPL-SCNC: 3.4 MMOL/L (ref 3.5–5.3)
PROT SERPL-MCNC: 6.1 G/DL (ref 6.4–8.2)
PROTHROMBIN TIME: 12.1 SECONDS (ref 9.8–12.8)
RBC # BLD AUTO: 3.34 X10*6/UL (ref 4.5–5.9)
RBC MORPH BLD: ABNORMAL
SCHISTOCYTES BLD QL SMEAR: ABNORMAL
SODIUM SERPL-SCNC: 138 MMOL/L (ref 136–145)
TOTAL CELLS COUNTED BLD: 100
WBC # BLD AUTO: 13.3 X10*3/UL (ref 4.4–11.3)

## 2024-01-26 PROCEDURE — 85007 BL SMEAR W/DIFF WBC COUNT: CPT

## 2024-01-26 PROCEDURE — 99214 OFFICE O/P EST MOD 30 MIN: CPT | Performed by: NURSE PRACTITIONER

## 2024-01-26 PROCEDURE — 85384 FIBRINOGEN ACTIVITY: CPT

## 2024-01-26 PROCEDURE — 1036F TOBACCO NON-USER: CPT | Performed by: NURSE PRACTITIONER

## 2024-01-26 PROCEDURE — 83615 LACTATE (LD) (LDH) ENZYME: CPT

## 2024-01-26 PROCEDURE — 99024 POSTOP FOLLOW-UP VISIT: CPT

## 2024-01-26 PROCEDURE — 85730 THROMBOPLASTIN TIME PARTIAL: CPT

## 2024-01-26 PROCEDURE — 92134 CPTRZ OPH DX IMG PST SGM RTA: CPT | Mod: RIGHT SIDE

## 2024-01-26 PROCEDURE — 80053 COMPREHEN METABOLIC PANEL: CPT

## 2024-01-26 PROCEDURE — 85027 COMPLETE CBC AUTOMATED: CPT

## 2024-01-26 PROCEDURE — 85610 PROTHROMBIN TIME: CPT

## 2024-01-26 PROCEDURE — 36591 DRAW BLOOD OFF VENOUS DEVICE: CPT

## 2024-01-26 ASSESSMENT — VISUAL ACUITY
METHOD: SNELLEN - LINEAR
OD_SC: CF AT 1'

## 2024-01-26 ASSESSMENT — ENCOUNTER SYMPTOMS
HEADACHES: 0
DIZZINESS: 0
CONSTITUTIONAL NEGATIVE: 0
UNEXPECTED WEIGHT CHANGE: 0
HEMATOLOGIC/LYMPHATIC NEGATIVE: 0
CONSTIPATION: 0
FEVER: 0
VOMITING: 0
DYSURIA: 0
NAUSEA: 0
RESPIRATORY NEGATIVE: 0
MUSCULOSKELETAL NEGATIVE: 0
EYES NEGATIVE: 1
NUMBNESS: 0
PSYCHIATRIC NEGATIVE: 0
CHEST TIGHTNESS: 0
BLOOD IN STOOL: 0
ENDOCRINE NEGATIVE: 0
HEMATURIA: 0
ABDOMINAL PAIN: 0
GASTROINTESTINAL NEGATIVE: 0
ALLERGIC/IMMUNOLOGIC NEGATIVE: 0
SHORTNESS OF BREATH: 0
COUGH: 0
CARDIOVASCULAR NEGATIVE: 0
CHILLS: 0
LIGHT-HEADEDNESS: 0
NEUROLOGICAL NEGATIVE: 0
DIARRHEA: 0

## 2024-01-26 ASSESSMENT — SLIT LAMP EXAM - LIDS
COMMENTS: NORMAL
COMMENTS: NORMAL

## 2024-01-26 ASSESSMENT — EXTERNAL EXAM - LEFT EYE: OS_EXAM: NORMAL

## 2024-01-26 ASSESSMENT — TONOMETRY
OS_IOP_MMHG: 28
IOP_METHOD: TONOPEN
OD_IOP_MMHG: 20

## 2024-01-26 ASSESSMENT — EXTERNAL EXAM - RIGHT EYE: OD_EXAM: NORMAL

## 2024-01-26 ASSESSMENT — PAIN SCALES - GENERAL: PAINLEVEL_OUTOF10: 0-NO PAIN

## 2024-01-26 NOTE — ASSESSMENT & PLAN NOTE
High risk BL  in CR   Delay cycle 5 until cleared by Dr. Klein to start  Cycles 5 and 6 will require dose modifications

## 2024-01-26 NOTE — ASSESSMENT & PLAN NOTE
- Bilateral retinoschisis and retinal hemorrgages   - s/p left eye vitrectomy  - Following closely with ophthalmology. Will require right vitrectomy in near future

## 2024-01-26 NOTE — PROGRESS NOTES
Patient ID: Shilo Thakkar is a 23 y.o. male.  Oncologic History  Burkitt lymphoma, high risk, MYC positive  - 10/19/23 presented to OSH ED with dysphagia and abdominal pain  - CT A/P with IV contrast (10/19) mass in the body of the pancreas measuring approximately 2.7 x 2.9 cm. The pancreatic duct is dilated. Multiple masses in the mesentery as well as omental caking consistent with carcinomatosis. Moderate amount of free fluid throughout the abdomen and pelvis.   - CT soft tissue neck with IV contrast (10/19): homogeneous soft tissue attenuation lesion in the L oropharynx which measures approximately 3.0 x 4.0 x 5.1 cm.   - US guided mesenteric LN biopsy (10/20/23): HIGH GRADE B-CELL LYMPHOMA MORPHOLOGICALLY CONSISTENT WITH BURKITT LYMPHOMA; flow cytometry: No B cell population identified  - Bone marrow aspiration biopsy November 1, 2023 no evidence of lymphoma involvement.  CSF by flow cytometry showed no lymphoma 11/2/2023.  - Biopsy L oropharyngeal mass and L cervical LN by ENT (10/23/23) high grade B-cell burkitt lymphoma, MYC positive, Bcl-2 negative.  - MR brain with and w/o IV contrast (10/27): No evidence of intracranial metastatic disease.     - Dexa 40 mg daily ended 11/1  - PET CT ordered 10/30 showed FDG avid left oropharyngeal mass and LAD above and below diaphragm  - Started HyperCVAD 10/29 cycle 1 day 1  - Part A:   D1: CTX + mesna, dexamethasone, D2: CTX + mesna, D3: CTX + mesna, D4: Doxo, vinca, D6: Ritux, GCSF, D9: IT,   D2:  intrathecal methotrexate November 2, 2023, 12 mg, CSF shows no B cells by flow cytometry.  D6: Intrathecal cytarabine 100 mg on November 6, 2023.  D11: vinca, dexamethasone, D13: Ritux 11/10  D13: Rituximab therapy  - Daily neupogen since 11/3/23  -Discharged home November 11, 2023  -Evaluation November 15, 2023: Recovered thrombocytopenia, stop levofloxacin, continue prophylactic acyclovir and 3 times weekly Bactrim and fluconazole.  Depressed affect noted.  -Cycle 2-day 1  part to be high-dose methotrexate plus high-dose cytarabine, November 20, 2023,  with 2 intrathecal chemotherapy administered  -Neulasta on 11/27/2023.  -Evaluation 12/6/2023 asymptomatic gaining weight doing well.  To schedule twice weekly CBC BMP Mondays and Thursdays after discharge, emphasized prophylactic levofloxacin between day 5 and day 15, acyclovir, fluconazole and 3 times a week Bactrim.  -CT scan of the chest abdomen or pelvis on December 6, 2023 shows complete resolution of intra-abdominal disease, with mildly worse splenomegaly most likely unrelated to his lymphoma.  -Cycle 3 hyper-CVAD regimen December 12, 2023, including Rituxan day 1 and day 11, intrathecal methotrexate 12/14/2023, intrathecal cytarabine December 18, 2023.  -December 27, 2023: Feeling well asymptomatic tolerated cycle 3 well, plan for cycle 4 January 2, 2024 consisting of high-dose methotrexate high-dose cytarabine, and includes 2 doses of rituximab day 1 day 8, and 2 doses of intrathecal chemotherapy, intrathecal cytarabine day 1 and intrathecal methotrexate day 8  -Hospitalized  1/17-1/23/2024 with visual changes, retinoschisis, profound pancytopenia s/p left eye vitrectomy, retinal hemorrgages. Delay cycle 5. Discuss with ophth regarding etiology and plans.  Subjective    HPI  Poor vision. No fever. No bleeding. Headache resolved. No oral sores or diarrhea    Review of Systems    Head and neck: Other than HPI negative  CNS: Other than HPI negative  Cardiovascular: Other than HPI negative  Pulmonary: Other than HPI negative  GI: Other than HPI negative  : Other than HPI negative  Bleeding: Other than HPI negative  Constitutional: Other than HPI negative   Objective    BSA: 1.95 meters squared  /83 (BP Location: Left arm, Patient Position: Sitting, BP Cuff Size: Adult)   Pulse 51   Temp 36.3 °C (97.3 °F) (Skin)   Resp 16   Wt 74.1 kg (163 lb 6.4 oz)   SpO2 100%   BMI 21.56 kg/m²      Physical Exam  Left eye  patched  No obvious vision right eye  Oral -ve  Lungs clear  Cor RRR  Abd soft NT  LL no edema    Performance Status:  Symptomatic; in bed <50% of the day      Assessment/Plan   High risk BL  In CR, delay cycle 5. Profound pancytopenia resolved.   2. Visual changes, retinoschisis, profound pancytopenia s/p left eye vitrectomy, retinal hemorrgages  Discuss with ophthalmology re: etiology, plans and prospects.   3. AYA considerations-  A. Needs to meet with social work, psychology  B. Compliance concerns. I re-emphasized need for twice weekly compliance with lab checks, transfusion support as needed. Does not look like they were adherent with labs appointments. This is not uncommon in AYA population. Will go ahead and schedule lab check with nurse on the lab days to hopefully improve compliance.    I will see next week. Cycles 5 and 6 will require dose modifications and adjustment in transfusion parameters with BIW labs checks, keeping platelets >50K and Hgb >9.0    Addendum: Discussed with ophthalmology, will plan to proceed with further surgery to both eyes next week which means we will likely delay his next cycle to the following week while ensuring keeping platelets above 50-70,000 and instituting the appropriate dose modifications.    Cancer Staging   No matching staging information was found for the patient.    Oncology History   Burkitt lymphoma (CMS/HCC)   10/27/2023 Initial Diagnosis    Burkitt lymphoma (CMS/HCC)     10/29/2023 -  Chemotherapy    HyperCVAD + RiTUXimab, 21 Day Cycles          Diagnoses and all orders for this visit:  Burkitt lymphoma, unspecified body region (CMS/HCC)  -     Oncology Line Draw Appointment Request; Future  -     Infusion Appointment Request [facility] (Count check-- 10am RM 26 okay per JM); Future  -     Clinic Appointment Request Follow up; [facility] (during infusion); Future  -     Oncology Line Draw Appointment Request; Future  -     Clinic  Appointment Request Follow up; [facility]; Future  -     Lactate Dehydrogenase; Future  -     Comprehensive Metabolic Panel; Future  -     CBC and Auto Differential; Future  -     potassium chloride CR (Klor-Con M20) 20 mEq ER tablet; Take 1 tablet (20 mEq) by mouth once daily. Do not crush or chew.  -     Fibrinogen; Future  -     aPTT; Future  -     Protime-INR; Future  -     Folate; Future  -     Vitamin B12; Future  -     B complex-vitamin C-folic acid (Nephrocaps) capsule 1 capsule  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)  -     Clinic Appointment Request Follow Up; [facility] MD

## 2024-01-26 NOTE — PROGRESS NOTES
1125 Patient arrived to infusion for labs. Patient reports feeling ok.    Patient has DL PICC to R Arm. Labs drawn from purple lumen. Patient's transfusion parameters are <7 and <30- hgb 9.5 and plt 85 today- no transfusions needed.    1220 Patient discharged ambulatory with family.

## 2024-01-26 NOTE — PATIENT INSTRUCTIONS
Results for orders placed or performed in visit on 01/26/24   Protime-INR   Result Value Ref Range    Protime 12.1 9.8 - 12.8 seconds    INR 1.1 0.9 - 1.1   aPTT   Result Value Ref Range    aPTT 26 (L) 27 - 38 seconds   Fibrinogen   Result Value Ref Range    Fibrinogen 232 200 - 400 mg/dL   CBC and Auto Differential   Result Value Ref Range    WBC 13.3 (H) 4.4 - 11.3 x10*3/uL    nRBC 0.2 (H) 0.0 - 0.0 /100 WBCs    RBC 3.34 (L) 4.50 - 5.90 x10*6/uL    Hemoglobin 9.5 (L) 13.5 - 17.5 g/dL    Hematocrit 27.4 (L) 41.0 - 52.0 %    MCV 82 80 - 100 fL    MCH 28.4 26.0 - 34.0 pg    MCHC 34.7 32.0 - 36.0 g/dL    RDW 15.2 (H) 11.5 - 14.5 %    Platelets 85 (L) 150 - 450 x10*3/uL    Immature Granulocytes %, Automated 18.2 (H) 0.0 - 0.9 %    Immature Granulocytes Absolute, Automated 2.41 (H) 0.00 - 0.70 x10*3/uL   Comprehensive Metabolic Panel   Result Value Ref Range    Glucose 118 (H) 74 - 99 mg/dL    Sodium 138 136 - 145 mmol/L    Potassium 3.4 (L) 3.5 - 5.3 mmol/L    Chloride 100 98 - 107 mmol/L    Bicarbonate 28 21 - 32 mmol/L    Anion Gap 13 10 - 20 mmol/L    Urea Nitrogen 18 6 - 23 mg/dL    Creatinine 0.93 0.50 - 1.30 mg/dL    eGFR >90 >60 mL/min/1.73m*2    Calcium 9.1 8.6 - 10.6 mg/dL    Albumin 3.9 3.4 - 5.0 g/dL    Alkaline Phosphatase 90 33 - 120 U/L    Total Protein 6.1 (L) 6.4 - 8.2 g/dL    AST 14 9 - 39 U/L    Bilirubin, Total 0.5 0.0 - 1.2 mg/dL    ALT 20 10 - 52 U/L   Lactate Dehydrogenase   Result Value Ref Range     (H) 84 - 246 U/L   Manual Differential   Result Value Ref Range    Neutrophils %, Manual 80.0 40.0 - 80.0 %    Bands %, Manual 7.0 0.0 - 5.0 %    Lymphocytes %, Manual 3.0 13.0 - 44.0 %    Monocytes %, Manual 3.0 2.0 - 10.0 %    Eosinophils %, Manual 0.0 0.0 - 6.0 %    Basophils %, Manual 0.0 0.0 - 2.0 %    Metamyelocytes %, Manual 6.0 0.0 - 0.0 %    Myelocytes %, Manual 1.0 0.0 - 0.0 %    Seg Neutrophils Absolute, Manual 10.64 (H) 1.20 - 7.00 x10*3/uL    Bands Absolute, Manual 0.93 (H) 0.00 -  0.70 x10*3/uL    Lymphocytes Absolute, Manual 0.40 (L) 1.20 - 4.80 x10*3/uL    Monocytes Absolute, Manual 0.40 0.10 - 1.00 x10*3/uL    Eosinophils Absolute, Manual 0.00 0.00 - 0.70 x10*3/uL    Basophils Absolute, Manual 0.00 0.00 - 0.10 x10*3/uL    Metamyelocytes Absolute, Manual 0.80 0.00 - 0.00 x10*3/uL    Myelocytes Absolute, Manual 0.13 0.00 - 0.00 x10*3/uL    Total Cells Counted 100     Neutrophils Absolute, Manual 11.57 (H) 1.20 - 7.70 x10*3/uL    RBC Morphology See Below     Polychromasia Mild     RBC Fragments Few     Ovalocytes Few     Teardrop Cells Few

## 2024-01-26 NOTE — PROGRESS NOTES
Patient ID: Shilo Thakkar is a 23 y.o. male.  Referring Physician: Olegario Klein MD  [id number] [id number]  Primary Care Provider: Yuni Esteban MD    Date of Service:  1/26/2024    ASSESSMENT and PLAN:    Burkitt lymphoma (CMS/HCC)  High risk BL  in CR   Delay cycle 5 until cleared by Dr. Klein to start  Cycles 5 and 6 will require dose modifications    Thrombocytopenia (CMS/HCC)  Keep PLT greater that 30 d/t bilateral retinoschisis    Bilateral retinoschisis  - Bilateral retinoschisis and retinal hemorrgages   - s/p left eye vitrectomy  - Following closely with ophthalmology. Will require right vitrectomy in near future       Oncology History   Burkitt lymphoma (CMS/HCC)   10/27/2023 Initial Diagnosis    Burkitt lymphoma (CMS/HCC)     10/29/2023 -  Chemotherapy    HyperCVAD + RiTUXimab, 21 Day Cycles        SUBJECTIVE:  History of Present Illness:  Patient presents today, accompanied by his mother and girlfriend, for follow up.  He reports that he has been feeling ok.  He was seen by ophthalmology today and they told him he will eventually need surgery on the right eye as well.  Reports that the vision in his right eye is hazy. He does need some assistance navigated due to it.  No other concerns or complaints today.        Review of Systems   Constitutional:  Negative for chills, fever and unexpected weight change.   HENT:  Negative for mouth sores and nosebleeds.    Eyes:  Positive for visual disturbance.   Respiratory:  Negative for cough, chest tightness and shortness of breath.    Cardiovascular:  Negative for chest pain and leg swelling.   Gastrointestinal:  Negative for abdominal pain, blood in stool, constipation, diarrhea, nausea and vomiting.   Genitourinary:  Negative for dysuria and hematuria.   Skin:  Negative for rash.   Neurological:  Negative for dizziness, light-headedness, numbness and headaches.     OBJECTIVE:  KPS: Karnofsky Score: 100 - Fully  active, able to carry on all pre-disease performed without restriction     Physical Exam  Constitutional:       Appearance: Normal appearance.   HENT:      Head: Normocephalic.      Comments: Left eye with patch and shield in place  Eyes:      Pupils: Pupils are equal, round, and reactive to light.   Cardiovascular:      Rate and Rhythm: Normal rate and regular rhythm.   Pulmonary:      Effort: Pulmonary effort is normal.      Breath sounds: Normal breath sounds.   Abdominal:      General: Bowel sounds are normal.      Palpations: Abdomen is soft.   Musculoskeletal:         General: Normal range of motion.      Cervical back: Normal range of motion and neck supple.   Lymphadenopathy:      Comments: No lymphadenopathy   Skin:     General: Skin is warm and dry.      Findings: No lesion or rash.   Neurological:      General: No focal deficit present.      Mental Status: He is alert and oriented to person, place, and time. Mental status is at baseline.      Comments: No numbness or tingling   Psychiatric:         Mood and Affect: Mood normal.       Current Outpatient Medications   Medication Instructions    acyclovir (ZOVIRAX) 400 mg, oral, 2 times daily    ferrous sulfate, 325 mg ferrous sulfate, tablet 1 tablet, oral, 2 times daily    fluconazole (DIFLUCAN) 400 mg, oral, Daily    ofloxacin (Ocuflox) 0.3 % ophthalmic solution Administer 1 drop into the left eye 4 times a day for 7 days. Do not start before January 23, 2024.    pantoprazole (PROTONIX) 40 mg, oral, Daily before breakfast, Do not crush, chew, or split.    peg 400-hypromellose-glycerin (Artificial Tears,pg-hypm-glyc,) 1-0.2-0.2 % drops ophthalmic drops Administer 2 drops into the left eye every 6 hours for 7 days.    potassium chloride CR (Klor-Con M20) 20 mEq ER tablet 20 mEq, oral, Daily, Do not crush or chew.    prednisoLONE acetate (Pred-Forte) 1 % ophthalmic suspension Administer 1 drop into the left eye 4 times a day. Do not start before January 23,  2024.    sennosides-docusate sodium (Ashley-Colace) 8.6-50 mg tablet 2 tablets, oral, 2 times daily PRN    sulfamethoxazole-trimethoprim (Bactrim DS) 800-160 mg tablet 1 tablet, oral, Every Mon/Wed/Fri      Laboratory:  The pertinent laboratory results were reviewed and discussed with the patient.    Lab Results   Component Value Date    WBC 13.3 (H) 01/26/2024    HCT 27.4 (L) 01/26/2024    HGB 9.5 (L) 01/26/2024    PLT 85 (L) 01/26/2024    K 3.4 (L) 01/24/2024    CALCIUM 8.9 01/24/2024     01/24/2024    MG 2.16 01/24/2024    BILITOT 0.9 01/17/2024    ALT 19 01/17/2024    AST 9 01/17/2024    BUN 31 (H) 01/24/2024    CREATININE 0.93 01/24/2024    PHOS 3.8 01/24/2024      Note: for a comprehensive list of the patient's lab results, access the Results Review activity.    RTC:  1/31 with Dr. Latoya Pardo, APRN-CNP

## 2024-01-27 ENCOUNTER — DOCUMENTATION (OUTPATIENT)
Dept: OPHTHALMOLOGY | Facility: CLINIC | Age: 24
End: 2024-01-27
Payer: COMMERCIAL

## 2024-01-27 DIAGNOSIS — H35.61 PRERETINAL HEMORRHAGE OF RIGHT EYE: Primary | ICD-10-CM

## 2024-01-27 ASSESSMENT — VISUAL ACUITY
METHOD: SNELLEN - LINEAR
OD_SC: CF 2'
OS_SC: HM 2'

## 2024-01-27 ASSESSMENT — EXTERNAL EXAM - RIGHT EYE: OD_EXAM: NORMAL

## 2024-01-27 ASSESSMENT — SLIT LAMP EXAM - LIDS
COMMENTS: NORMAL
COMMENTS: NORMAL

## 2024-01-27 ASSESSMENT — TONOMETRY
OD_IOP_MMHG: 14
OS_IOP_MMHG: 14
IOP_METHOD: GOLDMANN APPLANATION

## 2024-01-27 ASSESSMENT — EXTERNAL EXAM - LEFT EYE: OS_EXAM: NORMAL

## 2024-01-27 NOTE — PROGRESS NOTES
PROCEDURE NOTE     The right eye was prepped with standard sterile technique. The right eye was confirmed as correct eye by two providers. Subconjunctival 1% lidocaine was injected for anesthesia. An additional drop of betadine was administered. Lashes were cleaned with betadine swab. The eye was marked 4mm from the limbus with a sterile tuberculin syringe. 0.05 mL of tPA was injected to right eye. Count fingers vision was confirmed. Eye was irrigated copiously.

## 2024-01-27 NOTE — PROGRESS NOTES
Bilateral macula-involving sub ILM hemorrhage with serous detachment OS>OD  Multilayer retinal hemorrhage, left eye  S/p pars plana vitrectomy (PPV)/C3F8/intraretinal tPA OS   Gas is 65% fill  VH below the bubble  Retina is flat  No signs of infection  Plan for pre-operative intravitreal tPA, right eye per Dr. Canales  Discussed r/b/a with patient, pt consented   Performed intravitreal tPA right eye, see procedure note   F/u tomorrow OR     Plan:  - Continue Prednisolone, and artificial tears QID to the left eye,   - Still have significant difficulties with ADLs with current vision.   - Discussed r/b/a of proceeding PPV with OD prior to repeat OS surgery;   - Postioning restrictions discussed with the patient  - Discussed with oncology regarding timing for surgery and the initiation of another cycle of chemo. Will proceed with the surgery and postpone the next cycle for 1-2 weeks.

## 2024-01-28 ENCOUNTER — HOSPITAL ENCOUNTER (OUTPATIENT)
Facility: HOSPITAL | Age: 24
Setting detail: SURGERY ADMIT
Discharge: HOME | End: 2024-01-28
Payer: COMMERCIAL

## 2024-01-28 ENCOUNTER — ANESTHESIA EVENT (OUTPATIENT)
Dept: OPERATING ROOM | Facility: HOSPITAL | Age: 24
End: 2024-01-28
Payer: COMMERCIAL

## 2024-01-28 ENCOUNTER — ANESTHESIA (OUTPATIENT)
Dept: OPERATING ROOM | Facility: HOSPITAL | Age: 24
End: 2024-01-28
Payer: COMMERCIAL

## 2024-01-28 VITALS
HEART RATE: 70 BPM | DIASTOLIC BLOOD PRESSURE: 73 MMHG | OXYGEN SATURATION: 100 % | SYSTOLIC BLOOD PRESSURE: 129 MMHG | TEMPERATURE: 96.8 F | RESPIRATION RATE: 16 BRPM

## 2024-01-28 PROCEDURE — 7100000002 HC RECOVERY ROOM TIME - EACH INCREMENTAL 1 MINUTE

## 2024-01-28 PROCEDURE — 67042 VIT FOR MACULAR HOLE: CPT

## 2024-01-28 PROCEDURE — 2500000005 HC RX 250 GENERAL PHARMACY W/O HCPCS

## 2024-01-28 PROCEDURE — 7100000010 HC PHASE TWO TIME - EACH INCREMENTAL 1 MINUTE

## 2024-01-28 PROCEDURE — 3600000008 HC OR TIME - EACH INCREMENTAL 1 MINUTE - PROCEDURE LEVEL THREE

## 2024-01-28 PROCEDURE — A4217 STERILE WATER/SALINE, 500 ML: HCPCS

## 2024-01-28 PROCEDURE — 2720000007 HC OR 272 NO HCPCS

## 2024-01-28 PROCEDURE — 2500000001 HC RX 250 WO HCPCS SELF ADMINISTERED DRUGS (ALT 637 FOR MEDICARE OP)

## 2024-01-28 PROCEDURE — 99140 ANES COMP EMERGENCY COND: CPT | Performed by: ANESTHESIOLOGY

## 2024-01-28 PROCEDURE — 3700000002 HC GENERAL ANESTHESIA TIME - EACH INCREMENTAL 1 MINUTE

## 2024-01-28 PROCEDURE — 3600000003 HC OR TIME - INITIAL BASE CHARGE - PROCEDURE LEVEL THREE

## 2024-01-28 PROCEDURE — 2500000004 HC RX 250 GENERAL PHARMACY W/ HCPCS (ALT 636 FOR OP/ED)

## 2024-01-28 PROCEDURE — A67042 PR VITRECTOMY PARS PLANA REMOVE INT MEMB RETINA

## 2024-01-28 PROCEDURE — 7100000001 HC RECOVERY ROOM TIME - INITIAL BASE CHARGE

## 2024-01-28 PROCEDURE — 3700000001 HC GENERAL ANESTHESIA TIME - INITIAL BASE CHARGE

## 2024-01-28 PROCEDURE — A67042 PR VITRECTOMY PARS PLANA REMOVE INT MEMB RETINA: Performed by: ANESTHESIOLOGY

## 2024-01-28 PROCEDURE — 7100000009 HC PHASE TWO TIME - INITIAL BASE CHARGE

## 2024-01-28 RX ORDER — PHENYLEPHRINE HYDROCHLORIDE 25 MG/ML
1 SOLUTION/ DROPS OPHTHALMIC
Status: COMPLETED | OUTPATIENT
Start: 2024-01-28 | End: 2024-01-28

## 2024-01-28 RX ORDER — LIDOCAINE HYDROCHLORIDE 10 MG/ML
0.1 INJECTION, SOLUTION EPIDURAL; INFILTRATION; INTRACAUDAL; PERINEURAL ONCE
Status: DISCONTINUED | OUTPATIENT
Start: 2024-01-28 | End: 2024-01-28 | Stop reason: HOSPADM

## 2024-01-28 RX ORDER — HYDROMORPHONE HYDROCHLORIDE 1 MG/ML
0.5 INJECTION, SOLUTION INTRAMUSCULAR; INTRAVENOUS; SUBCUTANEOUS EVERY 5 MIN PRN
Status: DISCONTINUED | OUTPATIENT
Start: 2024-01-28 | End: 2024-01-28 | Stop reason: HOSPADM

## 2024-01-28 RX ORDER — BUPIVACAINE HYDROCHLORIDE 7.5 MG/ML
INJECTION, SOLUTION EPIDURAL; RETROBULBAR
Status: DISCONTINUED
Start: 2024-01-28 | End: 2024-01-28 | Stop reason: WASHOUT

## 2024-01-28 RX ORDER — WATER 1 ML/ML
IRRIGANT IRRIGATION AS NEEDED
Status: DISCONTINUED | OUTPATIENT
Start: 2024-01-28 | End: 2024-01-28 | Stop reason: HOSPADM

## 2024-01-28 RX ORDER — POVIDONE-IODINE 5 %
SOLUTION, NON-ORAL OPHTHALMIC (EYE) AS NEEDED
Status: DISCONTINUED | OUTPATIENT
Start: 2024-01-28 | End: 2024-01-28 | Stop reason: HOSPADM

## 2024-01-28 RX ORDER — PHENYLEPHRINE HCL IN 0.9% NACL 0.4MG/10ML
SYRINGE (ML) INTRAVENOUS AS NEEDED
Status: DISCONTINUED | OUTPATIENT
Start: 2024-01-28 | End: 2024-01-28

## 2024-01-28 RX ORDER — ONDANSETRON HYDROCHLORIDE 2 MG/ML
INJECTION, SOLUTION INTRAVENOUS AS NEEDED
Status: DISCONTINUED | OUTPATIENT
Start: 2024-01-28 | End: 2024-01-28

## 2024-01-28 RX ORDER — OXYCODONE HYDROCHLORIDE 5 MG/1
5 TABLET ORAL EVERY 4 HOURS PRN
Status: DISCONTINUED | OUTPATIENT
Start: 2024-01-28 | End: 2024-01-28 | Stop reason: HOSPADM

## 2024-01-28 RX ORDER — TROPICAMIDE 10 MG/ML
1 SOLUTION/ DROPS OPHTHALMIC
Status: COMPLETED | OUTPATIENT
Start: 2024-01-28 | End: 2024-01-28

## 2024-01-28 RX ORDER — CEFAZOLIN 1 G/1
INJECTION, POWDER, FOR SOLUTION INTRAVENOUS AS NEEDED
Status: DISCONTINUED | OUTPATIENT
Start: 2024-01-28 | End: 2024-01-28 | Stop reason: HOSPADM

## 2024-01-28 RX ORDER — EPINEPHRINE 1 MG/ML
INJECTION, SOLUTION, CONCENTRATE INTRAVENOUS AS NEEDED
Status: DISCONTINUED | OUTPATIENT
Start: 2024-01-28 | End: 2024-01-28 | Stop reason: HOSPADM

## 2024-01-28 RX ORDER — GLYCOPYRROLATE 0.2 MG/ML
INJECTION INTRAMUSCULAR; INTRAVENOUS AS NEEDED
Status: DISCONTINUED | OUTPATIENT
Start: 2024-01-28 | End: 2024-01-28

## 2024-01-28 RX ORDER — ROCURONIUM BROMIDE 10 MG/ML
INJECTION, SOLUTION INTRAVENOUS AS NEEDED
Status: DISCONTINUED | OUTPATIENT
Start: 2024-01-28 | End: 2024-01-28

## 2024-01-28 RX ORDER — DEXAMETHASONE SODIUM PHOSPHATE 10 MG/ML
INJECTION INTRAMUSCULAR; INTRAVENOUS
Status: DISCONTINUED
Start: 2024-01-28 | End: 2024-01-28 | Stop reason: HOSPADM

## 2024-01-28 RX ORDER — DEXAMETHASONE SODIUM PHOSPHATE 100 MG/10ML
INJECTION INTRAMUSCULAR; INTRAVENOUS AS NEEDED
Status: DISCONTINUED | OUTPATIENT
Start: 2024-01-28 | End: 2024-01-28 | Stop reason: HOSPADM

## 2024-01-28 RX ORDER — SODIUM CHLORIDE, SODIUM LACTATE, POTASSIUM CHLORIDE, CALCIUM CHLORIDE 600; 310; 30; 20 MG/100ML; MG/100ML; MG/100ML; MG/100ML
INJECTION, SOLUTION INTRAVENOUS CONTINUOUS PRN
Status: DISCONTINUED | OUTPATIENT
Start: 2024-01-28 | End: 2024-01-28

## 2024-01-28 RX ORDER — TETRACAINE HYDROCHLORIDE 5 MG/ML
1 SOLUTION OPHTHALMIC ONCE
Status: COMPLETED | OUTPATIENT
Start: 2024-01-28 | End: 2024-01-28

## 2024-01-28 RX ORDER — SODIUM CHLORIDE, SODIUM LACTATE, POTASSIUM CHLORIDE, CALCIUM CHLORIDE 600; 310; 30; 20 MG/100ML; MG/100ML; MG/100ML; MG/100ML
100 INJECTION, SOLUTION INTRAVENOUS CONTINUOUS
Status: DISCONTINUED | OUTPATIENT
Start: 2024-01-28 | End: 2024-01-28 | Stop reason: HOSPADM

## 2024-01-28 RX ORDER — DEXAMETHASONE SODIUM PHOSPHATE 4 MG/ML
INJECTION, SOLUTION INTRA-ARTICULAR; INTRALESIONAL; INTRAMUSCULAR; INTRAVENOUS; SOFT TISSUE AS NEEDED
Status: DISCONTINUED | OUTPATIENT
Start: 2024-01-28 | End: 2024-01-28

## 2024-01-28 RX ORDER — PROPOFOL 10 MG/ML
INJECTION, EMULSION INTRAVENOUS AS NEEDED
Status: DISCONTINUED | OUTPATIENT
Start: 2024-01-28 | End: 2024-01-28

## 2024-01-28 RX ORDER — TRIAMCINOLONE ACETONIDE 40 MG/ML
INJECTION, SUSPENSION INTRA-ARTICULAR; INTRAMUSCULAR AS NEEDED
Status: DISCONTINUED | OUTPATIENT
Start: 2024-01-28 | End: 2024-01-28 | Stop reason: HOSPADM

## 2024-01-28 RX ORDER — ONDANSETRON HYDROCHLORIDE 2 MG/ML
4 INJECTION, SOLUTION INTRAVENOUS ONCE AS NEEDED
Status: DISCONTINUED | OUTPATIENT
Start: 2024-01-28 | End: 2024-01-28 | Stop reason: HOSPADM

## 2024-01-28 RX ORDER — MIDAZOLAM HYDROCHLORIDE 1 MG/ML
INJECTION INTRAMUSCULAR; INTRAVENOUS AS NEEDED
Status: DISCONTINUED | OUTPATIENT
Start: 2024-01-28 | End: 2024-01-28

## 2024-01-28 RX ORDER — FENTANYL CITRATE 50 UG/ML
INJECTION, SOLUTION INTRAMUSCULAR; INTRAVENOUS AS NEEDED
Status: DISCONTINUED | OUTPATIENT
Start: 2024-01-28 | End: 2024-01-28

## 2024-01-28 RX ORDER — LIDOCAINE HYDROCHLORIDE 20 MG/ML
INJECTION, SOLUTION INFILTRATION; PERINEURAL AS NEEDED
Status: DISCONTINUED | OUTPATIENT
Start: 2024-01-28 | End: 2024-01-28

## 2024-01-28 RX ADMIN — TETRACAINE HYDROCHLORIDE 1 DROP: 5 SOLUTION OPHTHALMIC at 06:35

## 2024-01-28 RX ADMIN — Medication 120 MCG: at 09:07

## 2024-01-28 RX ADMIN — SODIUM CHLORIDE, POTASSIUM CHLORIDE, SODIUM LACTATE AND CALCIUM CHLORIDE: 600; 310; 30; 20 INJECTION, SOLUTION INTRAVENOUS at 07:57

## 2024-01-28 RX ADMIN — PHENYLEPHRINE HYDROCHLORIDE 1 DROP: 25 SOLUTION/ DROPS OPHTHALMIC at 06:34

## 2024-01-28 RX ADMIN — MIDAZOLAM HYDROCHLORIDE 2 MG: 1 INJECTION, SOLUTION INTRAMUSCULAR; INTRAVENOUS at 07:56

## 2024-01-28 RX ADMIN — DEXAMETHASONE SODIUM PHOSPHATE 4 MG: 4 INJECTION, SOLUTION INTRAMUSCULAR; INTRAVENOUS at 08:00

## 2024-01-28 RX ADMIN — PROPOFOL 30 MG: 10 INJECTION, EMULSION INTRAVENOUS at 09:31

## 2024-01-28 RX ADMIN — PROPOFOL 170 MG: 10 INJECTION, EMULSION INTRAVENOUS at 08:00

## 2024-01-28 RX ADMIN — SUGAMMADEX 200 MG: 100 INJECTION, SOLUTION INTRAVENOUS at 09:43

## 2024-01-28 RX ADMIN — Medication 80 MCG: at 08:53

## 2024-01-28 RX ADMIN — PHENYLEPHRINE HYDROCHLORIDE 1 DROP: 25 SOLUTION/ DROPS OPHTHALMIC at 06:48

## 2024-01-28 RX ADMIN — TROPICAMIDE 1 DROP: 10 SOLUTION/ DROPS OPHTHALMIC at 06:48

## 2024-01-28 RX ADMIN — PHENYLEPHRINE HYDROCHLORIDE 1 DROP: 25 SOLUTION/ DROPS OPHTHALMIC at 06:42

## 2024-01-28 RX ADMIN — TROPICAMIDE 1 DROP: 10 SOLUTION/ DROPS OPHTHALMIC at 06:34

## 2024-01-28 RX ADMIN — FENTANYL CITRATE 100 MCG: 50 INJECTION, SOLUTION INTRAMUSCULAR; INTRAVENOUS at 08:00

## 2024-01-28 RX ADMIN — GLYCOPYRROLATE 0.2 MG: 0.2 INJECTION INTRAMUSCULAR; INTRAVENOUS at 09:07

## 2024-01-28 RX ADMIN — ONDANSETRON 4 MG: 2 INJECTION, SOLUTION INTRAMUSCULAR; INTRAVENOUS at 09:30

## 2024-01-28 RX ADMIN — SUGAMMADEX 200 MG: 100 INJECTION, SOLUTION INTRAVENOUS at 09:37

## 2024-01-28 RX ADMIN — Medication 120 MCG: at 08:34

## 2024-01-28 RX ADMIN — TROPICAMIDE 1 DROP: 10 SOLUTION/ DROPS OPHTHALMIC at 06:42

## 2024-01-28 RX ADMIN — ROCURONIUM BROMIDE 50 MG: 10 INJECTION, SOLUTION INTRAVENOUS at 08:00

## 2024-01-28 RX ADMIN — LIDOCAINE HYDROCHLORIDE 60 MG: 20 INJECTION, SOLUTION INFILTRATION; PERINEURAL at 08:00

## 2024-01-28 SDOH — HEALTH STABILITY: MENTAL HEALTH: CURRENT SMOKER: 0

## 2024-01-28 ASSESSMENT — PAIN - FUNCTIONAL ASSESSMENT
PAIN_FUNCTIONAL_ASSESSMENT: 0-10

## 2024-01-28 ASSESSMENT — PAIN SCALES - GENERAL
PAINLEVEL_OUTOF10: 0 - NO PAIN
PAINLEVEL_OUTOF10: 0 - NO PAIN
PAIN_LEVEL: 3
PAINLEVEL_OUTOF10: 0 - NO PAIN

## 2024-01-28 NOTE — DISCHARGE INSTRUCTIONS
Surgeon: Abbi Canales MD    Patient name: Shilo Thakkar    Date of visit:January 28, 2024      right vitrectomy surgery post op instructions    Drops after surgery:  none    Keep patch and shield on the right eye until tomorrow. Sleep on your right side.     No heavy lifting over 10-15 lbs, no bending over, do not get eye wet, no eye rubbing. Shower from neck down only. Please call immediately if you develop worsening redness, pain, decreased vision, flashes, floaters.     Follow up tomorrow 1/29/24 1:30PM with Dr Canales at [facility], [facility]     During regular business hours call: [phone]), choose option for , and Dr. Canales   After hours call hospital : [phone], ask to speak with on-call ophthalmology resident

## 2024-01-28 NOTE — ANESTHESIA PROCEDURE NOTES
Airway  Date/Time: 1/28/2024 8:01 AM  Urgency: emergent    Airway not difficult    Staffing  Performed: DENIZ   Authorized by: DENIZ Browne    Performed by: DENIZ Browne  Patient location during procedure: OR    Indications and Patient Condition  Indications for airway management: anesthesia  Spontaneous ventilation: present  Sedation level: deep  Preoxygenated: yes  Patient position: sniffing  MILS maintained throughout  Mask difficulty assessment: 1 - vent by mask    Final Airway Details  Final airway type: endotracheal airway      Successful airway: ETT  Cuffed: yes   Successful intubation technique: direct laryngoscopy  Facilitating devices/methods: intubating stylet  Endotracheal tube insertion site: oral  Blade size: #4  ETT size (mm): 7.5  Cormack-Lehane Classification: grade IIa - partial view of glottis  Placement verified by: chest auscultation and capnometry   Measured from: lips  ETT to lips (cm): 22  Number of attempts at approach: 1

## 2024-01-28 NOTE — ANESTHESIA POSTPROCEDURE EVALUATION
Patient: Shilo Thakkar    Procedure Summary       Date: 01/28/24 Room / Location: [facility] OR 03 / [facility]    Anesthesia Start: 0757 Anesthesia Stop: 0948    Procedure: Vitrectomy Pars Plana, Membrane Peel, Endolaser, Air Fluid Exchange (Right) Diagnosis:       Preretinal hemorrhage of right eye      (Preretinal hemorrhage of right eye [H35.61])    Surgeons: Abbi Canales MD PhD Responsible Provider: Henry Bose MD    Anesthesia Type: general ASA Status: 3 - Emergent            Anesthesia Type: general    Vitals Value Taken Time   /59 01/28/24 1030   Temp 36.4 °C (97.5 °F) 01/28/24 0955   Pulse 51 01/28/24 1036   Resp 16 01/28/24 1030   SpO2 99 % 01/28/24 1036   Vitals shown include unvalidated device data.    Anesthesia Post Evaluation    Patient location during evaluation: PACU  Patient participation: complete - patient participated  Level of consciousness: awake and alert  Pain score: 3  Pain management: adequate  Airway patency: patent  Cardiovascular status: acceptable  Respiratory status: acceptable  Hydration status: acceptable  Postoperative Nausea and Vomiting: none        There were no known notable events for this encounter.

## 2024-01-28 NOTE — H&P
History Of Present Illness  Shilo Thakkar is a 23 y.o. male with multilayer retinal hemorrhage of the right eye presenting for planned pars plana vitrectomy (PPV)/membrane peel (MP)/gas exchange of the right eye.      Past Medical History  Past Medical History:   Diagnosis Date    Colles' fracture of right radius, initial encounter for closed fracture 04/23/2015    Colles' fracture of right radius    Personal history of other mental and behavioral disorders 01/28/2014    History of attention deficit hyperactivity disorder    Personal history of other mental and behavioral disorders 05/10/2017    History of attention deficit hyperactivity disorder (ADHD)    Personal history of other specified conditions 05/03/2016    History of gynecomastia    Unspecified fracture of navicular (scaphoid) bone of left wrist, initial encounter for closed fracture 12/17/2015    Closed fracture of scaphoid bone of left wrist       Surgical History  Past Surgical History:   Procedure Laterality Date    CHEST TUBE INSERTION  10/29/2023    FOOT FRACTURE SURGERY Left     OTHER SURGICAL HISTORY  10/03/2017    History Of Prior Surgery    US GUIDED SOFT TISSUE BIOPSY  10/20/2023    US GUIDED SOFT TISSUE BIOPSY 10/20/2023 [facility] US        Social History  He reports that he has never smoked. He has never been exposed to tobacco smoke. He has never used smokeless tobacco. He reports that he does not currently use alcohol. He reports that he does not use drugs.    Family History  Family History   Problem Relation Name Age of Onset    ADD / ADHD Mother      ADD / ADHD Sister      ADD / ADHD Brother          Allergies  Patient has no known allergies.    Review of Systems   All other systems reviewed and are negative.       Physical Exam  Constitutional:       Appearance: Normal appearance.   HENT:      Head: Normocephalic and atraumatic.   Eyes:      Extraocular Movements: Extraocular movements intact.      Pupils: Pupils are equal, round, and reactive  to light.   Cardiovascular:      Rate and Rhythm: Normal rate and regular rhythm.   Pulmonary:      Effort: Pulmonary effort is normal.      Breath sounds: Normal breath sounds.   Abdominal:      General: Abdomen is flat.   Neurological:      Mental Status: He is oriented to person, place, and time. Mental status is at baseline.          Last Recorded Vitals  There were no vitals taken for this visit.    Relevant Results         Assessment/Plan   Principal Problem:    Preretinal hemorrhage of right eye      Mr. Thakkar is a 22 yo male with hx of Burkitt lymphoma with multilayer hemorrhage of the right eye presenting for planned pars plana vitrectomy (PPV)/membrane peel (MP)/gas exchange of the right eye.     Plan to proceed with surgery as above.        Néstor Almonte MD

## 2024-01-28 NOTE — ANESTHESIA PREPROCEDURE EVALUATION
Patient: Shilo Thakkar    Procedure Information       Anesthesia Start Date/Time: 01/28/24 0757    Procedure: Vitrectomy Pars Plana (Right)    Location: [facility] OR 03 / [facility] OR    Surgeons: Abbi Canales MD PhD            Relevant Problems   Neuro/Psych   (+) ADHD      Hematology   (+) Anemia in neoplastic disease   (+) Burkitt lymphoma (CMS/HCC)   (+) Thrombocytopenia (CMS/HCC)       Clinical information reviewed:   Tobacco  Allergies  Meds   Med Hx  Surg Hx   Fam Hx  Soc Hx        NPO Detail:  NPO/Void Status  Date of Last Liquid: 01/27/24  Time of Last Liquid: 1900  Date of Last Solid: 01/27/24  Time of Last Solid: 1900         Physical Exam    Airway  Mallampati: I  TM distance: >3 FB  Neck ROM: full     Cardiovascular - normal exam     Dental - normal exam     Pulmonary - normal exam     Abdominal - normal exam             Anesthesia Plan    History of general anesthesia?: yes  History of complications of general anesthesia?: no    ASA 3 - emergent     general     The patient is not a current smoker.  Patient was previously instructed to abstain from smoking on day of procedure.  Patient did not smoke on day of procedure.    intravenous induction   Anesthetic plan and risks discussed with patient and mother.  Use of blood products discussed with patient and mother who.    Plan discussed with attending and CAA.

## 2024-01-28 NOTE — OP NOTE
Vitrectomy Pars Plana, Membrane Peel, Endolaser, Air Fluid Exchange (R) Operative Note     Date: 2024  OR Location: [facility] OR    Name: Shilo Thakkar : 2000, Age: 23 y.o., MRN: [id number], Sex: male    Diagnosis  Pre-op Diagnosis     * Preretinal hemorrhage of right eye [H35.61] Post-op Diagnosis     * Preretinal hemorrhage of right eye [H35.61]     Procedures  Vitrectomy Pars Plana, Membrane Peel, Endolaser, Air Fluid Exchange  [address]95 - TN VITRECTOMY PARS PLANA REMOVE PRERETINAL MEMBRANE      Surgeons      * Abbi Canales - Primary    Resident/Fellow/Other Assistant:  Surgeon(s) and Role:     * Lasha Maddox MD - Resident - Assisting    Procedure Summary  Anesthesia: Monitor Anesthesia Care  ASA: III  Anesthesia Staff: Anesthesiologist: Henry Bose MD  C-AA: DENIZ Sommer; DENIZ Browne  Estimated Blood Loss: 0mL  Intra-op Medications:   Administrations occurring from 0745 to 0840 on 24:   Medication Name Total Dose   balanced salts (BSS Plus) intraocular solution  - Omnicell Override Pull 500 mL              Anesthesia Record               Intraprocedure I/O Totals       None           Specimen: No specimens collected     Staff:   Circulator: Melany Sanchez RN  Scrub Person: Latesha Fleming RN         Drains and/or Catheters: * None in log *    Tourniquet Times:         Implants:     Findings: Preretinal hemorrhage right eye, Possible macular hole right eye.     Indications: Shilo Thakkar is an 23 y.o. male who is having surgery for Preretinal hemorrhage of right eye [H35.61].     This is a 23 y.o. year old patient with dense subhyaloid hemorrhage and clot, right eye, in the setting of Burkitt's Lymphoma and thrombocytopenia. This was causing decreased visual function. The risks, benefits, and alternatives to the procedure were discussed with the patient including the risk for vision loss, blindness, retinal detachment, infection, bleeding, pain, high or low pressure in  the eye, double vision, no benefit, need for additional procedures, and droopy eyelid, amongst others. The patient had a full opportunity to have all questions answered in clinic prior to surgery. Afterwards, the patient requested that we perform surgery and signed the consent form.        Procedure Details:     The patient was brought to the preoperative holding area where the correct eye was confirmed and marked. The patient was then brought to the operating room where a secondary time-out was performed to identify the correct patient, eye, procedure, and any allergies. The patient then underwent intravenous sedation by the anesthesia team followed by a block as described above.  The eye was prepped and draped in the usual sterile ophthalmic fashion followed by placement of a lid speculum.      A  25 -gauge trocar was placed in the inferotemporal quadrant 4 mm posterior to the limbus after conjunctival displacement.  A 4 mm infusion cannula was placed through this trocar, and the infusion cannula was confirmed in the vitreous cavity prior to turning it on. Two additional  25 -gauge trocars were placed in a similar fashion in the superonasal and superotemporal quadrants 4 mm posterior to the limbus after conjunctival displacement.      At this time, a standard three-port pars plana vitrectomy was performed using the light pipe, the cutter, and the BIOM viewing system. Kenalog was injected into the eye to visualize the vitreous interface. A core vitreous dissection was performed and vitreous hemorrhage was evacuated. The retina was inspected and was found to be attached with a multiple sub-internal limiting membrane multilayer hemorrhagic clot. One clot was extensive and covered most part of the macula.  There were also scattered round retinal hemorrhages in the midperiphery. A posterior vitreous detachment was performed with aspiration of the hyaloid over the optic nerve after injection of diluted intravitreal  triamcinolone.  A thorough peripheral vitreous dissection was performed. There is one vitreous tuft associated with retinal tear noted inferonasal with shallow subretinal fluid. Vitreous traction to the tuft was released.     The multilayer hemorrhagic clot was carefully dissected from the posterior pole with a combination of 25 gauge vitrector along with a bimanual technique utilizing 25 gauge internal limiting membrane (ILM) forceps. Approximately 90% of the clot was successfully removed which provided visualization of the macula There are also two separate smaller clot along the inferior arcade and nasal to the optic nerve that was dissected carefully using internal limiting membrane (ILM) forceps and vitrector.     A careful scleral depression revealed a small retinal hole with heme and operculum nasally. Endolaser was applied to both the retina tear inferonasal and retinal hole nasally.      At this time an air-fluid exchange of approximately 80-90% was performed with the soft tip cannula on extrusion.      The supratemporal and superonasal trocars were removed and their respective sclerotomy sites were found to be watertight. The infusion cannula and associated trocar were then removed and sutured with an interrupted 7-0 Vicryl. The eye was confirmed to be at a physiologic level by digital palpation after removal of the trocars.      Subconjunctival injection of Cefazolin and Dexamethasone were administered. The lid speculum and drapes were removed. Tobramycin ointment was applied. The eye was patched and shielded. The patient tolerated the procedure well without any intraoperative or immediate postoperative complications. The patient was taken to the recovery room in good condition. The patient was instructed to maintain strict face-down positioning. The patient will be transferred back to the medicine floor and will be seen by the consult resident tomorrow prior to home discharge.   Complications:  None;  patient tolerated the procedure well.    Disposition: PACU - hemodynamically stable.  Condition: stable         Additional Details: none    Attending Attestation: I was present and scrubbed for the entire procedure.    Abbi Canales  Phone Number: [phone]

## 2024-01-28 NOTE — PERIOPERATIVE NURSING NOTE
Discharge education provided, all questions answered,. Instructions reviewed with patient and family. All belongings sent home with patient. Patient left with family at bedside.

## 2024-01-29 ENCOUNTER — OFFICE VISIT (OUTPATIENT)
Dept: OPHTHALMOLOGY | Facility: CLINIC | Age: 24
End: 2024-01-29
Payer: COMMERCIAL

## 2024-01-29 DIAGNOSIS — H35.63 RETINAL HEMORRHAGE OF BOTH EYES: Primary | ICD-10-CM

## 2024-01-29 DIAGNOSIS — H33.103 BILATERAL RETINOSCHISIS: ICD-10-CM

## 2024-01-30 DIAGNOSIS — C83.78 BURKITT LYMPHOMA OF LYMPH NODES OF MULTIPLE REGIONS (MULTI): Primary | ICD-10-CM

## 2024-01-31 ENCOUNTER — OFFICE VISIT (OUTPATIENT)
Dept: HEMATOLOGY/ONCOLOGY | Facility: HOSPITAL | Age: 24
End: 2024-01-31
Payer: COMMERCIAL

## 2024-01-31 ENCOUNTER — LAB (OUTPATIENT)
Dept: HEMATOLOGY/ONCOLOGY | Facility: HOSPITAL | Age: 24
End: 2024-01-31
Payer: COMMERCIAL

## 2024-01-31 VITALS
HEART RATE: 65 BPM | SYSTOLIC BLOOD PRESSURE: 126 MMHG | DIASTOLIC BLOOD PRESSURE: 68 MMHG | WEIGHT: 151.9 LBS | OXYGEN SATURATION: 100 % | RESPIRATION RATE: 17 BRPM | TEMPERATURE: 97.2 F | BODY MASS INDEX: 20.04 KG/M2

## 2024-01-31 DIAGNOSIS — C83.70 BURKITT LYMPHOMA, UNSPECIFIED BODY REGION (MULTI): ICD-10-CM

## 2024-01-31 DIAGNOSIS — C83.70 BURKITT LYMPHOMA, UNSPECIFIED BODY REGION (MULTI): Primary | ICD-10-CM

## 2024-01-31 DIAGNOSIS — C83.78 BURKITT LYMPHOMA OF LYMPH NODES OF MULTIPLE REGIONS (MULTI): ICD-10-CM

## 2024-01-31 LAB
ALBUMIN SERPL BCP-MCNC: 4.5 G/DL (ref 3.4–5)
ALP SERPL-CCNC: 79 U/L (ref 33–120)
ALT SERPL W P-5'-P-CCNC: 18 U/L (ref 10–52)
ANION GAP SERPL CALC-SCNC: 13 MMOL/L (ref 10–20)
AST SERPL W P-5'-P-CCNC: 11 U/L (ref 9–39)
ATRIAL RATE: 44 BPM
BASOPHILS # BLD AUTO: 0.01 X10*3/UL (ref 0–0.1)
BASOPHILS NFR BLD AUTO: 0.1 %
BILIRUB SERPL-MCNC: 0.5 MG/DL (ref 0–1.2)
BUN SERPL-MCNC: 18 MG/DL (ref 6–23)
CALCIUM SERPL-MCNC: 9.4 MG/DL (ref 8.6–10.3)
CHLORIDE SERPL-SCNC: 101 MMOL/L (ref 98–107)
CO2 SERPL-SCNC: 29 MMOL/L (ref 21–32)
CREAT SERPL-MCNC: 0.95 MG/DL (ref 0.5–1.3)
EGFRCR SERPLBLD CKD-EPI 2021: >90 ML/MIN/1.73M*2
EOSINOPHIL # BLD AUTO: 0 X10*3/UL (ref 0–0.7)
EOSINOPHIL NFR BLD AUTO: 0 %
ERYTHROCYTE [DISTWIDTH] IN BLOOD BY AUTOMATED COUNT: 19.5 % (ref 11.5–14.5)
GLUCOSE SERPL-MCNC: 100 MG/DL (ref 74–99)
HCT VFR BLD AUTO: 29.7 % (ref 41–52)
HGB BLD-MCNC: 10 G/DL (ref 13.5–17.5)
IMM GRANULOCYTES # BLD AUTO: 0.4 X10*3/UL (ref 0–0.7)
IMM GRANULOCYTES NFR BLD AUTO: 4.6 % (ref 0–0.9)
LDH SERPL L TO P-CCNC: 166 U/L (ref 84–246)
LYMPHOCYTES # BLD AUTO: 0.48 X10*3/UL (ref 1.2–4.8)
LYMPHOCYTES NFR BLD AUTO: 5.5 %
MCH RBC QN AUTO: 28.9 PG (ref 26–34)
MCHC RBC AUTO-ENTMCNC: 33.7 G/DL (ref 32–36)
MCV RBC AUTO: 86 FL (ref 80–100)
MONOCYTES # BLD AUTO: 1.43 X10*3/UL (ref 0.1–1)
MONOCYTES NFR BLD AUTO: 16.5 %
NEUTROPHILS # BLD AUTO: 6.35 X10*3/UL (ref 1.2–7.7)
NEUTROPHILS NFR BLD AUTO: 73.3 %
NRBC BLD-RTO: 0.5 /100 WBCS (ref 0–0)
P AXIS: 38 DEGREES
P OFFSET: 197 MS
P ONSET: 154 MS
PLATELET # BLD AUTO: 155 X10*3/UL (ref 150–450)
POTASSIUM SERPL-SCNC: 3.5 MMOL/L (ref 3.5–5.3)
PR INTERVAL: 124 MS
PROT SERPL-MCNC: 6.5 G/DL (ref 6.4–8.2)
Q ONSET: 216 MS
QRS COUNT: 7 BEATS
QRS DURATION: 102 MS
QT INTERVAL: 476 MS
QTC CALCULATION(BAZETT): 406 MS
QTC FREDERICIA: 429 MS
R AXIS: 72 DEGREES
RBC # BLD AUTO: 3.46 X10*6/UL (ref 4.5–5.9)
SODIUM SERPL-SCNC: 139 MMOL/L (ref 136–145)
T AXIS: 74 DEGREES
T OFFSET: 454 MS
VENTRICULAR RATE: 44 BPM
WBC # BLD AUTO: 8.7 X10*3/UL (ref 4.4–11.3)

## 2024-01-31 PROCEDURE — 1036F TOBACCO NON-USER: CPT | Performed by: INTERNAL MEDICINE

## 2024-01-31 PROCEDURE — 99214 OFFICE O/P EST MOD 30 MIN: CPT | Performed by: INTERNAL MEDICINE

## 2024-01-31 PROCEDURE — 85025 COMPLETE CBC W/AUTO DIFF WBC: CPT

## 2024-01-31 PROCEDURE — 80053 COMPREHEN METABOLIC PANEL: CPT

## 2024-01-31 PROCEDURE — 83615 LACTATE (LD) (LDH) ENZYME: CPT

## 2024-01-31 PROCEDURE — 36591 DRAW BLOOD OFF VENOUS DEVICE: CPT

## 2024-01-31 ASSESSMENT — PAIN SCALES - GENERAL: PAINLEVEL: 0-NO PAIN

## 2024-01-31 ASSESSMENT — ENCOUNTER SYMPTOMS
DEPRESSION: 0
LOSS OF SENSATION IN FEET: 0
OCCASIONAL FEELINGS OF UNSTEADINESS: 0

## 2024-02-01 ENCOUNTER — APPOINTMENT (OUTPATIENT)
Dept: HEMATOLOGY/ONCOLOGY | Facility: HOSPITAL | Age: 24
End: 2024-02-01
Payer: COMMERCIAL

## 2024-02-02 ENCOUNTER — OFFICE VISIT (OUTPATIENT)
Dept: OPHTHALMOLOGY | Facility: CLINIC | Age: 24
End: 2024-02-02
Payer: COMMERCIAL

## 2024-02-02 ENCOUNTER — TELEPHONE (OUTPATIENT)
Dept: SCHEDULING | Age: 24
End: 2024-02-02

## 2024-02-02 DIAGNOSIS — H40.41X0 UVEITIS-HYPHEMA-GLAUCOMA SYNDROME OF RIGHT EYE: ICD-10-CM

## 2024-02-02 DIAGNOSIS — T85.398A UVEITIS-HYPHEMA-GLAUCOMA SYNDROME OF RIGHT EYE: ICD-10-CM

## 2024-02-02 DIAGNOSIS — H35.351 CYSTOID MACULAR EDEMA, RIGHT EYE: Primary | ICD-10-CM

## 2024-02-02 DIAGNOSIS — H20.9 UVEITIS-HYPHEMA-GLAUCOMA SYNDROME OF RIGHT EYE: ICD-10-CM

## 2024-02-02 DIAGNOSIS — H35.63 RETINAL HEMORRHAGE OF BOTH EYES: ICD-10-CM

## 2024-02-02 PROCEDURE — 99024 POSTOP FOLLOW-UP VISIT: CPT

## 2024-02-02 PROCEDURE — 92134 CPTRZ OPH DX IMG PST SGM RTA: CPT | Mod: BILATERAL PROCEDURE

## 2024-02-02 RX ORDER — PREDNISOLONE/GATIFLOX/NEPAFEN 1-0.5-0.1%
SUSPENSION, DROPS(FINAL DOSAGE FORM)(ML) OPHTHALMIC (EYE)
Qty: 5 ML | Refills: 1 | Status: SHIPPED | OUTPATIENT
Start: 2024-02-02 | End: 2024-02-14 | Stop reason: HOSPADM

## 2024-02-02 RX ORDER — PANTOPRAZOLE SODIUM 40 MG/1
40 TABLET, DELAYED RELEASE ORAL
Qty: 30 TABLET | Refills: 0 | Status: SHIPPED | OUTPATIENT
Start: 2024-02-02 | End: 2024-03-12 | Stop reason: SDUPTHER

## 2024-02-02 RX ORDER — KETOROLAC TROMETHAMINE 5 MG/ML
SOLUTION OPHTHALMIC
Qty: 5 ML | Refills: 3 | Status: ON HOLD | OUTPATIENT
Start: 2024-02-02 | End: 2024-05-02

## 2024-02-02 ASSESSMENT — CONF VISUAL FIELD
OD_INFERIOR_TEMPORAL_RESTRICTION: 0
OS_NORMAL: 1
OD_INFERIOR_NASAL_RESTRICTION: 0
OS_SUPERIOR_NASAL_RESTRICTION: 0
OD_NORMAL: 1
OS_INFERIOR_NASAL_RESTRICTION: 0
OS_INFERIOR_TEMPORAL_RESTRICTION: 0
OD_SUPERIOR_TEMPORAL_RESTRICTION: 0
OD_SUPERIOR_NASAL_RESTRICTION: 0
OS_SUPERIOR_TEMPORAL_RESTRICTION: 0

## 2024-02-02 ASSESSMENT — SLIT LAMP EXAM - LIDS
COMMENTS: NORMAL
COMMENTS: NORMAL

## 2024-02-02 ASSESSMENT — ENCOUNTER SYMPTOMS
CONSTITUTIONAL NEGATIVE: 0
RESPIRATORY NEGATIVE: 0
ENDOCRINE NEGATIVE: 0
HEMATOLOGIC/LYMPHATIC NEGATIVE: 0
MUSCULOSKELETAL NEGATIVE: 0
CARDIOVASCULAR NEGATIVE: 0
PSYCHIATRIC NEGATIVE: 0
EYES NEGATIVE: 0
ALLERGIC/IMMUNOLOGIC NEGATIVE: 0
GASTROINTESTINAL NEGATIVE: 0
NEUROLOGICAL NEGATIVE: 0

## 2024-02-02 ASSESSMENT — VISUAL ACUITY
METHOD: SNELLEN - LINEAR
OS_SC: HM

## 2024-02-02 ASSESSMENT — EXTERNAL EXAM - RIGHT EYE: OD_EXAM: NORMAL

## 2024-02-02 ASSESSMENT — TONOMETRY
OD_IOP_MMHG: 15
IOP_METHOD: TONOPEN
OS_IOP_MMHG: 16

## 2024-02-02 ASSESSMENT — EXTERNAL EXAM - LEFT EYE: OS_EXAM: NORMAL

## 2024-02-02 NOTE — PROGRESS NOTES
Bilateral macula-involving sub ILM hemorrhage with serous detachment OS>OD  Multilayer retinal hemorrhage, left eye  S/p pars plana vitrectomy (PPV)/C3F8/intraretinal tPA OD  S/P PPV/MP/EL/air    Gas is 35 % fill OD - No inferior RD on scleral depression  3+ VH below the bubble    Air is 20% fill OS  Retina is flat  No signs of infection    OCT 02/02/24     - Right eye (OD): No veiw    - Left eye (OS): Disrupted outer retinal bands with intact ELM; CME     Optos 02/02/24    - Right eye (OD): 65% gas fill, retina is attached, residual preretinal blood clot with fibrin membranes in the temporal retina     - Left eye (OS): SubLM subfoveal clotted heme with scattered pre-retinal hemorrhage with in the posterior pole; stable    Plan:  - Continue Prednisolone, and artificial tears QID to the left eye,   - Start Pred and ketorolac QID, right eye   - Eyes are healing well, patient can proceed with chemotherapy while avoiding platelets from dropping significantly - preferable level would be 50-70K but its understandable if this is not achievable and would recommend avoiding levels below 30K.  - May need repeat surgery in the left eye (after this cycle or the next) if the vitreous hemorrhage persists or retina detaches  -RTC in 1 week

## 2024-02-02 NOTE — TELEPHONE ENCOUNTER
"Pt's mother calling to request refill for \"Pantoprolizoid\" / \"Proponix\" 40mg. Please submit to [facility] [address] [address]. Thank you!  "

## 2024-02-05 ENCOUNTER — APPOINTMENT (OUTPATIENT)
Dept: HEMATOLOGY/ONCOLOGY | Facility: HOSPITAL | Age: 24
End: 2024-02-05
Payer: COMMERCIAL

## 2024-02-05 ENCOUNTER — INFUSION (OUTPATIENT)
Dept: HEMATOLOGY/ONCOLOGY | Facility: CLINIC | Age: 24
End: 2024-02-05
Payer: COMMERCIAL

## 2024-02-05 VITALS
OXYGEN SATURATION: 100 % | TEMPERATURE: 98.2 F | BODY MASS INDEX: 20.74 KG/M2 | RESPIRATION RATE: 16 BRPM | SYSTOLIC BLOOD PRESSURE: 125 MMHG | DIASTOLIC BLOOD PRESSURE: 85 MMHG | WEIGHT: 157.19 LBS | HEART RATE: 70 BPM

## 2024-02-05 DIAGNOSIS — C83.79 BURKITT LYMPHOMA OF SOLID ORGAN EXCLUDING SPLEEN (MULTI): ICD-10-CM

## 2024-02-05 DIAGNOSIS — C83.78 BURKITT LYMPHOMA OF LYMPH NODES OF MULTIPLE REGIONS (MULTI): ICD-10-CM

## 2024-02-05 DIAGNOSIS — C83.70 BURKITT LYMPHOMA, UNSPECIFIED BODY REGION (MULTI): ICD-10-CM

## 2024-02-05 LAB
ALBUMIN SERPL BCP-MCNC: 4.6 G/DL (ref 3.4–5)
ALP SERPL-CCNC: 82 U/L (ref 33–120)
ALT SERPL W P-5'-P-CCNC: 32 U/L (ref 10–52)
ANION GAP SERPL CALC-SCNC: 13 MMOL/L (ref 10–20)
AST SERPL W P-5'-P-CCNC: 17 U/L (ref 9–39)
BASOPHILS # BLD AUTO: 0.01 X10*3/UL (ref 0–0.1)
BASOPHILS NFR BLD AUTO: 0.2 %
BILIRUB SERPL-MCNC: 0.4 MG/DL (ref 0–1.2)
BUN SERPL-MCNC: 21 MG/DL (ref 6–23)
CALCIUM SERPL-MCNC: 9.6 MG/DL (ref 8.6–10.3)
CHLORIDE SERPL-SCNC: 99 MMOL/L (ref 98–107)
CO2 SERPL-SCNC: 29 MMOL/L (ref 21–32)
CREAT SERPL-MCNC: 0.89 MG/DL (ref 0.5–1.3)
EGFRCR SERPLBLD CKD-EPI 2021: >90 ML/MIN/1.73M*2
EOSINOPHIL # BLD AUTO: 0 X10*3/UL (ref 0–0.7)
EOSINOPHIL NFR BLD AUTO: 0 %
ERYTHROCYTE [DISTWIDTH] IN BLOOD BY AUTOMATED COUNT: 23.2 % (ref 11.5–14.5)
GLUCOSE SERPL-MCNC: 85 MG/DL (ref 74–99)
HCT VFR BLD AUTO: 30 % (ref 41–52)
HGB BLD-MCNC: 10.1 G/DL (ref 13.5–17.5)
IMM GRANULOCYTES # BLD AUTO: 0.03 X10*3/UL (ref 0–0.7)
IMM GRANULOCYTES NFR BLD AUTO: 0.6 % (ref 0–0.9)
LDH SERPL L TO P-CCNC: 155 U/L (ref 84–246)
LYMPHOCYTES # BLD AUTO: 0.49 X10*3/UL (ref 1.2–4.8)
LYMPHOCYTES NFR BLD AUTO: 9.2 %
MCH RBC QN AUTO: 30.1 PG (ref 26–34)
MCHC RBC AUTO-ENTMCNC: 33.7 G/DL (ref 32–36)
MCV RBC AUTO: 90 FL (ref 80–100)
MONOCYTES # BLD AUTO: 0.73 X10*3/UL (ref 0.1–1)
MONOCYTES NFR BLD AUTO: 13.7 %
NEUTROPHILS # BLD AUTO: 4.05 X10*3/UL (ref 1.2–7.7)
NEUTROPHILS NFR BLD AUTO: 76.3 %
NRBC BLD-RTO: ABNORMAL /100{WBCS}
PLATELET # BLD AUTO: 186 X10*3/UL (ref 150–450)
POLYCHROMASIA BLD QL SMEAR: NORMAL
POTASSIUM SERPL-SCNC: 4.2 MMOL/L (ref 3.5–5.3)
PROT SERPL-MCNC: 6.6 G/DL (ref 6.4–8.2)
RBC # BLD AUTO: 3.35 X10*6/UL (ref 4.5–5.9)
RBC MORPH BLD: NORMAL
SODIUM SERPL-SCNC: 137 MMOL/L (ref 136–145)
WBC # BLD AUTO: 5.3 X10*3/UL (ref 4.4–11.3)

## 2024-02-05 PROCEDURE — 85025 COMPLETE CBC W/AUTO DIFF WBC: CPT

## 2024-02-05 PROCEDURE — 83615 LACTATE (LD) (LDH) ENZYME: CPT

## 2024-02-05 PROCEDURE — 84075 ASSAY ALKALINE PHOSPHATASE: CPT

## 2024-02-05 PROCEDURE — 36591 DRAW BLOOD OFF VENOUS DEVICE: CPT

## 2024-02-05 RX ORDER — HEPARIN SODIUM,PORCINE/PF 10 UNIT/ML
50 SYRINGE (ML) INTRAVENOUS AS NEEDED
Status: CANCELLED | OUTPATIENT
Start: 2024-02-05

## 2024-02-05 ASSESSMENT — PAIN SCALES - GENERAL: PAINLEVEL: 0-NO PAIN

## 2024-02-05 NOTE — PROGRESS NOTES
Pt tolerated treatment without incident. Denies questions, concerns, or comments at this time. Discharged home with steady gait

## 2024-02-06 NOTE — PROGRESS NOTES
Patient ID: Shilo Thakkar is a 23 y.o. male.  Oncologic History  Burkitt lymphoma, high risk, MYC positive  - 10/19/23 presented to OSH ED with dysphagia and abdominal pain  - CT A/P with IV contrast (10/19) mass in the body of the pancreas measuring approximately 2.7 x 2.9 cm. The pancreatic duct is dilated. Multiple masses in the mesentery as well as omental caking consistent with carcinomatosis. Moderate amount of free fluid throughout the abdomen and pelvis.   - CT soft tissue neck with IV contrast (10/19): homogeneous soft tissue attenuation lesion in the L oropharynx which measures approximately 3.0 x 4.0 x 5.1 cm.   - US guided mesenteric LN biopsy (10/20/23): HIGH GRADE B-CELL LYMPHOMA MORPHOLOGICALLY CONSISTENT WITH BURKITT LYMPHOMA; flow cytometry: No B cell population identified  - Bone marrow aspiration biopsy November 1, 2023 no evidence of lymphoma involvement.  CSF by flow cytometry showed no lymphoma 11/2/2023.  - Biopsy L oropharyngeal mass and L cervical LN by ENT (10/23/23) high grade B-cell burkitt lymphoma, MYC positive, Bcl-2 negative.  - MR brain with and w/o IV contrast (10/27): No evidence of intracranial metastatic disease.     - Dexa 40 mg daily ended 11/1  - PET CT ordered 10/30 showed FDG avid left oropharyngeal mass and LAD above and below diaphragm  - Started HyperCVAD 10/29 cycle 1 day 1  - Part A:   D1: CTX + mesna, dexamethasone, D2: CTX + mesna, D3: CTX + mesna, D4: Doxo, vinca, D6: Ritux, GCSF, D9: IT,   D2:  intrathecal methotrexate November 2, 2023, 12 mg, CSF shows no B cells by flow cytometry.  D6: Intrathecal cytarabine 100 mg on November 6, 2023.  D11: vinca, dexamethasone, D13: Ritux 11/10  D13: Rituximab therapy  - Daily neupogen since 11/3/23  -Discharged home November 11, 2023  -Evaluation November 15, 2023: Recovered thrombocytopenia, stop levofloxacin, continue prophylactic acyclovir and 3 times weekly Bactrim and fluconazole.  Depressed affect noted.  -Cycle 2-day 1  part to be high-dose methotrexate plus high-dose cytarabine, November 20, 2023,  with 2 intrathecal chemotherapy administered  -Neulasta on 11/27/2023.  -Evaluation 12/6/2023 asymptomatic gaining weight doing well.  To schedule twice weekly CBC BMP Mondays and Thursdays after discharge, emphasized prophylactic levofloxacin between day 5 and day 15, acyclovir, fluconazole and 3 times a week Bactrim.  -CT scan of the chest abdomen or pelvis on December 6, 2023 shows complete resolution of intra-abdominal disease, with mildly worse splenomegaly most likely unrelated to his lymphoma.  -Cycle 3 hyper-CVAD regimen December 12, 2023, including Rituxan day 1 and day 11, intrathecal methotrexate 12/14/2023, intrathecal cytarabine December 18, 2023.  -December 27, 2023: Feeling well asymptomatic tolerated cycle 3 well, plan for cycle 4 January 2, 2024 consisting of high-dose methotrexate high-dose cytarabine, and includes 2 doses of rituximab day 1 day 8, and 2 doses of intrathecal chemotherapy, intrathecal cytarabine day 1 and intrathecal methotrexate day 8  -Hospitalized  1/17-1/23/2024 with visual changes, retinoschisis, profound pancytopenia s/p left eye vitrectomy, retinal hemorrgages. Delay cycle 5. Discuss with ophth regarding etiology and plans.  -Evaluation January 31, 2024: Vision appears to be improving.  Discussed with ophthalmology.  They will be seeing February 2 and will decide whether they will clear him for cycle 5 of chemotherapy which would be tentatively planned to start February 9, 2024 as an inpatient at modified dosing.  Repeat echocardiogram, continue prophylactic antibiotics.  Will need 3 times weekly labs for close monitoring and will keep platelets above 50,000, keep hemoglobin above 8.5-9.  Will need closer supervision as he was noncompliant with twice weekly labs before.  Subjective    HPI  Patient is doing well.  His vision is starting to improve.  He has no symptoms.  His family appears to be in  a more positive mood today understandably so.  Denies any fever chills oral sores nausea vomiting dyspnea cough abdominal pain and he appears to be eating well.  Denies any paresthesias.  No diarrhea, no edema in the legs.    Objective    BSA: 1.88 meters squared  /68   Pulse 65   Temp 36.2 °C (97.2 °F) (Skin)   Resp 17   Wt 68.9 kg (151 lb 14.4 oz)   SpO2 100%   BMI 20.04 kg/m²      Physical Exam  Alert oriented not in distress not pale or jaundiced  Lymph nodes: No adenopathy  Oral mucosa negative, no mucositis  Head is normocephalic atraumatic, eyes not examined.  No eye to eye contact  Neck is supple no adenopathy, no thyromegaly  Lungs show equal air entry, no crackles or wheezing, equal percussion  Heart shows regular rate and rhythm normal S1-S2 no murmurs or gallop rhythm  Abdomen is soft nontender, moves with respiration, no hepatosplenomegaly or masses, positive bowel sounds, not distended.  Lower extremities show no edema  Neurologically grossly nonfocal  Psych: Normal affect    Performance Status:  Symptomatic; in bed <50% of the day      Assessment/Plan   High risk Burkitt's lymphoma  Complete remission based on PET scan from 3 weeks ago  Completed 8 intrathecal chemotherapy treatments  Therapy: Hyperseal S rituximab  Cycle 5 delayed due to vitreous, retinal hemorrhages associated with retinoschisis in the context of pancytopenia status post vitrectomy, appears to be recovering well.  If and when ophthalmology gives us the greenlight we can proceed with cycle 5.  Need to emphasize compliance with interim labs.  Need to keep platelets above 50,000 and hemoglobin above 9.  He will need modified dosing for the last 2 cycles.  Certainly the even cycle will require reduction in cytarabine to 1 g/m².  We will also proceed with B complex with folic acid supplementation at this time.  See back next week.  Overall doing better    Repeat echocardiogram prior to next cycle, there appears to be a slight  drop in the ejection fraction from 55 to 60% prior to about 50 to 55% at this time.    Cancer Staging   No matching staging information was found for the patient.    Oncology History   Burkitt lymphoma (CMS/HCC)   10/27/2023 Initial Diagnosis    Burkitt lymphoma (CMS/HCC)     10/29/2023 -  Chemotherapy    HyperCVAD + RiTUXimab, 21 Day Cycles          Diagnoses and all orders for this visit:  Burkitt lymphoma, unspecified body region (CMS/HCC)  -     Lactate Dehydrogenase; Future  -     Comprehensive Metabolic Panel; Future  -     CBC and Auto Differential; Future  -     Oncology Line Draw Appointment Request  -     Clinic Appointment Request Follow up; [facility]  -     B complex-vitamin C-folic acid (Nephrocaps) capsule 1 capsule  -     Clinic Appointment Request Follow up; [facility]IQUE; Future  -     Oncology Line Draw Appointment Request; Future  -     Lactate Dehydrogenase; Future  -     Comprehensive Metabolic Panel; Future  -     CBC and Auto Differential; Future  -     Onco-Echo Complete (Strain & 3D); Future  -     Planned Future Admission >24 Hours  -     pantoprazole (ProtoNix) 40 mg EC tablet; Take 1 tablet (40 mg) by mouth once daily in the morning. Take before meals. Do not crush, chew, or split.           Marbin Monique MD

## 2024-02-07 ENCOUNTER — LAB (OUTPATIENT)
Dept: HEMATOLOGY/ONCOLOGY | Facility: HOSPITAL | Age: 24
End: 2024-02-07
Payer: COMMERCIAL

## 2024-02-07 ENCOUNTER — OFFICE VISIT (OUTPATIENT)
Dept: HEMATOLOGY/ONCOLOGY | Facility: HOSPITAL | Age: 24
End: 2024-02-07
Payer: COMMERCIAL

## 2024-02-07 VITALS
TEMPERATURE: 97.3 F | SYSTOLIC BLOOD PRESSURE: 117 MMHG | DIASTOLIC BLOOD PRESSURE: 73 MMHG | HEART RATE: 73 BPM | RESPIRATION RATE: 16 BRPM | OXYGEN SATURATION: 100 % | WEIGHT: 157.8 LBS | BODY MASS INDEX: 20.82 KG/M2

## 2024-02-07 DIAGNOSIS — C83.70 BURKITT LYMPHOMA, UNSPECIFIED BODY REGION (MULTI): Primary | ICD-10-CM

## 2024-02-07 DIAGNOSIS — C83.78 BURKITT LYMPHOMA OF LYMPH NODES OF MULTIPLE REGIONS (MULTI): ICD-10-CM

## 2024-02-07 DIAGNOSIS — C83.70 BURKITT LYMPHOMA, UNSPECIFIED BODY REGION (MULTI): ICD-10-CM

## 2024-02-07 DIAGNOSIS — C83.79 BURKITT LYMPHOMA OF SOLID ORGAN EXCLUDING SPLEEN (MULTI): ICD-10-CM

## 2024-02-07 LAB
ALBUMIN SERPL BCP-MCNC: 4.5 G/DL (ref 3.4–5)
ALP SERPL-CCNC: 80 U/L (ref 33–120)
ALT SERPL W P-5'-P-CCNC: 38 U/L (ref 10–52)
ANION GAP SERPL CALC-SCNC: 15 MMOL/L (ref 10–20)
AST SERPL W P-5'-P-CCNC: 22 U/L (ref 9–39)
BASOPHILS # BLD AUTO: 0.01 X10*3/UL (ref 0–0.1)
BASOPHILS NFR BLD AUTO: 0.3 %
BILIRUB SERPL-MCNC: 0.4 MG/DL (ref 0–1.2)
BNP SERPL-MCNC: 18 PG/ML (ref 0–99)
BUN SERPL-MCNC: 19 MG/DL (ref 6–23)
CALCIUM SERPL-MCNC: 9.5 MG/DL (ref 8.6–10.3)
CHLORIDE SERPL-SCNC: 101 MMOL/L (ref 98–107)
CO2 SERPL-SCNC: 28 MMOL/L (ref 21–32)
CREAT SERPL-MCNC: 0.96 MG/DL (ref 0.5–1.3)
DACRYOCYTES BLD QL SMEAR: NORMAL
EGFRCR SERPLBLD CKD-EPI 2021: >90 ML/MIN/1.73M*2
EOSINOPHIL # BLD AUTO: 0.01 X10*3/UL (ref 0–0.7)
EOSINOPHIL NFR BLD AUTO: 0.3 %
ERYTHROCYTE [DISTWIDTH] IN BLOOD BY AUTOMATED COUNT: 24.4 % (ref 11.5–14.5)
ERYTHROCYTE [SEDIMENTATION RATE] IN BLOOD BY WESTERGREN METHOD: 4 MM/H (ref 0–15)
GLUCOSE SERPL-MCNC: 104 MG/DL (ref 74–99)
HCT VFR BLD AUTO: 30.8 % (ref 41–52)
HGB BLD-MCNC: 10.5 G/DL (ref 13.5–17.5)
IMM GRANULOCYTES # BLD AUTO: 0.04 X10*3/UL (ref 0–0.7)
IMM GRANULOCYTES NFR BLD AUTO: 1 % (ref 0–0.9)
LYMPHOCYTES # BLD AUTO: 0.45 X10*3/UL (ref 1.2–4.8)
LYMPHOCYTES NFR BLD AUTO: 11.7 %
MCH RBC QN AUTO: 30.4 PG (ref 26–34)
MCHC RBC AUTO-ENTMCNC: 34.1 G/DL (ref 32–36)
MCV RBC AUTO: 89 FL (ref 80–100)
MONOCYTES # BLD AUTO: 0.48 X10*3/UL (ref 0.1–1)
MONOCYTES NFR BLD AUTO: 12.5 %
NEUTROPHILS # BLD AUTO: 2.85 X10*3/UL (ref 1.2–7.7)
NEUTROPHILS NFR BLD AUTO: 74.2 %
NRBC BLD-RTO: 0 /100 WBCS (ref 0–0)
OVALOCYTES BLD QL SMEAR: NORMAL
PLATELET # BLD AUTO: 187 X10*3/UL (ref 150–450)
POLYCHROMASIA BLD QL SMEAR: NORMAL
POTASSIUM SERPL-SCNC: 3.7 MMOL/L (ref 3.5–5.3)
PROT SERPL-MCNC: 6.6 G/DL (ref 6.4–8.2)
RBC # BLD AUTO: 3.45 X10*6/UL (ref 4.5–5.9)
RBC MORPH BLD: NORMAL
SCHISTOCYTES BLD QL SMEAR: NORMAL
SODIUM SERPL-SCNC: 140 MMOL/L (ref 136–145)
WBC # BLD AUTO: 3.8 X10*3/UL (ref 4.4–11.3)

## 2024-02-07 PROCEDURE — 83880 ASSAY OF NATRIURETIC PEPTIDE: CPT

## 2024-02-07 PROCEDURE — 1036F TOBACCO NON-USER: CPT | Performed by: INTERNAL MEDICINE

## 2024-02-07 PROCEDURE — 99215 OFFICE O/P EST HI 40 MIN: CPT | Mod: 25,27 | Performed by: INTERNAL MEDICINE

## 2024-02-07 PROCEDURE — 84075 ASSAY ALKALINE PHOSPHATASE: CPT

## 2024-02-07 PROCEDURE — 36591 DRAW BLOOD OFF VENOUS DEVICE: CPT

## 2024-02-07 PROCEDURE — 85025 COMPLETE CBC W/AUTO DIFF WBC: CPT

## 2024-02-07 PROCEDURE — 99215 OFFICE O/P EST HI 40 MIN: CPT | Performed by: INTERNAL MEDICINE

## 2024-02-07 PROCEDURE — 85652 RBC SED RATE AUTOMATED: CPT

## 2024-02-07 RX ORDER — HEPARIN SODIUM,PORCINE/PF 10 UNIT/ML
50 SYRINGE (ML) INTRAVENOUS AS NEEDED
Status: CANCELLED | OUTPATIENT
Start: 2024-02-07

## 2024-02-07 RX ORDER — B-COMPLEX WITH VITAMIN C
1 TABLET ORAL DAILY
Qty: 30 TABLET | Refills: 11 | Status: SHIPPED | OUTPATIENT
Start: 2024-02-07 | End: 2024-05-03 | Stop reason: ALTCHOICE

## 2024-02-08 ENCOUNTER — OFFICE VISIT (OUTPATIENT)
Dept: OPHTHALMOLOGY | Facility: CLINIC | Age: 24
End: 2024-02-08
Payer: COMMERCIAL

## 2024-02-08 DIAGNOSIS — H35.63 RETINAL HEMORRHAGE OF BOTH EYES: Primary | ICD-10-CM

## 2024-02-08 PROCEDURE — 99024 POSTOP FOLLOW-UP VISIT: CPT

## 2024-02-08 ASSESSMENT — EXTERNAL EXAM - RIGHT EYE: OD_EXAM: NORMAL

## 2024-02-08 ASSESSMENT — VISUAL ACUITY
OD_PH_SC: 20/200
OD_SC: 20/400-1
METHOD: SNELLEN - LINEAR

## 2024-02-08 ASSESSMENT — TONOMETRY
OS_IOP_MMHG: 20
OD_IOP_MMHG: 20
IOP_METHOD: GOLDMANN APPLANATION

## 2024-02-08 ASSESSMENT — EXTERNAL EXAM - LEFT EYE: OS_EXAM: NORMAL

## 2024-02-08 ASSESSMENT — SLIT LAMP EXAM - LIDS
COMMENTS: NORMAL
COMMENTS: NORMAL

## 2024-02-08 NOTE — PROGRESS NOTES
Bilateral macula-involving sub ILM hemorrhage with serous detachment OS>OD  Multilayer retinal hemorrhage, left eye  S/p pars plana vitrectomy (PPV)/C3F8/intraretinal tPA OS  S/P PPV/MP/EL/air OD    Gas is 25 % fill OS - No inferior RD on scleral depression  3+ VH below the bubble  stable    Air is dissipated OD  Retina is flat  No signs of infection  VA improved from CF to 20/200    OCT 02/08/24     - Right eye (OD): outer retinal disruptions and outer retinal folds inferiorly, CME    - Left eye (OS): No view    Optos 02/08/24    - Right eye (OD): SubLM subfoveal clotted heme now mostly resolved with scattered pre-retinal hemorrhage with in the posterior pole; stable    - OS:  Poor view 25% gas fill, retina is attached, residual preretinal blood clot with fibrin membranes in the temporal retina     Plan:  - Continue Prednisolone, and artificial tears QID to the left eye,   - Start Pred and ketorolac QID, right eye   - Eyes are healing well, patient can proceed with chemotherapy while avoiding platelets from dropping significantly - preferable level would be 50-70K but its understandable if this is not achievable and would recommend avoiding levels below 30K.  - May need repeat surgery in the left eye (after this cycle or the next) if the vitreous hemorrhage persists or retina detaches  -RTC in 1 week

## 2024-02-08 NOTE — PROGRESS NOTES
Patient ID: Shilo Thakkar is a 23 y.o. male.  Oncologic History  Burkitt lymphoma, high risk, MYC positive  - 10/19/23 presented to OSH ED with dysphagia and abdominal pain  - CT A/P with IV contrast (10/19) mass in the body of the pancreas measuring approximately 2.7 x 2.9 cm. The pancreatic duct is dilated. Multiple masses in the mesentery as well as omental caking consistent with carcinomatosis. Moderate amount of free fluid throughout the abdomen and pelvis.   - CT soft tissue neck with IV contrast (10/19): homogeneous soft tissue attenuation lesion in the L oropharynx which measures approximately 3.0 x 4.0 x 5.1 cm.   - US guided mesenteric LN biopsy (10/20/23): HIGH GRADE B-CELL LYMPHOMA MORPHOLOGICALLY CONSISTENT WITH BURKITT LYMPHOMA; flow cytometry: No B cell population identified  - Bone marrow aspiration biopsy November 1, 2023 no evidence of lymphoma involvement.  CSF by flow cytometry showed no lymphoma 11/2/2023.  - Biopsy L oropharyngeal mass and L cervical LN by ENT (10/23/23) high grade B-cell burkitt lymphoma, MYC positive, Bcl-2 negative.  - MR brain with and w/o IV contrast (10/27): No evidence of intracranial metastatic disease.     - Dexa 40 mg daily ended 11/1  - PET CT ordered 10/30 showed FDG avid left oropharyngeal mass and LAD above and below diaphragm  - Started HyperCVAD 10/29 cycle 1 day 1  - Part A:   D1: CTX + mesna, dexamethasone, D2: CTX + mesna, D3: CTX + mesna, D4: Doxo, vinca, D6: Ritux, GCSF, D9: IT,   D2:  intrathecal methotrexate November 2, 2023, 12 mg, CSF shows no B cells by flow cytometry.  D6: Intrathecal cytarabine 100 mg on November 6, 2023.  D11: vinca, dexamethasone, D13: Ritux 11/10  D13: Rituximab therapy  - Daily neupogen since 11/3/23  -Discharged home November 11, 2023  -Evaluation November 15, 2023: Recovered thrombocytopenia, stop levofloxacin, continue prophylactic acyclovir and 3 times weekly Bactrim and fluconazole.  Depressed affect noted.  -Cycle 2-day 1  part to be high-dose methotrexate plus high-dose cytarabine, November 20, 2023,  with 2 intrathecal chemotherapy administered  -Neulasta on 11/27/2023.  -Evaluation 12/6/2023 asymptomatic gaining weight doing well.  To schedule twice weekly CBC BMP Mondays and Thursdays after discharge, emphasized prophylactic levofloxacin between day 5 and day 15, acyclovir, fluconazole and 3 times a week Bactrim.  -CT scan of the chest abdomen or pelvis on December 6, 2023 shows complete resolution of intra-abdominal disease, with mildly worse splenomegaly most likely unrelated to his lymphoma.  -Cycle 3 hyper-CVAD regimen December 12, 2023, including Rituxan day 1 and day 11, intrathecal methotrexate 12/14/2023, intrathecal cytarabine December 18, 2023.  -December 27, 2023: Feeling well asymptomatic tolerated cycle 3 well, plan for cycle 4 January 2, 2024 consisting of high-dose methotrexate high-dose cytarabine, and includes 2 doses of rituximab day 1 day 8, and 2 doses of intrathecal chemotherapy, intrathecal cytarabine day 1 and intrathecal methotrexate day 8  -Hospitalized  1/17-1/23/2024 with visual changes, retinoschisis, profound pancytopenia s/p left eye vitrectomy, retinal hemorrgages. Delay cycle 5. Discuss with ophth regarding etiology and plans.  -Evaluation January 31, 2024: Vision appears to be improving.  Discussed with ophthalmology.  They will be seeing February 2 and will decide whether they will clear him for cycle 5 of chemotherapy which would be tentatively planned to start February 9, 2024 as an inpatient at modified dosing.  Repeat echocardiogram, continue prophylactic antibiotics.  Will need 3 times weekly labs for close monitoring and will keep platelets above 50,000, keep hemoglobin above 8.5-9.  Will need closer supervision as he was noncompliant with twice weekly labs before.  -Evaluation 2/7/2024: Continues to improve, vision clearly better.  Appears to have been cleared by ophthalmology, to proceed  with cycle 5 starting 2/9/2024 with 80% cyclophosphamide.  Needs echocardiogram repeated to verify a normal ejection fraction.  Upon discharge he will need 3 times a week count check to assure compliance, as he had been noncompliant with twice weekly labs previously.  Need to keep platelets above 30-50,000, need to keep hemoglobin above 8.5 at esthela.  Final cycle of chemotherapy will be also dose reduced with cytarabine down to 1 g/m².  Needs a chest x-ray on Friday before starting  Subjective    HPI  Feels well overall.  Vision appears to be improving.  He now establishes eye to eye contact which he did not do before.  Eating reasonably well.  No dyspnea cough fever chills night sweats oral sores diarrhea abdominal pain nausea vomiting paresthesias or edema    Review of Systems    Head and neck: Other than HPI negative  CNS: Other than HPI negative  Cardiovascular: Other than HPI negative  Pulmonary: Other than HPI negative  GI: Other than HPI negative  : Other than HPI negative  Bleeding: Other than HPI negative  Constitutional: Other than HPI negative     Objective    BSA: 1.92 meters squared  /73 (BP Location: Left arm, Patient Position: Sitting, BP Cuff Size: Large adult)   Pulse 73   Temp 36.3 °C (97.3 °F) (Skin)   Resp 16   Wt 71.6 kg (157 lb 12.8 oz)   SpO2 100%   BMI 20.82 kg/m²      Physical Exam  Alert oriented not in distress not pale or jaundiced  Lymph nodes: No adenopathy  Oral mucosa negative, no mucositis  Head is normocephalic atraumatic, pupils equal reactive  Neck is supple no adenopathy, no thyromegaly  Lungs show equal air entry, no crackles or wheezing, equal percussion  Heart shows regular rate and rhythm normal S1-S2 no murmurs or gallop rhythm  Abdomen is soft nontender, moves with respiration, no hepatosplenomegaly or masses, positive bowel sounds, not distended.  Lower extremities show no edema  Neurologically grossly nonfocal  Psych: Normal affect    Performance  Status:  Symptomatic; in bed <50% of the day      Assessment/Plan   High risk Burkitt's lymphoma  Complete remission based on PET scan from 3 weeks ago  Completed 8 intrathecal chemotherapy treatments  Therapy: HyperCVAD plus Rituximab  Cycle 5 delayed due to vitreous, retinal hemorrhages associated with retinoschisis in the context of pancytopenia status post vitrectomy, appears to be recovering well.  Concern about compliance.  Psychology evaluation, I reached out to child life support.      I will discuss with ophthalmology and if all is good we can admit him to the hospital Friday, 2/9/2024.  Will need dose reduction and cyclophosphamide to 80%.  Need to repeat echocardiogram and if improved keep doxorubicin at the same dose.  He only received 100 mg/m² cumulative dose of doxorubicin but there was slight drop in the ejection fraction on the last evaluation hence we need to verify.  He will need pegfilgrastim upon discharge  Will need to arrange for 3 times a week count check q. Monday Wednesday and Friday with transfusion parameters to keep platelets above 30-50,000 and hemoglobin above 8.5 due to his recent retinal hemorrhages.  Need to schedule with the nurse on those days to assure compliance as he had history of not being compliant with interim labs which is very problematic with his treatment.  Continue prophylactic acyclovir and Bactrim 3 times a week.  We added B complex and folic acid last visit.  I will obtain a chest x-ray on Friday as well.  Upon discharge next week he will also need to be on levofloxacin and fluconazole between days 5 and 15.  Cancer Staging   No matching staging information was found for the patient.    Oncology History   Burkitt lymphoma (CMS/HCC)   10/27/2023 Initial Diagnosis    Burkitt lymphoma (CMS/HCC)     10/29/2023 -  Chemotherapy    HyperCVAD + RiTUXimab, 21 Day Cycles          Diagnoses and all orders for this visit:  Burkitt lymphoma, unspecified body region (CMS/HCC)  -      Clinic Appointment Request Follow up; [facility]  -     Iron and TIBC; Future  -     Ferritin; Future  -     Sedimentation Rate; Future  -     Infusion Appointment Request [facility] (count check); Future  -     Clinic Appointment Request Follow up; [facility] (during infusion- post discharge); Future  -     B-Type Natriuretic Peptide; Future  -     Onco-Echo Complete (Strain & 3D); Future           Marbin Monique MD

## 2024-02-09 ENCOUNTER — APPOINTMENT (OUTPATIENT)
Dept: RADIOLOGY | Facility: HOSPITAL | Age: 24
DRG: 847 | End: 2024-02-09
Payer: COMMERCIAL

## 2024-02-09 ENCOUNTER — HOSPITAL ENCOUNTER (INPATIENT)
Facility: HOSPITAL | Age: 24
LOS: 5 days | Discharge: HOME | DRG: 847 | End: 2024-02-14
Attending: INTERNAL MEDICINE
Payer: COMMERCIAL

## 2024-02-09 DIAGNOSIS — C83.78 BURKITT LYMPHOMA OF LYMPH NODES OF MULTIPLE REGIONS (MULTI): ICD-10-CM

## 2024-02-09 DIAGNOSIS — Z01.818 ENCOUNTER FOR OTHER PREPROCEDURAL EXAMINATION: ICD-10-CM

## 2024-02-09 DIAGNOSIS — C83.70: Primary | ICD-10-CM

## 2024-02-09 DIAGNOSIS — H33.103 BILATERAL RETINOSCHISIS: ICD-10-CM

## 2024-02-09 PROCEDURE — 1170000001 HC PRIVATE ONCOLOGY ROOM DAILY

## 2024-02-09 PROCEDURE — 71046 X-RAY EXAM CHEST 2 VIEWS: CPT

## 2024-02-09 PROCEDURE — 99223 1ST HOSP IP/OBS HIGH 75: CPT | Performed by: NURSE PRACTITIONER

## 2024-02-09 PROCEDURE — 2500000002 HC RX 250 W HCPCS SELF ADMINISTERED DRUGS (ALT 637 FOR MEDICARE OP, ALT 636 FOR OP/ED): Performed by: NURSE PRACTITIONER

## 2024-02-09 PROCEDURE — 71046 X-RAY EXAM CHEST 2 VIEWS: CPT | Performed by: RADIOLOGY

## 2024-02-09 PROCEDURE — 2500000001 HC RX 250 WO HCPCS SELF ADMINISTERED DRUGS (ALT 637 FOR MEDICARE OP): Performed by: NURSE PRACTITIONER

## 2024-02-09 RX ORDER — FERROUS SULFATE 325(65) MG
65 TABLET ORAL 2 TIMES DAILY
Status: DISCONTINUED | OUTPATIENT
Start: 2024-02-09 | End: 2024-02-14 | Stop reason: HOSPADM

## 2024-02-09 RX ORDER — ACYCLOVIR 400 MG/1
400 TABLET ORAL 2 TIMES DAILY
Status: DISCONTINUED | OUTPATIENT
Start: 2024-02-09 | End: 2024-02-14 | Stop reason: HOSPADM

## 2024-02-09 RX ORDER — PREDNISOLONE ACETATE 10 MG/ML
1 SUSPENSION/ DROPS OPHTHALMIC 3 TIMES DAILY
Status: DISCONTINUED | OUTPATIENT
Start: 2024-02-09 | End: 2024-02-14 | Stop reason: HOSPADM

## 2024-02-09 RX ORDER — PROCHLORPERAZINE MALEATE 10 MG
10 TABLET ORAL EVERY 6 HOURS PRN
Status: CANCELLED | OUTPATIENT
Start: 2024-02-09

## 2024-02-09 RX ORDER — POTASSIUM CHLORIDE 20 MEQ/1
20 TABLET, EXTENDED RELEASE ORAL DAILY
Status: DISCONTINUED | OUTPATIENT
Start: 2024-02-10 | End: 2024-02-14 | Stop reason: HOSPADM

## 2024-02-09 RX ORDER — PREDNISOLONE ACETATE 10 MG/ML
1 SUSPENSION/ DROPS OPHTHALMIC 4 TIMES DAILY
Status: DISCONTINUED | OUTPATIENT
Start: 2024-02-09 | End: 2024-02-14 | Stop reason: HOSPADM

## 2024-02-09 RX ORDER — SODIUM CHLORIDE 9 MG/ML
100 INJECTION, SOLUTION INTRAVENOUS CONTINUOUS
Status: CANCELLED | OUTPATIENT
Start: 2024-02-09

## 2024-02-09 RX ORDER — FAMOTIDINE 10 MG/ML
20 INJECTION INTRAVENOUS AS NEEDED
Status: CANCELLED | OUTPATIENT
Start: 2024-02-09

## 2024-02-09 RX ORDER — FAMOTIDINE 40 MG/1
40 TABLET, FILM COATED ORAL DAILY
Status: DISCONTINUED | OUTPATIENT
Start: 2024-02-10 | End: 2024-02-14 | Stop reason: HOSPADM

## 2024-02-09 RX ORDER — SULFAMETHOXAZOLE AND TRIMETHOPRIM 800; 160 MG/1; MG/1
1 TABLET ORAL
Status: DISCONTINUED | OUTPATIENT
Start: 2024-02-09 | End: 2024-02-14 | Stop reason: HOSPADM

## 2024-02-09 RX ORDER — PROCHLORPERAZINE EDISYLATE 5 MG/ML
10 INJECTION INTRAMUSCULAR; INTRAVENOUS EVERY 6 HOURS PRN
Status: CANCELLED | OUTPATIENT
Start: 2024-02-09

## 2024-02-09 RX ORDER — DIPHENHYDRAMINE HYDROCHLORIDE 50 MG/ML
50 INJECTION INTRAMUSCULAR; INTRAVENOUS AS NEEDED
Status: CANCELLED | OUTPATIENT
Start: 2024-02-09

## 2024-02-09 RX ORDER — KETOROLAC TROMETHAMINE 5 MG/ML
1 SOLUTION OPHTHALMIC 4 TIMES DAILY
Status: DISCONTINUED | OUTPATIENT
Start: 2024-02-09 | End: 2024-02-14 | Stop reason: HOSPADM

## 2024-02-09 RX ORDER — AMOXICILLIN 250 MG
2 CAPSULE ORAL NIGHTLY PRN
Status: DISCONTINUED | OUTPATIENT
Start: 2024-02-09 | End: 2024-02-14 | Stop reason: HOSPADM

## 2024-02-09 RX ORDER — B-COMPLEX WITH VITAMIN C
1 TABLET ORAL DAILY
Status: DISCONTINUED | OUTPATIENT
Start: 2024-02-10 | End: 2024-02-14 | Stop reason: HOSPADM

## 2024-02-09 RX ORDER — ALBUTEROL SULFATE 0.83 MG/ML
3 SOLUTION RESPIRATORY (INHALATION) AS NEEDED
Status: CANCELLED | OUTPATIENT
Start: 2024-02-09

## 2024-02-09 RX ORDER — FLUCONAZOLE 200 MG/1
400 TABLET ORAL DAILY
Status: DISCONTINUED | OUTPATIENT
Start: 2024-02-10 | End: 2024-02-14 | Stop reason: HOSPADM

## 2024-02-09 RX ORDER — EPINEPHRINE 1 MG/ML
0.3 INJECTION INTRAMUSCULAR; INTRAVENOUS; SUBCUTANEOUS EVERY 5 MIN PRN
Status: CANCELLED | OUTPATIENT
Start: 2024-02-09

## 2024-02-09 RX ADMIN — KETOROLAC TROMETHAMINE 1 DROP: 5 SOLUTION OPHTHALMIC at 22:51

## 2024-02-09 RX ADMIN — SULFAMETHOXAZOLE AND TRIMETHOPRIM 1 TABLET: 800; 160 TABLET ORAL at 22:49

## 2024-02-09 RX ADMIN — ACYCLOVIR 400 MG: 400 TABLET ORAL at 22:49

## 2024-02-09 SDOH — HEALTH STABILITY: MENTAL HEALTH: EXPERIENCED ANY OF THE FOLLOWING LIFE EVENTS: LIFE-THREATENING ILLNESS OR INJURY

## 2024-02-09 SDOH — SOCIAL STABILITY: SOCIAL INSECURITY: HAVE YOU HAD THOUGHTS OF HARMING ANYONE ELSE?: NO

## 2024-02-09 SDOH — SOCIAL STABILITY: SOCIAL INSECURITY: ABUSE: ADULT

## 2024-02-09 SDOH — SOCIAL STABILITY: SOCIAL INSECURITY: DO YOU FEEL UNSAFE GOING BACK TO THE PLACE WHERE YOU ARE LIVING?: NO

## 2024-02-09 SDOH — SOCIAL STABILITY: SOCIAL INSECURITY: ARE THERE ANY APPARENT SIGNS OF INJURIES/BEHAVIORS THAT COULD BE RELATED TO ABUSE/NEGLECT?: NO

## 2024-02-09 SDOH — SOCIAL STABILITY: SOCIAL INSECURITY: DO YOU FEEL ANYONE HAS EXPLOITED OR TAKEN ADVANTAGE OF YOU FINANCIALLY OR OF YOUR PERSONAL PROPERTY?: NO

## 2024-02-09 SDOH — SOCIAL STABILITY: SOCIAL INSECURITY: ARE YOU OR HAVE YOU BEEN THREATENED OR ABUSED PHYSICALLY, EMOTIONALLY, OR SEXUALLY BY ANYONE?: NO

## 2024-02-09 SDOH — SOCIAL STABILITY: SOCIAL INSECURITY: HAS ANYONE EVER THREATENED TO HURT YOUR FAMILY OR YOUR PETS?: NO

## 2024-02-09 SDOH — SOCIAL STABILITY: SOCIAL INSECURITY: DOES ANYONE TRY TO KEEP YOU FROM HAVING/CONTACTING OTHER FRIENDS OR DOING THINGS OUTSIDE YOUR HOME?: NO

## 2024-02-09 ASSESSMENT — ACTIVITIES OF DAILY LIVING (ADL)
PATIENT'S MEMORY ADEQUATE TO SAFELY COMPLETE DAILY ACTIVITIES?: YES
HEARING - LEFT EAR: FUNCTIONAL
BATHING: NEEDS ASSISTANCE
JUDGMENT_ADEQUATE_SAFELY_COMPLETE_DAILY_ACTIVITIES: YES
WALKS IN HOME: NEEDS ASSISTANCE
FEEDING YOURSELF: NEEDS ASSISTANCE
TOILETING: NEEDS ASSISTANCE
GROOMING: NEEDS ASSISTANCE
DRESSING YOURSELF: NEEDS ASSISTANCE
ADEQUATE_TO_COMPLETE_ADL: NO
HEARING - RIGHT EAR: FUNCTIONAL

## 2024-02-09 ASSESSMENT — COGNITIVE AND FUNCTIONAL STATUS - GENERAL
DAILY ACTIVITIY SCORE: 18
DRESSING REGULAR UPPER BODY CLOTHING: A LITTLE
HELP NEEDED FOR BATHING: A LITTLE
CLIMB 3 TO 5 STEPS WITH RAILING: A LITTLE
PERSONAL GROOMING: A LITTLE
PATIENT BASELINE BEDBOUND: NO
MOBILITY SCORE: 22
TOILETING: A LITTLE
WALKING IN HOSPITAL ROOM: A LITTLE
DRESSING REGULAR LOWER BODY CLOTHING: A LITTLE
EATING MEALS: A LITTLE

## 2024-02-09 ASSESSMENT — LIFESTYLE VARIABLES
HOW OFTEN DO YOU HAVE A DRINK CONTAINING ALCOHOL: NEVER
SUBSTANCE_ABUSE_PAST_12_MONTHS: NO
SKIP TO QUESTIONS 9-10: 1
PRESCIPTION_ABUSE_PAST_12_MONTHS: NO
HOW MANY STANDARD DRINKS CONTAINING ALCOHOL DO YOU HAVE ON A TYPICAL DAY: PATIENT DOES NOT DRINK
AUDIT-C TOTAL SCORE: 0
HOW OFTEN DO YOU HAVE 6 OR MORE DRINKS ON ONE OCCASION: NEVER
AUDIT-C TOTAL SCORE: 0

## 2024-02-09 ASSESSMENT — COLUMBIA-SUICIDE SEVERITY RATING SCALE - C-SSRS
1. IN THE PAST MONTH, HAVE YOU WISHED YOU WERE DEAD OR WISHED YOU COULD GO TO SLEEP AND NOT WAKE UP?: NO
6. HAVE YOU EVER DONE ANYTHING, STARTED TO DO ANYTHING, OR PREPARED TO DO ANYTHING TO END YOUR LIFE?: NO
2. HAVE YOU ACTUALLY HAD ANY THOUGHTS OF KILLING YOURSELF?: NO

## 2024-02-09 ASSESSMENT — PAIN - FUNCTIONAL ASSESSMENT: PAIN_FUNCTIONAL_ASSESSMENT: 0-10

## 2024-02-09 ASSESSMENT — PAIN SCALES - GENERAL: PAINLEVEL_OUTOF10: 0 - NO PAIN

## 2024-02-10 ENCOUNTER — APPOINTMENT (OUTPATIENT)
Dept: CARDIOLOGY | Facility: HOSPITAL | Age: 24
DRG: 847 | End: 2024-02-10
Payer: COMMERCIAL

## 2024-02-10 LAB
ABO GROUP (TYPE) IN BLOOD: NORMAL
ALBUMIN SERPL BCP-MCNC: 4.3 G/DL (ref 3.4–5)
ALP SERPL-CCNC: 67 U/L (ref 33–120)
ALT SERPL W P-5'-P-CCNC: 35 U/L (ref 10–52)
ANION GAP SERPL CALC-SCNC: 12 MMOL/L (ref 10–20)
ANTIBODY SCREEN: NORMAL
AORTIC VALVE PEAK VELOCITY: 1.32 M/S
APTT PPP: 32 SECONDS (ref 27–38)
AST SERPL W P-5'-P-CCNC: 21 U/L (ref 9–39)
AV PEAK GRADIENT: 7 MMHG
AVA (PEAK VEL): 3.28 CM2
BASOPHILS # BLD AUTO: 0.02 X10*3/UL (ref 0–0.1)
BASOPHILS NFR BLD AUTO: 0.6 %
BILIRUB SERPL-MCNC: 0.4 MG/DL (ref 0–1.2)
BODY SURFACE AREA: 1.9 M2
BUN SERPL-MCNC: 18 MG/DL (ref 6–23)
CALCIUM SERPL-MCNC: 9.4 MG/DL (ref 8.6–10.6)
CHLORIDE SERPL-SCNC: 105 MMOL/L (ref 98–107)
CO2 SERPL-SCNC: 27 MMOL/L (ref 21–32)
CREAT SERPL-MCNC: 1.07 MG/DL (ref 0.5–1.3)
DACRYOCYTES BLD QL SMEAR: NORMAL
EGFRCR SERPLBLD CKD-EPI 2021: >90 ML/MIN/1.73M*2
EJECTION FRACTION APICAL 4 CHAMBER: 68.8
EJECTION FRACTION: 67 %
EOSINOPHIL # BLD AUTO: 0.01 X10*3/UL (ref 0–0.7)
EOSINOPHIL NFR BLD AUTO: 0.3 %
ERYTHROCYTE [DISTWIDTH] IN BLOOD BY AUTOMATED COUNT: 25.1 % (ref 11.5–14.5)
FIBRINOGEN PPP-MCNC: 210 MG/DL (ref 200–400)
GLUCOSE SERPL-MCNC: 93 MG/DL (ref 74–99)
HCT VFR BLD AUTO: 29.2 % (ref 41–52)
HGB BLD-MCNC: 9.4 G/DL (ref 13.5–17.5)
IMM GRANULOCYTES # BLD AUTO: 0.03 X10*3/UL (ref 0–0.7)
IMM GRANULOCYTES NFR BLD AUTO: 0.8 % (ref 0–0.9)
INR PPP: 1 (ref 0.9–1.1)
LDH SERPL L TO P-CCNC: 145 U/L (ref 84–246)
LEFT ATRIUM VOLUME AREA LENGTH INDEX BSA: 30.6 ML/M2
LEFT VENTRICLE INTERNAL DIMENSION DIASTOLE: 5.1 CM (ref 3.5–6)
LEFT VENTRICULAR OUTFLOW TRACT DIAMETER: 2.2 CM
LYMPHOCYTES # BLD AUTO: 0.47 X10*3/UL (ref 1.2–4.8)
LYMPHOCYTES NFR BLD AUTO: 13.1 %
MCH RBC QN AUTO: 30.2 PG (ref 26–34)
MCHC RBC AUTO-ENTMCNC: 32.2 G/DL (ref 32–36)
MCV RBC AUTO: 94 FL (ref 80–100)
MITRAL VALVE E/A RATIO: 2.34
MITRAL VALVE E/E' RATIO: 5.7
MONOCYTES # BLD AUTO: 0.5 X10*3/UL (ref 0.1–1)
MONOCYTES NFR BLD AUTO: 14 %
NEUTROPHILS # BLD AUTO: 2.55 X10*3/UL (ref 1.2–7.7)
NEUTROPHILS NFR BLD AUTO: 71.2 %
NRBC BLD-RTO: 0 /100 WBCS (ref 0–0)
OVALOCYTES BLD QL SMEAR: NORMAL
PLATELET # BLD AUTO: 146 X10*3/UL (ref 150–450)
POTASSIUM SERPL-SCNC: 3.8 MMOL/L (ref 3.5–5.3)
PROT SERPL-MCNC: 5.8 G/DL (ref 6.4–8.2)
PROTHROMBIN TIME: 11 SECONDS (ref 9.8–12.8)
RBC # BLD AUTO: 3.11 X10*6/UL (ref 4.5–5.9)
RBC MORPH BLD: NORMAL
RH FACTOR (ANTIGEN D): NORMAL
RIGHT VENTRICLE FREE WALL PEAK S': 13.5 CM/S
RIGHT VENTRICLE PEAK SYSTOLIC PRESSURE: 22 MMHG
SODIUM SERPL-SCNC: 140 MMOL/L (ref 136–145)
TRICUSPID ANNULAR PLANE SYSTOLIC EXCURSION: 2.1 CM
URATE SERPL-MCNC: 5.8 MG/DL (ref 4–7.5)
WBC # BLD AUTO: 3.6 X10*3/UL (ref 4.4–11.3)

## 2024-02-10 PROCEDURE — 93306 TTE W/DOPPLER COMPLETE: CPT | Performed by: INTERNAL MEDICINE

## 2024-02-10 PROCEDURE — 84550 ASSAY OF BLOOD/URIC ACID: CPT

## 2024-02-10 PROCEDURE — 2500000004 HC RX 250 GENERAL PHARMACY W/ HCPCS (ALT 636 FOR OP/ED): Performed by: INTERNAL MEDICINE

## 2024-02-10 PROCEDURE — 80053 COMPREHEN METABOLIC PANEL: CPT

## 2024-02-10 PROCEDURE — 83615 LACTATE (LD) (LDH) ENZYME: CPT

## 2024-02-10 PROCEDURE — 3E03305 INTRODUCTION OF OTHER ANTINEOPLASTIC INTO PERIPHERAL VEIN, PERCUTANEOUS APPROACH: ICD-10-PCS | Performed by: INTERNAL MEDICINE

## 2024-02-10 PROCEDURE — 2500000001 HC RX 250 WO HCPCS SELF ADMINISTERED DRUGS (ALT 637 FOR MEDICARE OP): Performed by: NURSE PRACTITIONER

## 2024-02-10 PROCEDURE — 76376 3D RENDER W/INTRP POSTPROCES: CPT

## 2024-02-10 PROCEDURE — 85025 COMPLETE CBC W/AUTO DIFF WBC: CPT

## 2024-02-10 PROCEDURE — 85610 PROTHROMBIN TIME: CPT

## 2024-02-10 PROCEDURE — 2500000002 HC RX 250 W HCPCS SELF ADMINISTERED DRUGS (ALT 637 FOR MEDICARE OP, ALT 636 FOR OP/ED): Performed by: NURSE PRACTITIONER

## 2024-02-10 PROCEDURE — 2500000001 HC RX 250 WO HCPCS SELF ADMINISTERED DRUGS (ALT 637 FOR MEDICARE OP): Performed by: HOME HEALTH AIDE

## 2024-02-10 PROCEDURE — 85384 FIBRINOGEN ACTIVITY: CPT

## 2024-02-10 PROCEDURE — 76376 3D RENDER W/INTRP POSTPROCES: CPT | Performed by: INTERNAL MEDICINE

## 2024-02-10 PROCEDURE — 1170000001 HC PRIVATE ONCOLOGY ROOM DAILY

## 2024-02-10 PROCEDURE — 93356 MYOCRD STRAIN IMG SPCKL TRCK: CPT | Performed by: INTERNAL MEDICINE

## 2024-02-10 PROCEDURE — 86901 BLOOD TYPING SEROLOGIC RH(D): CPT

## 2024-02-10 PROCEDURE — 99233 SBSQ HOSP IP/OBS HIGH 50: CPT | Performed by: INTERNAL MEDICINE

## 2024-02-10 RX ORDER — EPINEPHRINE 1 MG/ML
0.3 INJECTION INTRAMUSCULAR; INTRAVENOUS; SUBCUTANEOUS EVERY 5 MIN PRN
Status: DISCONTINUED | OUTPATIENT
Start: 2024-02-10 | End: 2024-02-14 | Stop reason: HOSPADM

## 2024-02-10 RX ORDER — DEXAMETHASONE 4 MG/1
40 TABLET ORAL EVERY MORNING
Status: COMPLETED | OUTPATIENT
Start: 2024-02-11 | End: 2024-02-13

## 2024-02-10 RX ORDER — BACITRACIN ZINC 500 UNIT/G
OINTMENT IN PACKET (EA) TOPICAL 3 TIMES DAILY
Status: DISCONTINUED | OUTPATIENT
Start: 2024-02-10 | End: 2024-02-14 | Stop reason: HOSPADM

## 2024-02-10 RX ORDER — DEXAMETHASONE 4 MG/1
40 TABLET ORAL ONCE
Status: COMPLETED | OUTPATIENT
Start: 2024-02-10 | End: 2024-02-10

## 2024-02-10 RX ORDER — DIPHENHYDRAMINE HYDROCHLORIDE 50 MG/ML
50 INJECTION INTRAMUSCULAR; INTRAVENOUS AS NEEDED
Status: DISCONTINUED | OUTPATIENT
Start: 2024-02-10 | End: 2024-02-14 | Stop reason: HOSPADM

## 2024-02-10 RX ORDER — SODIUM CHLORIDE 9 MG/ML
100 INJECTION, SOLUTION INTRAVENOUS CONTINUOUS
Status: DISCONTINUED | OUTPATIENT
Start: 2024-02-10 | End: 2024-02-14 | Stop reason: HOSPADM

## 2024-02-10 RX ORDER — ALBUTEROL SULFATE 0.83 MG/ML
3 SOLUTION RESPIRATORY (INHALATION) AS NEEDED
Status: DISCONTINUED | OUTPATIENT
Start: 2024-02-10 | End: 2024-02-14 | Stop reason: HOSPADM

## 2024-02-10 RX ORDER — PROCHLORPERAZINE EDISYLATE 5 MG/ML
10 INJECTION INTRAMUSCULAR; INTRAVENOUS EVERY 6 HOURS PRN
Status: DISCONTINUED | OUTPATIENT
Start: 2024-02-10 | End: 2024-02-14 | Stop reason: HOSPADM

## 2024-02-10 RX ORDER — PROCHLORPERAZINE MALEATE 10 MG
10 TABLET ORAL EVERY 6 HOURS PRN
Status: DISCONTINUED | OUTPATIENT
Start: 2024-02-10 | End: 2024-02-14 | Stop reason: HOSPADM

## 2024-02-10 RX ORDER — FAMOTIDINE 10 MG/ML
20 INJECTION INTRAVENOUS AS NEEDED
Status: DISCONTINUED | OUTPATIENT
Start: 2024-02-10 | End: 2024-02-14 | Stop reason: HOSPADM

## 2024-02-10 RX ADMIN — PREDNISOLONE ACETATE 1 DROP: 10 SUSPENSION/ DROPS OPHTHALMIC at 13:25

## 2024-02-10 RX ADMIN — BACITRACIN 1 APPLICATION: 500 OINTMENT TOPICAL at 20:29

## 2024-02-10 RX ADMIN — DEXAMETHASONE 40 MG: 4 TABLET ORAL at 15:42

## 2024-02-10 RX ADMIN — Medication 1 TABLET: at 09:44

## 2024-02-10 RX ADMIN — KETOROLAC TROMETHAMINE 1 DROP: 5 SOLUTION OPHTHALMIC at 16:08

## 2024-02-10 RX ADMIN — FERROUS SULFATE TAB 325 MG (65 MG ELEMENTAL FE) 1 TABLET: 325 (65 FE) TAB at 09:44

## 2024-02-10 RX ADMIN — PREDNISOLONE ACETATE 1 DROP: 10 SUSPENSION/ DROPS OPHTHALMIC at 20:31

## 2024-02-10 RX ADMIN — KETOROLAC TROMETHAMINE 1 DROP: 5 SOLUTION OPHTHALMIC at 13:25

## 2024-02-10 RX ADMIN — PREDNISOLONE ACETATE 1 DROP: 10 SUSPENSION/ DROPS OPHTHALMIC at 09:45

## 2024-02-10 RX ADMIN — POTASSIUM CHLORIDE 20 MEQ: 1500 TABLET, EXTENDED RELEASE ORAL at 09:44

## 2024-02-10 RX ADMIN — FAMOTIDINE 40 MG: 40 TABLET ORAL at 09:44

## 2024-02-10 RX ADMIN — CYCLOPHOSPHAMIDE 500 MG: 1 INJECTION, POWDER, FOR SOLUTION INTRAVENOUS; ORAL at 16:07

## 2024-02-10 RX ADMIN — FERROUS SULFATE TAB 325 MG (65 MG ELEMENTAL FE) 1 TABLET: 325 (65 FE) TAB at 20:30

## 2024-02-10 RX ADMIN — PREDNISOLONE ACETATE 1 DROP: 10 SUSPENSION/ DROPS OPHTHALMIC at 20:30

## 2024-02-10 RX ADMIN — PREDNISOLONE ACETATE 1 DROP: 10 SUSPENSION/ DROPS OPHTHALMIC at 09:44

## 2024-02-10 RX ADMIN — ACYCLOVIR 400 MG: 400 TABLET ORAL at 20:29

## 2024-02-10 RX ADMIN — FLUCONAZOLE 400 MG: 200 TABLET ORAL at 09:44

## 2024-02-10 RX ADMIN — MESNA 1150 MG: 100 INJECTION, SOLUTION INTRAVENOUS at 15:43

## 2024-02-10 RX ADMIN — SODIUM CHLORIDE 100 ML/HR: 9 INJECTION, SOLUTION INTRAVENOUS at 15:42

## 2024-02-10 RX ADMIN — ONDANSETRON 16 MG: 2 INJECTION INTRAMUSCULAR; INTRAVENOUS at 15:41

## 2024-02-10 RX ADMIN — ACYCLOVIR 400 MG: 400 TABLET ORAL at 09:44

## 2024-02-10 RX ADMIN — KETOROLAC TROMETHAMINE 1 DROP: 5 SOLUTION OPHTHALMIC at 20:31

## 2024-02-10 RX ADMIN — PREDNISOLONE ACETATE 1 DROP: 10 SUSPENSION/ DROPS OPHTHALMIC at 14:31

## 2024-02-10 RX ADMIN — KETOROLAC TROMETHAMINE 1 DROP: 5 SOLUTION OPHTHALMIC at 09:45

## 2024-02-10 RX ADMIN — PREDNISOLONE ACETATE 1 DROP: 10 SUSPENSION/ DROPS OPHTHALMIC at 16:08

## 2024-02-10 SDOH — SOCIAL STABILITY: SOCIAL INSECURITY: HAS ANYONE EVER THREATENED TO HURT YOUR FAMILY OR YOUR PETS?: NO

## 2024-02-10 SDOH — SOCIAL STABILITY: SOCIAL INSECURITY: HAVE YOU HAD THOUGHTS OF HARMING ANYONE ELSE?: NO

## 2024-02-10 SDOH — SOCIAL STABILITY: SOCIAL INSECURITY: ARE YOU OR HAVE YOU BEEN THREATENED OR ABUSED PHYSICALLY, EMOTIONALLY, OR SEXUALLY BY ANYONE?: NO

## 2024-02-10 SDOH — SOCIAL STABILITY: SOCIAL INSECURITY: DO YOU FEEL ANYONE HAS EXPLOITED OR TAKEN ADVANTAGE OF YOU FINANCIALLY OR OF YOUR PERSONAL PROPERTY?: NO

## 2024-02-10 SDOH — HEALTH STABILITY: MENTAL HEALTH: EXPERIENCED ANY OF THE FOLLOWING LIFE EVENTS: LIFE-THREATENING ILLNESS OR INJURY

## 2024-02-10 SDOH — SOCIAL STABILITY: SOCIAL INSECURITY: ARE THERE ANY APPARENT SIGNS OF INJURIES/BEHAVIORS THAT COULD BE RELATED TO ABUSE/NEGLECT?: NO

## 2024-02-10 SDOH — SOCIAL STABILITY: SOCIAL INSECURITY: DO YOU FEEL UNSAFE GOING BACK TO THE PLACE WHERE YOU ARE LIVING?: NO

## 2024-02-10 SDOH — SOCIAL STABILITY: SOCIAL INSECURITY: DOES ANYONE TRY TO KEEP YOU FROM HAVING/CONTACTING OTHER FRIENDS OR DOING THINGS OUTSIDE YOUR HOME?: NO

## 2024-02-10 SDOH — SOCIAL STABILITY: SOCIAL INSECURITY: ABUSE: ADULT

## 2024-02-10 SDOH — SOCIAL STABILITY: SOCIAL INSECURITY: WERE YOU ABLE TO COMPLETE ALL THE BEHAVIORAL HEALTH SCREENINGS?: YES

## 2024-02-10 ASSESSMENT — LIFESTYLE VARIABLES
SKIP TO QUESTIONS 9-10: 1
HOW OFTEN DO YOU HAVE A DRINK CONTAINING ALCOHOL: NEVER
HOW MANY STANDARD DRINKS CONTAINING ALCOHOL DO YOU HAVE ON A TYPICAL DAY: PATIENT DOES NOT DRINK
PRESCIPTION_ABUSE_PAST_12_MONTHS: NO
SUBSTANCE_ABUSE_PAST_12_MONTHS: NO
AUDIT-C TOTAL SCORE: 0
AUDIT-C TOTAL SCORE: 0
HOW OFTEN DO YOU HAVE 6 OR MORE DRINKS ON ONE OCCASION: NEVER

## 2024-02-10 ASSESSMENT — COGNITIVE AND FUNCTIONAL STATUS - GENERAL
MOBILITY SCORE: 24
DAILY ACTIVITIY SCORE: 24

## 2024-02-10 ASSESSMENT — PAIN - FUNCTIONAL ASSESSMENT
PAIN_FUNCTIONAL_ASSESSMENT: 0-10

## 2024-02-10 ASSESSMENT — PAIN SCALES - GENERAL
PAINLEVEL_OUTOF10: 0 - NO PAIN

## 2024-02-10 ASSESSMENT — ACTIVITIES OF DAILY LIVING (ADL): LACK_OF_TRANSPORTATION: NO

## 2024-02-10 NOTE — PROGRESS NOTES
Shilo Thakkar is a 23 y.o. male on day 1 of admission presenting with Burkitt lymphoma (CMS/HCC).    Subjective   Patient feels well today, ready to start chemotherapy. Denies HA, dizziness, CP, SOB, N/V/D/C. All other ROS otherwise negative.        Objective     Physical Exam  Constitutional:       General: He is not in acute distress.     Appearance: Normal appearance. He is not ill-appearing or toxic-appearing.   HENT:      Head: Normocephalic and atraumatic.      Mouth/Throat:      Mouth: Mucous membranes are moist.   Eyes:      General: No scleral icterus.     Extraocular Movements: Extraocular movements intact.      Pupils: Pupils are equal, round, and reactive to light.      Comments: Right eye has scleral hemorrhage evident; left eye sclera is very blood shot/pink appearing    Cardiovascular:      Rate and Rhythm: Normal rate and regular rhythm.      Pulses: Normal pulses.      Heart sounds: Normal heart sounds. No murmur heard.     No friction rub. No gallop.   Pulmonary:      Effort: Pulmonary effort is normal. No respiratory distress.      Breath sounds: Normal breath sounds. No wheezing, rhonchi or rales.   Abdominal:      General: Abdomen is flat. Bowel sounds are normal. There is no distension.      Palpations: Abdomen is soft.      Tenderness: There is no abdominal tenderness.   Musculoskeletal:         General: No swelling, deformity or signs of injury. Normal range of motion.      Cervical back: Normal range of motion and neck supple.      Right lower leg: No edema.      Left lower leg: No edema.   Skin:     General: Skin is warm and dry.      Capillary Refill: Capillary refill takes less than 2 seconds.      Coloration: Skin is not jaundiced.      Findings: No bruising, erythema, lesion or rash.   Neurological:      General: No focal deficit present.      Mental Status: He is alert and oriented to person, place, and time.      Cranial Nerves: No cranial nerve deficit.      Sensory: No sensory  "deficit.      Motor: No weakness.      Coordination: Coordination normal.   Psychiatric:         Mood and Affect: Mood normal.         Behavior: Behavior normal.      Comments: Fluent speech, cooperative         Last Recorded Vitals  Blood pressure 131/83, pulse 60, temperature 35.9 °C (96.6 °F), temperature source Temporal, resp. rate 17, height 1.82 m (5' 11.65\"), weight 71.1 kg (156 lb 12 oz), SpO2 100 %.  Intake/Output last 3 Shifts:  No intake/output data recorded.    Relevant Results  Results for orders placed or performed during the hospital encounter of 02/09/24 (from the past 24 hour(s))   CBC and Auto Differential   Result Value Ref Range    WBC 3.6 (L) 4.4 - 11.3 x10*3/uL    nRBC 0.0 0.0 - 0.0 /100 WBCs    RBC 3.11 (L) 4.50 - 5.90 x10*6/uL    Hemoglobin 9.4 (L) 13.5 - 17.5 g/dL    Hematocrit 29.2 (L) 41.0 - 52.0 %    MCV 94 80 - 100 fL    MCH 30.2 26.0 - 34.0 pg    MCHC 32.2 32.0 - 36.0 g/dL    RDW 25.1 (H) 11.5 - 14.5 %    Platelets 146 (L) 150 - 450 x10*3/uL    Neutrophils % 71.2 40.0 - 80.0 %    Immature Granulocytes %, Automated 0.8 0.0 - 0.9 %    Lymphocytes % 13.1 13.0 - 44.0 %    Monocytes % 14.0 2.0 - 10.0 %    Eosinophils % 0.3 0.0 - 6.0 %    Basophils % 0.6 0.0 - 2.0 %    Neutrophils Absolute 2.55 1.20 - 7.70 x10*3/uL    Immature Granulocytes Absolute, Automated 0.03 0.00 - 0.70 x10*3/uL    Lymphocytes Absolute 0.47 (L) 1.20 - 4.80 x10*3/uL    Monocytes Absolute 0.50 0.10 - 1.00 x10*3/uL    Eosinophils Absolute 0.01 0.00 - 0.70 x10*3/uL    Basophils Absolute 0.02 0.00 - 0.10 x10*3/uL   Comprehensive metabolic panel   Result Value Ref Range    Glucose 93 74 - 99 mg/dL    Sodium 140 136 - 145 mmol/L    Potassium 3.8 3.5 - 5.3 mmol/L    Chloride 105 98 - 107 mmol/L    Bicarbonate 27 21 - 32 mmol/L    Anion Gap 12 10 - 20 mmol/L    Urea Nitrogen 18 6 - 23 mg/dL    Creatinine 1.07 0.50 - 1.30 mg/dL    eGFR >90 >60 mL/min/1.73m*2    Calcium 9.4 8.6 - 10.6 mg/dL    Albumin 4.3 3.4 - 5.0 g/dL    " Alkaline Phosphatase 67 33 - 120 U/L    Total Protein 5.8 (L) 6.4 - 8.2 g/dL    AST 21 9 - 39 U/L    Bilirubin, Total 0.4 0.0 - 1.2 mg/dL    ALT 35 10 - 52 U/L   Fibrinogen   Result Value Ref Range    Fibrinogen 210 200 - 400 mg/dL   Lactate Dehydrogenase   Result Value Ref Range     84 - 246 U/L   Coagulation Screen   Result Value Ref Range    Protime 11.0 9.8 - 12.8 seconds    INR 1.0 0.9 - 1.1    aPTT 32 27 - 38 seconds   Uric Acid   Result Value Ref Range    Uric Acid 5.8 4.0 - 7.5 mg/dL   Type and screen   Result Value Ref Range    ABO TYPE O     Rh TYPE POS     ANTIBODY SCREEN NEG    Morphology   Result Value Ref Range    RBC Morphology See Below     Ovalocytes Few     Teardrop Cells Few    Onco-Echo Complete (Strain & 3D)   Result Value Ref Range    AV pk edmond 1.32 m/s    LVOT diam 2.20 cm    LV biplane EF 67 %    MV avg E/e' ratio 5.70     MV E/A ratio 2.34     Tricuspid annular plane systolic excursion 2.1 cm    LA vol index A/L 30.6 ml/m2    RV free wall pk S' 13.50 cm/s    LVIDd 5.10 cm    RVSP 22.0 mmHg    Aortic Valve Area by Continuity of Peak Velocity 3.28 cm2    AV pk grad 7.0 mmHg    LV A4C EF 68.8     BSA 1.9 m2       Assessment/Plan   Principal Problem:    Burkitt lymphoma (CMS/HCC)    Shilo Thakkar is a 23 y.o. male with PMH of Burkitt Lymphoma (Dx 10/20/23; s/p 4 cycles of HyperCVAD), malignant pleural effusions s/p CT (10/29/23), ADHD, and recently hospitalized from 1/17-1/23/2024 with visual changes, retinoschisis, retinal hemorrhages, and profound pancytopenia requiring left eye vitrectomy now admitted on 2/09/24 for cycle 5 of HyperCVAD.     D1 HyperCVAD     ONC:  # Burkitt lymphoma, newly diagnosed (10/20/23)  - 10/19/23 presented to [facility] ED with dysphagia and abdominal pain  - CT A/P with IV contrast (10/19) mass in the body of the pancreas measuring approximately 2.7 x 2.9 cm. Multiple masses in the mesentery as well as omental caking c/q carcinomatosis.   - CT soft tissue neck w/ IV  contrast (10/19): homogeneous soft tissue attenuation lesion in the L oropharynx which measures approximately 3.0 x 4.0 x 5.1 cm.   - CT Abdomen w/o IV contrast (10/20): Mesenteric lymphadenopathy  - US guided mesenteric LN biopsy (10/20/23): HIGH GRADE B-CELL LYMPHOMA MORPHOLOGICALLY C/W BURKITT LYMPHOMA; flow cytometry: CD10+ kappa restricted B cells supportive of a B cell lymphoma with germinal center origin.   - Biopsy L oropharyngeal mass and L cervical LN by ENT (10/23/23) high grade B-cell burkitt lymphoma  - PET CT (10/30) showed FDG avid left oropharyngeal mass and LAD above and below diaphragm  - BMBx (11/1) without evidence of disease  - s/p 4 cycles of HyperCVAD (10/28, 11/19, 12/11, 1/2/24),  - PET scan (1/15/24): [place] 2. Complete resolution of metabolic activity of previously seen Lt oropharyngeal mass, resolution of previously seen hypermetabolic Lt level 2 and 3 cervical lymphadenopathy, resolution of previously seen moderate-large size Lt pleural effusion and trace Rt pleural effusion and abdominopelvic ascites, resolution of previously seen hypermetabolic large mass within pancreatic tail/body and the previously seen hypermetabolic retroperitoneal and mesenteric lymphadenopathy.  - Cycle 5 initially delayed a little over a week due to recent hospitalization from 1/17-1/23/24 for visual changes, retinoschisis, retinal hemorrhages, and profound pancytopenia requiring left eye vitrectomy  - Now admitted for cycle 5 of HyperCVAD (plan to dose reduce cyclophosphamide to 80%)   - Plan pegfilgrastim outpatient     OPTHO:  -Recent hx/o bilateral macula-involving sub ILM hemorrhage with serous detachment OS>OD  -Multilayer retinal hemorrhage, left eye.   -S/p pars plana vitrectomy (PPV)/C3F8/intraretinal tPA OS  -S/P PPV/MP/EL/air O  -Last seen by Ophthalm in outpatient clinic on 2/8. Per recs, continue Prednisolone and artificial tears QID to the left eye, Start Pred and ketorolac QID, right eye. Okay  to proceed with chemotherapy.   - Prefer to keep Plts 50-70,000 and recommend avoiding PLt levels below 30K  - Pt reports baseline vision unchanged recently upon admit; right eye vision is blurry, left eye no vision  - May need repeat surgery in the Lt eye after next cycle of chemo if the vitreous hemorrhage persists or retina detaches. Recommends next Ophthalm appt in 1 week.     HEME:  # Anemia without active bleeding  - Continue home ferrous sulfate 325mg BID started by Dr. Klein  - Transfuse to keep Hgb>8.5, Plt>50-70,000 and avoid dropping below 30,000 per ophtho requirements with hx/o retinal hemorrhages     CARDIAC:  - ECHO (10/2023): EF 60-65%  - Plan repeat ECHO (2/10/24) LVEF 60-65%     ID:  Allergies: NKDA  PPX: Acyclovir 400mg BID, Fluconazole 400mg daily, Bactrim MWF  - Plan Levaquin for neutropenia (days 5 through day 15 per outpt notes)     FEN/GI:  - Admit weight: 72.2kg  - Monitor and replace electrolytes as needed  - Cont Vit B complex     ENDO:  -CTA neck (1/17) with 6 mm nodule in the left lobe of the thyroid gland   -TSH level (1/20): 1.35       DISPO:  - Full Code  - Access: PICC line (right)  - Primary Onc: Dr. Klein  - NOK: Ms Deidra Pickett [phone]  - plan 3x weekly count checks     Patient seen, examined, and discussed with Dr. Caitie Ortega PA-C

## 2024-02-10 NOTE — PROGRESS NOTES
Child Life Assessment:   Reason for Consult  Discipline: Child Life Specialist  Reason for Consult: Coping skill development/planning  Referral Source: Physician/Resident  Total Time Spent (min): 10 minutes    Patient Intervention(s)  Type of Intervention Performed: Healing environment interventions  Healing Environment Intervention(s): Assessment, Opportunity for choice and control    Support Provided to Family  Support Provided to Family: Family present for patient session  Family Present for Patient Session: Other(s)  Other's Name: Anna  Other(s) Relationship: Girlfriend    Evaluation  Evaluation/Plan of Care: Provide ongoing support    Session Details: Certified Child Life Specialist (CCLS) familiar with patient from previous interactions and offered support for coping with medical experience.  CCLS acknowledged the changes that have occurred since first meeting patient.  Patient unable to state what he does for coping at this time, but denied wanting to explore learning new ways.  Patient and girlfriend shared that he spends time during admissions listening to podcasts.  Patient open to child life continuing to provide supportive check ins should patient be open to processing his experience or learning additional ways of positive coping in the future.    JIHAN Treviño, CCLS  [employer] Secure Chat

## 2024-02-10 NOTE — H&P
History Of Present Illness  Shilo Thakkar is a 23 y.o. male with PMH of Burkitt Lymphoma (Dx 10/20/23; s/p 4 cycles of HyperCVAD), malignant pleural effusions s/p CT (10/29/23), ADHD, and recently hospitalized from 1/17-1/23/2024 with visual changes, retinoschisis, retinal hemorrhages, and profound pancytopenia requiring left eye vitrectomy now admitted on 2/09/24 to initiate cycle 5 of HyperCVAD.     Oncologic History  Burkitt lymphoma, high risk, MYC positive  - 10/19/23 presented to OSH ED with dysphagia and abdominal pain  - CT A/P with IV contrast (10/19) mass in the body of the pancreas measuring approximately 2.7 x 2.9 cm. The pancreatic duct is dilated. Multiple masses in the mesentery as well as omental caking consistent with carcinomatosis. Moderate amount of free fluid throughout the abdomen and pelvis.   - CT soft tissue neck with IV contrast (10/19): homogeneous soft tissue attenuation lesion in the L oropharynx which measures approximately 3.0 x 4.0 x 5.1 cm.   - US guided mesenteric LN biopsy (10/20/23): HIGH GRADE B-CELL LYMPHOMA MORPHOLOGICALLY CONSISTENT WITH BURKITT LYMPHOMA; flow cytometry: No B cell population identified  - Bone marrow aspiration biopsy November 1, 2023 no evidence of lymphoma involvement.  CSF by flow cytometry showed no lymphoma 11/2/2023.  - Biopsy L oropharyngeal mass and L cervical LN by ENT (10/23/23) high grade B-cell burkitt lymphoma, MYC positive, Bcl-2 negative.  - MR brain with and w/o IV contrast (10/27): No evidence of intracranial metastatic disease.     - Dexa 40 mg daily ended 11/1  - PET CT ordered 10/30 showed FDG avid left oropharyngeal mass and LAD above and below diaphragm  - Started HyperCVAD 10/29 cycle 1 day 1  - Part A:   D1: CTX + mesna, dexamethasone, D2: CTX + mesna, D3: CTX + mesna, D4: Doxo, vinca, D6: Ritux, GCSF, D9: IT,   D2:  intrathecal methotrexate November 2, 2023, 12 mg, CSF shows no B cells by flow cytometry.  D6: Intrathecal cytarabine 100  mg on November 6, 2023.  D11: vinca, dexamethasone, D13: Ritux 11/10  D13: Rituximab therapy  - Daily neupogen since 11/3/23  -Discharged home November 11, 2023  -Evaluation November 15, 2023: Recovered thrombocytopenia, stop levofloxacin, continue prophylactic acyclovir and 3 times weekly Bactrim and fluconazole.  Depressed affect noted.  -Cycle 2-day 1 part to be high-dose methotrexate plus high-dose cytarabine, November 20, 2023,  with 2 intrathecal chemotherapy administered  -Neulasta on 11/27/2023.  -Evaluation 12/6/2023 asymptomatic gaining weight doing well.  To schedule twice weekly CBC BMP Mondays and Thursdays after discharge, emphasized prophylactic levofloxacin between day 5 and day 15, acyclovir, fluconazole and 3 times a week Bactrim.  -CT scan of the chest abdomen or pelvis on December 6, 2023 shows complete resolution of intra-abdominal disease, with mildly worse splenomegaly most likely unrelated to his lymphoma.  -Cycle 3 hyper-CVAD regimen December 12, 2023, including Rituxan day 1 and day 11, intrathecal methotrexate 12/14/2023, intrathecal cytarabine December 18, 2023.  -December 27, 2023: Feeling well asymptomatic tolerated cycle 3 well, plan for cycle 4 January 2, 2024 consisting of high-dose methotrexate high-dose cytarabine, and includes 2 doses of rituximab day 1 day 8, and 2 doses of intrathecal chemotherapy, intrathecal cytarabine day 1 and intrathecal methotrexate day 8  -Hospitalized  1/17-1/23/2024 with visual changes, retinoschisis, profound pancytopenia s/p left eye vitrectomy, retinal hemorrgages. Delay cycle 5. Discuss with ophth regarding etiology and plans.  -Evaluation January 31, 2024: Vision appears to be improving.    -Cleared by ophthalmology for cycle 5 of hyper-CVAD 2/10/24 at modified dosing of 80% cyclophosphamide and cytarabine down to 1 g/m².  -Repeat echocardiogram, continue prophylactic antibiotics.  Will need 3 times weekly labs for close monitoring and goal to keep  platelets above 50,000 (definitely higher than 30,000), keep hemoglobin above 8.5 at esthela.  Will need closer supervision as he was noncompliant with twice weekly labs before.  Needs a chest x-ray before starting.    Past Medical History  He has a past medical history of Colles' fracture of right radius, initial encounter for closed fracture (04/23/2015), Personal history of other mental and behavioral disorders (01/28/2014), Personal history of other mental and behavioral disorders (05/10/2017), Personal history of other specified conditions (05/03/2016), and Unspecified fracture of navicular (scaphoid) bone of left wrist, initial encounter for closed fracture (12/17/2015).     Surgical History  He has a past surgical history that includes Other surgical history (10/03/2017); US guided soft tissue biopsy (10/20/2023); Foot fracture surgery (Left); and Chest Tube Insertion (10/29/2023); S/p pars plana vitrectomy (PPV)/C3F8/intraretinal tPA OS, S/P PPV/MP/EL/air OD (1/28/24)     Social History  He reports that he has never smoked. He has never been exposed to tobacco smoke. He has never used smokeless tobacco. He reports that he does not currently use alcohol. He reports that he does not use drugs.     Allergies  Patient has no known allergies.    Review of Systems   Eyes:  Positive for visual disturbance.        No vision out of his left eye, right eye vision is blurry; just seen by ophthalmology 2/8; no change in recent vision   All other systems reviewed and are negative.       Physical Exam  Vitals and nursing note reviewed.   Constitutional:       Appearance: Normal appearance.   HENT:      Head: Normocephalic and atraumatic.      Nose: Nose normal.      Mouth/Throat:      Mouth: Mucous membranes are moist.      Pharynx: Oropharynx is clear.   Eyes:      Extraocular Movements: Extraocular movements intact.      Conjunctiva/sclera:      Right eye: Hemorrhage present.      Left eye: No hemorrhage.     Pupils:  "Pupils are equal, round, and reactive to light.      Comments: Right eye has scleral hemorrhage evident; left eye sclera is very blood shot/pink appearing   Cardiovascular:      Rate and Rhythm: Normal rate and regular rhythm.      Pulses: Normal pulses.      Heart sounds: Normal heart sounds.   Pulmonary:      Effort: Pulmonary effort is normal.      Breath sounds: Normal breath sounds.   Abdominal:      General: Abdomen is flat. Bowel sounds are normal.      Palpations: Abdomen is soft.   Musculoskeletal:         General: Normal range of motion.      Cervical back: Normal range of motion and neck supple.   Skin:     General: Skin is warm and dry.      Capillary Refill: Capillary refill takes less than 2 seconds.   Neurological:      General: No focal deficit present.      Mental Status: He is alert and oriented to person, place, and time.   Psychiatric:         Mood and Affect: Mood normal.         Behavior: Behavior normal.          Last Recorded Vitals  Blood pressure 121/74, pulse 69, temperature 36.4 °C (97.5 °F), temperature source Temporal, resp. rate 18, height 1.82 m (5' 11.65\"), weight 72.2 kg (159 lb 2.8 oz), SpO2 99 %.    Relevant Results  Scheduled medications  acyclovir, 400 mg, oral, BID  [START ON 2/10/2024] B complex-vitamin C, 1 tablet, oral, Daily  [START ON 2/10/2024] famotidine, 40 mg, oral, Daily  ferrous sulfate (325 mg ferrous sulfate), 65 mg of iron, oral, BID  [START ON 2/10/2024] fluconazole, 400 mg, oral, Daily  ketorolac, 1 drop, Right Eye, 4x daily  [START ON 2/10/2024] potassium chloride CR, 20 mEq, oral, Daily  prednisoLONE acetate, 1 drop, Left Eye, TID  prednisoLONE acetate, 1 drop, Right Eye, 4x daily  sulfamethoxazole-trimethoprim, 1 tablet, oral, Every Mon/Wed/Fri      Continuous medications     PRN medications  PRN medications: alteplase, sennosides-docusate sodium    No results found for this or any previous visit (from the past 24 hour(s)).    Fundus Photos - OU - Both " Eyes    Result Date: 2/8/2024  Right Eye Progression has been stable. Left Eye Progression has been stable. Notes Optos 02/08/24   - Right eye (OD): SubLM subfoveal clotted heme now mostly resolved with scattered pre-retinal hemorrhage with in the posterior pole; stable  - OS:  Poor view 25% gas fill, retina is attached, residual preretinal blood clot with fibrin membranes in the temporal retina     OCT, Retina - OU - Both Eyes    Result Date: 2/8/2024  Retinal multimodal imaging including photography was completed, and the findings are described in the examination.      Assessment/Plan   Principal Problem:    Burkitt lymphoma (CMS/HCC)    Shilo Thakakr is a 23 y.o. male with PMH of Burkitt Lymphoma (Dx 10/20/23; s/p 4 cycles of HyperCVAD), malignant pleural effusions s/p CT (10/29/23), ADHD, and recently hospitalized from 1/17-1/23/2024 with visual changes, retinoschisis, retinal hemorrhages, and profound pancytopenia requiring left eye vitrectomy now admitted on 2/09/24 for cycle 5 of HyperCVAD.     ONC:  # Burkitt lymphoma, newly diagnosed (10/20/23)  - 10/19/23 presented to OSH ED with dysphagia and abdominal pain  - CT A/P with IV contrast (10/19) mass in the body of the pancreas measuring approximately 2.7 x 2.9 cm. Multiple masses in the mesentery as well as omental caking c/q carcinomatosis.   - CT soft tissue neck w/ IV contrast (10/19): homogeneous soft tissue attenuation lesion in the L oropharynx which measures approximately 3.0 x 4.0 x 5.1 cm.   - CT Abdomen w/o IV contrast (10/20): Mesenteric lymphadenopathy  - US guided mesenteric LN biopsy (10/20/23): HIGH GRADE B-CELL LYMPHOMA MORPHOLOGICALLY C/W BURKITT LYMPHOMA; flow cytometry: CD10+ kappa restricted B cells supportive of a B cell lymphoma with germinal center origin.   - Biopsy L oropharyngeal mass and L cervical LN by ENT (10/23/23) high grade B-cell burkitt lymphoma  - PET CT (10/30) showed FDG avid left oropharyngeal mass and LAD above and  below diaphragm  - BMBx (11/1) without evidence of disease  - s/p 4 cycles of HyperCVAD (10/28, 11/19, 12/11, 1/2/24),  - PET scan (1/15/24): Rudolph 2. Complete resolution of metabolic activity of previously seen Lt oropharyngeal mass, resolution of previously seen hypermetabolic Lt level 2 and 3 cervical lymphadenopathy, resolution of previously seen moderate-large size Lt pleural effusion and trace Rt pleural effusion and abdominopelvic ascites, resolution of previously seen hypermetabolic large mass within pancreatic tail/body and the previously seen hypermetabolic retroperitoneal and mesenteric lymphadenopathy.  - Cycle 5 initially delayed a little over a week due to recent hospitalization from 1/17-1/23/24 for visual changes, retinoschisis, retinal hemorrhages, and profound pancytopenia requiring left eye vitrectomy  - Now admitted for cycle 5 of HyperCVAD (plan to dose reduce cyclophosphamide to 80%)   - Needs CXR and ECHO prior to initiation of chemo   - Plan pegfilgrastim outpatient    OPTHO:  -Recent hx/o bilateral macula-involving sub ILM hemorrhage with serous detachment OS>OD  -Multilayer retinal hemorrhage, left eye.   -S/p pars plana vitrectomy (PPV)/C3F8/intraretinal tPA OS  -S/P PPV/MP/EL/air O  -Last seen by Ophthalm in outpatient clinic on 2/8. Per recs, continue Prednisolone and artificial tears QID to the left eye, Start Pred and ketorolac QID, right eye. Okay to proceed with chemotherapy.   - Prefer to keep Plts 50-70,000 and recommend avoiding PLt levels below 30K  - Pt reports baseline vision unchanged recently upon admit; right eye vision is blurry, left eye no vision  - May need repeat surgery in the Lt eye after next cycle of chemo if the vitreous hemorrhage persists or retina detaches. Recommends next Ophthalm appt in 1 week.    HEME:  # Anemia without active bleeding  - Continue home ferrous sulfate 325mg BID started by Dr. Klein  - Transfuse to keep Hgb>8.5, Plt>50-70,000 and avoid  dropping below 30,000 per ophtho requirements with hx/o retinal hemorrhages     CARDIAC:  - ECHO (10/2023): EF 60-65%  - Plan repeat ECHO on admit- pending     PULM  -CXR pending 2/9    ID:  Allergies: NKDA  PPX: Acyclovir 400mg BID, Fluconazole 400mg daily, Bactrim MWF  - Plan Levaquin for neutropenia (days 5 through day 15 per outpt notes)     FEN/GI:  - Admit weight: 72.2kg  - Monitor and replace electrolytes as needed  - Cont Vit B complex     ENDO:  -CTA neck (1/17) with 6 mm nodule in the left lobe of the thyroid gland   -TSH level (1/20): 1.35       DISPO:  - Full Code  - Access: PICC line (right)  - Primary Onc: Dr. Klein  - NOK: Ms Deidra Pickett [phone]  - Should have tx appointments every M, W, Fri      Marquita Shah, APRN-CNP, APRN-CNS

## 2024-02-11 LAB
ALBUMIN SERPL BCP-MCNC: 4.3 G/DL (ref 3.4–5)
ALP SERPL-CCNC: 58 U/L (ref 33–120)
ALT SERPL W P-5'-P-CCNC: 32 U/L (ref 10–52)
ANION GAP SERPL CALC-SCNC: 16 MMOL/L (ref 10–20)
AST SERPL W P-5'-P-CCNC: 20 U/L (ref 9–39)
BASOPHILS # BLD AUTO: 0.01 X10*3/UL (ref 0–0.1)
BASOPHILS # BLD AUTO: 0.01 X10*3/UL (ref 0–0.1)
BASOPHILS NFR BLD AUTO: 0.1 %
BASOPHILS NFR BLD AUTO: 0.1 %
BILIRUB SERPL-MCNC: 0.5 MG/DL (ref 0–1.2)
BUN SERPL-MCNC: 15 MG/DL (ref 6–23)
CALCIUM SERPL-MCNC: 9.6 MG/DL (ref 8.6–10.6)
CHLORIDE SERPL-SCNC: 103 MMOL/L (ref 98–107)
CO2 SERPL-SCNC: 25 MMOL/L (ref 21–32)
CREAT SERPL-MCNC: 1.07 MG/DL (ref 0.5–1.3)
EGFRCR SERPLBLD CKD-EPI 2021: >90 ML/MIN/1.73M*2
EOSINOPHIL # BLD AUTO: 0 X10*3/UL (ref 0–0.7)
EOSINOPHIL # BLD AUTO: 0 X10*3/UL (ref 0–0.7)
EOSINOPHIL NFR BLD AUTO: 0 %
EOSINOPHIL NFR BLD AUTO: 0 %
ERYTHROCYTE [DISTWIDTH] IN BLOOD BY AUTOMATED COUNT: 23.7 % (ref 11.5–14.5)
ERYTHROCYTE [DISTWIDTH] IN BLOOD BY AUTOMATED COUNT: 24.1 % (ref 11.5–14.5)
GLUCOSE SERPL-MCNC: 148 MG/DL (ref 74–99)
HCT VFR BLD AUTO: 28 % (ref 41–52)
HCT VFR BLD AUTO: 28.9 % (ref 41–52)
HGB BLD-MCNC: 9.4 G/DL (ref 13.5–17.5)
HGB BLD-MCNC: 9.8 G/DL (ref 13.5–17.5)
IMM GRANULOCYTES # BLD AUTO: 0.06 X10*3/UL (ref 0–0.7)
IMM GRANULOCYTES # BLD AUTO: 0.1 X10*3/UL (ref 0–0.7)
IMM GRANULOCYTES NFR BLD AUTO: 0.7 % (ref 0–0.9)
IMM GRANULOCYTES NFR BLD AUTO: 0.9 % (ref 0–0.9)
LYMPHOCYTES # BLD AUTO: 0.26 X10*3/UL (ref 1.2–4.8)
LYMPHOCYTES # BLD AUTO: 0.33 X10*3/UL (ref 1.2–4.8)
LYMPHOCYTES NFR BLD AUTO: 1.8 %
LYMPHOCYTES NFR BLD AUTO: 4.7 %
MAGNESIUM SERPL-MCNC: 1.82 MG/DL (ref 1.6–2.4)
MCH RBC QN AUTO: 31 PG (ref 26–34)
MCH RBC QN AUTO: 31.3 PG (ref 26–34)
MCHC RBC AUTO-ENTMCNC: 33.6 G/DL (ref 32–36)
MCHC RBC AUTO-ENTMCNC: 33.9 G/DL (ref 32–36)
MCV RBC AUTO: 92 FL (ref 80–100)
MCV RBC AUTO: 92 FL (ref 80–100)
MONOCYTES # BLD AUTO: 0.05 X10*3/UL (ref 0.1–1)
MONOCYTES # BLD AUTO: 0.12 X10*3/UL (ref 0.1–1)
MONOCYTES NFR BLD AUTO: 0.7 %
MONOCYTES NFR BLD AUTO: 0.8 %
NEUTROPHILS # BLD AUTO: 13.93 X10*3/UL (ref 1.2–7.7)
NEUTROPHILS # BLD AUTO: 6.55 X10*3/UL (ref 1.2–7.7)
NEUTROPHILS NFR BLD AUTO: 93.6 %
NEUTROPHILS NFR BLD AUTO: 96.6 %
NRBC BLD-RTO: 0 /100 WBCS (ref 0–0)
NRBC BLD-RTO: 0 /100 WBCS (ref 0–0)
OVALOCYTES BLD QL SMEAR: NORMAL
PLATELET # BLD AUTO: 156 X10*3/UL (ref 150–450)
PLATELET # BLD AUTO: 161 X10*3/UL (ref 150–450)
POLYCHROMASIA BLD QL SMEAR: NORMAL
POTASSIUM SERPL-SCNC: 4.4 MMOL/L (ref 3.5–5.3)
PROT SERPL-MCNC: 6.1 G/DL (ref 6.4–8.2)
RBC # BLD AUTO: 3.03 X10*6/UL (ref 4.5–5.9)
RBC # BLD AUTO: 3.13 X10*6/UL (ref 4.5–5.9)
RBC MORPH BLD: NORMAL
RBC MORPH BLD: NORMAL
SODIUM SERPL-SCNC: 140 MMOL/L (ref 136–145)
WBC # BLD AUTO: 14.4 X10*3/UL (ref 4.4–11.3)
WBC # BLD AUTO: 7 X10*3/UL (ref 4.4–11.3)

## 2024-02-11 PROCEDURE — 85025 COMPLETE CBC W/AUTO DIFF WBC: CPT

## 2024-02-11 PROCEDURE — 2500000002 HC RX 250 W HCPCS SELF ADMINISTERED DRUGS (ALT 637 FOR MEDICARE OP, ALT 636 FOR OP/ED): Performed by: NURSE PRACTITIONER

## 2024-02-11 PROCEDURE — 99233 SBSQ HOSP IP/OBS HIGH 50: CPT | Performed by: INTERNAL MEDICINE

## 2024-02-11 PROCEDURE — 2500000004 HC RX 250 GENERAL PHARMACY W/ HCPCS (ALT 636 FOR OP/ED)

## 2024-02-11 PROCEDURE — 83735 ASSAY OF MAGNESIUM: CPT

## 2024-02-11 PROCEDURE — 2500000001 HC RX 250 WO HCPCS SELF ADMINISTERED DRUGS (ALT 637 FOR MEDICARE OP): Performed by: NURSE PRACTITIONER

## 2024-02-11 PROCEDURE — 2500000004 HC RX 250 GENERAL PHARMACY W/ HCPCS (ALT 636 FOR OP/ED): Performed by: INTERNAL MEDICINE

## 2024-02-11 PROCEDURE — 84075 ASSAY ALKALINE PHOSPHATASE: CPT

## 2024-02-11 PROCEDURE — 2500000001 HC RX 250 WO HCPCS SELF ADMINISTERED DRUGS (ALT 637 FOR MEDICARE OP): Performed by: HOME HEALTH AIDE

## 2024-02-11 PROCEDURE — 1170000001 HC PRIVATE ONCOLOGY ROOM DAILY

## 2024-02-11 RX ORDER — EPINEPHRINE 0.3 MG/.3ML
0.3 INJECTION SUBCUTANEOUS EVERY 5 MIN PRN
Status: CANCELLED | OUTPATIENT
Start: 2024-02-16

## 2024-02-11 RX ORDER — MAGNESIUM SULFATE HEPTAHYDRATE 40 MG/ML
2 INJECTION, SOLUTION INTRAVENOUS ONCE
Status: COMPLETED | OUTPATIENT
Start: 2024-02-11 | End: 2024-02-11

## 2024-02-11 RX ORDER — EPINEPHRINE 0.3 MG/.3ML
0.3 INJECTION SUBCUTANEOUS EVERY 5 MIN PRN
Status: CANCELLED | OUTPATIENT
Start: 2024-02-19

## 2024-02-11 RX ORDER — DIPHENHYDRAMINE HYDROCHLORIDE 50 MG/ML
50 INJECTION INTRAMUSCULAR; INTRAVENOUS AS NEEDED
Status: CANCELLED | OUTPATIENT
Start: 2024-02-19

## 2024-02-11 RX ORDER — ALBUTEROL SULFATE 0.83 MG/ML
3 SOLUTION RESPIRATORY (INHALATION) AS NEEDED
Status: CANCELLED | OUTPATIENT
Start: 2024-02-19

## 2024-02-11 RX ORDER — FAMOTIDINE 10 MG/ML
20 INJECTION INTRAVENOUS ONCE AS NEEDED
Status: CANCELLED | OUTPATIENT
Start: 2024-02-16

## 2024-02-11 RX ORDER — DIPHENHYDRAMINE HYDROCHLORIDE 50 MG/ML
50 INJECTION INTRAMUSCULAR; INTRAVENOUS AS NEEDED
Status: CANCELLED | OUTPATIENT
Start: 2024-02-16

## 2024-02-11 RX ORDER — ALBUTEROL SULFATE 0.83 MG/ML
3 SOLUTION RESPIRATORY (INHALATION) AS NEEDED
Status: CANCELLED | OUTPATIENT
Start: 2024-02-16

## 2024-02-11 RX ORDER — FAMOTIDINE 10 MG/ML
20 INJECTION INTRAVENOUS ONCE AS NEEDED
Status: CANCELLED | OUTPATIENT
Start: 2024-02-19

## 2024-02-11 RX ADMIN — PREDNISOLONE ACETATE 1 DROP: 10 SUSPENSION/ DROPS OPHTHALMIC at 20:23

## 2024-02-11 RX ADMIN — ACYCLOVIR 400 MG: 400 TABLET ORAL at 09:42

## 2024-02-11 RX ADMIN — FERROUS SULFATE TAB 325 MG (65 MG ELEMENTAL FE) 1 TABLET: 325 (65 FE) TAB at 09:43

## 2024-02-11 RX ADMIN — FAMOTIDINE 40 MG: 40 TABLET ORAL at 09:42

## 2024-02-11 RX ADMIN — BACITRACIN 1 APPLICATION: 500 OINTMENT TOPICAL at 20:22

## 2024-02-11 RX ADMIN — ACYCLOVIR 400 MG: 400 TABLET ORAL at 20:22

## 2024-02-11 RX ADMIN — PREDNISOLONE ACETATE 1 DROP: 10 SUSPENSION/ DROPS OPHTHALMIC at 14:48

## 2024-02-11 RX ADMIN — BACITRACIN 1 APPLICATION: 500 OINTMENT TOPICAL at 09:42

## 2024-02-11 RX ADMIN — CYCLOPHOSPHAMIDE 500 MG: 1 INJECTION, POWDER, FOR SOLUTION INTRAVENOUS; ORAL at 16:50

## 2024-02-11 RX ADMIN — KETOROLAC TROMETHAMINE 1 DROP: 5 SOLUTION OPHTHALMIC at 16:25

## 2024-02-11 RX ADMIN — PREDNISOLONE ACETATE 1 DROP: 10 SUSPENSION/ DROPS OPHTHALMIC at 16:25

## 2024-02-11 RX ADMIN — KETOROLAC TROMETHAMINE 1 DROP: 5 SOLUTION OPHTHALMIC at 13:11

## 2024-02-11 RX ADMIN — KETOROLAC TROMETHAMINE 1 DROP: 5 SOLUTION OPHTHALMIC at 09:43

## 2024-02-11 RX ADMIN — PREDNISOLONE ACETATE 1 DROP: 10 SUSPENSION/ DROPS OPHTHALMIC at 09:43

## 2024-02-11 RX ADMIN — DEXAMETHASONE 40 MG: 4 TABLET ORAL at 09:42

## 2024-02-11 RX ADMIN — SODIUM CHLORIDE 100 ML/HR: 9 INJECTION, SOLUTION INTRAVENOUS at 20:50

## 2024-02-11 RX ADMIN — FLUCONAZOLE 400 MG: 200 TABLET ORAL at 09:42

## 2024-02-11 RX ADMIN — MESNA 1150 MG: 100 INJECTION, SOLUTION INTRAVENOUS at 16:24

## 2024-02-11 RX ADMIN — POTASSIUM CHLORIDE 20 MEQ: 1500 TABLET, EXTENDED RELEASE ORAL at 09:42

## 2024-02-11 RX ADMIN — BACITRACIN 1 APPLICATION: 500 OINTMENT TOPICAL at 14:47

## 2024-02-11 RX ADMIN — FERROUS SULFATE TAB 325 MG (65 MG ELEMENTAL FE) 1 TABLET: 325 (65 FE) TAB at 20:22

## 2024-02-11 RX ADMIN — PREDNISOLONE ACETATE 1 DROP: 10 SUSPENSION/ DROPS OPHTHALMIC at 20:22

## 2024-02-11 RX ADMIN — Medication 1 TABLET: at 09:42

## 2024-02-11 RX ADMIN — KETOROLAC TROMETHAMINE 1 DROP: 5 SOLUTION OPHTHALMIC at 20:23

## 2024-02-11 RX ADMIN — SODIUM CHLORIDE 100 ML/HR: 9 INJECTION, SOLUTION INTRAVENOUS at 02:04

## 2024-02-11 RX ADMIN — CYCLOPHOSPHAMIDE 500 MG: 1 INJECTION, POWDER, FOR SOLUTION INTRAVENOUS; ORAL at 05:05

## 2024-02-11 RX ADMIN — ONDANSETRON 16 MG: 2 INJECTION INTRAMUSCULAR; INTRAVENOUS at 16:24

## 2024-02-11 RX ADMIN — PREDNISOLONE ACETATE 1 DROP: 10 SUSPENSION/ DROPS OPHTHALMIC at 13:11

## 2024-02-11 RX ADMIN — MAGNESIUM SULFATE 2 G: 2 INJECTION INTRAVENOUS at 09:42

## 2024-02-11 ASSESSMENT — PAIN - FUNCTIONAL ASSESSMENT: PAIN_FUNCTIONAL_ASSESSMENT: 0-10

## 2024-02-11 ASSESSMENT — PAIN SCALES - GENERAL
PAINLEVEL_OUTOF10: 0 - NO PAIN
PAINLEVEL_OUTOF10: 0 - NO PAIN

## 2024-02-11 NOTE — NURSING NOTE
Dose #3 of cyclophosphamide 500 mg in 125 mL NS given over 3 hours via PICC line.  Dose up at 1650 and scheduled to be down at 1950.  Pre-medicated with ondansetron and mesna per orders.  Patient, dose and rate verified at bedside with second RN, Kristal Michaels. BBR obtained via syringe aspiration before administration.  Patient with no known side effects at time of administration.

## 2024-02-11 NOTE — CARE PLAN
Patient VSS with no complaints of pain overnight.  Patient tolerated chemotherapy well throughout the morning.  Right PICC remains patent.     The clinical goals for the shift include patient will remain free from falls throughout the shift

## 2024-02-11 NOTE — PROGRESS NOTES
Shilo Thakkar is a 23 y.o. male on day 2 of admission presenting with Burkitt lymphoma (CMS/HCC).    Subjective   Patient feels well today, no symptoms. Denies HA, dizziness, CP, SOB, N/V/D/C. All other ROS otherwise negative.        Objective     Physical Exam  Constitutional:       General: He is not in acute distress.     Appearance: Normal appearance. He is not ill-appearing or toxic-appearing.   HENT:      Head: Normocephalic and atraumatic.      Mouth/Throat:      Mouth: Mucous membranes are moist.   Eyes:      General: No scleral icterus.     Extraocular Movements: Extraocular movements intact.      Pupils: Pupils are equal, round, and reactive to light.      Comments: Right eye has scleral hemorrhage evident; left eye sclera is very blood shot/pink appearing    Cardiovascular:      Rate and Rhythm: Normal rate and regular rhythm.      Pulses: Normal pulses.      Heart sounds: Normal heart sounds. No murmur heard.     No friction rub. No gallop.   Pulmonary:      Effort: Pulmonary effort is normal. No respiratory distress.      Breath sounds: Normal breath sounds. No wheezing, rhonchi or rales.   Abdominal:      General: Abdomen is flat. Bowel sounds are normal. There is no distension.      Palpations: Abdomen is soft.      Tenderness: There is no abdominal tenderness.   Musculoskeletal:         General: No swelling, deformity or signs of injury. Normal range of motion.      Cervical back: Normal range of motion and neck supple.      Right lower leg: No edema.      Left lower leg: No edema.   Skin:     General: Skin is warm and dry.      Capillary Refill: Capillary refill takes less than 2 seconds.      Coloration: Skin is not jaundiced.      Findings: Lesion (left upper arm, C/D/I (See picture)) present. No bruising, erythema or rash.   Neurological:      General: No focal deficit present.      Mental Status: He is alert and oriented to person, place, and time.      Cranial Nerves: No cranial nerve deficit.  "     Sensory: No sensory deficit.      Motor: No weakness.      Coordination: Coordination normal.   Psychiatric:         Mood and Affect: Mood normal.         Behavior: Behavior normal.      Comments: Fluent speech, cooperative         Last Recorded Vitals  Blood pressure 136/74, pulse 69, temperature 36.1 °C (97 °F), temperature source Temporal, resp. rate 18, height 1.82 m (5' 11.65\"), weight 72 kg (158 lb 11.7 oz), SpO2 100 %.  Intake/Output last 3 Shifts:  I/O last 3 completed shifts:  In: 1465 (20.6 mL/kg) [I.V.:595 (8.4 mL/kg); IV Piggyback:870]  Out: - (0 mL/kg)   Weight: 71.1 kg     Relevant Results  Results for orders placed or performed during the hospital encounter of 02/09/24 (from the past 24 hour(s))   CBC and Auto Differential   Result Value Ref Range    WBC 7.0 4.4 - 11.3 x10*3/uL    nRBC 0.0 0.0 - 0.0 /100 WBCs    RBC 3.13 (L) 4.50 - 5.90 x10*6/uL    Hemoglobin 9.8 (L) 13.5 - 17.5 g/dL    Hematocrit 28.9 (L) 41.0 - 52.0 %    MCV 92 80 - 100 fL    MCH 31.3 26.0 - 34.0 pg    MCHC 33.9 32.0 - 36.0 g/dL    RDW 23.7 (H) 11.5 - 14.5 %    Platelets 161 150 - 450 x10*3/uL    Neutrophils % 93.6 40.0 - 80.0 %    Immature Granulocytes %, Automated 0.9 0.0 - 0.9 %    Lymphocytes % 4.7 13.0 - 44.0 %    Monocytes % 0.7 2.0 - 10.0 %    Eosinophils % 0.0 0.0 - 6.0 %    Basophils % 0.1 0.0 - 2.0 %    Neutrophils Absolute 6.55 1.20 - 7.70 x10*3/uL    Immature Granulocytes Absolute, Automated 0.06 0.00 - 0.70 x10*3/uL    Lymphocytes Absolute 0.33 (L) 1.20 - 4.80 x10*3/uL    Monocytes Absolute 0.05 (L) 0.10 - 1.00 x10*3/uL    Eosinophils Absolute 0.00 0.00 - 0.70 x10*3/uL    Basophils Absolute 0.01 0.00 - 0.10 x10*3/uL   Comprehensive metabolic panel   Result Value Ref Range    Glucose 148 (H) 74 - 99 mg/dL    Sodium 140 136 - 145 mmol/L    Potassium 4.4 3.5 - 5.3 mmol/L    Chloride 103 98 - 107 mmol/L    Bicarbonate 25 21 - 32 mmol/L    Anion Gap 16 10 - 20 mmol/L    Urea Nitrogen 15 6 - 23 mg/dL    Creatinine 1.07 " 0.50 - 1.30 mg/dL    eGFR >90 >60 mL/min/1.73m*2    Calcium 9.6 8.6 - 10.6 mg/dL    Albumin 4.3 3.4 - 5.0 g/dL    Alkaline Phosphatase 58 33 - 120 U/L    Total Protein 6.1 (L) 6.4 - 8.2 g/dL    AST 20 9 - 39 U/L    Bilirubin, Total 0.5 0.0 - 1.2 mg/dL    ALT 32 10 - 52 U/L   Magnesium   Result Value Ref Range    Magnesium 1.82 1.60 - 2.40 mg/dL   Morphology   Result Value Ref Range    RBC Morphology See Below     Polychromasia Mild     Ovalocytes Few        Assessment/Plan   Principal Problem:    Burkitt lymphoma (CMS/HCC)    Shilo Thakkar is a 23 y.o. male with PMH of Burkitt Lymphoma (Dx 10/20/23; s/p 4 cycles of HyperCVAD), malignant pleural effusions s/p CT (10/29/23), ADHD, and recently hospitalized from 1/17-1/23/2024 with visual changes, retinoschisis, retinal hemorrhages, and profound pancytopenia requiring left eye vitrectomy now admitted on 2/09/24 for cycle 5 of HyperCVAD.     D2 HyperCVAD     ONC:  # Burkitt lymphoma, newly diagnosed (10/20/23)  - 10/19/23 presented to OSH ED with dysphagia and abdominal pain  - CT A/P with IV contrast (10/19) mass in the body of the pancreas measuring approximately 2.7 x 2.9 cm. Multiple masses in the mesentery as well as omental caking c/q carcinomatosis.   - CT soft tissue neck w/ IV contrast (10/19): homogeneous soft tissue attenuation lesion in the L oropharynx which measures approximately 3.0 x 4.0 x 5.1 cm.   - CT Abdomen w/o IV contrast (10/20): Mesenteric lymphadenopathy  - US guided mesenteric LN biopsy (10/20/23): HIGH GRADE B-CELL LYMPHOMA MORPHOLOGICALLY C/W BURKITT LYMPHOMA; flow cytometry: CD10+ kappa restricted B cells supportive of a B cell lymphoma with germinal center origin.   - Biopsy L oropharyngeal mass and L cervical LN by ENT (10/23/23) high grade B-cell burkitt lymphoma  - PET CT (10/30) showed FDG avid left oropharyngeal mass and LAD above and below diaphragm  - BMBx (11/1) without evidence of disease  - s/p 4 cycles of HyperCVAD (10/28, 11/19,  12/11, 1/2/24),  - PET scan (1/15/24): Rudolph 2. Complete resolution of metabolic activity of previously seen Lt oropharyngeal mass, resolution of previously seen hypermetabolic Lt level 2 and 3 cervical lymphadenopathy, resolution of previously seen moderate-large size Lt pleural effusion and trace Rt pleural effusion and abdominopelvic ascites, resolution of previously seen hypermetabolic large mass within pancreatic tail/body and the previously seen hypermetabolic retroperitoneal and mesenteric lymphadenopathy.  - Cycle 5 initially delayed a little over a week due to recent hospitalization from 1/17-1/23/24 for visual changes, retinoschisis, retinal hemorrhages, and profound pancytopenia requiring left eye vitrectomy  - Now admitted for cycle 5 of HyperCVAD (plan to dose reduce cyclophosphamide to 80%)   - Plan pegfilgrastim outpatient  -Per patient, not supposed to receive any additional IT chemotherapy. Please confirm with Dr. Klein on 2/12/24     CHEMO  -Cyclophosphamide 300 mg/m2 IV over 3 hours every 12 hours for 6 doses on Days 1-3   -Mesna 600 mg/m2 IV continuous infusion over 24 hours daily on Days 1-3   -DOXOrubicin 50 mg/m2 IV push or IV continuous infusion over 24 hours on Day 4   -VinCRIStine 2 mg IV over 5-10 minutes on Days 4 and 11   -Dexamethasone 40 mg IV or PO daily on Days 1-4 and 11-14   -Methotrexate 12 mg intrathecal on Day 2 +/- 2 days (? Confirm with Dr. Klein on 2/12)   -RiTUXimab 375 mg/m2 IV (Cycle 1) on Day 6 +/- 2 days   -RiTUXimab 375 mg/m2 IV or 1,400 mg SUBQ on Day 6 (Cycles 3, 5, or 7) +/- 2 days   -Cytarabine 100 mg intrathecal on Day 7 +/- 2 days (? Confirm with Dr. Klein on 2/12)     OPTHO:  -Recent hx/o bilateral macula-involving sub ILM hemorrhage with serous detachment OS>OD  -Multilayer retinal hemorrhage, left eye.   -S/p pars plana vitrectomy (PPV)/C3F8/intraretinal tPA OS  -S/P PPV/MP/EL/air O  -Last seen by Ophthalm in outpatient clinic on 2/8. Per recs, continue  Prednisolone and artificial tears QID to the left eye, Start Pred and ketorolac QID, right eye. Okay to proceed with chemotherapy.   - Prefer to keep Plts 50-70,000 and recommend avoiding PLt levels below 30K  - Pt reports baseline vision unchanged recently upon admit; right eye vision is blurry, left eye no vision  - May need repeat surgery in the Lt eye after next cycle of chemo if the vitreous hemorrhage persists or retina detaches. Recommends next Ophthalm appt in 1 week.     HEME:  # Anemia without active bleeding  - Continue home ferrous sulfate 325mg BID started by Dr. Klein  - Transfuse to keep Hgb>8.5, Plt>50-70,000 and avoid dropping below 30,000 per ophtho requirements with hx/o retinal hemorrhages     CARDIAC:  - ECHO (10/2023): EF 60-65%  - Plan repeat ECHO (2/10/24) LVEF 60-65%     ID:  Allergies: NKDA  PPX: Acyclovir 400mg BID, Fluconazole 400mg daily, Bactrim MWF  - Plan Levaquin for neutropenia (days 5 through day 15 per outpt notes)     FEN/GI:  - Admit weight: 72.2kg, current wt 72kg (2/11)   - Monitor and replace electrolytes as needed  - Cont Vit B complex     ENDO:  -CTA neck (1/17) with 6 mm nodule in the left lobe of the thyroid gland   -TSH level (1/20): 1.35      DERM  #Dog scratch, LUE   -Cover with bacitracin, replace bandaid daily   -Currently C/D/I; assess daily      DISPO:  - Full Code  - Access: PICC line (right)  - Primary Onc: Dr. Klein  - NOK: Ms Deidra Pickett [phone]  - plan 3x weekly count checks     Patient seen, examined, and discussed with Dr. Caitie Ortega PA-C

## 2024-02-12 LAB
ALBUMIN SERPL BCP-MCNC: 3.9 G/DL (ref 3.4–5)
ALP SERPL-CCNC: 54 U/L (ref 33–120)
ALT SERPL W P-5'-P-CCNC: 23 U/L (ref 10–52)
ANION GAP SERPL CALC-SCNC: 13 MMOL/L (ref 10–20)
APTT PPP: 27 SECONDS (ref 27–38)
AST SERPL W P-5'-P-CCNC: 13 U/L (ref 9–39)
BASOPHILS # BLD MANUAL: 0 X10*3/UL (ref 0–0.1)
BASOPHILS NFR BLD MANUAL: 0 %
BILIRUB DIRECT SERPL-MCNC: 0.1 MG/DL (ref 0–0.3)
BILIRUB SERPL-MCNC: 0.3 MG/DL (ref 0–1.2)
BUN SERPL-MCNC: 15 MG/DL (ref 6–23)
BURR CELLS BLD QL SMEAR: ABNORMAL
CALCIUM SERPL-MCNC: 9.6 MG/DL (ref 8.6–10.6)
CHLORIDE SERPL-SCNC: 108 MMOL/L (ref 98–107)
CO2 SERPL-SCNC: 26 MMOL/L (ref 21–32)
CREAT SERPL-MCNC: 0.82 MG/DL (ref 0.5–1.3)
DACRYOCYTES BLD QL SMEAR: ABNORMAL
EGFRCR SERPLBLD CKD-EPI 2021: >90 ML/MIN/1.73M*2
EOSINOPHIL # BLD MANUAL: 0 X10*3/UL (ref 0–0.7)
EOSINOPHIL NFR BLD MANUAL: 0 %
ERYTHROCYTE [DISTWIDTH] IN BLOOD BY AUTOMATED COUNT: 24.5 % (ref 11.5–14.5)
GLUCOSE SERPL-MCNC: 124 MG/DL (ref 74–99)
HCT VFR BLD AUTO: 28.1 % (ref 41–52)
HGB BLD-MCNC: 9.4 G/DL (ref 13.5–17.5)
IMM GRANULOCYTES # BLD AUTO: 0.12 X10*3/UL (ref 0–0.7)
IMM GRANULOCYTES NFR BLD AUTO: 0.9 % (ref 0–0.9)
INR PPP: 1.1 (ref 0.9–1.1)
LYMPHOCYTES # BLD MANUAL: 0.36 X10*3/UL (ref 1.2–4.8)
LYMPHOCYTES NFR BLD MANUAL: 2.6 %
MAGNESIUM SERPL-MCNC: 1.92 MG/DL (ref 1.6–2.4)
MCH RBC QN AUTO: 31.3 PG (ref 26–34)
MCHC RBC AUTO-ENTMCNC: 33.5 G/DL (ref 32–36)
MCV RBC AUTO: 94 FL (ref 80–100)
MONOCYTES # BLD MANUAL: 0.13 X10*3/UL (ref 0.1–1)
MONOCYTES NFR BLD MANUAL: 0.9 %
NEUTS SEG # BLD MANUAL: 13.41 X10*3/UL (ref 1.2–7)
NEUTS SEG NFR BLD MANUAL: 96.5 %
NRBC BLD-RTO: 0 /100 WBCS (ref 0–0)
PHOSPHATE SERPL-MCNC: 3.5 MG/DL (ref 2.5–4.9)
PLATELET # BLD AUTO: 146 X10*3/UL (ref 150–450)
POLYCHROMASIA BLD QL SMEAR: ABNORMAL
POTASSIUM SERPL-SCNC: 3.8 MMOL/L (ref 3.5–5.3)
PROT SERPL-MCNC: 5.6 G/DL (ref 6.4–8.2)
PROTHROMBIN TIME: 12.8 SECONDS (ref 9.8–12.8)
RBC # BLD AUTO: 3 X10*6/UL (ref 4.5–5.9)
RBC MORPH BLD: ABNORMAL
SCHISTOCYTES BLD QL SMEAR: ABNORMAL
SODIUM SERPL-SCNC: 143 MMOL/L (ref 136–145)
TOTAL CELLS COUNTED BLD: 115
WBC # BLD AUTO: 13.9 X10*3/UL (ref 4.4–11.3)

## 2024-02-12 PROCEDURE — 2500000001 HC RX 250 WO HCPCS SELF ADMINISTERED DRUGS (ALT 637 FOR MEDICARE OP)

## 2024-02-12 PROCEDURE — 2500000001 HC RX 250 WO HCPCS SELF ADMINISTERED DRUGS (ALT 637 FOR MEDICARE OP): Performed by: HOME HEALTH AIDE

## 2024-02-12 PROCEDURE — 99233 SBSQ HOSP IP/OBS HIGH 50: CPT | Performed by: INTERNAL MEDICINE

## 2024-02-12 PROCEDURE — 84100 ASSAY OF PHOSPHORUS: CPT | Performed by: PHYSICIAN ASSISTANT

## 2024-02-12 PROCEDURE — 2500000001 HC RX 250 WO HCPCS SELF ADMINISTERED DRUGS (ALT 637 FOR MEDICARE OP): Performed by: NURSE PRACTITIONER

## 2024-02-12 PROCEDURE — 80053 COMPREHEN METABOLIC PANEL: CPT | Performed by: PHYSICIAN ASSISTANT

## 2024-02-12 PROCEDURE — 2500000004 HC RX 250 GENERAL PHARMACY W/ HCPCS (ALT 636 FOR OP/ED): Performed by: INTERNAL MEDICINE

## 2024-02-12 PROCEDURE — 82248 BILIRUBIN DIRECT: CPT | Performed by: PHYSICIAN ASSISTANT

## 2024-02-12 PROCEDURE — 85027 COMPLETE CBC AUTOMATED: CPT | Performed by: PHYSICIAN ASSISTANT

## 2024-02-12 PROCEDURE — 85007 BL SMEAR W/DIFF WBC COUNT: CPT | Performed by: PHYSICIAN ASSISTANT

## 2024-02-12 PROCEDURE — 2500000002 HC RX 250 W HCPCS SELF ADMINISTERED DRUGS (ALT 637 FOR MEDICARE OP, ALT 636 FOR OP/ED): Performed by: NURSE PRACTITIONER

## 2024-02-12 PROCEDURE — 85610 PROTHROMBIN TIME: CPT | Performed by: PHYSICIAN ASSISTANT

## 2024-02-12 PROCEDURE — 83735 ASSAY OF MAGNESIUM: CPT | Performed by: PHYSICIAN ASSISTANT

## 2024-02-12 PROCEDURE — 1170000001 HC PRIVATE ONCOLOGY ROOM DAILY

## 2024-02-12 RX ORDER — DORZOLAMIDE HYDROCHLORIDE AND TIMOLOL MALEATE 20; 5 MG/ML; MG/ML
1 SOLUTION/ DROPS OPHTHALMIC 2 TIMES DAILY
Status: DISCONTINUED | OUTPATIENT
Start: 2024-02-12 | End: 2024-02-14 | Stop reason: HOSPADM

## 2024-02-12 RX ADMIN — DEXAMETHASONE 40 MG: 4 TABLET ORAL at 09:27

## 2024-02-12 RX ADMIN — FLUCONAZOLE 400 MG: 200 TABLET ORAL at 09:27

## 2024-02-12 RX ADMIN — PREDNISOLONE ACETATE 1 DROP: 10 SUSPENSION/ DROPS OPHTHALMIC at 14:26

## 2024-02-12 RX ADMIN — DORZOLAMIDE HYDROCHLORIDE AND TIMOLOL MALEATE 1 DROP: 22.3; 6.8 SOLUTION/ DROPS OPHTHALMIC at 22:55

## 2024-02-12 RX ADMIN — PREDNISOLONE ACETATE 1 DROP: 10 SUSPENSION/ DROPS OPHTHALMIC at 12:29

## 2024-02-12 RX ADMIN — PREDNISOLONE ACETATE 1 DROP: 10 SUSPENSION/ DROPS OPHTHALMIC at 20:53

## 2024-02-12 RX ADMIN — CYCLOPHOSPHAMIDE 500 MG: 1 INJECTION, POWDER, FOR SOLUTION INTRAVENOUS; ORAL at 05:30

## 2024-02-12 RX ADMIN — PREDNISOLONE ACETATE 1 DROP: 10 SUSPENSION/ DROPS OPHTHALMIC at 09:28

## 2024-02-12 RX ADMIN — POTASSIUM CHLORIDE 20 MEQ: 1500 TABLET, EXTENDED RELEASE ORAL at 09:27

## 2024-02-12 RX ADMIN — PREDNISOLONE ACETATE 1 DROP: 10 SUSPENSION/ DROPS OPHTHALMIC at 06:04

## 2024-02-12 RX ADMIN — FERROUS SULFATE TAB 325 MG (65 MG ELEMENTAL FE) 1 TABLET: 325 (65 FE) TAB at 20:54

## 2024-02-12 RX ADMIN — KETOROLAC TROMETHAMINE 1 DROP: 5 SOLUTION OPHTHALMIC at 12:29

## 2024-02-12 RX ADMIN — ONDANSETRON 16 MG: 2 INJECTION INTRAMUSCULAR; INTRAVENOUS at 16:41

## 2024-02-12 RX ADMIN — SULFAMETHOXAZOLE AND TRIMETHOPRIM 1 TABLET: 800; 160 TABLET ORAL at 20:52

## 2024-02-12 RX ADMIN — MESNA 1150 MG: 100 INJECTION, SOLUTION INTRAVENOUS at 17:25

## 2024-02-12 RX ADMIN — KETOROLAC TROMETHAMINE 1 DROP: 5 SOLUTION OPHTHALMIC at 20:53

## 2024-02-12 RX ADMIN — FAMOTIDINE 40 MG: 40 TABLET ORAL at 09:27

## 2024-02-12 RX ADMIN — SODIUM CHLORIDE 100 ML/HR: 9 INJECTION, SOLUTION INTRAVENOUS at 16:42

## 2024-02-12 RX ADMIN — FERROUS SULFATE TAB 325 MG (65 MG ELEMENTAL FE) 1 TABLET: 325 (65 FE) TAB at 09:32

## 2024-02-12 RX ADMIN — Medication 1 TABLET: at 09:27

## 2024-02-12 RX ADMIN — ACYCLOVIR 400 MG: 400 TABLET ORAL at 09:27

## 2024-02-12 RX ADMIN — PREDNISOLONE ACETATE 1 DROP: 10 SUSPENSION/ DROPS OPHTHALMIC at 16:42

## 2024-02-12 RX ADMIN — KETOROLAC TROMETHAMINE 1 DROP: 5 SOLUTION OPHTHALMIC at 06:04

## 2024-02-12 RX ADMIN — BACITRACIN 1 APPLICATION: 500 OINTMENT TOPICAL at 09:27

## 2024-02-12 RX ADMIN — CYCLOPHOSPHAMIDE 500 MG: 1 INJECTION, POWDER, FOR SOLUTION INTRAVENOUS; ORAL at 17:26

## 2024-02-12 RX ADMIN — ACYCLOVIR 400 MG: 400 TABLET ORAL at 20:52

## 2024-02-12 RX ADMIN — KETOROLAC TROMETHAMINE 1 DROP: 5 SOLUTION OPHTHALMIC at 16:42

## 2024-02-12 ASSESSMENT — PAIN SCALES - GENERAL
PAINLEVEL_OUTOF10: 0 - NO PAIN

## 2024-02-12 ASSESSMENT — COGNITIVE AND FUNCTIONAL STATUS - GENERAL
MOBILITY SCORE: 22
MOBILITY SCORE: 22
WALKING IN HOSPITAL ROOM: A LITTLE
CLIMB 3 TO 5 STEPS WITH RAILING: A LITTLE
DAILY ACTIVITIY SCORE: 24
CLIMB 3 TO 5 STEPS WITH RAILING: A LITTLE
DAILY ACTIVITIY SCORE: 24
WALKING IN HOSPITAL ROOM: A LITTLE

## 2024-02-12 ASSESSMENT — PAIN - FUNCTIONAL ASSESSMENT
PAIN_FUNCTIONAL_ASSESSMENT: 0-10

## 2024-02-12 ASSESSMENT — ACTIVITIES OF DAILY LIVING (ADL): LACK_OF_TRANSPORTATION: NO

## 2024-02-12 NOTE — CONSULTS
Reason For Consult  POW1 baseline retina exam    History Of Present Illness  Shilo Thakkar is a 23 y.o. male  status post (s/p) pars plana vitrectomy (PPV)/membrane peel (MP)/intravitreal tPA left eye 01/22/2024 and pars plana vitrectomy (PPV)/membrane peel (MP)/endolaser (EL)/air fluid exchange 01/28/2024 admitted for chemotherapy. Patient did develop red eye bilaterally 2 days ago, stated that he did sneeze and have nosebleeds during this time frame. Denies any other visual complaints including eye pain, diplopia, flashes, floaters, or sudden vision loss.      Past Medical History  He has a past medical history of Colles' fracture of right radius, initial encounter for closed fracture (04/23/2015), Personal history of other mental and behavioral disorders (01/28/2014), Personal history of other mental and behavioral disorders (05/10/2017), Personal history of other specified conditions (05/03/2016), and Unspecified fracture of navicular (scaphoid) bone of left wrist, initial encounter for closed fracture (12/17/2015).    Exam  Base Eye Exam       Visual Acuity (Snellen - Linear)         Right Left    Near cc 20/40 HM              Tonometry (Tonopen, 5:24 PM)         Right Left    Pressure 30 20              Pupils         Dark Light Shape React APD    Right 4 2 Brisk Round None    Left 4 2 Brisk Round None              Visual Fields         Left Right      Full              Extraocular Movement         Right Left     Full Full              Neuro/Psych       Oriented x3: Yes              Dilation       Both eyes: 1% Mydriacyl & 2.5% Augustine  @ 5:24 PM                  Slit Lamp and Fundus Exam       External Exam         Right Left    External Normal Normal              Slit Lamp Exam         Right Left    Lids/Lashes Normal Normal    Conjunctiva/Sclera MARIO temporally and inferiorly MARIO inferiorly    Cornea Clear Clear    Anterior Chamber Deep and quiet 1+ cell    Iris Round and reactive Round and reactive    Lens Clear  "Clear              Fundus Exam         Right Left    Vitreous Normal 60% air fill    Disc Normal Normal    C/D Ratio 0.3 0.3    Macula Normal Normal    Vessels Normal Normal    Periphery Scattered pre-retinal hemorrhages witin posterior pole retina grossly attached, scattered retinal hemorrhage, vitreous hemorrhage inferiorly                       Last Recorded Vitals  Blood pressure 142/82, pulse 65, temperature 36.3 °C (97.3 °F), temperature source Temporal, resp. rate 16, height 1.82 m (5' 11.65\"), weight 72 kg (158 lb 11.7 oz), SpO2 100 %.    Relevant Results    OCT 02/08/24     - Right eye (OD): outer retinal disruptions and outer retinal folds inferiorly, CME    - Left eye (OS): No view     Optos 02/08/24    - Right eye (OD): SubLM subfoveal clotted heme now mostly resolved with scattered pre-retinal hemorrhage with in the posterior pole; stable    - OS:  Poor view 25% gas fill, retina is attached, residual preretinal blood clot with fibrin membranes in the temporal retina      Assessment/Plan   Bilateral macula-involving sub ILM hemorrhage with serous detachment OS>OD  Multilayer retinal hemorrhage, left eye  S/p pars plana vitrectomy (PPV)/C3F8/intraretinal tPA left eye 01/22/24  S/P PPV/MP/EL/air right eye 01/28/24  - Continue Prednisolone, and artificial tears QID to the left eye,   - Continue Pred and ketorolac QID, right eye   - Eyes are healing well, patient can proceed with chemotherapy while avoiding platelets from dropping significantly - preferable level would be 50-70K but its understandable if this is not achievable and would recommend avoiding levels below 30K.  - May need repeat surgery in the left eye (after this cycle or the next) if the vitreous hemorrhage persists or retina detaches  - Start Cosopt BID right eye   - Will measure intraocular pressure (IOP) in 2 days before discharge    #Subconjunctival hemorrhage, both eyes  - Patient has subconjunctival hemorrhage on exam.  Counselled " patient that this is a benign finding and will resolve in the next few weeks. Warned patient that in the future this may be precipitated by Valsalva maneuvers but may occur without clear trigger.     Discussed with Dr. Canales, vitreoretinal attending     Allyson Olguin MD  PGY-3 Ophthalmology     Ophthalmology Adult Pager - [phone]  Ophthalmology Pediatrics Pager - [phone]    For adult follow-up appointments, call: [phone]  For pediatric follow-up appointments, call: [phone]      NOTE: This note is not finalized until attending reviews and signs.

## 2024-02-12 NOTE — PROGRESS NOTES
02/12/24 1456   Discharge Planning   Living Arrangements Parent   Support Systems Family members;Parent   Assistance Needed none   Type of Residence Private residence   Number of Stairs Within Residence 12   Do you have animals or pets at home? Yes   Type of Animals or Pets cats   Home or Post Acute Services None   Patient expects to be discharged to: home   Does the patient need discharge transport arranged? No   Financial Resource Strain   How hard is it for you to pay for the very basics like food, housing, medical care, and heating? Not hard   Housing Stability   In the last 12 months, was there a time when you were not able to pay the mortgage or rent on time? N   In the last 12 months, how many places have you lived? 1   In the last 12 months, was there a time when you did not have a steady place to sleep or slept in a shelter (including now)? N   Transportation Needs   In the past 12 months, has lack of transportation kept you from medical appointments or from getting medications? no   In the past 12 months, has lack of transportation kept you from meetings, work, or from getting things needed for daily living? No     Pt with Burkitt's Lymphoma was admitted for C5 of HyperCVAD.  He will return to home with his mother when ready for discharge.  He is still independent with his ADLS and has no home care or DME needs.  His SOLO PICC is cared for in the out infusion center.  His MD is Dr. Olegario Klein.  Will continue to follow for any home going needs.  Edna Santana RN, TCC

## 2024-02-12 NOTE — PROGRESS NOTES
Shilo Thakkar is a 23 y.o. male on day 3 of admission presenting with Burkitt lymphoma (CMS/HCC).    Subjective   Patient feels well today, no symptoms. Denies any changes in vision.  Denies HA, dizziness, CP, SOB, N/V/D/C. All other ROS otherwise negative.        Objective     Physical Exam  Constitutional:       General: He is not in acute distress.     Appearance: Normal appearance. He is not ill-appearing or toxic-appearing.   HENT:      Head: Normocephalic and atraumatic.      Mouth/Throat:      Mouth: Mucous membranes are moist.   Eyes:      General: No scleral icterus.     Extraocular Movements: Extraocular movements intact.      Pupils: Pupils are equal, round, and reactive to light.      Comments: Right eye has scleral hemorrhage evident; left eye sclera is very blood shot/pink appearing    Cardiovascular:      Rate and Rhythm: Normal rate and regular rhythm.      Pulses: Normal pulses.      Heart sounds: Normal heart sounds. No murmur heard.     No friction rub. No gallop.   Pulmonary:      Effort: Pulmonary effort is normal. No respiratory distress.      Breath sounds: Normal breath sounds. No wheezing, rhonchi or rales.   Abdominal:      General: Abdomen is flat. Bowel sounds are normal. There is no distension.      Palpations: Abdomen is soft.      Tenderness: There is no abdominal tenderness.   Musculoskeletal:         General: No swelling, deformity or signs of injury. Normal range of motion.      Cervical back: Normal range of motion and neck supple.      Right lower leg: No edema.      Left lower leg: No edema.   Skin:     General: Skin is warm and dry.      Capillary Refill: Capillary refill takes less than 2 seconds.      Coloration: Skin is not jaundiced.      Findings: Lesion (left upper arm, C/D/I (See picture)) present. No bruising, erythema or rash.   Neurological:      General: No focal deficit present.      Mental Status: He is alert and oriented to person, place, and time.      Cranial  "Nerves: No cranial nerve deficit.      Sensory: No sensory deficit.      Motor: No weakness.      Coordination: Coordination normal.   Psychiatric:         Mood and Affect: Mood normal.         Behavior: Behavior normal.      Comments: Fluent speech, cooperative         Last Recorded Vitals  Blood pressure 142/82, pulse 65, temperature 36.3 °C (97.3 °F), temperature source Temporal, resp. rate 16, height 1.82 m (5' 11.65\"), weight 72 kg (158 lb 11.7 oz), SpO2 100 %.  Intake/Output last 3 Shifts:  I/O last 3 completed shifts:  In: 4017.9 (55.8 mL/kg) [I.V.:2906.7 (40.4 mL/kg); IV Piggyback:1111.2]  Out: - (0 mL/kg)   Weight: 72 kg       Assessment/Plan   Principal Problem:    Burkitt lymphoma (CMS/HCC)    Shilo Thakkar is a 23 y.o. male with PMH of Burkitt Lymphoma (Dx 10/20/23; s/p 4 cycles of HyperCVAD), malignant pleural effusions s/p CT (10/29/23), ADHD, and recently hospitalized from 1/17-1/23/2024 with visual changes, retinoschisis, retinal hemorrhages, and profound pancytopenia requiring left eye vitrectomy now admitted on 2/09/24 for cycle 5 of HyperCVAD.     D3 HyperCVAD     ONC:  # Burkitt lymphoma, newly diagnosed (10/20/23)  - 10/19/23 presented to OSH ED with dysphagia and abdominal pain  - CT A/P with IV contrast (10/19) mass in the body of the pancreas measuring approximately 2.7 x 2.9 cm. Multiple masses in the mesentery as well as omental caking c/q carcinomatosis.   - CT soft tissue neck w/ IV contrast (10/19): homogeneous soft tissue attenuation lesion in the L oropharynx which measures approximately 3.0 x 4.0 x 5.1 cm.   - CT Abdomen w/o IV contrast (10/20): Mesenteric lymphadenopathy  - US guided mesenteric LN biopsy (10/20/23): HIGH GRADE B-CELL LYMPHOMA MORPHOLOGICALLY C/W BURKITT LYMPHOMA; flow cytometry: CD10+ kappa restricted B cells supportive of a B cell lymphoma with germinal center origin.   - Biopsy L oropharyngeal mass and L cervical LN by ENT (10/23/23) high grade B-cell burkitt " lymphoma  - PET CT (10/30) showed FDG avid left oropharyngeal mass and LAD above and below diaphragm  - BMBx (11/1) without evidence of disease  - s/p 4 cycles of HyperCVAD (10/28, 11/19, 12/11, 1/2/24)  - PET scan (1/15/24): Rudolph 2. Complete resolution of metabolic activity of previously seen Lt oropharyngeal mass, resolution of previously seen hypermetabolic Lt level 2 and 3 cervical lymphadenopathy, resolution of previously seen moderate-large size Lt pleural effusion and trace Rt pleural effusion and abdominopelvic ascites, resolution of previously seen hypermetabolic large mass within pancreatic tail/body and the previously seen hypermetabolic retroperitoneal and mesenteric lymphadenopathy.  - Cycle 5 initially delayed a little over a week due to recent hospitalization from 1/17-1/23/24 for visual changes, retinoschisis, retinal hemorrhages, and profound pancytopenia requiring left eye vitrectomy  - Now admitted for cycle 5 of HyperCVAD (plan to dose reduce cyclophosphamide to 80%)   - Plan pegfilgrastim outpatient  - No further IT chemotherapy    CHEMO  -Cyclophosphamide 300 mg/m2 IV over 3 hours every 12 hours for 6 doses on Days 1-3   -Mesna 600 mg/m2 IV continuous infusion over 24 hours daily on Days 1-3   -DOXOrubicin 50 mg/m2 IV push or IV continuous infusion over 24 hours on Day 4   -VinCRIStine 2 mg IV over 5-10 minutes on Days 4 and 11   -Dexamethasone 40 mg IV or PO daily on Days 1-4 and 11-14   -RiTUXimab 375 mg/m2 IV (Cycle 1) on Day 6 +/- 2 days   -RiTUXimab 375 mg/m2 IV or 1,400 mg SUBQ on Day 6 (Cycles 3, 5, or 7) +/- 2 days        OPTHO:  -Recent hx/o bilateral macula-involving sub ILM hemorrhage with serous detachment OS>OD  -Multilayer retinal hemorrhage, left eye.   -S/p pars plana vitrectomy (PPV)/C3F8/intraretinal tPA OS  -S/P PPV/MP/EL/air O  -Last seen by Ophthalm in outpatient clinic on 2/8. Per recs, continue Prednisolone and artificial tears QID to the left eye, Start Pred and  ketorolac QID, right eye. Okay to proceed with chemotherapy.   - Prefer to keep Plts 50-70,000 and recommend avoiding PLt levels below 30K  - Pt reports baseline vision unchanged recently upon admit; right eye vision is blurry, left eye no vision  - May need repeat surgery in the Lt eye after next cycle of chemo if the vitreous hemorrhage persists or retina detaches. Recommends next Ophthalm appt in 1 week.  - Consulted Ophthalmology (2/12) for follow up     HEME:  # Anemia without active bleeding  - Continue home ferrous sulfate 325mg BID started by Dr. Klein  - Transfuse to keep Hgb>8.5, Plt>50-70,000 and avoid dropping below 30,000 per ophtho requirements with hx/o retinal hemorrhages     CARDIAC:  - ECHO (10/2023): EF 60-65%  - Plan repeat ECHO (2/10/24) LVEF 60-65%     ID:  Allergies: NKDA  PPX: Acyclovir 400mg BID, Fluconazole 400mg daily, Bactrim MWF  - Plan Levaquin for neutropenia (days 5 through day 15 per outpt notes)     FEN/GI:  - Admit weight: 72.2kg, current wt 72kg (2/11)   - Monitor and replace electrolytes as needed  - Cont Vit B complex     ENDO:  -CTA neck (1/17) with 6 mm nodule in the left lobe of the thyroid gland   -TSH level (1/20): 1.35      DERM  #Dog scratch, LUE   -Cover with bacitracin, replace bandaid daily   -Currently C/D/I; assess daily      DISPO:  - Full Code  - Access: PICC line (right)  - Primary Onc: Dr. Klein  - NOK: Ms Deidra Pickett [phone]  - plan 3x weekly count checks     Patient seen, examined, and discussed with Dr. Latoya Shetty PA-C

## 2024-02-13 LAB
ALBUMIN SERPL BCP-MCNC: 3.8 G/DL (ref 3.4–5)
ANION GAP SERPL CALC-SCNC: 12 MMOL/L (ref 10–20)
BASOPHILS # BLD AUTO: 0 X10*3/UL (ref 0–0.1)
BASOPHILS NFR BLD AUTO: 0 %
BUN SERPL-MCNC: 13 MG/DL (ref 6–23)
CALCIUM SERPL-MCNC: 9.2 MG/DL (ref 8.6–10.6)
CHLORIDE SERPL-SCNC: 105 MMOL/L (ref 98–107)
CO2 SERPL-SCNC: 26 MMOL/L (ref 21–32)
CREAT SERPL-MCNC: 0.84 MG/DL (ref 0.5–1.3)
EGFRCR SERPLBLD CKD-EPI 2021: >90 ML/MIN/1.73M*2
EOSINOPHIL # BLD AUTO: 0 X10*3/UL (ref 0–0.7)
EOSINOPHIL NFR BLD AUTO: 0 %
ERYTHROCYTE [DISTWIDTH] IN BLOOD BY AUTOMATED COUNT: 24.3 % (ref 11.5–14.5)
GLUCOSE SERPL-MCNC: 132 MG/DL (ref 74–99)
HCT VFR BLD AUTO: 28.2 % (ref 41–52)
HGB BLD-MCNC: 9.2 G/DL (ref 13.5–17.5)
IMM GRANULOCYTES # BLD AUTO: 0.07 X10*3/UL (ref 0–0.7)
IMM GRANULOCYTES NFR BLD AUTO: 0.6 % (ref 0–0.9)
LYMPHOCYTES # BLD AUTO: 0.17 X10*3/UL (ref 1.2–4.8)
LYMPHOCYTES NFR BLD AUTO: 1.5 %
MAGNESIUM SERPL-MCNC: 1.89 MG/DL (ref 1.6–2.4)
MCH RBC QN AUTO: 31.1 PG (ref 26–34)
MCHC RBC AUTO-ENTMCNC: 32.6 G/DL (ref 32–36)
MCV RBC AUTO: 95 FL (ref 80–100)
MONOCYTES # BLD AUTO: 0.17 X10*3/UL (ref 0.1–1)
MONOCYTES NFR BLD AUTO: 1.5 %
NEUTROPHILS # BLD AUTO: 10.86 X10*3/UL (ref 1.2–7.7)
NEUTROPHILS NFR BLD AUTO: 96.4 %
NRBC BLD-RTO: 0 /100 WBCS (ref 0–0)
PHOSPHATE SERPL-MCNC: 3.8 MG/DL (ref 2.5–4.9)
PLATELET # BLD AUTO: 118 X10*3/UL (ref 150–450)
POTASSIUM SERPL-SCNC: 3.9 MMOL/L (ref 3.5–5.3)
RBC # BLD AUTO: 2.96 X10*6/UL (ref 4.5–5.9)
RBC MORPH BLD: NORMAL
SODIUM SERPL-SCNC: 139 MMOL/L (ref 136–145)
WBC # BLD AUTO: 11.3 X10*3/UL (ref 4.4–11.3)

## 2024-02-13 PROCEDURE — 85025 COMPLETE CBC W/AUTO DIFF WBC: CPT | Performed by: PHYSICIAN ASSISTANT

## 2024-02-13 PROCEDURE — 2500000004 HC RX 250 GENERAL PHARMACY W/ HCPCS (ALT 636 FOR OP/ED): Performed by: INTERNAL MEDICINE

## 2024-02-13 PROCEDURE — RXMED WILLOW AMBULATORY MEDICATION CHARGE

## 2024-02-13 PROCEDURE — 1170000001 HC PRIVATE ONCOLOGY ROOM DAILY

## 2024-02-13 PROCEDURE — 2500000002 HC RX 250 W HCPCS SELF ADMINISTERED DRUGS (ALT 637 FOR MEDICARE OP, ALT 636 FOR OP/ED): Performed by: NURSE PRACTITIONER

## 2024-02-13 PROCEDURE — 80069 RENAL FUNCTION PANEL: CPT | Performed by: PHYSICIAN ASSISTANT

## 2024-02-13 PROCEDURE — 83735 ASSAY OF MAGNESIUM: CPT | Performed by: PHYSICIAN ASSISTANT

## 2024-02-13 PROCEDURE — 99233 SBSQ HOSP IP/OBS HIGH 50: CPT | Performed by: INTERNAL MEDICINE

## 2024-02-13 PROCEDURE — 2500000001 HC RX 250 WO HCPCS SELF ADMINISTERED DRUGS (ALT 637 FOR MEDICARE OP): Performed by: HOME HEALTH AIDE

## 2024-02-13 PROCEDURE — 2500000001 HC RX 250 WO HCPCS SELF ADMINISTERED DRUGS (ALT 637 FOR MEDICARE OP): Performed by: NURSE PRACTITIONER

## 2024-02-13 RX ORDER — PREDNISOLONE ACETATE 10 MG/ML
1 SUSPENSION/ DROPS OPHTHALMIC 4 TIMES DAILY
Qty: 15 ML | Refills: 1 | Status: SHIPPED | OUTPATIENT
Start: 2024-02-13 | End: 2024-03-09 | Stop reason: HOSPADM

## 2024-02-13 RX ORDER — DIPHENHYDRAMINE HCL 50 MG
50 CAPSULE ORAL ONCE
Status: CANCELLED | OUTPATIENT
Start: 2024-02-19

## 2024-02-13 RX ORDER — LEVOFLOXACIN 500 MG/1
500 TABLET, FILM COATED ORAL DAILY
Qty: 14 TABLET | Refills: 0 | Status: SHIPPED | OUTPATIENT
Start: 2024-02-13 | End: 2024-03-09 | Stop reason: HOSPADM

## 2024-02-13 RX ORDER — FERROUS SULFATE 325(65) MG
325 TABLET ORAL 2 TIMES DAILY
Qty: 60 TABLET | Refills: 1 | Status: SHIPPED | OUTPATIENT
Start: 2024-02-13 | End: 2024-04-17

## 2024-02-13 RX ORDER — DORZOLAMIDE HYDROCHLORIDE AND TIMOLOL MALEATE 20; 5 MG/ML; MG/ML
1 SOLUTION/ DROPS OPHTHALMIC 2 TIMES DAILY
Qty: 10 ML | Refills: 2 | Status: SHIPPED | OUTPATIENT
Start: 2024-02-13 | End: 2025-02-12

## 2024-02-13 RX ORDER — DEXAMETHASONE 4 MG/1
40 TABLET ORAL DAILY
Qty: 40 TABLET | Refills: 0 | Status: SHIPPED | OUTPATIENT
Start: 2024-02-13 | End: 2024-03-09 | Stop reason: HOSPADM

## 2024-02-13 RX ORDER — ACETAMINOPHEN 325 MG/1
650 TABLET ORAL ONCE
Status: CANCELLED | OUTPATIENT
Start: 2024-02-19

## 2024-02-13 RX ADMIN — DOXORUBICIN HYDROCHLORIDE 96 MG: 2 INJECTION, SOLUTION INTRAVENOUS at 17:31

## 2024-02-13 RX ADMIN — DORZOLAMIDE HYDROCHLORIDE AND TIMOLOL MALEATE 1 DROP: 22.3; 6.8 SOLUTION/ DROPS OPHTHALMIC at 08:30

## 2024-02-13 RX ADMIN — CYCLOPHOSPHAMIDE 500 MG: 1 INJECTION, POWDER, FOR SOLUTION INTRAVENOUS; ORAL at 05:17

## 2024-02-13 RX ADMIN — PREDNISOLONE ACETATE 1 DROP: 10 SUSPENSION/ DROPS OPHTHALMIC at 17:19

## 2024-02-13 RX ADMIN — POTASSIUM CHLORIDE 20 MEQ: 1500 TABLET, EXTENDED RELEASE ORAL at 08:26

## 2024-02-13 RX ADMIN — Medication 1 TABLET: at 08:26

## 2024-02-13 RX ADMIN — ONDANSETRON 16 MG: 2 INJECTION INTRAMUSCULAR; INTRAVENOUS at 17:18

## 2024-02-13 RX ADMIN — FERROUS SULFATE TAB 325 MG (65 MG ELEMENTAL FE) 1 TABLET: 325 (65 FE) TAB at 20:54

## 2024-02-13 RX ADMIN — PREDNISOLONE ACETATE 1 DROP: 10 SUSPENSION/ DROPS OPHTHALMIC at 08:26

## 2024-02-13 RX ADMIN — ACYCLOVIR 400 MG: 400 TABLET ORAL at 08:26

## 2024-02-13 RX ADMIN — FAMOTIDINE 40 MG: 40 TABLET ORAL at 08:26

## 2024-02-13 RX ADMIN — PREDNISOLONE ACETATE 1 DROP: 10 SUSPENSION/ DROPS OPHTHALMIC at 20:55

## 2024-02-13 RX ADMIN — DORZOLAMIDE HYDROCHLORIDE AND TIMOLOL MALEATE 1 DROP: 22.3; 6.8 SOLUTION/ DROPS OPHTHALMIC at 20:55

## 2024-02-13 RX ADMIN — BACITRACIN 1 APPLICATION: 500 OINTMENT TOPICAL at 20:54

## 2024-02-13 RX ADMIN — BACITRACIN 1 APPLICATION: 500 OINTMENT TOPICAL at 08:26

## 2024-02-13 RX ADMIN — DEXAMETHASONE 40 MG: 4 TABLET ORAL at 08:26

## 2024-02-13 RX ADMIN — KETOROLAC TROMETHAMINE 1 DROP: 5 SOLUTION OPHTHALMIC at 08:26

## 2024-02-13 RX ADMIN — FERROUS SULFATE TAB 325 MG (65 MG ELEMENTAL FE) 1 TABLET: 325 (65 FE) TAB at 08:26

## 2024-02-13 RX ADMIN — KETOROLAC TROMETHAMINE 1 DROP: 5 SOLUTION OPHTHALMIC at 20:55

## 2024-02-13 RX ADMIN — ACYCLOVIR 400 MG: 400 TABLET ORAL at 20:54

## 2024-02-13 RX ADMIN — VINCRISTINE SULFATE 2 MG: 1 INJECTION, SOLUTION INTRAVENOUS at 17:30

## 2024-02-13 RX ADMIN — FLUCONAZOLE 400 MG: 200 TABLET ORAL at 08:26

## 2024-02-13 RX ADMIN — KETOROLAC TROMETHAMINE 1 DROP: 5 SOLUTION OPHTHALMIC at 17:20

## 2024-02-13 ASSESSMENT — COGNITIVE AND FUNCTIONAL STATUS - GENERAL
MOBILITY SCORE: 22
WALKING IN HOSPITAL ROOM: A LITTLE
MOBILITY SCORE: 22
DAILY ACTIVITIY SCORE: 24
CLIMB 3 TO 5 STEPS WITH RAILING: A LITTLE
CLIMB 3 TO 5 STEPS WITH RAILING: A LITTLE
DAILY ACTIVITIY SCORE: 24
WALKING IN HOSPITAL ROOM: A LITTLE

## 2024-02-13 ASSESSMENT — PAIN SCALES - GENERAL
PAINLEVEL_OUTOF10: 0 - NO PAIN

## 2024-02-13 ASSESSMENT — PAIN - FUNCTIONAL ASSESSMENT
PAIN_FUNCTIONAL_ASSESSMENT: 0-10
PAIN_FUNCTIONAL_ASSESSMENT: 0-10

## 2024-02-13 NOTE — SIGNIFICANT EVENT
02/13/24 0121   Prechemo Checklist   Has the patient been in the hospital, ED, or urgent care since last date of service N/A   Chemo/Immuno Consent Signed Yes   Protocol/Indications Verified Yes   Confirmed to previous date/time of medication Yes   Compared to previous dose Yes   All medications are dated accurately Yes   Pregnancy Test Negative Not applicable   Parameters Met Yes   BSA/Weight-Height Verified Yes   Dose Calculations Verified Yes

## 2024-02-13 NOTE — NURSING NOTE
Pt. Receiving cyclophosphamide over 3 hours via right double picc. +BBR prior to administration. Chemo verified independently with Jeanna Camarillo RN, all parameters WNL. Continues 24h mesna, NS@ 100ml/hr and pt. Premedicated with IVPB zofran.

## 2024-02-13 NOTE — NURSING NOTE
Dose # 2 of cyclophosphamide chemotherapy 500mg in 125mL given over 3 hours via PICCcatheter.  Dose up at 0517. Patient, dose and rate verified with second RNLindsay  + BBR obtained via syringe aspiration before and after administration.  Patient with no side effects.

## 2024-02-13 NOTE — CARE PLAN
Problem: Fall/Injury  Goal: Not fall by end of shift  Outcome: Progressing  Goal: Be free from injury by end of the shift  Outcome: Progressing  Goal: Verbalize understanding of personal risk factors for fall in the hospital  Outcome: Progressing  Goal: Verbalize understanding of risk factor reduction measures to prevent injury from fall in the home  Outcome: Progressing  Goal: Pace activities to prevent fatigue by end of the shift  Outcome: Progressing     Problem: Pain  Goal: My pain/discomfort is manageable  Outcome: Progressing     Problem: Safety  Goal: Patient will be injury free during hospitalization  Outcome: Progressing     Problem: Daily Care  Goal: Daily care needs are met  Outcome: Progressing     Problem: Psychosocial Needs  Goal: Demonstrates ability to cope with hospitalization/illness  Outcome: Progressing  Goal: Collaborate with me, my family, and caregiver to identify my specific goals  Outcome: Progressing     Problem: Discharge Barriers  Goal: My discharge needs are met  Outcome: Progressing       The clinical goals for the shift include remain HDS

## 2024-02-13 NOTE — DISCHARGE SUMMARY
Discharge Diagnosis  Burkitt lymphoma (CMS/[facility])      Hospital Course  Shilo Thakkar is a 23 y.o. male with PMH of Burkitt Lymphoma (Dx 10/20/23; s/p 4 cycles of HyperCVAD), malignant pleural effusions s/p chest tube (10/29/23), ADHD, and recently hospitalized from 1/17-1/23/2024 with visual changes, retinoschisis, retinal hemorrhages, and profound pancytopenia requiring left eye vitrectomy now admitted on 2/09/24 for cycle 5 of HyperCVAD.     Tolerated chemotherapy well. No complications. Patient will return to outpt clinic on 2/21/24 for Day 11 Vincristine, and will begin Dex pulse 2/21-2/24/24 per chemotherapy protocol. Neulasta to be given 2/16/24.    3x weekly infusion appointments beginning 2/16/24  Mal Heme follow up 2/16, 2/21  Dr. Klein appointment requested for 2 weeks  Access: DL SOLO PICC line  Prophylaxis: ACV, Fluc, Bactrim, Levaquin    Pertinent Physical Exam At Time of Discharge  Physical Exam    Home Medications     Medication List      START taking these medications     dexAMETHasone 4 mg tablet; Commonly known as: Decadron; Take 10 tablets   (40 mg) by mouth once daily for 4 days . Take 2/21-2/24/24   dorzolamide-timoloL 22.3-6.8 mg/mL ophthalmic solution; Commonly known   as: Cosopt; Administer 1 drop into the right eye 2 times a day.   levoFLOXacin 500 mg tablet; Commonly known as: Levaquin; Take 1 tablet   (500 mg) by mouth once daily for 14 days.     CHANGE how you take these medications     * prednisoLONE acetate 1 % ophthalmic suspension; Commonly known as:   Pred-Forte; Administer 1 drop into the left eye 4 times a day. Do not   start before January 23, 2024.; What changed: Another medication with the   same name was added. Make sure you understand how and when to take each.   * prednisoLONE acetate 1 % ophthalmic suspension; Commonly known as:   Pred-Forte; Administer 1 drop into the right eye 4 times a day.; What   changed: You were already taking a medication with the same name, and this    prescription was added. Make sure you understand how and when to take   each.  * This list has 2 medication(s) that are the same as other medications   prescribed for you. Read the directions carefully, and ask your doctor or   other care provider to review them with you.     CONTINUE taking these medications     acyclovir 400 mg tablet; Commonly known as: Zovirax; Take 1 tablet (400   mg) by mouth 2 times a day.   B complex-vitamin C tablet; Take 1 tablet by mouth once daily.   ferrous sulfate (325 mg ferrous sulfate) tablet; Take 1 tablet by mouth   2 times a day.   fluconazole 200 mg tablet; Commonly known as: Diflucan; Take 2 tablets   (400 mg) by mouth once daily.   ketorolac 0.5 % ophthalmic solution; Commonly known as: Acular; Use one   drop 4 times a day in the right eye   pantoprazole 40 mg EC tablet; Commonly known as: ProtoNix; Take 1 tablet   (40 mg) by mouth once daily in the morning. Take before meals. Do not   crush, chew, or split.   sennosides-docusate sodium 8.6-50 mg tablet; Commonly known as:   Ashley-Colace; Take 2 tablets by mouth 2 times a day as needed for   constipation.   sulfamethoxazole-trimethoprim 800-160 mg tablet; Commonly known as:   Bactrim DS; Take 1 tablet by mouth once a day on Monday, Wednesday, and   Friday.     STOP taking these medications     Artificial Tears(pg-hypm-glyc) 1-0.2-0.2 % drops ophthalmic drops;   Generic drug: peg 400-hypromellose-glycerin   ofloxacin 0.3 % ophthalmic solution; Commonly known as: Ocuflox   potassium chloride CR 20 mEq ER tablet; Commonly known as: Klor-Con M20   prednisoLONE acetate (PF) 1 % drops,suspension       Outpatient Follow-Up  Future Appointments   Date Time Provider Department Center   2/16/2024  3:00 PM INF 20 [facility]   2/16/2024  3:00 PM [facility]   2/21/2024  1:00 PM [facility]   2/21/2024  1:30 PM INF 36 [facility]   2/23/2024  3:00  PM [facility]   2/26/2024  4:00 PM [facility]F 24 [facility]   2/27/2024  1:40 PM Olegario Klein [id number]   2/28/2024  3:15 PM [facility]F [facility]   3/1/2024  3:15 PM [facility]F 07 [facility]   3/15/2024  1:00 PM [facility]   3/20/2024  1:00 PM [facility]   3/20/2024  1:30 PM INF 37 [facility]   4/5/2024  1:00 PM [facility]F [facility]   4/5/2024  1:00 PM [facility]       Roselia Shetty PA-C

## 2024-02-13 NOTE — PROGRESS NOTES
Shilo Thakkar is a 23 y.o. male on day 4 of admission presenting with Burkitt lymphoma (CMS/HCC).    Subjective   Patient feels well today, no symptoms. Denies any changes in vision.  Denies HA, dizziness, CP, SOB, N/V/D/C. All other ROS otherwise negative.        Objective     Physical Exam  Constitutional:       General: He is not in acute distress.     Appearance: Normal appearance. He is not ill-appearing or toxic-appearing.   HENT:      Head: Normocephalic and atraumatic.      Mouth/Throat:      Mouth: Mucous membranes are moist.   Eyes:      General: No scleral icterus.     Extraocular Movements: Extraocular movements intact.      Pupils: Pupils are equal, round, and reactive to light.      Comments: Right eye has scleral hemorrhage evident; left eye sclera is very blood shot/pink appearing    Cardiovascular:      Rate and Rhythm: Normal rate and regular rhythm.      Pulses: Normal pulses.      Heart sounds: Normal heart sounds. No murmur heard.     No friction rub. No gallop.   Pulmonary:      Effort: Pulmonary effort is normal. No respiratory distress.      Breath sounds: Normal breath sounds. No wheezing, rhonchi or rales.   Abdominal:      General: Abdomen is flat. Bowel sounds are normal. There is no distension.      Palpations: Abdomen is soft.      Tenderness: There is no abdominal tenderness.   Musculoskeletal:         General: No swelling, deformity or signs of injury. Normal range of motion.      Cervical back: Normal range of motion and neck supple.      Right lower leg: No edema.      Left lower leg: No edema.   Skin:     General: Skin is warm and dry.      Capillary Refill: Capillary refill takes less than 2 seconds.      Coloration: Skin is not jaundiced.      Findings: Lesion (left upper arm, C/D/I (See picture)) present. No bruising, erythema or rash.   Neurological:      General: No focal deficit present.      Mental Status: He is alert and oriented to person, place, and time.      Cranial  "Nerves: No cranial nerve deficit.      Sensory: No sensory deficit.      Motor: No weakness.      Coordination: Coordination normal.   Psychiatric:         Mood and Affect: Mood normal.         Behavior: Behavior normal.      Comments: Fluent speech, cooperative         Last Recorded Vitals  Blood pressure 134/75, pulse 51, temperature 36.7 °C (98.1 °F), temperature source Temporal, resp. rate 16, height 1.82 m (5' 11.65\"), weight 72 kg (158 lb 11.7 oz), SpO2 98 %.  Intake/Output last 3 Shifts:  I/O last 3 completed shifts:  In: 2160.9 (30 mL/kg) [I.V.:1433.3 (19.9 mL/kg); IV Piggyback:727.6]  Out: - (0 mL/kg)   Weight: 72 kg       Assessment/Plan   Principal Problem:    Burkitt lymphoma (CMS/HCC)    Shilo Thakkar is a 23 y.o. male with PMH of Burkitt Lymphoma (Dx 10/20/23; s/p 4 cycles of HyperCVAD), malignant pleural effusions s/p CT (10/29/23), ADHD, and recently hospitalized from 1/17-1/23/2024 with visual changes, retinoschisis, retinal hemorrhages, and profound pancytopenia requiring left eye vitrectomy now admitted on 2/09/24 for cycle 5 of HyperCVAD.     D4 HyperCVAD     ONC:  # Burkitt lymphoma, newly diagnosed (10/20/23)  - 10/19/23 presented to OSH ED with dysphagia and abdominal pain  - CT A/P with IV contrast (10/19) mass in the body of the pancreas measuring approximately 2.7 x 2.9 cm. Multiple masses in the mesentery as well as omental caking c/q carcinomatosis.   - CT soft tissue neck w/ IV contrast (10/19): homogeneous soft tissue attenuation lesion in the L oropharynx which measures approximately 3.0 x 4.0 x 5.1 cm.   - CT Abdomen w/o IV contrast (10/20): Mesenteric lymphadenopathy  - US guided mesenteric LN biopsy (10/20/23): HIGH GRADE B-CELL LYMPHOMA MORPHOLOGICALLY C/W BURKITT LYMPHOMA; flow cytometry: CD10+ kappa restricted B cells supportive of a B cell lymphoma with germinal center origin.   - Biopsy L oropharyngeal mass and L cervical LN by ENT (10/23/23) high grade B-cell burkitt " lymphoma  - PET CT (10/30) showed FDG avid left oropharyngeal mass and LAD above and below diaphragm  - BMBx (11/1) without evidence of disease  - s/p 4 cycles of HyperCVAD (10/28, 11/19, 12/11, 1/2/24)  - PET scan (1/15/24): Rudolph 2. Complete resolution of metabolic activity of previously seen Lt oropharyngeal mass, resolution of previously seen hypermetabolic Lt level 2 and 3 cervical lymphadenopathy, resolution of previously seen moderate-large size Lt pleural effusion and trace Rt pleural effusion and abdominopelvic ascites, resolution of previously seen hypermetabolic large mass within pancreatic tail/body and the previously seen hypermetabolic retroperitoneal and mesenteric lymphadenopathy.  - Cycle 5 initially delayed a little over a week due to recent hospitalization from 1/17-1/23/24 for visual changes, retinoschisis, retinal hemorrhages, and profound pancytopenia requiring left eye vitrectomy  - Now admitted for cycle 5 of HyperCVAD (plan to dose reduce cyclophosphamide to 80%)   - Plan pegfilgrastim outpatient  - No further IT chemotherapy    CHEMO  -Cyclophosphamide 300 mg/m2 IV over 3 hours every 12 hours for 6 doses on Days 1-3   -Mesna 600 mg/m2 IV continuous infusion over 24 hours daily on Days 1-3   -DOXOrubicin 50 mg/m2 IV push or IV continuous infusion over 24 hours on Day 4   -VinCRIStine 2 mg IV over 5-10 minutes on Days 4 and 11   -Dexamethasone 40 mg IV or PO daily on Days 1-4 and 11-14   -RiTUXimab 375 mg/m2 IV (Cycle 1) on Day 6 +/- 2 days   -RiTUXimab 375 mg/m2 IV or 1,400 mg SUBQ on Day 6 (Cycles 3, 5, or 7) +/- 2 days        OPTHO:  -Recent hx/o bilateral macula-involving sub ILM hemorrhage with serous detachment OS>OD  -Multilayer retinal hemorrhage, left eye.   -S/p pars plana vitrectomy (PPV)/C3F8/intraretinal tPA OS  -S/P PPV/MP/EL/air O  -Last seen by Ophthalm in outpatient clinic on 2/8. Per recs, continue Prednisolone and artificial tears QID to the left eye, Start Pred and  ketorolac QID, right eye. Okay to proceed with chemotherapy.   - Prefer to keep Plts 50-70,000 and recommend avoiding PLt levels below 30K  - Pt reports baseline vision unchanged recently upon admit; right eye vision is blurry, left eye no vision  - May need repeat surgery in the Lt eye after next cycle of chemo if the vitreous hemorrhage persists or retina detaches. Recommends next Ophthalm appt in 1 week.  - Consulted Ophthalmology (2/12) for follow up. Patient seen--> added Cosopt BID for right eye. No acute changes.     HEME:  # Anemia without active bleeding  - Continue home ferrous sulfate 325mg BID started by Dr. Klein  - Transfuse to keep Hgb>8.5, Plt>50-70,000 and avoid dropping below 30,000 per ophtho requirements with hx/o retinal hemorrhages     CARDIAC:  - ECHO (10/2023): EF 60-65%  - Plan repeat ECHO (2/10/24) LVEF 60-65%     ID:  Allergies: NKDA  PPX: Acyclovir 400mg BID, Fluconazole 400mg daily, Bactrim MWF  - Plan Levaquin for neutropenia (days 5 through day 15 per outpt notes)     FEN/GI:  - Admit weight: 72.2kg, current wt 72kg (2/11)   - Monitor and replace electrolytes as needed  - Cont Vit B complex     ENDO:  -CTA neck (1/17) with 6 mm nodule in the left lobe of the thyroid gland   -TSH level (1/20): 1.35      DERM  #Dog scratch, LUE   -Cover with bacitracin, replace bandaid daily   -Currently C/D/I; assess daily      DISPO:  - Full Code  - Access: PICC line (right)  - Primary Onc: Dr. Klein  - NOK: Ms Deidra Pickett [phone]      Patient seen, examined, and discussed with Dr. Latoya Shetty PA-C

## 2024-02-14 ENCOUNTER — PHARMACY VISIT (OUTPATIENT)
Dept: PHARMACY | Facility: CLINIC | Age: 24
End: 2024-02-14
Payer: MEDICARE

## 2024-02-14 VITALS
WEIGHT: 167.33 LBS | HEIGHT: 72 IN | BODY MASS INDEX: 22.66 KG/M2 | TEMPERATURE: 97.3 F | DIASTOLIC BLOOD PRESSURE: 86 MMHG | SYSTOLIC BLOOD PRESSURE: 137 MMHG | HEART RATE: 52 BPM | OXYGEN SATURATION: 100 % | RESPIRATION RATE: 18 BRPM

## 2024-02-14 LAB
ALBUMIN SERPL BCP-MCNC: 3.4 G/DL (ref 3.4–5)
ANION GAP SERPL CALC-SCNC: 12 MMOL/L (ref 10–20)
BASOPHILS # BLD AUTO: 0 X10*3/UL (ref 0–0.1)
BASOPHILS NFR BLD AUTO: 0 %
BUN SERPL-MCNC: 14 MG/DL (ref 6–23)
CALCIUM SERPL-MCNC: 8.5 MG/DL (ref 8.6–10.6)
CHLORIDE SERPL-SCNC: 105 MMOL/L (ref 98–107)
CO2 SERPL-SCNC: 26 MMOL/L (ref 21–32)
CREAT SERPL-MCNC: 0.72 MG/DL (ref 0.5–1.3)
EGFRCR SERPLBLD CKD-EPI 2021: >90 ML/MIN/1.73M*2
EOSINOPHIL # BLD AUTO: 0 X10*3/UL (ref 0–0.7)
EOSINOPHIL NFR BLD AUTO: 0 %
ERYTHROCYTE [DISTWIDTH] IN BLOOD BY AUTOMATED COUNT: 22.6 % (ref 11.5–14.5)
GLUCOSE SERPL-MCNC: 117 MG/DL (ref 74–99)
HCT VFR BLD AUTO: 27.7 % (ref 41–52)
HGB BLD-MCNC: 9.4 G/DL (ref 13.5–17.5)
IMM GRANULOCYTES # BLD AUTO: 0.06 X10*3/UL (ref 0–0.7)
IMM GRANULOCYTES NFR BLD AUTO: 0.9 % (ref 0–0.9)
LYMPHOCYTES # BLD AUTO: 0.06 X10*3/UL (ref 1.2–4.8)
LYMPHOCYTES NFR BLD AUTO: 0.9 %
MAGNESIUM SERPL-MCNC: 1.61 MG/DL (ref 1.6–2.4)
MCH RBC QN AUTO: 31.2 PG (ref 26–34)
MCHC RBC AUTO-ENTMCNC: 33.9 G/DL (ref 32–36)
MCV RBC AUTO: 92 FL (ref 80–100)
MONOCYTES # BLD AUTO: 0.09 X10*3/UL (ref 0.1–1)
MONOCYTES NFR BLD AUTO: 1.3 %
NEUTROPHILS # BLD AUTO: 6.75 X10*3/UL (ref 1.2–7.7)
NEUTROPHILS NFR BLD AUTO: 96.9 %
NRBC BLD-RTO: 0 /100 WBCS (ref 0–0)
PHOSPHATE SERPL-MCNC: 4.1 MG/DL (ref 2.5–4.9)
PLATELET # BLD AUTO: 114 X10*3/UL (ref 150–450)
POTASSIUM SERPL-SCNC: 3.4 MMOL/L (ref 3.5–5.3)
RBC # BLD AUTO: 3.01 X10*6/UL (ref 4.5–5.9)
SODIUM SERPL-SCNC: 140 MMOL/L (ref 136–145)
WBC # BLD AUTO: 7 X10*3/UL (ref 4.4–11.3)

## 2024-02-14 PROCEDURE — 2500000004 HC RX 250 GENERAL PHARMACY W/ HCPCS (ALT 636 FOR OP/ED)

## 2024-02-14 PROCEDURE — 2500000001 HC RX 250 WO HCPCS SELF ADMINISTERED DRUGS (ALT 637 FOR MEDICARE OP): Performed by: REGISTERED NURSE

## 2024-02-14 PROCEDURE — 2500000001 HC RX 250 WO HCPCS SELF ADMINISTERED DRUGS (ALT 637 FOR MEDICARE OP): Performed by: NURSE PRACTITIONER

## 2024-02-14 PROCEDURE — 83735 ASSAY OF MAGNESIUM: CPT | Performed by: PHYSICIAN ASSISTANT

## 2024-02-14 PROCEDURE — 2500000002 HC RX 250 W HCPCS SELF ADMINISTERED DRUGS (ALT 637 FOR MEDICARE OP, ALT 636 FOR OP/ED): Performed by: NURSE PRACTITIONER

## 2024-02-14 PROCEDURE — 2500000001 HC RX 250 WO HCPCS SELF ADMINISTERED DRUGS (ALT 637 FOR MEDICARE OP): Performed by: HOME HEALTH AIDE

## 2024-02-14 PROCEDURE — 85025 COMPLETE CBC W/AUTO DIFF WBC: CPT | Performed by: PHYSICIAN ASSISTANT

## 2024-02-14 PROCEDURE — 80069 RENAL FUNCTION PANEL: CPT | Performed by: PHYSICIAN ASSISTANT

## 2024-02-14 PROCEDURE — 2500000004 HC RX 250 GENERAL PHARMACY W/ HCPCS (ALT 636 FOR OP/ED): Performed by: INTERNAL MEDICINE

## 2024-02-14 RX ORDER — MAGNESIUM CHLORIDE 64 MG
128 TABLET, DELAYED RELEASE (ENTERIC COATED) ORAL DAILY
Status: DISCONTINUED | OUTPATIENT
Start: 2024-02-14 | End: 2024-02-14 | Stop reason: HOSPADM

## 2024-02-14 RX ORDER — POTASSIUM CHLORIDE 29.8 MG/ML
40 INJECTION INTRAVENOUS ONCE
Status: COMPLETED | OUTPATIENT
Start: 2024-02-14 | End: 2024-02-14

## 2024-02-14 RX ADMIN — POTASSIUM CHLORIDE 20 MEQ: 1500 TABLET, EXTENDED RELEASE ORAL at 08:53

## 2024-02-14 RX ADMIN — PREDNISOLONE ACETATE 1 DROP: 10 SUSPENSION/ DROPS OPHTHALMIC at 07:00

## 2024-02-14 RX ADMIN — PREDNISOLONE ACETATE 1 DROP: 10 SUSPENSION/ DROPS OPHTHALMIC at 14:14

## 2024-02-14 RX ADMIN — SODIUM CHLORIDE 100 ML/HR: 9 INJECTION, SOLUTION INTRAVENOUS at 09:01

## 2024-02-14 RX ADMIN — Medication 1 TABLET: at 08:53

## 2024-02-14 RX ADMIN — FAMOTIDINE 40 MG: 40 TABLET ORAL at 08:53

## 2024-02-14 RX ADMIN — FERROUS SULFATE TAB 325 MG (65 MG ELEMENTAL FE) 1 TABLET: 325 (65 FE) TAB at 08:54

## 2024-02-14 RX ADMIN — KETOROLAC TROMETHAMINE 1 DROP: 5 SOLUTION OPHTHALMIC at 07:00

## 2024-02-14 RX ADMIN — KETOROLAC TROMETHAMINE 1 DROP: 5 SOLUTION OPHTHALMIC at 17:00

## 2024-02-14 RX ADMIN — POTASSIUM CHLORIDE 40 MEQ: 29.8 INJECTION, SOLUTION INTRAVENOUS at 08:53

## 2024-02-14 RX ADMIN — PREDNISOLONE ACETATE 1 DROP: 10 SUSPENSION/ DROPS OPHTHALMIC at 14:13

## 2024-02-14 RX ADMIN — FLUCONAZOLE 400 MG: 200 TABLET ORAL at 08:53

## 2024-02-14 RX ADMIN — MAGNESIUM 64 MG (MAGNESIUM CHLORIDE) TABLET,DELAYED RELEASE 128 MG: at 17:48

## 2024-02-14 RX ADMIN — ACYCLOVIR 400 MG: 400 TABLET ORAL at 08:53

## 2024-02-14 RX ADMIN — PREDNISOLONE ACETATE 1 DROP: 10 SUSPENSION/ DROPS OPHTHALMIC at 08:57

## 2024-02-14 RX ADMIN — PREDNISOLONE ACETATE 1 DROP: 10 SUSPENSION/ DROPS OPHTHALMIC at 17:00

## 2024-02-14 RX ADMIN — DORZOLAMIDE HYDROCHLORIDE AND TIMOLOL MALEATE 1 DROP: 22.3; 6.8 SOLUTION/ DROPS OPHTHALMIC at 08:57

## 2024-02-14 RX ADMIN — BACITRACIN 1 APPLICATION: 500 OINTMENT TOPICAL at 08:57

## 2024-02-14 RX ADMIN — KETOROLAC TROMETHAMINE 1 DROP: 5 SOLUTION OPHTHALMIC at 14:13

## 2024-02-14 ASSESSMENT — PAIN - FUNCTIONAL ASSESSMENT
PAIN_FUNCTIONAL_ASSESSMENT: 0-10
PAIN_FUNCTIONAL_ASSESSMENT: 0-10

## 2024-02-14 ASSESSMENT — COGNITIVE AND FUNCTIONAL STATUS - GENERAL
MOBILITY SCORE: 22
WALKING IN HOSPITAL ROOM: A LITTLE
DAILY ACTIVITIY SCORE: 24
CLIMB 3 TO 5 STEPS WITH RAILING: A LITTLE

## 2024-02-14 ASSESSMENT — PAIN SCALES - GENERAL
PAINLEVEL_OUTOF10: 0 - NO PAIN
PAINLEVEL_OUTOF10: 0 - NO PAIN

## 2024-02-14 NOTE — DISCHARGE SUMMARY
Discharge Diagnosis  Burkitt lymphoma (CMS/HCC)    Issues Requiring Follow-Up  Ophthamology to conduct repeat eye exam measuring pressure today before patient leaves. They will also make certain patient gets a follow up outpatient appointment scheduled.     Hospital Course  Shilo Thakkar is a 23 y.o. male with PMH of Burkitt Lymphoma (Dx 10/20/23; s/p 4 cycles of HyperCVAD), malignant pleural effusions s/p CT (10/29/23), ADHD, and recently hospitalized from 1/17-1/23/2024 with visual changes, retinoschisis, retinal hemorrhages, and profound pancytopenia requiring left eye vitrectomy now admitted on 2/09/24 for cycle 5 of HyperCVAD.      D5 HyperCVAD     ONC:  # Burkitt lymphoma, newly diagnosed (10/20/23)  - 10/19/23 presented to OSH ED with dysphagia and abdominal pain  - CT A/P with IV contrast (10/19) mass in the body of the pancreas measuring approximately 2.7 x 2.9 cm. Multiple masses in the mesentery as well as omental caking c/q carcinomatosis.   - CT soft tissue neck w/ IV contrast (10/19): homogeneous soft tissue attenuation lesion in the L oropharynx which measures approximately 3.0 x 4.0 x 5.1 cm.   - CT Abdomen w/o IV contrast (10/20): Mesenteric lymphadenopathy  - US guided mesenteric LN biopsy (10/20/23): HIGH GRADE B-CELL LYMPHOMA MORPHOLOGICALLY C/W BURKITT LYMPHOMA; flow cytometry: CD10+ kappa restricted B cells supportive of a B cell lymphoma with germinal center origin.   - Biopsy L oropharyngeal mass and L cervical LN by ENT (10/23/23) high grade B-cell burkitt lymphoma  - PET CT (10/30) showed FDG avid left oropharyngeal mass and LAD above and below diaphragm  - BMBx (11/1) without evidence of disease  - s/p 4 cycles of HyperCVAD (10/28, 11/19, 12/11, 1/2/24)  - PET scan (1/15/24): Deauville 2. Complete resolution of metabolic activity of previously seen Lt oropharyngeal mass, resolution of previously seen hypermetabolic Lt level 2 and 3 cervical lymphadenopathy, resolution of previously seen  moderate-large size Lt pleural effusion and trace Rt pleural effusion and abdominopelvic ascites, resolution of previously seen hypermetabolic large mass within pancreatic tail/body and the previously seen hypermetabolic retroperitoneal and mesenteric lymphadenopathy.  - Cycle 5 initially delayed a little over a week due to recent hospitalization from 1/17-1/23/24 for visual changes, retinoschisis, retinal hemorrhages, and profound pancytopenia requiring left eye vitrectomy  - Now admitted for cycle 5 of HyperCVAD (plan to dose reduce cyclophosphamide to 80%)   - Plan pegfilgrastim outpatient  - No further IT chemotherapy     CHEMO  -Cyclophosphamide 300 mg/m2 IV over 3 hours every 12 hours for 6 doses on Days 1-3   -Mesna 600 mg/m2 IV continuous infusion over 24 hours daily on Days 1-3   -DOXOrubicin 50 mg/m2 IV push or IV continuous infusion over 24 hours on Day 4   -VinCRIStine 2 mg IV over 5-10 minutes on Days 4 and 11   -Dexamethasone 40 mg IV or PO daily on Days 1-4 and 11-14   -RiTUXimab 375 mg/m2 IV (Cycle 1) on Day 6 +/- 2 days   -RiTUXimab 375 mg/m2 IV or 1,400 mg SUBQ on Day 6 (Cycles 3, 5, or 7) +/- 2 days         OPTHO:  -Recent hx/o bilateral macula-involving sub ILM hemorrhage with serous detachment OS>OD  -Multilayer retinal hemorrhage, left eye.   -S/p pars plana vitrectomy (PPV)/C3F8/intraretinal tPA OS  -S/P PPV/MP/EL/air O  -Last seen by Ophthalm in outpatient clinic on 2/8. Per recs, continue Prednisolone and artificial tears QID to the left eye, Start Pred and ketorolac QID, right eye. Okay to proceed with chemotherapy.   - Prefer to keep Plts 50-70,000 and recommend avoiding PLt levels below 30K  - Pt reports baseline vision unchanged recently upon admit; right eye vision is blurry, left eye no vision  - May need repeat surgery in the Lt eye after next cycle of chemo if the vitreous hemorrhage persists or retina detaches. Recommends next Ophthalm appt in 1 week.  - Consulted Ophthalmology  (2/12) for follow up. Patient seen--> added Cosopt BID for right eye. No acute changes. Repeat eye exam to be completed prior to discharge. They will also make certain patient has outpatient appt scheduled.     HEME:  # Anemia without active bleeding  - Continue home ferrous sulfate 325mg BID started by Dr. Klein  - Transfuse to keep Hgb>8.5, Plt>50-70,000 and avoid dropping below 30,000 per ophtho requirements with hx/o retinal hemorrhages     CARDIAC:  - ECHO (10/2023): EF 60-65%  - Plan repeat ECHO (2/10/24) LVEF 60-65%     ID:  Allergies: NKDA  PPX: Acyclovir 400mg BID, Fluconazole 400mg daily, Bactrim MWF  - Plan Levaquin for neutropenia (days 5 through day 15 per outpt notes)     FEN/GI:  - Admit weight: 72.2kg, current wt 72kg (2/11)   - Monitor and replace electrolytes as needed  - Cont Vit B complex     ENDO:  -CTA neck (1/17) with 6 mm nodule in the left lobe of the thyroid gland   -TSH level (1/20): 1.35       DERM  #Dog scratch, LUE   -Cover with bacitracin, replace bandaid daily   -Currently C/D/I; assess daily      DISPO:  - Full Code  - Access: PICC line (right)  - Primary Onc: Dr. Klein  - NOK: Ms Deidra Pickett [phone]        Patient seen, examined, and discussed with Dr. Klein    Pertinent Physical Exam At Time of Discharge  Physical Exam  Constitutional:       Appearance: Normal appearance.   HENT:      Head: Normocephalic and atraumatic.      Nose: Nose normal.      Mouth/Throat:      Mouth: Mucous membranes are moist.   Eyes:      General:         Left eye: No discharge.      Comments: Patient is keeping his left eye closed d/t irritation. Eye exam deferred to Ophtho Team   Cardiovascular:      Rate and Rhythm: Normal rate and regular rhythm.      Heart sounds: Normal heart sounds.   Pulmonary:      Effort: Pulmonary effort is normal.      Breath sounds: Normal breath sounds.   Abdominal:      General: Abdomen is flat. Bowel sounds are normal.   Musculoskeletal:      Cervical back: Full  passive range of motion without pain.   Skin:     General: Skin is warm and dry.   Neurological:      Mental Status: He is alert and oriented to person, place, and time.   Psychiatric:         Attention and Perception: Attention normal.         Mood and Affect: Mood and affect normal.         Home Medications     Medication List      START taking these medications     dexAMETHasone 4 mg tablet; Commonly known as: Decadron; Take 10 tablets   (40 mg) by mouth once daily for 4 days . Take 2/21-2/24/24   dorzolamide-timoloL 22.3-6.8 mg/mL ophthalmic solution; Commonly known   as: Cosopt; Administer 1 drop into the right eye 2 times a day.   levoFLOXacin 500 mg tablet; Commonly known as: Levaquin; Take 1 tablet   (500 mg) by mouth once daily for 14 days.     CHANGE how you take these medications     * prednisoLONE acetate 1 % ophthalmic suspension; Commonly known as:   Pred-Forte; Administer 1 drop into the left eye 4 times a day. Do not   start before January 23, 2024.; What changed: Another medication with the   same name was added. Make sure you understand how and when to take each.   * prednisoLONE acetate 1 % ophthalmic suspension; Commonly known as:   Pred-Forte; Administer 1 drop into the both eyes 4 times a day.; What   changed: You were already taking a medication with the same name, and this   prescription was added. Make sure you understand how and when to take   each.  * This list has 2 medication(s) that are the same as other medications   prescribed for you. Read the directions carefully, and ask your doctor or   other care provider to review them with you.     CONTINUE taking these medications     acyclovir 400 mg tablet; Commonly known as: Zovirax; Take 1 tablet (400   mg) by mouth 2 times a day.   B complex-vitamin C tablet; Take 1 tablet by mouth once daily.   FeroSuL tablet; Generic drug: ferrous sulfate (325 mg ferrous sulfate);   Take 1 tablet by mouth 2 times a day.   fluconazole 200 mg tablet;  Commonly known as: Diflucan; Take 2 tablets   (400 mg) by mouth once daily.   ketorolac 0.5 % ophthalmic solution; Commonly known as: Acular; Use one   drop 4 times a day in the right eye   pantoprazole 40 mg EC tablet; Commonly known as: ProtoNix; Take 1 tablet   (40 mg) by mouth once daily in the morning. Take before meals. Do not   crush, chew, or split.   sennosides-docusate sodium 8.6-50 mg tablet; Commonly known as:   Ashley-Colace; Take 2 tablets by mouth 2 times a day as needed for   constipation.   sulfamethoxazole-trimethoprim 800-160 mg tablet; Commonly known as:   Bactrim DS; Take 1 tablet by mouth once a day on Monday, Wednesday, and   Friday.     STOP taking these medications     Artificial Tears(pg-hypm-glyc) 1-0.2-0.2 % drops ophthalmic drops;   Generic drug: peg 400-hypromellose-glycerin   ofloxacin 0.3 % ophthalmic solution; Commonly known as: Ocuflox   potassium chloride CR 20 mEq ER tablet; Commonly known as: Klor-Con M20   prednisoLONE acetate (PF) 1 % drops,suspension       Outpatient Follow-Up  Future Appointments   Date Time Provider Department Center   2/16/2024  3:00 PM INF 20 [facility]   2/16/2024  3:00 PM [facility]   2/19/2024 11:30 AM INF 01 [facility]   2/21/2024  1:00 PM [facility]   2/21/2024  1:30 PM INF 36 [facility]   2/23/2024  3:00 PM INF 35 [facility]   2/26/2024  4:00 PM INF 24 [facility]   2/27/2024  1:40 PM Olegario Klein MD [id number]   2/28/2024  3:15 PM INF 35 [facility]   3/1/2024  3:15 PM INF 07 [facility]   3/15/2024  1:00 PM INF 20 [facility]   3/20/2024  1:00 PM [facility]   3/20/2024  1:30 PM INF 37 [facility]   4/5/2024  1:00 PM INF 20 [facility]   4/5/2024  1:00 PM [facility]       Kayla Vang, APRN-CNP

## 2024-02-14 NOTE — PROGRESS NOTES
"Shilo Thakkar is a 23 y.o. male on day 5 of admission presenting with Burkitt lymphoma (CMS/HCC).      Subjective   Patient reports stable vision. Denies any acute changes such as eye pain, diplopia, flashes, floaters, or sudden vision loss.         Objective     Last Recorded Vitals  Blood pressure 137/86, pulse 52, temperature 36.3 °C (97.3 °F), temperature source Temporal, resp. rate 18, height 1.82 m (5' 11.65\"), weight 75.9 kg (167 lb 5.3 oz), SpO2 100 %.    Physical Exam  Base Eye Exam       Visual Acuity (Snellen - Linear)         Right Left    Near sc 20/20 HM              Tonometry (Tonopen, 4:44 PM)         Right Left    Pressure 26 21              Pupils         Dark Light Shape React APD    Right 5 3 Round Brisk None    Left 5 3 Round Brisk None              Visual Fields         Left Right      Full   Unable OS due to HM vision             Extraocular Movement         Right Left     Full, Ortho Full, Ortho              Neuro/Psych       Oriented x3: Yes                  Slit Lamp and Fundus Exam       External Exam         Right Left    External Normal Normal              Slit Lamp Exam         Right Left    Lids/Lashes Normal Normal    Conjunctiva/Sclera MARIO temporally and inferiorly MARIO inferiorly    Cornea Clear Clear    Anterior Chamber Deep and quiet trace cell    Iris Round and reactive Round and reactive    Lens Clear Clear    Anterior Vitreous Normal 60% air fill                    Relevant Results     OCT 02/08/24     - Right eye (OD): outer retinal disruptions and outer retinal folds inferiorly, CME    - Left eye (OS): No view     Optos 02/08/24    - Right eye (OD): SubLM subfoveal clotted heme now mostly resolved with scattered pre-retinal hemorrhage with in the posterior pole; stable    - OS:  Poor view 25% gas fill, retina is attached, residual preretinal blood clot with fibrin membranes in the temporal retina         Assessment/Plan   Bilateral macula-involving sub ILM hemorrhage with " serous detachment OS>OD  Multilayer retinal hemorrhage, left eye  S/p pars plana vitrectomy (PPV)/C3F8/intraretinal tPA left eye 01/22/24  S/P PPV/MP/EL/air right eye 01/28/24  - Continue Prednisolone TID for 1 week, then BID for 1 week, then daily for 1 week and stop, and artificial tears QID to the left eye for homegoing  - Continue Pred QID for 1 week, then TID for 1 week, then BID for 1 week, then daily for 1 week and stop, ketorolac QID, and Cosopt BID right eye for homegoing  - Eyes are healing well, patient can proceed with chemotherapy while avoiding platelets from dropping significantly - preferable level would be 50-70K but its understandable if this is not achievable and would recommend avoiding levels below 30K.  - May need repeat surgery in the left eye (after this cycle or the next) if the vitreous hemorrhage persists or retina detaches  - Will arrange outpatient follow up with retina specialist Dr. Canales         #Subconjunctival hemorrhage, both eyes  - Patient has subconjunctival hemorrhage on exam.  Counselled patient that this is a benign finding and will resolve in the next few weeks. Warned patient that in the future this may be precipitated by Valsalva maneuvers but may occur without clear trigger.      Previously discussed with Dr. Canales, vitreoretinal attending      Agustina Blakely MD PhD  PGY-3 Ophthalmology      Ophthalmology Adult Pager - [phone]  Ophthalmology Pediatrics Pager - [phone]     For adult follow-up appointments, call: [phone]  For pediatric follow-up appointments, call: [phone]        NOTE: This note is not finalized until attending reviews and signs.

## 2024-02-14 NOTE — PROGRESS NOTES
Spiritual Care Visit    Clinical Encounter Type  Visited With: Patient and family together  Routine Visit: Follow-up     reintroduced self and role to patient Shilo Thakkar and his significant other.  offered to administer ashes in observance of Chris Wednesday, and they expressed they would like to receive them. Patient updated  on how his treatment is going, on the vision loss he is dealing with, and how he is coping. Patient and significant other were appreciative of care and did not have any further needs at this time. Spiritual Care remains available as needed/requested.    Rev. Lisa Parker, MDiv, BCC

## 2024-02-16 ENCOUNTER — APPOINTMENT (OUTPATIENT)
Dept: HEMATOLOGY/ONCOLOGY | Facility: CLINIC | Age: 24
End: 2024-02-16
Payer: COMMERCIAL

## 2024-02-16 ENCOUNTER — INFUSION (OUTPATIENT)
Dept: HEMATOLOGY/ONCOLOGY | Facility: HOSPITAL | Age: 24
End: 2024-02-16
Payer: COMMERCIAL

## 2024-02-16 ENCOUNTER — OFFICE VISIT (OUTPATIENT)
Dept: HEMATOLOGY/ONCOLOGY | Facility: HOSPITAL | Age: 24
End: 2024-02-16
Payer: COMMERCIAL

## 2024-02-16 VITALS
SYSTOLIC BLOOD PRESSURE: 117 MMHG | BODY MASS INDEX: 21.8 KG/M2 | RESPIRATION RATE: 18 BRPM | HEART RATE: 87 BPM | HEIGHT: 72 IN | DIASTOLIC BLOOD PRESSURE: 80 MMHG | OXYGEN SATURATION: 100 % | WEIGHT: 160.94 LBS | TEMPERATURE: 98.2 F

## 2024-02-16 DIAGNOSIS — C83.70 BURKITT LYMPHOMA, UNSPECIFIED BODY REGION (MULTI): ICD-10-CM

## 2024-02-16 DIAGNOSIS — C83.78 BURKITT LYMPHOMA OF LYMPH NODES OF MULTIPLE REGIONS (MULTI): ICD-10-CM

## 2024-02-16 LAB
ALBUMIN SERPL BCP-MCNC: 4 G/DL (ref 3.4–5)
ALP SERPL-CCNC: 52 U/L (ref 33–120)
ALT SERPL W P-5'-P-CCNC: 24 U/L (ref 10–52)
ANION GAP SERPL CALC-SCNC: 10 MMOL/L (ref 10–20)
AST SERPL W P-5'-P-CCNC: 14 U/L (ref 9–39)
BASOPHILS # BLD AUTO: 0 X10*3/UL (ref 0–0.1)
BASOPHILS NFR BLD AUTO: 0 %
BILIRUB SERPL-MCNC: 0.8 MG/DL (ref 0–1.2)
BUN SERPL-MCNC: 22 MG/DL (ref 6–23)
CALCIUM SERPL-MCNC: 9 MG/DL (ref 8.6–10.3)
CHLORIDE SERPL-SCNC: 100 MMOL/L (ref 98–107)
CO2 SERPL-SCNC: 32 MMOL/L (ref 21–32)
CREAT SERPL-MCNC: 0.71 MG/DL (ref 0.5–1.3)
DACRYOCYTES BLD QL SMEAR: NORMAL
EGFRCR SERPLBLD CKD-EPI 2021: >90 ML/MIN/1.73M*2
EOSINOPHIL # BLD AUTO: 0.03 X10*3/UL (ref 0–0.7)
EOSINOPHIL NFR BLD AUTO: 1.2 %
ERYTHROCYTE [DISTWIDTH] IN BLOOD BY AUTOMATED COUNT: 21.4 % (ref 11.5–14.5)
GLUCOSE SERPL-MCNC: 102 MG/DL (ref 74–99)
HCT VFR BLD AUTO: 29.9 % (ref 41–52)
HGB BLD-MCNC: 10.8 G/DL (ref 13.5–17.5)
IMM GRANULOCYTES # BLD AUTO: 0.01 X10*3/UL (ref 0–0.7)
IMM GRANULOCYTES NFR BLD AUTO: 0.4 % (ref 0–0.9)
LYMPHOCYTES # BLD AUTO: 0.17 X10*3/UL (ref 1.2–4.8)
LYMPHOCYTES NFR BLD AUTO: 7 %
MAGNESIUM SERPL-MCNC: 2.03 MG/DL (ref 1.6–2.4)
MCH RBC QN AUTO: 31.3 PG (ref 26–34)
MCHC RBC AUTO-ENTMCNC: 36.1 G/DL (ref 32–36)
MCV RBC AUTO: 87 FL (ref 80–100)
MONOCYTES # BLD AUTO: 0.06 X10*3/UL (ref 0.1–1)
MONOCYTES NFR BLD AUTO: 2.5 %
NEUTROPHILS # BLD AUTO: 2.16 X10*3/UL (ref 1.2–7.7)
NEUTROPHILS NFR BLD AUTO: 88.9 %
NRBC BLD-RTO: 0 /100 WBCS (ref 0–0)
OVALOCYTES BLD QL SMEAR: NORMAL
PLATELET # BLD AUTO: 116 X10*3/UL (ref 150–450)
POTASSIUM SERPL-SCNC: 3.2 MMOL/L (ref 3.5–5.3)
PROT SERPL-MCNC: 5.8 G/DL (ref 6.4–8.2)
RBC # BLD AUTO: 3.45 X10*6/UL (ref 4.5–5.9)
RBC MORPH BLD: NORMAL
SCHISTOCYTES BLD QL SMEAR: NORMAL
SODIUM SERPL-SCNC: 139 MMOL/L (ref 136–145)
WBC # BLD AUTO: 2.4 X10*3/UL (ref 4.4–11.3)

## 2024-02-16 PROCEDURE — 96372 THER/PROPH/DIAG INJ SC/IM: CPT

## 2024-02-16 PROCEDURE — 83735 ASSAY OF MAGNESIUM: CPT

## 2024-02-16 PROCEDURE — 80053 COMPREHEN METABOLIC PANEL: CPT

## 2024-02-16 PROCEDURE — 99215 OFFICE O/P EST HI 40 MIN: CPT | Performed by: NURSE PRACTITIONER

## 2024-02-16 PROCEDURE — 85025 COMPLETE CBC W/AUTO DIFF WBC: CPT

## 2024-02-16 PROCEDURE — 96372 THER/PROPH/DIAG INJ SC/IM: CPT | Performed by: INTERNAL MEDICINE

## 2024-02-16 PROCEDURE — 1036F TOBACCO NON-USER: CPT | Performed by: NURSE PRACTITIONER

## 2024-02-16 PROCEDURE — 2500000004 HC RX 250 GENERAL PHARMACY W/ HCPCS (ALT 636 FOR OP/ED): Mod: JZ | Performed by: INTERNAL MEDICINE

## 2024-02-16 RX ORDER — FAMOTIDINE 10 MG/ML
20 INJECTION INTRAVENOUS ONCE AS NEEDED
Status: DISCONTINUED | OUTPATIENT
Start: 2024-02-16 | End: 2024-02-16 | Stop reason: HOSPADM

## 2024-02-16 RX ORDER — ALBUTEROL SULFATE 0.83 MG/ML
3 SOLUTION RESPIRATORY (INHALATION) AS NEEDED
Status: DISCONTINUED | OUTPATIENT
Start: 2024-02-16 | End: 2024-02-16 | Stop reason: HOSPADM

## 2024-02-16 RX ORDER — EPINEPHRINE 0.3 MG/.3ML
0.3 INJECTION SUBCUTANEOUS EVERY 5 MIN PRN
Status: DISCONTINUED | OUTPATIENT
Start: 2024-02-16 | End: 2024-02-16 | Stop reason: HOSPADM

## 2024-02-16 RX ORDER — SULFAMETHOXAZOLE AND TRIMETHOPRIM 800; 160 MG/1; MG/1
1 TABLET ORAL
Qty: 12 TABLET | Refills: 0 | Status: SHIPPED | OUTPATIENT
Start: 2024-02-16 | End: 2024-03-12 | Stop reason: SDUPTHER

## 2024-02-16 RX ORDER — DIPHENHYDRAMINE HYDROCHLORIDE 50 MG/ML
50 INJECTION INTRAMUSCULAR; INTRAVENOUS AS NEEDED
Status: DISCONTINUED | OUTPATIENT
Start: 2024-02-16 | End: 2024-02-16 | Stop reason: HOSPADM

## 2024-02-16 RX ADMIN — PEGFILGRASTIM 6 MG: 6 INJECTION SUBCUTANEOUS at 16:41

## 2024-02-16 NOTE — PROGRESS NOTES
Patient came in for count check, accompanied by mother and sister. Patient is ambulatory, awake, conscious and coherent not in cp distress. Labs drawn and resulted. Marleni MCAFRLANE NP seen the patient. Lab results referred to Marleni MCFARLANE NP. Ordered to resume oral potassium at home. Instructed  patient to resume oral potassium at home. Family and patient verbalized understanding of the instruction. Instructed to return on Monday 2/19 for his next appointment. Patiens is discharged in stable condition.

## 2024-02-17 PROBLEM — Z79.69 IMMUNOSUPPRESSED DUE TO CHEMOTHERAPY: Status: ACTIVE | Noted: 2024-02-17

## 2024-02-17 PROBLEM — D84.821 IMMUNOSUPPRESSED DUE TO CHEMOTHERAPY (MULTI): Status: ACTIVE | Noted: 2024-02-17

## 2024-02-17 PROBLEM — T45.1X5A IMMUNOSUPPRESSED DUE TO CHEMOTHERAPY (MULTI): Status: ACTIVE | Noted: 2024-02-17

## 2024-02-17 PROBLEM — Z79.899 IMMUNOSUPPRESSED DUE TO CHEMOTHERAPY (MULTI): Status: ACTIVE | Noted: 2024-02-17

## 2024-02-17 ASSESSMENT — ENCOUNTER SYMPTOMS: EYE PROBLEMS: 1

## 2024-02-17 NOTE — ASSESSMENT & PLAN NOTE
- Bilateral retinoschisis and retinal hemorrgages   - s/p left eye vitrectomy  - May require repeat surgery in left eye folowing next cycle of chemo if vitreous hemorrhage persists or retina detaches  - Optho recommends Platelet threshold of 50-70,000 & not dropping below 30,000  - Baseline vision is unchanged today  - Continue Prednisolone and artificial ears to left eye; Started Prednisolone and Ketorolac to right eye

## 2024-02-17 NOTE — ASSESSMENT & PLAN NOTE
High risk BL  in CR   PET-CT s/p C4 (1/15/24) c/w Rudolph 2  Admitted for C5 (2/9-2/14); Delayed d/t retinal hemorrhage requiring left eye vitrectomy  Cyclophosphamide dose reduced to 80%  Neulasta today  D11 Vincristine, Rituximab on 2/19  No further IT chemotherapy   Discussed questions regarding plan for future cycles. Will discuss with Dr. Klein and review with them at next visit.

## 2024-02-17 NOTE — ASSESSMENT & PLAN NOTE
- Platelet transfusion threshold 50,000-70,000 per Optho recommendations. Cannot drop below 30,000.  - Updated Treatment Plan 2/16

## 2024-02-17 NOTE — PROGRESS NOTES
Patient ID: Shilo Thakkar is a 23 y.o. male.    Subjective    HPI  Presents today for Neulasta & post hospital discharge follow up visit. He is C5D6 HyperCVAD. Tolerated admission well without any unexpected complications.    Continues to report blurry vision in right eye and no vision in left eye which has not changed. Was seen by Optho during admission. They recommend keeping Platelet threshold 50-70,000.     Review of Systems   Eyes:  Positive for eye problems (No vision in left eye; Blurry vision in right eye (unchanged from recent baseline)).   All other systems reviewed and are negative.      Objective    BSA: There is no height or weight on file to calculate BSA.  There were no vitals taken for this visit.  Vital signs reviewed today.      Physical Exam  Constitutional:       Appearance: Normal appearance.   HENT:      Head: Normocephalic.      Nose: Nose normal.      Mouth/Throat:      Mouth: Mucous membranes are moist.      Pharynx: Oropharynx is clear.   Eyes:      Extraocular Movements: Extraocular movements intact.      Conjunctiva/sclera: Conjunctivae normal.      Pupils: Pupils are equal, round, and reactive to light.      Comments: Keeps left eye closed d/t vision changes. Right eye with unchanged small amount of bleeding.   Cardiovascular:      Rate and Rhythm: Normal rate and regular rhythm.      Pulses: Normal pulses.      Heart sounds: Normal heart sounds.   Pulmonary:      Effort: Pulmonary effort is normal.      Breath sounds: Normal breath sounds.   Abdominal:      General: Abdomen is flat. Bowel sounds are normal.      Palpations: Abdomen is soft.   Musculoskeletal:         General: Normal range of motion.      Cervical back: Normal range of motion.   Skin:     General: Skin is warm and dry.      Capillary Refill: Capillary refill takes less than 2 seconds.   Neurological:      General: No focal deficit present.      Mental Status: He is alert and oriented to person, place, and time. Mental  status is at baseline.   Psychiatric:         Mood and Affect: Mood normal.         Performance Status:  Karnofsky Score: 70 - Cares for self; unable to carry on normal activity or do normal work       Assessment/Plan         Oncology History   Burkitt lymphoma (CMS/HCC)   10/27/2023 Initial Diagnosis    Burkitt lymphoma (CMS/HCC)     10/29/2023 -  Chemotherapy    HyperCVAD + RiTUXimab, 21 Day Cycles          Burkitt lymphoma (CMS/HCC)  High risk BL  in CR   PET-CT s/p C4 (1/15/24) c/w Deauville 2  Admitted for C5 (2/9-2/14); Delayed d/t retinal hemorrhage requiring left eye vitrectomy  Cyclophosphamide dose reduced to 80%  Neulasta today  D11 Vincristine, Rituximab on 2/19  No further IT chemotherapy   Discussed questions regarding plan for future cycles. Will discuss with Dr. Klein and review with them at next visit.      Bilateral retinoschisis  - Bilateral retinoschisis and retinal hemorrgages   - s/p left eye vitrectomy  - May require repeat surgery in left eye folowing next cycle of chemo if vitreous hemorrhage persists or retina detaches  - Optho recommends Platelet threshold of 50-70,000 & not dropping below 30,000  - Baseline vision is unchanged today  - Continue Prednisolone and artificial ears to left eye; Started Prednisolone and Ketorolac to right eye     Thrombocytopenia (CMS/HCC)  - Platelet transfusion threshold 50,000-70,000 per Optho recommendations. Cannot drop below 30,000.  - Updated Treatment Plan 2/16    Immunosuppressed due to chemotherapy (CMS/HCC)  - Continue prophylactic Acyclovir, Fluconazole, and Bactrim  - Levofloxacin Day 5-15 of each cycle for antibacterial prophylaxis     RTC  Requires 3x weekly count checks given new platelet transfusion threshold    Optho Follow Up 2/19  D11 Vincristine, Rituximab, [facility] Follow Up 2/19  Count Check 2/21, 2/23, 2/26  Follow Up with Dr. Klein 2/27   C6 tentatively due 3/2       DEMETRIO Mojica-CNP

## 2024-02-19 ENCOUNTER — OFFICE VISIT (OUTPATIENT)
Dept: OPHTHALMOLOGY | Facility: CLINIC | Age: 24
End: 2024-02-19
Payer: COMMERCIAL

## 2024-02-19 ENCOUNTER — INFUSION (OUTPATIENT)
Dept: HEMATOLOGY/ONCOLOGY | Facility: HOSPITAL | Age: 24
End: 2024-02-19
Payer: COMMERCIAL

## 2024-02-19 ENCOUNTER — OFFICE VISIT (OUTPATIENT)
Dept: HEMATOLOGY/ONCOLOGY | Facility: HOSPITAL | Age: 24
End: 2024-02-19
Payer: COMMERCIAL

## 2024-02-19 VITALS
SYSTOLIC BLOOD PRESSURE: 127 MMHG | HEART RATE: 75 BPM | BODY MASS INDEX: 21.46 KG/M2 | TEMPERATURE: 98.4 F | WEIGHT: 156.75 LBS | OXYGEN SATURATION: 100 % | DIASTOLIC BLOOD PRESSURE: 70 MMHG | RESPIRATION RATE: 16 BRPM

## 2024-02-19 DIAGNOSIS — H33.103 BILATERAL RETINOSCHISIS: ICD-10-CM

## 2024-02-19 DIAGNOSIS — C83.70 BURKITT LYMPHOMA, UNSPECIFIED BODY REGION (MULTI): ICD-10-CM

## 2024-02-19 DIAGNOSIS — C83.79 BURKITT LYMPHOMA OF SOLID ORGAN EXCLUDING SPLEEN (MULTI): ICD-10-CM

## 2024-02-19 DIAGNOSIS — T45.1X5A IMMUNOSUPPRESSED DUE TO CHEMOTHERAPY (MULTI): ICD-10-CM

## 2024-02-19 DIAGNOSIS — C83.78 BURKITT LYMPHOMA OF LYMPH NODES OF MULTIPLE REGIONS (MULTI): ICD-10-CM

## 2024-02-19 DIAGNOSIS — H35.351 CYSTOID MACULAR EDEMA, RIGHT EYE: ICD-10-CM

## 2024-02-19 DIAGNOSIS — C83.79 BURKITT LYMPHOMA OF SOLID ORGAN EXCLUDING SPLEEN (MULTI): Primary | ICD-10-CM

## 2024-02-19 DIAGNOSIS — D84.821 IMMUNOSUPPRESSED DUE TO CHEMOTHERAPY (MULTI): ICD-10-CM

## 2024-02-19 DIAGNOSIS — D69.6 THROMBOCYTOPENIA (CMS-HCC): ICD-10-CM

## 2024-02-19 DIAGNOSIS — H35.63 RETINAL HEMORRHAGE OF BOTH EYES: Primary | ICD-10-CM

## 2024-02-19 DIAGNOSIS — Z79.899 IMMUNOSUPPRESSED DUE TO CHEMOTHERAPY (MULTI): ICD-10-CM

## 2024-02-19 PROBLEM — D63.0 ANEMIA IN NEOPLASTIC DISEASE: Status: RESOLVED | Noted: 2024-01-22 | Resolved: 2024-02-19

## 2024-02-19 PROBLEM — Z51.11 ENCOUNTER FOR ANTINEOPLASTIC CHEMOTHERAPY: Status: RESOLVED | Noted: 2023-12-11 | Resolved: 2024-02-19

## 2024-02-19 LAB
ALBUMIN SERPL BCP-MCNC: 4.5 G/DL (ref 3.4–5)
ALP SERPL-CCNC: 57 U/L (ref 33–120)
ALT SERPL W P-5'-P-CCNC: 21 U/L (ref 10–52)
ANION GAP SERPL CALC-SCNC: 13 MMOL/L (ref 10–20)
AST SERPL W P-5'-P-CCNC: 11 U/L (ref 9–39)
BASOPHILS # BLD MANUAL: 0 X10*3/UL (ref 0–0.1)
BASOPHILS NFR BLD MANUAL: 0 %
BILIRUB SERPL-MCNC: 1.1 MG/DL (ref 0–1.2)
BUN SERPL-MCNC: 18 MG/DL (ref 6–23)
CALCIUM SERPL-MCNC: 9.5 MG/DL (ref 8.6–10.3)
CHLORIDE SERPL-SCNC: 101 MMOL/L (ref 98–107)
CO2 SERPL-SCNC: 29 MMOL/L (ref 21–32)
CREAT SERPL-MCNC: 0.68 MG/DL (ref 0.5–1.3)
DACRYOCYTES BLD QL SMEAR: ABNORMAL
EGFRCR SERPLBLD CKD-EPI 2021: >90 ML/MIN/1.73M*2
EOSINOPHIL # BLD MANUAL: 0 X10*3/UL (ref 0–0.7)
EOSINOPHIL NFR BLD MANUAL: 0 %
ERYTHROCYTE [DISTWIDTH] IN BLOOD BY AUTOMATED COUNT: 20.6 % (ref 11.5–14.5)
GLUCOSE SERPL-MCNC: 84 MG/DL (ref 74–99)
HCT VFR BLD AUTO: 28.6 % (ref 41–52)
HGB BLD-MCNC: 10 G/DL (ref 13.5–17.5)
IMM GRANULOCYTES # BLD AUTO: 0.11 X10*3/UL (ref 0–0.7)
IMM GRANULOCYTES NFR BLD AUTO: 5.8 % (ref 0–0.9)
LYMPHOCYTES # BLD MANUAL: 0.21 X10*3/UL (ref 1.2–4.8)
LYMPHOCYTES NFR BLD MANUAL: 11 %
MCH RBC QN AUTO: 31.1 PG (ref 26–34)
MCHC RBC AUTO-ENTMCNC: 35 G/DL (ref 32–36)
MCV RBC AUTO: 89 FL (ref 80–100)
MONOCYTES # BLD MANUAL: 0.02 X10*3/UL (ref 0.1–1)
MONOCYTES NFR BLD MANUAL: 1 %
NEUTS SEG # BLD MANUAL: 1.67 X10*3/UL (ref 1.2–7)
NEUTS SEG NFR BLD MANUAL: 88 %
NRBC BLD-RTO: 0 /100 WBCS (ref 0–0)
OVALOCYTES BLD QL SMEAR: ABNORMAL
PLATELET # BLD AUTO: 89 X10*3/UL (ref 150–450)
POTASSIUM SERPL-SCNC: 3.7 MMOL/L (ref 3.5–5.3)
PROT SERPL-MCNC: 6.4 G/DL (ref 6.4–8.2)
RBC # BLD AUTO: 3.22 X10*6/UL (ref 4.5–5.9)
RBC MORPH BLD: ABNORMAL
SCHISTOCYTES BLD QL SMEAR: ABNORMAL
SODIUM SERPL-SCNC: 139 MMOL/L (ref 136–145)
TOTAL CELLS COUNTED BLD: 100
WBC # BLD AUTO: 1.9 X10*3/UL (ref 4.4–11.3)

## 2024-02-19 PROCEDURE — 85027 COMPLETE CBC AUTOMATED: CPT

## 2024-02-19 PROCEDURE — 96413 CHEMO IV INFUSION 1 HR: CPT

## 2024-02-19 PROCEDURE — 99417 PROLNG OP E/M EACH 15 MIN: CPT | Performed by: PHYSICIAN ASSISTANT

## 2024-02-19 PROCEDURE — 99215 OFFICE O/P EST HI 40 MIN: CPT | Performed by: PHYSICIAN ASSISTANT

## 2024-02-19 PROCEDURE — 85007 BL SMEAR W/DIFF WBC COUNT: CPT

## 2024-02-19 PROCEDURE — 96411 CHEMO IV PUSH ADDL DRUG: CPT

## 2024-02-19 PROCEDURE — 99024 POSTOP FOLLOW-UP VISIT: CPT

## 2024-02-19 PROCEDURE — 1036F TOBACCO NON-USER: CPT | Performed by: PHYSICIAN ASSISTANT

## 2024-02-19 PROCEDURE — 2500000004 HC RX 250 GENERAL PHARMACY W/ HCPCS (ALT 636 FOR OP/ED): Mod: JZ | Performed by: INTERNAL MEDICINE

## 2024-02-19 PROCEDURE — 84075 ASSAY ALKALINE PHOSPHATASE: CPT

## 2024-02-19 PROCEDURE — 2500000001 HC RX 250 WO HCPCS SELF ADMINISTERED DRUGS (ALT 637 FOR MEDICARE OP): Performed by: INTERNAL MEDICINE

## 2024-02-19 PROCEDURE — 92134 CPTRZ OPH DX IMG PST SGM RTA: CPT | Mod: BILATERAL PROCEDURE

## 2024-02-19 PROCEDURE — 2500000004 HC RX 250 GENERAL PHARMACY W/ HCPCS (ALT 636 FOR OP/ED): Performed by: INTERNAL MEDICINE

## 2024-02-19 PROCEDURE — 96415 CHEMO IV INFUSION ADDL HR: CPT

## 2024-02-19 RX ORDER — DIPHENHYDRAMINE HYDROCHLORIDE 50 MG/ML
50 INJECTION INTRAMUSCULAR; INTRAVENOUS AS NEEDED
Status: DISCONTINUED | OUTPATIENT
Start: 2024-02-19 | End: 2024-02-19 | Stop reason: HOSPADM

## 2024-02-19 RX ORDER — DIPHENHYDRAMINE HCL 50 MG
50 CAPSULE ORAL ONCE
Status: COMPLETED | OUTPATIENT
Start: 2024-02-19 | End: 2024-02-19

## 2024-02-19 RX ORDER — ALBUTEROL SULFATE 0.83 MG/ML
3 SOLUTION RESPIRATORY (INHALATION) AS NEEDED
Status: DISCONTINUED | OUTPATIENT
Start: 2024-02-19 | End: 2024-02-19 | Stop reason: HOSPADM

## 2024-02-19 RX ORDER — HEPARIN SODIUM,PORCINE/PF 10 UNIT/ML
50 SYRINGE (ML) INTRAVENOUS AS NEEDED
Status: DISCONTINUED | OUTPATIENT
Start: 2024-02-19 | End: 2024-02-19 | Stop reason: HOSPADM

## 2024-02-19 RX ORDER — EPINEPHRINE 0.3 MG/.3ML
0.3 INJECTION SUBCUTANEOUS EVERY 5 MIN PRN
Status: DISCONTINUED | OUTPATIENT
Start: 2024-02-19 | End: 2024-02-19 | Stop reason: HOSPADM

## 2024-02-19 RX ORDER — ACETAMINOPHEN 325 MG/1
650 TABLET ORAL ONCE
Status: COMPLETED | OUTPATIENT
Start: 2024-02-19 | End: 2024-02-19

## 2024-02-19 RX ORDER — HEPARIN SODIUM,PORCINE/PF 10 UNIT/ML
50 SYRINGE (ML) INTRAVENOUS AS NEEDED
Status: CANCELLED | OUTPATIENT
Start: 2024-02-19

## 2024-02-19 RX ORDER — FAMOTIDINE 10 MG/ML
20 INJECTION INTRAVENOUS ONCE AS NEEDED
Status: DISCONTINUED | OUTPATIENT
Start: 2024-02-19 | End: 2024-02-19 | Stop reason: HOSPADM

## 2024-02-19 RX ADMIN — DIPHENHYDRAMINE HYDROCHLORIDE 50 MG: 50 CAPSULE ORAL at 11:36

## 2024-02-19 RX ADMIN — ACETAMINOPHEN 650 MG: 325 TABLET ORAL at 11:36

## 2024-02-19 RX ADMIN — RITUXIMAB 700 MG: 10 INJECTION, SOLUTION INTRAVENOUS at 12:28

## 2024-02-19 RX ADMIN — VINCRISTINE SULFATE 2 MG: 1 INJECTION, SOLUTION INTRAVENOUS at 12:05

## 2024-02-19 ASSESSMENT — SLIT LAMP EXAM - LIDS
COMMENTS: NORMAL
COMMENTS: NORMAL

## 2024-02-19 ASSESSMENT — ENCOUNTER SYMPTOMS
CHEST TIGHTNESS: 0
RESPIRATORY NEGATIVE: 0
WHEEZING: 0
PSYCHIATRIC NEGATIVE: 0
HEMATURIA: 0
LEG SWELLING: 0
DYSURIA: 0
FEVER: 0
ALLERGIC/IMMUNOLOGIC NEGATIVE: 0
SHORTNESS OF BREATH: 0
ABDOMINAL PAIN: 0
COUGH: 0
GASTROINTESTINAL NEGATIVE: 0
PALPITATIONS: 0
NEUROLOGICAL NEGATIVE: 0
DIZZINESS: 0
CARDIOVASCULAR NEGATIVE: 0
ENDOCRINE NEGATIVE: 0
HEADACHES: 0
CONSTIPATION: 0
FATIGUE: 0
EYE PROBLEMS: 1
CHILLS: 0
FREQUENCY: 0
HEMOPTYSIS: 0
MUSCULOSKELETAL NEGATIVE: 0
HEMATOLOGIC/LYMPHATIC NEGATIVE: 0
APPETITE CHANGE: 0
CONSTITUTIONAL NEGATIVE: 0
NAUSEA: 0
UNEXPECTED WEIGHT CHANGE: 0
DIARRHEA: 0
VOMITING: 0
EYES NEGATIVE: 0

## 2024-02-19 ASSESSMENT — CONF VISUAL FIELD
OS_INFERIOR_TEMPORAL_RESTRICTION: 0
OD_SUPERIOR_TEMPORAL_RESTRICTION: 0
OS_SUPERIOR_NASAL_RESTRICTION: 0
OD_SUPERIOR_NASAL_RESTRICTION: 0
OD_NORMAL: 1
OD_INFERIOR_TEMPORAL_RESTRICTION: 0
OS_NORMAL: 1
OS_INFERIOR_NASAL_RESTRICTION: 0
OS_SUPERIOR_TEMPORAL_RESTRICTION: 0
OD_INFERIOR_NASAL_RESTRICTION: 0

## 2024-02-19 ASSESSMENT — VISUAL ACUITY
OD_SC: 20/40
METHOD: SNELLEN - LINEAR
OD_SC+: -1

## 2024-02-19 ASSESSMENT — PAIN SCALES - GENERAL: PAINLEVEL: 0-NO PAIN

## 2024-02-19 ASSESSMENT — TONOMETRY
OD_IOP_MMHG: 17
OS_IOP_MMHG: 18
IOP_METHOD: TONOPEN

## 2024-02-19 ASSESSMENT — CUP TO DISC RATIO: OD_RATIO: 0.3

## 2024-02-19 ASSESSMENT — EXTERNAL EXAM - RIGHT EYE: OD_EXAM: NORMAL

## 2024-02-19 ASSESSMENT — EXTERNAL EXAM - LEFT EYE: OS_EXAM: NORMAL

## 2024-02-19 NOTE — ASSESSMENT & PLAN NOTE
- Bilateral retinoschisis and retinal hemorrgages   - S/p left eye vitrectomy  - May require repeat surgery in left eye folowing next cycle of chemo if vitreous hemorrhage persists or retina detaches  - Optho recommends Platelet threshold of 50-70,000 & not dropping below 30,000  - Baseline vision is improved today,  - Saw Ophthalmology today (02/19/24):  -- Taper Prednisolone 3x/day both eyes x 2 weeks then BID x 2 weeks  -- Continue Cosopt BID to the R eye  -- Taper Ketorolac TID both eyes x 2 weeks, BID x 2 weeks  -- Artificial tears QID to the left eye

## 2024-02-19 NOTE — PROGRESS NOTES
Patient ID: Shilo Thakkar is a 23 y.o. male.    Diagnosis: No matching staging information was found for the patient.,    Treatment:   Oncology History   Burkitt lymphoma (CMS/HCC)   10/27/2023 Initial Diagnosis    Burkitt lymphoma (CMS/HCC)     10/29/2023 -  Chemotherapy    HyperCVAD + RiTUXimab, 21 Day Cycles         Past Medical History:     Past Medical History:   Diagnosis Date    Colles' fracture of right radius, initial encounter for closed fracture 04/23/2015    Colles' fracture of right radius    Personal history of other mental and behavioral disorders 01/28/2014    History of attention deficit hyperactivity disorder    Personal history of other mental and behavioral disorders 05/10/2017    History of attention deficit hyperactivity disorder (ADHD)    Personal history of other specified conditions 05/03/2016    History of gynecomastia    Unspecified fracture of navicular (scaphoid) bone of left wrist, initial encounter for closed fracture 12/17/2015    Closed fracture of scaphoid bone of left wrist       Surgical History:     Past Surgical History:   Procedure Laterality Date    CHEST TUBE INSERTION  10/29/2023    FOOT FRACTURE SURGERY Left     OTHER SURGICAL HISTORY  10/03/2017    History Of Prior Surgery    US GUIDED SOFT TISSUE BIOPSY  10/20/2023    US GUIDED SOFT TISSUE BIOPSY 10/20/2023 [facility]         Family History:     Family History   Problem Relation Name Age of Onset    ADD / ADHD Mother      ADD / ADHD Sister      ADD / ADHD Brother         Social History:     Social History     Tobacco Use    Smoking status: Never     Passive exposure: Never    Smokeless tobacco: Never   Substance Use Topics    Alcohol use: Not Currently     Comment: rarely    Drug use: Never      -------------------------------------------------------------------------------------------------------  Subjective       The patient presents to the clinic today (02/19/24) for follow-up; overall, he is doing well.    He says his eye  symptoms are getting better. His vision is getting better. He says his R eye is better. He can't really see at all out of the L eye, R  is getting better. No discharge froming fromhis eyes. Scleral hemorrhcarges are better, he thinks. No headaches.    Denies nausea/vomiting/diarrhea. Denies constipation. He's eating and  drinking okay. Nodiarhea or constipation. No hematochezia, no melena. No dysuria no hematuria.     Denies chest pain, dyspnea, cough, and productive cough. No swelling in his arms or legs.     Review of Systems   Constitutional:  Negative for appetite change, chills, fatigue, fever and unexpected weight change.   HENT:   Negative for nosebleeds.    Eyes:  Positive for eye problems (as above).   Respiratory:  Negative for chest tightness, cough, hemoptysis, shortness of breath and wheezing.    Cardiovascular:  Negative for chest pain, leg swelling and palpitations.   Gastrointestinal:  Negative for abdominal pain, constipation, diarrhea, nausea and vomiting.   Genitourinary:  Negative for dysuria, frequency and hematuria.    Neurological:  Negative for dizziness and headaches.      -------------------------------------------------------------------------------------------------------  Objective   BSA: There is no height or weight on file to calculate BSA.  There were no vitals taken for this visit.    Physical Exam  HENT:      Mouth/Throat:      Mouth: Mucous membranes are moist.      Pharynx: No posterior oropharyngeal erythema.   Eyes:      General: No scleral icterus.     Extraocular Movements: Extraocular movements intact.      Conjunctiva/sclera:      Right eye: Right conjunctiva is not injected. Hemorrhage present.      Left eye: Left conjunctiva is not injected. Hemorrhage present.      Pupils: Pupils are equal, round, and reactive to light.      Comments: L periorbital area with edema, forcing the eyelid somewhat shut and some scleral hemorrhage; no discharge.     R eye without significant  edema, and scleral hemorrhage without discharge.    Cardiovascular:      Rate and Rhythm: Normal rate and regular rhythm.      Heart sounds: No murmur heard.     No friction rub. No gallop.   Pulmonary:      Effort: No respiratory distress.      Breath sounds: No stridor. No wheezing, rhonchi or rales.   Abdominal:      General: There is no distension.      Palpations: There is no mass.      Tenderness: There is no abdominal tenderness. There is no guarding or rebound.   Musculoskeletal:         General: No swelling or deformity.      Right lower leg: No edema.      Left lower leg: No edema.   Lymphadenopathy:      Cervical: No cervical adenopathy.   Skin:     Findings: No lesion or rash.      Comments: RUE PICC insertion site without erythema, edema, tenderness, and dischrage   Neurological:      Mental Status: He is alert.      Cranial Nerves: No cranial nerve deficit.      Motor: No weakness.   Psychiatric:         Mood and Affect: Mood normal.         Behavior: Behavior normal.       Performance Status:  Symptomatic; fully ambulatory  -------------------------------------------------------------------------------------------------------  Assessment/Plan    Burkitt lymphoma (CMS/HCC)  - High risk BL  in CR   - PET-CT s/p C4 (1/15/24) c/w Deauville 2  - Admitted for C5 (2/9-2/14); Delayed d/t retinal hemorrhage requiring left eye vitrectomy  - Cyclophosphamide dose reduced to 80%  - Received Neulasta (2/16/24)  - Received D11 Vincristine, Rituximab today (02/19/24)   - No further IT chemotherapy     Thrombocytopenia (CMS/HCC)  :: Due to disease and chemotherapy   - Ideal platelet transfusion threshold 50,000-70,000 per Optho recommendations; however, definitely cannot allow to drop below 30,000/uL.  - Updated Treatment Plan 2/16    Immunosuppressed due to chemotherapy (CMS/HCC)  - Continue prophylactic Acyclovir, Fluconazole, and Bactrim  - Levofloxacin Day 5-15 of each cycle for antibacterial prophylaxis      Bilateral retinoschisis  - Bilateral retinoschisis and retinal hemorrgages   - S/p left eye vitrectomy  - May require repeat surgery in left eye folowing next cycle of chemo if vitreous hemorrhage persists or retina detaches  - Optho recommends Platelet threshold of 50-70,000 & not dropping below 30,000  - Baseline vision is improved today,  - Saw Ophthalmology today (02/19/24):  -- Taper Prednisolone 3x/day both eyes x 2 weeks then BID x 2 weeks  -- Continue Cosopt BID to the R eye  -- Taper Ketorolac TID both eyes x 2 weeks, BID x 2 weeks  -- Artificial tears QID to the left eye    RTC:  - Count checks 3x weekly (Dymhqk-[id number]-Yltnum)  - 2/28/24: Dr. Klein --  moved to Wednesday to coincide with count check day    -------------------------------------------------------------------------------------------------------  Néstor Soni PA-C   [facility]

## 2024-02-19 NOTE — PROGRESS NOTES
Pt presents to infusion appointment in stable condition. Vincristine and rituximab infusions tolerated without incidence. Pt discharged ambulatory in stable condition at 1420 with support person.

## 2024-02-19 NOTE — PROGRESS NOTES
Bilateral macula-involving sub ILM hemorrhage with serous detachment OS>OD  Multilayer retinal hemorrhage, left eye  S/p pars plana vitrectomy (PPV)/C3F8/intraretinal tPA OS  S/P PPV/MP/EL/air OD    Gas is 20 % fill OS - No inferior RD on scleral depression 2/19/24  3+ VH below the bubble  Stable    Retina is flat OU  No signs of infection  VA improved from CF to 20/40 OD, CF OS    OCT 02/19/24     - Right eye (OD): outer retinal disruptions and outer retinal folds inferiorly, CME    - Left eye (OS): No view    Optos 02/19/24    - Right eye (OD): SubLM subfoveal clotted heme now mostly resolved with scattered pre-retinal hemorrhage with in the posterior pole; stable    - OS:  Poor view 25% gas fill, retina is attached, residual preretinal blood clot with fibrin membranes in the temporal retina     Plan:  - Patient is a steroid responder OD, Taper Prednisolone 3x/day OU x 2 weeks then BID x 2 weeks, continue Cosopt BID OD, Taper Ketorolac TID OU x 2 weeks, BID x 2 weeksand artificial tears QID to the left eye,   - Eyes are healing well, patient can proceed with chemotherapy while avoiding platelets from dropping significantly - preferable level would be 50-70K but its understandable if this is not achievable and would recommend avoiding levels below 30K.  - May need repeat surgery in the left eye (after this cycle or the next) if the vitreous hemorrhage persists or retina detaches    RTC 3 weeks

## 2024-02-19 NOTE — ASSESSMENT & PLAN NOTE
- Continue prophylactic Acyclovir, Fluconazole, and Bactrim  - Levofloxacin Day 5-15 of each cycle for antibacterial prophylaxis

## 2024-02-19 NOTE — ASSESSMENT & PLAN NOTE
- High risk BL  in CR   - PET-CT s/p C4 (1/15/24) c/w Deauville 2  - Admitted for C5 (2/9-2/14); Delayed d/t retinal hemorrhage requiring left eye vitrectomy  - Cyclophosphamide dose reduced to 80%  - Received Neulasta (2/16/24)  - Received D11 Vincristine, Rituximab today (02/19/24)   - No further IT chemotherapy

## 2024-02-21 ENCOUNTER — PROCEDURE VISIT (OUTPATIENT)
Dept: HEMATOLOGY/ONCOLOGY | Facility: HOSPITAL | Age: 24
End: 2024-02-21
Payer: COMMERCIAL

## 2024-02-21 ENCOUNTER — INFUSION (OUTPATIENT)
Dept: HEMATOLOGY/ONCOLOGY | Facility: HOSPITAL | Age: 24
End: 2024-02-21
Payer: COMMERCIAL

## 2024-02-21 VITALS
OXYGEN SATURATION: 100 % | DIASTOLIC BLOOD PRESSURE: 83 MMHG | RESPIRATION RATE: 16 BRPM | HEART RATE: 111 BPM | BODY MASS INDEX: 21.49 KG/M2 | SYSTOLIC BLOOD PRESSURE: 134 MMHG | WEIGHT: 156.97 LBS | TEMPERATURE: 97.5 F

## 2024-02-21 VITALS
RESPIRATION RATE: 100 BRPM | OXYGEN SATURATION: 100 % | DIASTOLIC BLOOD PRESSURE: 65 MMHG | SYSTOLIC BLOOD PRESSURE: 124 MMHG | HEART RATE: 95 BPM | TEMPERATURE: 98.8 F

## 2024-02-21 DIAGNOSIS — C83.70 BURKITT LYMPHOMA, UNSPECIFIED BODY REGION (MULTI): ICD-10-CM

## 2024-02-21 DIAGNOSIS — C83.79 BURKITT LYMPHOMA OF SOLID ORGAN EXCLUDING SPLEEN (MULTI): ICD-10-CM

## 2024-02-21 LAB
ALBUMIN SERPL BCP-MCNC: 4.7 G/DL (ref 3.4–5)
ALP SERPL-CCNC: 71 U/L (ref 33–120)
ALT SERPL W P-5'-P-CCNC: 28 U/L (ref 10–52)
ANION GAP SERPL CALC-SCNC: 15 MMOL/L (ref 10–20)
AST SERPL W P-5'-P-CCNC: 15 U/L (ref 9–39)
BASOPHILS # BLD AUTO: 0 X10*3/UL (ref 0–0.1)
BASOPHILS NFR BLD AUTO: 0 %
BILIRUB SERPL-MCNC: 1.1 MG/DL (ref 0–1.2)
BLOOD EXPIRATION DATE: NORMAL
BUN SERPL-MCNC: 16 MG/DL (ref 6–23)
CALCIUM SERPL-MCNC: 9.8 MG/DL (ref 8.6–10.3)
CHLORIDE SERPL-SCNC: 101 MMOL/L (ref 98–107)
CO2 SERPL-SCNC: 28 MMOL/L (ref 21–32)
CREAT SERPL-MCNC: 0.82 MG/DL (ref 0.5–1.3)
DISPENSE STATUS: NORMAL
EGFRCR SERPLBLD CKD-EPI 2021: >90 ML/MIN/1.73M*2
EOSINOPHIL # BLD AUTO: 0.01 X10*3/UL (ref 0–0.7)
EOSINOPHIL NFR BLD AUTO: 3.4 %
ERYTHROCYTE [DISTWIDTH] IN BLOOD BY AUTOMATED COUNT: 18.8 % (ref 11.5–14.5)
GLUCOSE SERPL-MCNC: 101 MG/DL (ref 74–99)
HCT VFR BLD AUTO: 27 % (ref 41–52)
HGB BLD-MCNC: 9.7 G/DL (ref 13.5–17.5)
IMM GRANULOCYTES # BLD AUTO: 0 X10*3/UL (ref 0–0.7)
IMM GRANULOCYTES NFR BLD AUTO: 0 % (ref 0–0.9)
LYMPHOCYTES # BLD AUTO: 0.11 X10*3/UL (ref 1.2–4.8)
LYMPHOCYTES NFR BLD AUTO: 37.9 %
MCH RBC QN AUTO: 31.2 PG (ref 26–34)
MCHC RBC AUTO-ENTMCNC: 35.9 G/DL (ref 32–36)
MCV RBC AUTO: 87 FL (ref 80–100)
MONOCYTES # BLD AUTO: 0.1 X10*3/UL (ref 0.1–1)
MONOCYTES NFR BLD AUTO: 34.5 %
NEUTROPHILS # BLD AUTO: 0.07 X10*3/UL (ref 1.2–7.7)
NEUTROPHILS NFR BLD AUTO: 24.2 %
NRBC BLD-RTO: 0 /100 WBCS (ref 0–0)
PLATELET # BLD AUTO: 57 X10*3/UL (ref 150–450)
POTASSIUM SERPL-SCNC: 3.7 MMOL/L (ref 3.5–5.3)
PRODUCT BLOOD TYPE: 6200
PRODUCT CODE: NORMAL
PROT SERPL-MCNC: 6.8 G/DL (ref 6.4–8.2)
RBC # BLD AUTO: 3.11 X10*6/UL (ref 4.5–5.9)
SODIUM SERPL-SCNC: 140 MMOL/L (ref 136–145)
UNIT ABO: NORMAL
UNIT NUMBER: NORMAL
UNIT RH: NORMAL
UNIT VOLUME: 567
WBC # BLD AUTO: 0.3 X10*3/UL (ref 4.4–11.3)

## 2024-02-21 PROCEDURE — 85025 COMPLETE CBC W/AUTO DIFF WBC: CPT

## 2024-02-21 PROCEDURE — 80053 COMPREHEN METABOLIC PANEL: CPT

## 2024-02-21 PROCEDURE — 36430 TRANSFUSION BLD/BLD COMPNT: CPT

## 2024-02-21 PROCEDURE — P9037 PLATE PHERES LEUKOREDU IRRAD: HCPCS

## 2024-02-21 RX ORDER — HEPARIN SODIUM,PORCINE/PF 10 UNIT/ML
50 SYRINGE (ML) INTRAVENOUS AS NEEDED
Status: CANCELLED | OUTPATIENT
Start: 2024-02-21

## 2024-02-21 RX ORDER — EPINEPHRINE 0.3 MG/.3ML
0.3 INJECTION SUBCUTANEOUS EVERY 5 MIN PRN
Status: DISCONTINUED | OUTPATIENT
Start: 2024-02-21 | End: 2024-02-21 | Stop reason: HOSPADM

## 2024-02-21 RX ORDER — EPINEPHRINE 0.3 MG/.3ML
0.3 INJECTION SUBCUTANEOUS EVERY 5 MIN PRN
Status: CANCELLED | OUTPATIENT
Start: 2024-02-21

## 2024-02-21 RX ORDER — FAMOTIDINE 10 MG/ML
20 INJECTION INTRAVENOUS ONCE AS NEEDED
Status: DISCONTINUED | OUTPATIENT
Start: 2024-02-21 | End: 2024-02-21 | Stop reason: HOSPADM

## 2024-02-21 RX ORDER — ALBUTEROL SULFATE 0.83 MG/ML
3 SOLUTION RESPIRATORY (INHALATION) AS NEEDED
Status: CANCELLED | OUTPATIENT
Start: 2024-02-21

## 2024-02-21 RX ORDER — ALBUTEROL SULFATE 0.83 MG/ML
3 SOLUTION RESPIRATORY (INHALATION) AS NEEDED
Status: DISCONTINUED | OUTPATIENT
Start: 2024-02-21 | End: 2024-02-21 | Stop reason: HOSPADM

## 2024-02-21 RX ORDER — FAMOTIDINE 10 MG/ML
20 INJECTION INTRAVENOUS ONCE AS NEEDED
Status: CANCELLED | OUTPATIENT
Start: 2024-02-21

## 2024-02-21 RX ORDER — HEPARIN SODIUM,PORCINE/PF 10 UNIT/ML
50 SYRINGE (ML) INTRAVENOUS AS NEEDED
Status: DISCONTINUED | OUTPATIENT
Start: 2024-02-21 | End: 2024-02-21 | Stop reason: HOSPADM

## 2024-02-21 RX ORDER — DIPHENHYDRAMINE HYDROCHLORIDE 50 MG/ML
50 INJECTION INTRAMUSCULAR; INTRAVENOUS AS NEEDED
Status: CANCELLED | OUTPATIENT
Start: 2024-02-21

## 2024-02-21 RX ORDER — DIPHENHYDRAMINE HYDROCHLORIDE 50 MG/ML
50 INJECTION INTRAMUSCULAR; INTRAVENOUS AS NEEDED
Status: DISCONTINUED | OUTPATIENT
Start: 2024-02-21 | End: 2024-02-21 | Stop reason: HOSPADM

## 2024-02-21 ASSESSMENT — PAIN SCALES - GENERAL: PAINLEVEL: 0-NO PAIN

## 2024-02-23 ENCOUNTER — INFUSION (OUTPATIENT)
Dept: HEMATOLOGY/ONCOLOGY | Facility: HOSPITAL | Age: 24
End: 2024-02-23
Payer: COMMERCIAL

## 2024-02-23 ENCOUNTER — APPOINTMENT (OUTPATIENT)
Dept: HEMATOLOGY/ONCOLOGY | Facility: HOSPITAL | Age: 24
End: 2024-02-23
Payer: COMMERCIAL

## 2024-02-23 VITALS
OXYGEN SATURATION: 100 % | TEMPERATURE: 98.1 F | BODY MASS INDEX: 21.74 KG/M2 | RESPIRATION RATE: 16 BRPM | DIASTOLIC BLOOD PRESSURE: 86 MMHG | SYSTOLIC BLOOD PRESSURE: 150 MMHG | WEIGHT: 158.73 LBS | HEART RATE: 78 BPM

## 2024-02-23 DIAGNOSIS — C83.79 BURKITT LYMPHOMA OF SOLID ORGAN EXCLUDING SPLEEN (MULTI): ICD-10-CM

## 2024-02-23 DIAGNOSIS — C83.78 BURKITT LYMPHOMA OF LYMPH NODES OF MULTIPLE REGIONS (MULTI): ICD-10-CM

## 2024-02-23 LAB
ALBUMIN SERPL BCP-MCNC: 4.6 G/DL (ref 3.4–5)
ALP SERPL-CCNC: 65 U/L (ref 33–120)
ALT SERPL W P-5'-P-CCNC: 22 U/L (ref 10–52)
ANION GAP SERPL CALC-SCNC: 15 MMOL/L (ref 10–20)
AST SERPL W P-5'-P-CCNC: 14 U/L (ref 9–39)
BASOPHILS # BLD AUTO: 0.01 X10*3/UL (ref 0–0.1)
BASOPHILS NFR BLD AUTO: 0.2 %
BILIRUB SERPL-MCNC: 0.7 MG/DL (ref 0–1.2)
BUN SERPL-MCNC: 22 MG/DL (ref 6–23)
CALCIUM SERPL-MCNC: 9.6 MG/DL (ref 8.6–10.3)
CHLORIDE SERPL-SCNC: 100 MMOL/L (ref 98–107)
CO2 SERPL-SCNC: 29 MMOL/L (ref 21–32)
CREAT SERPL-MCNC: 0.77 MG/DL (ref 0.5–1.3)
DACRYOCYTES BLD QL SMEAR: NORMAL
EGFRCR SERPLBLD CKD-EPI 2021: >90 ML/MIN/1.73M*2
EOSINOPHIL # BLD AUTO: 0 X10*3/UL (ref 0–0.7)
EOSINOPHIL NFR BLD AUTO: 0 %
ERYTHROCYTE [DISTWIDTH] IN BLOOD BY AUTOMATED COUNT: 18.9 % (ref 11.5–14.5)
GLUCOSE SERPL-MCNC: 124 MG/DL (ref 74–99)
HCT VFR BLD AUTO: 23.6 % (ref 41–52)
HGB BLD-MCNC: 8.5 G/DL (ref 13.5–17.5)
IMM GRANULOCYTES # BLD AUTO: 0.1 X10*3/UL (ref 0–0.7)
IMM GRANULOCYTES NFR BLD AUTO: 2.1 % (ref 0–0.9)
LYMPHOCYTES # BLD AUTO: 0.19 X10*3/UL (ref 1.2–4.8)
LYMPHOCYTES NFR BLD AUTO: 4 %
MCH RBC QN AUTO: 31.7 PG (ref 26–34)
MCHC RBC AUTO-ENTMCNC: 36 G/DL (ref 32–36)
MCV RBC AUTO: 88 FL (ref 80–100)
MONOCYTES # BLD AUTO: 0.97 X10*3/UL (ref 0.1–1)
MONOCYTES NFR BLD AUTO: 20.3 %
NEUTROPHILS # BLD AUTO: 3.51 X10*3/UL (ref 1.2–7.7)
NEUTROPHILS NFR BLD AUTO: 73.4 %
NRBC BLD-RTO: 1.5 /100 WBCS (ref 0–0)
OVALOCYTES BLD QL SMEAR: NORMAL
PLATELET # BLD AUTO: 86 X10*3/UL (ref 150–450)
POLYCHROMASIA BLD QL SMEAR: NORMAL
POTASSIUM SERPL-SCNC: 3.3 MMOL/L (ref 3.5–5.3)
PROT SERPL-MCNC: 6.7 G/DL (ref 6.4–8.2)
RBC # BLD AUTO: 2.68 X10*6/UL (ref 4.5–5.9)
RBC MORPH BLD: NORMAL
SCHISTOCYTES BLD QL SMEAR: NORMAL
SODIUM SERPL-SCNC: 141 MMOL/L (ref 136–145)
WBC # BLD AUTO: 4.8 X10*3/UL (ref 4.4–11.3)

## 2024-02-23 PROCEDURE — 85025 COMPLETE CBC W/AUTO DIFF WBC: CPT

## 2024-02-23 PROCEDURE — 80053 COMPREHEN METABOLIC PANEL: CPT

## 2024-02-23 PROCEDURE — 36591 DRAW BLOOD OFF VENOUS DEVICE: CPT

## 2024-02-23 ASSESSMENT — PAIN SCALES - GENERAL: PAINLEVEL: 0-NO PAIN

## 2024-02-23 NOTE — PROGRESS NOTES
Patient came to facility accompanied by his sister. Patient is awake conscious and coherent not in CP distress. Labs drawn via his PICC line with both lumen have good blood return. Results reviewed and referred to Dr. Klein thru secure chat and made him aware that they still have remaining potassium tablets from previous prescription. Dr. Klein ordered to take 40 mEq po today then 20 mEq daily. Patient and his sister verbalized understanding of the instruction and agreed to Dr. Klein's plan. Patient and his sister is aware that they are coming back on Monday 2/26/2024 for count check. Patient discharged in stable condition accompanied by his sister.

## 2024-02-26 ENCOUNTER — INFUSION (OUTPATIENT)
Dept: HEMATOLOGY/ONCOLOGY | Facility: HOSPITAL | Age: 24
End: 2024-02-26
Payer: COMMERCIAL

## 2024-02-26 VITALS
OXYGEN SATURATION: 100 % | DIASTOLIC BLOOD PRESSURE: 74 MMHG | RESPIRATION RATE: 18 BRPM | SYSTOLIC BLOOD PRESSURE: 131 MMHG | BODY MASS INDEX: 22.13 KG/M2 | TEMPERATURE: 98.6 F | WEIGHT: 161.6 LBS | HEART RATE: 79 BPM

## 2024-02-26 DIAGNOSIS — C83.79 BURKITT LYMPHOMA OF SOLID ORGAN EXCLUDING SPLEEN (MULTI): Primary | ICD-10-CM

## 2024-02-26 DIAGNOSIS — C83.79 BURKITT LYMPHOMA OF SOLID ORGAN EXCLUDING SPLEEN (MULTI): ICD-10-CM

## 2024-02-26 DIAGNOSIS — C83.78 BURKITT LYMPHOMA OF LYMPH NODES OF MULTIPLE REGIONS (MULTI): ICD-10-CM

## 2024-02-26 LAB
ALBUMIN SERPL BCP-MCNC: 4 G/DL (ref 3.4–5)
ALP SERPL-CCNC: 80 U/L (ref 33–120)
ALT SERPL W P-5'-P-CCNC: 24 U/L (ref 10–52)
ANION GAP SERPL CALC-SCNC: 12 MMOL/L (ref 10–20)
AST SERPL W P-5'-P-CCNC: 19 U/L (ref 9–39)
BASOPHILS # BLD MANUAL: 0 X10*3/UL (ref 0–0.1)
BASOPHILS NFR BLD MANUAL: 0 %
BILIRUB SERPL-MCNC: 0.4 MG/DL (ref 0–1.2)
BUN SERPL-MCNC: 23 MG/DL (ref 6–23)
CALCIUM SERPL-MCNC: 9 MG/DL (ref 8.6–10.3)
CHLORIDE SERPL-SCNC: 98 MMOL/L (ref 98–107)
CO2 SERPL-SCNC: 32 MMOL/L (ref 21–32)
CREAT SERPL-MCNC: 0.89 MG/DL (ref 0.5–1.3)
DACRYOCYTES BLD QL SMEAR: ABNORMAL
EGFRCR SERPLBLD CKD-EPI 2021: >90 ML/MIN/1.73M*2
EOSINOPHIL # BLD MANUAL: 0 X10*3/UL (ref 0–0.7)
EOSINOPHIL NFR BLD MANUAL: 0 %
ERYTHROCYTE [DISTWIDTH] IN BLOOD BY AUTOMATED COUNT: 19.4 % (ref 11.5–14.5)
GLUCOSE SERPL-MCNC: 102 MG/DL (ref 74–99)
HCT VFR BLD AUTO: 26.8 % (ref 41–52)
HGB BLD-MCNC: 9.3 G/DL (ref 13.5–17.5)
IMM GRANULOCYTES # BLD AUTO: 2.72 X10*3/UL (ref 0–0.7)
IMM GRANULOCYTES NFR BLD AUTO: 17.9 % (ref 0–0.9)
LDH SERPL L TO P-CCNC: 343 U/L (ref 84–246)
LYMPHOCYTES # BLD MANUAL: 0.15 X10*3/UL (ref 1.2–4.8)
LYMPHOCYTES NFR BLD MANUAL: 1 %
MCH RBC QN AUTO: 31.4 PG (ref 26–34)
MCHC RBC AUTO-ENTMCNC: 34.7 G/DL (ref 32–36)
MCV RBC AUTO: 91 FL (ref 80–100)
MONOCYTES # BLD MANUAL: 0.76 X10*3/UL (ref 0.1–1)
MONOCYTES NFR BLD MANUAL: 5 %
MYELOCYTES # BLD MANUAL: 0.61 X10*3/UL
MYELOCYTES NFR BLD MANUAL: 4 %
NEUTROPHILS # BLD MANUAL: 13.68 X10*3/UL (ref 1.2–7.7)
NEUTS BAND # BLD MANUAL: 1.82 X10*3/UL (ref 0–0.7)
NEUTS BAND NFR BLD MANUAL: 12 %
NEUTS SEG # BLD MANUAL: 11.86 X10*3/UL (ref 1.2–7)
NEUTS SEG NFR BLD MANUAL: 78 %
NRBC BLD-RTO: 3.9 /100 WBCS (ref 0–0)
OVALOCYTES BLD QL SMEAR: ABNORMAL
PLATELET # BLD AUTO: 70 X10*3/UL (ref 150–450)
POLYCHROMASIA BLD QL SMEAR: ABNORMAL
POTASSIUM SERPL-SCNC: 3.3 MMOL/L (ref 3.5–5.3)
PROT SERPL-MCNC: 5.9 G/DL (ref 6.4–8.2)
RBC # BLD AUTO: 2.96 X10*6/UL (ref 4.5–5.9)
RBC MORPH BLD: ABNORMAL
SCHISTOCYTES BLD QL SMEAR: ABNORMAL
SODIUM SERPL-SCNC: 139 MMOL/L (ref 136–145)
TOTAL CELLS COUNTED BLD: 100
WBC # BLD AUTO: 15.2 X10*3/UL (ref 4.4–11.3)

## 2024-02-26 PROCEDURE — 85007 BL SMEAR W/DIFF WBC COUNT: CPT

## 2024-02-26 PROCEDURE — 80053 COMPREHEN METABOLIC PANEL: CPT

## 2024-02-26 PROCEDURE — 36591 DRAW BLOOD OFF VENOUS DEVICE: CPT

## 2024-02-26 PROCEDURE — 83615 LACTATE (LD) (LDH) ENZYME: CPT

## 2024-02-26 PROCEDURE — 85027 COMPLETE CBC AUTOMATED: CPT

## 2024-02-26 RX ORDER — HEPARIN SODIUM,PORCINE/PF 10 UNIT/ML
50 SYRINGE (ML) INTRAVENOUS AS NEEDED
Status: CANCELLED | OUTPATIENT
Start: 2024-02-26

## 2024-02-26 ASSESSMENT — PAIN SCALES - GENERAL: PAINLEVEL: 0-NO PAIN

## 2024-02-26 NOTE — PROGRESS NOTES
Plt count 70,000, no transfusion needed per Dr. Klein. Potassium result 3.3 mmol/L, pt instructed to increase home potassium supplement dose from 20 mEq to 40 mEq daily for 7 days per orders from Dr. Klein. Discharged ambulatory in stable condition at 1710.

## 2024-02-27 ENCOUNTER — APPOINTMENT (OUTPATIENT)
Dept: HEMATOLOGY/ONCOLOGY | Facility: HOSPITAL | Age: 24
End: 2024-02-27
Payer: COMMERCIAL

## 2024-02-28 ENCOUNTER — OFFICE VISIT (OUTPATIENT)
Dept: HEMATOLOGY/ONCOLOGY | Facility: HOSPITAL | Age: 24
End: 2024-02-28
Payer: COMMERCIAL

## 2024-02-28 ENCOUNTER — APPOINTMENT (OUTPATIENT)
Dept: HEMATOLOGY/ONCOLOGY | Facility: HOSPITAL | Age: 24
End: 2024-02-28
Payer: COMMERCIAL

## 2024-02-28 ENCOUNTER — LAB (OUTPATIENT)
Dept: HEMATOLOGY/ONCOLOGY | Facility: HOSPITAL | Age: 24
End: 2024-02-28
Payer: COMMERCIAL

## 2024-02-28 ENCOUNTER — INFUSION (OUTPATIENT)
Dept: HEMATOLOGY/ONCOLOGY | Facility: HOSPITAL | Age: 24
End: 2024-02-28
Payer: COMMERCIAL

## 2024-02-28 VITALS
HEART RATE: 70 BPM | RESPIRATION RATE: 16 BRPM | OXYGEN SATURATION: 100 % | DIASTOLIC BLOOD PRESSURE: 79 MMHG | SYSTOLIC BLOOD PRESSURE: 130 MMHG | TEMPERATURE: 97.3 F

## 2024-02-28 VITALS
RESPIRATION RATE: 18 BRPM | HEART RATE: 82 BPM | OXYGEN SATURATION: 100 % | WEIGHT: 160.94 LBS | BODY MASS INDEX: 22.04 KG/M2 | TEMPERATURE: 97.7 F | SYSTOLIC BLOOD PRESSURE: 122 MMHG | DIASTOLIC BLOOD PRESSURE: 62 MMHG

## 2024-02-28 DIAGNOSIS — C83.70 BURKITT LYMPHOMA, UNSPECIFIED BODY REGION (MULTI): ICD-10-CM

## 2024-02-28 DIAGNOSIS — C83.79 BURKITT LYMPHOMA OF SOLID ORGAN EXCLUDING SPLEEN (MULTI): ICD-10-CM

## 2024-02-28 DIAGNOSIS — C83.78 BURKITT LYMPHOMA OF LYMPH NODES OF MULTIPLE REGIONS (MULTI): Primary | ICD-10-CM

## 2024-02-28 DIAGNOSIS — C83.78 BURKITT LYMPHOMA OF LYMPH NODES OF MULTIPLE REGIONS (MULTI): ICD-10-CM

## 2024-02-28 LAB
ALBUMIN SERPL BCP-MCNC: 4 G/DL (ref 3.4–5)
ALP SERPL-CCNC: 86 U/L (ref 33–120)
ALT SERPL W P-5'-P-CCNC: 24 U/L (ref 10–52)
ANION GAP SERPL CALC-SCNC: 12 MMOL/L (ref 10–20)
AST SERPL W P-5'-P-CCNC: 15 U/L (ref 9–39)
BASOPHILS # BLD MANUAL: 0 X10*3/UL (ref 0–0.1)
BASOPHILS NFR BLD MANUAL: 0 %
BILIRUB SERPL-MCNC: 0.3 MG/DL (ref 0–1.2)
BLOOD EXPIRATION DATE: NORMAL
BUN SERPL-MCNC: 25 MG/DL (ref 6–23)
CALCIUM SERPL-MCNC: 8.8 MG/DL (ref 8.6–10.3)
CHLORIDE SERPL-SCNC: 102 MMOL/L (ref 98–107)
CO2 SERPL-SCNC: 31 MMOL/L (ref 21–32)
CREAT SERPL-MCNC: 0.93 MG/DL (ref 0.5–1.3)
DACRYOCYTES BLD QL SMEAR: ABNORMAL
DISPENSE STATUS: NORMAL
EGFRCR SERPLBLD CKD-EPI 2021: >90 ML/MIN/1.73M*2
EOSINOPHIL # BLD MANUAL: 0 X10*3/UL (ref 0–0.7)
EOSINOPHIL NFR BLD MANUAL: 0 %
ERYTHROCYTE [DISTWIDTH] IN BLOOD BY AUTOMATED COUNT: 20.6 % (ref 11.5–14.5)
GLUCOSE SERPL-MCNC: 81 MG/DL (ref 74–99)
HCT VFR BLD AUTO: 26.3 % (ref 41–52)
HGB BLD-MCNC: 9.1 G/DL (ref 13.5–17.5)
IMM GRANULOCYTES # BLD AUTO: 1.94 X10*3/UL (ref 0–0.7)
IMM GRANULOCYTES NFR BLD AUTO: 19.3 % (ref 0–0.9)
LDH SERPL L TO P-CCNC: 254 U/L (ref 84–246)
LYMPHOCYTES # BLD MANUAL: 0.3 X10*3/UL (ref 1.2–4.8)
LYMPHOCYTES NFR BLD MANUAL: 3 %
MCH RBC QN AUTO: 31.8 PG (ref 26–34)
MCHC RBC AUTO-ENTMCNC: 34.6 G/DL (ref 32–36)
MCV RBC AUTO: 92 FL (ref 80–100)
MONOCYTES # BLD MANUAL: 1.11 X10*3/UL (ref 0.1–1)
MONOCYTES NFR BLD MANUAL: 11 %
MYELOCYTES # BLD MANUAL: 0.3 X10*3/UL
MYELOCYTES NFR BLD MANUAL: 3 %
NEUTROPHILS # BLD MANUAL: 8.38 X10*3/UL (ref 1.2–7.7)
NEUTS BAND # BLD MANUAL: 1.41 X10*3/UL (ref 0–0.7)
NEUTS BAND NFR BLD MANUAL: 14 %
NEUTS SEG # BLD MANUAL: 6.97 X10*3/UL (ref 1.2–7)
NEUTS SEG NFR BLD MANUAL: 69 %
NRBC BLD-RTO: 1.9 /100 WBCS (ref 0–0)
OVALOCYTES BLD QL SMEAR: ABNORMAL
PLATELET # BLD AUTO: 58 X10*3/UL (ref 150–450)
POLYCHROMASIA BLD QL SMEAR: ABNORMAL
POTASSIUM SERPL-SCNC: 3.7 MMOL/L (ref 3.5–5.3)
PRODUCT BLOOD TYPE: 5100
PRODUCT CODE: NORMAL
PROT SERPL-MCNC: 5.7 G/DL (ref 6.4–8.2)
RBC # BLD AUTO: 2.86 X10*6/UL (ref 4.5–5.9)
RBC MORPH BLD: ABNORMAL
SCHISTOCYTES BLD QL SMEAR: ABNORMAL
SODIUM SERPL-SCNC: 141 MMOL/L (ref 136–145)
TOTAL CELLS COUNTED BLD: 100
UNIT ABO: NORMAL
UNIT NUMBER: NORMAL
UNIT RH: NORMAL
UNIT VOLUME: 268
WBC # BLD AUTO: 10.1 X10*3/UL (ref 4.4–11.3)

## 2024-02-28 PROCEDURE — 36430 TRANSFUSION BLD/BLD COMPNT: CPT

## 2024-02-28 PROCEDURE — 36591 DRAW BLOOD OFF VENOUS DEVICE: CPT

## 2024-02-28 PROCEDURE — 2500000004 HC RX 250 GENERAL PHARMACY W/ HCPCS (ALT 636 FOR OP/ED): Performed by: INTERNAL MEDICINE

## 2024-02-28 PROCEDURE — 83615 LACTATE (LD) (LDH) ENZYME: CPT

## 2024-02-28 PROCEDURE — 85027 COMPLETE CBC AUTOMATED: CPT

## 2024-02-28 PROCEDURE — 84075 ASSAY ALKALINE PHOSPHATASE: CPT

## 2024-02-28 PROCEDURE — P9073 PLATELETS PHERESIS PATH REDU: HCPCS

## 2024-02-28 PROCEDURE — 99215 OFFICE O/P EST HI 40 MIN: CPT | Performed by: INTERNAL MEDICINE

## 2024-02-28 PROCEDURE — 2500000001 HC RX 250 WO HCPCS SELF ADMINISTERED DRUGS (ALT 637 FOR MEDICARE OP): Performed by: INTERNAL MEDICINE

## 2024-02-28 PROCEDURE — 1036F TOBACCO NON-USER: CPT | Performed by: INTERNAL MEDICINE

## 2024-02-28 PROCEDURE — 85007 BL SMEAR W/DIFF WBC COUNT: CPT

## 2024-02-28 RX ORDER — ALBUTEROL SULFATE 0.83 MG/ML
3 SOLUTION RESPIRATORY (INHALATION) AS NEEDED
Status: CANCELLED | OUTPATIENT
Start: 2024-02-28

## 2024-02-28 RX ORDER — DIPHENHYDRAMINE HCL 25 MG
25 CAPSULE ORAL ONCE
Status: CANCELLED
Start: 2024-02-28 | End: 2024-02-28

## 2024-02-28 RX ORDER — FAMOTIDINE 10 MG/ML
20 INJECTION INTRAVENOUS ONCE AS NEEDED
Status: CANCELLED | OUTPATIENT
Start: 2024-02-28

## 2024-02-28 RX ORDER — FAMOTIDINE 10 MG/ML
20 INJECTION INTRAVENOUS ONCE AS NEEDED
Status: DISCONTINUED | OUTPATIENT
Start: 2024-02-28 | End: 2024-02-28 | Stop reason: HOSPADM

## 2024-02-28 RX ORDER — DIPHENHYDRAMINE HCL 25 MG
25 CAPSULE ORAL ONCE
Status: COMPLETED | OUTPATIENT
Start: 2024-02-28 | End: 2024-02-28

## 2024-02-28 RX ORDER — HEPARIN SODIUM,PORCINE/PF 10 UNIT/ML
50 SYRINGE (ML) INTRAVENOUS AS NEEDED
Status: CANCELLED | OUTPATIENT
Start: 2024-02-28

## 2024-02-28 RX ORDER — EPINEPHRINE 0.3 MG/.3ML
0.3 INJECTION SUBCUTANEOUS EVERY 5 MIN PRN
Status: DISCONTINUED | OUTPATIENT
Start: 2024-02-28 | End: 2024-02-28 | Stop reason: HOSPADM

## 2024-02-28 RX ORDER — DIPHENHYDRAMINE HYDROCHLORIDE 50 MG/ML
50 INJECTION INTRAMUSCULAR; INTRAVENOUS AS NEEDED
Status: DISCONTINUED | OUTPATIENT
Start: 2024-02-28 | End: 2024-02-28 | Stop reason: HOSPADM

## 2024-02-28 RX ORDER — DIPHENHYDRAMINE HYDROCHLORIDE 50 MG/ML
50 INJECTION INTRAMUSCULAR; INTRAVENOUS AS NEEDED
Status: CANCELLED | OUTPATIENT
Start: 2024-02-28

## 2024-02-28 RX ORDER — ALBUTEROL SULFATE 0.83 MG/ML
3 SOLUTION RESPIRATORY (INHALATION) AS NEEDED
Status: DISCONTINUED | OUTPATIENT
Start: 2024-02-28 | End: 2024-02-28 | Stop reason: HOSPADM

## 2024-02-28 RX ORDER — EPINEPHRINE 0.3 MG/.3ML
0.3 INJECTION SUBCUTANEOUS EVERY 5 MIN PRN
Status: CANCELLED | OUTPATIENT
Start: 2024-02-28

## 2024-02-28 RX ADMIN — HYDROCORTISONE SODIUM SUCCINATE 100 MG: 100 INJECTION, POWDER, FOR SOLUTION INTRAMUSCULAR; INTRAVENOUS at 11:45

## 2024-02-28 RX ADMIN — DIPHENHYDRAMINE HYDROCHLORIDE 25 MG: 25 CAPSULE ORAL at 11:45

## 2024-02-28 ASSESSMENT — ENCOUNTER SYMPTOMS
LOSS OF SENSATION IN FEET: 0
DEPRESSION: 0
OCCASIONAL FEELINGS OF UNSTEADINESS: 0

## 2024-02-28 ASSESSMENT — PAIN SCALES - GENERAL: PAINLEVEL: 0-NO PAIN

## 2024-02-28 NOTE — PROGRESS NOTES
Patient seen in infusion for count check accompanied by family. Plt given per order for a plt count of 58, patient tolerated transfusion without complication. PICC line flushed and positive for blood return. Dressing clean, dry and intact. Pt discharged in stable condition.

## 2024-02-29 NOTE — PROGRESS NOTES
Patient ID: Shilo Thakkar is a 23 y.o. male.  Oncologic History  Burkitt lymphoma, high risk, MYC positive  - 10/19/23 presented to OSH ED with dysphagia and abdominal pain  - CT A/P with IV contrast (10/19) mass in the body of the pancreas measuring approximately 2.7 x 2.9 cm. The pancreatic duct is dilated. Multiple masses in the mesentery as well as omental caking consistent with carcinomatosis. Moderate amount of free fluid throughout the abdomen and pelvis.   - CT soft tissue neck with IV contrast (10/19): homogeneous soft tissue attenuation lesion in the L oropharynx which measures approximately 3.0 x 4.0 x 5.1 cm.   - US guided mesenteric LN biopsy (10/20/23): HIGH GRADE B-CELL LYMPHOMA MORPHOLOGICALLY CONSISTENT WITH BURKITT LYMPHOMA; flow cytometry: No B cell population identified  - Bone marrow aspiration biopsy November 1, 2023 no evidence of lymphoma involvement.  CSF by flow cytometry showed no lymphoma 11/2/2023.  - Biopsy L oropharyngeal mass and L cervical LN by ENT (10/23/23) high grade B-cell burkitt lymphoma, MYC positive, Bcl-2 negative.  - MR brain with and w/o IV contrast (10/27): No evidence of intracranial metastatic disease.     - Dexa 40 mg daily ended 11/1  - PET CT ordered 10/30 showed FDG avid left oropharyngeal mass and LAD above and below diaphragm  - Started HyperCVAD 10/29 cycle 1 day 1  - Part A:   D1: CTX + mesna, dexamethasone, D2: CTX + mesna, D3: CTX + mesna, D4: Doxo, vinca, D6: Ritux, GCSF, D9: IT,   D2:  intrathecal methotrexate November 2, 2023, 12 mg, CSF shows no B cells by flow cytometry.  D6: Intrathecal cytarabine 100 mg on November 6, 2023.  D11: vinca, dexamethasone, D13: Ritux 11/10  D13: Rituximab therapy  - Daily neupogen since 11/3/23  -Discharged home November 11, 2023  -Evaluation November 15, 2023: Recovered thrombocytopenia, stop levofloxacin, continue prophylactic acyclovir and 3 times weekly Bactrim and fluconazole.  Depressed affect noted.  -Cycle 2-day 1  part to be high-dose methotrexate plus high-dose cytarabine, November 20, 2023,  with 2 intrathecal chemotherapy administered  -Neulasta on 11/27/2023.  -Evaluation 12/6/2023 asymptomatic gaining weight doing well.  To schedule twice weekly CBC BMP Mondays and Thursdays after discharge, emphasized prophylactic levofloxacin between day 5 and day 15, acyclovir, fluconazole and 3 times a week Bactrim.  -CT scan of the chest abdomen or pelvis on December 6, 2023 shows complete resolution of intra-abdominal disease, with mildly worse splenomegaly most likely unrelated to his lymphoma.  -Cycle 3 hyper-CVAD regimen December 12, 2023, including Rituxan day 1 and day 11, intrathecal methotrexate 12/14/2023, intrathecal cytarabine December 18, 2023.  -December 27, 2023: Feeling well asymptomatic tolerated cycle 3 well, plan for cycle 4 January 2, 2024 consisting of high-dose methotrexate high-dose cytarabine, and includes 2 doses of rituximab day 1 day 8, and 2 doses of intrathecal chemotherapy, intrathecal cytarabine day 1 and intrathecal methotrexate day 8  -Hospitalized  1/17-1/23/2024 with visual changes, retinoschisis, profound pancytopenia s/p left eye vitrectomy, retinal hemorrgages. Delay cycle 5. Discuss with ophth regarding etiology and plans.  -Evaluation January 31, 2024: Vision appears to be improving.  Discussed with ophthalmology.  They will be seeing February 2 and will decide whether they will clear him for cycle 5 of chemotherapy which would be tentatively planned to start February 9, 2024 as an inpatient at modified dosing.  Repeat echocardiogram, continue prophylactic antibiotics.  Will need 3 times weekly labs for close monitoring and will keep platelets above 50,000, keep hemoglobin above 8.5-9.  Will need closer supervision as he was noncompliant with twice weekly labs before.  -Evaluation 2/7/2024: Continues to improve, vision clearly better.  Appears to have been cleared by ophthalmology, to proceed  with cycle 5 starting 2/9/2024 with 80% cyclophosphamide.  Needs echocardiogram repeated to verify a normal ejection fraction.  Upon discharge he will need 3 times a week count check to assure compliance, as he had been noncompliant with twice weekly labs previously.  Need to keep platelets above 30-50,000, need to keep hemoglobin above 8.5 at esthela.  Final cycle of chemotherapy will be also dose reduced with cytarabine down to 1 g/m².  Needs a chest x-ray on Friday before starting  -Evaluation 2/20/2024: Doing well.  Eyes appear to be improving tolerated cycle 5 without difficulty.  We omitted intrathecal on cycle 5.  Plan to admit on 3/5/2024 for cycle 6, planned dose reduction of high-dose cytarabine to 1.5 g/m², keeping methotrexate the same.  We will incorporate intrathecal cytarabine day 1, patient declining a second intrathecal.  We are in the process of trying to convince the patient to complete 8 cycles to adhere to the published data on hyper CVAD and Burkitt's lymphoma.  Subjective    HPI  Patient is feeling well.  Denies any new complaints.  Eating well maintaining weight.  No oral sores fever chills night sweats dyspnea cough abdominal pain nausea vomiting.  They may consider surgery to the left eye in the near future.  Abdomen feels normal without masses fullness early satiety or pain.  No change in bowel habits.  No paresthesias.  No headaches.    Review of Systems    Head and neck: Other than HPI negative  CNS: Other than HPI negative  Cardiovascular: Other than HPI negative  Pulmonary: Other than HPI negative  GI: Other than HPI negative  : Other than HPI negative  Bleeding: Other than HPI negative  Constitutional: Other than HPI negative     Objective    BSA: 1.92 meters squared  /62 (BP Location: Left arm, Patient Position: Sitting)   Pulse 82   Temp 36.5 °C (97.7 °F)   Resp 18   Wt 73 kg (160 lb 15 oz)   SpO2 100%   BMI 22.04 kg/m²      Physical Exam  Alert oriented not in distress  not pale or jaundiced  Lymph nodes: No adenopathy  Oral mucosa negative, no mucositis  Head is normocephalic atraumatic, pupils equal reactive  Neck is supple no adenopathy, no thyromegaly  Lungs show equal air entry, no crackles or wheezing, equal percussion  Heart shows regular rate and rhythm normal S1-S2 no murmurs or gallop rhythm  Abdomen is soft nontender, moves with respiration, no hepatosplenomegaly or masses, positive bowel sounds, not distended.  Lower extremities show no edema  Neurologically grossly nonfocal  Psych: Normal affect      Performance Status:  Symptomatic; fully ambulatory      Assessment/Plan   High risk Burkitt's lymphoma  Completed 5 cycles of Hyperseal, complete remission  Completed 8 treatments of intrathecal chemotherapy  Plan cycle 6 on March 5, 2024.  He will have dose reduction in cytarabine at 1.5 g/m² per dose keeping methotrexate at 1000 mg/m² total.  He should receive rituximab day 8 after discharge.  We would like to give him 1 dose of intrathecal cytarabine at 100 mg day 1 or day 2 of the cycle.  The patient is declining twice per cycle intrathecal despite the fact that we skipped last cycle intrathecal due to intraocular bleeding.  I am trying to convince him to see if we can finish 8 cycles of this treatment to adhere to the published HyperCVAD data that traditionally delivered 8 cycles of therapy, keep the dose level at -1 on subsequent cycles and reducing the intrathecal from twice per cycle to once per cycle if we can convince the patient even that much.  I had to do some convincing to let him allow us to give him cytarabine this upcoming cycle intrathecally.  Given the profound pancytopenia and the need for keeping platelets above 50,000 and hemoglobin above 8.5 and given the need for frequent labs on Mondays Wednesdays Fridays after discharge, the best way to secure compliance is to schedule count check with nurse 3 times a week after discharge.    Prophylaxis:  Bactrim  3 times a week  Acyclovir  Fluconazole  Levofloxacin starting day 5 through day 17 of each cycle    Disease monitoring:  Will order a PET scan after cycle 6    Cardiac monitoring:  Repeat echocardiogram after cycle 7    Cancer Staging   No matching staging information was found for the patient.    Oncology History   Burkitt lymphoma (CMS/HCC)   10/27/2023 Initial Diagnosis    Burkitt lymphoma (CMS/HCC)     10/29/2023 -  Chemotherapy    HyperCVAD + RiTUXimab, 21 Day Cycles          Diagnoses and all orders for this visit:  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)  -     Clinic Appointment Request MARBIN MONIQUE  -     CBC and Auto Differential; Standing  -     Basic Metabolic Panel; Standing  -     Count Check Appointment Request [facility] ([id number] 15 okay per JM); Future  -     Clinic Appointment Request Follow up; [facility] (during infusion); Future  -     Count Check Appointment Request [facility]; Future  -     Count Check Appointment Request [facility]; Future  -     Count Check Appointment Request [facility]; Future  -     Count Check Appointment Request [facility]; Future  -     Count Check Appointment Request [facility]; Future  -     Count Check Appointment Request [facility]; Future  -     Count Check Appointment Request [facility]; Future  -     Planned Future Admission >24 Hours  Burkitt lymphoma, unspecified body region (CMS/HCC)  -     Oncology Line Draw Appointment Request; Future  -     Lactate Dehydrogenase; Future  -     Comprehensive Metabolic Panel; Future  -     CBC and Auto Differential; Future           Marbin Monique MD

## 2024-03-01 ENCOUNTER — INFUSION (OUTPATIENT)
Dept: HEMATOLOGY/ONCOLOGY | Facility: HOSPITAL | Age: 24
End: 2024-03-01
Payer: COMMERCIAL

## 2024-03-01 VITALS
SYSTOLIC BLOOD PRESSURE: 135 MMHG | RESPIRATION RATE: 16 BRPM | HEART RATE: 78 BPM | DIASTOLIC BLOOD PRESSURE: 80 MMHG | TEMPERATURE: 98.6 F | BODY MASS INDEX: 22.04 KG/M2 | OXYGEN SATURATION: 100 % | WEIGHT: 160.94 LBS

## 2024-03-01 DIAGNOSIS — C83.78 BURKITT LYMPHOMA OF LYMPH NODES OF MULTIPLE REGIONS (MULTI): ICD-10-CM

## 2024-03-01 DIAGNOSIS — C83.70 BURKITT LYMPHOMA, UNSPECIFIED BODY REGION (MULTI): Primary | ICD-10-CM

## 2024-03-01 DIAGNOSIS — C83.70 BURKITT LYMPHOMA, UNSPECIFIED BODY REGION (MULTI): ICD-10-CM

## 2024-03-01 LAB
ABO GROUP (TYPE) IN BLOOD: NORMAL
ALBUMIN SERPL BCP-MCNC: 4.4 G/DL (ref 3.4–5)
ALP SERPL-CCNC: 81 U/L (ref 33–120)
ALT SERPL W P-5'-P-CCNC: 24 U/L (ref 10–52)
ANION GAP SERPL CALC-SCNC: 15 MMOL/L (ref 10–20)
ANTIBODY SCREEN: NORMAL
AST SERPL W P-5'-P-CCNC: 14 U/L (ref 9–39)
BASOPHILS # BLD MANUAL: 0 X10*3/UL (ref 0–0.1)
BASOPHILS NFR BLD MANUAL: 0 %
BILIRUB SERPL-MCNC: 0.3 MG/DL (ref 0–1.2)
BUN SERPL-MCNC: 22 MG/DL (ref 6–23)
CALCIUM SERPL-MCNC: 9.1 MG/DL (ref 8.6–10.3)
CHLORIDE SERPL-SCNC: 99 MMOL/L (ref 98–107)
CO2 SERPL-SCNC: 28 MMOL/L (ref 21–32)
CREAT SERPL-MCNC: 0.85 MG/DL (ref 0.5–1.3)
DACRYOCYTES BLD QL SMEAR: ABNORMAL
EGFRCR SERPLBLD CKD-EPI 2021: >90 ML/MIN/1.73M*2
EOSINOPHIL # BLD MANUAL: 0 X10*3/UL (ref 0–0.7)
EOSINOPHIL NFR BLD MANUAL: 0 %
ERYTHROCYTE [DISTWIDTH] IN BLOOD BY AUTOMATED COUNT: 21.9 % (ref 11.5–14.5)
GLUCOSE SERPL-MCNC: 95 MG/DL (ref 74–99)
HCT VFR BLD AUTO: 27.9 % (ref 41–52)
HGB BLD-MCNC: 9.5 G/DL (ref 13.5–17.5)
IMM GRANULOCYTES # BLD AUTO: 0.97 X10*3/UL (ref 0–0.7)
IMM GRANULOCYTES NFR BLD AUTO: 9.6 % (ref 0–0.9)
LDH SERPL L TO P-CCNC: 238 U/L (ref 84–246)
LYMPHOCYTES # BLD MANUAL: 0.51 X10*3/UL (ref 1.2–4.8)
LYMPHOCYTES NFR BLD MANUAL: 5 %
MCH RBC QN AUTO: 31.6 PG (ref 26–34)
MCHC RBC AUTO-ENTMCNC: 34.1 G/DL (ref 32–36)
MCV RBC AUTO: 93 FL (ref 80–100)
METAMYELOCYTES # BLD MANUAL: 0.3 X10*3/UL
METAMYELOCYTES NFR BLD MANUAL: 3 %
MONOCYTES # BLD MANUAL: 1.11 X10*3/UL (ref 0.1–1)
MONOCYTES NFR BLD MANUAL: 11 %
MYELOCYTES # BLD MANUAL: 0.3 X10*3/UL
MYELOCYTES NFR BLD MANUAL: 3 %
NEUTROPHILS # BLD MANUAL: 7.88 X10*3/UL (ref 1.2–7.7)
NEUTS BAND # BLD MANUAL: 0.91 X10*3/UL (ref 0–0.7)
NEUTS BAND NFR BLD MANUAL: 9 %
NEUTS SEG # BLD MANUAL: 6.97 X10*3/UL (ref 1.2–7)
NEUTS SEG NFR BLD MANUAL: 69 %
NRBC BLD-RTO: 0.8 /100 WBCS (ref 0–0)
OVALOCYTES BLD QL SMEAR: ABNORMAL
PLATELET # BLD AUTO: 94 X10*3/UL (ref 150–450)
POLYCHROMASIA BLD QL SMEAR: ABNORMAL
POTASSIUM SERPL-SCNC: 4 MMOL/L (ref 3.5–5.3)
PROT SERPL-MCNC: 6.6 G/DL (ref 6.4–8.2)
RBC # BLD AUTO: 3.01 X10*6/UL (ref 4.5–5.9)
RBC MORPH BLD: ABNORMAL
RH FACTOR (ANTIGEN D): NORMAL
SCHISTOCYTES BLD QL SMEAR: ABNORMAL
SODIUM SERPL-SCNC: 138 MMOL/L (ref 136–145)
TOTAL CELLS COUNTED BLD: 100
WBC # BLD AUTO: 10.1 X10*3/UL (ref 4.4–11.3)

## 2024-03-01 PROCEDURE — 86901 BLOOD TYPING SEROLOGIC RH(D): CPT

## 2024-03-01 PROCEDURE — 85007 BL SMEAR W/DIFF WBC COUNT: CPT

## 2024-03-01 PROCEDURE — 84075 ASSAY ALKALINE PHOSPHATASE: CPT

## 2024-03-01 PROCEDURE — 83615 LACTATE (LD) (LDH) ENZYME: CPT

## 2024-03-01 PROCEDURE — 36591 DRAW BLOOD OFF VENOUS DEVICE: CPT

## 2024-03-01 PROCEDURE — 85027 COMPLETE CBC AUTOMATED: CPT

## 2024-03-01 RX ORDER — FAMOTIDINE 10 MG/ML
20 INJECTION INTRAVENOUS ONCE AS NEEDED
Status: CANCELLED | OUTPATIENT
Start: 2024-03-01

## 2024-03-01 RX ORDER — ALBUTEROL SULFATE 0.83 MG/ML
3 SOLUTION RESPIRATORY (INHALATION) AS NEEDED
Status: CANCELLED | OUTPATIENT
Start: 2024-03-01

## 2024-03-01 RX ORDER — EPINEPHRINE 0.3 MG/.3ML
0.3 INJECTION SUBCUTANEOUS EVERY 5 MIN PRN
Status: CANCELLED | OUTPATIENT
Start: 2024-03-01

## 2024-03-01 RX ORDER — DIPHENHYDRAMINE HYDROCHLORIDE 50 MG/ML
50 INJECTION INTRAMUSCULAR; INTRAVENOUS AS NEEDED
Status: CANCELLED | OUTPATIENT
Start: 2024-03-01

## 2024-03-01 ASSESSMENT — PAIN SCALES - GENERAL: PAINLEVEL: 0-NO PAIN

## 2024-03-01 NOTE — PROGRESS NOTES
Patient came in for count check. Patient is awake conscious and coherent not in cp distress; accompanied by sister. Labs drawn and result reviewed with the patient. All queries addressed. No complains/discomfort verbalized. Patient is aware of the next appointment on Monday. Patient is discharged in stable condition.

## 2024-03-04 ENCOUNTER — OFFICE VISIT (OUTPATIENT)
Dept: HEMATOLOGY/ONCOLOGY | Facility: HOSPITAL | Age: 24
End: 2024-03-04
Payer: COMMERCIAL

## 2024-03-04 ENCOUNTER — LAB (OUTPATIENT)
Dept: HEMATOLOGY/ONCOLOGY | Facility: HOSPITAL | Age: 24
End: 2024-03-04
Payer: COMMERCIAL

## 2024-03-04 VITALS
OXYGEN SATURATION: 100 % | BODY MASS INDEX: 21.5 KG/M2 | TEMPERATURE: 98.6 F | SYSTOLIC BLOOD PRESSURE: 124 MMHG | WEIGHT: 158.73 LBS | HEART RATE: 87 BPM | HEIGHT: 72 IN | DIASTOLIC BLOOD PRESSURE: 77 MMHG | RESPIRATION RATE: 18 BRPM

## 2024-03-04 DIAGNOSIS — C83.78 BURKITT LYMPHOMA OF LYMPH NODES OF MULTIPLE REGIONS (MULTI): Primary | ICD-10-CM

## 2024-03-04 DIAGNOSIS — C83.78 BURKITT LYMPHOMA OF LYMPH NODES OF MULTIPLE REGIONS (MULTI): ICD-10-CM

## 2024-03-04 LAB
ALBUMIN SERPL BCP-MCNC: 4.4 G/DL (ref 3.4–5)
ALP SERPL-CCNC: 67 U/L (ref 33–120)
ALT SERPL W P-5'-P-CCNC: 27 U/L (ref 10–52)
ANION GAP SERPL CALC-SCNC: 14 MMOL/L (ref 10–20)
AST SERPL W P-5'-P-CCNC: 16 U/L (ref 9–39)
BASOPHILS # BLD AUTO: 0.01 X10*3/UL (ref 0–0.1)
BASOPHILS NFR BLD AUTO: 0.2 %
BILIRUB SERPL-MCNC: 0.5 MG/DL (ref 0–1.2)
BUN SERPL-MCNC: 17 MG/DL (ref 6–23)
CALCIUM SERPL-MCNC: 9.5 MG/DL (ref 8.6–10.3)
CHLORIDE SERPL-SCNC: 102 MMOL/L (ref 98–107)
CO2 SERPL-SCNC: 28 MMOL/L (ref 21–32)
CREAT SERPL-MCNC: 0.92 MG/DL (ref 0.5–1.3)
DACRYOCYTES BLD QL SMEAR: NORMAL
EGFRCR SERPLBLD CKD-EPI 2021: >90 ML/MIN/1.73M*2
EOSINOPHIL # BLD AUTO: 0 X10*3/UL (ref 0–0.7)
EOSINOPHIL NFR BLD AUTO: 0 %
ERYTHROCYTE [DISTWIDTH] IN BLOOD BY AUTOMATED COUNT: 23.2 % (ref 11.5–14.5)
GLUCOSE SERPL-MCNC: 87 MG/DL (ref 74–99)
HCT VFR BLD AUTO: 27.8 % (ref 41–52)
HGB BLD-MCNC: 9.5 G/DL (ref 13.5–17.5)
IMM GRANULOCYTES # BLD AUTO: 0.12 X10*3/UL (ref 0–0.7)
IMM GRANULOCYTES NFR BLD AUTO: 1.9 % (ref 0–0.9)
LYMPHOCYTES # BLD AUTO: 0.49 X10*3/UL (ref 1.2–4.8)
LYMPHOCYTES NFR BLD AUTO: 7.9 %
MAGNESIUM SERPL-MCNC: 2.18 MG/DL (ref 1.6–2.4)
MCH RBC QN AUTO: 32 PG (ref 26–34)
MCHC RBC AUTO-ENTMCNC: 34.2 G/DL (ref 32–36)
MCV RBC AUTO: 94 FL (ref 80–100)
MONOCYTES # BLD AUTO: 0.81 X10*3/UL (ref 0.1–1)
MONOCYTES NFR BLD AUTO: 13.1 %
NEUTROPHILS # BLD AUTO: 4.76 X10*3/UL (ref 1.2–7.7)
NEUTROPHILS NFR BLD AUTO: 76.9 %
NRBC BLD-RTO: 1 /100 WBCS (ref 0–0)
OVALOCYTES BLD QL SMEAR: NORMAL
PLATELET # BLD AUTO: 112 X10*3/UL (ref 150–450)
POLYCHROMASIA BLD QL SMEAR: NORMAL
POTASSIUM SERPL-SCNC: 3.9 MMOL/L (ref 3.5–5.3)
PROT SERPL-MCNC: 6.5 G/DL (ref 6.4–8.2)
RBC # BLD AUTO: 2.97 X10*6/UL (ref 4.5–5.9)
RBC MORPH BLD: NORMAL
SARS-COV-2 RNA RESP QL NAA+PROBE: NOT DETECTED
SCHISTOCYTES BLD QL SMEAR: NORMAL
SODIUM SERPL-SCNC: 140 MMOL/L (ref 136–145)
WBC # BLD AUTO: 6.2 X10*3/UL (ref 4.4–11.3)

## 2024-03-04 PROCEDURE — 85025 COMPLETE CBC W/AUTO DIFF WBC: CPT

## 2024-03-04 PROCEDURE — 99215 OFFICE O/P EST HI 40 MIN: CPT | Performed by: NURSE PRACTITIONER

## 2024-03-04 PROCEDURE — 1036F TOBACCO NON-USER: CPT | Performed by: NURSE PRACTITIONER

## 2024-03-04 PROCEDURE — 87635 SARS-COV-2 COVID-19 AMP PRB: CPT | Performed by: NURSE PRACTITIONER

## 2024-03-04 PROCEDURE — 80053 COMPREHEN METABOLIC PANEL: CPT

## 2024-03-04 PROCEDURE — 83735 ASSAY OF MAGNESIUM: CPT

## 2024-03-04 PROCEDURE — 36591 DRAW BLOOD OFF VENOUS DEVICE: CPT

## 2024-03-04 NOTE — PROGRESS NOTES
Pt here for preadmission count check. COVID swab completed. Pt discharged in stable condition, no further needs at this time.

## 2024-03-05 ENCOUNTER — APPOINTMENT (OUTPATIENT)
Dept: RADIOLOGY | Facility: HOSPITAL | Age: 24
DRG: 847 | End: 2024-03-05
Payer: COMMERCIAL

## 2024-03-05 ENCOUNTER — HOSPITAL ENCOUNTER (INPATIENT)
Facility: HOSPITAL | Age: 24
LOS: 4 days | Discharge: HOME | DRG: 847 | End: 2024-03-09
Attending: INTERNAL MEDICINE | Admitting: INTERNAL MEDICINE
Payer: COMMERCIAL

## 2024-03-05 DIAGNOSIS — C83.78 BURKITT LYMPHOMA OF LYMPH NODES OF MULTIPLE REGIONS (MULTI): ICD-10-CM

## 2024-03-05 DIAGNOSIS — J35.1 TONSILLAR HYPERTROPHY, UNILATERAL: ICD-10-CM

## 2024-03-05 DIAGNOSIS — C25.9 PANCREATIC CANCER (MULTI): ICD-10-CM

## 2024-03-05 DIAGNOSIS — C79.9 METASTASIS FROM PANCREATIC CANCER (MULTI): ICD-10-CM

## 2024-03-05 DIAGNOSIS — C83.70: Primary | ICD-10-CM

## 2024-03-05 DIAGNOSIS — C25.9 METASTASIS FROM PANCREATIC CANCER (MULTI): ICD-10-CM

## 2024-03-05 DIAGNOSIS — R22.1 NECK MASS: ICD-10-CM

## 2024-03-05 LAB
ALBUMIN SERPL BCP-MCNC: 4.3 G/DL (ref 3.4–5)
ALP SERPL-CCNC: 73 U/L (ref 33–120)
ALT SERPL W P-5'-P-CCNC: 29 U/L (ref 10–52)
ANION GAP SERPL CALC-SCNC: 14 MMOL/L (ref 10–20)
APTT PPP: 31 SECONDS (ref 27–38)
AST SERPL W P-5'-P-CCNC: 19 U/L (ref 9–39)
BASOPHILS # BLD MANUAL: 0 X10*3/UL (ref 0–0.1)
BASOPHILS NFR BLD MANUAL: 0 %
BILIRUB SERPL-MCNC: 0.6 MG/DL (ref 0–1.2)
BUN SERPL-MCNC: 22 MG/DL (ref 6–23)
CALCIUM SERPL-MCNC: 9.2 MG/DL (ref 8.6–10.6)
CHLORIDE SERPL-SCNC: 105 MMOL/L (ref 98–107)
CO2 SERPL-SCNC: 25 MMOL/L (ref 21–32)
CREAT SERPL-MCNC: 1.3 MG/DL (ref 0.5–1.3)
DACRYOCYTES BLD QL SMEAR: ABNORMAL
EGFRCR SERPLBLD CKD-EPI 2021: 79 ML/MIN/1.73M*2
EOSINOPHIL # BLD MANUAL: 0 X10*3/UL (ref 0–0.7)
EOSINOPHIL NFR BLD MANUAL: 0 %
ERYTHROCYTE [DISTWIDTH] IN BLOOD BY AUTOMATED COUNT: 23 % (ref 11.5–14.5)
GLUCOSE SERPL-MCNC: 128 MG/DL (ref 74–99)
HCT VFR BLD AUTO: 26 % (ref 41–52)
HGB BLD-MCNC: 9 G/DL (ref 13.5–17.5)
IMM GRANULOCYTES # BLD AUTO: 0.05 X10*3/UL (ref 0–0.7)
IMM GRANULOCYTES NFR BLD AUTO: 0.8 % (ref 0–0.9)
LDH SERPL L TO P-CCNC: 215 U/L (ref 84–246)
LYMPHOCYTES # BLD MANUAL: 0.5 X10*3/UL (ref 1.2–4.8)
LYMPHOCYTES NFR BLD MANUAL: 8.2 %
MAGNESIUM SERPL-MCNC: 1.97 MG/DL (ref 1.6–2.4)
MCH RBC QN AUTO: 33.1 PG (ref 26–34)
MCHC RBC AUTO-ENTMCNC: 34.6 G/DL (ref 32–36)
MCV RBC AUTO: 96 FL (ref 80–100)
MONOCYTES # BLD MANUAL: 0.23 X10*3/UL (ref 0.1–1)
MONOCYTES NFR BLD MANUAL: 3.7 %
NEUTS SEG # BLD MANUAL: 5.37 X10*3/UL (ref 1.2–7)
NEUTS SEG NFR BLD MANUAL: 88.1 %
NRBC BLD-RTO: 0.5 /100 WBCS (ref 0–0)
OVALOCYTES BLD QL SMEAR: ABNORMAL
PHOSPHATE SERPL-MCNC: 4.4 MG/DL (ref 2.5–4.9)
PLATELET # BLD AUTO: 122 X10*3/UL (ref 150–450)
POTASSIUM SERPL-SCNC: 3.6 MMOL/L (ref 3.5–5.3)
PROT SERPL-MCNC: 5.9 G/DL (ref 6.4–8.2)
RBC # BLD AUTO: 2.72 X10*6/UL (ref 4.5–5.9)
RBC MORPH BLD: ABNORMAL
SODIUM SERPL-SCNC: 140 MMOL/L (ref 136–145)
TOTAL CELLS COUNTED BLD: 109
URATE SERPL-MCNC: 6.8 MG/DL (ref 4–7.5)
WBC # BLD AUTO: 6.1 X10*3/UL (ref 4.4–11.3)

## 2024-03-05 PROCEDURE — 85027 COMPLETE CBC AUTOMATED: CPT | Performed by: PHYSICIAN ASSISTANT

## 2024-03-05 PROCEDURE — 85730 THROMBOPLASTIN TIME PARTIAL: CPT | Performed by: PHYSICIAN ASSISTANT

## 2024-03-05 PROCEDURE — 99222 1ST HOSP IP/OBS MODERATE 55: CPT

## 2024-03-05 PROCEDURE — 84100 ASSAY OF PHOSPHORUS: CPT | Performed by: PHYSICIAN ASSISTANT

## 2024-03-05 PROCEDURE — 83735 ASSAY OF MAGNESIUM: CPT | Performed by: PHYSICIAN ASSISTANT

## 2024-03-05 PROCEDURE — 83615 LACTATE (LD) (LDH) ENZYME: CPT | Performed by: PHYSICIAN ASSISTANT

## 2024-03-05 PROCEDURE — 71046 X-RAY EXAM CHEST 2 VIEWS: CPT

## 2024-03-05 PROCEDURE — 71046 X-RAY EXAM CHEST 2 VIEWS: CPT | Performed by: RADIOLOGY

## 2024-03-05 PROCEDURE — 2500000001 HC RX 250 WO HCPCS SELF ADMINISTERED DRUGS (ALT 637 FOR MEDICARE OP)

## 2024-03-05 PROCEDURE — 1170000001 HC PRIVATE ONCOLOGY ROOM DAILY

## 2024-03-05 PROCEDURE — 85007 BL SMEAR W/DIFF WBC COUNT: CPT | Performed by: PHYSICIAN ASSISTANT

## 2024-03-05 PROCEDURE — 84075 ASSAY ALKALINE PHOSPHATASE: CPT | Performed by: PHYSICIAN ASSISTANT

## 2024-03-05 PROCEDURE — 84550 ASSAY OF BLOOD/URIC ACID: CPT | Performed by: PHYSICIAN ASSISTANT

## 2024-03-05 RX ORDER — DIPHENHYDRAMINE HYDROCHLORIDE 50 MG/ML
50 INJECTION INTRAMUSCULAR; INTRAVENOUS AS NEEDED
Status: CANCELLED | OUTPATIENT
Start: 2024-03-11

## 2024-03-05 RX ORDER — ONDANSETRON 4 MG/1
4 TABLET, FILM COATED ORAL EVERY 8 HOURS PRN
Status: DISCONTINUED | OUTPATIENT
Start: 2024-03-05 | End: 2024-03-09 | Stop reason: HOSPADM

## 2024-03-05 RX ORDER — ONDANSETRON HYDROCHLORIDE 2 MG/ML
8 INJECTION, SOLUTION INTRAVENOUS EVERY 4 HOURS PRN
Status: CANCELLED
Start: 2024-03-11

## 2024-03-05 RX ORDER — ALBUTEROL SULFATE 0.83 MG/ML
3 SOLUTION RESPIRATORY (INHALATION) AS NEEDED
Status: CANCELLED | OUTPATIENT
Start: 2024-03-11

## 2024-03-05 RX ORDER — ACETAMINOPHEN 325 MG/1
650 TABLET ORAL ONCE
Status: CANCELLED | OUTPATIENT
Start: 2024-03-10

## 2024-03-05 RX ORDER — DORZOLAMIDE HYDROCHLORIDE AND TIMOLOL MALEATE 20; 5 MG/ML; MG/ML
1 SOLUTION/ DROPS OPHTHALMIC 2 TIMES DAILY
Status: DISCONTINUED | OUTPATIENT
Start: 2024-03-05 | End: 2024-03-09 | Stop reason: HOSPADM

## 2024-03-05 RX ORDER — FAMOTIDINE 10 MG/ML
20 INJECTION INTRAVENOUS AS NEEDED
Status: CANCELLED | OUTPATIENT
Start: 2024-03-05

## 2024-03-05 RX ORDER — PREDNISOLONE ACETATE 10 MG/ML
1 SUSPENSION/ DROPS OPHTHALMIC 2 TIMES DAILY
Status: DISCONTINUED | OUTPATIENT
Start: 2024-03-05 | End: 2024-03-06

## 2024-03-05 RX ORDER — POTASSIUM CHLORIDE 14.9 MG/ML
20 INJECTION INTRAVENOUS AS NEEDED
Status: CANCELLED
Start: 2024-03-11

## 2024-03-05 RX ORDER — FERROUS SULFATE 325(65) MG
1 TABLET ORAL 2 TIMES DAILY
Status: DISCONTINUED | OUTPATIENT
Start: 2024-03-05 | End: 2024-03-09 | Stop reason: HOSPADM

## 2024-03-05 RX ORDER — AMOXICILLIN 250 MG
2 CAPSULE ORAL DAILY PRN
Status: DISCONTINUED | OUTPATIENT
Start: 2024-03-05 | End: 2024-03-09 | Stop reason: HOSPADM

## 2024-03-05 RX ORDER — ALBUTEROL SULFATE 0.83 MG/ML
3 SOLUTION RESPIRATORY (INHALATION) AS NEEDED
Status: CANCELLED | OUTPATIENT
Start: 2024-03-10

## 2024-03-05 RX ORDER — KETOROLAC TROMETHAMINE 5 MG/ML
1 SOLUTION OPHTHALMIC 2 TIMES DAILY
Status: DISCONTINUED | OUTPATIENT
Start: 2024-03-05 | End: 2024-03-09 | Stop reason: HOSPADM

## 2024-03-05 RX ORDER — SULFAMETHOXAZOLE AND TRIMETHOPRIM 800; 160 MG/1; MG/1
1 TABLET ORAL
Status: DISCONTINUED | OUTPATIENT
Start: 2024-03-06 | End: 2024-03-09 | Stop reason: HOSPADM

## 2024-03-05 RX ORDER — PROCHLORPERAZINE EDISYLATE 5 MG/ML
10 INJECTION INTRAMUSCULAR; INTRAVENOUS EVERY 6 HOURS PRN
Status: CANCELLED | OUTPATIENT
Start: 2024-03-05

## 2024-03-05 RX ORDER — B-COMPLEX WITH VITAMIN C
1 TABLET ORAL DAILY
Status: DISCONTINUED | OUTPATIENT
Start: 2024-03-06 | End: 2024-03-09 | Stop reason: HOSPADM

## 2024-03-05 RX ORDER — PANTOPRAZOLE SODIUM 40 MG/1
40 TABLET, DELAYED RELEASE ORAL
Status: DISCONTINUED | OUTPATIENT
Start: 2024-03-06 | End: 2024-03-09 | Stop reason: HOSPADM

## 2024-03-05 RX ORDER — MAGNESIUM SULFATE HEPTAHYDRATE 40 MG/ML
4 INJECTION, SOLUTION INTRAVENOUS AS NEEDED
Status: CANCELLED
Start: 2024-03-11

## 2024-03-05 RX ORDER — EPINEPHRINE 1 MG/ML
0.3 INJECTION INTRAMUSCULAR; INTRAVENOUS; SUBCUTANEOUS EVERY 5 MIN PRN
Status: CANCELLED | OUTPATIENT
Start: 2024-03-05

## 2024-03-05 RX ORDER — PROCHLORPERAZINE MALEATE 10 MG
10 TABLET ORAL EVERY 6 HOURS PRN
Status: CANCELLED | OUTPATIENT
Start: 2024-03-05

## 2024-03-05 RX ORDER — PREDNISOLONE ACETATE 10 MG/ML
2 SUSPENSION/ DROPS OPHTHALMIC EVERY 6 HOURS
Status: CANCELLED | OUTPATIENT
Start: 2024-03-05

## 2024-03-05 RX ORDER — DIPHENHYDRAMINE HYDROCHLORIDE 50 MG/ML
50 INJECTION INTRAMUSCULAR; INTRAVENOUS AS NEEDED
Status: CANCELLED | OUTPATIENT
Start: 2024-03-05

## 2024-03-05 RX ORDER — DIPHENHYDRAMINE HYDROCHLORIDE 50 MG/ML
50 INJECTION INTRAMUSCULAR; INTRAVENOUS AS NEEDED
Status: CANCELLED | OUTPATIENT
Start: 2024-03-10

## 2024-03-05 RX ORDER — EPINEPHRINE 0.3 MG/.3ML
0.3 INJECTION SUBCUTANEOUS EVERY 5 MIN PRN
Status: CANCELLED | OUTPATIENT
Start: 2024-03-10

## 2024-03-05 RX ORDER — ALBUTEROL SULFATE 0.83 MG/ML
3 SOLUTION RESPIRATORY (INHALATION) AS NEEDED
Status: CANCELLED | OUTPATIENT
Start: 2024-03-05

## 2024-03-05 RX ORDER — DIPHENHYDRAMINE HCL 50 MG
50 CAPSULE ORAL ONCE
Status: CANCELLED | OUTPATIENT
Start: 2024-03-10

## 2024-03-05 RX ORDER — EPINEPHRINE 0.3 MG/.3ML
0.3 INJECTION SUBCUTANEOUS EVERY 5 MIN PRN
Status: CANCELLED | OUTPATIENT
Start: 2024-03-11

## 2024-03-05 RX ORDER — DEXAMETHASONE SODIUM PHOSPHATE 4 MG/ML
8 INJECTION, SOLUTION INTRA-ARTICULAR; INTRALESIONAL; INTRAMUSCULAR; INTRAVENOUS; SOFT TISSUE ONCE
Status: CANCELLED
Start: 2024-03-11 | End: 2024-03-11

## 2024-03-05 RX ORDER — ACYCLOVIR 400 MG/1
400 TABLET ORAL 2 TIMES DAILY
Status: DISCONTINUED | OUTPATIENT
Start: 2024-03-05 | End: 2024-03-09 | Stop reason: HOSPADM

## 2024-03-05 RX ORDER — FAMOTIDINE 10 MG/ML
20 INJECTION INTRAVENOUS ONCE AS NEEDED
Status: CANCELLED | OUTPATIENT
Start: 2024-03-10

## 2024-03-05 RX ORDER — FAMOTIDINE 10 MG/ML
20 INJECTION INTRAVENOUS ONCE AS NEEDED
Status: CANCELLED | OUTPATIENT
Start: 2024-03-11

## 2024-03-05 RX ADMIN — CARBOXYMETHYLCELLULOSE SODIUM 1 DROP: 5 SOLUTION/ DROPS OPHTHALMIC at 23:00

## 2024-03-05 SDOH — ECONOMIC STABILITY: INCOME INSECURITY: HOW HARD IS IT FOR YOU TO PAY FOR THE VERY BASICS LIKE FOOD, HOUSING, MEDICAL CARE, AND HEATING?: PATIENT DECLINED

## 2024-03-05 SDOH — ECONOMIC STABILITY: INCOME INSECURITY: IN THE LAST 12 MONTHS, WAS THERE A TIME WHEN YOU WERE NOT ABLE TO PAY THE MORTGAGE OR RENT ON TIME?: NO

## 2024-03-05 SDOH — SOCIAL STABILITY: SOCIAL INSECURITY: HAS ANYONE EVER THREATENED TO HURT YOUR FAMILY OR YOUR PETS?: NO

## 2024-03-05 SDOH — SOCIAL STABILITY: SOCIAL INSECURITY: DO YOU FEEL ANYONE HAS EXPLOITED OR TAKEN ADVANTAGE OF YOU FINANCIALLY OR OF YOUR PERSONAL PROPERTY?: NO

## 2024-03-05 SDOH — SOCIAL STABILITY: SOCIAL INSECURITY: DO YOU FEEL UNSAFE GOING BACK TO THE PLACE WHERE YOU ARE LIVING?: NO

## 2024-03-05 SDOH — HEALTH STABILITY: PHYSICAL HEALTH
ON AVERAGE, HOW MANY DAYS PER WEEK DO YOU ENGAGE IN MODERATE TO STRENUOUS EXERCISE (LIKE A BRISK WALK)?: PATIENT DECLINED

## 2024-03-05 SDOH — ECONOMIC STABILITY: HOUSING INSECURITY: IN THE LAST 12 MONTHS, HOW MANY PLACES HAVE YOU LIVED?: 1

## 2024-03-05 SDOH — SOCIAL STABILITY: SOCIAL INSECURITY: ARE YOU OR HAVE YOU BEEN THREATENED OR ABUSED PHYSICALLY, EMOTIONALLY, OR SEXUALLY BY ANYONE?: NO

## 2024-03-05 SDOH — SOCIAL STABILITY: SOCIAL INSECURITY: DOES ANYONE TRY TO KEEP YOU FROM HAVING/CONTACTING OTHER FRIENDS OR DOING THINGS OUTSIDE YOUR HOME?: NO

## 2024-03-05 SDOH — SOCIAL STABILITY: SOCIAL INSECURITY: WERE YOU ABLE TO COMPLETE ALL THE BEHAVIORAL HEALTH SCREENINGS?: YES

## 2024-03-05 SDOH — SOCIAL STABILITY: SOCIAL INSECURITY: ARE THERE ANY APPARENT SIGNS OF INJURIES/BEHAVIORS THAT COULD BE RELATED TO ABUSE/NEGLECT?: NO

## 2024-03-05 SDOH — SOCIAL STABILITY: SOCIAL INSECURITY: HAVE YOU HAD THOUGHTS OF HARMING ANYONE ELSE?: NO

## 2024-03-05 SDOH — SOCIAL STABILITY: SOCIAL INSECURITY: ABUSE: ADULT

## 2024-03-05 ASSESSMENT — ACTIVITIES OF DAILY LIVING (ADL)
BATHING: INDEPENDENT
PATIENT'S MEMORY ADEQUATE TO SAFELY COMPLETE DAILY ACTIVITIES?: YES
WALKS IN HOME: INDEPENDENT
HEARING - LEFT EAR: FUNCTIONAL
LACK_OF_TRANSPORTATION: NO
ADEQUATE_TO_COMPLETE_ADL: YES
TOILETING: INDEPENDENT
HEARING - RIGHT EAR: FUNCTIONAL
FEEDING YOURSELF: INDEPENDENT
DRESSING YOURSELF: INDEPENDENT
GROOMING: INDEPENDENT
JUDGMENT_ADEQUATE_SAFELY_COMPLETE_DAILY_ACTIVITIES: YES

## 2024-03-05 ASSESSMENT — COGNITIVE AND FUNCTIONAL STATUS - GENERAL
MOBILITY SCORE: 24
DAILY ACTIVITIY SCORE: 24
PATIENT BASELINE BEDBOUND: NO

## 2024-03-05 ASSESSMENT — LIFESTYLE VARIABLES
AUDIT-C TOTAL SCORE: 0
HOW OFTEN DO YOU HAVE 6 OR MORE DRINKS ON ONE OCCASION: NEVER
SKIP TO QUESTIONS 9-10: 1
HOW OFTEN DO YOU HAVE A DRINK CONTAINING ALCOHOL: NEVER
AUDIT-C TOTAL SCORE: 0
HOW MANY STANDARD DRINKS CONTAINING ALCOHOL DO YOU HAVE ON A TYPICAL DAY: PATIENT DOES NOT DRINK

## 2024-03-05 ASSESSMENT — PAIN SCALES - GENERAL: PAINLEVEL_OUTOF10: 0 - NO PAIN

## 2024-03-05 ASSESSMENT — PAIN - FUNCTIONAL ASSESSMENT: PAIN_FUNCTIONAL_ASSESSMENT: 0-10

## 2024-03-05 ASSESSMENT — ENCOUNTER SYMPTOMS: EYE PROBLEMS: 1

## 2024-03-05 NOTE — ASSESSMENT & PLAN NOTE
- High risk BL  in CR   - PET-CT s/p C4 (1/15/24) c/w Rudolph 2  - Admitted for C6 HyperCVAD (Dose reduce Cytarabine at 1.5 G/m2 per dose with Methotrexate 1000 mg/m2 total) on 3/5/24  - Discussed with Dr. Klein doing 1 LP with intrathecal cytarabine this cycle while inpatient instead of 2 lumbar punctures per cycle  - They are still discussing total of 8 cycles of therapy

## 2024-03-05 NOTE — ASSESSMENT & PLAN NOTE
:: Due to disease and chemotherapy   - Ideal platelet transfusion threshold 50,000-70,000 per Optho recommendations; however, definitely cannot allow to drop below 30,000/uL.  - Updated Treatment Plan 2/16  - Plan for count checks three times weekly after discharge

## 2024-03-05 NOTE — PROGRESS NOTES
Patient ID: Shilo Thakkar is a 23 y.o. male.    Subjective    HPI  Presents today for pre-admission visit in [facility], accompanied by his mom. He will admit tomorrow for C6  HyperCVAD. He is doing well overall with no new acute issues since last visit. His vision remains unchanged.       Review of Systems   Eyes:  Positive for eye problems (Stable vision changes).   All other systems reviewed and are negative.      Objective    BSA: There is no height or weight on file to calculate BSA.  There were no vitals taken for this visit.  Vital signs reviewed today.      Physical Exam  Constitutional:       Appearance: Normal appearance.   HENT:      Head: Normocephalic.      Nose: Nose normal.      Mouth/Throat:      Mouth: Mucous membranes are moist.      Pharynx: Oropharynx is clear.   Eyes:      Extraocular Movements: Extraocular movements intact.      Conjunctiva/sclera: Conjunctivae normal.      Pupils: Pupils are equal, round, and reactive to light.      Comments: Retinal hemorrhages, improving   Cardiovascular:      Rate and Rhythm: Normal rate and regular rhythm.      Pulses: Normal pulses.      Heart sounds: Normal heart sounds.   Pulmonary:      Effort: Pulmonary effort is normal.      Breath sounds: Normal breath sounds.   Abdominal:      General: Abdomen is flat. Bowel sounds are normal.      Palpations: Abdomen is soft.   Musculoskeletal:         General: Normal range of motion.      Cervical back: Normal range of motion.   Skin:     General: Skin is warm and dry.      Capillary Refill: Capillary refill takes less than 2 seconds.   Neurological:      General: No focal deficit present.      Mental Status: He is alert and oriented to person, place, and time. Mental status is at baseline.   Psychiatric:         Mood and Affect: Mood normal.         Performance Status:  Karnofsky Score: 70 - Cares for self; unable to carry on normal activity or do normal work       Assessment/Plan         Oncology  History   Burkitt lymphoma (CMS/HCC)   10/27/2023 Initial Diagnosis    Burkitt lymphoma, high risk, MYC positive  - 10/19/23 presented to OSH ED with dysphagia and abdominal pain  - CT A/P with IV contrast (10/19) mass in the body of the pancreas measuring approximately 2.7 x 2.9 cm. The pancreatic duct is dilated. Multiple masses in the mesentery as well as omental caking consistent with carcinomatosis. Moderate amount of free fluid throughout the abdomen and pelvis.   - CT soft tissue neck with IV contrast (10/19): homogeneous soft tissue attenuation lesion in the L oropharynx which measures approximately 3.0 x 4.0 x 5.1 cm.   - US guided mesenteric LN biopsy (10/20/23): HIGH GRADE B-CELL LYMPHOMA MORPHOLOGICALLY CONSISTENT WITH BURKITT LYMPHOMA; flow cytometry: No B cell population identified  - Bone marrow aspiration biopsy November 1, 2023 no evidence of lymphoma involvement.  CSF by flow cytometry showed no lymphoma 11/2/2023.  - Biopsy L oropharyngeal mass and L cervical LN by ENT (10/23/23) high grade B-cell burkitt lymphoma, MYC positive, Bcl-2 negative.  - MR brain with and w/o IV contrast (10/27): No evidence of intracranial metastatic disease.     - Dexa 40 mg daily ended 11/1  - PET CT ordered 10/30 showed FDG avid left oropharyngeal mass and LAD above and below diaphragm     10/29/2023 -  Chemotherapy    HyperCVAD + RiTUXimab, 21 Day Cycles     12/6/2023 Imaging    -CT scan of the chest abdomen or pelvis on December 6, 2023 shows complete resolution of intra-abdominal disease, with mildly worse splenomegaly most likely unrelated to his lymphoma.           Burkitt lymphoma (CMS/HCC)  - High risk BL  in CR   - PET-CT s/p C4 (1/15/24) c/w Roxannuvadamaris 2  - Admitted for C6 HyperCVAD (Dose reduce Cytarabine at 1.5 G/m2 per dose with Methotrexate 1000 mg/m2 total) on 3/5/24  - Discussed with Dr. Klein doing 1 LP with intrathecal cytarabine this cycle while inpatient instead of 2 lumbar punctures per cycle  -  They are still discussing total of 8 cycles of therapy    Bilateral retinoschisis  - Bilateral retinoschisis and retinal hemorrgages   - S/p left eye vitrectomy  - May require repeat surgery in left eye folowing next cycle of chemo if vitreous hemorrhage persists or retina detaches  - Optho recommends Platelet threshold of 50-70,000 & not dropping below 30,000  - Saw Ophthalmology most recently on 02/19/24:  -- Taper Prednisolone 3x/day both eyes x 2 weeks then BID x 2 weeks  -- Continue Cosopt BID to the R eye  -- Taper Ketorolac TID both eyes x 2 weeks, BID x 2 weeks  -- Artificial tears QID to the left eye    Thrombocytopenia (CMS/HCC)  :: Due to disease and chemotherapy   - Ideal platelet transfusion threshold 50,000-70,000 per Optho recommendations; however, definitely cannot allow to drop below 30,000/uL.  - Updated Treatment Plan 2/16  - Plan for count checks three times weekly after discharge    Immunosuppressed due to chemotherapy (CMS/HCC)  - Continue prophylactic Acyclovir, Fluconazole, and Bactrim  - Levofloxacin Day 5-15 of each cycle for antibacterial prophylaxis     RTC  Admit to [facility] 3/5 for C6 HyperCVAD  D8 Rituximab as outpatient  Count Checks 3 times weekly M-W-F following discharge, first on 3/11    Requested & Pending-  [facility] Discharge Follow Up 3/11   Follow Up with Dr. Klein 3/13       DEMETRIO Mojica-CNP

## 2024-03-05 NOTE — ASSESSMENT & PLAN NOTE
- Bilateral retinoschisis and retinal hemorrgages   - S/p left eye vitrectomy  - May require repeat surgery in left eye folowing next cycle of chemo if vitreous hemorrhage persists or retina detaches  - Optho recommends Platelet threshold of 50-70,000 & not dropping below 30,000  - Saw Ophthalmology most recently on 02/19/24:  -- Taper Prednisolone 3x/day both eyes x 2 weeks then BID x 2 weeks  -- Continue Cosopt BID to the R eye  -- Taper Ketorolac TID both eyes x 2 weeks, BID x 2 weeks  -- Artificial tears QID to the left eye

## 2024-03-06 LAB
ABO GROUP (TYPE) IN BLOOD: NORMAL
ALBUMIN SERPL BCP-MCNC: 4 G/DL (ref 3.4–5)
ALP SERPL-CCNC: 67 U/L (ref 33–120)
ALT SERPL W P-5'-P-CCNC: 28 U/L (ref 10–52)
ANION GAP SERPL CALC-SCNC: 14 MMOL/L (ref 10–20)
ANTIBODY SCREEN: NORMAL
AST SERPL W P-5'-P-CCNC: 18 U/L (ref 9–39)
BASOPHILS # BLD AUTO: 0.01 X10*3/UL (ref 0–0.1)
BASOPHILS NFR BLD AUTO: 0.2 %
BILIRUB DIRECT SERPL-MCNC: 0.1 MG/DL (ref 0–0.3)
BILIRUB SERPL-MCNC: 0.5 MG/DL (ref 0–1.2)
BUN SERPL-MCNC: 22 MG/DL (ref 6–23)
CALCIUM SERPL-MCNC: 8.9 MG/DL (ref 8.6–10.6)
CHLORIDE SERPL-SCNC: 105 MMOL/L (ref 98–107)
CO2 SERPL-SCNC: 28 MMOL/L (ref 21–32)
CREAT SERPL-MCNC: 1.16 MG/DL (ref 0.5–1.3)
EGFRCR SERPLBLD CKD-EPI 2021: 90 ML/MIN/1.73M*2
EOSINOPHIL # BLD AUTO: 0 X10*3/UL (ref 0–0.7)
EOSINOPHIL NFR BLD AUTO: 0 %
ERYTHROCYTE [DISTWIDTH] IN BLOOD BY AUTOMATED COUNT: 22.6 % (ref 11.5–14.5)
GLUCOSE SERPL-MCNC: 88 MG/DL (ref 74–99)
HCT VFR BLD AUTO: 23.4 % (ref 41–52)
HGB BLD-MCNC: 8 G/DL (ref 13.5–17.5)
HOLD SPECIMEN: NORMAL
IMM GRANULOCYTES # BLD AUTO: 0.03 X10*3/UL (ref 0–0.7)
IMM GRANULOCYTES NFR BLD AUTO: 0.7 % (ref 0–0.9)
LYMPHOCYTES # BLD AUTO: 0.45 X10*3/UL (ref 1.2–4.8)
LYMPHOCYTES NFR BLD AUTO: 11.2 %
MCH RBC QN AUTO: 30.9 PG (ref 26–34)
MCHC RBC AUTO-ENTMCNC: 34.2 G/DL (ref 32–36)
MCV RBC AUTO: 90 FL (ref 80–100)
MONOCYTES # BLD AUTO: 0.76 X10*3/UL (ref 0.1–1)
MONOCYTES NFR BLD AUTO: 19 %
NEUTROPHILS # BLD AUTO: 2.76 X10*3/UL (ref 1.2–7.7)
NEUTROPHILS NFR BLD AUTO: 68.9 %
NRBC BLD-RTO: 0 /100 WBCS (ref 0–0)
PH, POC: 6.5
PH, POC: 8.5
PLATELET # BLD AUTO: 113 X10*3/UL (ref 150–450)
POTASSIUM SERPL-SCNC: 3.8 MMOL/L (ref 3.5–5.3)
PROT SERPL-MCNC: 5.4 G/DL (ref 6.4–8.2)
RBC # BLD AUTO: 2.59 X10*6/UL (ref 4.5–5.9)
RH FACTOR (ANTIGEN D): NORMAL
SODIUM SERPL-SCNC: 143 MMOL/L (ref 136–145)
WBC # BLD AUTO: 4 X10*3/UL (ref 4.4–11.3)

## 2024-03-06 PROCEDURE — 82248 BILIRUBIN DIRECT: CPT | Performed by: PHYSICIAN ASSISTANT

## 2024-03-06 PROCEDURE — 2500000001 HC RX 250 WO HCPCS SELF ADMINISTERED DRUGS (ALT 637 FOR MEDICARE OP)

## 2024-03-06 PROCEDURE — 99223 1ST HOSP IP/OBS HIGH 75: CPT | Performed by: INTERNAL MEDICINE

## 2024-03-06 PROCEDURE — 85025 COMPLETE CBC W/AUTO DIFF WBC: CPT | Performed by: PHYSICIAN ASSISTANT

## 2024-03-06 PROCEDURE — 80053 COMPREHEN METABOLIC PANEL: CPT | Performed by: PHYSICIAN ASSISTANT

## 2024-03-06 PROCEDURE — 81002 URINALYSIS NONAUTO W/O SCOPE: CPT | Performed by: PHYSICIAN ASSISTANT

## 2024-03-06 PROCEDURE — 2500000004 HC RX 250 GENERAL PHARMACY W/ HCPCS (ALT 636 FOR OP/ED): Performed by: INTERNAL MEDICINE

## 2024-03-06 PROCEDURE — 86920 COMPATIBILITY TEST SPIN: CPT

## 2024-03-06 PROCEDURE — 36430 TRANSFUSION BLD/BLD COMPNT: CPT

## 2024-03-06 PROCEDURE — 2500000004 HC RX 250 GENERAL PHARMACY W/ HCPCS (ALT 636 FOR OP/ED): Performed by: PHYSICIAN ASSISTANT

## 2024-03-06 PROCEDURE — 86901 BLOOD TYPING SEROLOGIC RH(D): CPT | Performed by: REGISTERED NURSE

## 2024-03-06 PROCEDURE — 2500000004 HC RX 250 GENERAL PHARMACY W/ HCPCS (ALT 636 FOR OP/ED)

## 2024-03-06 PROCEDURE — 2500000001 HC RX 250 WO HCPCS SELF ADMINISTERED DRUGS (ALT 637 FOR MEDICARE OP): Performed by: INTERNAL MEDICINE

## 2024-03-06 PROCEDURE — P9040 RBC LEUKOREDUCED IRRADIATED: HCPCS

## 2024-03-06 PROCEDURE — 1170000001 HC PRIVATE ONCOLOGY ROOM DAILY

## 2024-03-06 RX ORDER — DIPHENHYDRAMINE HYDROCHLORIDE 50 MG/ML
50 INJECTION INTRAMUSCULAR; INTRAVENOUS AS NEEDED
Status: DISCONTINUED | OUTPATIENT
Start: 2024-03-06 | End: 2024-03-09 | Stop reason: HOSPADM

## 2024-03-06 RX ORDER — FAMOTIDINE 10 MG/ML
20 INJECTION INTRAVENOUS AS NEEDED
Status: DISCONTINUED | OUTPATIENT
Start: 2024-03-06 | End: 2024-03-09 | Stop reason: HOSPADM

## 2024-03-06 RX ORDER — PROCHLORPERAZINE EDISYLATE 5 MG/ML
10 INJECTION INTRAMUSCULAR; INTRAVENOUS EVERY 6 HOURS PRN
Status: DISCONTINUED | OUTPATIENT
Start: 2024-03-06 | End: 2024-03-09 | Stop reason: HOSPADM

## 2024-03-06 RX ORDER — PREDNISOLONE ACETATE 10 MG/ML
2 SUSPENSION/ DROPS OPHTHALMIC EVERY 6 HOURS
Status: DISCONTINUED | OUTPATIENT
Start: 2024-03-06 | End: 2024-03-09 | Stop reason: HOSPADM

## 2024-03-06 RX ORDER — ALBUTEROL SULFATE 0.83 MG/ML
3 SOLUTION RESPIRATORY (INHALATION) AS NEEDED
Status: DISCONTINUED | OUTPATIENT
Start: 2024-03-06 | End: 2024-03-09 | Stop reason: HOSPADM

## 2024-03-06 RX ORDER — PROCHLORPERAZINE MALEATE 10 MG
10 TABLET ORAL EVERY 6 HOURS PRN
Status: DISCONTINUED | OUTPATIENT
Start: 2024-03-06 | End: 2024-03-09 | Stop reason: HOSPADM

## 2024-03-06 RX ORDER — EPINEPHRINE 0.1 MG/ML
0.3 INJECTION INTRACARDIAC; INTRAVENOUS EVERY 5 MIN PRN
Status: DISCONTINUED | OUTPATIENT
Start: 2024-03-06 | End: 2024-03-09 | Stop reason: HOSPADM

## 2024-03-06 RX ADMIN — Medication 1 TABLET: at 09:25

## 2024-03-06 RX ADMIN — PREDNISOLONE ACETATE 1 DROP: 10 SUSPENSION/ DROPS OPHTHALMIC at 01:10

## 2024-03-06 RX ADMIN — CARBOXYMETHYLCELLULOSE SODIUM 1 DROP: 5 SOLUTION/ DROPS OPHTHALMIC at 20:58

## 2024-03-06 RX ADMIN — ACYCLOVIR 400 MG: 400 TABLET ORAL at 09:25

## 2024-03-06 RX ADMIN — DORZOLAMIDE HYDROCHLORIDE AND TIMOLOL MALEATE 1 DROP: 22.3; 6.8 SOLUTION/ DROPS OPHTHALMIC at 01:10

## 2024-03-06 RX ADMIN — KETOROLAC TROMETHAMINE 1 DROP: 5 SOLUTION OPHTHALMIC at 21:00

## 2024-03-06 RX ADMIN — KETOROLAC TROMETHAMINE 1 DROP: 5 SOLUTION OPHTHALMIC at 09:28

## 2024-03-06 RX ADMIN — FERROUS SULFATE TAB 325 MG (65 MG ELEMENTAL FE) 1 TABLET: 325 (65 FE) TAB at 09:25

## 2024-03-06 RX ADMIN — DORZOLAMIDE HYDROCHLORIDE AND TIMOLOL MALEATE 1 DROP: 22.3; 6.8 SOLUTION/ DROPS OPHTHALMIC at 21:00

## 2024-03-06 RX ADMIN — DEXAMETHASONE SODIUM PHOSPHATE 12 MG: 4 INJECTION, SOLUTION INTRA-ARTICULAR; INTRALESIONAL; INTRAMUSCULAR; INTRAVENOUS; SOFT TISSUE at 09:25

## 2024-03-06 RX ADMIN — CARBOXYMETHYLCELLULOSE SODIUM 1 DROP: 5 SOLUTION/ DROPS OPHTHALMIC at 06:22

## 2024-03-06 RX ADMIN — ACYCLOVIR 400 MG: 400 TABLET ORAL at 01:11

## 2024-03-06 RX ADMIN — PREDNISOLONE ACETATE 2 DROP: 10 SUSPENSION/ DROPS OPHTHALMIC at 09:28

## 2024-03-06 RX ADMIN — METHOTREXATE 1536 MG: 25 INJECTION, SOLUTION INTRA-ARTERIAL; INTRAMUSCULAR; INTRATHECAL; INTRAVENOUS at 16:59

## 2024-03-06 RX ADMIN — FERROUS SULFATE TAB 325 MG (65 MG ELEMENTAL FE) 1 TABLET: 325 (65 FE) TAB at 01:11

## 2024-03-06 RX ADMIN — METHOTREXATE 384 MG: 25 INJECTION, SOLUTION INTRA-ARTERIAL; INTRAMUSCULAR; INTRATHECAL; INTRAVENOUS at 14:20

## 2024-03-06 RX ADMIN — SODIUM BICARBONATE 200 ML/HR: 84 INJECTION, SOLUTION INTRAVENOUS at 09:34

## 2024-03-06 RX ADMIN — CARBOXYMETHYLCELLULOSE SODIUM 1 DROP: 5 SOLUTION/ DROPS OPHTHALMIC at 14:19

## 2024-03-06 RX ADMIN — SODIUM BICARBONATE 200 ML/HR: 84 INJECTION, SOLUTION INTRAVENOUS at 16:59

## 2024-03-06 RX ADMIN — PREDNISOLONE ACETATE 2 DROP: 10 SUSPENSION/ DROPS OPHTHALMIC at 14:19

## 2024-03-06 RX ADMIN — PANTOPRAZOLE SODIUM 40 MG: 40 TABLET, DELAYED RELEASE ORAL at 06:22

## 2024-03-06 RX ADMIN — DORZOLAMIDE HYDROCHLORIDE AND TIMOLOL MALEATE 1 DROP: 22.3; 6.8 SOLUTION/ DROPS OPHTHALMIC at 09:27

## 2024-03-06 RX ADMIN — PREDNISOLONE ACETATE 2 DROP: 10 SUSPENSION/ DROPS OPHTHALMIC at 20:58

## 2024-03-06 RX ADMIN — SODIUM BICARBONATE 200 ML/HR: 84 INJECTION, SOLUTION INTRAVENOUS at 04:47

## 2024-03-06 RX ADMIN — FERROUS SULFATE TAB 325 MG (65 MG ELEMENTAL FE) 1 TABLET: 325 (65 FE) TAB at 20:58

## 2024-03-06 RX ADMIN — KETOROLAC TROMETHAMINE 1 DROP: 5 SOLUTION OPHTHALMIC at 01:10

## 2024-03-06 RX ADMIN — CARBOXYMETHYLCELLULOSE SODIUM 1 DROP: 5 SOLUTION/ DROPS OPHTHALMIC at 18:08

## 2024-03-06 RX ADMIN — ONDANSETRON 16 MG: 2 INJECTION INTRAMUSCULAR; INTRAVENOUS at 09:26

## 2024-03-06 RX ADMIN — ACYCLOVIR 400 MG: 400 TABLET ORAL at 20:58

## 2024-03-06 ASSESSMENT — COGNITIVE AND FUNCTIONAL STATUS - GENERAL
MOBILITY SCORE: 24
DAILY ACTIVITIY SCORE: 24

## 2024-03-06 ASSESSMENT — ENCOUNTER SYMPTOMS
COUGH: 0
FEVER: 0
ABDOMINAL DISTENTION: 0
VOICE CHANGE: 0
CONFUSION: 0
TROUBLE SWALLOWING: 0
ARTHRALGIAS: 0
PALPITATIONS: 0
CHOKING: 0
NAUSEA: 0
MYALGIAS: 0
HEMATURIA: 0
DIFFICULTY URINATING: 0
AGITATION: 0
RHINORRHEA: 0
HEADACHES: 0
VOMITING: 0
COLOR CHANGE: 0
APPETITE CHANGE: 0
DIARRHEA: 0
DIZZINESS: 0
APNEA: 0
CHILLS: 0
BACK PAIN: 0
WHEEZING: 0
ANAL BLEEDING: 0
ABDOMINAL PAIN: 0
SHORTNESS OF BREATH: 0
ACTIVITY CHANGE: 0
FATIGUE: 0
SORE THROAT: 0

## 2024-03-06 ASSESSMENT — PAIN SCALES - GENERAL
PAINLEVEL_OUTOF10: 0 - NO PAIN
PAINLEVEL_OUTOF10: 0 - NO PAIN

## 2024-03-06 ASSESSMENT — PAIN - FUNCTIONAL ASSESSMENT: PAIN_FUNCTIONAL_ASSESSMENT: 0-10

## 2024-03-06 ASSESSMENT — ACTIVITIES OF DAILY LIVING (ADL): LACK_OF_TRANSPORTATION: NO

## 2024-03-06 NOTE — SIGNIFICANT EVENT
03/06/24 0557   Prechemo Checklist   Has the patient been in the hospital, ED, or urgent care since last date of service No   Chemo/Immuno Consent Signed Yes   Protocol/Indications Verified Yes   Confirmed to previous date/time of medication Yes   Compared to previous dose N/A   All medications are dated accurately Yes   Pregnancy Test Negative Not applicable   Parameters Met Yes   BSA/Weight-Height Verified Yes   Dose Calculations Verified Yes

## 2024-03-06 NOTE — PROGRESS NOTES
Pharmacy Medication History Review    Shilo Thakkar is a 23 y.o. male admitted for Burkitt lymphoma (CMS/[facility]). Pharmacy reviewed the patient's [id number] medications and allergies for accuracy.    The list below reflects the updated PTA list. Comments regarding how patient may be taking medications differently can be found in the Admit Orders Activity.  Prior to Admission Medications   Prescriptions Last Dose Informant Patient Reported?   B complex-vitamin C tablet 3/5/2024 Self No   Sig: Take 1 tablet by mouth once daily.   acyclovir (Zovirax) 400 mg tablet 3/5/2024 Self No   Sig: Take 1 tablet (400 mg) by mouth 2 times a day.   dorzolamide-timoloL (Cosopt) 22.3-6.8 mg/mL ophthalmic solution 3/5/2024 Self No   Sig: Administer 1 drop into the right eye 2 times a day.   ferrous sulfate, 325 mg ferrous sulfate, tablet 3/5/2024 Self No   Sig: Take 1 tablet by mouth 2 times a day.   ketorolac (Acular) 0.5 % ophthalmic solution 3/5/2024 Self No   Sig: Use one drop 4 times a day in the right eye   Patient taking differently: Administer into both eyes. Use one drop 4 times a day in the right eye   pantoprazole (ProtoNix) 40 mg EC tablet  Self No   Sig: Take 1 tablet (40 mg) by mouth once daily in the morning. Take before meals. Do not crush, chew, or split.   prednisoLONE acetate (Pred-Forte) 1 % ophthalmic suspension 3/5/2024 Self No   Sig: Administer 1 drop into the both eyes 4 times a day.   sennosides-docusate sodium (Ashley-Colace) 8.6-50 mg tablet 3/5/2024 Self No   Sig: Take 2 tablets by mouth 2 times a day as needed for constipation.   sulfamethoxazole-trimethoprim (Bactrim DS) 800-160 mg tablet 3/5/2024 Self No   Sig: Take 1 tablet by mouth once a day on Monday, Wednesday, and Friday.      Facility-Administered Medications Last Administration Doses Remaining   ondansetron (Zofran) tablet 4 mg None recorded            The list below reflects the updated allergy list. Please review each documented allergy for  additional clarification and justification.  Allergies  Reviewed by Constance Betancourt PharmD on 3/6/2024   No Known Allergies         Patient declines M2B at discharge. Pharmacy has been updated to [facility] in [place], [place].    Sources used to complete the med history include reviewing the Epic medication dispense history, reviewing the heme onc office visit note from 3/4/24, checking [place] and interviewing the patient.    Below are additional concerns with the patient's PTA list.  Patient has been using the ketorolac drops in both eyes instead of just the right one.    Constance Betancourt, Pharm. D.  PGY-1 Pharmacy Resident  [facility]  Please reach out via [employer] Secure Chat for questions, or if no response call [phone] or [employer] “MedRec”

## 2024-03-06 NOTE — PROGRESS NOTES
03/06/24 1700   Discharge Planning   Living Arrangements Parent   Support Systems Family members;Friends/neighbors;Parent   Assistance Needed none   Type of Residence Private residence   Home or Post Acute Services None   Patient expects to be discharged to: home NN   Does the patient need discharge transport arranged? No   Financial Resource Strain   How hard is it for you to pay for the very basics like food, housing, medical care, and heating? Not hard   Housing Stability   In the last 12 months, was there a time when you were not able to pay the mortgage or rent on time? N   In the last 12 months, how many places have you lived? 1   In the last 12 months, was there a time when you did not have a steady place to sleep or slept in a shelter (including now)? N   Transportation Needs   In the past 12 months, has lack of transportation kept you from medical appointments or from getting medications? no   In the past 12 months, has lack of transportation kept you from meetings, work, or from getting things needed for daily living? No     LSW  met with pt and girlfriend at bedside to complete admission. Pt admitted for C6 HyperCVAD. Pt follows with Dr. Klein for dx of Burkitt's Lymphoma and has SOLO PICC. Pt plans to return home with family, no needs anticipated at discharge. Pt declines at current DME/ HHC use or needs. LSW to cont to follow.

## 2024-03-06 NOTE — H&P
History Of Present Illness  Shilo Thakkar is a 23 y.o. male with PMH of Burkitt Lymphoma (Dx 10/20/23; s/p 4 cycles of HyperCVAD), malignant pleural effusions s/p CT (10/29/23), ADHD, and BL retinoschisis and retinal hemorrhages, s/p left vitrectomy who presents as a direct admission on 3/5/24 for C6 HCVAD.    On admission,  Patient reports feeling well. Denies any current complaints. Denies HA, dizziness, CP, SOB, N/V/D/C, fever, chills. All other ROS negative.      ONC HX (per chart review):  Burkitt lymphoma, high risk, MYC positive  - 10/19/23 presented to OSH ED with dysphagia and abdominal pain  - CT A/P with IV contrast (10/19) mass in the body of the pancreas measuring approximately 2.7 x 2.9 cm. The pancreatic duct is dilated. Multiple masses in the mesentery as well as omental caking consistent with carcinomatosis. Moderate amount of free fluid throughout the abdomen and pelvis.   - CT soft tissue neck with IV contrast (10/19): homogeneous soft tissue attenuation lesion in the L oropharynx which measures approximately 3.0 x 4.0 x 5.1 cm.   - US guided mesenteric LN biopsy (10/20/23): HIGH GRADE B-CELL LYMPHOMA MORPHOLOGICALLY CONSISTENT WITH BURKITT LYMPHOMA; flow cytometry: No B cell population identified  - Bone marrow aspiration biopsy November 1, 2023 no evidence of lymphoma involvement.  CSF by flow cytometry showed no lymphoma 11/2/2023.  - Biopsy L oropharyngeal mass and L cervical LN by ENT (10/23/23) high grade B-cell burkitt lymphoma, MYC positive, Bcl-2 negative.  - MR brain with and w/o IV contrast (10/27): No evidence of intracranial metastatic disease.     - Dexa 40 mg daily ended 11/1  - PET CT ordered 10/30 showed FDG avid left oropharyngeal mass and LAD above and below diaphragm  - Started HyperCVAD 10/29 cycle 1 day 1  - Part A:   D1: CTX + mesna, dexamethasone, D2: CTX + mesna, D3: CTX + mesna, D4: Doxo, vinca, D6: Ritux, GCSF, D9: IT,   D2:  intrathecal methotrexate November 2, 2023, 12  mg, CSF shows no B cells by flow cytometry.  D6: Intrathecal cytarabine 100 mg on November 6, 2023.  D11: vinca, dexamethasone, D13: Ritux 11/10  D13: Rituximab therapy  - Daily neupogen since 11/3/23  -Discharged home November 11, 2023  -Evaluation November 15, 2023: Recovered thrombocytopenia, stop levofloxacin, continue prophylactic acyclovir and 3 times weekly Bactrim and fluconazole.  Depressed affect noted.  -Cycle 2-day 1 part to be high-dose methotrexate plus high-dose cytarabine, November 20, 2023,  with 2 intrathecal chemotherapy administered  -Neulasta on 11/27/2023.  -Evaluation 12/6/2023 asymptomatic gaining weight doing well.  To schedule twice weekly CBC BMP Mondays and Thursdays after discharge, emphasized prophylactic levofloxacin between day 5 and day 15, acyclovir, fluconazole and 3 times a week Bactrim.  -CT scan of the chest abdomen or pelvis on December 6, 2023 shows complete resolution of intra-abdominal disease, with mildly worse splenomegaly most likely unrelated to his lymphoma.  -Cycle 3 hyper-CVAD regimen December 12, 2023, including Rituxan day 1 and day 11, intrathecal methotrexate 12/14/2023, intrathecal cytarabine December 18, 2023.  -December 27, 2023: Feeling well asymptomatic tolerated cycle 3 well, plan for cycle 4 January 2, 2024 consisting of high-dose methotrexate high-dose cytarabine, and includes 2 doses of rituximab day 1 day 8, and 2 doses of intrathecal chemotherapy, intrathecal cytarabine day 1 and intrathecal methotrexate day 8  -Hospitalized  1/17-1/23/2024 with visual changes, retinoschisis, profound pancytopenia s/p left eye vitrectomy, retinal hemorrgages. Delay cycle 5. Discuss with ophth regarding etiology and plans.  -Evaluation January 31, 2024: Vision appears to be improving.  Discussed with ophthalmology.  They will be seeing February 2 and will decide whether they will clear him for cycle 5 of chemotherapy which would be tentatively planned to start February  9, 2024 as an inpatient at modified dosing.  Repeat echocardiogram, continue prophylactic antibiotics.  Will need 3 times weekly labs for close monitoring and will keep platelets above 50,000, keep hemoglobin above 8.5-9.  Will need closer supervision as he was noncompliant with twice weekly labs before.  -Evaluation 2/7/2024: Continues to improve, vision clearly better.  Appears to have been cleared by ophthalmology, to proceed with cycle 5 starting 2/9/2024 with 80% cyclophosphamide.  Needs echocardiogram repeated to verify a normal ejection fraction.  Upon discharge he will need 3 times a week count check to assure compliance, as he had been noncompliant with twice weekly labs previously.  Need to keep platelets above 30-50,000, need to keep hemoglobin above 8.5 at [place]a.  Final cycle of chemotherapy will be also dose reduced with cytarabine down to 1 g/m².  Needs a chest x-ray on Friday before starting  -Evaluation 2/20/2024: Doing well.  Eyes appear to be improving tolerated cycle 5 without difficulty.  We omitted intrathecal on cycle 5.  Plan to admit on 3/5/2024 for cycle 6, planned dose reduction of high-dose cytarabine to 1.5 g/m², keeping methotrexate the same.  We will incorporate intrathecal cytarabine day 1, patient declining a second intrathecal.  We are in the process of trying to convince the patient to complete 8 cycles to adhere to the published data on hyper CVAD and Burkitt's lymphoma  Past Medical History  Past Medical History:   Diagnosis Date    ADHD (attention deficit hyperactivity disorder) 01/28/2014    Burkitt lymphoma (CMS/Formerly [facility]) 10/27/2023    Colles' fracture of right radius, initial encounter for closed fracture 04/23/2015    Colles' fracture of right radius    Gynecomastia 05/23/2016    Unspecified fracture of navicular (scaphoid) bone of left wrist, initial encounter for closed fracture 12/17/2015    Closed fracture of scaphoid bone of left wrist       Surgical History  Past Surgical  History:   Procedure Laterality Date    CHEST TUBE INSERTION  10/29/2023    FOOT FRACTURE SURGERY Left     OTHER SURGICAL HISTORY  10/03/2017    History Of Prior Surgery    US GUIDED SOFT TISSUE BIOPSY  10/20/2023    US GUIDED SOFT TISSUE BIOPSY 10/20/2023 [facility] US        Social History  He reports that he has never smoked. He has never been exposed to tobacco smoke. He has never used smokeless tobacco. He reports that he does not currently use alcohol. He reports that he does not use drugs.    Family History  Family History   Problem Relation Name Age of Onset    ADD / ADHD Mother      ADD / ADHD Sister      ADD / ADHD Brother          Allergies  Patient has no known allergies.    Review of Systems   Constitutional:  Negative for activity change, appetite change, chills, fatigue and fever.   HENT:  Negative for congestion, drooling, ear pain, hearing loss, postnasal drip, rhinorrhea, sore throat, trouble swallowing and voice change.    Eyes:  Positive for visual disturbance (chronic, stable).   Respiratory:  Negative for apnea, cough, choking, shortness of breath and wheezing.    Cardiovascular:  Negative for chest pain, palpitations and leg swelling.   Gastrointestinal:  Negative for abdominal distention, abdominal pain, anal bleeding, diarrhea, nausea and vomiting.   Genitourinary:  Negative for difficulty urinating and hematuria.   Musculoskeletal:  Negative for arthralgias, back pain and myalgias.   Skin:  Negative for color change and rash.   Allergic/Immunologic: Positive for immunocompromised state.   Neurological:  Negative for dizziness and headaches.   Psychiatric/Behavioral:  Negative for agitation and confusion.         Physical Exam  Constitutional:       General: He is not in acute distress.     Appearance: Normal appearance. He is not toxic-appearing.   HENT:      Head: Normocephalic and atraumatic.      Nose: Nose normal. No congestion or rhinorrhea.      Mouth/Throat:      Mouth: Mucous membranes are  "moist.      Pharynx: No oropharyngeal exudate or posterior oropharyngeal erythema.   Eyes:      General: No scleral icterus.     Extraocular Movements: Extraocular movements intact.      Pupils: Pupils are equal, round, and reactive to light.      Comments: Retinal hemorrhages noted, improving     Cardiovascular:      Rate and Rhythm: Normal rate and regular rhythm.      Pulses: Normal pulses.      Heart sounds: Normal heart sounds. No murmur heard.     No friction rub. No gallop.   Pulmonary:      Effort: Pulmonary effort is normal. No respiratory distress.      Breath sounds: Normal breath sounds. No wheezing, rhonchi or rales.   Abdominal:      General: Abdomen is flat. Bowel sounds are normal. There is no distension.      Palpations: Abdomen is soft. There is no mass.      Tenderness: There is no abdominal tenderness. There is no guarding.   Musculoskeletal:         General: No swelling or tenderness. Normal range of motion.      Cervical back: Normal range of motion and neck supple.      Right lower leg: No edema.      Left lower leg: No edema.   Skin:     General: Skin is warm and dry.      Coloration: Skin is not jaundiced or pale.      Findings: No erythema.   Neurological:      General: No focal deficit present.      Mental Status: He is alert and oriented to person, place, and time.   Psychiatric:         Mood and Affect: Mood normal.         Behavior: Behavior normal.          Last Recorded Vitals  Blood pressure 131/80, pulse 91, temperature 36 °C (96.8 °F), temperature source Temporal, resp. rate 18, height 1.82 m (5' 11.65\"), weight 72.5 kg (159 lb 13.3 oz), SpO2 99 %.    Relevant Results  Scheduled medications  acyclovir, 400 mg, oral, BID  [START ON 3/6/2024] B complex-vitamin C, 1 tablet, oral, Daily  dorzolamide-timoloL, 1 drop, Right Eye, BID  ferrous sulfate (325 mg ferrous sulfate), 1 tablet, oral, BID  ketorolac, 1 drop, Both Eyes, BID  lubricating eye drops, 1 drop, Left Eye, 4x daily  [START " ON 3/6/2024] pantoprazole, 40 mg, oral, Daily before breakfast  prednisoLONE acetate, 1 drop, Both Eyes, BID  [START ON 3/6/2024] sulfamethoxazole-trimethoprim, 1 tablet, oral, Every Mon/Wed/Fri      Continuous medications     PRN medications  PRN medications: alteplase, ondansetron, sennosides-docusate sodium    Results for orders placed or performed during the hospital encounter of 03/05/24 (from the past 24 hour(s))   CBC and Auto Differential   Result Value Ref Range    WBC 6.1 4.4 - 11.3 x10*3/uL    nRBC 0.5 (H) 0.0 - 0.0 /100 WBCs    RBC 2.72 (L) 4.50 - 5.90 x10*6/uL    Hemoglobin 9.0 (L) 13.5 - 17.5 g/dL    Hematocrit 26.0 (L) 41.0 - 52.0 %    MCV 96 80 - 100 fL    MCH 33.1 26.0 - 34.0 pg    MCHC 34.6 32.0 - 36.0 g/dL    RDW 23.0 (H) 11.5 - 14.5 %    Platelets 122 (L) 150 - 450 x10*3/uL    Neutrophils %      Immature Granulocytes %, Automated      Lymphocytes %      Monocytes %      Eosinophils %      Basophils %      Neutrophils Absolute      Lymphocytes Absolute      Monocytes Absolute      Eosinophils Absolute      Basophils Absolute     aPTT   Result Value Ref Range    aPTT 31 27 - 38 seconds     No results found.         Assessment/Plan   Principal Problem:    Burkitt lymphoma (CMS/HCC)    Shilo Thakkar is a 23 y.o. male with PMH of Burkitt Lymphoma (Dx 10/20/23; s/p 4 cycles of HyperCVAD), malignant pleural effusions s/p CT (10/29/23), ADHD, and BL retinoschisis and retinal hemorrhages, s/p left vitrectomy who presents as a direct admission on 3/5/24 for C6 HCVAD.    ONC  # Burkitt lymphoma, newly diagnosed (10/20/23)  - 10/19/23 presented to OSH ED with dysphagia and abdominal pain  - CT A/P with IV contrast (10/19) mass in the body of the pancreas measuring approximately 2.7 x 2.9 cm. Multiple masses in the mesentery as well as omental caking c/q carcinomatosis.   - CT soft tissue neck w/ IV contrast (10/19): homogeneous soft tissue attenuation lesion in the L oropharynx which measures approximately  3.0 x 4.0 x 5.1 cm.   - CT Abdomen w/o IV contrast (10/20): Mesenteric lymphadenopathy  - US guided mesenteric LN biopsy (10/20/23): HIGH GRADE B-CELL LYMPHOMA MORPHOLOGICALLY C/W BURKITT LYMPHOMA; flow cytometry: CD10+ kappa restricted B cells supportive of a B cell lymphoma with germinal center origin.   - Biopsy L oropharyngeal mass and L cervical LN by ENT (10/23/23) high grade B-cell burkitt lymphoma  - PET CT (10/30) showed FDG avid left oropharyngeal mass and LAD above and below diaphragm  - BMBx (11/1) without evidence of disease  - s/p 5 cycles of HyperCVAD (10/28, 11/19, 12/11, 1/2/24, 2/10)  - PET scan (1/15/24): Deauville 2. Complete resolution of metabolic activity of previously seen Lt oropharyngeal mass, resolution of previously seen hypermetabolic Lt level 2 and 3 cervical lymphadenopathy, resolution of previously seen moderate-large size Lt pleural effusion and trace Rt pleural effusion and abdominopelvic ascites, resolution of previously seen hypermetabolic large mass within pancreatic tail/body and the previously seen hypermetabolic retroperitoneal and mesenteric lymphadenopathy.  - Cycle 5 initially delayed a little over a week due to recent hospitalization from 1/17-1/23/24 for visual changes, retinoschisis, retinal hemorrhages, and profound pancytopenia requiring left eye vitrectomy  -Still discussing total of 8 cycles of therapy   - Now admitted for cycle 6 of HyperCVAD (Dose reduce Cytarabine at 1.5 G/m2 per dose with Methotrexate 1000 mg/m2 total) on 3/6/24   -Per recent OP note, plan of doing 1 LP with IT cytarabine this cycle while inpatient instead of 2 lumbar punctures per cycle  -CXR obtained on admission, prior to starting chemotherapy     OPTHO  -Recent hx/o bilateral macula-involving sub ILM hemorrhage with serous detachment OS>OD  -Multilayer retinal hemorrhage, left eye.   -S/p pars plana vitrectomy (PPV)/C3F8/intraretinal tPA OS  -S/P PPV/MP/EL/air O  -Last seen by Ophthalm in  outpatient clinic on 2/19. Per recs, Taper Prednisolone 3x/day OU x 2 weeks then BID x 2 weeks, continue Cosopt BID OD, Taper Ketorolac TID OU x 2 weeks, BID x 2 weeks and artificial tears QID to the left eye.  -Okay to proceed with chemotherapy.   - Prefer to keep Plts 50-70,000 and recommend avoiding PLt levels below 30K  - Pt reports baseline vision unchanged recently upon admit; right eye vision is blurry, left eye no vision  - May need repeat surgery in the Lt eye (after this cycle or the next) if the vitreous hemorrhage persists or retina detaches  -RTC 3 weeks     HEME  # Anemia without active bleeding  - Continue home ferrous sulfate 325mg BID started by Dr. Klein  - Transfuse to keep Hgb>8.5, Plt>50-70,000 and avoid dropping below 30,000 per ophtho requirements with hx/o retinal hemorrhages     CARDIAC  - ECHO (10/2023): EF 60-65%  - Plan repeat ECHO (2/10/24) LVEF 60-65%     ID  Allergies: NKDA  PPX: Acyclovir 400mg BID, Bactrim MWF  - Please address if needs to be on fluconazole for antifungal ppx, currently not in his home meds list  - Plan Levaquin for Day 5-15 of each cycle for antibacterial prophylaxis       FEN/GI  - Admit weight: 72.5kg  - Monitor and replace electrolytes as needed  - Cont Vit B complex  -ppx: Continue Pantoprazole 40mg qd     ENDO  -CTA neck (1/17) with 6 mm nodule in the left lobe of the thyroid gland   -TSH level (1/20): 1.35       DISPO  - Full Code  - Access: PICC line  - Primary Onc: Dr. Klein  - NOK: Ms Deidra Pickett [phone]  - Plan 3x weekly count checks, first on 3/11         Huber Waller PA-C

## 2024-03-06 NOTE — CARE PLAN
The patient's goals for the shift include      The clinical goals for the shift include to get better  Progressing.  Patient new admit, here for chemo, chemo orders released, sodium bicarb infusing through PICC line to right upper arm. Spouse at bedside, appointed by patient for medications/review. Patient acclimated to room. Denies pain. Patient has visual impairment. He is stable and comfortable at this time.

## 2024-03-06 NOTE — PROGRESS NOTES
"Shilo Thakkar is a 23 y.o. male on day 1 of admission presenting with Burkitt lymphoma (CMS/HCC).    Subjective   ROS unremarkable except for eyes. R eye with persistent blurred vision. L eye with persistent near blindness. Can see light and some movement. No changes since last visit to eye doctor.        Objective     Physical Exam  Constitutional:       Appearance: Normal appearance.   HENT:      Head: Normocephalic and atraumatic.      Nose: Nose normal.      Mouth/Throat:      Mouth: Mucous membranes are moist.   Eyes:      Pupils: Pupils are equal, round, and reactive to light.      Comments: Bilat conjunctival erythema R>L eye. Non injected.    Cardiovascular:      Rate and Rhythm: Normal rate and regular rhythm.      Pulses: Normal pulses.      Heart sounds: Normal heart sounds.   Pulmonary:      Effort: Pulmonary effort is normal.      Breath sounds: Normal breath sounds.   Abdominal:      General: Bowel sounds are normal.      Palpations: Abdomen is soft.   Musculoskeletal:         General: Normal range of motion.   Skin:     General: Skin is warm and dry.      Capillary Refill: Capillary refill takes 2 to 3 seconds.   Neurological:      General: No focal deficit present.      Mental Status: He is alert and oriented to person, place, and time.   Psychiatric:         Mood and Affect: Mood normal.         Last Recorded Vitals  Blood pressure 126/75, pulse 84, temperature 35.7 °C (96.3 °F), temperature source Temporal, resp. rate 18, height 1.82 m (5' 11.65\"), weight 73.1 kg (161 lb 2.5 oz), SpO2 98 %.  Intake/Output last 3 Shifts:  I/O last 3 completed shifts:  In: 1420 (19.6 mL/kg) [I.V.:1420 (19.6 mL/kg)]  Out: - (0 mL/kg)   Weight: 72.5 kg     Labs reviewed (3/6/24): No needs  CXR (3/6/24): Unremarkable      Assessment/Plan   Principal Problem:    Burkitt lymphoma (CMS/HCC)    Principal Problem:    Burkitt lymphoma (CMS/HCC)     Shilo Thakkar is a 23 y.o. male with PMH of Burkitt Lymphoma (Dx 10/20/23; s/p 4 " cycles of HyperCVAD), malignant pleural effusions s/p CT (10/29/23), ADHD, and BL retinoschisis and retinal hemorrhages, s/p left vitrectomy who presents as a direct admission on 3/5/24 for C6 HCVAD.     ONC  # Burkitt lymphoma, newly diagnosed (10/20/23)  - 10/19/23 presented to [facility] ED with dysphagia and abdominal pain  - CT A/P with IV contrast (10/19) mass in the body of the pancreas measuring approximately 2.7 x 2.9 cm. Multiple masses in the mesentery as well as omental caking c/q carcinomatosis.   - CT soft tissue neck w/ IV contrast (10/19): homogeneous soft tissue attenuation lesion in the L oropharynx which measures approximately 3.0 x 4.0 x 5.1 cm.   - CT Abdomen w/o IV contrast (10/20): Mesenteric lymphadenopathy  - US guided mesenteric LN biopsy (10/20/23): HIGH GRADE B-CELL LYMPHOMA MORPHOLOGICALLY C/W BURKITT LYMPHOMA; flow cytometry: CD10+ kappa restricted B cells supportive of a B cell lymphoma with germinal center origin.   - Biopsy L oropharyngeal mass and L cervical LN by ENT (10/23/23) high grade B-cell burkitt lymphoma  - PET CT (10/30) showed FDG avid left oropharyngeal mass and LAD above and below diaphragm  - BMBx (11/1) without evidence of disease  - s/p 5 cycles of HyperCVAD (10/28, 11/19, 12/11, 1/2/24, 2/10)  - PET scan (1/15/24): Deauville 2. Complete resolution of metabolic activity of previously seen Lt oropharyngeal mass, resolution of previously seen hypermetabolic Lt level 2 and 3 cervical lymphadenopathy, resolution of previously seen moderate-large size Lt pleural effusion and trace Rt pleural effusion and abdominopelvic ascites, resolution of previously seen hypermetabolic large mass within pancreatic tail/body and the previously seen hypermetabolic retroperitoneal and mesenteric lymphadenopathy.  - Cycle 5 initially delayed a little over a week due to recent hospitalization from 1/17-1/23/24 for visual changes, retinoschisis, retinal hemorrhages, and profound pancytopenia  requiring left eye vitrectomy  -Still discussing total of 8 cycles of therapy   - Admitted for Cycle 6 of HyperCVAD (Dose reduce Cytarabine at 1.5 G/m2 per dose with Methotrexate 1000 mg/m2 total) on 3/6/24.  Follow Methotrexate levels  -Per recent OP note, plan on doing 1 LP with IT Cytarabine this cycle while inpatient instead of 2 lumbar punctures per cycle  -CXR obtained on admission, prior to starting chemotherapy was normal  - Neulasta and Rituxan to be given outpatient     OPTHO  -Recent hx/o bilateral macula-involving sub ILM hemorrhage with serous detachment OS>OD  -Multilayer retinal hemorrhage, left eye.   -S/p pars plana vitrectomy (PPV)/C3F8/intraretinal tPA OS  -S/P PPV/MP/EL/air O  -Last seen by Ophthalm in outpatient clinic on 2/19. Per recs, Taper Prednisolone 3x/day OU x 2 weeks then BID x 2 weeks, continue Cosopt BID OD, Taper Ketorolac TID OU x 2 weeks, BID x 2 weeks and artificial tears QID to the left eye.  -Okay to proceed with chemotherapy.   - Prefer to keep Plts 50-70,000 and recommend avoiding PLt levels below 30K  - Pt reports baseline vision unchanged recently upon admit; right eye vision is blurry, left eye no vision  - May need repeat surgery in the Lt eye (after this cycle or the next) if the vitreous hemorrhage persists or retina detaches  -RTC 3 weeks. Ophthalmology Consulted (3/6). They will see him next week outpatient. They will arrange appointment      HEME  # Anemia without active bleeding  - Continue home ferrous sulfate 325mg BID started by Dr. Klein  - Transfuse to keep Hgb>8.5, Plt>50-70,000 and avoid dropping below 30,000 per ophtho requirements with hx/o retinal hemorrhages  - S/p PRBC: 3/6     CARDIAC  - ECHO (10/2023): EF 60-65%  - Plan repeat ECHO (2/10/24) LVEF 60-65%     ID  Allergies: NKDA  PPX: Acyclovir 400mg BID, Bactrim MWF  - Please address if needs to be on fluconazole for antifungal ppx, currently not in his home meds list  - Plan Levaquin for Day 5-15 of  each cycle for antibacterial prophylaxis       FEN/GI  - Admit wt: 72.5 kg (3/5). Current wt: 73.1 kg (3/6)  - Monitor and replace electrolytes as needed  - Cont Vit B complex  - ppx: Home Pantoprazole 40mg every day held     ENDO  -CTA neck (1/17) with 6 mm nodule in the left lobe of the thyroid gland   -TSH level (1/20): 1.35       DISPO  - Full Code  - Access: PICC line  - Primary Onc: Dr. Klein  - NOK: Ms Deidra Pickett [phone]  - Plan 3x weekly count checks, first on 3/11     I spent 45 minutes in the professional and overall care of this patient.      Kayal Vang, DEMETRIO-CNP

## 2024-03-07 LAB
ALBUMIN SERPL BCP-MCNC: 3.7 G/DL (ref 3.4–5)
ALP SERPL-CCNC: 57 U/L (ref 33–120)
ALT SERPL W P-5'-P-CCNC: 41 U/L (ref 10–52)
ANION GAP SERPL CALC-SCNC: 12 MMOL/L (ref 10–20)
APPEARANCE CSF: CLEAR
AST SERPL W P-5'-P-CCNC: 23 U/L (ref 9–39)
BASOPHILS # BLD AUTO: 0.01 X10*3/UL (ref 0–0.1)
BASOPHILS NFR BLD AUTO: 0.1 %
BASOPHILS NFR CSF MANUAL: 0 %
BILIRUB DIRECT SERPL-MCNC: 0.1 MG/DL (ref 0–0.3)
BILIRUB SERPL-MCNC: 0.6 MG/DL (ref 0–1.2)
BLASTS CSF MANUAL: 0 %
BLOOD EXPIRATION DATE: NORMAL
BUN SERPL-MCNC: 16 MG/DL (ref 6–23)
CALCIUM SERPL-MCNC: 8.6 MG/DL (ref 8.6–10.6)
CHLORIDE SERPL-SCNC: 106 MMOL/L (ref 98–107)
CO2 SERPL-SCNC: 28 MMOL/L (ref 21–32)
COLOR CSF: COLORLESS
COLOR SPUN CSF: COLORLESS
CREAT SERPL-MCNC: 0.89 MG/DL (ref 0.5–1.3)
DACRYOCYTES BLD QL SMEAR: NORMAL
DISPENSE STATUS: NORMAL
EGFRCR SERPLBLD CKD-EPI 2021: >90 ML/MIN/1.73M*2
EOSINOPHIL # BLD AUTO: 0 X10*3/UL (ref 0–0.7)
EOSINOPHIL NFR BLD AUTO: 0 %
EOSINOPHIL NFR CSF MANUAL: 0 %
ERYTHROCYTE [DISTWIDTH] IN BLOOD BY AUTOMATED COUNT: 20.5 % (ref 11.5–14.5)
GLUCOSE CSF-MCNC: 94 MG/DL (ref 40–70)
GLUCOSE SERPL-MCNC: 168 MG/DL (ref 74–99)
HCT VFR BLD AUTO: 26 % (ref 41–52)
HGB BLD-MCNC: 8.8 G/DL (ref 13.5–17.5)
IMM GRANULOCYTES # BLD AUTO: 0.05 X10*3/UL (ref 0–0.7)
IMM GRANULOCYTES NFR BLD AUTO: 0.7 % (ref 0–0.9)
IMM GRANULOCYTES NFR CSF: 0 %
LYMPHOCYTES # BLD AUTO: 0.2 X10*3/UL (ref 1.2–4.8)
LYMPHOCYTES NFR BLD AUTO: 2.8 %
LYMPHOCYTES NFR CSF MANUAL: 25 % (ref 28–96)
MAGNESIUM SERPL-MCNC: 2.06 MG/DL (ref 1.6–2.4)
MCH RBC QN AUTO: 32.2 PG (ref 26–34)
MCHC RBC AUTO-ENTMCNC: 33.8 G/DL (ref 32–36)
MCV RBC AUTO: 95 FL (ref 80–100)
MONOCYTES # BLD AUTO: 0.24 X10*3/UL (ref 0.1–1)
MONOCYTES NFR BLD AUTO: 3.4 %
MONOS+MACROS NFR CSF MANUAL: 25 % (ref 16–56)
NEUTROPHILS # BLD AUTO: 6.58 X10*3/UL (ref 1.2–7.7)
NEUTROPHILS NFR BLD AUTO: 93 %
NEUTS SEG NFR CSF MANUAL: 50 % (ref 0–5)
NRBC BLD-RTO: 0.3 /100 WBCS (ref 0–0)
OTHER CELLS NFR CSF MANUAL: 0 %
PH, POC: 8.5
PH, POC: 8.5
PHOSPHATE SERPL-MCNC: 2.8 MG/DL (ref 2.5–4.9)
PLASMA CELLS NFR CSF MICRO: 0 %
PLATELET # BLD AUTO: 106 X10*3/UL (ref 150–450)
POLYCHROMASIA BLD QL SMEAR: NORMAL
POTASSIUM SERPL-SCNC: 3.5 MMOL/L (ref 3.5–5.3)
PRODUCT BLOOD TYPE: 5100
PRODUCT CODE: NORMAL
PROT CSF-MCNC: 42 MG/DL (ref 15–45)
PROT SERPL-MCNC: 5.2 G/DL (ref 6.4–8.2)
RBC # BLD AUTO: 2.73 X10*6/UL (ref 4.5–5.9)
RBC # CSF AUTO: 30 /UL (ref 0–5)
RBC MORPH BLD: NORMAL
SCHISTOCYTES BLD QL SMEAR: NORMAL
SODIUM SERPL-SCNC: 142 MMOL/L (ref 136–145)
TOTAL CELLS COUNTED CSF: 4
TUBE # CSF: ABNORMAL
UNIT ABO: NORMAL
UNIT NUMBER: NORMAL
UNIT RH: NORMAL
UNIT VOLUME: 350
WBC # BLD AUTO: 7.1 X10*3/UL (ref 4.4–11.3)
WBC # CSF AUTO: 1 /UL (ref 1–5)
XM INTEP: NORMAL

## 2024-03-07 PROCEDURE — 82945 GLUCOSE OTHER FLUID: CPT | Performed by: NURSE PRACTITIONER

## 2024-03-07 PROCEDURE — 81002 URINALYSIS NONAUTO W/O SCOPE: CPT | Performed by: PHYSICIAN ASSISTANT

## 2024-03-07 PROCEDURE — 96450 CHEMOTHERAPY INTO CNS: CPT | Performed by: HOME HEALTH AIDE

## 2024-03-07 PROCEDURE — 88185 FLOWCYTOMETRY/TC ADD-ON: CPT | Mod: TC | Performed by: NURSE PRACTITIONER

## 2024-03-07 PROCEDURE — 2500000004 HC RX 250 GENERAL PHARMACY W/ HCPCS (ALT 636 FOR OP/ED): Performed by: INTERNAL MEDICINE

## 2024-03-07 PROCEDURE — A4216 STERILE WATER/SALINE, 10 ML: HCPCS | Performed by: INTERNAL MEDICINE

## 2024-03-07 PROCEDURE — 88187 FLOWCYTOMETRY/READ 2-8: CPT | Performed by: NURSE PRACTITIONER

## 2024-03-07 PROCEDURE — 84157 ASSAY OF PROTEIN OTHER: CPT | Performed by: NURSE PRACTITIONER

## 2024-03-07 PROCEDURE — 83735 ASSAY OF MAGNESIUM: CPT | Performed by: PHYSICIAN ASSISTANT

## 2024-03-07 PROCEDURE — 82248 BILIRUBIN DIRECT: CPT | Performed by: PHYSICIAN ASSISTANT

## 2024-03-07 PROCEDURE — 99232 SBSQ HOSP IP/OBS MODERATE 35: CPT | Performed by: INTERNAL MEDICINE

## 2024-03-07 PROCEDURE — 009U3ZX DRAINAGE OF SPINAL CANAL, PERCUTANEOUS APPROACH, DIAGNOSTIC: ICD-10-PCS | Performed by: HOME HEALTH AIDE

## 2024-03-07 PROCEDURE — 1170000001 HC PRIVATE ONCOLOGY ROOM DAILY

## 2024-03-07 PROCEDURE — 84100 ASSAY OF PHOSPHORUS: CPT | Performed by: PHYSICIAN ASSISTANT

## 2024-03-07 PROCEDURE — 85025 COMPLETE CBC W/AUTO DIFF WBC: CPT | Performed by: PHYSICIAN ASSISTANT

## 2024-03-07 PROCEDURE — 89051 BODY FLUID CELL COUNT: CPT | Performed by: NURSE PRACTITIONER

## 2024-03-07 PROCEDURE — 2500000001 HC RX 250 WO HCPCS SELF ADMINISTERED DRUGS (ALT 637 FOR MEDICARE OP)

## 2024-03-07 PROCEDURE — 3E0R305 INTRODUCTION OF OTHER ANTINEOPLASTIC INTO SPINAL CANAL, PERCUTANEOUS APPROACH: ICD-10-PCS | Performed by: HOME HEALTH AIDE

## 2024-03-07 PROCEDURE — 88104 CYTOPATH FL NONGYN SMEARS: CPT | Performed by: NURSE PRACTITIONER

## 2024-03-07 PROCEDURE — 80048 BASIC METABOLIC PNL TOTAL CA: CPT | Performed by: PHYSICIAN ASSISTANT

## 2024-03-07 PROCEDURE — 2500000004 HC RX 250 GENERAL PHARMACY W/ HCPCS (ALT 636 FOR OP/ED): Performed by: PHYSICIAN ASSISTANT

## 2024-03-07 RX ADMIN — FERROUS SULFATE TAB 325 MG (65 MG ELEMENTAL FE) 1 TABLET: 325 (65 FE) TAB at 09:16

## 2024-03-07 RX ADMIN — Medication 1 TABLET: at 09:16

## 2024-03-07 RX ADMIN — PREDNISOLONE ACETATE 2 DROP: 10 SUSPENSION/ DROPS OPHTHALMIC at 04:00

## 2024-03-07 RX ADMIN — ONDANSETRON 16 MG: 2 INJECTION INTRAMUSCULAR; INTRAVENOUS at 17:34

## 2024-03-07 RX ADMIN — CARBOXYMETHYLCELLULOSE SODIUM 1 DROP: 5 SOLUTION/ DROPS OPHTHALMIC at 20:26

## 2024-03-07 RX ADMIN — CYTARABINE 2900 MG: 2 INJECTION, SOLUTION INTRATHECAL; INTRAVENOUS; SUBCUTANEOUS at 18:29

## 2024-03-07 RX ADMIN — FERROUS SULFATE TAB 325 MG (65 MG ELEMENTAL FE) 1 TABLET: 325 (65 FE) TAB at 20:24

## 2024-03-07 RX ADMIN — DORZOLAMIDE HYDROCHLORIDE AND TIMOLOL MALEATE 1 DROP: 22.3; 6.8 SOLUTION/ DROPS OPHTHALMIC at 20:26

## 2024-03-07 RX ADMIN — SODIUM BICARBONATE 200 ML/HR: 84 INJECTION, SOLUTION INTRAVENOUS at 14:37

## 2024-03-07 RX ADMIN — CARBOXYMETHYLCELLULOSE SODIUM 1 DROP: 5 SOLUTION/ DROPS OPHTHALMIC at 12:44

## 2024-03-07 RX ADMIN — PREDNISOLONE ACETATE 2 DROP: 10 SUSPENSION/ DROPS OPHTHALMIC at 14:37

## 2024-03-07 RX ADMIN — CYTARABINE 100 MG: 2 INJECTION, SOLUTION INTRATHECAL; INTRAVENOUS; SUBCUTANEOUS at 16:30

## 2024-03-07 RX ADMIN — PREDNISOLONE ACETATE 2 DROP: 10 SUSPENSION/ DROPS OPHTHALMIC at 20:26

## 2024-03-07 RX ADMIN — SODIUM BICARBONATE 200 ML/HR: 84 INJECTION, SOLUTION INTRAVENOUS at 08:24

## 2024-03-07 RX ADMIN — ACYCLOVIR 400 MG: 400 TABLET ORAL at 20:24

## 2024-03-07 RX ADMIN — DORZOLAMIDE HYDROCHLORIDE AND TIMOLOL MALEATE 1 DROP: 22.3; 6.8 SOLUTION/ DROPS OPHTHALMIC at 09:15

## 2024-03-07 RX ADMIN — ACYCLOVIR 400 MG: 400 TABLET ORAL at 09:16

## 2024-03-07 RX ADMIN — KETOROLAC TROMETHAMINE 1 DROP: 5 SOLUTION OPHTHALMIC at 09:15

## 2024-03-07 RX ADMIN — KETOROLAC TROMETHAMINE 1 DROP: 5 SOLUTION OPHTHALMIC at 20:26

## 2024-03-07 RX ADMIN — SODIUM BICARBONATE 200 ML/HR: 84 INJECTION, SOLUTION INTRAVENOUS at 01:56

## 2024-03-07 RX ADMIN — SODIUM BICARBONATE 200 ML/HR: 84 INJECTION, SOLUTION INTRAVENOUS at 21:10

## 2024-03-07 RX ADMIN — LEUCOVORIN CALCIUM 50 MG: 100 INJECTION, POWDER, LYOPHILIZED, FOR SOLUTION INTRAMUSCULAR; INTRAVENOUS at 22:38

## 2024-03-07 RX ADMIN — PREDNISOLONE ACETATE 2 DROP: 10 SUSPENSION/ DROPS OPHTHALMIC at 09:15

## 2024-03-07 RX ADMIN — CARBOXYMETHYLCELLULOSE SODIUM 1 DROP: 5 SOLUTION/ DROPS OPHTHALMIC at 18:11

## 2024-03-07 RX ADMIN — DEXAMETHASONE SODIUM PHOSPHATE 12 MG: 10 INJECTION, SOLUTION INTRAMUSCULAR; INTRAVENOUS at 17:34

## 2024-03-07 ASSESSMENT — COGNITIVE AND FUNCTIONAL STATUS - GENERAL
DAILY ACTIVITIY SCORE: 24
MOBILITY SCORE: 24

## 2024-03-07 ASSESSMENT — PAIN SCALES - GENERAL
PAINLEVEL_OUTOF10: 0 - NO PAIN

## 2024-03-07 ASSESSMENT — PAIN - FUNCTIONAL ASSESSMENT
PAIN_FUNCTIONAL_ASSESSMENT: 0-10

## 2024-03-07 NOTE — PROCEDURES
The patient was consented.  The risks, benefits, and alternatives of procedures were discussed.  The patient wishes to proceed.  Consent form was signed.  Time-out was taken to verify correct patient, procedure, and location of procedure.  Sterile technique was used for the entire procedure. Pt positioned sitting up on side of bed. 2 ml 1% Lidocaine given in the L3-L4 space with good response. Spinal needle was used to obtain 8 cc clear CSF. Nontraumatic. CSF sample sent out for cell count/diff, TP, Glu, and flow cytometry. Pt tolerated procedure well. 3 ml (100 mg) Cytarabine was instilled intrathecally over 2 min. Sterile dressing was placed over site and pt instructed to lie flat for 1 hour. Furthermore, the patient was instructed if any signs of infection, bleeding, or severe pain to immediately notify MD.  Medication allergies reviewed on EMR.

## 2024-03-07 NOTE — PROGRESS NOTES
"Shilo Thakkar is a 23 y.o. male on day 2 of admission presenting with Burkitt lymphoma (CMS/HCC).    Subjective   Patient feeling okay today. No f/c/n/v/d. No urinary complaints. No changes in his vision. No pain reported. No headaches. No CP, SOB. Appetite okay.       Objective     Physical Exam  HENT:      Head: Normocephalic and atraumatic.      Mouth/Throat:      Mouth: Mucous membranes are moist.      Pharynx: No oropharyngeal exudate or posterior oropharyngeal erythema.   Eyes:      Pupils: Pupils are equal, round, and reactive to light.   Cardiovascular:      Rate and Rhythm: Normal rate and regular rhythm.      Heart sounds: No murmur heard.     No friction rub. No gallop.   Pulmonary:      Effort: No respiratory distress.      Breath sounds: Normal breath sounds. No wheezing or rhonchi.   Abdominal:      General: Bowel sounds are normal. There is no distension.      Palpations: Abdomen is soft.      Tenderness: There is no abdominal tenderness.   Musculoskeletal:         General: Normal range of motion.      Right lower leg: No edema.      Left lower leg: No edema.   Skin:     General: Skin is warm and dry.   Neurological:      Mental Status: He is alert and oriented to person, place, and time.   Psychiatric:         Mood and Affect: Mood normal.         Behavior: Behavior normal.         Last Recorded Vitals  Blood pressure 119/70, pulse 60, temperature 35.6 °C (96.1 °F), temperature source Temporal, resp. rate 16, height 1.82 m (5' 11.65\"), weight 86.5 kg (190 lb 11.2 oz), SpO2 99 %.  Intake/Output last 3 Shifts:  I/O last 3 completed shifts:  In: 6374 (87.2 mL/kg) [I.V.:5309.7 (72.6 mL/kg); Blood:293.3; IV Piggyback:771]  Out: 2300 (31.5 mL/kg) [Urine:2300 (0.9 mL/kg/hr)]  Weight: 73.1 kg     Relevant Results  Scheduled medications  acyclovir, 400 mg, oral, BID  B complex-vitamin C, 1 tablet, oral, Daily  cytarabine, 1,500 mg/m2 (Treatment Plan Recorded), intravenous, q12h  cytarabine (PF), 100 mg, " intrathecal, Once  dexAMETHasone, 12 mg, intravenous, Once  dorzolamide-timoloL, 1 drop, Right Eye, BID  ferrous sulfate (325 mg ferrous sulfate), 1 tablet, oral, BID  ketorolac, 1 drop, Both Eyes, BID  [START ON 3/8/2024] leucovorin, 15 mg, intravenous, q6h  leucovorin, 50 mg, intravenous, Once  lubricating eye drops, 1 drop, Left Eye, 4x daily  methotrexate 1,536 mg in sodium chloride 0.9% 1,061.44 mL IV, 800 mg/m2 (Treatment Plan Recorded), intravenous, Once  ondansetron, 16 mg, intravenous, Once  [Held by provider] pantoprazole, 40 mg, oral, Daily before breakfast  prednisoLONE acetate, 2 drop, Both Eyes, q6h  sulfamethoxazole-trimethoprim, 1 tablet, oral, Every Mon/Wed/Fri      Continuous medications  sodium bicarbonate 150 mEq in dextrose 5 % in water (D5W) 1,150 mL infusion, 200 mL/hr, Last Rate: 200 mL/hr (03/07/24 0824)      PRN medications  PRN medications: albuterol, alteplase, dextrose, diphenhydrAMINE, EPINEPHrine, famotidine, methylPREDNISolone sodium succinate (PF), ondansetron, prochlorperazine, prochlorperazine, sennosides-docusate sodium, sodium chloride    Results for orders placed or performed during the hospital encounter of 03/05/24 (from the past 24 hour(s))   POCT pH, urine, qualitative, dipstick manually resulted   Result Value Ref Range    pH, UA 8.5    Prepare RBC: 1 Units, Irradiated, Leukocytes Reduced (CMV reduced risk)   Result Value Ref Range    PRODUCT CODE E6347X94     Unit Number [id number]     Unit ABO O     Unit RH POS     XM INTEP COMP     Dispense Status TR     Blood Expiration Date March 30, 2024 23:59 EDT     PRODUCT BLOOD TYPE 5100     UNIT VOLUME 350    Type and screen   Result Value Ref Range    ABO TYPE O     Rh TYPE POS     ANTIBODY SCREEN NEG    CBC and Auto Differential   Result Value Ref Range    WBC 7.1 4.4 - 11.3 x10*3/uL    nRBC 0.3 (H) 0.0 - 0.0 /100 WBCs    RBC 2.73 (L) 4.50 - 5.90 x10*6/uL    Hemoglobin 8.8 (L) 13.5 - 17.5 g/dL    Hematocrit 26.0 (L) 41.0 -  52.0 %    MCV 95 80 - 100 fL    MCH 32.2 26.0 - 34.0 pg    MCHC 33.8 32.0 - 36.0 g/dL    RDW 20.5 (H) 11.5 - 14.5 %    Platelets 106 (L) 150 - 450 x10*3/uL    Neutrophils % 93.0 40.0 - 80.0 %    Immature Granulocytes %, Automated 0.7 0.0 - 0.9 %    Lymphocytes % 2.8 13.0 - 44.0 %    Monocytes % 3.4 2.0 - 10.0 %    Eosinophils % 0.0 0.0 - 6.0 %    Basophils % 0.1 0.0 - 2.0 %    Neutrophils Absolute 6.58 1.20 - 7.70 x10*3/uL    Immature Granulocytes Absolute, Automated 0.05 0.00 - 0.70 x10*3/uL    Lymphocytes Absolute 0.20 (L) 1.20 - 4.80 x10*3/uL    Monocytes Absolute 0.24 0.10 - 1.00 x10*3/uL    Eosinophils Absolute 0.00 0.00 - 0.70 x10*3/uL    Basophils Absolute 0.01 0.00 - 0.10 x10*3/uL   Hepatic Function Panel   Result Value Ref Range    Albumin 3.7 3.4 - 5.0 g/dL    Bilirubin, Total 0.6 0.0 - 1.2 mg/dL    Bilirubin, Direct 0.1 0.0 - 0.3 mg/dL    Alkaline Phosphatase 57 33 - 120 U/L    ALT 41 10 - 52 U/L    AST 23 9 - 39 U/L    Total Protein 5.2 (L) 6.4 - 8.2 g/dL   Phosphorus   Result Value Ref Range    Phosphorus 2.8 2.5 - 4.9 mg/dL   Basic Metabolic Panel   Result Value Ref Range    Glucose 168 (H) 74 - 99 mg/dL    Sodium 142 136 - 145 mmol/L    Potassium 3.5 3.5 - 5.3 mmol/L    Chloride 106 98 - 107 mmol/L    Bicarbonate 28 21 - 32 mmol/L    Anion Gap 12 10 - 20 mmol/L    Urea Nitrogen 16 6 - 23 mg/dL    Creatinine 0.89 0.50 - 1.30 mg/dL    eGFR >90 >60 mL/min/1.73m*2    Calcium 8.6 8.6 - 10.6 mg/dL   Morphology   Result Value Ref Range    RBC Morphology See Below     Polychromasia Mild     RBC Fragments Few     Teardrop Cells Few    POCT pH, urine, qualitative, dipstick manually resulted   Result Value Ref Range    pH, UA 8.5              This patient has a central line   Reason for the central line remaining today? Parenteral medication            Malnutrition          I agree with the dietitian's malnutrition diagnosis.      Assessment/Plan   Principal Problem:    Burkitt lymphoma (CMS/HCC)    Shilo Thakkar  is a 23 y.o. male with PMH of Burkitt Lymphoma (Dx 10/20/23; s/p 4 cycles of HyperCVAD), malignant pleural effusions s/p CT (10/29/23), ADHD, and BL retinoschisis and retinal hemorrhages, s/p left vitrectomy who presents as a direct admission on 3/5/24 for C6 HyperCVAD.    Day 1 HyperCVAD(started on 3/7/24)       ONC  # Burkitt lymphoma, newly diagnosed (10/20/23)  - 10/19/23 presented to OSH ED with dysphagia and abdominal pain  - CT A/P with IV contrast (10/19) mass in the body of the pancreas measuring approximately 2.7 x 2.9 cm. Multiple masses in the mesentery as well as omental caking c/q carcinomatosis.   - CT soft tissue neck w/ IV contrast (10/19): homogeneous soft tissue attenuation lesion in the L oropharynx which measures approximately 3.0 x 4.0 x 5.1 cm.   - CT Abdomen w/o IV contrast (10/20): Mesenteric lymphadenopathy  - US guided mesenteric LN biopsy (10/20/23): HIGH GRADE B-CELL LYMPHOMA MORPHOLOGICALLY C/W BURKITT LYMPHOMA; flow cytometry: CD10+ kappa restricted B cells supportive of a B cell lymphoma with germinal center origin.   - Biopsy L oropharyngeal mass and L cervical LN by ENT (10/23/23) high grade B-cell burkitt lymphoma  - PET CT (10/30) showed FDG avid left oropharyngeal mass and LAD above and below diaphragm  - BMBx (11/1) without evidence of disease  - s/p 5 cycles of HyperCVAD (10/28, 11/19, 12/11, 1/2/24, 2/10)  - PET scan (1/15/24): Deauville 2. Complete resolution of metabolic activity of previously seen Lt oropharyngeal mass, resolution of previously seen hypermetabolic Lt level 2 and 3 cervical lymphadenopathy, resolution of previously seen moderate-large size Lt pleural effusion and trace Rt pleural effusion and abdominopelvic ascites, resolution of previously seen hypermetabolic large mass within pancreatic tail/body and the previously seen hypermetabolic retroperitoneal and mesenteric lymphadenopathy.  - Cycle 5 initially delayed a little over a week due to recent  hospitalization from 1/17-1/23/24 for visual changes, retinoschisis, retinal hemorrhages, and profound pancytopenia requiring left eye vitrectomy  -Still discussing total of 8 cycles of therapy   - Admitted for Cycle 6 of HyperCVAD (Dose reduce Cytarabine at 1.5 G/m2 per dose with Methotrexate 1000 mg/m2 total) on 3/6/24.  Follow Methotrexate levels  -Per recent OP note, plan on doing 1 LP with IT Cytarabine this cycle while inpatient instead of 2 lumbar punctures per cycle  -CXR obtained on admission, prior to starting chemotherapy was normal  - Neulasta and Rituxan to be given outpatient  -IT eulogio-C given 3/7     OPTHO  -Recent hx/o bilateral macula-involving sub ILM hemorrhage with serous detachment OS>OD  -Multilayer retinal hemorrhage, left eye.   -S/p pars plana vitrectomy (PPV)/C3F8/intraretinal tPA OS  -S/P PPV/MP/EL/air O  -Last seen by Ophthalm in outpatient clinic on 2/19. Per recs, Taper Prednisolone 3x/day OU x 2 weeks then BID x 2 weeks, continue Cosopt BID OD, Taper Ketorolac TID OU x 2 weeks, BID x 2 weeks and artificial tears QID to the left eye.  -Okay to proceed with chemotherapy.   - Prefer to keep Plts 50-70,000 and recommend avoiding PLt levels below 30K  - Pt reports baseline vision unchanged recently upon admit; right eye vision is blurry, left eye no vision  - May need repeat surgery in the Lt eye (after this cycle or the next) if the vitreous hemorrhage persists or retina detaches  -RTC 3 weeks. Ophthalmology Consulted (3/6). They will see him next week outpatient. They will arrange appointment      HEME  # Anemia without active bleeding  - Continue home ferrous sulfate 325mg BID started by Dr. Klein  - Transfuse to keep Hgb>8.5, Plt>50-70,000 and avoid dropping below 30,000 per ophtho requirements with hx/o retinal hemorrhages  - S/p PRBC: 3/6     CARDIAC  - ECHO (10/2023): EF 60-65%  - Plan repeat ECHO (2/10/24) LVEF 60-65%     ID  Allergies: NKDA  PPX: Acyclovir 400mg BID, Bactrim MWF  -  Please address if needs to be on fluconazole for antifungal ppx, currently not in his home meds list  - Plan Levaquin for Day 5-15 of each cycle for antibacterial prophylaxis       FEN/GI  - Admit wt: 72.5 kg (3/5). Current wt: 73.1 kg (3/6)  - Monitor and replace electrolytes as needed  - Cont Vit B complex  - ppx: Home Pantoprazole 40mg every day held     ENDO  -CTA neck (1/17) with 6 mm nodule in the left lobe of the thyroid gland   -TSH level (1/20): 1.35       DISPO  - Full Code  - Access: PICC line  - Primary Onc: Dr. Klein  - NOK: Ms Deidra Pickett [phone]  - Plan 3x weekly count checks, first on 3/11    Patient seen examined and discussed with JOSELYN BenderC

## 2024-03-07 NOTE — CARE PLAN
The clinical goals for the shift include patient will remain free from injuries and falls throughout shift      Problem: Pain  Goal: My pain/discomfort is manageable  Outcome: Progressing     Problem: Safety  Goal: Patient will be injury free during hospitalization  Outcome: Progressing  Goal: I will remain free of falls  Outcome: Progressing     Problem: Daily Care  Goal: Daily care needs are met  Outcome: Progressing

## 2024-03-07 NOTE — NURSING NOTE
Dose #1 of 4 cytarabine 2900 mg in 279 mL up at 1829. Pre-medicated with 12 mg dexamethasone and 16 mg zofran.  Patient, dose and rate verified with second RN, Gisele Penny. + BBR obtained via syringe aspiration before administration.

## 2024-03-08 DIAGNOSIS — H35.61 PRERETINAL HEMORRHAGE OF RIGHT EYE: Primary | ICD-10-CM

## 2024-03-08 DIAGNOSIS — H35.63 RETINAL HEMORRHAGE OF BOTH EYES: Primary | ICD-10-CM

## 2024-03-08 LAB
ALBUMIN SERPL BCP-MCNC: 3.6 G/DL (ref 3.4–5)
ALP SERPL-CCNC: 44 U/L (ref 33–120)
ALT SERPL W P-5'-P-CCNC: 31 U/L (ref 10–52)
ANION GAP SERPL CALC-SCNC: 7 MMOL/L (ref 10–20)
AST SERPL W P-5'-P-CCNC: 14 U/L (ref 9–39)
BASOPHILS # BLD AUTO: 0 X10*3/UL (ref 0–0.1)
BASOPHILS NFR BLD AUTO: 0 %
BILIRUB DIRECT SERPL-MCNC: 0.1 MG/DL (ref 0–0.3)
BILIRUB SERPL-MCNC: 0.4 MG/DL (ref 0–1.2)
BUN SERPL-MCNC: 9 MG/DL (ref 6–23)
CALCIUM SERPL-MCNC: 8.5 MG/DL (ref 8.6–10.6)
CHLORIDE SERPL-SCNC: 106 MMOL/L (ref 98–107)
CO2 SERPL-SCNC: 34 MMOL/L (ref 21–32)
CREAT SERPL-MCNC: 0.71 MG/DL (ref 0.5–1.3)
EGFRCR SERPLBLD CKD-EPI 2021: >90 ML/MIN/1.73M*2
EOSINOPHIL # BLD AUTO: 0 X10*3/UL (ref 0–0.7)
EOSINOPHIL NFR BLD AUTO: 0 %
ERYTHROCYTE [DISTWIDTH] IN BLOOD BY AUTOMATED COUNT: 21.5 % (ref 11.5–14.5)
GLUCOSE SERPL-MCNC: 133 MG/DL (ref 74–99)
HCT VFR BLD AUTO: 25.2 % (ref 41–52)
HGB BLD-MCNC: 8.7 G/DL (ref 13.5–17.5)
IMM GRANULOCYTES # BLD AUTO: 0.03 X10*3/UL (ref 0–0.7)
IMM GRANULOCYTES NFR BLD AUTO: 0.5 % (ref 0–0.9)
LYMPHOCYTES # BLD AUTO: 0.16 X10*3/UL (ref 1.2–4.8)
LYMPHOCYTES NFR BLD AUTO: 2.8 %
MCH RBC QN AUTO: 32.1 PG (ref 26–34)
MCHC RBC AUTO-ENTMCNC: 34.5 G/DL (ref 32–36)
MCV RBC AUTO: 93 FL (ref 80–100)
MONOCYTES # BLD AUTO: 0.19 X10*3/UL (ref 0.1–1)
MONOCYTES NFR BLD AUTO: 3.3 %
MTX 24H P SERPL-SCNC: 0.13 UMOL/L
NEUTROPHILS # BLD AUTO: 5.41 X10*3/UL (ref 1.2–7.7)
NEUTROPHILS NFR BLD AUTO: 93.4 %
NRBC BLD-RTO: 0 /100 WBCS (ref 0–0)
PATH REVIEW-CELL CT,CSF: NORMAL
PH, POC: 8.5
PHOSPHATE SERPL-MCNC: 3.1 MG/DL (ref 2.5–4.9)
PLATELET # BLD AUTO: 99 X10*3/UL (ref 150–450)
POTASSIUM SERPL-SCNC: 3.4 MMOL/L (ref 3.5–5.3)
PROT SERPL-MCNC: 4.9 G/DL (ref 6.4–8.2)
RBC # BLD AUTO: 2.71 X10*6/UL (ref 4.5–5.9)
RBC MORPH BLD: NORMAL
SODIUM SERPL-SCNC: 144 MMOL/L (ref 136–145)
WBC # BLD AUTO: 5.8 X10*3/UL (ref 4.4–11.3)

## 2024-03-08 PROCEDURE — 2500000002 HC RX 250 W HCPCS SELF ADMINISTERED DRUGS (ALT 637 FOR MEDICARE OP, ALT 636 FOR OP/ED)

## 2024-03-08 PROCEDURE — 80048 BASIC METABOLIC PNL TOTAL CA: CPT | Performed by: PHYSICIAN ASSISTANT

## 2024-03-08 PROCEDURE — 85025 COMPLETE CBC W/AUTO DIFF WBC: CPT | Performed by: PHYSICIAN ASSISTANT

## 2024-03-08 PROCEDURE — 1170000001 HC PRIVATE ONCOLOGY ROOM DAILY

## 2024-03-08 PROCEDURE — 2500000004 HC RX 250 GENERAL PHARMACY W/ HCPCS (ALT 636 FOR OP/ED): Performed by: REGISTERED NURSE

## 2024-03-08 PROCEDURE — 99232 SBSQ HOSP IP/OBS MODERATE 35: CPT | Performed by: INTERNAL MEDICINE

## 2024-03-08 PROCEDURE — 2500000004 HC RX 250 GENERAL PHARMACY W/ HCPCS (ALT 636 FOR OP/ED): Performed by: PHYSICIAN ASSISTANT

## 2024-03-08 PROCEDURE — 84075 ASSAY ALKALINE PHOSPHATASE: CPT | Performed by: PHYSICIAN ASSISTANT

## 2024-03-08 PROCEDURE — 80204 DRUG ASSAY METHOTREXATE: CPT | Performed by: INTERNAL MEDICINE

## 2024-03-08 PROCEDURE — 81002 URINALYSIS NONAUTO W/O SCOPE: CPT | Performed by: PHYSICIAN ASSISTANT

## 2024-03-08 PROCEDURE — 84100 ASSAY OF PHOSPHORUS: CPT | Performed by: PHYSICIAN ASSISTANT

## 2024-03-08 PROCEDURE — 2500000001 HC RX 250 WO HCPCS SELF ADMINISTERED DRUGS (ALT 637 FOR MEDICARE OP)

## 2024-03-08 PROCEDURE — 2500000004 HC RX 250 GENERAL PHARMACY W/ HCPCS (ALT 636 FOR OP/ED): Performed by: INTERNAL MEDICINE

## 2024-03-08 RX ORDER — FUROSEMIDE 10 MG/ML
20 INJECTION INTRAMUSCULAR; INTRAVENOUS ONCE
Status: COMPLETED | OUTPATIENT
Start: 2024-03-08 | End: 2024-03-08

## 2024-03-08 RX ORDER — POTASSIUM CHLORIDE 20 MEQ/1
40 TABLET, EXTENDED RELEASE ORAL ONCE
Status: COMPLETED | OUTPATIENT
Start: 2024-03-08 | End: 2024-03-08

## 2024-03-08 RX ADMIN — CYTARABINE 2900 MG: 2 INJECTION, SOLUTION INTRATHECAL; INTRAVENOUS; SUBCUTANEOUS at 06:36

## 2024-03-08 RX ADMIN — Medication 15 MG: at 11:22

## 2024-03-08 RX ADMIN — PREDNISOLONE ACETATE 2 DROP: 10 SUSPENSION/ DROPS OPHTHALMIC at 17:20

## 2024-03-08 RX ADMIN — DORZOLAMIDE HYDROCHLORIDE AND TIMOLOL MALEATE 1 DROP: 22.3; 6.8 SOLUTION/ DROPS OPHTHALMIC at 09:00

## 2024-03-08 RX ADMIN — ACYCLOVIR 400 MG: 400 TABLET ORAL at 08:48

## 2024-03-08 RX ADMIN — ONDANSETRON 16 MG: 2 INJECTION INTRAMUSCULAR; INTRAVENOUS at 18:32

## 2024-03-08 RX ADMIN — CYTARABINE 2900 MG: 2 INJECTION, SOLUTION INTRATHECAL; INTRAVENOUS; SUBCUTANEOUS at 18:49

## 2024-03-08 RX ADMIN — CARBOXYMETHYLCELLULOSE SODIUM 1 DROP: 5 SOLUTION/ DROPS OPHTHALMIC at 22:19

## 2024-03-08 RX ADMIN — CARBOXYMETHYLCELLULOSE SODIUM 1 DROP: 5 SOLUTION/ DROPS OPHTHALMIC at 08:49

## 2024-03-08 RX ADMIN — PREDNISOLONE ACETATE 2 DROP: 10 SUSPENSION/ DROPS OPHTHALMIC at 22:19

## 2024-03-08 RX ADMIN — DORZOLAMIDE HYDROCHLORIDE AND TIMOLOL MALEATE 1 DROP: 22.3; 6.8 SOLUTION/ DROPS OPHTHALMIC at 22:19

## 2024-03-08 RX ADMIN — SULFAMETHOXAZOLE AND TRIMETHOPRIM 1 TABLET: 800; 160 TABLET ORAL at 23:37

## 2024-03-08 RX ADMIN — FUROSEMIDE 20 MG: 10 INJECTION, SOLUTION INTRAVENOUS at 08:48

## 2024-03-08 RX ADMIN — FERROUS SULFATE TAB 325 MG (65 MG ELEMENTAL FE) 1 TABLET: 325 (65 FE) TAB at 08:56

## 2024-03-08 RX ADMIN — CARBOXYMETHYLCELLULOSE SODIUM 1 DROP: 5 SOLUTION/ DROPS OPHTHALMIC at 17:20

## 2024-03-08 RX ADMIN — SODIUM BICARBONATE 100 ML/HR: 84 INJECTION, SOLUTION INTRAVENOUS at 22:16

## 2024-03-08 RX ADMIN — PREDNISOLONE ACETATE 2 DROP: 10 SUSPENSION/ DROPS OPHTHALMIC at 09:00

## 2024-03-08 RX ADMIN — KETOROLAC TROMETHAMINE 1 DROP: 5 SOLUTION OPHTHALMIC at 22:19

## 2024-03-08 RX ADMIN — Medication 15 MG: at 23:37

## 2024-03-08 RX ADMIN — KETOROLAC TROMETHAMINE 1 DROP: 5 SOLUTION OPHTHALMIC at 09:00

## 2024-03-08 RX ADMIN — Medication 1 TABLET: at 08:58

## 2024-03-08 RX ADMIN — SODIUM BICARBONATE 200 ML/HR: 84 INJECTION, SOLUTION INTRAVENOUS at 08:48

## 2024-03-08 RX ADMIN — PREDNISOLONE ACETATE 2 DROP: 10 SUSPENSION/ DROPS OPHTHALMIC at 02:43

## 2024-03-08 RX ADMIN — DEXAMETHASONE SODIUM PHOSPHATE 12 MG: 10 INJECTION, SOLUTION INTRAMUSCULAR; INTRAVENOUS at 18:05

## 2024-03-08 RX ADMIN — FERROUS SULFATE TAB 325 MG (65 MG ELEMENTAL FE) 1 TABLET: 325 (65 FE) TAB at 22:17

## 2024-03-08 RX ADMIN — Medication 15 MG: at 05:13

## 2024-03-08 RX ADMIN — CARBOXYMETHYLCELLULOSE SODIUM 1 DROP: 5 SOLUTION/ DROPS OPHTHALMIC at 14:04

## 2024-03-08 RX ADMIN — POTASSIUM CHLORIDE 40 MEQ: 1500 TABLET, EXTENDED RELEASE ORAL at 08:48

## 2024-03-08 RX ADMIN — ACYCLOVIR 400 MG: 400 TABLET ORAL at 22:17

## 2024-03-08 RX ADMIN — Medication 15 MG: at 18:05

## 2024-03-08 RX ADMIN — SODIUM BICARBONATE 200 ML/HR: 84 INJECTION, SOLUTION INTRAVENOUS at 03:27

## 2024-03-08 ASSESSMENT — PAIN - FUNCTIONAL ASSESSMENT
PAIN_FUNCTIONAL_ASSESSMENT: 0-10

## 2024-03-08 ASSESSMENT — PAIN SCALES - GENERAL
PAINLEVEL_OUTOF10: 0 - NO PAIN

## 2024-03-08 NOTE — SIGNIFICANT EVENT
24yo M with Burkitt's lymphoma and bilateral macula-involving sub ILM hemorrhages with serous detachment OS>OD s/p pars plana vitrectomy (PPV) OU with residual retinal hemorrhages OS. He's admitted for cycle 6 of chemo.     Patient brought to [facility] eye clinic to update Optos fundus photographs. He report that vision is still blurring left eye. Right eye is stable. Notices floaters. No pain. Patient denies discharge,  double vision, blind spots, flashes.      Examination:  Base Eye Exam       Visual Acuity (Snellen - Linear)         Right Left    Dist sc 20/40 -1 20/600    Dist ph sc 20/30 -1 PHNI              Tonometry (Tonopen, 1:18 PM)         Right Left    Pressure 23 20              Pupils         Dark Light Shape React APD    Right 6 4 Round Brisk None    Left 6 4 Round Brisk None              Visual Fields (Counting fingers)         Left Right      Full                                Extraocular Movement         Right Left     Full, Ortho Full, Ortho              Neuro/Psych       Oriented x3: Yes    Mood/Affect: Normal                  Slit Lamp and Fundus Exam       External Exam         Right Left    External Normal Normal              Slit Lamp Exam         Right Left    Lids/Lashes Normal Normal    Conjunctiva/Sclera MARIO temporally White and quiet    Cornea Clear Clear    Anterior Chamber Deep and quiet trace cell    Iris Round and reactive Round and reactive    Lens Clear Clear    Anterior Vitreous Normal 10% gas fill, significantly improved hemorrhage              Fundus Exam         Right Left    Disc Normal Normal    C/D Ratio 0.3 0.3    Macula small temporal heme dull foveal reflex    Vessels tortuous, scattered hemorrhages along arcades scattered hemorrhage    Periphery scattered hemorrhages within midperiphery scattered hemorrhages within midperiphery                    Optos images reflected in exam.    Follow up with Dr. Canales requested for the upcoming week.    Please contact the  ophthalmology service for further questions/comments.  Note not final until signed by attending.  Agustina Blakely MD PhD  Ophthalmology PGY3  [id number] (adult)/[id number] (peds)  To schedule appointment for adult patients: [phone]  To schedule appointments for pediatric patients: [phone]

## 2024-03-08 NOTE — PROGRESS NOTES
"Shilo Thakkar is a 23 y.o. male on day 3 of admission presenting with Burkitt lymphoma (CMS/HCC).    Subjective   Feels fine. ROS unremarkable except for vision issues (stable)       Objective     Physical Exam  Constitutional:       Appearance: Normal appearance.   HENT:      Head: Normocephalic and atraumatic.      Nose: Nose normal.      Mouth/Throat:      Mouth: Mucous membranes are moist.   Eyes:      Conjunctiva/sclera: Conjunctivae normal.   Cardiovascular:      Rate and Rhythm: Normal rate and regular rhythm.      Pulses: Normal pulses.      Heart sounds: Normal heart sounds.   Pulmonary:      Effort: Pulmonary effort is normal.      Breath sounds: Normal breath sounds.   Abdominal:      General: Bowel sounds are normal.      Palpations: Abdomen is soft.   Musculoskeletal:         General: Normal range of motion.   Skin:     General: Skin is warm and dry.      Capillary Refill: Capillary refill takes 2 to 3 seconds.   Neurological:      General: No focal deficit present.      Mental Status: He is alert and oriented to person, place, and time.   Psychiatric:         Mood and Affect: Mood normal.         Last Recorded Vitals  Blood pressure 136/85, pulse 87, temperature 36.3 °C (97.3 °F), temperature source Temporal, resp. rate 18, height 1.82 m (5' 11.65\"), weight 78.7 kg (173 lb 8 oz), SpO2 98 %.  Intake/Output last 3 Shifts:  I/O last 3 completed shifts:  In: 5617.5 (71.4 mL/kg) [I.V.:4086.7 (51.9 mL/kg); Blood:293.3; IV Piggyback:1237.5]  Out: 1400 (17.8 mL/kg) [Urine:1400 (0.5 mL/kg/hr)]  Weight: 78.7 kg     Labs reviewed (3/8): Needs potassium today (ordered)        Assessment/Plan   Principal Problem:    Burkitt lymphoma (CMS/HCC)    Principal Problem:    Burkitt lymphoma (CMS/HCC)     Shilo Thakkar is a 23 y.o. male with PMH of Burkitt Lymphoma (Dx 10/20/23; s/p 4 cycles of HyperCVAD), malignant pleural effusions s/p CT (10/29/23), ADHD, and BL retinoschisis and retinal hemorrhages, s/p left vitrectomy " who presents as a direct admission on 3/5/24 for C6 HyperCVAD.     Day 1 HyperCVAD(started on 3/7/24)        ONC  # Burkitt lymphoma, newly diagnosed (10/20/23)  - 10/19/23 presented to OSH ED with dysphagia and abdominal pain  - CT A/P with IV contrast (10/19) mass in the body of the pancreas measuring approximately 2.7 x 2.9 cm. Multiple masses in the mesentery as well as omental caking c/q carcinomatosis.   - CT soft tissue neck w/ IV contrast (10/19): homogeneous soft tissue attenuation lesion in the L oropharynx which measures approximately 3.0 x 4.0 x 5.1 cm.   - CT Abdomen w/o IV contrast (10/20): Mesenteric lymphadenopathy  - US guided mesenteric LN biopsy (10/20/23): HIGH GRADE B-CELL LYMPHOMA MORPHOLOGICALLY C/W BURKITT LYMPHOMA; flow cytometry: CD10+ kappa restricted B cells supportive of a B cell lymphoma with germinal center origin.   - Biopsy L oropharyngeal mass and L cervical LN by ENT (10/23/23) high grade B-cell burkitt lymphoma  - PET CT (10/30) showed FDG avid left oropharyngeal mass and LAD above and below diaphragm  - BMBx (11/1) without evidence of disease  - s/p 5 cycles of HyperCVAD (10/28, 11/19, 12/11, 1/2/24, 2/10)  - PET scan (1/15/24): Deauville 2. Complete resolution of metabolic activity of previously seen Lt oropharyngeal mass, resolution of previously seen hypermetabolic Lt level 2 and 3 cervical lymphadenopathy, resolution of previously seen moderate-large size Lt pleural effusion and trace Rt pleural effusion and abdominopelvic ascites, resolution of previously seen hypermetabolic large mass within pancreatic tail/body and the previously seen hypermetabolic retroperitoneal and mesenteric lymphadenopathy.  - Cycle 5 initially delayed a little over a week due to recent hospitalization from 1/17-1/23/24 for visual changes, retinoschisis, retinal hemorrhages, and profound pancytopenia requiring left eye vitrectomy  -Still discussing total of 8 cycles of therapy   - Admitted for Cycle  6 of HyperCVAD (Dose reduce Cytarabine at 1.5 G/m2 per dose with Methotrexate 1000 mg/m2 total) on 3/6/24.  Follow Methotrexate levels. 24 Hr Methotrexate level will be drawn @ 5:30 p.m.  -Per recent OP note, plan on doing 1 LP with IT Cytarabine this cycle while inpatient instead of 2 lumbar punctures per cycle  -CXR obtained on admission, prior to starting chemotherapy was normal  - Neulasta and Rituxan to be given outpatient  -IT Coral-C given 3/7     OPTHO  -Recent hx/o bilateral macula-involving sub ILM hemorrhage with serous detachment OS>OD  -Multilayer retinal hemorrhage, left eye.   -S/p pars plana vitrectomy (PPV)/C3F8/intraretinal tPA OS  -S/P PPV/MP/EL/air O  -Last seen by Ophthalm in outpatient clinic on 2/19. Per recs, Taper Prednisolone 3x/day OU x 2 weeks then BID x 2 weeks, continue Cosopt BID OD, Taper Ketorolac TID OU x 2 weeks, BID x 2 weeks and artificial tears QID to the left eye.  -Okay to proceed with chemotherapy.   - Prefer to keep Plts 50-70,000 and recommend avoiding PLt levels below 30K  - Pt reports baseline vision unchanged recently upon admit; right eye vision is blurry, left eye no vision  - May need repeat surgery in the Lt eye (after this cycle or the next) if the vitreous hemorrhage persists or retina detaches  -RTC 3 weeks. Ophthalmology Consulted (3/6). Pt seen in Outpatient Eye Clinic today (3/8/24)     HEME  # Anemia without active bleeding  - Continue home ferrous sulfate 325mg BID started by Dr. Klein  - Transfuse to keep Hgb>8.5, Plt>50-70,000 and avoid dropping below 30,000 per ophtho requirements with hx/o retinal hemorrhages  - S/p PRBC: 3/6     CARDIAC  - ECHO (10/2023): EF 60-65%  - Plan repeat ECHO (2/10/24) LVEF 60-65%     ID  Allergies: NKDA  PPX: Acyclovir 400mg BID, Bactrim MWF  - Please address if needs to be on fluconazole for antifungal ppx, currently not in his home meds list  - Plan Levaquin for Day 5-15 of each cycle for antibacterial prophylaxis        FEN/GI  - Admit wt: 72.5 kg (3/5). Current wt: 78.7 kg (3/8)  - Weight up 6 kgs. Lungs clear. Face fullness noted. May need Lasix on 3/9.     IVF rate decr from 200 to 100 ml/hr  - Monitor and replace electrolytes as needed  - Cont Vit B complex  - ppx: Home Pantoprazole 40mg every day held     ENDO  -CTA neck (1/17) with 6 mm nodule in the left lobe of the thyroid gland   -TSH level (1/20): 1.35       DISPO  - Full Code  - Access: PICC line  - Primary Onc: Dr. Klein  - NOK: Ms Deidra Pickett [phone]  - Plan 3x weekly count checks, first on 3/11  - May not need Monday appt if he leaves on Sunday     Patient seen examined and discussed with Dr. Manuel Mccall    I spent 30 minutes in the professional and overall care of this patient.      DEMETRIO Silvestre-CNP

## 2024-03-09 VITALS
HEIGHT: 72 IN | DIASTOLIC BLOOD PRESSURE: 78 MMHG | HEART RATE: 78 BPM | WEIGHT: 172.62 LBS | BODY MASS INDEX: 23.38 KG/M2 | SYSTOLIC BLOOD PRESSURE: 131 MMHG | RESPIRATION RATE: 18 BRPM | OXYGEN SATURATION: 100 % | TEMPERATURE: 97.5 F

## 2024-03-09 LAB
ALBUMIN SERPL BCP-MCNC: 3.4 G/DL (ref 3.4–5)
ALP SERPL-CCNC: 46 U/L (ref 33–120)
ALT SERPL W P-5'-P-CCNC: 29 U/L (ref 10–52)
ANION GAP SERPL CALC-SCNC: 15 MMOL/L (ref 10–20)
AST SERPL W P-5'-P-CCNC: 16 U/L (ref 9–39)
BASOPHILS # BLD AUTO: 0 X10*3/UL (ref 0–0.1)
BASOPHILS NFR BLD AUTO: 0 %
BILIRUB DIRECT SERPL-MCNC: 0.1 MG/DL (ref 0–0.3)
BILIRUB SERPL-MCNC: 0.6 MG/DL (ref 0–1.2)
BUN SERPL-MCNC: 13 MG/DL (ref 6–23)
CALCIUM SERPL-MCNC: 8.6 MG/DL (ref 8.6–10.6)
CHLORIDE SERPL-SCNC: 105 MMOL/L (ref 98–107)
CO2 SERPL-SCNC: 28 MMOL/L (ref 21–32)
CREAT SERPL-MCNC: 0.71 MG/DL (ref 0.5–1.3)
DACRYOCYTES BLD QL SMEAR: NORMAL
EGFRCR SERPLBLD CKD-EPI 2021: >90 ML/MIN/1.73M*2
EOSINOPHIL # BLD AUTO: 0 X10*3/UL (ref 0–0.7)
EOSINOPHIL NFR BLD AUTO: 0 %
ERYTHROCYTE [DISTWIDTH] IN BLOOD BY AUTOMATED COUNT: 21 % (ref 11.5–14.5)
GLUCOSE SERPL-MCNC: 120 MG/DL (ref 74–99)
HCT VFR BLD AUTO: 26.1 % (ref 41–52)
HGB BLD-MCNC: 8.7 G/DL (ref 13.5–17.5)
IMM GRANULOCYTES # BLD AUTO: 0.03 X10*3/UL (ref 0–0.7)
IMM GRANULOCYTES NFR BLD AUTO: 0.5 % (ref 0–0.9)
LYMPHOCYTES # BLD AUTO: 0.05 X10*3/UL (ref 1.2–4.8)
LYMPHOCYTES NFR BLD AUTO: 0.8 %
MCH RBC QN AUTO: 32 PG (ref 26–34)
MCHC RBC AUTO-ENTMCNC: 33.3 G/DL (ref 32–36)
MCV RBC AUTO: 96 FL (ref 80–100)
MONOCYTES # BLD AUTO: 0.12 X10*3/UL (ref 0.1–1)
MONOCYTES NFR BLD AUTO: 1.9 %
MTX SERPL-SCNC: 0.04 UMOL/L
NEUTROPHILS # BLD AUTO: 6.17 X10*3/UL (ref 1.2–7.7)
NEUTROPHILS NFR BLD AUTO: 96.8 %
NRBC BLD-RTO: 0 /100 WBCS (ref 0–0)
OVALOCYTES BLD QL SMEAR: NORMAL
PH, POC: 8.5
PHOSPHATE SERPL-MCNC: 3.8 MG/DL (ref 2.5–4.9)
PLATELET # BLD AUTO: 95 X10*3/UL (ref 150–450)
POLYCHROMASIA BLD QL SMEAR: NORMAL
POTASSIUM SERPL-SCNC: 3.6 MMOL/L (ref 3.5–5.3)
PROT SERPL-MCNC: 4.8 G/DL (ref 6.4–8.2)
RBC # BLD AUTO: 2.72 X10*6/UL (ref 4.5–5.9)
RBC MORPH BLD: NORMAL
SODIUM SERPL-SCNC: 144 MMOL/L (ref 136–145)
WBC # BLD AUTO: 6.4 X10*3/UL (ref 4.4–11.3)

## 2024-03-09 PROCEDURE — 80204 DRUG ASSAY METHOTREXATE: CPT | Performed by: INTERNAL MEDICINE

## 2024-03-09 PROCEDURE — 2500000001 HC RX 250 WO HCPCS SELF ADMINISTERED DRUGS (ALT 637 FOR MEDICARE OP)

## 2024-03-09 PROCEDURE — 80076 HEPATIC FUNCTION PANEL: CPT | Performed by: PHYSICIAN ASSISTANT

## 2024-03-09 PROCEDURE — 81002 URINALYSIS NONAUTO W/O SCOPE: CPT | Performed by: PHYSICIAN ASSISTANT

## 2024-03-09 PROCEDURE — 2500000004 HC RX 250 GENERAL PHARMACY W/ HCPCS (ALT 636 FOR OP/ED): Performed by: INTERNAL MEDICINE

## 2024-03-09 PROCEDURE — 84520 ASSAY OF UREA NITROGEN: CPT | Performed by: PHYSICIAN ASSISTANT

## 2024-03-09 PROCEDURE — 84100 ASSAY OF PHOSPHORUS: CPT | Performed by: PHYSICIAN ASSISTANT

## 2024-03-09 PROCEDURE — 2500000004 HC RX 250 GENERAL PHARMACY W/ HCPCS (ALT 636 FOR OP/ED): Performed by: REGISTERED NURSE

## 2024-03-09 PROCEDURE — 85025 COMPLETE CBC W/AUTO DIFF WBC: CPT | Performed by: PHYSICIAN ASSISTANT

## 2024-03-09 PROCEDURE — 99238 HOSP IP/OBS DSCHRG MGMT 30/<: CPT | Performed by: INTERNAL MEDICINE

## 2024-03-09 RX ORDER — PREDNISOLONE ACETATE 10 MG/ML
2 SUSPENSION/ DROPS OPHTHALMIC EVERY 6 HOURS
Start: 2024-03-09 | End: 2024-03-12

## 2024-03-09 RX ADMIN — CYTARABINE 2900 MG: 2 INJECTION, SOLUTION INTRATHECAL; INTRAVENOUS; SUBCUTANEOUS at 06:13

## 2024-03-09 RX ADMIN — Medication 1 TABLET: at 09:25

## 2024-03-09 RX ADMIN — SODIUM BICARBONATE 100 ML/HR: 84 INJECTION, SOLUTION INTRAVENOUS at 09:24

## 2024-03-09 RX ADMIN — CARBOXYMETHYLCELLULOSE SODIUM 1 DROP: 5 SOLUTION/ DROPS OPHTHALMIC at 09:25

## 2024-03-09 RX ADMIN — Medication 15 MG: at 15:24

## 2024-03-09 RX ADMIN — PREDNISOLONE ACETATE 2 DROP: 10 SUSPENSION/ DROPS OPHTHALMIC at 15:00

## 2024-03-09 RX ADMIN — PREDNISOLONE ACETATE 2 DROP: 10 SUSPENSION/ DROPS OPHTHALMIC at 09:25

## 2024-03-09 RX ADMIN — CARBOXYMETHYLCELLULOSE SODIUM 1 DROP: 5 SOLUTION/ DROPS OPHTHALMIC at 13:00

## 2024-03-09 RX ADMIN — Medication 15 MG: at 10:27

## 2024-03-09 RX ADMIN — Medication 15 MG: at 04:13

## 2024-03-09 RX ADMIN — KETOROLAC TROMETHAMINE 1 DROP: 5 SOLUTION OPHTHALMIC at 09:25

## 2024-03-09 RX ADMIN — FERROUS SULFATE TAB 325 MG (65 MG ELEMENTAL FE) 1 TABLET: 325 (65 FE) TAB at 09:25

## 2024-03-09 RX ADMIN — ACYCLOVIR 400 MG: 400 TABLET ORAL at 09:25

## 2024-03-09 RX ADMIN — DORZOLAMIDE HYDROCHLORIDE AND TIMOLOL MALEATE 1 DROP: 22.3; 6.8 SOLUTION/ DROPS OPHTHALMIC at 09:26

## 2024-03-09 ASSESSMENT — PAIN SCALES - GENERAL
PAINLEVEL_OUTOF10: 0 - NO PAIN

## 2024-03-09 ASSESSMENT — COGNITIVE AND FUNCTIONAL STATUS - GENERAL
MOBILITY SCORE: 24
DAILY ACTIVITIY SCORE: 24

## 2024-03-09 ASSESSMENT — PAIN - FUNCTIONAL ASSESSMENT
PAIN_FUNCTIONAL_ASSESSMENT: 0-10

## 2024-03-09 NOTE — NURSING NOTE
Patient safely discharged to home with girlfriend. Homegoing medications, upcoming appointments and instructions reviewed with patient.  Patient stated understanding and repeated date of infusion/outpatient appointment to RN.  Patient voiced no concerns or questions.  Patient's belongings packed by patient and girlfriend and patient ambulated to front of building safely with girlfriend.

## 2024-03-09 NOTE — DISCHARGE SUMMARY
Discharge Diagnosis  Burkitt lymphoma (CMS/[facility])    Issues Requiring Follow-Up  Neulasta on 3/11.     Test Results Pending At Discharge  Pending Labs       Order Current Status    Flow Cytometry Test In process    Flow Cytometry Test In process    POCT pH, urine, qualitative, dipstick manually resulted In process    POCT pH, urine, qualitative, dipstick manually resulted In process    POCT pH, urine, qualitative, dipstick manually resulted In process            Hospital Course  Shilo Thakkar is a 23 y.o. male with PMH of Burkitt Lymphoma (Dx 10/20/23; s/p 4 cycles of HyperCVAD), malignant pleural effusions s/p CT (10/29/23), ADHD, and BL retinoschisis and retinal hemorrhages, s/p left vitrectomy who presents as a direct admission on 3/5/24 for C6 HyperCVAD. Course tolerated without issue, methotrexate cleared on 3/9 (0.04).  Scheduled for Neulasta at infusion appointment on 3/11.     Pertinent Physical Exam At Time of Discharge  Physical Exam  Constitutional:       Appearance: Normal appearance. He is normal weight.   HENT:      Head: Normocephalic and atraumatic.      Nose: Nose normal.      Mouth/Throat:      Mouth: Mucous membranes are moist.      Pharynx: Oropharynx is clear.   Eyes:      Extraocular Movements: Extraocular movements intact.      Pupils: Pupils are equal, round, and reactive to light.      Comments: R eye subconctival hemorrhage   Neurological:      Mental Status: He is alert.       Home Medications     Medication List      CHANGE how you take these medications     ketorolac 0.5 % ophthalmic solution; Commonly known as: Acular; Use one   drop 4 times a day in the right eye; What changed: how to take this   prednisoLONE acetate 1 % ophthalmic suspension; Commonly known as:   Pred-Forte; Administer 2 drops into both eyes every 6 hours for 12 doses.;   What changed: how much to take, how to take this, when to take this     CONTINUE taking these medications     acyclovir 400 mg tablet; Commonly known  as: Zovirax; Take 1 tablet (400   mg) by mouth 2 times a day.   B complex-vitamin C tablet; Take 1 tablet by mouth once daily.   dorzolamide-timoloL 22.3-6.8 mg/mL ophthalmic solution; Commonly known   as: Cosopt; Administer 1 drop into the right eye 2 times a day.   FeroSuL tablet; Generic drug: ferrous sulfate (325 mg ferrous sulfate);   Take 1 tablet by mouth 2 times a day.   pantoprazole 40 mg EC tablet; Commonly known as: ProtoNix; Take 1 tablet   (40 mg) by mouth once daily in the morning. Take before meals. Do not   crush, chew, or split.   sennosides-docusate sodium 8.6-50 mg tablet; Commonly known as:   Ashley-Colace; Take 2 tablets by mouth 2 times a day as needed for   constipation.   sulfamethoxazole-trimethoprim 800-160 mg tablet; Commonly known as:   Bactrim DS; Take 1 tablet by mouth once a day on Monday, Wednesday, and   Friday.     STOP taking these medications     dexAMETHasone 4 mg tablet; Commonly known as: Decadron   levoFLOXacin 500 mg tablet; Commonly known as: Levaquin       Outpatient Follow-Up  Future Appointments   Date Time Provider Department Center   3/11/2024  3:45 [facility]   3/13/2024  3:15 [facility]   3/15/2024  1:00 PM [facility]F [facility]   3/18/2024  2:30 PM [facility]   3/20/2024  1:00 PM [facility]   3/20/2024  1:30 PM INF [facility]   3/22/2024 12:30 PM INF 10 [facility]   3/25/2024 10:30 AM INF 22 [facility]   3/27/2024 10:30 AM INF 22 [facility]   3/29/2024  2:00 [facility]   4/5/2024  1:00 PM INF 20 [facility]   4/5/2024  1:00 PM [facility]       Gilberto Pineda PA-C

## 2024-03-09 NOTE — HOSPITAL COURSE
Shilo Thakkar is a 23 y.o. male with PMH of Burkitt Lymphoma (Dx 10/20/23; s/p 4 cycles of HyperCVAD), malignant pleural effusions s/p CT (10/29/23), ADHD, and BL retinoschisis and retinal hemorrhages, s/p left vitrectomy who presents as a direct admission on 3/5/24 for C6 HyperCVAD. Course tolerated without issue, received IT cytarabine on 3/7, and methotrexate cleared on 3/9 (0.04).  Scheduled for Neulasta at infusion appointment on 3/11.

## 2024-03-11 ENCOUNTER — INFUSION (OUTPATIENT)
Dept: HEMATOLOGY/ONCOLOGY | Facility: HOSPITAL | Age: 24
End: 2024-03-11
Payer: COMMERCIAL

## 2024-03-11 ENCOUNTER — APPOINTMENT (OUTPATIENT)
Dept: HEMATOLOGY/ONCOLOGY | Facility: HOSPITAL | Age: 24
End: 2024-03-11
Payer: COMMERCIAL

## 2024-03-11 VITALS
DIASTOLIC BLOOD PRESSURE: 80 MMHG | OXYGEN SATURATION: 100 % | HEART RATE: 62 BPM | BODY MASS INDEX: 23.14 KG/M2 | RESPIRATION RATE: 20 BRPM | WEIGHT: 169 LBS | SYSTOLIC BLOOD PRESSURE: 133 MMHG | TEMPERATURE: 99 F

## 2024-03-11 DIAGNOSIS — C83.70 BURKITT LYMPHOMA, UNSPECIFIED BODY REGION (MULTI): ICD-10-CM

## 2024-03-11 DIAGNOSIS — C83.79 BURKITT LYMPHOMA OF SOLID ORGAN EXCLUDING SPLEEN (MULTI): ICD-10-CM

## 2024-03-11 DIAGNOSIS — C83.78 BURKITT LYMPHOMA OF LYMPH NODES OF MULTIPLE REGIONS (MULTI): ICD-10-CM

## 2024-03-11 LAB
ABO GROUP (TYPE) IN BLOOD: NORMAL
ALBUMIN SERPL BCP-MCNC: 4.1 G/DL (ref 3.4–5)
ALP SERPL-CCNC: 46 U/L (ref 33–120)
ALT SERPL W P-5'-P-CCNC: 27 U/L (ref 10–52)
ANION GAP SERPL CALC-SCNC: 12 MMOL/L (ref 10–20)
ANTIBODY SCREEN: NORMAL
AST SERPL W P-5'-P-CCNC: 15 U/L (ref 9–39)
BASOPHILS # BLD AUTO: 0.01 X10*3/UL (ref 0–0.1)
BASOPHILS NFR BLD AUTO: 0.5 %
BILIRUB SERPL-MCNC: 0.9 MG/DL (ref 0–1.2)
BLOOD EXPIRATION DATE: NORMAL
BLOOD EXPIRATION DATE: NORMAL
BUN SERPL-MCNC: 14 MG/DL (ref 6–23)
CALCIUM SERPL-MCNC: 8.8 MG/DL (ref 8.6–10.3)
CHLORIDE SERPL-SCNC: 102 MMOL/L (ref 98–107)
CO2 SERPL-SCNC: 29 MMOL/L (ref 21–32)
CREAT SERPL-MCNC: 0.73 MG/DL (ref 0.5–1.3)
DISPENSE STATUS: NORMAL
DISPENSE STATUS: NORMAL
EGFRCR SERPLBLD CKD-EPI 2021: >90 ML/MIN/1.73M*2
EOSINOPHIL # BLD AUTO: 0 X10*3/UL (ref 0–0.7)
EOSINOPHIL NFR BLD AUTO: 0 %
ERYTHROCYTE [DISTWIDTH] IN BLOOD BY AUTOMATED COUNT: 18.8 % (ref 11.5–14.5)
GLUCOSE SERPL-MCNC: 106 MG/DL (ref 74–99)
HCT VFR BLD AUTO: 23.8 % (ref 41–52)
HGB BLD-MCNC: 8.4 G/DL (ref 13.5–17.5)
IMM GRANULOCYTES # BLD AUTO: 0.01 X10*3/UL (ref 0–0.7)
IMM GRANULOCYTES NFR BLD AUTO: 0.5 % (ref 0–0.9)
LYMPHOCYTES # BLD AUTO: 0.1 X10*3/UL (ref 1.2–4.8)
LYMPHOCYTES NFR BLD AUTO: 4.9 %
MAGNESIUM SERPL-MCNC: 1.88 MG/DL (ref 1.6–2.4)
MCH RBC QN AUTO: 31.8 PG (ref 26–34)
MCHC RBC AUTO-ENTMCNC: 35.3 G/DL (ref 32–36)
MCV RBC AUTO: 90 FL (ref 80–100)
MONOCYTES # BLD AUTO: 0.01 X10*3/UL (ref 0.1–1)
MONOCYTES NFR BLD AUTO: 0.5 %
NEUTROPHILS # BLD AUTO: 1.9 X10*3/UL (ref 1.2–7.7)
NEUTROPHILS NFR BLD AUTO: 93.6 %
NRBC BLD-RTO: 0 /100 WBCS (ref 0–0)
PLATELET # BLD AUTO: 61 X10*3/UL (ref 150–450)
POTASSIUM SERPL-SCNC: 3.2 MMOL/L (ref 3.5–5.3)
PRODUCT BLOOD TYPE: 5100
PRODUCT BLOOD TYPE: 6200
PRODUCT CODE: NORMAL
PRODUCT CODE: NORMAL
PROT SERPL-MCNC: 6.1 G/DL (ref 6.4–8.2)
RBC # BLD AUTO: 2.64 X10*6/UL (ref 4.5–5.9)
RH FACTOR (ANTIGEN D): NORMAL
SODIUM SERPL-SCNC: 140 MMOL/L (ref 136–145)
UNIT ABO: NORMAL
UNIT ABO: NORMAL
UNIT NUMBER: NORMAL
UNIT NUMBER: NORMAL
UNIT RH: NORMAL
UNIT RH: NORMAL
UNIT VOLUME: 188
UNIT VOLUME: 350
WBC # BLD AUTO: 2 X10*3/UL (ref 4.4–11.3)
XM INTEP: NORMAL

## 2024-03-11 PROCEDURE — 2500000004 HC RX 250 GENERAL PHARMACY W/ HCPCS (ALT 636 FOR OP/ED): Mod: JZ | Performed by: INTERNAL MEDICINE

## 2024-03-11 PROCEDURE — 2500000001 HC RX 250 WO HCPCS SELF ADMINISTERED DRUGS (ALT 637 FOR MEDICARE OP): Performed by: INTERNAL MEDICINE

## 2024-03-11 PROCEDURE — 96372 THER/PROPH/DIAG INJ SC/IM: CPT

## 2024-03-11 PROCEDURE — 96375 TX/PRO/DX INJ NEW DRUG ADDON: CPT | Mod: INF

## 2024-03-11 PROCEDURE — 96372 THER/PROPH/DIAG INJ SC/IM: CPT | Performed by: INTERNAL MEDICINE

## 2024-03-11 PROCEDURE — 85025 COMPLETE CBC W/AUTO DIFF WBC: CPT

## 2024-03-11 PROCEDURE — 83735 ASSAY OF MAGNESIUM: CPT

## 2024-03-11 PROCEDURE — 86901 BLOOD TYPING SEROLOGIC RH(D): CPT

## 2024-03-11 PROCEDURE — 96365 THER/PROPH/DIAG IV INF INIT: CPT | Mod: INF

## 2024-03-11 PROCEDURE — 80053 COMPREHEN METABOLIC PANEL: CPT

## 2024-03-11 PROCEDURE — 96413 CHEMO IV INFUSION 1 HR: CPT

## 2024-03-11 PROCEDURE — P9073 PLATELETS PHERESIS PATH REDU: HCPCS

## 2024-03-11 PROCEDURE — P9040 RBC LEUKOREDUCED IRRADIATED: HCPCS

## 2024-03-11 PROCEDURE — 86920 COMPATIBILITY TEST SPIN: CPT

## 2024-03-11 PROCEDURE — 36430 TRANSFUSION BLD/BLD COMPNT: CPT

## 2024-03-11 RX ORDER — FAMOTIDINE 10 MG/ML
20 INJECTION INTRAVENOUS ONCE AS NEEDED
Status: DISCONTINUED | OUTPATIENT
Start: 2024-03-11 | End: 2024-03-11 | Stop reason: HOSPADM

## 2024-03-11 RX ORDER — EPINEPHRINE 0.3 MG/.3ML
0.3 INJECTION SUBCUTANEOUS EVERY 5 MIN PRN
Status: CANCELLED | OUTPATIENT
Start: 2024-03-11

## 2024-03-11 RX ORDER — FAMOTIDINE 10 MG/ML
20 INJECTION INTRAVENOUS ONCE AS NEEDED
Status: CANCELLED | OUTPATIENT
Start: 2024-03-11

## 2024-03-11 RX ORDER — DIPHENHYDRAMINE HYDROCHLORIDE 50 MG/ML
50 INJECTION INTRAMUSCULAR; INTRAVENOUS AS NEEDED
Status: CANCELLED | OUTPATIENT
Start: 2024-03-11

## 2024-03-11 RX ORDER — ALBUTEROL SULFATE 0.83 MG/ML
3 SOLUTION RESPIRATORY (INHALATION) AS NEEDED
Status: CANCELLED | OUTPATIENT
Start: 2024-03-13

## 2024-03-11 RX ORDER — EPINEPHRINE 0.3 MG/.3ML
0.3 INJECTION SUBCUTANEOUS EVERY 5 MIN PRN
Status: CANCELLED | OUTPATIENT
Start: 2024-03-13

## 2024-03-11 RX ORDER — DIPHENHYDRAMINE HYDROCHLORIDE 50 MG/ML
50 INJECTION INTRAMUSCULAR; INTRAVENOUS AS NEEDED
Status: DISCONTINUED | OUTPATIENT
Start: 2024-03-11 | End: 2024-03-11 | Stop reason: HOSPADM

## 2024-03-11 RX ORDER — DIPHENHYDRAMINE HYDROCHLORIDE 50 MG/ML
50 INJECTION INTRAMUSCULAR; INTRAVENOUS AS NEEDED
Status: CANCELLED | OUTPATIENT
Start: 2024-03-13

## 2024-03-11 RX ORDER — ONDANSETRON HYDROCHLORIDE 2 MG/ML
8 INJECTION, SOLUTION INTRAVENOUS EVERY 4 HOURS PRN
Status: DISCONTINUED | OUTPATIENT
Start: 2024-03-11 | End: 2024-03-11 | Stop reason: HOSPADM

## 2024-03-11 RX ORDER — DIPHENHYDRAMINE HCL 50 MG
50 CAPSULE ORAL ONCE
Status: COMPLETED | OUTPATIENT
Start: 2024-03-11 | End: 2024-03-11

## 2024-03-11 RX ORDER — ACETAMINOPHEN 325 MG/1
650 TABLET ORAL ONCE
Status: COMPLETED | OUTPATIENT
Start: 2024-03-11 | End: 2024-03-11

## 2024-03-11 RX ORDER — DEXAMETHASONE SODIUM PHOSPHATE 100 MG/10ML
8 INJECTION INTRAMUSCULAR; INTRAVENOUS ONCE
Status: CANCELLED
Start: 2024-03-13 | End: 2024-03-13

## 2024-03-11 RX ORDER — POTASSIUM CHLORIDE 14.9 MG/ML
20 INJECTION INTRAVENOUS AS NEEDED
Status: DISCONTINUED | OUTPATIENT
Start: 2024-03-11 | End: 2024-03-11 | Stop reason: HOSPADM

## 2024-03-11 RX ORDER — ALBUTEROL SULFATE 0.83 MG/ML
3 SOLUTION RESPIRATORY (INHALATION) AS NEEDED
Status: DISCONTINUED | OUTPATIENT
Start: 2024-03-11 | End: 2024-03-11 | Stop reason: HOSPADM

## 2024-03-11 RX ORDER — ALBUTEROL SULFATE 0.83 MG/ML
3 SOLUTION RESPIRATORY (INHALATION) AS NEEDED
Status: CANCELLED | OUTPATIENT
Start: 2024-03-11

## 2024-03-11 RX ORDER — FAMOTIDINE 10 MG/ML
20 INJECTION INTRAVENOUS ONCE AS NEEDED
Status: CANCELLED | OUTPATIENT
Start: 2024-03-13

## 2024-03-11 RX ORDER — MAGNESIUM SULFATE HEPTAHYDRATE 40 MG/ML
4 INJECTION, SOLUTION INTRAVENOUS AS NEEDED
Status: CANCELLED
Start: 2024-03-13

## 2024-03-11 RX ORDER — EPINEPHRINE 0.3 MG/.3ML
0.3 INJECTION SUBCUTANEOUS EVERY 5 MIN PRN
Status: DISCONTINUED | OUTPATIENT
Start: 2024-03-11 | End: 2024-03-11 | Stop reason: HOSPADM

## 2024-03-11 RX ORDER — POTASSIUM CHLORIDE 14.9 MG/ML
20 INJECTION INTRAVENOUS AS NEEDED
Status: CANCELLED
Start: 2024-03-13

## 2024-03-11 RX ORDER — ONDANSETRON HYDROCHLORIDE 2 MG/ML
8 INJECTION, SOLUTION INTRAVENOUS EVERY 4 HOURS PRN
Status: CANCELLED
Start: 2024-03-13

## 2024-03-11 RX ADMIN — PEGFILGRASTIM 6 MG: 6 INJECTION SUBCUTANEOUS at 18:30

## 2024-03-11 RX ADMIN — POTASSIUM CHLORIDE 20 MEQ: 14.9 INJECTION, SOLUTION INTRAVENOUS at 15:06

## 2024-03-11 RX ADMIN — ACETAMINOPHEN 650 MG: 325 TABLET ORAL at 14:34

## 2024-03-11 RX ADMIN — RITUXIMAB 700 MG: 10 INJECTION, SOLUTION INTRAVENOUS at 15:10

## 2024-03-11 RX ADMIN — DIPHENHYDRAMINE HYDROCHLORIDE 50 MG: 50 CAPSULE ORAL at 14:34

## 2024-03-11 ASSESSMENT — PAIN SCALES - GENERAL: PAINLEVEL: 0-NO PAIN

## 2024-03-12 DIAGNOSIS — C83.78 BURKITT LYMPHOMA OF LYMPH NODES OF MULTIPLE REGIONS (MULTI): ICD-10-CM

## 2024-03-12 DIAGNOSIS — C83.70 BURKITT LYMPHOMA, UNSPECIFIED BODY REGION (MULTI): ICD-10-CM

## 2024-03-12 RX ORDER — SULFAMETHOXAZOLE AND TRIMETHOPRIM 800; 160 MG/1; MG/1
1 TABLET ORAL
Qty: 12 TABLET | Refills: 0 | Status: SHIPPED | OUTPATIENT
Start: 2024-03-13 | End: 2024-04-07 | Stop reason: SDUPTHER

## 2024-03-12 RX ORDER — LEVOFLOXACIN 500 MG/1
500 TABLET, FILM COATED ORAL DAILY
Qty: 14 TABLET | Refills: 0 | Status: ON HOLD | OUTPATIENT
Start: 2024-03-12 | End: 2024-04-04

## 2024-03-12 RX ORDER — ACYCLOVIR 400 MG/1
400 TABLET ORAL 2 TIMES DAILY
Qty: 180 TABLET | Refills: 0 | Status: SHIPPED | OUTPATIENT
Start: 2024-03-12 | End: 2024-04-07 | Stop reason: SDUPTHER

## 2024-03-12 RX ORDER — PANTOPRAZOLE SODIUM 40 MG/1
40 TABLET, DELAYED RELEASE ORAL
Qty: 30 TABLET | Refills: 0 | Status: SHIPPED | OUTPATIENT
Start: 2024-03-12 | End: 2024-05-06 | Stop reason: SDUPTHER

## 2024-03-13 ENCOUNTER — TELEPHONE (OUTPATIENT)
Dept: HEMATOLOGY/ONCOLOGY | Facility: HOSPITAL | Age: 24
End: 2024-03-13

## 2024-03-13 ENCOUNTER — INFUSION (OUTPATIENT)
Dept: HEMATOLOGY/ONCOLOGY | Facility: HOSPITAL | Age: 24
End: 2024-03-13
Payer: COMMERCIAL

## 2024-03-13 ENCOUNTER — APPOINTMENT (OUTPATIENT)
Dept: HEMATOLOGY/ONCOLOGY | Facility: HOSPITAL | Age: 24
End: 2024-03-13
Payer: COMMERCIAL

## 2024-03-13 VITALS
RESPIRATION RATE: 16 BRPM | TEMPERATURE: 98.1 F | BODY MASS INDEX: 22.19 KG/M2 | HEART RATE: 89 BPM | DIASTOLIC BLOOD PRESSURE: 74 MMHG | OXYGEN SATURATION: 100 % | WEIGHT: 162.04 LBS | SYSTOLIC BLOOD PRESSURE: 127 MMHG

## 2024-03-13 DIAGNOSIS — C83.78 BURKITT LYMPHOMA OF LYMPH NODES OF MULTIPLE REGIONS (MULTI): ICD-10-CM

## 2024-03-13 DIAGNOSIS — C83.79 BURKITT LYMPHOMA OF SOLID ORGAN EXCLUDING SPLEEN (MULTI): ICD-10-CM

## 2024-03-13 DIAGNOSIS — C83.70 BURKITT LYMPHOMA, UNSPECIFIED BODY REGION (MULTI): ICD-10-CM

## 2024-03-13 LAB
ABO GROUP (TYPE) IN BLOOD: NORMAL
ANION GAP SERPL CALC-SCNC: 14 MMOL/L (ref 10–20)
ANTIBODY SCREEN: NORMAL
BASOPHILS # BLD AUTO: 0.01 X10*3/UL (ref 0–0.1)
BASOPHILS NFR BLD AUTO: 0.4 %
BLOOD EXPIRATION DATE: NORMAL
BUN SERPL-MCNC: 20 MG/DL (ref 6–23)
CALCIUM SERPL-MCNC: 9.7 MG/DL (ref 8.6–10.3)
CELL POPULATIONS: NORMAL
CHLORIDE SERPL-SCNC: 103 MMOL/L (ref 98–107)
CO2 SERPL-SCNC: 27 MMOL/L (ref 21–32)
CREAT SERPL-MCNC: 0.86 MG/DL (ref 0.5–1.3)
DIAGNOSIS: NORMAL
DISPENSE STATUS: NORMAL
EGFRCR SERPLBLD CKD-EPI 2021: >90 ML/MIN/1.73M*2
EOSINOPHIL # BLD AUTO: 0 X10*3/UL (ref 0–0.7)
EOSINOPHIL NFR BLD AUTO: 0 %
ERYTHROCYTE [DISTWIDTH] IN BLOOD BY AUTOMATED COUNT: 17.4 % (ref 11.5–14.5)
FLOW DIFFERENTIAL: NORMAL
FLOW TEST ORDERED: NORMAL
FLUID CELL COUNT: 1 /UL
GLUCOSE SERPL-MCNC: 86 MG/DL (ref 74–99)
HCT VFR BLD AUTO: 27.1 % (ref 41–52)
HGB BLD-MCNC: 9.7 G/DL (ref 13.5–17.5)
IMM GRANULOCYTES # BLD AUTO: 0.04 X10*3/UL (ref 0–0.7)
IMM GRANULOCYTES NFR BLD AUTO: 1.6 % (ref 0–0.9)
LAB TEST METHOD: NORMAL
LYMPHOCYTES # BLD AUTO: 0.16 X10*3/UL (ref 1.2–4.8)
LYMPHOCYTES NFR BLD AUTO: 6.4 %
MCH RBC QN AUTO: 32.2 PG (ref 26–34)
MCHC RBC AUTO-ENTMCNC: 35.8 G/DL (ref 32–36)
MCV RBC AUTO: 90 FL (ref 80–100)
MONOCYTES # BLD AUTO: 0 X10*3/UL (ref 0.1–1)
MONOCYTES NFR BLD AUTO: 0 %
NEUTROPHILS # BLD AUTO: 2.3 X10*3/UL (ref 1.2–7.7)
NEUTROPHILS NFR BLD AUTO: 91.6 %
NRBC BLD-RTO: 0 /100 WBCS (ref 0–0)
NUMBER OF CELLS COLLECTED: NORMAL
PATH REPORT.TOTAL CANCER: NORMAL
PLATELET # BLD AUTO: 50 X10*3/UL (ref 150–450)
POTASSIUM SERPL-SCNC: 3.7 MMOL/L (ref 3.5–5.3)
PRODUCT BLOOD TYPE: 5100
PRODUCT CODE: NORMAL
RBC # BLD AUTO: 3.01 X10*6/UL (ref 4.5–5.9)
RH FACTOR (ANTIGEN D): NORMAL
SIGNATURE COMMENT: NORMAL
SODIUM SERPL-SCNC: 140 MMOL/L (ref 136–145)
SPECIMEN VIABILITY: NORMAL
UNIT ABO: NORMAL
UNIT NUMBER: NORMAL
UNIT RH: NORMAL
UNIT VOLUME: 198
WBC # BLD AUTO: 2.5 X10*3/UL (ref 4.4–11.3)

## 2024-03-13 PROCEDURE — 82374 ASSAY BLOOD CARBON DIOXIDE: CPT

## 2024-03-13 PROCEDURE — 86901 BLOOD TYPING SEROLOGIC RH(D): CPT

## 2024-03-13 PROCEDURE — 2500000001 HC RX 250 WO HCPCS SELF ADMINISTERED DRUGS (ALT 637 FOR MEDICARE OP): Performed by: STUDENT IN AN ORGANIZED HEALTH CARE EDUCATION/TRAINING PROGRAM

## 2024-03-13 PROCEDURE — 36430 TRANSFUSION BLD/BLD COMPNT: CPT

## 2024-03-13 PROCEDURE — 85025 COMPLETE CBC W/AUTO DIFF WBC: CPT

## 2024-03-13 PROCEDURE — P9037 PLATE PHERES LEUKOREDU IRRAD: HCPCS

## 2024-03-13 RX ORDER — ACETAMINOPHEN 325 MG/1
650 TABLET ORAL ONCE
Status: CANCELLED
Start: 2024-03-13 | End: 2024-03-13

## 2024-03-13 RX ORDER — FAMOTIDINE 10 MG/ML
20 INJECTION INTRAVENOUS ONCE AS NEEDED
Status: DISCONTINUED | OUTPATIENT
Start: 2024-03-13 | End: 2024-03-13 | Stop reason: HOSPADM

## 2024-03-13 RX ORDER — EPINEPHRINE 0.3 MG/.3ML
0.3 INJECTION SUBCUTANEOUS EVERY 5 MIN PRN
Status: DISCONTINUED | OUTPATIENT
Start: 2024-03-13 | End: 2024-03-13 | Stop reason: HOSPADM

## 2024-03-13 RX ORDER — ALBUTEROL SULFATE 0.83 MG/ML
3 SOLUTION RESPIRATORY (INHALATION) AS NEEDED
Status: CANCELLED | OUTPATIENT
Start: 2024-03-13

## 2024-03-13 RX ORDER — EPINEPHRINE 0.3 MG/.3ML
0.3 INJECTION SUBCUTANEOUS EVERY 5 MIN PRN
Status: CANCELLED | OUTPATIENT
Start: 2024-03-13

## 2024-03-13 RX ORDER — DIPHENHYDRAMINE HYDROCHLORIDE 50 MG/ML
50 INJECTION INTRAMUSCULAR; INTRAVENOUS AS NEEDED
Status: CANCELLED | OUTPATIENT
Start: 2024-03-13

## 2024-03-13 RX ORDER — DIPHENHYDRAMINE HCL 50 MG
50 CAPSULE ORAL ONCE
Status: CANCELLED
Start: 2024-03-13

## 2024-03-13 RX ORDER — DIPHENHYDRAMINE HCL 50 MG
50 CAPSULE ORAL ONCE
Status: CANCELLED
Start: 2024-03-13 | End: 2024-03-13

## 2024-03-13 RX ORDER — ACETAMINOPHEN 325 MG/1
650 TABLET ORAL ONCE
Status: CANCELLED
Start: 2024-03-13

## 2024-03-13 RX ORDER — ALBUTEROL SULFATE 0.83 MG/ML
3 SOLUTION RESPIRATORY (INHALATION) AS NEEDED
Status: DISCONTINUED | OUTPATIENT
Start: 2024-03-13 | End: 2024-03-13 | Stop reason: HOSPADM

## 2024-03-13 RX ORDER — FAMOTIDINE 10 MG/ML
20 INJECTION INTRAVENOUS ONCE AS NEEDED
Status: CANCELLED | OUTPATIENT
Start: 2024-03-13

## 2024-03-13 RX ORDER — DIPHENHYDRAMINE HYDROCHLORIDE 50 MG/ML
50 INJECTION INTRAMUSCULAR; INTRAVENOUS AS NEEDED
Status: DISCONTINUED | OUTPATIENT
Start: 2024-03-13 | End: 2024-03-13 | Stop reason: HOSPADM

## 2024-03-13 RX ORDER — DIPHENHYDRAMINE HCL 50 MG
50 CAPSULE ORAL ONCE
Status: COMPLETED | OUTPATIENT
Start: 2024-03-13 | End: 2024-03-13

## 2024-03-13 RX ORDER — ACETAMINOPHEN 325 MG/1
650 TABLET ORAL ONCE
Status: COMPLETED | OUTPATIENT
Start: 2024-03-13 | End: 2024-03-13

## 2024-03-13 RX ADMIN — ACETAMINOPHEN 650 MG: 325 TABLET ORAL at 15:18

## 2024-03-13 RX ADMIN — DIPHENHYDRAMINE HYDROCHLORIDE 50 MG: 50 CAPSULE ORAL at 15:18

## 2024-03-13 ASSESSMENT — PAIN SCALES - GENERAL: PAINLEVEL: 0-NO PAIN

## 2024-03-13 NOTE — PROGRESS NOTES
Pt reports having a few nose bleeds over the past couple of days.  Able to stop them quickly.  Tolerated platelet transfusion without incidence.  Discharged home in stable condition.

## 2024-03-13 NOTE — TELEPHONE ENCOUNTER
This nurse notified provider would like patient to receive platelets, due to cbc/d from today 3/13/2024.     Call made to patient, left message notifying him of this.     Message sent to charge at [facility] regarding request awaiting response, see if they can fit patient tomorrow 3/14/2024.

## 2024-03-13 NOTE — TELEPHONE ENCOUNTER
Call made to mother. Patient received unit of platelets today.     Provider notified. No need for infusion. Keep schedule as is.     Per provider. Please add patient to schedule next week.   Discussed with patients mother, patient scheduled 3/20/2024 at 1pm.     No further needs at this time.

## 2024-03-14 ENCOUNTER — APPOINTMENT (OUTPATIENT)
Dept: HEMATOLOGY/ONCOLOGY | Facility: HOSPITAL | Age: 24
End: 2024-03-14
Payer: COMMERCIAL

## 2024-03-15 ENCOUNTER — INFUSION (OUTPATIENT)
Dept: HEMATOLOGY/ONCOLOGY | Facility: HOSPITAL | Age: 24
End: 2024-03-15
Payer: COMMERCIAL

## 2024-03-15 VITALS
HEART RATE: 92 BPM | OXYGEN SATURATION: 100 % | BODY MASS INDEX: 22.07 KG/M2 | WEIGHT: 161.16 LBS | DIASTOLIC BLOOD PRESSURE: 83 MMHG | TEMPERATURE: 97.7 F | SYSTOLIC BLOOD PRESSURE: 130 MMHG | RESPIRATION RATE: 21 BRPM

## 2024-03-15 DIAGNOSIS — C83.79 BURKITT LYMPHOMA OF SOLID ORGAN EXCLUDING SPLEEN (MULTI): ICD-10-CM

## 2024-03-15 DIAGNOSIS — C83.78 BURKITT LYMPHOMA OF LYMPH NODES OF MULTIPLE REGIONS (MULTI): ICD-10-CM

## 2024-03-15 DIAGNOSIS — C83.70 BURKITT LYMPHOMA, UNSPECIFIED BODY REGION (MULTI): ICD-10-CM

## 2024-03-15 LAB
ANION GAP SERPL CALC-SCNC: 13 MMOL/L (ref 10–20)
BASOPHILS # BLD AUTO: 0.01 X10*3/UL (ref 0–0.1)
BASOPHILS NFR BLD AUTO: 4.5 %
BUN SERPL-MCNC: 17 MG/DL (ref 6–23)
CALCIUM SERPL-MCNC: 9.8 MG/DL (ref 8.6–10.3)
CHLORIDE SERPL-SCNC: 102 MMOL/L (ref 98–107)
CO2 SERPL-SCNC: 26 MMOL/L (ref 21–32)
CREAT SERPL-MCNC: 0.86 MG/DL (ref 0.5–1.3)
EGFRCR SERPLBLD CKD-EPI 2021: >90 ML/MIN/1.73M*2
EOSINOPHIL # BLD AUTO: 0 X10*3/UL (ref 0–0.7)
EOSINOPHIL NFR BLD AUTO: 0 %
ERYTHROCYTE [DISTWIDTH] IN BLOOD BY AUTOMATED COUNT: 16.1 % (ref 11.5–14.5)
GLUCOSE SERPL-MCNC: 88 MG/DL (ref 74–99)
HCT VFR BLD AUTO: 23.6 % (ref 41–52)
HGB BLD-MCNC: 8.4 G/DL (ref 13.5–17.5)
IMM GRANULOCYTES # BLD AUTO: 0 X10*3/UL (ref 0–0.7)
IMM GRANULOCYTES NFR BLD AUTO: 0 % (ref 0–0.9)
LYMPHOCYTES # BLD AUTO: 0.14 X10*3/UL (ref 1.2–4.8)
LYMPHOCYTES NFR BLD AUTO: 63.6 %
MCH RBC QN AUTO: 31.8 PG (ref 26–34)
MCHC RBC AUTO-ENTMCNC: 35.6 G/DL (ref 32–36)
MCV RBC AUTO: 89 FL (ref 80–100)
MONOCYTES # BLD AUTO: 0 X10*3/UL (ref 0.1–1)
MONOCYTES NFR BLD AUTO: 0 %
NEUTROPHILS # BLD AUTO: 0.07 X10*3/UL (ref 1.2–7.7)
NEUTROPHILS NFR BLD AUTO: 31.9 %
NRBC BLD-RTO: 0 /100 WBCS (ref 0–0)
PLATELET # BLD AUTO: 25 X10*3/UL (ref 150–450)
POTASSIUM SERPL-SCNC: 4 MMOL/L (ref 3.5–5.3)
RBC # BLD AUTO: 2.64 X10*6/UL (ref 4.5–5.9)
SODIUM SERPL-SCNC: 137 MMOL/L (ref 136–145)
WBC # BLD AUTO: 0.2 X10*3/UL (ref 4.4–11.3)

## 2024-03-15 PROCEDURE — 2500000001 HC RX 250 WO HCPCS SELF ADMINISTERED DRUGS (ALT 637 FOR MEDICARE OP): Performed by: STUDENT IN AN ORGANIZED HEALTH CARE EDUCATION/TRAINING PROGRAM

## 2024-03-15 PROCEDURE — P9073 PLATELETS PHERESIS PATH REDU: HCPCS

## 2024-03-15 PROCEDURE — 36430 TRANSFUSION BLD/BLD COMPNT: CPT

## 2024-03-15 PROCEDURE — 80048 BASIC METABOLIC PNL TOTAL CA: CPT

## 2024-03-15 PROCEDURE — 85025 COMPLETE CBC W/AUTO DIFF WBC: CPT

## 2024-03-15 PROCEDURE — 96360 HYDRATION IV INFUSION INIT: CPT | Mod: INF

## 2024-03-15 RX ORDER — ALBUTEROL SULFATE 0.83 MG/ML
3 SOLUTION RESPIRATORY (INHALATION) AS NEEDED
Status: CANCELLED | OUTPATIENT
Start: 2024-03-15

## 2024-03-15 RX ORDER — DIPHENHYDRAMINE HYDROCHLORIDE 50 MG/ML
50 INJECTION INTRAMUSCULAR; INTRAVENOUS AS NEEDED
Status: DISCONTINUED | OUTPATIENT
Start: 2024-03-15 | End: 2024-03-15 | Stop reason: HOSPADM

## 2024-03-15 RX ORDER — DIPHENHYDRAMINE HYDROCHLORIDE 50 MG/ML
50 INJECTION INTRAMUSCULAR; INTRAVENOUS AS NEEDED
Status: CANCELLED | OUTPATIENT
Start: 2024-03-15

## 2024-03-15 RX ORDER — EPINEPHRINE 0.3 MG/.3ML
0.3 INJECTION SUBCUTANEOUS EVERY 5 MIN PRN
Status: DISCONTINUED | OUTPATIENT
Start: 2024-03-15 | End: 2024-03-15 | Stop reason: HOSPADM

## 2024-03-15 RX ORDER — ACETAMINOPHEN 325 MG/1
650 TABLET ORAL ONCE
Status: COMPLETED | OUTPATIENT
Start: 2024-03-15 | End: 2024-03-15

## 2024-03-15 RX ORDER — DIPHENHYDRAMINE HCL 50 MG
50 CAPSULE ORAL ONCE
Status: CANCELLED
Start: 2024-03-15 | End: 2024-03-15

## 2024-03-15 RX ORDER — FAMOTIDINE 10 MG/ML
20 INJECTION INTRAVENOUS ONCE AS NEEDED
Status: CANCELLED | OUTPATIENT
Start: 2024-03-15

## 2024-03-15 RX ORDER — ACETAMINOPHEN 325 MG/1
650 TABLET ORAL ONCE
Status: DISCONTINUED | OUTPATIENT
Start: 2024-03-15 | End: 2024-03-15 | Stop reason: HOSPADM

## 2024-03-15 RX ORDER — FAMOTIDINE 10 MG/ML
20 INJECTION INTRAVENOUS ONCE AS NEEDED
Status: DISCONTINUED | OUTPATIENT
Start: 2024-03-15 | End: 2024-03-15 | Stop reason: HOSPADM

## 2024-03-15 RX ORDER — ACETAMINOPHEN 325 MG/1
650 TABLET ORAL ONCE
Status: CANCELLED
Start: 2024-03-15 | End: 2024-03-15

## 2024-03-15 RX ORDER — ACETAMINOPHEN 325 MG/1
650 TABLET ORAL ONCE
Status: CANCELLED
Start: 2024-03-15

## 2024-03-15 RX ORDER — EPINEPHRINE 0.3 MG/.3ML
0.3 INJECTION SUBCUTANEOUS EVERY 5 MIN PRN
Status: CANCELLED | OUTPATIENT
Start: 2024-03-15

## 2024-03-15 RX ORDER — DIPHENHYDRAMINE HCL 50 MG
50 CAPSULE ORAL ONCE
Status: COMPLETED | OUTPATIENT
Start: 2024-03-15 | End: 2024-03-15

## 2024-03-15 RX ORDER — DIPHENHYDRAMINE HCL 50 MG
50 CAPSULE ORAL ONCE
Status: DISCONTINUED | OUTPATIENT
Start: 2024-03-15 | End: 2024-03-15 | Stop reason: HOSPADM

## 2024-03-15 RX ORDER — DIPHENHYDRAMINE HCL 25 MG
25 CAPSULE ORAL ONCE
Status: CANCELLED
Start: 2024-03-15

## 2024-03-15 RX ORDER — ALBUTEROL SULFATE 0.83 MG/ML
3 SOLUTION RESPIRATORY (INHALATION) AS NEEDED
Status: DISCONTINUED | OUTPATIENT
Start: 2024-03-15 | End: 2024-03-15 | Stop reason: HOSPADM

## 2024-03-15 RX ADMIN — ACETAMINOPHEN 650 MG: 325 TABLET ORAL at 15:32

## 2024-03-15 RX ADMIN — DIPHENHYDRAMINE HYDROCHLORIDE 50 MG: 50 CAPSULE ORAL at 15:32

## 2024-03-15 ASSESSMENT — PAIN SCALES - GENERAL: PAINLEVEL: 0-NO PAIN

## 2024-03-15 NOTE — PROGRESS NOTES
[facility] Infusion Note     Shilo Thakkar is a 23 y.o. year old male here today,03/15/24,  in the [facility] infusion center for a count check. Platelets were reported at 25 so 1 units of platelets was ordered for preparation and transfusion. Downtime sheet needed to be used due to Epic issues and sheet was sent to medical records to be scanned into the chart. Infusion was started at 1537 and ended at 1610.   Vitals:   1537: 36.5 degrees celsius, pulse was 92, pulse ox was 100%, RR 21, and /83  1610: 36.7 degrees celsius, pulse was 88, pulse ox was 100%, RR 20, and /73        Overall, pt states mild fatigue, no concerns regarding appetite, and no pain.     Access: PICC Line        Administrations This Visit       acetaminophen (Tylenol) tablet 650 mg       Admin Date  03/15/2024 Action  Given Dose  650 mg Route  oral Administered By  Mariel Tovar RN              diphenhydrAMINE (BENADryl) capsule 50 mg       Admin Date  03/15/2024 Action  Given Dose  50 mg Route  oral Administered By  Mariel Tovar RN                    Pt tolerated platelet transfusion well and was discharged to home in stable condition. Pt had no further questions or concerns at this time.

## 2024-03-16 ENCOUNTER — APPOINTMENT (OUTPATIENT)
Dept: HEMATOLOGY/ONCOLOGY | Facility: HOSPITAL | Age: 24
End: 2024-03-16
Payer: COMMERCIAL

## 2024-03-16 LAB
BLOOD EXPIRATION DATE: NORMAL
DISPENSE STATUS: NORMAL
PRODUCT BLOOD TYPE: 8400
PRODUCT CODE: NORMAL
UNIT ABO: NORMAL
UNIT NUMBER: NORMAL
UNIT RH: NORMAL
UNIT VOLUME: 274

## 2024-03-18 ENCOUNTER — TELEPHONE (OUTPATIENT)
Dept: OTHER | Facility: HOSPITAL | Age: 24
End: 2024-03-18

## 2024-03-18 ENCOUNTER — INFUSION (OUTPATIENT)
Dept: HEMATOLOGY/ONCOLOGY | Facility: HOSPITAL | Age: 24
End: 2024-03-18
Payer: COMMERCIAL

## 2024-03-18 ENCOUNTER — HOSPITAL ENCOUNTER (EMERGENCY)
Facility: HOSPITAL | Age: 24
Discharge: HOME | End: 2024-03-19
Attending: EMERGENCY MEDICINE
Payer: COMMERCIAL

## 2024-03-18 VITALS
TEMPERATURE: 98.8 F | SYSTOLIC BLOOD PRESSURE: 119 MMHG | RESPIRATION RATE: 18 BRPM | OXYGEN SATURATION: 100 % | HEART RATE: 70 BPM | DIASTOLIC BLOOD PRESSURE: 65 MMHG

## 2024-03-18 DIAGNOSIS — D61.818 PANCYTOPENIA (MULTI): Primary | ICD-10-CM

## 2024-03-18 DIAGNOSIS — R04.0 EPISTAXIS: Primary | ICD-10-CM

## 2024-03-18 DIAGNOSIS — C83.70 BURKITT LYMPHOMA, UNSPECIFIED BODY REGION (MULTI): Primary | ICD-10-CM

## 2024-03-18 DIAGNOSIS — H35.61 PRERETINAL HEMORRHAGE OF RIGHT EYE: ICD-10-CM

## 2024-03-18 DIAGNOSIS — H35.61 PRERETINAL HEMORRHAGE OF RIGHT EYE: Primary | ICD-10-CM

## 2024-03-18 DIAGNOSIS — C83.79 BURKITT LYMPHOMA OF SOLID ORGAN EXCLUDING SPLEEN (MULTI): ICD-10-CM

## 2024-03-18 DIAGNOSIS — C83.70 BURKITT LYMPHOMA, UNSPECIFIED BODY REGION (MULTI): ICD-10-CM

## 2024-03-18 LAB
ABO GROUP (TYPE) IN BLOOD: NORMAL
ALBUMIN SERPL BCP-MCNC: 4.7 G/DL (ref 3.4–5)
ALP SERPL-CCNC: 86 U/L (ref 33–120)
ALT SERPL W P-5'-P-CCNC: 19 U/L (ref 10–52)
ANION GAP SERPL CALC-SCNC: 12 MMOL/L (ref 10–20)
ANION GAP SERPL CALC-SCNC: 14 MMOL/L (ref 10–20)
ANTIBODY SCREEN: NORMAL
AST SERPL W P-5'-P-CCNC: 12 U/L (ref 9–39)
BASOPHILS # BLD AUTO: 0 X10*3/UL (ref 0–0.1)
BASOPHILS NFR BLD AUTO: 0 %
BILIRUB SERPL-MCNC: 0.9 MG/DL (ref 0–1.2)
BLOOD EXPIRATION DATE: NORMAL
BLOOD EXPIRATION DATE: NORMAL
BUN SERPL-MCNC: 18 MG/DL (ref 6–23)
BUN SERPL-MCNC: 19 MG/DL (ref 6–23)
CALCIUM SERPL-MCNC: 9.5 MG/DL (ref 8.6–10.3)
CALCIUM SERPL-MCNC: 9.8 MG/DL (ref 8.6–10.6)
CHLORIDE SERPL-SCNC: 102 MMOL/L (ref 98–107)
CHLORIDE SERPL-SCNC: 105 MMOL/L (ref 98–107)
CO2 SERPL-SCNC: 28 MMOL/L (ref 21–32)
CO2 SERPL-SCNC: 31 MMOL/L (ref 21–32)
CREAT SERPL-MCNC: 0.86 MG/DL (ref 0.5–1.3)
CREAT SERPL-MCNC: 1.08 MG/DL (ref 0.5–1.3)
DISPENSE STATUS: NORMAL
DISPENSE STATUS: NORMAL
EGFRCR SERPLBLD CKD-EPI 2021: >90 ML/MIN/1.73M*2
EGFRCR SERPLBLD CKD-EPI 2021: >90 ML/MIN/1.73M*2
EOSINOPHIL # BLD AUTO: 0 X10*3/UL (ref 0–0.7)
EOSINOPHIL NFR BLD AUTO: 0 %
ERYTHROCYTE [DISTWIDTH] IN BLOOD BY AUTOMATED COUNT: 14.7 % (ref 11.5–14.5)
ERYTHROCYTE [DISTWIDTH] IN BLOOD BY AUTOMATED COUNT: 15 % (ref 11.5–14.5)
GLUCOSE SERPL-MCNC: 89 MG/DL (ref 74–99)
GLUCOSE SERPL-MCNC: 90 MG/DL (ref 74–99)
HCT VFR BLD AUTO: 18.2 % (ref 41–52)
HCT VFR BLD AUTO: 18.7 % (ref 41–52)
HGB BLD-MCNC: 6.2 G/DL (ref 13.5–17.5)
HGB BLD-MCNC: 6.5 G/DL (ref 13.5–17.5)
IMM GRANULOCYTES # BLD AUTO: 0 X10*3/UL (ref 0–0.7)
IMM GRANULOCYTES NFR BLD AUTO: 0 % (ref 0–0.9)
LYMPHOCYTES # BLD AUTO: 0.15 X10*3/UL (ref 1.2–4.8)
LYMPHOCYTES NFR BLD AUTO: 93.8 %
MCH RBC QN AUTO: 31.3 PG (ref 26–34)
MCH RBC QN AUTO: 31.4 PG (ref 26–34)
MCHC RBC AUTO-ENTMCNC: 33.2 G/DL (ref 32–36)
MCHC RBC AUTO-ENTMCNC: 35.7 G/DL (ref 32–36)
MCV RBC AUTO: 88 FL (ref 80–100)
MCV RBC AUTO: 94 FL (ref 80–100)
MONOCYTES # BLD AUTO: 0 X10*3/UL (ref 0.1–1)
MONOCYTES NFR BLD AUTO: 0 %
NEUTROPHILS # BLD AUTO: 0.01 X10*3/UL (ref 1.2–7.7)
NEUTROPHILS NFR BLD AUTO: 6.2 %
NRBC BLD-RTO: 0 /100 WBCS (ref 0–0)
PLATELET # BLD AUTO: 62 X10*3/UL (ref 150–450)
PLATELET # BLD AUTO: 9 X10*3/UL (ref 150–450)
POTASSIUM SERPL-SCNC: 3.9 MMOL/L (ref 3.5–5.3)
POTASSIUM SERPL-SCNC: 4 MMOL/L (ref 3.5–5.3)
PRODUCT BLOOD TYPE: 5100
PRODUCT BLOOD TYPE: 5100
PRODUCT CODE: NORMAL
PRODUCT CODE: NORMAL
PROT SERPL-MCNC: 7.1 G/DL (ref 6.4–8.2)
RBC # BLD AUTO: 1.98 X10*6/UL (ref 4.5–5.9)
RBC # BLD AUTO: 2.07 X10*6/UL (ref 4.5–5.9)
RH FACTOR (ANTIGEN D): NORMAL
SODIUM SERPL-SCNC: 140 MMOL/L (ref 136–145)
SODIUM SERPL-SCNC: 144 MMOL/L (ref 136–145)
UNIT ABO: NORMAL
UNIT ABO: NORMAL
UNIT NUMBER: NORMAL
UNIT NUMBER: NORMAL
UNIT RH: NORMAL
UNIT RH: NORMAL
UNIT VOLUME: 273
UNIT VOLUME: 277
WBC # BLD AUTO: 0.2 X10*3/UL (ref 4.4–11.3)
WBC # BLD AUTO: 0.3 X10*3/UL (ref 4.4–11.3)

## 2024-03-18 PROCEDURE — 2500000001 HC RX 250 WO HCPCS SELF ADMINISTERED DRUGS (ALT 637 FOR MEDICARE OP): Performed by: NURSE PRACTITIONER

## 2024-03-18 PROCEDURE — 96374 THER/PROPH/DIAG INJ IV PUSH: CPT

## 2024-03-18 PROCEDURE — 99285 EMERGENCY DEPT VISIT HI MDM: CPT

## 2024-03-18 PROCEDURE — 2500000001 HC RX 250 WO HCPCS SELF ADMINISTERED DRUGS (ALT 637 FOR MEDICARE OP): Performed by: PHYSICIAN ASSISTANT

## 2024-03-18 PROCEDURE — 36430 TRANSFUSION BLD/BLD COMPNT: CPT

## 2024-03-18 PROCEDURE — 85025 COMPLETE CBC W/AUTO DIFF WBC: CPT | Performed by: INTERNAL MEDICINE

## 2024-03-18 PROCEDURE — 86920 COMPATIBILITY TEST SPIN: CPT

## 2024-03-18 PROCEDURE — 85027 COMPLETE CBC AUTOMATED: CPT | Performed by: EMERGENCY MEDICINE

## 2024-03-18 PROCEDURE — P9073 PLATELETS PHERESIS PATH REDU: HCPCS

## 2024-03-18 PROCEDURE — 80048 BASIC METABOLIC PNL TOTAL CA: CPT | Performed by: EMERGENCY MEDICINE

## 2024-03-18 PROCEDURE — P9037 PLATE PHERES LEUKOREDU IRRAD: HCPCS

## 2024-03-18 PROCEDURE — 80048 BASIC METABOLIC PNL TOTAL CA: CPT | Mod: CCI | Performed by: EMERGENCY MEDICINE

## 2024-03-18 PROCEDURE — 85027 COMPLETE CBC AUTOMATED: CPT | Mod: 91 | Performed by: EMERGENCY MEDICINE

## 2024-03-18 PROCEDURE — 86850 RBC ANTIBODY SCREEN: CPT | Performed by: EMERGENCY MEDICINE

## 2024-03-18 PROCEDURE — 86901 BLOOD TYPING SEROLOGIC RH(D): CPT

## 2024-03-18 PROCEDURE — 99284 EMERGENCY DEPT VISIT MOD MDM: CPT | Performed by: EMERGENCY MEDICINE

## 2024-03-18 PROCEDURE — 80053 COMPREHEN METABOLIC PANEL: CPT | Performed by: INTERNAL MEDICINE

## 2024-03-18 PROCEDURE — 86900 BLOOD TYPING SEROLOGIC ABO: CPT | Performed by: EMERGENCY MEDICINE

## 2024-03-18 RX ORDER — ACETAMINOPHEN 325 MG/1
650 TABLET ORAL ONCE
Status: COMPLETED | OUTPATIENT
Start: 2024-03-18 | End: 2024-03-18

## 2024-03-18 RX ORDER — EPINEPHRINE 0.3 MG/.3ML
0.3 INJECTION SUBCUTANEOUS EVERY 5 MIN PRN
Status: CANCELLED | OUTPATIENT
Start: 2024-03-18

## 2024-03-18 RX ORDER — DIPHENHYDRAMINE HYDROCHLORIDE 50 MG/ML
50 INJECTION INTRAMUSCULAR; INTRAVENOUS AS NEEDED
Status: DISCONTINUED | OUTPATIENT
Start: 2024-03-18 | End: 2024-03-18 | Stop reason: HOSPADM

## 2024-03-18 RX ORDER — FAMOTIDINE 10 MG/ML
20 INJECTION INTRAVENOUS ONCE AS NEEDED
Status: CANCELLED | OUTPATIENT
Start: 2024-03-18

## 2024-03-18 RX ORDER — FAMOTIDINE 10 MG/ML
20 INJECTION INTRAVENOUS ONCE AS NEEDED
Status: DISCONTINUED | OUTPATIENT
Start: 2024-03-18 | End: 2024-03-18 | Stop reason: HOSPADM

## 2024-03-18 RX ORDER — DIPHENHYDRAMINE HYDROCHLORIDE 50 MG/ML
50 INJECTION INTRAMUSCULAR; INTRAVENOUS AS NEEDED
Status: CANCELLED | OUTPATIENT
Start: 2024-03-18

## 2024-03-18 RX ORDER — ALBUTEROL SULFATE 0.83 MG/ML
3 SOLUTION RESPIRATORY (INHALATION) AS NEEDED
Status: CANCELLED | OUTPATIENT
Start: 2024-03-18

## 2024-03-18 RX ORDER — DIPHENHYDRAMINE HCL 25 MG
25 CAPSULE ORAL ONCE
Status: COMPLETED | OUTPATIENT
Start: 2024-03-18 | End: 2024-03-18

## 2024-03-18 RX ORDER — ACETAMINOPHEN 325 MG/1
650 TABLET ORAL ONCE
Status: CANCELLED
Start: 2024-03-18 | End: 2024-03-18

## 2024-03-18 RX ORDER — EPINEPHRINE 0.3 MG/.3ML
0.3 INJECTION SUBCUTANEOUS EVERY 5 MIN PRN
Status: DISCONTINUED | OUTPATIENT
Start: 2024-03-18 | End: 2024-03-18 | Stop reason: HOSPADM

## 2024-03-18 RX ORDER — DIPHENHYDRAMINE HCL 25 MG
25 CAPSULE ORAL ONCE
Status: CANCELLED
Start: 2024-03-18 | End: 2024-03-18

## 2024-03-18 RX ORDER — ALBUTEROL SULFATE 0.83 MG/ML
3 SOLUTION RESPIRATORY (INHALATION) AS NEEDED
Status: DISCONTINUED | OUTPATIENT
Start: 2024-03-18 | End: 2024-03-18 | Stop reason: HOSPADM

## 2024-03-18 RX ORDER — OXYMETAZOLINE HCL 0.05 %
2 SPRAY, NON-AEROSOL (ML) NASAL ONCE
Status: COMPLETED | OUTPATIENT
Start: 2024-03-18 | End: 2024-03-18

## 2024-03-18 RX ADMIN — OXYMETAZOLINE HYDROCHLORIDE 2 SPRAY: 0.05 SPRAY NASAL at 15:39

## 2024-03-18 RX ADMIN — DIPHENHYDRAMINE HYDROCHLORIDE 25 MG: 25 CAPSULE ORAL at 15:39

## 2024-03-18 RX ADMIN — ACETAMINOPHEN 650 MG: 325 TABLET ORAL at 15:39

## 2024-03-18 NOTE — PROGRESS NOTES
SIGNIFICANT EVENT: Epistaxis  DATE: 03/18/24  TIME: 5:15 PM     Upon arrival to clinic, patient complained of persistent epistaxis, platelets were found to be 9 k/uL.    He denied headaches, nausea, visual disturbances beyond baseline, dysarthria, hemoptysis, hematuria, hematochezia, and melena.     /64   Pulse 66   Temp 37.2 °C (99 °F) (Temporal)   Resp 18   SpO2 100%      Lab Results   Component Value Date    WBC 0.2 (LL) 03/18/2024    HGB 6.5 (LL) 03/18/2024    HCT 18.2 (L) 03/18/2024    MCV 88 03/18/2024    PLT 9 (LL) 03/18/2024     Gauze soaked in oxymetazoline 0.05% nasal was placed in the L naris and replaced several times throughout the infusion visit. He was transfused 2 units of platelets, and with these interventions hemostasis was achieved.     He will return tomorrow (3/19/24) for RBC transfusion.    He was given a list of red flag symptoms that would prompt visit to the ED:  Uncontrollable epistaxis  Severe headache  Visual changes   Changes in consciousness  Other uncontrollable bleeding    Néstor Soni PA-C  [facility]

## 2024-03-18 NOTE — TELEPHONE ENCOUNTER
I reviewed the patient's labs earlier today.  Lab Results   Component Value Date    WBC 0.2 (LL) 03/18/2024    HGB 6.5 (LL) 03/18/2024    HCT 18.2 (L) 03/18/2024    MCV 88 03/18/2024    PLT 9 (LL) 03/18/2024        He had already received 2 units of platelets, SDP, and was discharged with the plan of bring him back tomorrow for 2 units of packed red blood cells.    Because of concern about what happened previously with retinal hemorrhages with profound anemia/thrombocytopenia, I instructed the patient to go back to the emergency room tonight to receive 2 units of packed red blood cells, to bring his hemoglobin up to above 8-8.5 which should have a protective effect on tendency for retinal hemorrhages, and if platelets are still below 50 after the 2 units earlier today, to receive a third unit of single donor platelets.    I spoke to the patient's mom who voiced understanding and I spoke with emergency room physician and explained the patient's status and the needed intervention.  All is in agreement to proceed.    He is neutropenic but afebrile and should to be on levofloxacin.  I am not seeing any reason for him to be hospitalized unless he demonstrates specific indication for admission, including the development of neutropenic fever.  He is day 12 of the cycle and should be passing his esthela within the next 2 to 3 days and recovering promptly thereafter.

## 2024-03-19 ENCOUNTER — TELEPHONE (OUTPATIENT)
Dept: HEMATOLOGY/ONCOLOGY | Facility: HOSPITAL | Age: 24
End: 2024-03-19
Payer: COMMERCIAL

## 2024-03-19 ENCOUNTER — APPOINTMENT (OUTPATIENT)
Dept: HEMATOLOGY/ONCOLOGY | Facility: HOSPITAL | Age: 24
End: 2024-03-19
Payer: COMMERCIAL

## 2024-03-19 VITALS
BODY MASS INDEX: 22.53 KG/M2 | TEMPERATURE: 98.1 F | HEIGHT: 73 IN | WEIGHT: 170 LBS | DIASTOLIC BLOOD PRESSURE: 63 MMHG | HEART RATE: 68 BPM | OXYGEN SATURATION: 97 % | SYSTOLIC BLOOD PRESSURE: 112 MMHG | RESPIRATION RATE: 16 BRPM

## 2024-03-19 LAB
ABO GROUP (TYPE) IN BLOOD: NORMAL
ANTIBODY SCREEN: NORMAL
BLOOD EXPIRATION DATE: NORMAL
BLOOD EXPIRATION DATE: NORMAL
DISPENSE STATUS: NORMAL
DISPENSE STATUS: NORMAL
ERYTHROCYTE [DISTWIDTH] IN BLOOD BY AUTOMATED COUNT: 15.9 % (ref 11.5–14.5)
HCT VFR BLD AUTO: 19.8 % (ref 41–52)
HGB BLD-MCNC: 7 G/DL (ref 13.5–17.5)
MCH RBC QN AUTO: 28.5 PG (ref 26–34)
MCHC RBC AUTO-ENTMCNC: 35.4 G/DL (ref 32–36)
MCV RBC AUTO: 81 FL (ref 80–100)
NRBC BLD-RTO: 0 /100 WBCS (ref 0–0)
PLATELET # BLD AUTO: 46 X10*3/UL (ref 150–450)
PRODUCT BLOOD TYPE: 5100
PRODUCT BLOOD TYPE: 7300
PRODUCT CODE: NORMAL
PRODUCT CODE: NORMAL
RBC # BLD AUTO: 2.46 X10*6/UL (ref 4.5–5.9)
RH FACTOR (ANTIGEN D): NORMAL
UNIT ABO: NORMAL
UNIT ABO: NORMAL
UNIT NUMBER: NORMAL
UNIT NUMBER: NORMAL
UNIT RH: NORMAL
UNIT RH: NORMAL
UNIT VOLUME: 280
UNIT VOLUME: 416
WBC # BLD AUTO: 0.3 X10*3/UL (ref 4.4–11.3)
XM INTEP: NORMAL

## 2024-03-19 PROCEDURE — P9037 PLATE PHERES LEUKOREDU IRRAD: HCPCS

## 2024-03-19 PROCEDURE — 96374 THER/PROPH/DIAG INJ IV PUSH: CPT

## 2024-03-19 PROCEDURE — 36430 TRANSFUSION BLD/BLD COMPNT: CPT

## 2024-03-19 PROCEDURE — 2500000004 HC RX 250 GENERAL PHARMACY W/ HCPCS (ALT 636 FOR OP/ED): Performed by: STUDENT IN AN ORGANIZED HEALTH CARE EDUCATION/TRAINING PROGRAM

## 2024-03-19 PROCEDURE — 2500000001 HC RX 250 WO HCPCS SELF ADMINISTERED DRUGS (ALT 637 FOR MEDICARE OP): Performed by: STUDENT IN AN ORGANIZED HEALTH CARE EDUCATION/TRAINING PROGRAM

## 2024-03-19 PROCEDURE — 85027 COMPLETE CBC AUTOMATED: CPT | Performed by: STUDENT IN AN ORGANIZED HEALTH CARE EDUCATION/TRAINING PROGRAM

## 2024-03-19 PROCEDURE — P9040 RBC LEUKOREDUCED IRRADIATED: HCPCS

## 2024-03-19 RX ORDER — DIPHENHYDRAMINE HCL 25 MG
25 CAPSULE ORAL ONCE
Status: COMPLETED | OUTPATIENT
Start: 2024-03-19 | End: 2024-03-19

## 2024-03-19 RX ORDER — FAMOTIDINE 10 MG/ML
20 INJECTION INTRAVENOUS ONCE AS NEEDED
Status: CANCELLED | OUTPATIENT
Start: 2024-03-19

## 2024-03-19 RX ORDER — ACETAMINOPHEN 325 MG/1
975 TABLET ORAL ONCE
Status: COMPLETED | OUTPATIENT
Start: 2024-03-19 | End: 2024-03-19

## 2024-03-19 RX ORDER — DIPHENHYDRAMINE HYDROCHLORIDE 50 MG/ML
50 INJECTION INTRAMUSCULAR; INTRAVENOUS AS NEEDED
Status: CANCELLED | OUTPATIENT
Start: 2024-03-19

## 2024-03-19 RX ORDER — ALBUTEROL SULFATE 0.83 MG/ML
3 SOLUTION RESPIRATORY (INHALATION) AS NEEDED
Status: CANCELLED | OUTPATIENT
Start: 2024-03-19

## 2024-03-19 RX ORDER — DIPHENHYDRAMINE HYDROCHLORIDE 50 MG/ML
25 INJECTION INTRAMUSCULAR; INTRAVENOUS ONCE
Status: DISCONTINUED | OUTPATIENT
Start: 2024-03-19 | End: 2024-03-19

## 2024-03-19 RX ORDER — EPINEPHRINE 0.3 MG/.3ML
0.3 INJECTION SUBCUTANEOUS EVERY 5 MIN PRN
Status: CANCELLED | OUTPATIENT
Start: 2024-03-19

## 2024-03-19 RX ADMIN — HYDROCORTISONE SODIUM SUCCINATE 50 MG: 100 INJECTION, POWDER, FOR SOLUTION INTRAMUSCULAR; INTRAVENOUS at 13:28

## 2024-03-19 RX ADMIN — ACETAMINOPHEN 975 MG: 325 TABLET ORAL at 13:28

## 2024-03-19 RX ADMIN — DIPHENHYDRAMINE HYDROCHLORIDE 25 MG: 25 CAPSULE ORAL at 13:28

## 2024-03-19 ASSESSMENT — LIFESTYLE VARIABLES
HAVE PEOPLE ANNOYED YOU BY CRITICIZING YOUR DRINKING: NO
EVER HAD A DRINK FIRST THING IN THE MORNING TO STEADY YOUR NERVES TO GET RID OF A HANGOVER: NO
HAVE YOU EVER FELT YOU SHOULD CUT DOWN ON YOUR DRINKING: NO
EVER FELT BAD OR GUILTY ABOUT YOUR DRINKING: NO

## 2024-03-19 ASSESSMENT — PAIN - FUNCTIONAL ASSESSMENT: PAIN_FUNCTIONAL_ASSESSMENT: 0-10

## 2024-03-19 ASSESSMENT — PAIN SCALES - GENERAL: PAINLEVEL_OUTOF10: 0 - NO PAIN

## 2024-03-19 NOTE — DISCHARGE INSTRUCTIONS
Eleonora Hurst Follow-up with hematology/oncology tomorrow.  Discuss with your doctor and return to the emergency department immediately if you develop fevers or any other sign of infection.

## 2024-03-19 NOTE — PROGRESS NOTES
Patient was handed off to me from the previous team. For full history, physical, and prior ED course, please see previous provider note prior to patient handoff. This is an addendum to the record.    Briefly, this is a 23-year-old male with history of Burkitt lymphoma who presents to the emergency department for blood transfusion.  Patient with pancytopenia, seen in clinic yesterday, received 2 packs platelets at that time.  Sent to the emergency department today for transfusion of PRBC.  Pending blood product transfusion and reevaluation for disposition at time of signout.    Hospital Course/MDM:  Patient transfused 2 units PRBC.  Did develop an obstruction within his PICC line, resolved after Cathflo.  Repeat Justino CBC the after 2 units PRBC demonstrates hemoglobin 7.0, platelets 46.  I discussed with the patient's hematologist Dr. Klein, who recommends no further PRBC transfusion, but does recommend an additional pack of platelets with premedication with Tylenol, Benadryl, and hydrocortisone.  Will see patient in clinic tomorrow.    Patient received additional platelets without complication.  Continues to have no signs or symptoms of infection including no fevers.  Patient and mom are comfortable with plan for outpatient follow-up tomorrow.    Disposition:  Discharge home    --  Carlie Aaron MD  Emergency Medicine, PGY-3

## 2024-03-19 NOTE — ED PROVIDER NOTES
HPI   Chief Complaint   Patient presents with   • Abnormal Lab       Patient is a 23-year-old male with history of Burkitt lymphoma on chemotherapy who presents to the ER due to concern for low blood counts.  According to patient, he was sent for blood transfusion.  His CBC was noted to have a low hemoglobin on outpatient lab work.  His hematologist oncologist sent patient to the ER for transfusion.  He otherwise denies any fevers, chills, chest pain, shortness of breath, abdominal pain, syncopal episodes, lethargy, fatigue.      History provided by:  Parent and patient   used: No                        Unity Coma Scale Score: 15                     Patient History   Past Medical History:   Diagnosis Date   • ADHD (attention deficit hyperactivity disorder) 01/28/2014   • Burkitt lymphoma (CMS/HCC) 10/27/2023   • Colles' fracture of right radius, initial encounter for closed fracture 04/23/2015    Colles' fracture of right radius   • Gynecomastia 05/23/2016   • Unspecified fracture of navicular (scaphoid) bone of left wrist, initial encounter for closed fracture 12/17/2015    Closed fracture of scaphoid bone of left wrist     Past Surgical History:   Procedure Laterality Date   • CHEST TUBE INSERTION  10/29/2023   • FOOT FRACTURE SURGERY Left    • OTHER SURGICAL HISTORY  10/03/2017    History Of Prior Surgery   • US GUIDED SOFT TISSUE BIOPSY  10/20/2023    US GUIDED SOFT TISSUE BIOPSY 10/20/2023 [facility]      Family History   Problem Relation Name Age of Onset   • ADD / ADHD Mother     • ADD / ADHD Sister     • ADD / ADHD Brother       Social History     Tobacco Use   • Smoking status: Never     Passive exposure: Never   • Smokeless tobacco: Never   Vaping Use   • Vaping Use: Not on file   Substance Use Topics   • Alcohol use: Not Currently     Comment: rarely   • Drug use: Never       Physical Exam   ED Triage Vitals   Temperature Heart Rate Respirations BP   03/19/24 0115 03/18/24 2037 03/18/24  2037 03/18/24 2037   36.6 °C (97.8 °F) 68 16 109/58      Pulse Ox Temp Source Heart Rate Source Patient Position   03/18/24 2037 03/19/24 0115 03/19/24 0004 03/19/24 0004   100 % Oral Monitor Lying      BP Location FiO2 (%)     03/19/24 0004 --     Right arm        Physical Exam  Vitals and nursing note reviewed.   Constitutional:       General: He is not in acute distress.     Appearance: He is well-developed.   HENT:      Head: Normocephalic and atraumatic.   Eyes:      Conjunctiva/sclera: Conjunctivae normal.   Cardiovascular:      Rate and Rhythm: Normal rate and regular rhythm.      Heart sounds: No murmur heard.  Pulmonary:      Effort: Pulmonary effort is normal. No respiratory distress.      Breath sounds: Normal breath sounds.   Abdominal:      Palpations: Abdomen is soft.      Tenderness: There is no abdominal tenderness.   Musculoskeletal:         General: No swelling.      Cervical back: Neck supple.   Skin:     General: Skin is warm and dry.      Capillary Refill: Capillary refill takes less than 2 seconds.      Coloration: Skin is pale.   Neurological:      Mental Status: He is alert.   Psychiatric:         Mood and Affect: Mood normal.         ED Course & MDM   ED Course as of 03/19/24 0815   Tue Mar 19, 2024   0546 Patient is a 23-year-old male with history of Burkitt lymphoma on chemotherapy presents the emergency department today due to concern for low hemoglobin.  Vital signs are stable on arrival. [MJ]   4502 On review of patient's chart, her hematologist oncologist physician made a message that patient was to come to the ER to receive 2 units of PRBCs in hopes of being his hemoglobin to above 8-8.5 and that patient's platelet count was below 50 that he should receive an additional unit of platelets.  They did not see any reason for patient to be admitted as he should be passing his esthela in the next 2 to 3 days and patient is already on Levaquin. [MJ]   0512 CBC was in the ER which showed the  patient leukopenic with white count of 0.3, anemic with hemoglobin 6.2 and thrombocytopenic with platelet count of 62.  Based on patient's hematology note, plan to transfuse patient with 2 units of PRBC.  Patient did consent to this.  [MJ]   0550 Comprehensive metabolic panel  Unremarkable [MJ]   0551 Plan to repeat CBC after patient received second unit of PRBC. [MJ]   0700 Patient signed out to oncoming resident pending repeat CBC after completing second unit of PRBC. [MJ]      ED Course User Index  [MJ] Jermaine Lara DO         Diagnoses as of 03/19/24 0815   Pancytopenia (CMS/HCC)       Medical Decision Making      Procedure  Procedures     Jermaine Lara DO  Resident  03/19/24 0809

## 2024-03-19 NOTE — TELEPHONE ENCOUNTER
Discussed with Diann Ybarra, Infusion Charge RN. The ED should be able to use Cath Dustin in the PICC line to get it working properly.    Called ED, spoke to Lexi, ED Charge RN. Lexi states that she was not aware of this situation but she will contact the provider to request an order for Cath Dustin in the ED.    Called patient's mother back. Understanding verbalized with teach back. No further needs at this time.  [facility] and Dr. Brandtiso updated.

## 2024-03-19 NOTE — TELEPHONE ENCOUNTER
Patient's mother calls from the ED. They have been there since 7:30pm yesterday, He received two units of blood and they are getting ready to go and his PICC line won't flush. She expresses frustration that the ED  doesn't seem to know what to do and were going to try to call someone to come over from [facility]. Patient's mother wants to walk him over to infusion. Advised to wait until I speak to Dr. Klein and Infusion Charge RN.  Message sent to team and Infusion Charge.

## 2024-03-20 ENCOUNTER — INFUSION (OUTPATIENT)
Dept: HEMATOLOGY/ONCOLOGY | Facility: HOSPITAL | Age: 24
End: 2024-03-20
Payer: COMMERCIAL

## 2024-03-20 ENCOUNTER — OFFICE VISIT (OUTPATIENT)
Dept: HEMATOLOGY/ONCOLOGY | Facility: HOSPITAL | Age: 24
End: 2024-03-20
Payer: COMMERCIAL

## 2024-03-20 ENCOUNTER — APPOINTMENT (OUTPATIENT)
Dept: HEMATOLOGY/ONCOLOGY | Facility: HOSPITAL | Age: 24
End: 2024-03-20
Payer: COMMERCIAL

## 2024-03-20 VITALS
DIASTOLIC BLOOD PRESSURE: 52 MMHG | HEART RATE: 61 BPM | SYSTOLIC BLOOD PRESSURE: 112 MMHG | TEMPERATURE: 98.2 F | RESPIRATION RATE: 16 BRPM | OXYGEN SATURATION: 100 %

## 2024-03-20 VITALS
DIASTOLIC BLOOD PRESSURE: 76 MMHG | SYSTOLIC BLOOD PRESSURE: 136 MMHG | WEIGHT: 163.3 LBS | HEART RATE: 67 BPM | RESPIRATION RATE: 16 BRPM | TEMPERATURE: 96.8 F | BODY MASS INDEX: 21.54 KG/M2 | OXYGEN SATURATION: 100 %

## 2024-03-20 DIAGNOSIS — C83.70 BURKITT LYMPHOMA, UNSPECIFIED BODY REGION (MULTI): Primary | ICD-10-CM

## 2024-03-20 DIAGNOSIS — C83.78 BURKITT LYMPHOMA OF LYMPH NODES OF MULTIPLE REGIONS (MULTI): ICD-10-CM

## 2024-03-20 DIAGNOSIS — C83.79 BURKITT LYMPHOMA OF SOLID ORGAN EXCLUDING SPLEEN (MULTI): ICD-10-CM

## 2024-03-20 DIAGNOSIS — C83.70 BURKITT LYMPHOMA, UNSPECIFIED BODY REGION (MULTI): ICD-10-CM

## 2024-03-20 LAB
ABO GROUP (TYPE) IN BLOOD: NORMAL
ANION GAP SERPL CALC-SCNC: 14 MMOL/L (ref 10–20)
ANTIBODY SCREEN: NORMAL
BASOPHILS # BLD MANUAL: 0.01 X10*3/UL (ref 0–0.1)
BASOPHILS NFR BLD MANUAL: 1 %
BLOOD EXPIRATION DATE: NORMAL
BLOOD EXPIRATION DATE: NORMAL
BUN SERPL-MCNC: 15 MG/DL (ref 6–23)
CALCIUM SERPL-MCNC: 9.8 MG/DL (ref 8.6–10.3)
CHLORIDE SERPL-SCNC: 104 MMOL/L (ref 98–107)
CO2 SERPL-SCNC: 30 MMOL/L (ref 21–32)
CREAT SERPL-MCNC: 0.88 MG/DL (ref 0.5–1.3)
DACRYOCYTES BLD QL SMEAR: ABNORMAL
DISPENSE STATUS: NORMAL
DISPENSE STATUS: NORMAL
DOHLE BOD BLD QL SMEAR: PRESENT
EGFRCR SERPLBLD CKD-EPI 2021: >90 ML/MIN/1.73M*2
EOSINOPHIL # BLD MANUAL: 0 X10*3/UL (ref 0–0.7)
EOSINOPHIL NFR BLD MANUAL: 0 %
ERYTHROCYTE [DISTWIDTH] IN BLOOD BY AUTOMATED COUNT: 15.6 % (ref 11.5–14.5)
GLUCOSE SERPL-MCNC: 90 MG/DL (ref 74–99)
HCT VFR BLD AUTO: 21.1 % (ref 41–52)
HGB BLD-MCNC: 7.6 G/DL (ref 13.5–17.5)
IMM GRANULOCYTES # BLD AUTO: 0.15 X10*3/UL (ref 0–0.7)
IMM GRANULOCYTES NFR BLD AUTO: 12.1 % (ref 0–0.9)
LYMPHOCYTES # BLD MANUAL: 0.31 X10*3/UL (ref 1.2–4.8)
LYMPHOCYTES NFR BLD MANUAL: 26 %
MCH RBC QN AUTO: 29.9 PG (ref 26–34)
MCHC RBC AUTO-ENTMCNC: 36 G/DL (ref 32–36)
MCV RBC AUTO: 83 FL (ref 80–100)
METAMYELOCYTES # BLD MANUAL: 0.01 X10*3/UL
METAMYELOCYTES NFR BLD MANUAL: 1 %
MONOCYTES # BLD MANUAL: 0.11 X10*3/UL (ref 0.1–1)
MONOCYTES NFR BLD MANUAL: 9 %
MYELOCYTES # BLD MANUAL: 0.02 X10*3/UL
MYELOCYTES NFR BLD MANUAL: 2 %
NEUTROPHILS # BLD MANUAL: 0.72 X10*3/UL (ref 1.2–7.7)
NEUTS BAND # BLD MANUAL: 0.14 X10*3/UL (ref 0–0.7)
NEUTS BAND NFR BLD MANUAL: 12 %
NEUTS SEG # BLD MANUAL: 0.58 X10*3/UL (ref 1.2–7)
NEUTS SEG NFR BLD MANUAL: 48 %
NRBC BLD-RTO: 0 /100 WBCS (ref 0–0)
OVALOCYTES BLD QL SMEAR: ABNORMAL
PLATELET # BLD AUTO: 45 X10*3/UL (ref 150–450)
POTASSIUM SERPL-SCNC: 3.6 MMOL/L (ref 3.5–5.3)
PRODUCT BLOOD TYPE: 5100
PRODUCT BLOOD TYPE: 5100
PRODUCT CODE: NORMAL
PRODUCT CODE: NORMAL
RBC # BLD AUTO: 2.54 X10*6/UL (ref 4.5–5.9)
RBC MORPH BLD: ABNORMAL
RH FACTOR (ANTIGEN D): NORMAL
SODIUM SERPL-SCNC: 144 MMOL/L (ref 136–145)
TOTAL CELLS COUNTED BLD: 100
UNIT ABO: NORMAL
UNIT ABO: NORMAL
UNIT NUMBER: NORMAL
UNIT NUMBER: NORMAL
UNIT RH: NORMAL
UNIT RH: NORMAL
UNIT VOLUME: 279
UNIT VOLUME: 321
VARIANT LYMPHS # BLD MANUAL: 0.01 X10*3/UL (ref 0–0.5)
VARIANT LYMPHS NFR BLD: 1 %
WBC # BLD AUTO: 1.2 X10*3/UL (ref 4.4–11.3)
XM INTEP: NORMAL

## 2024-03-20 PROCEDURE — 99215 OFFICE O/P EST HI 40 MIN: CPT | Performed by: INTERNAL MEDICINE

## 2024-03-20 PROCEDURE — 1036F TOBACCO NON-USER: CPT | Performed by: INTERNAL MEDICINE

## 2024-03-20 PROCEDURE — P9040 RBC LEUKOREDUCED IRRADIATED: HCPCS

## 2024-03-20 PROCEDURE — 36430 TRANSFUSION BLD/BLD COMPNT: CPT

## 2024-03-20 PROCEDURE — 85007 BL SMEAR W/DIFF WBC COUNT: CPT

## 2024-03-20 PROCEDURE — 85027 COMPLETE CBC AUTOMATED: CPT

## 2024-03-20 PROCEDURE — 2500000001 HC RX 250 WO HCPCS SELF ADMINISTERED DRUGS (ALT 637 FOR MEDICARE OP): Performed by: NURSE PRACTITIONER

## 2024-03-20 PROCEDURE — P9073 PLATELETS PHERESIS PATH REDU: HCPCS

## 2024-03-20 PROCEDURE — 80048 BASIC METABOLIC PNL TOTAL CA: CPT

## 2024-03-20 PROCEDURE — 86901 BLOOD TYPING SEROLOGIC RH(D): CPT

## 2024-03-20 PROCEDURE — 86920 COMPATIBILITY TEST SPIN: CPT

## 2024-03-20 RX ORDER — ACETAMINOPHEN 325 MG/1
650 TABLET ORAL ONCE
Status: CANCELLED
Start: 2024-03-20

## 2024-03-20 RX ORDER — FAMOTIDINE 10 MG/ML
20 INJECTION INTRAVENOUS ONCE AS NEEDED
Status: CANCELLED | OUTPATIENT
Start: 2024-03-20

## 2024-03-20 RX ORDER — ALBUTEROL SULFATE 0.83 MG/ML
3 SOLUTION RESPIRATORY (INHALATION) AS NEEDED
Status: CANCELLED | OUTPATIENT
Start: 2024-03-20

## 2024-03-20 RX ORDER — DIPHENHYDRAMINE HYDROCHLORIDE 50 MG/ML
50 INJECTION, SOLUTION INTRAMUSCULAR; INTRAVENOUS ONCE
Status: CANCELLED
Start: 2024-03-20

## 2024-03-20 RX ORDER — DIPHENHYDRAMINE HYDROCHLORIDE 50 MG/ML
50 INJECTION INTRAMUSCULAR; INTRAVENOUS AS NEEDED
Status: DISCONTINUED | OUTPATIENT
Start: 2024-03-20 | End: 2024-03-20 | Stop reason: HOSPADM

## 2024-03-20 RX ORDER — DIPHENHYDRAMINE HYDROCHLORIDE 50 MG/ML
50 INJECTION INTRAMUSCULAR; INTRAVENOUS AS NEEDED
Status: CANCELLED | OUTPATIENT
Start: 2024-03-20

## 2024-03-20 RX ORDER — DIPHENHYDRAMINE HCL 25 MG
25 CAPSULE ORAL ONCE
Status: COMPLETED | OUTPATIENT
Start: 2024-03-20 | End: 2024-03-20

## 2024-03-20 RX ORDER — FAMOTIDINE 10 MG/ML
20 INJECTION INTRAVENOUS ONCE AS NEEDED
Status: DISCONTINUED | OUTPATIENT
Start: 2024-03-20 | End: 2024-03-20 | Stop reason: HOSPADM

## 2024-03-20 RX ORDER — ALBUTEROL SULFATE 0.83 MG/ML
3 SOLUTION RESPIRATORY (INHALATION) AS NEEDED
Status: DISCONTINUED | OUTPATIENT
Start: 2024-03-20 | End: 2024-03-20 | Stop reason: HOSPADM

## 2024-03-20 RX ORDER — EPINEPHRINE 0.3 MG/.3ML
0.3 INJECTION SUBCUTANEOUS EVERY 5 MIN PRN
Status: DISCONTINUED | OUTPATIENT
Start: 2024-03-20 | End: 2024-03-20 | Stop reason: HOSPADM

## 2024-03-20 RX ORDER — EPINEPHRINE 0.3 MG/.3ML
0.3 INJECTION SUBCUTANEOUS EVERY 5 MIN PRN
Status: CANCELLED | OUTPATIENT
Start: 2024-03-20

## 2024-03-20 RX ORDER — DIPHENHYDRAMINE HCL 25 MG
25 CAPSULE ORAL ONCE
Status: CANCELLED
Start: 2024-03-20

## 2024-03-20 RX ORDER — ACETAMINOPHEN 325 MG/1
650 TABLET ORAL ONCE
Status: COMPLETED | OUTPATIENT
Start: 2024-03-20 | End: 2024-03-20

## 2024-03-20 RX ADMIN — DIPHENHYDRAMINE HYDROCHLORIDE 25 MG: 25 CAPSULE ORAL at 15:07

## 2024-03-20 RX ADMIN — ACETAMINOPHEN 650 MG: 325 TABLET ORAL at 15:07

## 2024-03-20 ASSESSMENT — PAIN SCALES - GENERAL: PAINLEVEL_OUTOF10: 0-NO PAIN

## 2024-03-21 LAB
BLOOD EXPIRATION DATE: NORMAL
BLOOD EXPIRATION DATE: NORMAL
DISPENSE STATUS: NORMAL
DISPENSE STATUS: NORMAL
PRODUCT BLOOD TYPE: 5100
PRODUCT BLOOD TYPE: 5100
PRODUCT CODE: NORMAL
PRODUCT CODE: NORMAL
UNIT ABO: NORMAL
UNIT ABO: NORMAL
UNIT NUMBER: NORMAL
UNIT NUMBER: NORMAL
UNIT RH: NORMAL
UNIT RH: NORMAL
UNIT VOLUME: 281
UNIT VOLUME: 350
XM INTEP: NORMAL
XM INTEP: NORMAL

## 2024-03-22 ENCOUNTER — INFUSION (OUTPATIENT)
Dept: HEMATOLOGY/ONCOLOGY | Facility: HOSPITAL | Age: 24
End: 2024-03-22
Payer: COMMERCIAL

## 2024-03-22 VITALS
RESPIRATION RATE: 16 BRPM | TEMPERATURE: 99.1 F | BODY MASS INDEX: 21.64 KG/M2 | OXYGEN SATURATION: 100 % | DIASTOLIC BLOOD PRESSURE: 66 MMHG | SYSTOLIC BLOOD PRESSURE: 113 MMHG | HEART RATE: 58 BPM | WEIGHT: 164 LBS

## 2024-03-22 DIAGNOSIS — C83.70 BURKITT LYMPHOMA, UNSPECIFIED BODY REGION (MULTI): ICD-10-CM

## 2024-03-22 DIAGNOSIS — C83.79 BURKITT LYMPHOMA OF SOLID ORGAN EXCLUDING SPLEEN (MULTI): ICD-10-CM

## 2024-03-22 LAB
ABO GROUP (TYPE) IN BLOOD: NORMAL
ALBUMIN SERPL BCP-MCNC: 4.3 G/DL (ref 3.4–5)
ALP SERPL-CCNC: 92 U/L (ref 33–120)
ALT SERPL W P-5'-P-CCNC: 17 U/L (ref 10–52)
ANION GAP SERPL CALC-SCNC: 14 MMOL/L (ref 10–20)
ANTIBODY SCREEN: NORMAL
AST SERPL W P-5'-P-CCNC: 17 U/L (ref 9–39)
BASOPHILS # BLD MANUAL: 0 X10*3/UL (ref 0–0.1)
BASOPHILS NFR BLD MANUAL: 0 %
BILIRUB SERPL-MCNC: 0.5 MG/DL (ref 0–1.2)
BLOOD EXPIRATION DATE: NORMAL
BUN SERPL-MCNC: 9 MG/DL (ref 6–23)
CALCIUM SERPL-MCNC: 9.2 MG/DL (ref 8.6–10.3)
CHLORIDE SERPL-SCNC: 106 MMOL/L (ref 98–107)
CO2 SERPL-SCNC: 27 MMOL/L (ref 21–32)
CREAT SERPL-MCNC: 1 MG/DL (ref 0.5–1.3)
DACRYOCYTES BLD QL SMEAR: ABNORMAL
DISPENSE STATUS: NORMAL
DOHLE BOD BLD QL SMEAR: PRESENT
EGFRCR SERPLBLD CKD-EPI 2021: >90 ML/MIN/1.73M*2
EOSINOPHIL # BLD MANUAL: 0 X10*3/UL (ref 0–0.7)
EOSINOPHIL NFR BLD MANUAL: 0 %
ERYTHROCYTE [DISTWIDTH] IN BLOOD BY AUTOMATED COUNT: 14.7 % (ref 11.5–14.5)
GLUCOSE SERPL-MCNC: 87 MG/DL (ref 74–99)
HCT VFR BLD AUTO: 23.7 % (ref 41–52)
HGB BLD-MCNC: 8.2 G/DL (ref 13.5–17.5)
IMM GRANULOCYTES # BLD AUTO: 0.63 X10*3/UL (ref 0–0.7)
IMM GRANULOCYTES NFR BLD AUTO: 10.3 % (ref 0–0.9)
LYMPHOCYTES # BLD MANUAL: 0.79 X10*3/UL (ref 1.2–4.8)
LYMPHOCYTES NFR BLD MANUAL: 13 %
MAGNESIUM SERPL-MCNC: 1.72 MG/DL (ref 1.6–2.4)
MCH RBC QN AUTO: 29.4 PG (ref 26–34)
MCHC RBC AUTO-ENTMCNC: 34.6 G/DL (ref 32–36)
MCV RBC AUTO: 85 FL (ref 80–100)
METAMYELOCYTES # BLD MANUAL: 0.18 X10*3/UL
METAMYELOCYTES NFR BLD MANUAL: 3 %
MONOCYTES # BLD MANUAL: 0.43 X10*3/UL (ref 0.1–1)
MONOCYTES NFR BLD MANUAL: 7 %
NEUTROPHILS # BLD MANUAL: 4.58 X10*3/UL (ref 1.2–7.7)
NEUTS BAND # BLD MANUAL: 0.37 X10*3/UL (ref 0–0.7)
NEUTS BAND NFR BLD MANUAL: 6 %
NEUTS SEG # BLD MANUAL: 4.21 X10*3/UL (ref 1.2–7)
NEUTS SEG NFR BLD MANUAL: 69 %
NRBC BLD-RTO: 0 /100 WBCS (ref 0–0)
OVALOCYTES BLD QL SMEAR: ABNORMAL
PLATELET # BLD AUTO: 36 X10*3/UL (ref 150–450)
POLYCHROMASIA BLD QL SMEAR: ABNORMAL
POTASSIUM SERPL-SCNC: 3.6 MMOL/L (ref 3.5–5.3)
PRODUCT BLOOD TYPE: 5100
PRODUCT CODE: NORMAL
PROT SERPL-MCNC: 6.3 G/DL (ref 6.4–8.2)
RBC # BLD AUTO: 2.79 X10*6/UL (ref 4.5–5.9)
RBC MORPH BLD: ABNORMAL
RH FACTOR (ANTIGEN D): NORMAL
SODIUM SERPL-SCNC: 143 MMOL/L (ref 136–145)
TOTAL CELLS COUNTED BLD: 100
UNIT ABO: NORMAL
UNIT NUMBER: NORMAL
UNIT RH: NORMAL
UNIT VOLUME: 269
VARIANT LYMPHS # BLD MANUAL: 0.12 X10*3/UL (ref 0–0.5)
VARIANT LYMPHS NFR BLD: 2 %
WBC # BLD AUTO: 6.1 X10*3/UL (ref 4.4–11.3)

## 2024-03-22 PROCEDURE — 2500000004 HC RX 250 GENERAL PHARMACY W/ HCPCS (ALT 636 FOR OP/ED): Performed by: INTERNAL MEDICINE

## 2024-03-22 PROCEDURE — 85007 BL SMEAR W/DIFF WBC COUNT: CPT

## 2024-03-22 PROCEDURE — 96374 THER/PROPH/DIAG INJ IV PUSH: CPT | Mod: INF

## 2024-03-22 PROCEDURE — 85027 COMPLETE CBC AUTOMATED: CPT

## 2024-03-22 PROCEDURE — 83735 ASSAY OF MAGNESIUM: CPT

## 2024-03-22 PROCEDURE — 86901 BLOOD TYPING SEROLOGIC RH(D): CPT

## 2024-03-22 PROCEDURE — 86920 COMPATIBILITY TEST SPIN: CPT

## 2024-03-22 PROCEDURE — P9037 PLATE PHERES LEUKOREDU IRRAD: HCPCS

## 2024-03-22 PROCEDURE — 36430 TRANSFUSION BLD/BLD COMPNT: CPT

## 2024-03-22 PROCEDURE — 80053 COMPREHEN METABOLIC PANEL: CPT

## 2024-03-22 RX ORDER — ACETAMINOPHEN 325 MG/1
650 TABLET ORAL ONCE
Status: CANCELLED
Start: 2024-03-22 | End: 2024-03-22

## 2024-03-22 RX ORDER — DIPHENHYDRAMINE HYDROCHLORIDE 50 MG/ML
50 INJECTION, SOLUTION INTRAMUSCULAR; INTRAVENOUS ONCE
Status: CANCELLED
Start: 2024-03-22 | End: 2024-03-22

## 2024-03-22 RX ORDER — DIPHENHYDRAMINE HYDROCHLORIDE 50 MG/ML
50 INJECTION INTRAMUSCULAR; INTRAVENOUS AS NEEDED
Status: CANCELLED | OUTPATIENT
Start: 2024-03-22

## 2024-03-22 RX ORDER — ONDANSETRON HYDROCHLORIDE 2 MG/ML
8 INJECTION, SOLUTION INTRAVENOUS EVERY 4 HOURS PRN
Status: CANCELLED
Start: 2024-03-25

## 2024-03-22 RX ORDER — POTASSIUM CHLORIDE 14.9 MG/ML
20 INJECTION INTRAVENOUS AS NEEDED
Status: CANCELLED
Start: 2024-03-25

## 2024-03-22 RX ORDER — DIPHENHYDRAMINE HYDROCHLORIDE 50 MG/ML
50 INJECTION INTRAMUSCULAR; INTRAVENOUS AS NEEDED
Status: CANCELLED | OUTPATIENT
Start: 2024-03-25

## 2024-03-22 RX ORDER — POTASSIUM CHLORIDE 14.9 MG/ML
20 INJECTION INTRAVENOUS AS NEEDED
Status: DISCONTINUED | OUTPATIENT
Start: 2024-03-22 | End: 2024-03-22 | Stop reason: HOSPADM

## 2024-03-22 RX ORDER — DIPHENHYDRAMINE HYDROCHLORIDE 50 MG/ML
50 INJECTION INTRAMUSCULAR; INTRAVENOUS ONCE
Status: COMPLETED | OUTPATIENT
Start: 2024-03-22 | End: 2024-03-22

## 2024-03-22 RX ORDER — ALBUTEROL SULFATE 0.83 MG/ML
3 SOLUTION RESPIRATORY (INHALATION) AS NEEDED
Status: CANCELLED | OUTPATIENT
Start: 2024-03-25

## 2024-03-22 RX ORDER — MAGNESIUM SULFATE HEPTAHYDRATE 40 MG/ML
4 INJECTION, SOLUTION INTRAVENOUS AS NEEDED
Status: CANCELLED
Start: 2024-03-25

## 2024-03-22 RX ORDER — FAMOTIDINE 10 MG/ML
20 INJECTION INTRAVENOUS ONCE AS NEEDED
Status: CANCELLED | OUTPATIENT
Start: 2024-03-25

## 2024-03-22 RX ORDER — FAMOTIDINE 10 MG/ML
20 INJECTION INTRAVENOUS ONCE AS NEEDED
Status: CANCELLED | OUTPATIENT
Start: 2024-03-22

## 2024-03-22 RX ORDER — EPINEPHRINE 0.3 MG/.3ML
0.3 INJECTION SUBCUTANEOUS EVERY 5 MIN PRN
Status: CANCELLED | OUTPATIENT
Start: 2024-03-25

## 2024-03-22 RX ORDER — ALBUTEROL SULFATE 0.83 MG/ML
3 SOLUTION RESPIRATORY (INHALATION) AS NEEDED
Status: CANCELLED | OUTPATIENT
Start: 2024-03-22

## 2024-03-22 RX ORDER — ONDANSETRON HYDROCHLORIDE 2 MG/ML
8 INJECTION, SOLUTION INTRAVENOUS EVERY 4 HOURS PRN
Status: DISCONTINUED | OUTPATIENT
Start: 2024-03-22 | End: 2024-03-22 | Stop reason: HOSPADM

## 2024-03-22 RX ORDER — ACETAMINOPHEN 325 MG/1
650 TABLET ORAL ONCE
Status: COMPLETED | OUTPATIENT
Start: 2024-03-22 | End: 2024-03-22

## 2024-03-22 RX ORDER — EPINEPHRINE 0.3 MG/.3ML
0.3 INJECTION SUBCUTANEOUS EVERY 5 MIN PRN
Status: CANCELLED | OUTPATIENT
Start: 2024-03-22

## 2024-03-22 RX ORDER — MAGNESIUM SULFATE HEPTAHYDRATE 40 MG/ML
4 INJECTION, SOLUTION INTRAVENOUS AS NEEDED
Status: DISCONTINUED | OUTPATIENT
Start: 2024-03-22 | End: 2024-03-22 | Stop reason: HOSPADM

## 2024-03-22 RX ADMIN — DIPHENHYDRAMINE HYDROCHLORIDE 50 MG: 50 INJECTION INTRAMUSCULAR; INTRAVENOUS at 14:15

## 2024-03-22 RX ADMIN — ACETAMINOPHEN 650 MG: 325 TABLET ORAL at 14:13

## 2024-03-22 ASSESSMENT — PAIN SCALES - GENERAL: PAINLEVEL: 0-NO PAIN

## 2024-03-22 NOTE — PROGRESS NOTES
Patient arrived ambulating and does not appear to be in distress for count check appointment. Blood drawn from right arm PICC that was accessed without issue. Labs resulted and shows that platelets are low and will require replacement. Platelets prepared and infused without issue. Patient discharged ambulating and in good condition. Denies any concerns at this time.     Chelsea Valencia RN

## 2024-03-23 NOTE — PROGRESS NOTES
Patient ID: Shilo Thakkar is a 23 y.o. male.  Oncologic History  Burkitt lymphoma, high risk, MYC positive  - 10/19/23 presented to OSH ED with dysphagia and abdominal pain  - CT A/P with IV contrast (10/19) mass in the body of the pancreas measuring approximately 2.7 x 2.9 cm. The pancreatic duct is dilated. Multiple masses in the mesentery as well as omental caking consistent with carcinomatosis. Moderate amount of free fluid throughout the abdomen and pelvis.   - CT soft tissue neck with IV contrast (10/19): homogeneous soft tissue attenuation lesion in the L oropharynx which measures approximately 3.0 x 4.0 x 5.1 cm.   - US guided mesenteric LN biopsy (10/20/23): HIGH GRADE B-CELL LYMPHOMA MORPHOLOGICALLY CONSISTENT WITH BURKITT LYMPHOMA; flow cytometry: No B cell population identified  - Bone marrow aspiration biopsy November 1, 2023 no evidence of lymphoma involvement.  CSF by flow cytometry showed no lymphoma 11/2/2023.  - Biopsy L oropharyngeal mass and L cervical LN by ENT (10/23/23) high grade B-cell burkitt lymphoma, MYC positive, Bcl-2 negative.  - MR brain with and w/o IV contrast (10/27): No evidence of intracranial metastatic disease.     - Dexa 40 mg daily ended 11/1  - PET CT ordered 10/30 showed FDG avid left oropharyngeal mass and LAD above and below diaphragm  - Started HyperCVAD 10/29 cycle 1 day 1  - Part A:   D1: CTX + mesna, dexamethasone, D2: CTX + mesna, D3: CTX + mesna, D4: Doxo, vinca, D6: Ritux, GCSF, D9: IT,   D2:  intrathecal methotrexate November 2, 2023, 12 mg, CSF shows no B cells by flow cytometry.  D6: Intrathecal cytarabine 100 mg on November 6, 2023.  D11: vinca, dexamethasone, D13: Ritux 11/10  D13: Rituximab therapy  - Daily neupogen since 11/3/23  -Discharged home November 11, 2023  -Evaluation November 15, 2023: Recovered thrombocytopenia, stop levofloxacin, continue prophylactic acyclovir and 3 times weekly Bactrim and fluconazole.  Depressed affect noted.  -Cycle 2-day 1  part to be high-dose methotrexate plus high-dose cytarabine, November 20, 2023,  with 2 intrathecal chemotherapy administered  -Neulasta on 11/27/2023.  -Evaluation 12/6/2023 asymptomatic gaining weight doing well.  To schedule twice weekly CBC BMP Mondays and Thursdays after discharge, emphasized prophylactic levofloxacin between day 5 and day 15, acyclovir, fluconazole and 3 times a week Bactrim.  -CT scan of the chest abdomen or pelvis on December 6, 2023 shows complete resolution of intra-abdominal disease, with mildly worse splenomegaly most likely unrelated to his lymphoma.  -Cycle 3 hyper-CVAD regimen December 12, 2023, including Rituxan day 1 and day 11, intrathecal methotrexate 12/14/2023, intrathecal cytarabine December 18, 2023.  -December 27, 2023: Feeling well asymptomatic tolerated cycle 3 well, plan for cycle 4 January 2, 2024 consisting of high-dose methotrexate high-dose cytarabine, and includes 2 doses of rituximab day 1 day 8, and 2 doses of intrathecal chemotherapy, intrathecal cytarabine day 1 and intrathecal methotrexate day 8  -Hospitalized  1/17-1/23/2024 with visual changes, retinoschisis, profound pancytopenia s/p left eye vitrectomy, retinal hemorrgages. Delay cycle 5. Discuss with ophth regarding etiology and plans.  -Evaluation January 31, 2024: Vision appears to be improving.  Discussed with ophthalmology.  They will be seeing February 2 and will decide whether they will clear him for cycle 5 of chemotherapy which would be tentatively planned to start February 9, 2024 as an inpatient at modified dosing.  Repeat echocardiogram, continue prophylactic antibiotics.  Will need 3 times weekly labs for close monitoring and will keep platelets above 50,000, keep hemoglobin above 8.5-9.  Will need closer supervision as he was noncompliant with twice weekly labs before.  -Evaluation 2/7/2024: Continues to improve, vision clearly better.  Appears to have been cleared by ophthalmology, to proceed  with cycle 5 starting 2/9/2024 with 80% cyclophosphamide.  Needs echocardiogram repeated to verify a normal ejection fraction.  Upon discharge he will need 3 times a week count check to assure compliance, as he had been noncompliant with twice weekly labs previously.  Need to keep platelets above 30-50,000, need to keep hemoglobin above 8.5 at [place].  Final cycle of chemotherapy will be also dose reduced with cytarabine down to 1 g/m².  Needs a chest x-ray on Friday before starting  -Evaluation 2/20/2024: Doing well.  Eyes appear to be improving tolerated cycle 5 without difficulty.  We omitted intrathecal on cycle 5.  Plan to admit on 3/5/2024 for cycle 6, planned dose reduction of high-dose cytarabine to 1.5 g/m², keeping methotrexate the same.  We will incorporate intrathecal cytarabine day 1, patient declining a second intrathecal.  We are in the process of trying to convince the patient to complete 8 cycles to adhere to the published data on hyper CVAD and Burkitt's lymphoma.  -Evaluation 3/20/2024 C6D15 recovering counts.  Keeping hemoglobin above 8 and platelets above 50 due to the retinal hemorrhages.  Overall doing well.  After discussion with patient and mom, we decided to proceed with 8 cycles, with 1 intrathecal with a each cycle.  Next cycle will be on 3/28/2024, should include rituximab day 1 and day 11, and intrathecal methotrexate 12 mg on day 2 [+/-1 day) and day 11 vincristine and dexamethasone pulse.  Cycle 8 is planned with dose reduction of cytarabine to 1 g/m² per dose and methotrexate to to 800 mg/m² total [20% as short infusion and 80% as 24-hour infusion].    Therapy:  Hyper-CVAD C1D1 10/29/2023  Hyper-CVAD C2D1 11/20/2023  Hyper-CVAD C3D1 12/12/2023  Hyper-CVAD C4D1 01/02/2024  Hyper-CVAD C5D1 02/09/2024  Hyper-CVAD C6D1 03/06/2024  Subjective    SYMONE  Shilo is doing well.  He is recovering.  He has been in and over the past 2 days for transfusions of blood and platelets to keep him above  the parameters to protect his eye from further bleeding which has not happened since that single episode.  He is eating well maintaining weight.  No dyspnea cough abdominal pain nausea vomiting paresthesias or fever.  Overall doing very well.      Review of Systems    Head and neck: Other than HPI negative  CNS: Other than HPI negative  Cardiovascular: Other than HPI negative  Pulmonary: Other than HPI negative  GI: Other than HPI negative  : Other than HPI negative  Bleeding: Other than HPI negative  Constitutional: Other than HPI negative     Current Outpatient Medications on File Prior to Visit   Medication Sig Dispense Refill    acyclovir (Zovirax) 400 mg tablet Take 1 tablet (400 mg) by mouth 2 times a day. 180 tablet 0    B complex-vitamin C tablet Take 1 tablet by mouth once daily. 30 tablet 11    dorzolamide-timoloL (Cosopt) 22.3-6.8 mg/mL ophthalmic solution Administer 1 drop into the right eye 2 times a day. 10 mL 2    ferrous sulfate, 325 mg ferrous sulfate, tablet Take 1 tablet by mouth 2 times a day. 60 tablet 1    ketorolac (Acular) 0.5 % ophthalmic solution Use one drop 4 times a day in the right eye (Patient taking differently: Administer into both eyes. Use one drop 4 times a day in the right eye) 5 mL 3    levoFLOXacin (Levaquin) 500 mg tablet Take 1 tablet (500 mg) by mouth once daily for 14 days. 14 tablet 0    pantoprazole (ProtoNix) 40 mg EC tablet Take 1 tablet (40 mg) by mouth once daily in the morning. Take before meals. Do not crush, chew, or split. 30 tablet 0    sennosides-docusate sodium (Ashley-Colace) 8.6-50 mg tablet Take 2 tablets by mouth 2 times a day as needed for constipation. 60 tablet 0    sulfamethoxazole-trimethoprim (Bactrim DS) 800-160 mg tablet Take 1 tablet by mouth once a day on Monday, Wednesday, and Friday. 12 tablet 0     Current Facility-Administered Medications on File Prior to Visit   Medication Dose Route Frequency Provider Last Rate Last Admin    ondansetron  (Zofran) tablet 4 mg  4 mg oral q8h PRN Luma Trent MD          Objective    BSA: 1.95 meters squared  /76 (BP Location: Left arm, Patient Position: Sitting, BP Cuff Size: Adult)   Pulse 67   Temp 36 °C (96.8 °F) (Skin)   Resp 16   Wt 74.1 kg (163 lb 4.8 oz)   SpO2 100%   BMI 21.54 kg/m²      Physical Exam  Alert oriented not in distress not pale or jaundiced.  He is demonstrating better eye contact.  Lymph nodes: No adenopathy  Oral mucosa negative, no mucositis  Head is normocephalic atraumatic, pupils equal reactive  Neck is supple no adenopathy, no thyromegaly  Lungs show equal air entry, no crackles or wheezing, equal percussion  Heart shows regular rate and rhythm normal S1-S2 no murmurs or gallop rhythm  Abdomen is soft nontender, moves with respiration, no hepatosplenomegaly or masses, positive bowel sounds, not distended.  Lower extremities show no edema  Neurologically grossly nonfocal  Psych: Normal affect      Performance Status:  Symptomatic; fully ambulatory   Latest Reference Range & Units 03/18/24 14:47 03/18/24 22:12 03/19/24 10:03 03/20/24 14:22   WBC 4.4 - 11.3 x10*3/uL 0.2 (LL) 0.3 (LL) 0.3 (LL) 1.2 (L)   nRBC 0.0 - 0.0 /100 WBCs 0.0  0.0 0.0   RBC 4.50 - 5.90 x10*6/uL 2.07 (L) 1.98 (L) 2.46 (L) 2.54 (L)   HEMOGLOBIN 13.5 - 17.5 g/dL 6.5 (LL) 6.2 (LL) 7.0 (L) 7.6 (L)   HEMATOCRIT 41.0 - 52.0 % 18.2 (L) 18.7 (L) 19.8 (L) 21.1 (L)   MCV 80 - 100 fL 88 94 81 83   MCH 26.0 - 34.0 pg 31.4 31.3 28.5 29.9   MCHC 32.0 - 36.0 g/dL 35.7 33.2 35.4 36.0   RED CELL DISTRIBUTION WIDTH 11.5 - 14.5 % 14.7 (H) 15.0 (H) 15.9 (H) 15.6 (H)   Platelets 150 - 450 x10*3/uL 9 (LL) 62 (L) 46 (L) 45 (L)   (LL): Data is critically low  (L): Data is abnormally low  (H): Data is abnormally high   Latest Reference Range & Units 03/13/24 14:09 03/15/24 13:17 03/18/24 14:47 03/18/24 22:12 03/20/24 14:22   GLUCOSE 74 - 99 mg/dL 86 88 89 90 90   SODIUM 136 - 145 mmol/L 140 137 140 144 144   POTASSIUM 3.5 - 5.3 mmol/L  3.7 4.0 3.9 4.0 3.6   CHLORIDE 98 - 107 mmol/L 103 102 102 105 104   Bicarbonate 21 - 32 mmol/L 27 26 28 31 30   Anion Gap 10 - 20 mmol/L 14 13 14 12 14   Blood Urea Nitrogen 6 - 23 mg/dL 20 17 18 19 15   Creatinine 0.50 - 1.30 mg/dL 0.86 0.86 0.86 1.08 0.88   EGFR >60 mL/min/1.73m*2 >90 >90 >90 >90 >90   Calcium 8.6 - 10.3 mg/dL 9.7 9.8 9.5 9.8 9.8   Albumin 3.4 - 5.0 g/dL    4.7    Alkaline Phosphatase 33 - 120 U/L    86    ALT 10 - 52 U/L    19    AST 9 - 39 U/L    12    Bilirubin Total 0.0 - 1.2 mg/dL    0.9      Assessment/Plan     High risk Burkitt's lymphoma  Completed 6 cycles of hyper-CVAD in complete remission  After discussion with patient and mom they decided to complete 8 cycles, modified dosing to mitigate risk for bleeding  Cycle 7 planned for March 28, 2024.  His ANC needs to be above 1500 and his platelets need to be above 100,000.  Continues prophylactic antibiotics    Comorbidities  Retinal hemorrhages, in the context of profound thrombocytopenia and anemia, improving.  Keeping platelets above 50,000 and hemoglobin above 8 during chemotherapy.    Cancer Staging   No matching staging information was found for the patient.    Oncology History   Burkitt lymphoma (CMS/HCC)   10/27/2023 Initial Diagnosis    Burkitt lymphoma, high risk, MYC positive  - 10/19/23 presented to OSH ED with dysphagia and abdominal pain  - CT A/P with IV contrast (10/19) mass in the body of the pancreas measuring approximately 2.7 x 2.9 cm. The pancreatic duct is dilated. Multiple masses in the mesentery as well as omental caking consistent with carcinomatosis. Moderate amount of free fluid throughout the abdomen and pelvis.   - CT soft tissue neck with IV contrast (10/19): homogeneous soft tissue attenuation lesion in the L oropharynx which measures approximately 3.0 x 4.0 x 5.1 cm.   - US guided mesenteric LN biopsy (10/20/23): HIGH GRADE B-CELL LYMPHOMA MORPHOLOGICALLY CONSISTENT WITH BURKITT LYMPHOMA; flow cytometry: No B  cell population identified  - Bone marrow aspiration biopsy November 1, 2023 no evidence of lymphoma involvement.  CSF by flow cytometry showed no lymphoma 11/2/2023.  - Biopsy L oropharyngeal mass and L cervical LN by ENT (10/23/23) high grade B-cell burkitt lymphoma, MYC positive, Bcl-2 negative.  - MR brain with and w/o IV contrast (10/27): No evidence of intracranial metastatic disease.     - Dexa 40 mg daily ended 11/1  - PET CT ordered 10/30 showed FDG avid left oropharyngeal mass and LAD above and below diaphragm     10/29/2023 -  Chemotherapy    HyperCVAD + RiTUXimab, 21 Day Cycles     12/6/2023 Imaging    -CT scan of the chest abdomen or pelvis on December 6, 2023 shows complete resolution of intra-abdominal disease, with mildly worse splenomegaly most likely unrelated to his lymphoma.           Diagnoses and all orders for this visit:  Burkitt lymphoma, unspecified body region (CMS/HCC)  -     aPTT; Future  -     Fibrinogen; Future  -     Protime-INR; Future  -     Infusion Appointment Request [facility] (cnt check); Future  -     Infusion Appointment Request [facility] (cnt check); Future  -     Infusion Appointment Request [facility] (cnt check); Future  -     Infusion Appointment Request [facility] (cnt check); Future  -     Infusion Appointment Request [facility] (count check); Future  -     Clinic Appointment Request [facility]; Future  -     Clinic Appointment Request [facility]; Future  -     Oncology Line Draw Appointment Request; Future  -     Sars-CoV-2 PCR; Future  Other orders  -     Planned Future Admission >24 Hours           Marbin Monique MD

## 2024-03-25 ENCOUNTER — INFUSION (OUTPATIENT)
Dept: HEMATOLOGY/ONCOLOGY | Facility: HOSPITAL | Age: 24
End: 2024-03-25
Payer: COMMERCIAL

## 2024-03-25 VITALS
OXYGEN SATURATION: 100 % | RESPIRATION RATE: 16 BRPM | WEIGHT: 164.9 LBS | DIASTOLIC BLOOD PRESSURE: 63 MMHG | SYSTOLIC BLOOD PRESSURE: 120 MMHG | HEART RATE: 55 BPM | TEMPERATURE: 98.4 F | BODY MASS INDEX: 21.76 KG/M2

## 2024-03-25 DIAGNOSIS — C83.78 BURKITT LYMPHOMA OF LYMPH NODES OF MULTIPLE REGIONS (MULTI): ICD-10-CM

## 2024-03-25 DIAGNOSIS — C83.79 BURKITT LYMPHOMA OF SOLID ORGAN EXCLUDING SPLEEN (MULTI): ICD-10-CM

## 2024-03-25 DIAGNOSIS — C83.70 BURKITT LYMPHOMA, UNSPECIFIED BODY REGION (MULTI): ICD-10-CM

## 2024-03-25 LAB
ABO GROUP (TYPE) IN BLOOD: NORMAL
ALBUMIN SERPL BCP-MCNC: 4.5 G/DL (ref 3.4–5)
ALP SERPL-CCNC: 90 U/L (ref 33–120)
ALT SERPL W P-5'-P-CCNC: 18 U/L (ref 10–52)
ANION GAP SERPL CALC-SCNC: 15 MMOL/L (ref 10–20)
ANTIBODY SCREEN: NORMAL
AST SERPL W P-5'-P-CCNC: 16 U/L (ref 9–39)
BASOPHILS # BLD MANUAL: 0 X10*3/UL (ref 0–0.1)
BASOPHILS NFR BLD MANUAL: 0 %
BILIRUB SERPL-MCNC: 0.3 MG/DL (ref 0–1.2)
BLOOD EXPIRATION DATE: NORMAL
BLOOD EXPIRATION DATE: NORMAL
BUN SERPL-MCNC: 13 MG/DL (ref 6–23)
CALCIUM SERPL-MCNC: 9.1 MG/DL (ref 8.6–10.3)
CHLORIDE SERPL-SCNC: 105 MMOL/L (ref 98–107)
CO2 SERPL-SCNC: 27 MMOL/L (ref 21–32)
CREAT SERPL-MCNC: 0.98 MG/DL (ref 0.5–1.3)
DACRYOCYTES BLD QL SMEAR: ABNORMAL
DISPENSE STATUS: NORMAL
DISPENSE STATUS: NORMAL
DOHLE BOD BLD QL SMEAR: PRESENT
EGFRCR SERPLBLD CKD-EPI 2021: >90 ML/MIN/1.73M*2
EOSINOPHIL # BLD MANUAL: 0 X10*3/UL (ref 0–0.7)
EOSINOPHIL NFR BLD MANUAL: 0 %
ERYTHROCYTE [DISTWIDTH] IN BLOOD BY AUTOMATED COUNT: 14.6 % (ref 11.5–14.5)
GLUCOSE SERPL-MCNC: 142 MG/DL (ref 74–99)
HCT VFR BLD AUTO: 22.1 % (ref 41–52)
HGB BLD-MCNC: 7.6 G/DL (ref 13.5–17.5)
IMM GRANULOCYTES # BLD AUTO: 0.31 X10*3/UL (ref 0–0.7)
IMM GRANULOCYTES NFR BLD AUTO: 6.4 % (ref 0–0.9)
LYMPHOCYTES # BLD MANUAL: 0.34 X10*3/UL (ref 1.2–4.8)
LYMPHOCYTES NFR BLD MANUAL: 7 %
MAGNESIUM SERPL-MCNC: 1.67 MG/DL (ref 1.6–2.4)
MCH RBC QN AUTO: 29.3 PG (ref 26–34)
MCHC RBC AUTO-ENTMCNC: 34.4 G/DL (ref 32–36)
MCV RBC AUTO: 85 FL (ref 80–100)
MONOCYTES # BLD MANUAL: 0.82 X10*3/UL (ref 0.1–1)
MONOCYTES NFR BLD MANUAL: 17 %
NEUTROPHILS # BLD MANUAL: 3.65 X10*3/UL (ref 1.2–7.7)
NEUTS BAND # BLD MANUAL: 0.43 X10*3/UL (ref 0–0.7)
NEUTS BAND NFR BLD MANUAL: 9 %
NEUTS SEG # BLD MANUAL: 3.22 X10*3/UL (ref 1.2–7)
NEUTS SEG NFR BLD MANUAL: 67 %
NRBC BLD-RTO: 0 /100 WBCS (ref 0–0)
OVALOCYTES BLD QL SMEAR: ABNORMAL
PLATELET # BLD AUTO: 46 X10*3/UL (ref 150–450)
POLYCHROMASIA BLD QL SMEAR: ABNORMAL
POTASSIUM SERPL-SCNC: 3.5 MMOL/L (ref 3.5–5.3)
PRODUCT BLOOD TYPE: 5100
PRODUCT BLOOD TYPE: 6200
PRODUCT CODE: NORMAL
PRODUCT CODE: NORMAL
PROT SERPL-MCNC: 6.4 G/DL (ref 6.4–8.2)
RBC # BLD AUTO: 2.59 X10*6/UL (ref 4.5–5.9)
RBC MORPH BLD: ABNORMAL
RH FACTOR (ANTIGEN D): NORMAL
SODIUM SERPL-SCNC: 143 MMOL/L (ref 136–145)
TOTAL CELLS COUNTED BLD: 100
UNIT ABO: NORMAL
UNIT ABO: NORMAL
UNIT NUMBER: NORMAL
UNIT NUMBER: NORMAL
UNIT RH: NORMAL
UNIT RH: NORMAL
UNIT VOLUME: 260
UNIT VOLUME: 283
WBC # BLD AUTO: 4.8 X10*3/UL (ref 4.4–11.3)
XM INTEP: NORMAL

## 2024-03-25 PROCEDURE — P9073 PLATELETS PHERESIS PATH REDU: HCPCS

## 2024-03-25 PROCEDURE — 36430 TRANSFUSION BLD/BLD COMPNT: CPT

## 2024-03-25 PROCEDURE — 2500000004 HC RX 250 GENERAL PHARMACY W/ HCPCS (ALT 636 FOR OP/ED): Performed by: INTERNAL MEDICINE

## 2024-03-25 PROCEDURE — 85007 BL SMEAR W/DIFF WBC COUNT: CPT

## 2024-03-25 PROCEDURE — P9040 RBC LEUKOREDUCED IRRADIATED: HCPCS

## 2024-03-25 PROCEDURE — 86901 BLOOD TYPING SEROLOGIC RH(D): CPT

## 2024-03-25 PROCEDURE — 83735 ASSAY OF MAGNESIUM: CPT

## 2024-03-25 PROCEDURE — 85027 COMPLETE CBC AUTOMATED: CPT

## 2024-03-25 PROCEDURE — 80053 COMPREHEN METABOLIC PANEL: CPT

## 2024-03-25 PROCEDURE — 96365 THER/PROPH/DIAG IV INF INIT: CPT | Mod: INF

## 2024-03-25 PROCEDURE — 96375 TX/PRO/DX INJ NEW DRUG ADDON: CPT | Mod: INF

## 2024-03-25 RX ORDER — DIPHENHYDRAMINE HYDROCHLORIDE 50 MG/ML
50 INJECTION INTRAMUSCULAR; INTRAVENOUS ONCE
Status: COMPLETED | OUTPATIENT
Start: 2024-03-25 | End: 2024-03-25

## 2024-03-25 RX ORDER — FAMOTIDINE 10 MG/ML
20 INJECTION INTRAVENOUS ONCE AS NEEDED
Status: CANCELLED | OUTPATIENT
Start: 2024-03-25

## 2024-03-25 RX ORDER — DIPHENHYDRAMINE HYDROCHLORIDE 50 MG/ML
50 INJECTION INTRAMUSCULAR; INTRAVENOUS AS NEEDED
Status: CANCELLED | OUTPATIENT
Start: 2024-03-25

## 2024-03-25 RX ORDER — EPINEPHRINE 0.3 MG/.3ML
0.3 INJECTION SUBCUTANEOUS EVERY 5 MIN PRN
Status: CANCELLED | OUTPATIENT
Start: 2024-03-25

## 2024-03-25 RX ORDER — HEPARIN SODIUM,PORCINE/PF 10 UNIT/ML
50 SYRINGE (ML) INTRAVENOUS AS NEEDED
Status: CANCELLED | OUTPATIENT
Start: 2024-03-25

## 2024-03-25 RX ORDER — FAMOTIDINE 10 MG/ML
20 INJECTION INTRAVENOUS ONCE AS NEEDED
Status: CANCELLED | OUTPATIENT
Start: 2024-03-27

## 2024-03-25 RX ORDER — EPINEPHRINE 0.3 MG/.3ML
0.3 INJECTION SUBCUTANEOUS EVERY 5 MIN PRN
Status: CANCELLED | OUTPATIENT
Start: 2024-03-27

## 2024-03-25 RX ORDER — MAGNESIUM SULFATE HEPTAHYDRATE 40 MG/ML
4 INJECTION, SOLUTION INTRAVENOUS AS NEEDED
Status: CANCELLED
Start: 2024-03-27

## 2024-03-25 RX ORDER — POTASSIUM CHLORIDE 14.9 MG/ML
20 INJECTION INTRAVENOUS AS NEEDED
Status: CANCELLED
Start: 2024-03-27

## 2024-03-25 RX ORDER — ALBUTEROL SULFATE 0.83 MG/ML
3 SOLUTION RESPIRATORY (INHALATION) AS NEEDED
Status: CANCELLED | OUTPATIENT
Start: 2024-03-27

## 2024-03-25 RX ORDER — ONDANSETRON HYDROCHLORIDE 2 MG/ML
8 INJECTION, SOLUTION INTRAVENOUS EVERY 4 HOURS PRN
Status: DISCONTINUED | OUTPATIENT
Start: 2024-03-25 | End: 2024-03-25 | Stop reason: HOSPADM

## 2024-03-25 RX ORDER — ALBUTEROL SULFATE 0.83 MG/ML
3 SOLUTION RESPIRATORY (INHALATION) AS NEEDED
Status: CANCELLED | OUTPATIENT
Start: 2024-03-25

## 2024-03-25 RX ORDER — POTASSIUM CHLORIDE 14.9 MG/ML
20 INJECTION INTRAVENOUS AS NEEDED
Status: DISCONTINUED | OUTPATIENT
Start: 2024-03-25 | End: 2024-03-25 | Stop reason: HOSPADM

## 2024-03-25 RX ORDER — ACETAMINOPHEN 325 MG/1
650 TABLET ORAL ONCE
Status: COMPLETED | OUTPATIENT
Start: 2024-03-25 | End: 2024-03-25

## 2024-03-25 RX ORDER — ONDANSETRON HYDROCHLORIDE 2 MG/ML
8 INJECTION, SOLUTION INTRAVENOUS EVERY 4 HOURS PRN
Status: CANCELLED
Start: 2024-03-27

## 2024-03-25 RX ORDER — DIPHENHYDRAMINE HYDROCHLORIDE 50 MG/ML
50 INJECTION INTRAMUSCULAR; INTRAVENOUS AS NEEDED
Status: CANCELLED | OUTPATIENT
Start: 2024-03-27

## 2024-03-25 RX ORDER — DIPHENHYDRAMINE HYDROCHLORIDE 50 MG/ML
50 INJECTION, SOLUTION INTRAMUSCULAR; INTRAVENOUS ONCE
Status: CANCELLED
Start: 2024-03-25 | End: 2024-03-25

## 2024-03-25 RX ORDER — ACETAMINOPHEN 325 MG/1
650 TABLET ORAL ONCE
Status: CANCELLED
Start: 2024-03-25 | End: 2024-03-25

## 2024-03-25 RX ORDER — HEPARIN SODIUM,PORCINE/PF 10 UNIT/ML
50 SYRINGE (ML) INTRAVENOUS AS NEEDED
Status: DISCONTINUED | OUTPATIENT
Start: 2024-03-25 | End: 2024-03-25 | Stop reason: HOSPADM

## 2024-03-25 RX ADMIN — DIPHENHYDRAMINE HYDROCHLORIDE 50 MG: 50 INJECTION INTRAMUSCULAR; INTRAVENOUS at 11:59

## 2024-03-25 RX ADMIN — POTASSIUM CHLORIDE 20 MEQ: 14.9 INJECTION, SOLUTION INTRAVENOUS at 12:52

## 2024-03-25 RX ADMIN — ACETAMINOPHEN 650 MG: 325 TABLET ORAL at 11:58

## 2024-03-25 ASSESSMENT — PAIN SCALES - GENERAL: PAINLEVEL: 0-NO PAIN

## 2024-03-25 NOTE — PROGRESS NOTES
Pt presents to infusion appointment with no new or worsening complaints, reports feeling well.     Transfused 1 unit platelets and 1 unit PRBCs for platelet count 46 and Hgb 7.6.   20 mEq potassium chloride given for potassium of 3.5 per orders.     Discharged in stable condition at 1410.

## 2024-03-27 ENCOUNTER — APPOINTMENT (OUTPATIENT)
Dept: HEMATOLOGY/ONCOLOGY | Facility: HOSPITAL | Age: 24
End: 2024-03-27
Payer: COMMERCIAL

## 2024-03-27 ENCOUNTER — OFFICE VISIT (OUTPATIENT)
Dept: HEMATOLOGY/ONCOLOGY | Facility: HOSPITAL | Age: 24
End: 2024-03-27
Payer: COMMERCIAL

## 2024-03-27 ENCOUNTER — INFUSION (OUTPATIENT)
Dept: HEMATOLOGY/ONCOLOGY | Facility: HOSPITAL | Age: 24
End: 2024-03-27
Payer: COMMERCIAL

## 2024-03-27 VITALS
OXYGEN SATURATION: 100 % | HEART RATE: 63 BPM | SYSTOLIC BLOOD PRESSURE: 126 MMHG | RESPIRATION RATE: 16 BRPM | BODY MASS INDEX: 21.94 KG/M2 | DIASTOLIC BLOOD PRESSURE: 74 MMHG | WEIGHT: 166.3 LBS | TEMPERATURE: 97.2 F

## 2024-03-27 DIAGNOSIS — C83.78 BURKITT LYMPHOMA OF LYMPH NODES OF MULTIPLE REGIONS (MULTI): ICD-10-CM

## 2024-03-27 DIAGNOSIS — C83.79 BURKITT LYMPHOMA OF SOLID ORGAN EXCLUDING SPLEEN (MULTI): ICD-10-CM

## 2024-03-27 DIAGNOSIS — C83.70 BURKITT LYMPHOMA, UNSPECIFIED BODY REGION (MULTI): ICD-10-CM

## 2024-03-27 DIAGNOSIS — C83.70 BURKITT LYMPHOMA, UNSPECIFIED BODY REGION (MULTI): Primary | ICD-10-CM

## 2024-03-27 LAB
ANION GAP SERPL CALC-SCNC: 14 MMOL/L (ref 10–20)
BASOPHILS # BLD MANUAL: 0 X10*3/UL (ref 0–0.1)
BASOPHILS NFR BLD MANUAL: 0 %
BUN SERPL-MCNC: 10 MG/DL (ref 6–23)
CALCIUM SERPL-MCNC: 9.3 MG/DL (ref 8.6–10.3)
CHLORIDE SERPL-SCNC: 105 MMOL/L (ref 98–107)
CO2 SERPL-SCNC: 27 MMOL/L (ref 21–32)
CREAT SERPL-MCNC: 1.03 MG/DL (ref 0.5–1.3)
DACRYOCYTES BLD QL SMEAR: ABNORMAL
EGFRCR SERPLBLD CKD-EPI 2021: >90 ML/MIN/1.73M*2
EOSINOPHIL # BLD MANUAL: 0 X10*3/UL (ref 0–0.7)
EOSINOPHIL NFR BLD MANUAL: 0 %
ERYTHROCYTE [DISTWIDTH] IN BLOOD BY AUTOMATED COUNT: 14.6 % (ref 11.5–14.5)
GLUCOSE SERPL-MCNC: 87 MG/DL (ref 74–99)
HCT VFR BLD AUTO: 25.8 % (ref 41–52)
HGB BLD-MCNC: 9 G/DL (ref 13.5–17.5)
IMM GRANULOCYTES # BLD AUTO: 0.31 X10*3/UL (ref 0–0.7)
IMM GRANULOCYTES NFR BLD AUTO: 5.1 % (ref 0–0.9)
LYMPHOCYTES # BLD MANUAL: 0.55 X10*3/UL (ref 1.2–4.8)
LYMPHOCYTES NFR BLD MANUAL: 9 %
MCH RBC QN AUTO: 30 PG (ref 26–34)
MCHC RBC AUTO-ENTMCNC: 34.9 G/DL (ref 32–36)
MCV RBC AUTO: 86 FL (ref 80–100)
MONOCYTES # BLD MANUAL: 0.85 X10*3/UL (ref 0.1–1)
MONOCYTES NFR BLD MANUAL: 14 %
MYELOCYTES # BLD MANUAL: 0.06 X10*3/UL
MYELOCYTES NFR BLD MANUAL: 1 %
NEUTROPHILS # BLD MANUAL: 4.64 X10*3/UL (ref 1.2–7.7)
NEUTS BAND # BLD MANUAL: 0.06 X10*3/UL (ref 0–0.7)
NEUTS BAND NFR BLD MANUAL: 1 %
NEUTS SEG # BLD MANUAL: 4.58 X10*3/UL (ref 1.2–7)
NEUTS SEG NFR BLD MANUAL: 75 %
NRBC BLD-RTO: 0.7 /100 WBCS (ref 0–0)
OVALOCYTES BLD QL SMEAR: ABNORMAL
PLATELET # BLD AUTO: 77 X10*3/UL (ref 150–450)
POLYCHROMASIA BLD QL SMEAR: ABNORMAL
POTASSIUM SERPL-SCNC: 3.8 MMOL/L (ref 3.5–5.3)
RBC # BLD AUTO: 3 X10*6/UL (ref 4.5–5.9)
RBC MORPH BLD: ABNORMAL
SODIUM SERPL-SCNC: 142 MMOL/L (ref 136–145)
TOTAL CELLS COUNTED BLD: 100
WBC # BLD AUTO: 6.1 X10*3/UL (ref 4.4–11.3)

## 2024-03-27 PROCEDURE — 99215 OFFICE O/P EST HI 40 MIN: CPT | Performed by: INTERNAL MEDICINE

## 2024-03-27 PROCEDURE — 80048 BASIC METABOLIC PNL TOTAL CA: CPT

## 2024-03-27 PROCEDURE — 85007 BL SMEAR W/DIFF WBC COUNT: CPT

## 2024-03-27 PROCEDURE — 36591 DRAW BLOOD OFF VENOUS DEVICE: CPT

## 2024-03-27 PROCEDURE — 85027 COMPLETE CBC AUTOMATED: CPT

## 2024-03-27 PROCEDURE — 87635 SARS-COV-2 COVID-19 AMP PRB: CPT

## 2024-03-27 ASSESSMENT — PAIN SCALES - GENERAL: PAINLEVEL: 0-NO PAIN

## 2024-03-27 NOTE — PROGRESS NOTES
Pt present to infusion at 1333 for lab draw.  Labs drawn from double lumen PICC line.  Both lumens flushed with positive blood return.  After clinic appointment, no infusions needed per Dr. Klein.  Pt returned to infusion for PICC dressing changed and dressing changed per protocol.  Pt discharged to home in stable condition.

## 2024-03-28 LAB — SARS-COV-2 RNA RESP QL NAA+PROBE: NOT DETECTED

## 2024-03-29 ENCOUNTER — OFFICE VISIT (OUTPATIENT)
Dept: HEMATOLOGY/ONCOLOGY | Facility: HOSPITAL | Age: 24
DRG: 847 | End: 2024-03-29
Payer: COMMERCIAL

## 2024-03-29 VITALS
HEART RATE: 63 BPM | SYSTOLIC BLOOD PRESSURE: 141 MMHG | OXYGEN SATURATION: 100 % | DIASTOLIC BLOOD PRESSURE: 66 MMHG | WEIGHT: 164.9 LBS | RESPIRATION RATE: 18 BRPM | BODY MASS INDEX: 21.76 KG/M2 | TEMPERATURE: 97.9 F

## 2024-03-29 DIAGNOSIS — C83.79 BURKITT LYMPHOMA OF SOLID ORGAN EXCLUDING SPLEEN (MULTI): Primary | ICD-10-CM

## 2024-03-29 DIAGNOSIS — C83.70 BURKITT LYMPHOMA, UNSPECIFIED BODY REGION (MULTI): ICD-10-CM

## 2024-03-29 DIAGNOSIS — C83.79 BURKITT LYMPHOMA OF SOLID ORGAN EXCLUDING SPLEEN (MULTI): ICD-10-CM

## 2024-03-29 DIAGNOSIS — C83.78 BURKITT LYMPHOMA OF LYMPH NODES OF MULTIPLE REGIONS (MULTI): ICD-10-CM

## 2024-03-29 LAB
ALBUMIN SERPL BCP-MCNC: 4.6 G/DL (ref 3.4–5)
ALP SERPL-CCNC: 74 U/L (ref 33–120)
ALT SERPL W P-5'-P-CCNC: 20 U/L (ref 10–52)
ANION GAP SERPL CALC-SCNC: 14 MMOL/L (ref 10–20)
AST SERPL W P-5'-P-CCNC: 17 U/L (ref 9–39)
BASOPHILS # BLD AUTO: 0 X10*3/UL (ref 0–0.1)
BASOPHILS NFR BLD AUTO: 0 %
BILIRUB SERPL-MCNC: 0.5 MG/DL (ref 0–1.2)
BUN SERPL-MCNC: 14 MG/DL (ref 6–23)
CALCIUM SERPL-MCNC: 9.3 MG/DL (ref 8.6–10.3)
CHLORIDE SERPL-SCNC: 105 MMOL/L (ref 98–107)
CO2 SERPL-SCNC: 27 MMOL/L (ref 21–32)
CREAT SERPL-MCNC: 1.05 MG/DL (ref 0.5–1.3)
EGFRCR SERPLBLD CKD-EPI 2021: >90 ML/MIN/1.73M*2
EOSINOPHIL # BLD AUTO: 0 X10*3/UL (ref 0–0.7)
EOSINOPHIL NFR BLD AUTO: 0 %
ERYTHROCYTE [DISTWIDTH] IN BLOOD BY AUTOMATED COUNT: 14.6 % (ref 11.5–14.5)
GLUCOSE SERPL-MCNC: 88 MG/DL (ref 74–99)
HCT VFR BLD AUTO: 26 % (ref 41–52)
HGB BLD-MCNC: 9 G/DL (ref 13.5–17.5)
IMM GRANULOCYTES # BLD AUTO: 0.06 X10*3/UL (ref 0–0.7)
IMM GRANULOCYTES NFR BLD AUTO: 1.4 % (ref 0–0.9)
IRON SATN MFR SERPL: 51 % (ref 25–45)
IRON SERPL-MCNC: 150 UG/DL (ref 35–150)
LYMPHOCYTES # BLD AUTO: 0.38 X10*3/UL (ref 1.2–4.8)
LYMPHOCYTES NFR BLD AUTO: 8.9 %
MAGNESIUM SERPL-MCNC: 1.78 MG/DL (ref 1.6–2.4)
MCH RBC QN AUTO: 30.1 PG (ref 26–34)
MCHC RBC AUTO-ENTMCNC: 34.6 G/DL (ref 32–36)
MCV RBC AUTO: 87 FL (ref 80–100)
MONOCYTES # BLD AUTO: 0.65 X10*3/UL (ref 0.1–1)
MONOCYTES NFR BLD AUTO: 15.2 %
NEUTROPHILS # BLD AUTO: 3.18 X10*3/UL (ref 1.2–7.7)
NEUTROPHILS NFR BLD AUTO: 74.5 %
NRBC BLD-RTO: 0.7 /100 WBCS (ref 0–0)
PLATELET # BLD AUTO: 97 X10*3/UL (ref 150–450)
POTASSIUM SERPL-SCNC: 3.7 MMOL/L (ref 3.5–5.3)
PROT SERPL-MCNC: 6.7 G/DL (ref 6.4–8.2)
RBC # BLD AUTO: 2.99 X10*6/UL (ref 4.5–5.9)
SODIUM SERPL-SCNC: 142 MMOL/L (ref 136–145)
TIBC SERPL-MCNC: 296 UG/DL (ref 240–445)
UIBC SERPL-MCNC: 146 UG/DL (ref 110–370)
WBC # BLD AUTO: 4.3 X10*3/UL (ref 4.4–11.3)

## 2024-03-29 PROCEDURE — 36591 DRAW BLOOD OFF VENOUS DEVICE: CPT

## 2024-03-29 PROCEDURE — 80053 COMPREHEN METABOLIC PANEL: CPT

## 2024-03-29 PROCEDURE — 83735 ASSAY OF MAGNESIUM: CPT

## 2024-03-29 PROCEDURE — 85025 COMPLETE CBC W/AUTO DIFF WBC: CPT

## 2024-03-29 PROCEDURE — 99215 OFFICE O/P EST HI 40 MIN: CPT | Performed by: NURSE PRACTITIONER

## 2024-03-29 PROCEDURE — 83540 ASSAY OF IRON: CPT

## 2024-03-29 RX ORDER — POTASSIUM CHLORIDE 14.9 MG/ML
20 INJECTION INTRAVENOUS AS NEEDED
Status: CANCELLED
Start: 2024-03-29

## 2024-03-29 RX ORDER — FAMOTIDINE 10 MG/ML
20 INJECTION INTRAVENOUS ONCE AS NEEDED
Status: CANCELLED | OUTPATIENT
Start: 2024-03-29

## 2024-03-29 RX ORDER — ONDANSETRON HYDROCHLORIDE 2 MG/ML
8 INJECTION, SOLUTION INTRAVENOUS EVERY 4 HOURS PRN
Status: CANCELLED
Start: 2024-03-29

## 2024-03-29 RX ORDER — DIPHENHYDRAMINE HYDROCHLORIDE 50 MG/ML
50 INJECTION INTRAMUSCULAR; INTRAVENOUS AS NEEDED
Status: CANCELLED | OUTPATIENT
Start: 2024-03-29

## 2024-03-29 RX ORDER — EPINEPHRINE 0.3 MG/.3ML
0.3 INJECTION SUBCUTANEOUS EVERY 5 MIN PRN
Status: CANCELLED | OUTPATIENT
Start: 2024-03-29

## 2024-03-29 RX ORDER — ALBUTEROL SULFATE 0.83 MG/ML
3 SOLUTION RESPIRATORY (INHALATION) AS NEEDED
Status: CANCELLED | OUTPATIENT
Start: 2024-03-29

## 2024-03-29 RX ORDER — MAGNESIUM SULFATE HEPTAHYDRATE 40 MG/ML
4 INJECTION, SOLUTION INTRAVENOUS AS NEEDED
Status: CANCELLED
Start: 2024-03-29

## 2024-03-29 ASSESSMENT — PAIN SCALES - GENERAL: PAINLEVEL: 0-NO PAIN

## 2024-03-29 NOTE — PROGRESS NOTES
Patient came in for count check. Labs drawn and results reviewed. No complains/discomfort verbalized. Marleni MCFARLANE NP seen patient at his treatment chair. All queries and concerns addressed. Discharged to home in stable condition accompanied by mother.

## 2024-03-30 NOTE — ASSESSMENT & PLAN NOTE
:: Due to disease and chemotherapy   - Ideal platelet transfusion threshold per Optho recommendations is to keep platelets > 50,000  - Plan for count checks three times weekly after discharge

## 2024-03-30 NOTE — PROGRESS NOTES
Patient ID: Shilo Thakkar is a 23 y.o. male.    Subjective    HPI  Presents today for count check & pre-admission visit in [facility], accompanied by his mom. He is feeling well overall with no new issues/concerns.       Review of Systems - Oncology    Objective    BSA: There is no height or weight on file to calculate BSA.  There were no vitals taken for this visit.  Vital signs reviewed today.      Physical Exam    Performance Status:  Karnofsky Score: 80 - Normal activity with effort; some signs or symptoms of disease      Assessment/Plan         Oncology History   Burkitt lymphoma (CMS/HCC)   10/27/2023 Initial Diagnosis    Burkitt lymphoma, high risk, MYC positive  - 10/19/23 presented to OSH ED with dysphagia and abdominal pain  - CT A/P with IV contrast (10/19) mass in the body of the pancreas measuring approximately 2.7 x 2.9 cm. The pancreatic duct is dilated. Multiple masses in the mesentery as well as omental caking consistent with carcinomatosis. Moderate amount of free fluid throughout the abdomen and pelvis.   - CT soft tissue neck with IV contrast (10/19): homogeneous soft tissue attenuation lesion in the L oropharynx which measures approximately 3.0 x 4.0 x 5.1 cm.   - US guided mesenteric LN biopsy (10/20/23): HIGH GRADE B-CELL LYMPHOMA MORPHOLOGICALLY CONSISTENT WITH BURKITT LYMPHOMA; flow cytometry: No B cell population identified  - Bone marrow aspiration biopsy November 1, 2023 no evidence of lymphoma involvement.  CSF by flow cytometry showed no lymphoma 11/2/2023.  - Biopsy L oropharyngeal mass and L cervical LN by ENT (10/23/23) high grade B-cell burkitt lymphoma, MYC positive, Bcl-2 negative.  - MR brain with and w/o IV contrast (10/27): No evidence of intracranial metastatic disease.     - Dexa 40 mg daily ended 11/1  - PET CT ordered 10/30 showed FDG avid left oropharyngeal mass and LAD above and below diaphragm     10/29/2023 -  Chemotherapy    HyperCVAD + RiTUXimab, 21 Day  Cycles     12/6/2023 Imaging    -CT scan of the chest abdomen or pelvis on December 6, 2023 shows complete resolution of intra-abdominal disease, with mildly worse splenomegaly most likely unrelated to his lymphoma.           Burkitt lymphoma (CMS/HCC)  - High risk BL  in CR   - PET-CT s/p C4 (1/15/24) c/w [place] 2  - Tolerated C6 HyperCVAD (Dose reduce Cytarabine at 1.5 G/m2 per dose with Methotrexate 1000 mg/m2 total)   - 1 LP with intrathecal cytarabine during C6 on 3/7; CSF flow cytometry negative  - After discussion with Dr. Klein on 3/20, they decided to proceed with 8 cycles, with 1 intrathecal each cycle.    - Will admit for C7 on Monday 4/1/24. This will include rituximab day 1 and day 11, and intrathecal methotrexate 12 mg on day 2 [+/-1 day) and day 11 vincristine and dexamethasone pulse.    - Cycle 8 is planned with dose reduction of cytarabine to 1 g/m² per dose and methotrexate to to 800 mg/m² total [20% as short infusion and 80% as 24-hour infusion].   - Transfuse to keep Hgb>8.5 and Plt>50,000    Immunosuppressed due to chemotherapy (CMS/HCC)  - Continue prophylactic Acyclovir, Fluconazole, and Bactrim  - Levofloxacin Day 5-15 of each cycle for antibacterial prophylaxis     Thrombocytopenia (CMS/HCC)  :: Due to disease and chemotherapy   - Ideal platelet transfusion threshold per Optho recommendations is to keep platelets > 50,000  - Plan for count checks three times weekly after discharge    Bilateral retinoschisis  - Bilateral retinoschisis and retinal hemorrgages   - S/p left eye vitrectomy  - May require repeat surgery in left eye folowing next cycle of chemo if vitreous hemorrhage persists or retina detaches  - Optho recommends Platelet threshold of 50,000  - Close Follow up with Optho   -- Taper Prednisolone 3x/day both eyes x 2 weeks then BID x 2 weeks  -- Continue Cosopt BID to the R eye  -- Taper Ketorolac TID both eyes x 2 weeks, BID x 2 weeks  -- Artificial tears QID to the left  eye     RTC  4/1 Admit for C7 HyperCVAD    Requested & Pending Scheduling (Will need updated schedule during admission):  4/6 Count Check/Neulasta/[facility] Discharge Visit ([facility])  4/8 Count Check ([facility]) - Patient request for [facility]  Count Checks 3 times weekly M-W-F (4/10, 4/12, 4/15, 4/17, 4/19, 4/22, 4/24, 4/26)  4/12 Delayed C7D11 Vincristine/Rituximab (will need to adjust treatment plan) + [facility]  4/17 Follow up with Dr. Klein   Weekly [facility] Follow Up (4/12, 4/26)    DEMETRIO Mojica-CNP  [facility]

## 2024-03-30 NOTE — ASSESSMENT & PLAN NOTE
- Bilateral retinoschisis and retinal hemorrgages   - S/p left eye vitrectomy  - May require repeat surgery in left eye folowing next cycle of chemo if vitreous hemorrhage persists or retina detaches  - Optho recommends Platelet threshold of 50,000  - Close Follow up with Optho   -- Taper Prednisolone 3x/day both eyes x 2 weeks then BID x 2 weeks  -- Continue Cosopt BID to the R eye  -- Taper Ketorolac TID both eyes x 2 weeks, BID x 2 weeks  -- Artificial tears QID to the left eye

## 2024-03-30 NOTE — ASSESSMENT & PLAN NOTE
- High risk BL  in CR   - PET-CT s/p C4 (1/15/24) c/w Rudolph 2  - Tolerated C6 HyperCVAD (Dose reduce Cytarabine at 1.5 G/m2 per dose with Methotrexate 1000 mg/m2 total)   - 1 LP with intrathecal cytarabine during C6 on 3/7; CSF flow cytometry negative  - After discussion with Dr. Klein on 3/20, they decided to proceed with 8 cycles, with 1 intrathecal each cycle.    - Will admit for C7 on Monday 4/1/24. This will include rituximab day 1 and day 11, and intrathecal methotrexate 12 mg on day 2 [+/-1 day) and day 11 vincristine and dexamethasone pulse.    - Cycle 8 is planned with dose reduction of cytarabine to 1 g/m² per dose and methotrexate to to 800 mg/m² total [20% as short infusion and 80% as 24-hour infusion].   - Transfuse to keep Hgb>8.5 and Plt>50,000

## 2024-03-31 NOTE — PROGRESS NOTES
Patient ID: Shilo Thakkar is a 23 y.o. male.  Oncologic History  Burkitt lymphoma, high risk, MYC positive  - 10/19/23 presented to OSH ED with dysphagia and abdominal pain  - CT A/P with IV contrast (10/19) mass in the body of the pancreas measuring approximately 2.7 x 2.9 cm. The pancreatic duct is dilated. Multiple masses in the mesentery as well as omental caking consistent with carcinomatosis. Moderate amount of free fluid throughout the abdomen and pelvis.   - CT soft tissue neck with IV contrast (10/19): homogeneous soft tissue attenuation lesion in the L oropharynx which measures approximately 3.0 x 4.0 x 5.1 cm.   - US guided mesenteric LN biopsy (10/20/23): HIGH GRADE B-CELL LYMPHOMA MORPHOLOGICALLY CONSISTENT WITH BURKITT LYMPHOMA; flow cytometry: No B cell population identified  - Bone marrow aspiration biopsy November 1, 2023 no evidence of lymphoma involvement.  CSF by flow cytometry showed no lymphoma 11/2/2023.  - Biopsy L oropharyngeal mass and L cervical LN by ENT (10/23/23) high grade B-cell burkitt lymphoma, MYC positive, Bcl-2 negative.  - MR brain with and w/o IV contrast (10/27): No evidence of intracranial metastatic disease.     - Dexa 40 mg daily ended 11/1  - PET CT ordered 10/30 showed FDG avid left oropharyngeal mass and LAD above and below diaphragm  - Started HyperCVAD 10/29 cycle 1 day 1  - Part A:   D1: CTX + mesna, dexamethasone, D2: CTX + mesna, D3: CTX + mesna, D4: Doxo, vinca, D6: Ritux, GCSF, D9: IT,   D2:  intrathecal methotrexate November 2, 2023, 12 mg, CSF shows no B cells by flow cytometry.  D6: Intrathecal cytarabine 100 mg on November 6, 2023.  D11: vinca, dexamethasone, D13: Ritux 11/10  D13: Rituximab therapy  - Daily neupogen since 11/3/23  -Discharged home November 11, 2023  -Evaluation November 15, 2023: Recovered thrombocytopenia, stop levofloxacin, continue prophylactic acyclovir and 3 times weekly Bactrim and fluconazole.  Depressed affect noted.  -Cycle 2-day 1  part to be high-dose methotrexate plus high-dose cytarabine, November 20, 2023,  with 2 intrathecal chemotherapy administered  -Neulasta on 11/27/2023.  -Evaluation 12/6/2023 asymptomatic gaining weight doing well.  To schedule twice weekly CBC BMP Mondays and Thursdays after discharge, emphasized prophylactic levofloxacin between day 5 and day 15, acyclovir, fluconazole and 3 times a week Bactrim.  -CT scan of the chest abdomen or pelvis on December 6, 2023 shows complete resolution of intra-abdominal disease, with mildly worse splenomegaly most likely unrelated to his lymphoma.  -Cycle 3 hyper-CVAD regimen December 12, 2023, including Rituxan day 1 and day 11, intrathecal methotrexate 12/14/2023, intrathecal cytarabine December 18, 2023.  -December 27, 2023: Feeling well asymptomatic tolerated cycle 3 well, plan for cycle 4 January 2, 2024 consisting of high-dose methotrexate high-dose cytarabine, and includes 2 doses of rituximab day 1 day 8, and 2 doses of intrathecal chemotherapy, intrathecal cytarabine day 1 and intrathecal methotrexate day 8  -Hospitalized  1/17-1/23/2024 with visual changes, retinoschisis, profound pancytopenia s/p left eye vitrectomy, retinal hemorrgages. Delay cycle 5. Discuss with ophth regarding etiology and plans.  -Evaluation January 31, 2024: Vision appears to be improving.  Discussed with ophthalmology.  They will be seeing February 2 and will decide whether they will clear him for cycle 5 of chemotherapy which would be tentatively planned to start February 9, 2024 as an inpatient at modified dosing.  Repeat echocardiogram, continue prophylactic antibiotics.  Will need 3 times weekly labs for close monitoring and will keep platelets above 50,000, keep hemoglobin above 8.5-9.  Will need closer supervision as he was noncompliant with twice weekly labs before.  -Evaluation 2/7/2024: Continues to improve, vision clearly better.  Appears to have been cleared by ophthalmology, to proceed  with cycle 5 starting 2/9/2024 with 80% cyclophosphamide.  Needs echocardiogram repeated to verify a normal ejection fraction.  Upon discharge he will need 3 times a week count check to assure compliance, as he had been noncompliant with twice weekly labs previously.  Need to keep platelets above 30-50,000, need to keep hemoglobin above 8.5 at [facility]a.  Final cycle of chemotherapy will be also dose reduced with cytarabine down to 1 g/m².  Needs a chest x-ray on Friday before starting  -Evaluation 2/20/2024: Doing well.  Eyes appear to be improving tolerated cycle 5 without difficulty.  We omitted intrathecal on cycle 5.  Plan to admit on 3/5/2024 for cycle 6, planned dose reduction of high-dose cytarabine to 1.5 g/m², keeping methotrexate the same.  We will incorporate intrathecal cytarabine day 1, patient declining a second intrathecal.  We are in the process of trying to convince the patient to complete 8 cycles to adhere to the published data on hyper CVAD and Burkitt's lymphoma.  -Evaluation 3/20/2024 C6D15 recovering counts.  Keeping hemoglobin above 8 and platelets above 50 due to the retinal hemorrhages.  Overall doing well.  After discussion with patient and mom, we decided to proceed with 8 cycles, with 1 intrathecal with a each cycle.  Next cycle will be on 3/28/2024, should include rituximab day 1 and day 11, and intrathecal methotrexate 12 mg on day 2 [+/-1 day) and day 11 vincristine and dexamethasone pulse.  Cycle 8 is planned with dose reduction of cytarabine to 1 g/m² per dose and methotrexate to to 800 mg/m² total [20% as short infusion and 80% as 24-hour infusion].  -Evaluation 3/27/2024,C6D22, platelets less than 80,000, we will wait till Monday, April 1, 2024 and proceed with cycle 7, on cycle consisting of cyclophosphamide 250 mg/m² every 12 hours D1-D3, doxorubicin 50 mg/m², vincristine 2 mg flat dose D4 and day 11, dexamethasone 40 mg p.o. day 4 and day 11 through day 14, rituximab possibly  after discharge 375 mg/m², intrathecal methotrexate 12 mg to be delivered day 2 or day 3 of the cycle.  Post chemotherapy the patient will need pegfilgrastim, and will need to be set up  for count check Monday Wednesday Friday, keeping platelets above 50,000, keeping hemoglobin above 8, orders  for interim nursing assessment RN.     Therapy:  Hyper-CVAD C1D1 10/29/2023  Hyper-CVAD C2D1 11/20/2023  Hyper-CVAD C3D1 12/12/2023  Hyper-CVAD C4D1 01/02/2024  Hyper-CVAD C5D1 02/09/2024  Hyper-CVAD C6D1 03/06/2024     Subjective    HPI  Feels well today.  No new complaints.  Eating well maintaining weight.  No cough dyspnea fever back or shoulder or abdominal pain nausea vomiting or change in bowel habits.    Review of Systems  \Head and neck: Other than HPI negative  CNS: Other than HPI negative  Cardiovascular: Other than HPI negative  Pulmonary: Other than HPI negative  GI: Other than HPI negative  : Other than HPI negative  Bleeding: Other than HPI negative  Constitutional: Other than HPI negative     Objective    BSA: 1.97 meters squared  /74 (BP Location: Left arm, Patient Position: Sitting, BP Cuff Size: Adult)   Pulse 63   Temp 36.2 °C (97.2 °F) (Skin)   Resp 16   Wt 75.4 kg (166 lb 4.8 oz)   SpO2 100%   BMI 21.94 kg/m²      Physical Exam  Alert oriented not in distress not pale or jaundiced  Lymph nodes: No adenopathy  Oral mucosa negative, no mucositis  Head is normocephalic atraumatic, pupils equal reactive  Neck is supple no adenopathy, no thyromegaly  Lungs show equal air entry, no crackles or wheezing, equal percussion  Heart shows regular rate and rhythm normal S1-S2 no murmurs or gallop rhythm  Abdomen is soft nontender, moves with respiration, no hepatosplenomegaly or masses, positive bowel sounds, not distended.  Lower extremities show no edema  Neurologically grossly nonfocal  Psych: Normal affect    Performance Status:  Symptomatic; fully ambulatory      Assessment/Plan   High risk  Burkitt's lymphoma  Completed 6 cycles of hyper-CVAD and incomplete remission  Platelets still below 70,000.  Before starting his platelets need to be above 90-100k, and ANC needs to be above 1500.  He will be back Friday for count check.  I expect he will recover quickly.  States on Friday, 3/29/2024 >90k, will plan to admit Monday 4/1/2024. He will need pegfilgrastim after cycle 7.    Cancer Staging   No matching staging information was found for the patient.    Oncology History   Burkitt lymphoma (CMS/HCC)   10/27/2023 Initial Diagnosis    Burkitt lymphoma, high risk, MYC positive  - 10/19/23 presented to OSH ED with dysphagia and abdominal pain  - CT A/P with IV contrast (10/19) mass in the body of the pancreas measuring approximately 2.7 x 2.9 cm. The pancreatic duct is dilated. Multiple masses in the mesentery as well as omental caking consistent with carcinomatosis. Moderate amount of free fluid throughout the abdomen and pelvis.   - CT soft tissue neck with IV contrast (10/19): homogeneous soft tissue attenuation lesion in the L oropharynx which measures approximately 3.0 x 4.0 x 5.1 cm.   - US guided mesenteric LN biopsy (10/20/23): HIGH GRADE B-CELL LYMPHOMA MORPHOLOGICALLY CONSISTENT WITH BURKITT LYMPHOMA; flow cytometry: No B cell population identified  - Bone marrow aspiration biopsy November 1, 2023 no evidence of lymphoma involvement.  CSF by flow cytometry showed no lymphoma 11/2/2023.  - Biopsy L oropharyngeal mass and L cervical LN by ENT (10/23/23) high grade B-cell burkitt lymphoma, MYC positive, Bcl-2 negative.  - MR brain with and w/o IV contrast (10/27): No evidence of intracranial metastatic disease.     - Dexa 40 mg daily ended 11/1  - PET CT ordered 10/30 showed FDG avid left oropharyngeal mass and LAD above and below diaphragm     10/29/2023 -  Chemotherapy    HyperCVAD + RiTUXimab, 21 Day Cycles     12/6/2023 Imaging    -CT scan of the chest abdomen or pelvis on December 6, 2023 shows  complete resolution of intra-abdominal disease, with mildly worse splenomegaly most likely unrelated to his lymphoma.           Diagnoses and all orders for this visit:  Burkitt lymphoma, unspecified body region (CMS/HCC)  -     Iron and TIBC; Future  -     Ferritin; Future           Olegario Klein MD

## 2024-04-01 ENCOUNTER — HOSPITAL ENCOUNTER (INPATIENT)
Facility: HOSPITAL | Age: 24
LOS: 4 days | Discharge: HOME | DRG: 847 | End: 2024-04-05
Attending: INTERNAL MEDICINE | Admitting: PHYSICIAN ASSISTANT
Payer: COMMERCIAL

## 2024-04-01 DIAGNOSIS — H33.103 BILATERAL RETINOSCHISIS: ICD-10-CM

## 2024-04-01 DIAGNOSIS — C83.78 BURKITT LYMPHOMA OF LYMPH NODES OF MULTIPLE REGIONS (MULTI): Primary | ICD-10-CM

## 2024-04-01 DIAGNOSIS — C25.9 PANCREATIC CANCER (MULTI): ICD-10-CM

## 2024-04-01 DIAGNOSIS — C79.9 METASTASIS FROM PANCREATIC CANCER (MULTI): ICD-10-CM

## 2024-04-01 DIAGNOSIS — C25.9 METASTASIS FROM PANCREATIC CANCER (MULTI): ICD-10-CM

## 2024-04-01 DIAGNOSIS — J35.1 TONSILLAR HYPERTROPHY, UNILATERAL: ICD-10-CM

## 2024-04-01 DIAGNOSIS — R22.1 NECK MASS: ICD-10-CM

## 2024-04-01 LAB
ALBUMIN SERPL BCP-MCNC: 4.6 G/DL (ref 3.4–5)
ALP SERPL-CCNC: 67 U/L (ref 33–120)
ALT SERPL W P-5'-P-CCNC: 19 U/L (ref 10–52)
ANION GAP SERPL CALC-SCNC: 15 MMOL/L (ref 10–20)
APTT PPP: 35 SECONDS (ref 27–38)
AST SERPL W P-5'-P-CCNC: 17 U/L (ref 9–39)
BASOPHILS # BLD AUTO: 0.01 X10*3/UL (ref 0–0.1)
BASOPHILS NFR BLD AUTO: 0.3 %
BILIRUB DIRECT SERPL-MCNC: 0.1 MG/DL (ref 0–0.3)
BILIRUB SERPL-MCNC: 0.6 MG/DL (ref 0–1.2)
BUN SERPL-MCNC: 19 MG/DL (ref 6–23)
CALCIUM SERPL-MCNC: 9.6 MG/DL (ref 8.6–10.6)
CHLORIDE SERPL-SCNC: 103 MMOL/L (ref 98–107)
CO2 SERPL-SCNC: 29 MMOL/L (ref 21–32)
CREAT SERPL-MCNC: 1.14 MG/DL (ref 0.5–1.3)
EGFRCR SERPLBLD CKD-EPI 2021: >90 ML/MIN/1.73M*2
EOSINOPHIL # BLD AUTO: 0 X10*3/UL (ref 0–0.7)
EOSINOPHIL NFR BLD AUTO: 0 %
ERYTHROCYTE [DISTWIDTH] IN BLOOD BY AUTOMATED COUNT: 16.7 % (ref 11.5–14.5)
GLUCOSE SERPL-MCNC: 127 MG/DL (ref 74–99)
HCT VFR BLD AUTO: 25.4 % (ref 41–52)
HGB BLD-MCNC: 8.6 G/DL (ref 13.5–17.5)
IMM GRANULOCYTES # BLD AUTO: 0.02 X10*3/UL (ref 0–0.7)
IMM GRANULOCYTES NFR BLD AUTO: 0.6 % (ref 0–0.9)
INR PPP: 1.1 (ref 0.9–1.1)
LDH SERPL L TO P-CCNC: 183 U/L (ref 84–246)
LYMPHOCYTES # BLD AUTO: 0.33 X10*3/UL (ref 1.2–4.8)
LYMPHOCYTES NFR BLD AUTO: 9.2 %
MAGNESIUM SERPL-MCNC: 1.84 MG/DL (ref 1.6–2.4)
MCH RBC QN AUTO: 30.6 PG (ref 26–34)
MCHC RBC AUTO-ENTMCNC: 33.9 G/DL (ref 32–36)
MCV RBC AUTO: 90 FL (ref 80–100)
MONOCYTES # BLD AUTO: 0.61 X10*3/UL (ref 0.1–1)
MONOCYTES NFR BLD AUTO: 16.9 %
NEUTROPHILS # BLD AUTO: 2.63 X10*3/UL (ref 1.2–7.7)
NEUTROPHILS NFR BLD AUTO: 73 %
NRBC BLD-RTO: 0 /100 WBCS (ref 0–0)
PHOSPHATE SERPL-MCNC: 3.6 MG/DL (ref 2.5–4.9)
PLATELET # BLD AUTO: 123 X10*3/UL (ref 150–450)
POTASSIUM SERPL-SCNC: 3.9 MMOL/L (ref 3.5–5.3)
PROT SERPL-MCNC: 6.3 G/DL (ref 6.4–8.2)
PROTHROMBIN TIME: 12.1 SECONDS (ref 9.8–12.8)
RBC # BLD AUTO: 2.81 X10*6/UL (ref 4.5–5.9)
SARS-COV-2 RNA RESP QL NAA+PROBE: NOT DETECTED
SODIUM SERPL-SCNC: 143 MMOL/L (ref 136–145)
URATE SERPL-MCNC: 6.4 MG/DL (ref 4–7.5)
WBC # BLD AUTO: 3.6 X10*3/UL (ref 4.4–11.3)

## 2024-04-01 PROCEDURE — 80076 HEPATIC FUNCTION PANEL: CPT | Performed by: PHYSICIAN ASSISTANT

## 2024-04-01 PROCEDURE — 84100 ASSAY OF PHOSPHORUS: CPT | Performed by: PHYSICIAN ASSISTANT

## 2024-04-01 PROCEDURE — 3E0330M INTRODUCTION OF MONOCLONAL ANTIBODY INTO PERIPHERAL VEIN, PERCUTANEOUS APPROACH: ICD-10-PCS | Performed by: STUDENT IN AN ORGANIZED HEALTH CARE EDUCATION/TRAINING PROGRAM

## 2024-04-01 PROCEDURE — 87635 SARS-COV-2 COVID-19 AMP PRB: CPT | Performed by: PHYSICIAN ASSISTANT

## 2024-04-01 PROCEDURE — 84550 ASSAY OF BLOOD/URIC ACID: CPT | Performed by: PHYSICIAN ASSISTANT

## 2024-04-01 PROCEDURE — 85610 PROTHROMBIN TIME: CPT | Performed by: PHYSICIAN ASSISTANT

## 2024-04-01 PROCEDURE — 2500000004 HC RX 250 GENERAL PHARMACY W/ HCPCS (ALT 636 FOR OP/ED): Mod: JZ | Performed by: STUDENT IN AN ORGANIZED HEALTH CARE EDUCATION/TRAINING PROGRAM

## 2024-04-01 PROCEDURE — 83615 LACTATE (LD) (LDH) ENZYME: CPT | Performed by: PHYSICIAN ASSISTANT

## 2024-04-01 PROCEDURE — 2500000001 HC RX 250 WO HCPCS SELF ADMINISTERED DRUGS (ALT 637 FOR MEDICARE OP): Performed by: STUDENT IN AN ORGANIZED HEALTH CARE EDUCATION/TRAINING PROGRAM

## 2024-04-01 PROCEDURE — 1170000001 HC PRIVATE ONCOLOGY ROOM DAILY

## 2024-04-01 PROCEDURE — 2500000001 HC RX 250 WO HCPCS SELF ADMINISTERED DRUGS (ALT 637 FOR MEDICARE OP): Performed by: PHYSICIAN ASSISTANT

## 2024-04-01 PROCEDURE — 82374 ASSAY BLOOD CARBON DIOXIDE: CPT | Performed by: PHYSICIAN ASSISTANT

## 2024-04-01 PROCEDURE — 85025 COMPLETE CBC W/AUTO DIFF WBC: CPT | Performed by: PHYSICIAN ASSISTANT

## 2024-04-01 PROCEDURE — 2500000005 HC RX 250 GENERAL PHARMACY W/O HCPCS: Performed by: PHYSICIAN ASSISTANT

## 2024-04-01 PROCEDURE — 83735 ASSAY OF MAGNESIUM: CPT | Performed by: PHYSICIAN ASSISTANT

## 2024-04-01 PROCEDURE — 99222 1ST HOSP IP/OBS MODERATE 55: CPT | Performed by: STUDENT IN AN ORGANIZED HEALTH CARE EDUCATION/TRAINING PROGRAM

## 2024-04-01 RX ORDER — ALBUTEROL SULFATE 0.83 MG/ML
3 SOLUTION RESPIRATORY (INHALATION) AS NEEDED
Status: CANCELLED | OUTPATIENT
Start: 2024-04-12

## 2024-04-01 RX ORDER — PREDNISOLONE ACETATE 10 MG/ML
1 SUSPENSION/ DROPS OPHTHALMIC 3 TIMES DAILY
Status: DISCONTINUED | OUTPATIENT
Start: 2024-04-01 | End: 2024-04-06 | Stop reason: HOSPADM

## 2024-04-01 RX ORDER — PROCHLORPERAZINE MALEATE 10 MG
10 TABLET ORAL EVERY 6 HOURS PRN
Status: DISCONTINUED | OUTPATIENT
Start: 2024-04-01 | End: 2024-04-06 | Stop reason: HOSPADM

## 2024-04-01 RX ORDER — DORZOLAMIDE HYDROCHLORIDE AND TIMOLOL MALEATE PRESERVATIVE FREE 20; 5 MG/ML; MG/ML
1 SOLUTION/ DROPS OPHTHALMIC 2 TIMES DAILY
Status: DISCONTINUED | OUTPATIENT
Start: 2024-04-01 | End: 2024-04-06 | Stop reason: HOSPADM

## 2024-04-01 RX ORDER — DEXAMETHASONE 4 MG/1
40 TABLET ORAL EVERY MORNING
Status: COMPLETED | OUTPATIENT
Start: 2024-04-02 | End: 2024-04-04

## 2024-04-01 RX ORDER — KETOROLAC TROMETHAMINE 5 MG/ML
1 SOLUTION OPHTHALMIC 2 TIMES DAILY
Status: DISCONTINUED | OUTPATIENT
Start: 2024-04-01 | End: 2024-04-01

## 2024-04-01 RX ORDER — PROCHLORPERAZINE EDISYLATE 5 MG/ML
10 INJECTION INTRAMUSCULAR; INTRAVENOUS EVERY 6 HOURS PRN
Status: DISCONTINUED | OUTPATIENT
Start: 2024-04-01 | End: 2024-04-06 | Stop reason: HOSPADM

## 2024-04-01 RX ORDER — ACYCLOVIR 400 MG/1
400 TABLET ORAL 2 TIMES DAILY
Status: DISCONTINUED | OUTPATIENT
Start: 2024-04-01 | End: 2024-04-06 | Stop reason: HOSPADM

## 2024-04-01 RX ORDER — DIPHENHYDRAMINE HYDROCHLORIDE 50 MG/ML
50 INJECTION INTRAMUSCULAR; INTRAVENOUS AS NEEDED
Status: CANCELLED | OUTPATIENT
Start: 2024-04-06

## 2024-04-01 RX ORDER — EPINEPHRINE 0.1 MG/ML
0.3 INJECTION INTRACARDIAC; INTRAVENOUS EVERY 5 MIN PRN
Status: DISCONTINUED | OUTPATIENT
Start: 2024-04-01 | End: 2024-04-06 | Stop reason: HOSPADM

## 2024-04-01 RX ORDER — ONDANSETRON 4 MG/1
4 TABLET, FILM COATED ORAL EVERY 8 HOURS PRN
Status: DISCONTINUED | OUTPATIENT
Start: 2024-04-01 | End: 2024-04-06 | Stop reason: HOSPADM

## 2024-04-01 RX ORDER — FAMOTIDINE 10 MG/ML
20 INJECTION INTRAVENOUS ONCE AS NEEDED
Status: CANCELLED | OUTPATIENT
Start: 2024-04-06

## 2024-04-01 RX ORDER — PANTOPRAZOLE SODIUM 40 MG/1
40 TABLET, DELAYED RELEASE ORAL
Status: DISCONTINUED | OUTPATIENT
Start: 2024-04-02 | End: 2024-04-06 | Stop reason: HOSPADM

## 2024-04-01 RX ORDER — FAMOTIDINE 10 MG/ML
20 INJECTION INTRAVENOUS ONCE AS NEEDED
Status: CANCELLED | OUTPATIENT
Start: 2024-04-12

## 2024-04-01 RX ORDER — FAMOTIDINE 10 MG/ML
20 INJECTION INTRAVENOUS AS NEEDED
Status: DISCONTINUED | OUTPATIENT
Start: 2024-04-01 | End: 2024-04-06 | Stop reason: HOSPADM

## 2024-04-01 RX ORDER — AMOXICILLIN 250 MG
2 CAPSULE ORAL 2 TIMES DAILY PRN
Status: DISCONTINUED | OUTPATIENT
Start: 2024-04-01 | End: 2024-04-06 | Stop reason: HOSPADM

## 2024-04-01 RX ORDER — DIPHENHYDRAMINE HCL 50 MG
50 CAPSULE ORAL ONCE
Status: COMPLETED | OUTPATIENT
Start: 2024-04-01 | End: 2024-04-01

## 2024-04-01 RX ORDER — SODIUM CHLORIDE 9 MG/ML
100 INJECTION, SOLUTION INTRAVENOUS CONTINUOUS
Status: DISCONTINUED | OUTPATIENT
Start: 2024-04-01 | End: 2024-04-06 | Stop reason: HOSPADM

## 2024-04-01 RX ORDER — ALBUTEROL SULFATE 0.83 MG/ML
3 SOLUTION RESPIRATORY (INHALATION) AS NEEDED
Status: CANCELLED | OUTPATIENT
Start: 2024-04-06

## 2024-04-01 RX ORDER — ACETAMINOPHEN 325 MG/1
650 TABLET ORAL ONCE
Status: COMPLETED | OUTPATIENT
Start: 2024-04-01 | End: 2024-04-01

## 2024-04-01 RX ORDER — EPINEPHRINE 0.3 MG/.3ML
0.3 INJECTION SUBCUTANEOUS EVERY 5 MIN PRN
Status: CANCELLED | OUTPATIENT
Start: 2024-04-06

## 2024-04-01 RX ORDER — B-COMPLEX WITH VITAMIN C
1 TABLET ORAL DAILY
Status: DISCONTINUED | OUTPATIENT
Start: 2024-04-01 | End: 2024-04-06 | Stop reason: HOSPADM

## 2024-04-01 RX ORDER — DEXAMETHASONE 4 MG/1
40 TABLET ORAL ONCE
Status: COMPLETED | OUTPATIENT
Start: 2024-04-01 | End: 2024-04-01

## 2024-04-01 RX ORDER — EPINEPHRINE 0.3 MG/.3ML
0.3 INJECTION SUBCUTANEOUS EVERY 5 MIN PRN
Status: CANCELLED | OUTPATIENT
Start: 2024-04-12

## 2024-04-01 RX ORDER — ALBUTEROL SULFATE 0.83 MG/ML
3 SOLUTION RESPIRATORY (INHALATION) AS NEEDED
Status: DISCONTINUED | OUTPATIENT
Start: 2024-04-01 | End: 2024-04-06 | Stop reason: HOSPADM

## 2024-04-01 RX ORDER — DIPHENHYDRAMINE HYDROCHLORIDE 50 MG/ML
50 INJECTION INTRAMUSCULAR; INTRAVENOUS AS NEEDED
Status: DISCONTINUED | OUTPATIENT
Start: 2024-04-01 | End: 2024-04-06 | Stop reason: HOSPADM

## 2024-04-01 RX ORDER — DIPHENHYDRAMINE HYDROCHLORIDE 50 MG/ML
50 INJECTION INTRAMUSCULAR; INTRAVENOUS AS NEEDED
Status: CANCELLED | OUTPATIENT
Start: 2024-04-12

## 2024-04-01 RX ORDER — DIPHENHYDRAMINE HCL 50 MG
50 CAPSULE ORAL ONCE
Status: CANCELLED | OUTPATIENT
Start: 2024-04-12

## 2024-04-01 RX ORDER — FERROUS SULFATE 325(65) MG
1 TABLET ORAL 2 TIMES DAILY
Status: DISCONTINUED | OUTPATIENT
Start: 2024-04-01 | End: 2024-04-06 | Stop reason: HOSPADM

## 2024-04-01 RX ORDER — ACETAMINOPHEN 325 MG/1
650 TABLET ORAL ONCE
Status: CANCELLED | OUTPATIENT
Start: 2024-04-12

## 2024-04-01 RX ORDER — KETOROLAC TROMETHAMINE 5 MG/ML
1 SOLUTION OPHTHALMIC 3 TIMES DAILY
Status: DISCONTINUED | OUTPATIENT
Start: 2024-04-01 | End: 2024-04-06 | Stop reason: HOSPADM

## 2024-04-01 RX ADMIN — DEXAMETHASONE 40 MG: 4 TABLET ORAL at 20:09

## 2024-04-01 RX ADMIN — MESNA 1150 MG: 100 INJECTION, SOLUTION INTRAVENOUS at 22:49

## 2024-04-01 RX ADMIN — DIPHENHYDRAMINE HYDROCHLORIDE 50 MG: 50 CAPSULE ORAL at 20:09

## 2024-04-01 RX ADMIN — KETOROLAC TROMETHAMINE 1 DROP: 5 SOLUTION OPHTHALMIC at 22:26

## 2024-04-01 RX ADMIN — ACYCLOVIR 400 MG: 400 TABLET ORAL at 20:09

## 2024-04-01 RX ADMIN — ACETAMINOPHEN 650 MG: 325 TABLET ORAL at 20:09

## 2024-04-01 RX ADMIN — CYCLOPHOSPHAMIDE 500 MG: 1 INJECTION, POWDER, FOR SOLUTION INTRAVENOUS; ORAL at 23:16

## 2024-04-01 RX ADMIN — CARBOXYMETHYLCELLULOSE SODIUM 1 DROP: 5 SOLUTION/ DROPS OPHTHALMIC at 20:10

## 2024-04-01 RX ADMIN — FERROUS SULFATE TAB 325 MG (65 MG ELEMENTAL FE) 1 TABLET: 325 (65 FE) TAB at 20:09

## 2024-04-01 RX ADMIN — ONDANSETRON 16 MG: 2 INJECTION INTRAMUSCULAR; INTRAVENOUS at 22:28

## 2024-04-01 RX ADMIN — SODIUM CHLORIDE 100 ML/HR: 9 INJECTION, SOLUTION INTRAVENOUS at 20:15

## 2024-04-01 RX ADMIN — DORZOLAMIDE HYDROCHLORIDE AND TIMOLOL MALEATE 1 DROP: 20; 5 SOLUTION/ DROPS OPHTHALMIC at 20:10

## 2024-04-01 RX ADMIN — SODIUM CHLORIDE 700 MG: 9 INJECTION, SOLUTION INTRAVENOUS at 20:42

## 2024-04-01 RX ADMIN — PREDNISOLONE ACETATE 1 DROP: 10 SUSPENSION/ DROPS OPHTHALMIC at 20:10

## 2024-04-01 SDOH — SOCIAL STABILITY: SOCIAL INSECURITY: ARE YOU OR HAVE YOU BEEN THREATENED OR ABUSED PHYSICALLY, EMOTIONALLY, OR SEXUALLY BY ANYONE?: NO

## 2024-04-01 SDOH — SOCIAL STABILITY: SOCIAL INSECURITY: DO YOU FEEL UNSAFE GOING BACK TO THE PLACE WHERE YOU ARE LIVING?: NO

## 2024-04-01 SDOH — SOCIAL STABILITY: SOCIAL INSECURITY: DO YOU FEEL ANYONE HAS EXPLOITED OR TAKEN ADVANTAGE OF YOU FINANCIALLY OR OF YOUR PERSONAL PROPERTY?: NO

## 2024-04-01 SDOH — SOCIAL STABILITY: SOCIAL INSECURITY: HAS ANYONE EVER THREATENED TO HURT YOUR FAMILY OR YOUR PETS?: NO

## 2024-04-01 SDOH — SOCIAL STABILITY: SOCIAL INSECURITY: ARE THERE ANY APPARENT SIGNS OF INJURIES/BEHAVIORS THAT COULD BE RELATED TO ABUSE/NEGLECT?: NO

## 2024-04-01 SDOH — SOCIAL STABILITY: SOCIAL INSECURITY: WERE YOU ABLE TO COMPLETE ALL THE BEHAVIORAL HEALTH SCREENINGS?: YES

## 2024-04-01 SDOH — SOCIAL STABILITY: SOCIAL INSECURITY: DOES ANYONE TRY TO KEEP YOU FROM HAVING/CONTACTING OTHER FRIENDS OR DOING THINGS OUTSIDE YOUR HOME?: NO

## 2024-04-01 SDOH — SOCIAL STABILITY: SOCIAL INSECURITY: ABUSE: ADULT

## 2024-04-01 SDOH — SOCIAL STABILITY: SOCIAL INSECURITY: HAVE YOU HAD THOUGHTS OF HARMING ANYONE ELSE?: NO

## 2024-04-01 ASSESSMENT — COGNITIVE AND FUNCTIONAL STATUS - GENERAL
DAILY ACTIVITIY SCORE: 24
DAILY ACTIVITIY SCORE: 24
PATIENT BASELINE BEDBOUND: NO
MOBILITY SCORE: 24
MOBILITY SCORE: 24

## 2024-04-01 ASSESSMENT — ACTIVITIES OF DAILY LIVING (ADL)
HEARING - RIGHT EAR: FUNCTIONAL
PATIENT'S MEMORY ADEQUATE TO SAFELY COMPLETE DAILY ACTIVITIES?: NO
ADEQUATE_TO_COMPLETE_ADL: NO
HEARING - LEFT EAR: FUNCTIONAL
FEEDING YOURSELF: INDEPENDENT
WALKS IN HOME: INDEPENDENT
JUDGMENT_ADEQUATE_SAFELY_COMPLETE_DAILY_ACTIVITIES: NO
GROOMING: INDEPENDENT
BATHING: INDEPENDENT
LACK_OF_TRANSPORTATION: NO
TOILETING: INDEPENDENT
DRESSING YOURSELF: INDEPENDENT

## 2024-04-01 ASSESSMENT — ENCOUNTER SYMPTOMS
DIZZINESS: 0
CONSTIPATION: 0
FEVER: 0
COUGH: 0
ABDOMINAL PAIN: 0
BRUISES/BLEEDS EASILY: 0
NAUSEA: 0
DIARRHEA: 0
VOMITING: 0
SHORTNESS OF BREATH: 0
SORE THROAT: 0
DIFFICULTY URINATING: 0
BLOOD IN STOOL: 0
HEADACHES: 0
CHILLS: 0

## 2024-04-01 ASSESSMENT — LIFESTYLE VARIABLES
HOW OFTEN DO YOU HAVE A DRINK CONTAINING ALCOHOL: NEVER
AUDIT-C TOTAL SCORE: 0
AUDIT-C TOTAL SCORE: 0
HOW MANY STANDARD DRINKS CONTAINING ALCOHOL DO YOU HAVE ON A TYPICAL DAY: PATIENT DOES NOT DRINK
SKIP TO QUESTIONS 9-10: 1
HOW OFTEN DO YOU HAVE 6 OR MORE DRINKS ON ONE OCCASION: NEVER

## 2024-04-01 ASSESSMENT — PAIN SCALES - GENERAL
PAINLEVEL_OUTOF10: 0 - NO PAIN
PAINLEVEL_OUTOF10: 0 - NO PAIN

## 2024-04-01 ASSESSMENT — PAIN - FUNCTIONAL ASSESSMENT
PAIN_FUNCTIONAL_ASSESSMENT: 0-10
PAIN_FUNCTIONAL_ASSESSMENT: 0-10

## 2024-04-01 NOTE — H&P
History Of Present Illness  Shilo Thakkar is a 23 y.o. male presenting with history of Burkitt's lymphoma admitted for cycle 7 of HyperCVAD.     On admit, patient doing well. Eating and drinking okay. No complaints on admit. Denies recent fever, chills, dizziness, headache, sore throat, cough, nasal congestion, CP, SOB, nausea, vomiting, diarrhea, constipation, abdominal pain, urinary issues, bleeding issues, or neuropathy issues.     Past Medical History  He has a past medical history of ADHD (attention deficit hyperactivity disorder) (01/28/2014), Burkitt lymphoma (CMS/HCC) (10/27/2023), Colles' fracture of right radius, initial encounter for closed fracture (04/23/2015), Gynecomastia (05/23/2016), and Unspecified fracture of navicular (scaphoid) bone of left wrist, initial encounter for closed fracture (12/17/2015).    Surgical History  He has a past surgical history that includes Other surgical history (10/03/2017); US guided soft tissue biopsy (10/20/2023); Foot fracture surgery (Left); and Chest Tube Insertion (10/29/2023).    Oncology History   Burkitt lymphoma (CMS/HCC)   10/27/2023 Initial Diagnosis    Burkitt lymphoma, high risk, MYC positive  - 10/19/23 presented to OSH ED with dysphagia and abdominal pain  - CT A/P with IV contrast (10/19) mass in the body of the pancreas measuring approximately 2.7 x 2.9 cm. The pancreatic duct is dilated. Multiple masses in the mesentery as well as omental caking consistent with carcinomatosis. Moderate amount of free fluid throughout the abdomen and pelvis.   - CT soft tissue neck with IV contrast (10/19): homogeneous soft tissue attenuation lesion in the L oropharynx which measures approximately 3.0 x 4.0 x 5.1 cm.   - US guided mesenteric LN biopsy (10/20/23): HIGH GRADE B-CELL LYMPHOMA MORPHOLOGICALLY CONSISTENT WITH BURKITT LYMPHOMA; flow cytometry: No B cell population identified  - Bone marrow aspiration biopsy November 1, 2023 no evidence of lymphoma  involvement.  CSF by flow cytometry showed no lymphoma 11/2/2023.  - Biopsy L oropharyngeal mass and L cervical LN by ENT (10/23/23) high grade B-cell burkitt lymphoma, MYC positive, Bcl-2 negative.  - MR brain with and w/o IV contrast (10/27): No evidence of intracranial metastatic disease.     - Dexa 40 mg daily ended 11/1  - PET CT ordered 10/30 showed FDG avid left oropharyngeal mass and LAD above and below diaphragm     10/29/2023 -  Chemotherapy    HyperCVAD + RiTUXimab, 21 Day Cycles     12/6/2023 Imaging    -CT scan of the chest abdomen or pelvis on December 6, 2023 shows complete resolution of intra-abdominal disease, with mildly worse splenomegaly most likely unrelated to his lymphoma.           Social History  He reports that he has never smoked. He has never been exposed to tobacco smoke. He has never used smokeless tobacco. He reports that he does not currently use alcohol. He reports that he does not use drugs.     Allergies  Patient has no known allergies.    Review of Systems   Constitutional:  Negative for chills and fever.   HENT:  Negative for congestion and sore throat.    Respiratory:  Negative for cough and shortness of breath.    Cardiovascular:  Negative for chest pain.   Gastrointestinal:  Negative for abdominal pain, blood in stool, constipation, diarrhea, nausea and vomiting.   Genitourinary:  Negative for difficulty urinating.   Musculoskeletal:         No pain issues   Neurological:  Negative for dizziness and headaches.   Hematological:  Does not bruise/bleed easily.        Physical Exam  Vitals reviewed.   Constitutional:       Appearance: Normal appearance. He is not ill-appearing.   HENT:      Head: Normocephalic and atraumatic.      Mouth/Throat:      Mouth: Mucous membranes are moist.   Eyes:      Extraocular Movements: Extraocular movements intact.      Pupils: Pupils are equal, round, and reactive to light.   Cardiovascular:      Rate and Rhythm: Normal rate and regular rhythm.  "     Heart sounds: Normal heart sounds.   Pulmonary:      Effort: Pulmonary effort is normal.      Breath sounds: Normal breath sounds. No wheezing, rhonchi or rales.   Abdominal:      General: Abdomen is flat. Bowel sounds are normal.      Palpations: Abdomen is soft.      Tenderness: There is no abdominal tenderness. There is no guarding.   Musculoskeletal:         General: No swelling. Normal range of motion.      Cervical back: Normal range of motion and neck supple.   Skin:     General: Skin is warm and dry.      Findings: No rash.   Neurological:      General: No focal deficit present.      Mental Status: He is alert and oriented to person, place, and time.      Cranial Nerves: No cranial nerve deficit.   Psychiatric:         Mood and Affect: Mood normal.         Behavior: Behavior normal.          Last Recorded Vitals  Blood pressure (!) 162/92, pulse 64, temperature 36 °C (96.8 °F), temperature source Temporal, resp. rate 18, height 1.818 m (5' 11.58\"), weight 75.2 kg (165 lb 12.6 oz), SpO2 100 %.    Relevant Results  Type Scheduled medications  acetaminophen, 650 mg, oral, Once  cyclophosphamide, 250 mg/m2 (Treatment Plan Recorded), intravenous, q12h  dexAMETHasone, 40 mg, oral, Once  [START ON 4/2/2024] dexAMETHasone, 40 mg, oral, q AM  diphenhydrAMINE, 50 mg, oral, Once  mesna, 600 mg/m2 (Treatment Plan Recorded), intravenous, Once  ondansetron, 16 mg, intravenous, Once  riTUXimab or biosimilar, 375 mg/m2 (Treatment Plan Recorded), intravenous, Once      Continuous medications  sodium chloride 0.9%, 100 mL/hr      PRN medications  PRN medications: albuterol, alteplase, dextrose, diphenhydrAMINE, EPINEPHrine, famotidine, methylPREDNISolone sodium succinate (PF), prochlorperazine, prochlorperazine, sodium chloride    Type   Results for orders placed or performed during the hospital encounter of 04/01/24 (from the past 24 hour(s))   CBC and Auto Differential   Result Value Ref Range    WBC 3.6 (L) 4.4 - 11.3 " x10*3/uL    nRBC 0.0 0.0 - 0.0 /100 WBCs    RBC 2.81 (L) 4.50 - 5.90 x10*6/uL    Hemoglobin 8.6 (L) 13.5 - 17.5 g/dL    Hematocrit 25.4 (L) 41.0 - 52.0 %    MCV 90 80 - 100 fL    MCH 30.6 26.0 - 34.0 pg    MCHC 33.9 32.0 - 36.0 g/dL    RDW 16.7 (H) 11.5 - 14.5 %    Platelets 123 (L) 150 - 450 x10*3/uL    Neutrophils % 73.0 40.0 - 80.0 %    Immature Granulocytes %, Automated 0.6 0.0 - 0.9 %    Lymphocytes % 9.2 13.0 - 44.0 %    Monocytes % 16.9 2.0 - 10.0 %    Eosinophils % 0.0 0.0 - 6.0 %    Basophils % 0.3 0.0 - 2.0 %    Neutrophils Absolute 2.63 1.20 - 7.70 x10*3/uL    Immature Granulocytes Absolute, Automated 0.02 0.00 - 0.70 x10*3/uL    Lymphocytes Absolute 0.33 (L) 1.20 - 4.80 x10*3/uL    Monocytes Absolute 0.61 0.10 - 1.00 x10*3/uL    Eosinophils Absolute 0.00 0.00 - 0.70 x10*3/uL    Basophils Absolute 0.01 0.00 - 0.10 x10*3/uL   Magnesium   Result Value Ref Range    Magnesium 1.84 1.60 - 2.40 mg/dL   Hepatic Function Panel   Result Value Ref Range    Albumin 4.6 3.4 - 5.0 g/dL    Bilirubin, Total 0.6 0.0 - 1.2 mg/dL    Bilirubin, Direct 0.1 0.0 - 0.3 mg/dL    Alkaline Phosphatase 67 33 - 120 U/L    ALT 19 10 - 52 U/L    AST 17 9 - 39 U/L    Total Protein 6.3 (L) 6.4 - 8.2 g/dL   Lactate dehydrogenase   Result Value Ref Range     84 - 246 U/L   Uric Acid   Result Value Ref Range    Uric Acid 6.4 4.0 - 7.5 mg/dL   Phosphorus   Result Value Ref Range    Phosphorus 3.6 2.5 - 4.9 mg/dL   Basic Metabolic Panel   Result Value Ref Range    Glucose 127 (H) 74 - 99 mg/dL    Sodium 143 136 - 145 mmol/L    Potassium 3.9 3.5 - 5.3 mmol/L    Chloride 103 98 - 107 mmol/L    Bicarbonate 29 21 - 32 mmol/L    Anion Gap 15 10 - 20 mmol/L    Urea Nitrogen 19 6 - 23 mg/dL    Creatinine 1.14 0.50 - 1.30 mg/dL    eGFR >90 >60 mL/min/1.73m*2    Calcium 9.6 8.6 - 10.6 mg/dL          Assessment/Plan   Principal Problem:    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)      Shilo Thakkar is a 23 y.o. male with PMH  of Burkitt Lymphoma (Dx 10/20/23; s/p 6 cycles of HyperCVAD), malignant pleural effusions s/p CT (10/29/23), ADHD, and BL retinoschisis and retinal hemorrhages, s/p left vitrectomy who presents as a direct admission on 4/1/24 for C7 HCVAD.    Day 1, Cycle 7 of HyperCVAD  - Rituxan 700mg IV on days 1 and day 11  - Cyclophosphamide 500mg IV every 12 hrs x 6 doses w/Mesna  - Doxorubicin 96mg in NS IV every 24 hrs on days 4 and 5  - Vincristine 2mg IV on days 4 and 11  - Dexamethasone 40mg daily on days 11 through 14  - Plan IT Methotrexate 12mg IT        ONC  # Burkitt lymphoma, high risk, MYC positive, newly diagnosed (10/20/23)  - 10/19/23 presented to H ED with dysphagia and abdominal pain  - CT A/P with IV contrast (10/19) mass in the body of the pancreas measuring approximately 2.7 x 2.9 cm. Multiple masses in the mesentery as well as omental caking c/q carcinomatosis.   - CT soft tissue neck w/ IV contrast (10/19): homogeneous soft tissue attenuation lesion in the L oropharynx which measures approximately 3.0 x 4.0 x 5.1 cm.   - CT Abdomen w/o IV contrast (10/20): Mesenteric lymphadenopathy  - US guided mesenteric LN biopsy (10/20/23): HIGH GRADE B-CELL LYMPHOMA MORPHOLOGICALLY C/W BURKITT LYMPHOMA; flow cytometry: CD10+ kappa restricted B cells supportive of a B cell lymphoma with germinal center origin.   - Biopsy L oropharyngeal mass and L cervical LN by ENT (10/23/23) high grade B-cell burkitt lymphoma  - PET CT (10/30) showed FDG avid left oropharyngeal mass and LAD above and below diaphragm  - BMBx (11/1) without evidence of disease  - s/p 6 cycles of HyperCVAD (10/28, 11/19, 12/11, 1/2/24, 2/10, 3/06)  - PET scan (1/15/24): [place] 2. Complete resolution of metabolic activity of previously seen Lt oropharyngeal mass, resolution of previously seen hypermetabolic Lt level 2 and 3 cervical lymphadenopathy, resolution of previously seen moderate-large size Lt pleural effusion and trace Rt pleural effusion  and abdominopelvic ascites, resolution of previously seen hypermetabolic large mass within pancreatic tail/body and the previously seen hypermetabolic retroperitoneal and mesenteric lymphadenopathy.  - Cycle 5 initially delayed a little over a week due to recent hospitalization from 1/17-1/23/24 for visual changes, retinoschisis, retinal hemorrhages, and profound pancytopenia requiring left eye vitrectomy  - Plan total of 8 cycles of therapy   - Now admitted for cycle 7 of HyperCVAD        OPTHO  -Recent hx/o bilateral macula-involving sub ILM hemorrhage with serous detachment OS>OD  -Multilayer retinal hemorrhage, left eye.   -S/p pars plana vitrectomy (PPV)/C3F8/intraretinal tPA OS  -S/P PPV/MP/EL/air O  -Last seen by Ophthalm in outpatient clinic on 2/19. Per recs, Taper Prednisolone 3x/day OU x 2 weeks then BID x 2 weeks, continue Cosopt BID OD, Taper Ketorolac TID OU x 2 weeks, BID x 2 weeks and artificial tears QID to the left eye.  -Okay to proceed with chemotherapy.   - Prefer to keep Plts 50-70,000 and recommend avoiding PLt levels below 30K  - Pt reports baseline vision unchanged recently upon admit; right eye vision is blurry, left eye no vision  - May need repeat surgery in the Lt eye (after this cycle or the next) if the vitreous hemorrhage persists or retina detaches  -Needs follow up with Ophthalm outpatient (Dr. Abbi Canales)     HEME  # Anemia without active bleeding  - Continue home ferrous sulfate 325mg BID started by Dr. Klein  - Transfuse to keep Hgb>8.5, Plt>50-70,000 and avoid dropping below 30,000 per ophtho requirements with hx/o retinal hemorrhages     CARDIAC  - ECHO (2/10/24) LVEF 60-65%     ID  Allergies: NKDA  Afebrile on admit  PPX: Acyclovir 400mg BID, Bactrim MWF  - Please address if needs to be on fluconazole for antifungal ppx, currently not in his home meds list  - Plan Levaquin for Day 5-15 of each cycle for antibacterial prophylaxis       FEN/GI  - Admit weight: 75.2 kg  -  Monitor and replace electrolytes as needed  - Cont Vit B complex  -ppx: Continue Pantoprazole 40mg qd     ENDO  -CTA neck (1/17) with 6 mm nodule in the left lobe of the thyroid gland   -TSH level (1/20): 1.35       DISPO  - Full Code  - Access: PICC line  - Primary Onc: Dr. Klein  - NOK: Ms Deidra Pickett [phone]  - Plan 3x weekly count checks   - Scheduled for count check/Neulasta/Mal heme clinic at [facility]  - 4/8 Count check ([facility])  - Has follow up with Dr. Klein on 4/17  - Plan weekly Malignant Heme clinic follow up on 4/12 and 4/26         I spent 60 minutes in the professional and overall care of this patient.      JOSELYN AlvarezC

## 2024-04-02 LAB
ALBUMIN SERPL BCP-MCNC: 4.2 G/DL (ref 3.4–5)
ANION GAP SERPL CALC-SCNC: 12 MMOL/L (ref 10–20)
BASOPHILS # BLD AUTO: 0.01 X10*3/UL (ref 0–0.1)
BASOPHILS NFR BLD AUTO: 0.1 %
BUN SERPL-MCNC: 15 MG/DL (ref 6–23)
CALCIUM SERPL-MCNC: 9.3 MG/DL (ref 8.6–10.6)
CHLORIDE SERPL-SCNC: 106 MMOL/L (ref 98–107)
CO2 SERPL-SCNC: 26 MMOL/L (ref 21–32)
CREAT SERPL-MCNC: 1.03 MG/DL (ref 0.5–1.3)
EGFRCR SERPLBLD CKD-EPI 2021: >90 ML/MIN/1.73M*2
EOSINOPHIL # BLD AUTO: 0 X10*3/UL (ref 0–0.7)
EOSINOPHIL NFR BLD AUTO: 0 %
ERYTHROCYTE [DISTWIDTH] IN BLOOD BY AUTOMATED COUNT: 17.2 % (ref 11.5–14.5)
GLUCOSE SERPL-MCNC: 157 MG/DL (ref 74–99)
HCT VFR BLD AUTO: 26.7 % (ref 41–52)
HGB BLD-MCNC: 8.6 G/DL (ref 13.5–17.5)
IMM GRANULOCYTES # BLD AUTO: 0.04 X10*3/UL (ref 0–0.7)
IMM GRANULOCYTES NFR BLD AUTO: 0.6 % (ref 0–0.9)
LYMPHOCYTES # BLD AUTO: 0.2 X10*3/UL (ref 1.2–4.8)
LYMPHOCYTES NFR BLD AUTO: 2.9 %
MAGNESIUM SERPL-MCNC: 1.84 MG/DL (ref 1.6–2.4)
MCH RBC QN AUTO: 30.2 PG (ref 26–34)
MCHC RBC AUTO-ENTMCNC: 32.2 G/DL (ref 32–36)
MCV RBC AUTO: 94 FL (ref 80–100)
MONOCYTES # BLD AUTO: 0.08 X10*3/UL (ref 0.1–1)
MONOCYTES NFR BLD AUTO: 1.2 %
NEUTROPHILS # BLD AUTO: 6.52 X10*3/UL (ref 1.2–7.7)
NEUTROPHILS NFR BLD AUTO: 95.2 %
NRBC BLD-RTO: 0 /100 WBCS (ref 0–0)
PHOSPHATE SERPL-MCNC: 2.2 MG/DL (ref 2.5–4.9)
PLATELET # BLD AUTO: 125 X10*3/UL (ref 150–450)
POTASSIUM SERPL-SCNC: 4.4 MMOL/L (ref 3.5–5.3)
RBC # BLD AUTO: 2.85 X10*6/UL (ref 4.5–5.9)
SODIUM SERPL-SCNC: 140 MMOL/L (ref 136–145)
WBC # BLD AUTO: 6.9 X10*3/UL (ref 4.4–11.3)

## 2024-04-02 PROCEDURE — 2500000005 HC RX 250 GENERAL PHARMACY W/O HCPCS: Performed by: PHYSICIAN ASSISTANT

## 2024-04-02 PROCEDURE — 83735 ASSAY OF MAGNESIUM: CPT | Performed by: PHYSICIAN ASSISTANT

## 2024-04-02 PROCEDURE — 99232 SBSQ HOSP IP/OBS MODERATE 35: CPT | Performed by: STUDENT IN AN ORGANIZED HEALTH CARE EDUCATION/TRAINING PROGRAM

## 2024-04-02 PROCEDURE — 2500000004 HC RX 250 GENERAL PHARMACY W/ HCPCS (ALT 636 FOR OP/ED): Performed by: STUDENT IN AN ORGANIZED HEALTH CARE EDUCATION/TRAINING PROGRAM

## 2024-04-02 PROCEDURE — 85025 COMPLETE CBC W/AUTO DIFF WBC: CPT | Performed by: PHYSICIAN ASSISTANT

## 2024-04-02 PROCEDURE — 1170000001 HC PRIVATE ONCOLOGY ROOM DAILY

## 2024-04-02 PROCEDURE — 2500000001 HC RX 250 WO HCPCS SELF ADMINISTERED DRUGS (ALT 637 FOR MEDICARE OP): Performed by: PHYSICIAN ASSISTANT

## 2024-04-02 PROCEDURE — 80069 RENAL FUNCTION PANEL: CPT | Performed by: PHYSICIAN ASSISTANT

## 2024-04-02 RX ORDER — SODIUM,POTASSIUM PHOSPHATES 280-250MG
1 POWDER IN PACKET (EA) ORAL 4 TIMES DAILY
Status: COMPLETED | OUTPATIENT
Start: 2024-04-02 | End: 2024-04-02

## 2024-04-02 RX ADMIN — PREDNISOLONE ACETATE 1 DROP: 10 SUSPENSION/ DROPS OPHTHALMIC at 14:05

## 2024-04-02 RX ADMIN — CARBOXYMETHYLCELLULOSE SODIUM 1 DROP: 5 SOLUTION/ DROPS OPHTHALMIC at 13:18

## 2024-04-02 RX ADMIN — PREDNISOLONE ACETATE 1 DROP: 10 SUSPENSION/ DROPS OPHTHALMIC at 20:26

## 2024-04-02 RX ADMIN — DORZOLAMIDE HYDROCHLORIDE AND TIMOLOL MALEATE 1 DROP: 20; 5 SOLUTION/ DROPS OPHTHALMIC at 09:00

## 2024-04-02 RX ADMIN — FERROUS SULFATE TAB 325 MG (65 MG ELEMENTAL FE) 1 TABLET: 325 (65 FE) TAB at 09:53

## 2024-04-02 RX ADMIN — SODIUM CHLORIDE 100 ML/HR: 9 INJECTION, SOLUTION INTRAVENOUS at 06:21

## 2024-04-02 RX ADMIN — POTASSIUM & SODIUM PHOSPHATES POWDER PACK 280-160-250 MG 1 PACKET: 280-160-250 PACK at 14:06

## 2024-04-02 RX ADMIN — KETOROLAC TROMETHAMINE 1 DROP: 5 SOLUTION OPHTHALMIC at 20:25

## 2024-04-02 RX ADMIN — CARBOXYMETHYLCELLULOSE SODIUM 1 DROP: 5 SOLUTION/ DROPS OPHTHALMIC at 06:21

## 2024-04-02 RX ADMIN — ACYCLOVIR 400 MG: 400 TABLET ORAL at 20:24

## 2024-04-02 RX ADMIN — CARBOXYMETHYLCELLULOSE SODIUM 1 DROP: 5 SOLUTION/ DROPS OPHTHALMIC at 20:26

## 2024-04-02 RX ADMIN — MESNA 1150 MG: 100 INJECTION, SOLUTION INTRAVENOUS at 21:32

## 2024-04-02 RX ADMIN — DEXAMETHASONE 40 MG: 4 TABLET ORAL at 09:53

## 2024-04-02 RX ADMIN — CYCLOPHOSPHAMIDE 500 MG: 1 INJECTION, POWDER, FOR SOLUTION INTRAVENOUS; ORAL at 22:16

## 2024-04-02 RX ADMIN — DORZOLAMIDE HYDROCHLORIDE AND TIMOLOL MALEATE 1 DROP: 20; 5 SOLUTION/ DROPS OPHTHALMIC at 20:26

## 2024-04-02 RX ADMIN — ONDANSETRON 16 MG: 2 INJECTION INTRAMUSCULAR; INTRAVENOUS at 21:32

## 2024-04-02 RX ADMIN — ACYCLOVIR 400 MG: 400 TABLET ORAL at 09:53

## 2024-04-02 RX ADMIN — CYCLOPHOSPHAMIDE 500 MG: 1 INJECTION, POWDER, FOR SOLUTION INTRAVENOUS; ORAL at 09:59

## 2024-04-02 RX ADMIN — CARBOXYMETHYLCELLULOSE SODIUM 1 DROP: 5 SOLUTION/ DROPS OPHTHALMIC at 17:09

## 2024-04-02 RX ADMIN — PANTOPRAZOLE SODIUM 40 MG: 40 TABLET, DELAYED RELEASE ORAL at 09:53

## 2024-04-02 RX ADMIN — FERROUS SULFATE TAB 325 MG (65 MG ELEMENTAL FE) 1 TABLET: 325 (65 FE) TAB at 20:24

## 2024-04-02 RX ADMIN — POTASSIUM & SODIUM PHOSPHATES POWDER PACK 280-160-250 MG 1 PACKET: 280-160-250 PACK at 17:09

## 2024-04-02 RX ADMIN — KETOROLAC TROMETHAMINE 1 DROP: 5 SOLUTION OPHTHALMIC at 09:53

## 2024-04-02 RX ADMIN — Medication 1 TABLET: at 09:59

## 2024-04-02 RX ADMIN — KETOROLAC TROMETHAMINE 1 DROP: 5 SOLUTION OPHTHALMIC at 14:05

## 2024-04-02 RX ADMIN — PREDNISOLONE ACETATE 1 DROP: 10 SUSPENSION/ DROPS OPHTHALMIC at 09:53

## 2024-04-02 ASSESSMENT — PAIN SCALES - GENERAL
PAINLEVEL_OUTOF10: 0 - NO PAIN
PAINLEVEL_OUTOF10: 0 - NO PAIN

## 2024-04-02 ASSESSMENT — PAIN - FUNCTIONAL ASSESSMENT
PAIN_FUNCTIONAL_ASSESSMENT: 0-10
PAIN_FUNCTIONAL_ASSESSMENT: 0-10

## 2024-04-02 NOTE — PROGRESS NOTES
"Shilo Thakkar is a 23 y.o. 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     Subjective   Patient did well overnight, no acute events. Received Rituxan rapid infusion and cyclophosphamide with Mesna infusing over 24 hours. Tolerated well.  Denies fevers, chills, nausea, vomiting, pain, abdominal pain. Denies diarrhea or constipation. Denies chest pain or SOB. No changes to vision.      Objective     Physical Exam  HENT:      Head: Normocephalic.      Mouth/Throat:      Mouth: Mucous membranes are moist.   Cardiovascular:      Rate and Rhythm: Normal rate and regular rhythm.   Pulmonary:      Effort: Pulmonary effort is normal.      Breath sounds: Normal breath sounds.   Abdominal:      General: There is no distension.      Palpations: Abdomen is soft.      Tenderness: There is no abdominal tenderness. There is no guarding.   Musculoskeletal:         General: Normal range of motion.   Skin:     General: Skin is warm.   Neurological:      General: No focal deficit present.      Mental Status: He is alert and oriented to person, place, and time.      Sensory: No sensory deficit.   Psychiatric:         Mood and Affect: Mood normal.         Behavior: Behavior normal.         Last Recorded Vitals  Blood pressure 125/73, pulse 50, temperature 36.3 °C (97.3 °F), temperature source Temporal, resp. rate 16, height 1.818 m (5' 11.58\"), weight 75.2 kg (165 lb 12.6 oz), SpO2 100 %.  Intake/Output last 3 Shifts:  I/O last 3 completed shifts:  In: 519 (6.9 mL/kg) [IV Piggyback:519]  Out: - (0 mL/kg)   Weight: 75.2 kg     Relevant Results    Scheduled medications  acyclovir, 400 mg, oral, BID  B complex-vitamin C, 1 tablet, oral, Daily  cyclophosphamide, 250 mg/m2 (Treatment Plan Recorded), intravenous, q12h  cyclophosphamide, 250 mg/m2 (Treatment Plan Recorded), intravenous, q12h  dexAMETHasone, 40 mg, oral, q AM  dorzolamide-timolol (PF), 1 " drop, Right Eye, BID  ferrous sulfate (325 mg ferrous sulfate), 1 tablet, oral, BID  ketorolac, 1 drop, Both Eyes, TID  lubricating eye drops, 1 drop, Left Eye, 4x daily  mesna, 600 mg/m2 (Treatment Plan Recorded), intravenous, Once  mesna, 600 mg/m2 (Treatment Plan Recorded), intravenous, Once  methotrexate (PF), 12 mg, intrathecal, Once  ondansetron, 16 mg, intravenous, Once  pantoprazole, 40 mg, oral, Daily before breakfast  potassium, sodium phosphates, 1 packet, oral, 4x daily  prednisoLONE acetate, 1 drop, Both Eyes, TID      Continuous medications  sodium chloride 0.9%, 100 mL/hr, Last Rate: 100 mL/hr (04/02/24 0621)      PRN medications  PRN medications: albuterol, alteplase, dextrose, diphenhydrAMINE, EPINEPHrine, famotidine, methylPREDNISolone sodium succinate (PF), ondansetron, prochlorperazine, prochlorperazine, sennosides-docusate sodium, sodium chloride    Results for orders placed or performed during the hospital encounter of 04/01/24 (from the past 24 hour(s))   CBC and Auto Differential   Result Value Ref Range    WBC 3.6 (L) 4.4 - 11.3 x10*3/uL    nRBC 0.0 0.0 - 0.0 /100 WBCs    RBC 2.81 (L) 4.50 - 5.90 x10*6/uL    Hemoglobin 8.6 (L) 13.5 - 17.5 g/dL    Hematocrit 25.4 (L) 41.0 - 52.0 %    MCV 90 80 - 100 fL    MCH 30.6 26.0 - 34.0 pg    MCHC 33.9 32.0 - 36.0 g/dL    RDW 16.7 (H) 11.5 - 14.5 %    Platelets 123 (L) 150 - 450 x10*3/uL    Neutrophils % 73.0 40.0 - 80.0 %    Immature Granulocytes %, Automated 0.6 0.0 - 0.9 %    Lymphocytes % 9.2 13.0 - 44.0 %    Monocytes % 16.9 2.0 - 10.0 %    Eosinophils % 0.0 0.0 - 6.0 %    Basophils % 0.3 0.0 - 2.0 %    Neutrophils Absolute 2.63 1.20 - 7.70 x10*3/uL    Immature Granulocytes Absolute, Automated 0.02 0.00 - 0.70 x10*3/uL    Lymphocytes Absolute 0.33 (L) 1.20 - 4.80 x10*3/uL    Monocytes Absolute 0.61 0.10 - 1.00 x10*3/uL    Eosinophils Absolute 0.00 0.00 - 0.70 x10*3/uL    Basophils Absolute 0.01 0.00 - 0.10 x10*3/uL   Magnesium   Result Value Ref  Range    Magnesium 1.84 1.60 - 2.40 mg/dL   Hepatic Function Panel   Result Value Ref Range    Albumin 4.6 3.4 - 5.0 g/dL    Bilirubin, Total 0.6 0.0 - 1.2 mg/dL    Bilirubin, Direct 0.1 0.0 - 0.3 mg/dL    Alkaline Phosphatase 67 33 - 120 U/L    ALT 19 10 - 52 U/L    AST 17 9 - 39 U/L    Total Protein 6.3 (L) 6.4 - 8.2 g/dL   Lactate dehydrogenase   Result Value Ref Range     84 - 246 U/L   Uric Acid   Result Value Ref Range    Uric Acid 6.4 4.0 - 7.5 mg/dL   Phosphorus   Result Value Ref Range    Phosphorus 3.6 2.5 - 4.9 mg/dL   Basic Metabolic Panel   Result Value Ref Range    Glucose 127 (H) 74 - 99 mg/dL    Sodium 143 136 - 145 mmol/L    Potassium 3.9 3.5 - 5.3 mmol/L    Chloride 103 98 - 107 mmol/L    Bicarbonate 29 21 - 32 mmol/L    Anion Gap 15 10 - 20 mmol/L    Urea Nitrogen 19 6 - 23 mg/dL    Creatinine 1.14 0.50 - 1.30 mg/dL    eGFR >90 >60 mL/min/1.73m*2    Calcium 9.6 8.6 - 10.6 mg/dL   Sars-CoV-2 PCR   Result Value Ref Range    Coronavirus 2019, PCR Not Detected Not Detected   Coagulation Screen   Result Value Ref Range    Protime 12.1 9.8 - 12.8 seconds    INR 1.1 0.9 - 1.1    aPTT 35 27 - 38 seconds   CBC and Auto Differential   Result Value Ref Range    WBC 6.9 4.4 - 11.3 x10*3/uL    nRBC 0.0 0.0 - 0.0 /100 WBCs    RBC 2.85 (L) 4.50 - 5.90 x10*6/uL    Hemoglobin 8.6 (L) 13.5 - 17.5 g/dL    Hematocrit 26.7 (L) 41.0 - 52.0 %    MCV 94 80 - 100 fL    MCH 30.2 26.0 - 34.0 pg    MCHC 32.2 32.0 - 36.0 g/dL    RDW 17.2 (H) 11.5 - 14.5 %    Platelets 125 (L) 150 - 450 x10*3/uL    Neutrophils % 95.2 40.0 - 80.0 %    Immature Granulocytes %, Automated 0.6 0.0 - 0.9 %    Lymphocytes % 2.9 13.0 - 44.0 %    Monocytes % 1.2 2.0 - 10.0 %    Eosinophils % 0.0 0.0 - 6.0 %    Basophils % 0.1 0.0 - 2.0 %    Neutrophils Absolute 6.52 1.20 - 7.70 x10*3/uL    Immature Granulocytes Absolute, Automated 0.04 0.00 - 0.70 x10*3/uL    Lymphocytes Absolute 0.20 (L) 1.20 - 4.80 x10*3/uL    Monocytes Absolute 0.08 (L) 0.10  - 1.00 x10*3/uL    Eosinophils Absolute 0.00 0.00 - 0.70 x10*3/uL    Basophils Absolute 0.01 0.00 - 0.10 x10*3/uL   Renal function panel   Result Value Ref Range    Glucose 157 (H) 74 - 99 mg/dL    Sodium 140 136 - 145 mmol/L    Potassium 4.4 3.5 - 5.3 mmol/L    Chloride 106 98 - 107 mmol/L    Bicarbonate 26 21 - 32 mmol/L    Anion Gap 12 10 - 20 mmol/L    Urea Nitrogen 15 6 - 23 mg/dL    Creatinine 1.03 0.50 - 1.30 mg/dL    eGFR >90 >60 mL/min/1.73m*2    Calcium 9.3 8.6 - 10.6 mg/dL    Phosphorus 2.2 (L) 2.5 - 4.9 mg/dL    Albumin 4.2 3.4 - 5.0 g/dL   Magnesium   Result Value Ref Range    Magnesium 1.84 1.60 - 2.40 mg/dL       This patient has a central line   Reason for the central line remaining today?  Chemotherapy        Malnutrition    I agree with the dietitian's malnutrition diagnosis.      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 male with PMH of Burkitt Lymphoma (Dx 10/20/23; s/p 4 cycles of HyperCVAD), malignant pleural effusions s/p CT (10/29/23), ADHD, and BL retinoschisis and retinal hemorrhages, s/p left vitrectomy who presents as a direct admission on 4/2/2024 for cycle 7 of HyperCVAD.      Day 2, Cycle 7 of HyperCVAD  - Rituxan 700mg IV on days 1 and day 11  - Cyclophosphamide 500mg IV every 12 hrs x 6 doses w/Mesna  - Doxorubicin 96mg in NS IV every 24 hrs on days 4 and 5  - Vincristine 2mg IV on days 4 and 11  - Dexamethasone 40mg daily on days 1-4 and 11-14  - Plan IT Methotrexate 12mg IT     ONC  #Burkitt lymphoma, high risk, MYC positive, newly diagnosed (10/20/23)  - 10/19/23 presented to OSH ED with dysphagia and abdominal pain  - CT A/P with IV contrast (10/19) mass in the body of the pancreas measuring approximately 2.7 x 2.9 cm. Multiple masses in the mesentery as well as omental caking c/q carcinomatosis.   - CT soft tissue neck w/ IV contrast (10/19): homogeneous soft tissue attenuation lesion in  the L oropharynx which measures approximately 3.0 x 4.0 x 5.1 cm.   - CT Abdomen w/o IV contrast (10/20): Mesenteric lymphadenopathy  - US guided mesenteric LN biopsy (10/20/23): HIGH GRADE B-CELL LYMPHOMA MORPHOLOGICALLY C/W BURKITT LYMPHOMA; flow cytometry: CD10+ kappa restricted B cells supportive of a B cell lymphoma with germinal center origin.   - Biopsy L oropharyngeal mass and L cervical LN by ENT (10/23/23) high grade B-cell burkitt lymphoma  - PET CT (10/30) showed FDG avid left oropharyngeal mass and LAD above and below diaphragm  - BMBx (11/1) without evidence of disease  - s/p 6 cycles of HyperCVAD (10/28, 11/19, 12/11, 1/2/24, 2/10, 3/06)  - PET scan (1/15/24): Deauville 2. Complete resolution of metabolic activity of previously seen Lt oropharyngeal mass, resolution of previously seen hypermetabolic Lt level 2 and 3 cervical lymphadenopathy, resolution of previously seen moderate-large size Lt pleural effusion and trace Rt pleural effusion and abdominopelvic ascites, resolution of previously seen hypermetabolic large mass within pancreatic tail/body and the previously seen hypermetabolic retroperitoneal and mesenteric lymphadenopathy.  - Cycle 5 initially delayed a little over a week due to recent hospitalization from 1/17-1/23/24 for visual changes, retinoschisis, retinal hemorrhages, and profound pancytopenia requiring left eye vitrectomy  - Plan total of 8 cycles of therapy   - Now admitted for cycle 7 of HyperCVAD   - Tolerated Rituximab and cyclophosphamide infusions, Mesna infusing over 24 hours.   - Plan for intrathecal methotrexate tomorrow      OPTHO  -Recent hx/o bilateral macula-involving sub ILM hemorrhage with serous detachment OS>OD  -Multilayer retinal hemorrhage, left eye.   -S/p pars plana vitrectomy (PPV)/C3F8/intraretinal tPA OS  -S/P PPV/MP/EL/air O  -Last seen by Ophthalm in outpatient clinic on 2/19. Per recs, Taper Prednisolone 3x/day OU x 2 weeks then BID x 2 weeks, continue  Cosopt BID OD, Taper Ketorolac TID OU x 2 weeks, BID x 2 weeks and artificial tears QID to the left eye.  -Okay to proceed with chemotherapy.   - Prefer to keep Plts 50-70,000 and recommend avoiding PLt levels below 30K  - Pt reports baseline vision unchanged upon admit and today; right eye vision is blurry, left eye no vision  - May need repeat surgery in the Lt eye (after this cycle or the next) if the vitreous hemorrhage persists or retina detaches  -Needs follow up with Ophthalm outpatient (Dr. Abbi Canales)     HEME  #Anemia without active bleeding  - Continue home ferrous sulfate 325mg BID started by Dr. Klein  - Transfuse to keep Hgb>8.5, Plt>50-70,000 and avoid dropping below 30,000 per ophtho requirements with hx/o retinal hemorrhages     CARDIAC  - ECHO (2/10/24) LVEF 60-65%     ID  Allergies: NKDA  Afebrile on admit  PPX: Acyclovir 400mg BID, Bactrim MWF  - Please address if needs to be on fluconazole for antifungal ppx, currently not in his home meds list  - Plan Levaquin for Day 5-15 of each cycle for antibacterial prophylaxis       FEN/GI  - Admit weight: 75.2 kg  - Monitor and replace electrolytes as needed  - Cont Vit B complex  -ppx: Continue Pantoprazole 40mg every day  -Phosphorus low at 2.2 today, phosphorus replacement ordered     ENDO  -CTA neck (1/17) with 6 mm nodule in the left lobe of the thyroid gland   -TSH level (1/20): 1.35       DISPO  - Full Code  - Access: PICC line  - Primary Onc: Dr. Klein  - NOK: Ms Deidra Pickett [phone]  - Plan 3x weekly count checks   - Scheduled for count check/Neulasta/Mal heme clinic at [facility]  - 4/8 Count check ([facility])  - Has follow up with Dr. Klein on 4/17  - Plan weekly Malignant Heme clinic follow up on 4/12 and 4/26       TEMITOPE Heller-Student

## 2024-04-02 NOTE — HOSPITAL COURSE
Shilo Thakkar is a 23 y.o. male with PMH of Burkitt Lymphoma (Dx 10/20/23; s/p 4 cycles of HyperCVAD), malignant pleural effusions s/p CT (10/29/23), ADHD, and BL retinoschisis and retinal hemorrhages, s/p left vitrectomy who presents as a direct admission on 4/2/2024 for cycle 7 of HyperCVAD. Tolerated chemotherapy well. Received IT Methotrexate 12mg on 4/3. Flow on CSF is pending.     Hx/o bilateral macula-involving sub ILM hemorrhage with serous detachment OS>OD  Multilayer retinal hemorrhage, left eye.  - Transfuse to keep Hgb>8.5, Plt>50-70,000 and avoid dropping below 30,000 per ophtho requirements with hx/o retinal hemorrhages    Also noted during this visit patient had some Bradycardia Hr was done in the 40-50's.   EKG showed SB.  Tele showed some bradycardia that returned to normal with  Help Prn Zofran and placed on   Plan ACV, Fluconazole, Bactrim, and Levaquin prophy at discharge     Access: PICC line  Plan 3x weekly count checks   Scheduled for Neulasta/Mal heme clinic at [facility] 4/7/24  Next count check on 4/10 Count check at [facility]   Has follow up with Dr. Klein on 4/17  Plan weekly Malignant Heme clinic follow up on 4/12 and 4/26

## 2024-04-02 NOTE — NURSING NOTE
CHEMO NOTE    Dose # 1 of 6 Cyclophosphamide 500mg in 125mL given over 3 hours via right PICC catheter.  Dose up at 0959 and down at 1259.  Pre-medicated with IV zofran.  Patient, dose and rate verified with second RN, Cathy .  + BBR obtained via syringe aspiration before and after administration.  Patient with no side effects.

## 2024-04-02 NOTE — CARE PLAN
The patient's goals for the shift include      The clinical goals for the shift include Patient will remain HDS this shift    VSS, afebrile. No c/o pain or nausea. Tolerated Rituxan rapid infusion and cyclophosphamide. Mesna infusing over 24 hours. No other needs at this time

## 2024-04-03 LAB
ALBUMIN SERPL BCP-MCNC: 3.9 G/DL (ref 3.4–5)
ALBUMIN SERPL BCP-MCNC: 4.1 G/DL (ref 3.4–5)
ALP SERPL-CCNC: 52 U/L (ref 33–120)
ALT SERPL W P-5'-P-CCNC: 21 U/L (ref 10–52)
ANION GAP SERPL CALC-SCNC: 11 MMOL/L (ref 10–20)
APPEARANCE CSF: CLEAR
AST SERPL W P-5'-P-CCNC: 18 U/L (ref 9–39)
BASOPHILS # BLD AUTO: 0 X10*3/UL (ref 0–0.1)
BASOPHILS NFR BLD AUTO: 0 %
BASOPHILS NFR CSF MANUAL: 0 %
BILIRUB DIRECT SERPL-MCNC: 0.1 MG/DL (ref 0–0.3)
BILIRUB SERPL-MCNC: 0.4 MG/DL (ref 0–1.2)
BLASTS CSF MANUAL: 0 %
BUN SERPL-MCNC: 10 MG/DL (ref 6–23)
CALCIUM SERPL-MCNC: 9.4 MG/DL (ref 8.6–10.6)
CHLORIDE SERPL-SCNC: 106 MMOL/L (ref 98–107)
CO2 SERPL-SCNC: 29 MMOL/L (ref 21–32)
COLOR CSF: COLORLESS
COLOR SPUN CSF: COLORLESS
CREAT SERPL-MCNC: 0.83 MG/DL (ref 0.5–1.3)
EGFRCR SERPLBLD CKD-EPI 2021: >90 ML/MIN/1.73M*2
EOSINOPHIL # BLD AUTO: 0 X10*3/UL (ref 0–0.7)
EOSINOPHIL NFR BLD AUTO: 0 %
EOSINOPHIL NFR CSF MANUAL: 0 %
ERYTHROCYTE [DISTWIDTH] IN BLOOD BY AUTOMATED COUNT: 18.2 % (ref 11.5–14.5)
GLUCOSE CSF-MCNC: 88 MG/DL (ref 40–70)
GLUCOSE SERPL-MCNC: 129 MG/DL (ref 74–99)
HCT VFR BLD AUTO: 24 % (ref 41–52)
HGB BLD-MCNC: 8.1 G/DL (ref 13.5–17.5)
IMM GRANULOCYTES # BLD AUTO: 0.05 X10*3/UL (ref 0–0.7)
IMM GRANULOCYTES NFR BLD AUTO: 0.6 % (ref 0–0.9)
IMM GRANULOCYTES NFR CSF: 0 %
LDH SERPL L TO P-CCNC: 171 U/L (ref 84–246)
LYMPHOCYTES # BLD AUTO: 0.18 X10*3/UL (ref 1.2–4.8)
LYMPHOCYTES NFR BLD AUTO: 2.2 %
LYMPHOCYTES NFR CSF MANUAL: 14 % (ref 28–96)
MAGNESIUM SERPL-MCNC: 1.5 MG/DL (ref 1.6–2.4)
MCH RBC QN AUTO: 30.8 PG (ref 26–34)
MCHC RBC AUTO-ENTMCNC: 33.8 G/DL (ref 32–36)
MCV RBC AUTO: 91 FL (ref 80–100)
MONOCYTES # BLD AUTO: 0.22 X10*3/UL (ref 0.1–1)
MONOCYTES NFR BLD AUTO: 2.7 %
MONOS+MACROS NFR CSF MANUAL: 71 % (ref 16–56)
NEUTROPHILS # BLD AUTO: 7.67 X10*3/UL (ref 1.2–7.7)
NEUTROPHILS NFR BLD AUTO: 94.5 %
NEUTS SEG NFR CSF MANUAL: 14 % (ref 0–5)
NRBC BLD-RTO: 0 /100 WBCS (ref 0–0)
OTHER CELLS NFR CSF MANUAL: 0 %
PHOSPHATE SERPL-MCNC: 3.5 MG/DL (ref 2.5–4.9)
PLASMA CELLS NFR CSF MICRO: 0 %
PLATELET # BLD AUTO: 113 X10*3/UL (ref 150–450)
POTASSIUM SERPL-SCNC: 3.7 MMOL/L (ref 3.5–5.3)
PROT CSF-MCNC: 45 MG/DL (ref 15–45)
PROT SERPL-MCNC: 5.9 G/DL (ref 6.4–8.2)
RBC # BLD AUTO: 2.63 X10*6/UL (ref 4.5–5.9)
RBC # CSF AUTO: 53 /UL (ref 0–5)
SODIUM SERPL-SCNC: 142 MMOL/L (ref 136–145)
TOTAL CELLS COUNTED CSF: 14
TUBE # CSF: ABNORMAL
URATE SERPL-MCNC: 3.9 MG/DL (ref 4–7.5)
WBC # BLD AUTO: 8.1 X10*3/UL (ref 4.4–11.3)
WBC # CSF AUTO: 2 /UL (ref 1–5)

## 2024-04-03 PROCEDURE — 2500000001 HC RX 250 WO HCPCS SELF ADMINISTERED DRUGS (ALT 637 FOR MEDICARE OP): Performed by: PHYSICIAN ASSISTANT

## 2024-04-03 PROCEDURE — 2500000004 HC RX 250 GENERAL PHARMACY W/ HCPCS (ALT 636 FOR OP/ED): Performed by: PHYSICIAN ASSISTANT

## 2024-04-03 PROCEDURE — 3E0R305 INTRODUCTION OF OTHER ANTINEOPLASTIC INTO SPINAL CANAL, PERCUTANEOUS APPROACH: ICD-10-PCS | Performed by: NURSE PRACTITIONER

## 2024-04-03 PROCEDURE — 84157 ASSAY OF PROTEIN OTHER: CPT | Performed by: PHYSICIAN ASSISTANT

## 2024-04-03 PROCEDURE — 88104 CYTOPATH FL NONGYN SMEARS: CPT | Performed by: PATHOLOGY

## 2024-04-03 PROCEDURE — 83735 ASSAY OF MAGNESIUM: CPT | Performed by: PHYSICIAN ASSISTANT

## 2024-04-03 PROCEDURE — 84550 ASSAY OF BLOOD/URIC ACID: CPT | Performed by: PHYSICIAN ASSISTANT

## 2024-04-03 PROCEDURE — 88187 FLOWCYTOMETRY/READ 2-8: CPT | Performed by: PATHOLOGY

## 2024-04-03 PROCEDURE — 84100 ASSAY OF PHOSPHORUS: CPT | Performed by: PHYSICIAN ASSISTANT

## 2024-04-03 PROCEDURE — 2500000004 HC RX 250 GENERAL PHARMACY W/ HCPCS (ALT 636 FOR OP/ED): Performed by: STUDENT IN AN ORGANIZED HEALTH CARE EDUCATION/TRAINING PROGRAM

## 2024-04-03 PROCEDURE — 2500000005 HC RX 250 GENERAL PHARMACY W/O HCPCS: Performed by: PHYSICIAN ASSISTANT

## 2024-04-03 PROCEDURE — 83615 LACTATE (LD) (LDH) ENZYME: CPT | Performed by: PHYSICIAN ASSISTANT

## 2024-04-03 PROCEDURE — 89051 BODY FLUID CELL COUNT: CPT | Performed by: PHYSICIAN ASSISTANT

## 2024-04-03 PROCEDURE — 62270 DX LMBR SPI PNXR: CPT | Performed by: NURSE PRACTITIONER

## 2024-04-03 PROCEDURE — A4216 STERILE WATER/SALINE, 10 ML: HCPCS | Performed by: STUDENT IN AN ORGANIZED HEALTH CARE EDUCATION/TRAINING PROGRAM

## 2024-04-03 PROCEDURE — 2500000002 HC RX 250 W HCPCS SELF ADMINISTERED DRUGS (ALT 637 FOR MEDICARE OP, ALT 636 FOR OP/ED): Performed by: PHYSICIAN ASSISTANT

## 2024-04-03 PROCEDURE — 82040 ASSAY OF SERUM ALBUMIN: CPT | Performed by: PHYSICIAN ASSISTANT

## 2024-04-03 PROCEDURE — 85025 COMPLETE CBC W/AUTO DIFF WBC: CPT | Performed by: PHYSICIAN ASSISTANT

## 2024-04-03 PROCEDURE — 2500000004 HC RX 250 GENERAL PHARMACY W/ HCPCS (ALT 636 FOR OP/ED)

## 2024-04-03 PROCEDURE — 82945 GLUCOSE OTHER FLUID: CPT | Performed by: PHYSICIAN ASSISTANT

## 2024-04-03 PROCEDURE — 88185 FLOWCYTOMETRY/TC ADD-ON: CPT | Mod: TC | Performed by: PHYSICIAN ASSISTANT

## 2024-04-03 PROCEDURE — 99232 SBSQ HOSP IP/OBS MODERATE 35: CPT | Performed by: STUDENT IN AN ORGANIZED HEALTH CARE EDUCATION/TRAINING PROGRAM

## 2024-04-03 PROCEDURE — 1170000001 HC PRIVATE ONCOLOGY ROOM DAILY

## 2024-04-03 RX ORDER — POTASSIUM CHLORIDE 20 MEQ/1
20 TABLET, EXTENDED RELEASE ORAL ONCE
Status: COMPLETED | OUTPATIENT
Start: 2024-04-03 | End: 2024-04-03

## 2024-04-03 RX ORDER — FUROSEMIDE 10 MG/ML
40 INJECTION INTRAMUSCULAR; INTRAVENOUS ONCE
Status: COMPLETED | OUTPATIENT
Start: 2024-04-03 | End: 2024-04-03

## 2024-04-03 RX ORDER — MAGNESIUM SULFATE HEPTAHYDRATE 40 MG/ML
4 INJECTION, SOLUTION INTRAVENOUS ONCE
Status: COMPLETED | OUTPATIENT
Start: 2024-04-03 | End: 2024-04-03

## 2024-04-03 RX ADMIN — MESNA 1150 MG: 100 INJECTION, SOLUTION INTRAVENOUS at 21:31

## 2024-04-03 RX ADMIN — POTASSIUM CHLORIDE 20 MEQ: 1500 TABLET, EXTENDED RELEASE ORAL at 09:01

## 2024-04-03 RX ADMIN — SODIUM CHLORIDE 100 ML/HR: 9 INJECTION, SOLUTION INTRAVENOUS at 01:28

## 2024-04-03 RX ADMIN — KETOROLAC TROMETHAMINE 1 DROP: 5 SOLUTION OPHTHALMIC at 09:04

## 2024-04-03 RX ADMIN — CYCLOPHOSPHAMIDE 500 MG: 1 INJECTION, POWDER, FOR SOLUTION INTRAVENOUS; ORAL at 22:03

## 2024-04-03 RX ADMIN — PANTOPRAZOLE SODIUM 40 MG: 40 TABLET, DELAYED RELEASE ORAL at 09:01

## 2024-04-03 RX ADMIN — DORZOLAMIDE HYDROCHLORIDE AND TIMOLOL MALEATE 1 DROP: 20; 5 SOLUTION/ DROPS OPHTHALMIC at 20:14

## 2024-04-03 RX ADMIN — DORZOLAMIDE HYDROCHLORIDE AND TIMOLOL MALEATE 1 DROP: 20; 5 SOLUTION/ DROPS OPHTHALMIC at 09:00

## 2024-04-03 RX ADMIN — CARBOXYMETHYLCELLULOSE SODIUM 1 DROP: 5 SOLUTION/ DROPS OPHTHALMIC at 20:13

## 2024-04-03 RX ADMIN — ONDANSETRON 16 MG: 2 INJECTION INTRAMUSCULAR; INTRAVENOUS at 21:31

## 2024-04-03 RX ADMIN — CARBOXYMETHYLCELLULOSE SODIUM 1 DROP: 5 SOLUTION/ DROPS OPHTHALMIC at 09:05

## 2024-04-03 RX ADMIN — SODIUM CHLORIDE 12 MG: 9 INJECTION, SOLUTION INTRAMUSCULAR; INTRAVENOUS; SUBCUTANEOUS at 16:00

## 2024-04-03 RX ADMIN — ACYCLOVIR 400 MG: 400 TABLET ORAL at 20:13

## 2024-04-03 RX ADMIN — CARBOXYMETHYLCELLULOSE SODIUM 1 DROP: 5 SOLUTION/ DROPS OPHTHALMIC at 16:31

## 2024-04-03 RX ADMIN — CYCLOPHOSPHAMIDE 500 MG: 1 INJECTION, POWDER, FOR SOLUTION INTRAVENOUS; ORAL at 11:40

## 2024-04-03 RX ADMIN — KETOROLAC TROMETHAMINE 1 DROP: 5 SOLUTION OPHTHALMIC at 20:15

## 2024-04-03 RX ADMIN — PREDNISOLONE ACETATE 1 DROP: 10 SUSPENSION/ DROPS OPHTHALMIC at 16:31

## 2024-04-03 RX ADMIN — FUROSEMIDE 40 MG: 10 INJECTION, SOLUTION INTRAMUSCULAR; INTRAVENOUS at 11:13

## 2024-04-03 RX ADMIN — Medication 1 TABLET: at 09:15

## 2024-04-03 RX ADMIN — FERROUS SULFATE TAB 325 MG (65 MG ELEMENTAL FE) 1 TABLET: 325 (65 FE) TAB at 09:03

## 2024-04-03 RX ADMIN — MAGNESIUM SULFATE HEPTAHYDRATE 4 G: 40 INJECTION, SOLUTION INTRAVENOUS at 07:03

## 2024-04-03 RX ADMIN — PREDNISOLONE ACETATE 1 DROP: 10 SUSPENSION/ DROPS OPHTHALMIC at 20:14

## 2024-04-03 RX ADMIN — ACYCLOVIR 400 MG: 400 TABLET ORAL at 09:01

## 2024-04-03 RX ADMIN — DEXAMETHASONE 40 MG: 4 TABLET ORAL at 09:02

## 2024-04-03 RX ADMIN — PREDNISOLONE ACETATE 1 DROP: 10 SUSPENSION/ DROPS OPHTHALMIC at 09:04

## 2024-04-03 RX ADMIN — FERROUS SULFATE TAB 325 MG (65 MG ELEMENTAL FE) 1 TABLET: 325 (65 FE) TAB at 20:13

## 2024-04-03 RX ADMIN — KETOROLAC TROMETHAMINE 1 DROP: 5 SOLUTION OPHTHALMIC at 16:30

## 2024-04-03 ASSESSMENT — COGNITIVE AND FUNCTIONAL STATUS - GENERAL
MOBILITY SCORE: 24
DAILY ACTIVITIY SCORE: 24

## 2024-04-03 ASSESSMENT — PAIN SCALES - GENERAL
PAINLEVEL_OUTOF10: 0 - NO PAIN
PAINLEVEL_OUTOF10: 0 - NO PAIN

## 2024-04-03 NOTE — PROGRESS NOTES
"Shilo Thakkar is a 23 y.o. 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    Subjective   Patient stable overnight. No acute complaints. Tolerated cyclophosphamide and Mesna. Intrathecal methotrexate given today. Tolerated well. Denies fevers/chills, nausea/vomiting, diarrhea/constipation, pain, shortness of breath, chest pain. Denies numbness or tingling. Denies any changes to vision bilaterally.      Objective     Physical Exam  Constitutional:       Appearance: Normal appearance.   HENT:      Head: Normocephalic.      Mouth/Throat:      Mouth: Mucous membranes are moist.   Cardiovascular:      Rate and Rhythm: Normal rate and regular rhythm.   Pulmonary:      Effort: Pulmonary effort is normal.      Breath sounds: Normal breath sounds.   Abdominal:      General: Abdomen is flat.      Palpations: Abdomen is soft.   Musculoskeletal:         General: Normal range of motion.   Skin:     General: Skin is warm and dry.   Neurological:      General: No focal deficit present.      Mental Status: He is alert and oriented to person, place, and time.   Psychiatric:         Mood and Affect: Mood normal.         Behavior: Behavior normal.     Last Recorded Vitals  Blood pressure 134/78, pulse 53, temperature 36.6 °C (97.9 °F), resp. rate 18, height 1.818 m (5' 11.58\"), weight 78.9 kg (173 lb 15.1 oz), SpO2 99 %.  Intake/Output last 3 Shifts:  I/O last 3 completed shifts:  In: 3265.2 (43.4 mL/kg) [I.V.:1588.3 (21.1 mL/kg); IV Piggyback:1676.8]  Out: - (0 mL/kg)   Weight: 75.2 kg     Relevant Results    Scheduled medications  acyclovir, 400 mg, oral, BID  B complex-vitamin C, 1 tablet, oral, Daily  cyclophosphamide, 250 mg/m2 (Treatment Plan Recorded), intravenous, q12h  cyclophosphamide, 250 mg/m2 (Treatment Plan Recorded), intravenous, q12h  dexAMETHasone, 40 mg, oral, q AM  dorzolamide-timolol (PF), 1 drop, Right Eye, BID  ferrous sulfate (325 mg ferrous sulfate), 1 " tablet, oral, BID  ketorolac, 1 drop, Both Eyes, TID  lubricating eye drops, 1 drop, Left Eye, 4x daily  mesna, 600 mg/m2 (Treatment Plan Recorded), intravenous, Once  mesna, 600 mg/m2 (Treatment Plan Recorded), intravenous, Once  methotrexate (PF), 12 mg, intrathecal, Once  ondansetron, 16 mg, intravenous, Once  pantoprazole, 40 mg, oral, Daily before breakfast  prednisoLONE acetate, 1 drop, Both Eyes, TID      Continuous medications  sodium chloride 0.9%, 100 mL/hr, Last Rate: 100 mL/hr (04/03/24 1045)      PRN medications  PRN medications: albuterol, alteplase, dextrose, diphenhydrAMINE, EPINEPHrine, famotidine, methylPREDNISolone sodium succinate (PF), ondansetron, prochlorperazine, prochlorperazine, sennosides-docusate sodium, sodium chloride    Results for orders placed or performed during the hospital encounter of 04/01/24 (from the past 24 hour(s))   CBC and Auto Differential   Result Value Ref Range    WBC 8.1 4.4 - 11.3 x10*3/uL    nRBC 0.0 0.0 - 0.0 /100 WBCs    RBC 2.63 (L) 4.50 - 5.90 x10*6/uL    Hemoglobin 8.1 (L) 13.5 - 17.5 g/dL    Hematocrit 24.0 (L) 41.0 - 52.0 %    MCV 91 80 - 100 fL    MCH 30.8 26.0 - 34.0 pg    MCHC 33.8 32.0 - 36.0 g/dL    RDW 18.2 (H) 11.5 - 14.5 %    Platelets 113 (L) 150 - 450 x10*3/uL    Neutrophils % 94.5 40.0 - 80.0 %    Immature Granulocytes %, Automated 0.6 0.0 - 0.9 %    Lymphocytes % 2.2 13.0 - 44.0 %    Monocytes % 2.7 2.0 - 10.0 %    Eosinophils % 0.0 0.0 - 6.0 %    Basophils % 0.0 0.0 - 2.0 %    Neutrophils Absolute 7.67 1.20 - 7.70 x10*3/uL    Immature Granulocytes Absolute, Automated 0.05 0.00 - 0.70 x10*3/uL    Lymphocytes Absolute 0.18 (L) 1.20 - 4.80 x10*3/uL    Monocytes Absolute 0.22 0.10 - 1.00 x10*3/uL    Eosinophils Absolute 0.00 0.00 - 0.70 x10*3/uL    Basophils Absolute 0.00 0.00 - 0.10 x10*3/uL   Renal function panel   Result Value Ref Range    Glucose 129 (H) 74 - 99 mg/dL    Sodium 142 136 - 145 mmol/L    Potassium 3.7 3.5 - 5.3 mmol/L    Chloride  106 98 - 107 mmol/L    Bicarbonate 29 21 - 32 mmol/L    Anion Gap 11 10 - 20 mmol/L    Urea Nitrogen 10 6 - 23 mg/dL    Creatinine 0.83 0.50 - 1.30 mg/dL    eGFR >90 >60 mL/min/1.73m*2    Calcium 9.4 8.6 - 10.6 mg/dL    Phosphorus 3.5 2.5 - 4.9 mg/dL    Albumin 3.9 3.4 - 5.0 g/dL   Magnesium   Result Value Ref Range    Magnesium 1.50 (L) 1.60 - 2.40 mg/dL     Malnutrition    I agree with the dietitian's malnutrition diagnosis.      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 male with PMH of Burkitt Lymphoma (Dx 10/20/23; s/p 4 cycles of HyperCVAD), malignant pleural effusions s/p CT (10/29/23), ADHD, and BL retinoschisis and retinal hemorrhages, s/p left vitrectomy who presents as a direct admission on 4/2/2024 for cycle 7 of HyperCVAD.     Day 3, Cycle 7 of HyperCVAD  - Rituxan 700mg IV on days 1 and day 11  - Cyclophosphamide 500mg IV every 12 hrs x 6 doses w/Mesna  - Doxorubicin 96mg in NS IV every 24 hrs on days 4 and 5  - Vincristine 2mg IV on days 4 and 11  - Dexamethasone 40mg daily on days 1-4 and 11-14  - Plan IT Methotrexate 12mg IT     ONC  #Burkitt lymphoma, high risk, MYC positive, newly diagnosed (10/20/23)  - 10/19/23 presented to OSH ED with dysphagia and abdominal pain  - CT A/P with IV contrast (10/19) mass in the body of the pancreas measuring approximately 2.7 x 2.9 cm. Multiple masses in the mesentery as well as omental caking c/q carcinomatosis.   - CT soft tissue neck w/ IV contrast (10/19): homogeneous soft tissue attenuation lesion in the L oropharynx which measures approximately 3.0 x 4.0 x 5.1 cm.   - CT Abdomen w/o IV contrast (10/20): Mesenteric lymphadenopathy  - US guided mesenteric LN biopsy (10/20/23): HIGH GRADE B-CELL LYMPHOMA MORPHOLOGICALLY C/W BURKITT LYMPHOMA; flow cytometry: CD10+ kappa restricted B cells supportive of a B cell lymphoma with germinal center origin.   - Biopsy L  oropharyngeal mass and L cervical LN by ENT (10/23/23) high grade B-cell burkitt lymphoma  - PET CT (10/30) showed FDG avid left oropharyngeal mass and LAD above and below diaphragm  - BMBx (11/1) without evidence of disease  - s/p 6 cycles of HyperCVAD (10/28, 11/19, 12/11, 1/2/24, 2/10, 3/06)  - PET scan (1/15/24): Deauville 2. Complete resolution of metabolic activity of previously seen Lt oropharyngeal mass, resolution of previously seen hypermetabolic Lt level 2 and 3 cervical lymphadenopathy, resolution of previously seen moderate-large size Lt pleural effusion and trace Rt pleural effusion and abdominopelvic ascites, resolution of previously seen hypermetabolic large mass within pancreatic tail/body and the previously seen hypermetabolic retroperitoneal and mesenteric lymphadenopathy.  - Cycle 5 initially delayed a little over a week due to recent hospitalization from 1/17-1/23/24 for visual changes, retinoschisis, retinal hemorrhages, and profound pancytopenia requiring left eye vitrectomy  - Plan total of 8 cycles of therapy   - Now admitted for cycle 7 of HyperCVAD   - Tolerated chemo regimen yesterday and today.   -Intrathecal methotrexate given today 4/3. Patient tolerated well.    - CSF sent for flow cytometry, cell count w diff, protein, glucose      OPTHO  -Recent hx/o bilateral macula-involving sub ILM hemorrhage with serous detachment OS>OD  -Multilayer retinal hemorrhage, left eye.   -S/p pars plana vitrectomy (PPV)/C3F8/intraretinal tPA OS  -S/P PPV/MP/EL/air O  -Last seen by Ophthalm in outpatient clinic on 2/19. Per recs, Taper Prednisolone 3x/day OU x 2 weeks then BID x 2 weeks, continue Cosopt BID OD, Taper Ketorolac TID OU x 2 weeks, BID x 2 weeks and artificial tears QID to the left eye.  -Okay to proceed with chemotherapy.   - Prefer to keep Plts 50-70,000 and recommend avoiding PLt levels below 30K  - Pt reports baseline vision unchanged upon admit and today; right eye vision is blurry,  left eye no vision  - May need repeat surgery in the Lt eye (after this cycle or the next) if the vitreous hemorrhage persists or retina detaches  -Needs follow up with Ophthalm outpatient (Dr. Abbi Canales)     HEME  #Anemia without active bleeding  - Continue home ferrous sulfate 325mg BID started by Dr. Klein  - Transfuse to keep Hgb>8.0, Plt>50-70,000 and avoid dropping below 30,000 per ophtho requirements with hx/o retinal hemorrhages     CARDIAC  - ECHO (2/10/24) LVEF 60-65%     ID  Allergies: NKDA  Afebrile on admit  PPX: Acyclovir 400mg BID, Bactrim MWF  - Please address if needs to be on fluconazole for antifungal ppx, currently not in his home meds list  - Plan Levaquin for Day 5-15 of each cycle for antibacterial prophylaxis       FEN/GI  - Admit weight: 75.2 kg  - Monitor and replace electrolytes as needed  - Cont Vit B complex  -ppx: Continue Pantoprazole 40mg every day  -Phosphorus increased to 3.5 (4/3) with replacement  -Mg decreased at 1.50 today, mg replacement ordered  -Potassium replacement to keep above 4.0   -Weight increased to 78.9kg (4/3), IV lasix 40mg ordered     ENDO  -CTA neck (1/17) with 6 mm nodule in the left lobe of the thyroid gland   -TSH level (1/20): 1.35       DISPO  - Full Code  - Access: PICC line  - Primary Onc: Dr. Klein  - NOK: Ms Deidra Pickett [phone]  - Plan 3x weekly count checks   - Scheduled for count check/Neulasta/Mal heme clinic at [facility]  - 4/8 Count check ([facility])  - Has follow up with Dr. Klein on 4/17  - Plan weekly Malignant Heme clinic follow up on 4/12 and 4/26     TEMITOPE Heller-Student

## 2024-04-03 NOTE — PROCEDURES
Lumbar Puncture    Date/Time: 4/3/2024 4:25 PM    Performed by: MILES Duff  Authorized by: MILES Duff    Consent:     Consent obtained:  Written    Consent given by:  Patient    Risks, benefits, and alternatives were discussed: yes      Risks discussed:  Bleeding, infection, pain, headache, nerve damage and repeat procedure  Universal protocol:     Procedure explained and questions answered to patient or proxy's satisfaction: yes      Relevant documents present and verified: yes      Test results available: yes      Imaging studies available: yes      Required blood products, implants, devices, and special equipment available: yes      Immediately prior to procedure a time out was called: yes      Site/side marked: yes      Patient identity confirmed:  Verbally with patient  Pre-procedure details:     Procedure purpose:  Diagnostic    Preparation: Patient was prepped and draped in usual sterile fashion    Anesthesia:     Anesthesia method:  Local infiltration    Local anesthetic:  Lidocaine 1% w/o epi  Procedure details:     Lumbar space:  L3-L4 interspace (and L4-L5 space)    Patient position:  Sitting    Needle gauge:  22    Needle type:  Sprotte tip    Number of attempts:  3    Fluid appearance:  Clear    Tubes of fluid:  3    Total volume (ml):  7  Post-procedure details:     Puncture site:  Adhesive bandage applied and direct pressure applied    Procedure completion:  Tolerated  Comments:      IT methotrexate was injected after collecting CSF.

## 2024-04-03 NOTE — CARE PLAN
The patient's goals for the shift include      The clinical goals for the shift include Remain free from injury    Pt's HR in the 40's, notified TEMITOPE Leo, no new orders states will continue to monitor for now    Problem: Pain  Goal: My pain/discomfort is manageable  Outcome: Progressing     Problem: Safety  Goal: Patient will be injury free during hospitalization  Outcome: Progressing  Goal: I will remain free of falls  Outcome: Progressing     Problem: Daily Care  Goal: Daily care needs are met  Outcome: Progressing     Problem: Psychosocial Needs  Goal: Demonstrates ability to cope with hospitalization/illness  Outcome: Progressing  Goal: Collaborate with me, my family, and caregiver to identify my specific goals  Outcome: Progressing     Problem: Discharge Barriers  Goal: My discharge needs are met  Outcome: Progressing

## 2024-04-04 ENCOUNTER — APPOINTMENT (OUTPATIENT)
Dept: CARDIOLOGY | Facility: HOSPITAL | Age: 24
End: 2024-04-04
Payer: COMMERCIAL

## 2024-04-04 LAB
ALBUMIN SERPL BCP-MCNC: 3.9 G/DL (ref 3.4–5)
ANION GAP SERPL CALC-SCNC: 14 MMOL/L (ref 10–20)
BASOPHILS # BLD AUTO: 0 X10*3/UL (ref 0–0.1)
BASOPHILS NFR BLD AUTO: 0 %
BUN SERPL-MCNC: 13 MG/DL (ref 6–23)
CALCIUM SERPL-MCNC: 9 MG/DL (ref 8.6–10.6)
CHLORIDE SERPL-SCNC: 103 MMOL/L (ref 98–107)
CO2 SERPL-SCNC: 28 MMOL/L (ref 21–32)
CREAT SERPL-MCNC: 0.86 MG/DL (ref 0.5–1.3)
EGFRCR SERPLBLD CKD-EPI 2021: >90 ML/MIN/1.73M*2
EOSINOPHIL # BLD AUTO: 0 X10*3/UL (ref 0–0.7)
EOSINOPHIL NFR BLD AUTO: 0 %
ERYTHROCYTE [DISTWIDTH] IN BLOOD BY AUTOMATED COUNT: 19.4 % (ref 11.5–14.5)
GLUCOSE SERPL-MCNC: 95 MG/DL (ref 74–99)
HCT VFR BLD AUTO: 24 % (ref 41–52)
HGB BLD-MCNC: 8.1 G/DL (ref 13.5–17.5)
IMM GRANULOCYTES # BLD AUTO: 0.04 X10*3/UL (ref 0–0.7)
IMM GRANULOCYTES NFR BLD AUTO: 0.7 % (ref 0–0.9)
LYMPHOCYTES # BLD AUTO: 0.16 X10*3/UL (ref 1.2–4.8)
LYMPHOCYTES NFR BLD AUTO: 2.7 %
MAGNESIUM SERPL-MCNC: 1.83 MG/DL (ref 1.6–2.4)
MCH RBC QN AUTO: 30.9 PG (ref 26–34)
MCHC RBC AUTO-ENTMCNC: 33.8 G/DL (ref 32–36)
MCV RBC AUTO: 92 FL (ref 80–100)
MONOCYTES # BLD AUTO: 0.3 X10*3/UL (ref 0.1–1)
MONOCYTES NFR BLD AUTO: 5.1 %
NEUTROPHILS # BLD AUTO: 5.35 X10*3/UL (ref 1.2–7.7)
NEUTROPHILS NFR BLD AUTO: 91.5 %
NRBC BLD-RTO: 0 /100 WBCS (ref 0–0)
PATH REVIEW-CELL CT,CSF: NORMAL
PHOSPHATE SERPL-MCNC: 4.8 MG/DL (ref 2.5–4.9)
PLATELET # BLD AUTO: 108 X10*3/UL (ref 150–450)
POTASSIUM SERPL-SCNC: 3.8 MMOL/L (ref 3.5–5.3)
RBC # BLD AUTO: 2.62 X10*6/UL (ref 4.5–5.9)
SODIUM SERPL-SCNC: 141 MMOL/L (ref 136–145)
WBC # BLD AUTO: 5.9 X10*3/UL (ref 4.4–11.3)

## 2024-04-04 PROCEDURE — 2500000005 HC RX 250 GENERAL PHARMACY W/O HCPCS: Performed by: PHYSICIAN ASSISTANT

## 2024-04-04 PROCEDURE — RXMED WILLOW AMBULATORY MEDICATION CHARGE

## 2024-04-04 PROCEDURE — 99232 SBSQ HOSP IP/OBS MODERATE 35: CPT | Performed by: STUDENT IN AN ORGANIZED HEALTH CARE EDUCATION/TRAINING PROGRAM

## 2024-04-04 PROCEDURE — 93010 ELECTROCARDIOGRAM REPORT: CPT | Performed by: INTERNAL MEDICINE

## 2024-04-04 PROCEDURE — 2500000004 HC RX 250 GENERAL PHARMACY W/ HCPCS (ALT 636 FOR OP/ED): Performed by: STUDENT IN AN ORGANIZED HEALTH CARE EDUCATION/TRAINING PROGRAM

## 2024-04-04 PROCEDURE — 83735 ASSAY OF MAGNESIUM: CPT | Performed by: PHYSICIAN ASSISTANT

## 2024-04-04 PROCEDURE — 1200000002 HC GENERAL ROOM WITH TELEMETRY DAILY

## 2024-04-04 PROCEDURE — 85025 COMPLETE CBC W/AUTO DIFF WBC: CPT | Performed by: PHYSICIAN ASSISTANT

## 2024-04-04 PROCEDURE — 93005 ELECTROCARDIOGRAM TRACING: CPT

## 2024-04-04 PROCEDURE — 80069 RENAL FUNCTION PANEL: CPT | Performed by: PHYSICIAN ASSISTANT

## 2024-04-04 PROCEDURE — 2500000001 HC RX 250 WO HCPCS SELF ADMINISTERED DRUGS (ALT 637 FOR MEDICARE OP): Performed by: PHYSICIAN ASSISTANT

## 2024-04-04 RX ORDER — PREDNISOLONE ACETATE 10 MG/ML
1 SUSPENSION/ DROPS OPHTHALMIC 3 TIMES DAILY
Qty: 5 ML
Start: 2024-04-04 | End: 2024-04-18

## 2024-04-04 RX ORDER — LEVOFLOXACIN 500 MG/1
500 TABLET, FILM COATED ORAL DAILY
Qty: 14 TABLET | Refills: 0 | Status: SHIPPED | OUTPATIENT
Start: 2024-04-04 | End: 2024-04-19

## 2024-04-04 RX ORDER — PROCHLORPERAZINE MALEATE 10 MG
10 TABLET ORAL EVERY 6 HOURS PRN
Qty: 30 TABLET | Refills: 0 | Status: SHIPPED | OUTPATIENT
Start: 2024-04-04 | End: 2024-04-17

## 2024-04-04 RX ADMIN — FERROUS SULFATE TAB 325 MG (65 MG ELEMENTAL FE) 1 TABLET: 325 (65 FE) TAB at 09:06

## 2024-04-04 RX ADMIN — KETOROLAC TROMETHAMINE 1 DROP: 5 SOLUTION OPHTHALMIC at 09:05

## 2024-04-04 RX ADMIN — PREDNISOLONE ACETATE 1 DROP: 10 SUSPENSION/ DROPS OPHTHALMIC at 21:22

## 2024-04-04 RX ADMIN — CYCLOPHOSPHAMIDE 500 MG: 1 INJECTION, POWDER, FOR SOLUTION INTRAVENOUS; ORAL at 11:13

## 2024-04-04 RX ADMIN — SODIUM CHLORIDE 100 ML/HR: 9 INJECTION, SOLUTION INTRAVENOUS at 14:26

## 2024-04-04 RX ADMIN — Medication 1 TABLET: at 09:05

## 2024-04-04 RX ADMIN — PREDNISOLONE ACETATE 1 DROP: 10 SUSPENSION/ DROPS OPHTHALMIC at 14:45

## 2024-04-04 RX ADMIN — ACYCLOVIR 400 MG: 400 TABLET ORAL at 09:06

## 2024-04-04 RX ADMIN — ONDANSETRON 16 MG: 2 INJECTION INTRAMUSCULAR; INTRAVENOUS at 21:06

## 2024-04-04 RX ADMIN — CARBOXYMETHYLCELLULOSE SODIUM 1 DROP: 5 SOLUTION/ DROPS OPHTHALMIC at 13:12

## 2024-04-04 RX ADMIN — FERROUS SULFATE TAB 325 MG (65 MG ELEMENTAL FE) 1 TABLET: 325 (65 FE) TAB at 21:21

## 2024-04-04 RX ADMIN — VINCRISTINE SULFATE 2 MG: 1 INJECTION, SOLUTION INTRAVENOUS at 21:39

## 2024-04-04 RX ADMIN — DORZOLAMIDE HYDROCHLORIDE AND TIMOLOL MALEATE 1 DROP: 20; 5 SOLUTION/ DROPS OPHTHALMIC at 09:00

## 2024-04-04 RX ADMIN — PREDNISOLONE ACETATE 1 DROP: 10 SUSPENSION/ DROPS OPHTHALMIC at 09:05

## 2024-04-04 RX ADMIN — CARBOXYMETHYLCELLULOSE SODIUM 1 DROP: 5 SOLUTION/ DROPS OPHTHALMIC at 21:21

## 2024-04-04 RX ADMIN — KETOROLAC TROMETHAMINE 1 DROP: 5 SOLUTION OPHTHALMIC at 21:21

## 2024-04-04 RX ADMIN — PANTOPRAZOLE SODIUM 40 MG: 40 TABLET, DELAYED RELEASE ORAL at 09:06

## 2024-04-04 RX ADMIN — KETOROLAC TROMETHAMINE 1 DROP: 5 SOLUTION OPHTHALMIC at 14:45

## 2024-04-04 RX ADMIN — SODIUM CHLORIDE 96 MG: 9 INJECTION, SOLUTION INTRAVENOUS at 22:03

## 2024-04-04 RX ADMIN — ACYCLOVIR 400 MG: 400 TABLET ORAL at 21:20

## 2024-04-04 RX ADMIN — DEXAMETHASONE 40 MG: 4 TABLET ORAL at 09:05

## 2024-04-04 RX ADMIN — CARBOXYMETHYLCELLULOSE SODIUM 1 DROP: 5 SOLUTION/ DROPS OPHTHALMIC at 09:05

## 2024-04-04 RX ADMIN — CARBOXYMETHYLCELLULOSE SODIUM 1 DROP: 5 SOLUTION/ DROPS OPHTHALMIC at 17:43

## 2024-04-04 RX ADMIN — DORZOLAMIDE HYDROCHLORIDE AND TIMOLOL MALEATE 1 DROP: 20; 5 SOLUTION/ DROPS OPHTHALMIC at 21:21

## 2024-04-04 ASSESSMENT — COGNITIVE AND FUNCTIONAL STATUS - GENERAL
MOBILITY SCORE: 24
DAILY ACTIVITIY SCORE: 24
MOBILITY SCORE: 24
DAILY ACTIVITIY SCORE: 24

## 2024-04-04 ASSESSMENT — PAIN SCALES - GENERAL
PAINLEVEL_OUTOF10: 0 - NO PAIN
PAINLEVEL_OUTOF10: 0 - NO PAIN

## 2024-04-04 ASSESSMENT — PAIN - FUNCTIONAL ASSESSMENT
PAIN_FUNCTIONAL_ASSESSMENT: 0-10
PAIN_FUNCTIONAL_ASSESSMENT: 0-10

## 2024-04-04 NOTE — NURSING NOTE
Cyclophosphamide 500 mg in 125 mL given over 3 hours via PICC line catheter. Dose up at 2200 and scheduled to be down at 0100. Pre-medicated with ondansetron per orders. Mesna running continuously per orders. Patient, dose and rate verified with second RN, Gail Lakhani, at bedside. + BBR obtained via syringe aspiration before administration. Patient with no known side effects at time of administration.

## 2024-04-04 NOTE — NURSING NOTE
For day shift 4/4/24 the patient had his dose 6/6 of cyclophosphamide cytoxan 500 mg in sodium chloride 0.9% 125 ml over three hours. Blood return noted prior administration via picc and post chemo. this morning and the remaining chemo scheduled for tonight. The patient tolerated well. No sxs of reactions during or after chemo. Notified NP ghulam of patient having episodes of intermittent bradycardia with new orders of telemetry post EKG. Patient is stable, patient is safe. No further interventions at this time.

## 2024-04-04 NOTE — PROGRESS NOTES
"Shilo Thakkar is a 23 y.o. 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    Subjective   Patient is bed resting well.  He states he is feeling well  and is tolerating chemo well.  He states his vision is stable and he doesn't feel it has worsened.   He states he is eating and drinking well  and offers no new complaints.       Objective     Physical Exam  Constitutional:       Appearance: Normal appearance.   HENT:      Head: Normocephalic.      Mouth/Throat:      Mouth: Mucous membranes are moist.   Cardiovascular:      Rate and Rhythm: Normal rate and regular rhythm.   Pulmonary:      Effort: Pulmonary effort is normal.      Breath sounds: Normal breath sounds.   Abdominal:      General: Abdomen is flat.      Palpations: Abdomen is soft.   Musculoskeletal:         General: Normal range of motion.   Skin:     General: Skin is warm and dry.   Neurological:      General: No focal deficit present.      Mental Status: He is alert and oriented to person, place, and time.   Psychiatric:         Mood and Affect: Mood normal.         Behavior: Behavior normal.       Last Recorded Vitals  Blood pressure 136/71, pulse 72, temperature 36.8 °C (98.2 °F), resp. rate 18, height 1.818 m (5' 11.58\"), weight 77.9 kg (171 lb 11.8 oz), SpO2 97 %.  Intake/Output last 3 Shifts:  I/O last 3 completed shifts:  In: 4621.2 (59.3 mL/kg) [I.V.:3130 (40.2 mL/kg); IV Piggyback:1491.2]  Out: - (0 mL/kg)   Weight: 77.9 kg     Relevant Results    Scheduled medications  acyclovir, 400 mg, oral, BID  B complex-vitamin C, 1 tablet, oral, Daily  dorzolamide-timolol (PF), 1 drop, Right Eye, BID  DOXOrubicin, 50 mg/m2 (Treatment Plan Recorded), intravenous, Once  ferrous sulfate (325 mg ferrous sulfate), 1 tablet, oral, BID  ketorolac, 1 drop, Both Eyes, TID  lubricating eye drops, 1 drop, Left Eye, 4x daily  mesna, 600 mg/m2 (Treatment Plan Recorded), intravenous, Once  ondansetron, 16 mg, " aB Leone was seen by Dr. An today Chiropractic department and reported NSAID's have caused negative side effects. Below are what Ba Leone described/shAred with Dr. An during his office visit.     After taking Meloxicam within 2 hours he stated having \"severe depression\" where his wife caught him attempting suicide. Ba Leone notes on two other occasions he started to get the same feelings with taking Advil. He feels \"completely nuts off his mind\" taking NSAID's within 2 hours. These episodes have occurred 3 times since November 6th Ba Leone stated.     Ba Leone stated he has used NSAID's in the past but this is the first time he has ever experienced these effects or reactions after taking NSAID's where it takes one day for the sensation/feelings to ware off.     At this time Ba Leone and his wife are monitoring the situation. Ba Leone states he no longer has depressed feelings or suicidal ideations/plan at this time as he was having during the 3 episodes. He no longer takes any NSAID's and has quit drinking at this time as precautionary measures. He did try to reach out to [facility] for mental health assitance regarding this but has not had a call back. Ba Leone is in agreement to have our office reach out to Behavioral Health to have further discussion on this matter. Ba Leone was advised if in the interim he does not receive a call from Behavioral Health, Dr. Gonzalez office and if he has any suicidal ideations again to go to the ER immediatly.    intravenous, Once  pantoprazole, 40 mg, oral, Daily before breakfast  prednisoLONE acetate, 1 drop, Both Eyes, TID  vinCRIStine, 2 mg, intravenous, Once      Continuous medications  sodium chloride 0.9%, 100 mL/hr, Last Rate: 100 mL/hr (04/04/24 1426)      PRN medications  PRN medications: albuterol, alteplase, dextrose, diphenhydrAMINE, EPINEPHrine, famotidine, methylPREDNISolone sodium succinate (PF), [Held by provider] ondansetron, prochlorperazine, prochlorperazine, sennosides-docusate sodium, sodium chloride    Results for orders placed or performed during the hospital encounter of 04/01/24 (from the past 24 hour(s))   Uric Acid   Result Value Ref Range    Uric Acid 3.9 (L) 4.0 - 7.5 mg/dL   Lactate dehydrogenase   Result Value Ref Range     84 - 246 U/L   Hepatic Function Panel   Result Value Ref Range    Albumin 4.1 3.4 - 5.0 g/dL    Bilirubin, Total 0.4 0.0 - 1.2 mg/dL    Bilirubin, Direct 0.1 0.0 - 0.3 mg/dL    Alkaline Phosphatase 52 33 - 120 U/L    ALT 21 10 - 52 U/L    AST 18 9 - 39 U/L    Total Protein 5.9 (L) 6.4 - 8.2 g/dL   CBC and Auto Differential   Result Value Ref Range    WBC 5.9 4.4 - 11.3 x10*3/uL    nRBC 0.0 0.0 - 0.0 /100 WBCs    RBC 2.62 (L) 4.50 - 5.90 x10*6/uL    Hemoglobin 8.1 (L) 13.5 - 17.5 g/dL    Hematocrit 24.0 (L) 41.0 - 52.0 %    MCV 92 80 - 100 fL    MCH 30.9 26.0 - 34.0 pg    MCHC 33.8 32.0 - 36.0 g/dL    RDW 19.4 (H) 11.5 - 14.5 %    Platelets 108 (L) 150 - 450 x10*3/uL    Neutrophils % 91.5 40.0 - 80.0 %    Immature Granulocytes %, Automated 0.7 0.0 - 0.9 %    Lymphocytes % 2.7 13.0 - 44.0 %    Monocytes % 5.1 2.0 - 10.0 %    Eosinophils % 0.0 0.0 - 6.0 %    Basophils % 0.0 0.0 - 2.0 %    Neutrophils Absolute 5.35 1.20 - 7.70 x10*3/uL    Immature Granulocytes Absolute, Automated 0.04 0.00 - 0.70 x10*3/uL    Lymphocytes Absolute 0.16 (L) 1.20 - 4.80 x10*3/uL    Monocytes Absolute 0.30 0.10 - 1.00 x10*3/uL    Eosinophils Absolute 0.00 0.00 - 0.70 x10*3/uL    Basophils  Absolute 0.00 0.00 - 0.10 x10*3/uL   Renal function panel   Result Value Ref Range    Glucose 95 74 - 99 mg/dL    Sodium 141 136 - 145 mmol/L    Potassium 3.8 3.5 - 5.3 mmol/L    Chloride 103 98 - 107 mmol/L    Bicarbonate 28 21 - 32 mmol/L    Anion Gap 14 10 - 20 mmol/L    Urea Nitrogen 13 6 - 23 mg/dL    Creatinine 0.86 0.50 - 1.30 mg/dL    eGFR >90 >60 mL/min/1.73m*2    Calcium 9.0 8.6 - 10.6 mg/dL    Phosphorus 4.8 2.5 - 4.9 mg/dL    Albumin 3.9 3.4 - 5.0 g/dL   Magnesium   Result Value Ref Range    Magnesium 1.83 1.60 - 2.40 mg/dL     Malnutrition    I agree with the dietitian's malnutrition diagnosis.      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 male with PMH of Burkitt Lymphoma (Dx 10/20/23; s/p 4 cycles of HyperCVAD), malignant pleural effusions s/p CT (10/29/23), ADHD, and BL retinoschisis and retinal hemorrhages, s/p left vitrectomy who presents as a direct admission on 4/2/2024 for cycle 7 of HyperCVAD.     Day 4, Cycle 7 of HyperCVAD  - Rituxan 700mg IV on days 1 and day 11  - Cyclophosphamide 500mg IV every 12 hrs x 6 doses w/Mesna  - Doxorubicin 96mg in NS IV every 24 hrs on days 4 and 5  - Vincristine 2mg IV on days 4 and 11  - Dexamethasone 40mg daily on days 1-4 and 11-14  - Plan IT Methotrexate 12mg IT     ONC  #Burkitt lymphoma, high risk, MYC positive, newly diagnosed (10/20/23)  - 10/19/23 presented to OSH ED with dysphagia and abdominal pain  - CT A/P with IV contrast (10/19) mass in the body of the pancreas measuring approximately 2.7 x 2.9 cm. Multiple masses in the mesentery as well as omental caking c/q carcinomatosis.   - CT soft tissue neck w/ IV contrast (10/19): homogeneous soft tissue attenuation lesion in the L oropharynx which measures approximately 3.0 x 4.0 x 5.1 cm.   - CT Abdomen w/o IV contrast (10/20): Mesenteric lymphadenopathy  - US guided mesenteric LN biopsy (10/20/23): HIGH GRADE  B-CELL LYMPHOMA MORPHOLOGICALLY C/W BURKITT LYMPHOMA; flow cytometry: CD10+ kappa restricted B cells supportive of a B cell lymphoma with germinal center origin.   - Biopsy L oropharyngeal mass and L cervical LN by ENT (10/23/23) high grade B-cell burkitt lymphoma  - PET CT (10/30) showed FDG avid left oropharyngeal mass and LAD above and below diaphragm  - BMBx (11/1) without evidence of disease  - s/p 6 cycles of HyperCVAD (10/28, 11/19, 12/11, 1/2/24, 2/10, 3/06)  - PET scan (1/15/24): Deauville 2. Complete resolution of metabolic activity of previously seen Lt oropharyngeal mass, resolution of previously seen hypermetabolic Lt level 2 and 3 cervical lymphadenopathy, resolution of previously seen moderate-large size Lt pleural effusion and trace Rt pleural effusion and abdominopelvic ascites, resolution of previously seen hypermetabolic large mass within pancreatic tail/body and the previously seen hypermetabolic retroperitoneal and mesenteric lymphadenopathy.  - Cycle 5 initially delayed a little over a week due to recent hospitalization from 1/17-1/23/24 for visual changes, retinoschisis, retinal hemorrhages, and profound pancytopenia requiring left eye vitrectomy  - Plan total of 8 cycles of therapy   - Now admitted for cycle 7 of HyperCVAD   - Tolerated chemo regimen yesterday and today.   -Intrathecal methotrexate given today 4/3. Patient tolerated well.    - CSF sent for flow cytometry, cell count w diff, protein, glucose: pending       OPTHO  -Recent hx/o bilateral macula-involving sub ILM hemorrhage with serous detachment OS>OD  -Multilayer retinal hemorrhage, left eye.   -S/p pars plana vitrectomy (PPV)/C3F8/intraretinal tPA OS  -S/P PPV/MP/EL/air O  -Last seen by Ophthalm in outpatient clinic on 2/19. Per recs, Taper Prednisolone 3x/day OU x 2 weeks then BID x 2 weeks, continue Cosopt BID OD, Taper Ketorolac TID OU x 2 weeks, BID x 2 weeks and artificial tears QID to the left eye.  -Okay to proceed with  chemotherapy.   - Prefer to keep Plts 50-70,000 and recommend avoiding PLt levels below 30K  - Pt reports baseline vision unchanged upon admit and today; right eye vision is blurry, left eye no vision  - May need repeat surgery in the Lt eye (after this cycle or the next) if the vitreous hemorrhage persists or retina detaches  -Needs follow up with Ophthalm outpatient (Dr. Abbi Canales)     HEME  #Anemia without active bleeding  - Continue home ferrous sulfate 325mg BID started by Dr. Klein  - Transfuse to keep Hgb>8.0, Plt>50-70,000 and avoid dropping below 30,000 per ophtho requirements with hx/o retinal hemorrhages     CARDIAC  - ECHO (2/10/24) LVEF 60-65%     ID  Allergies: NKDA  Afebrile on admit  PPX: Acyclovir 400mg BID, Bactrim MWF  - Please address if needs to be on fluconazole for antifungal ppx, currently not in his home meds list  - Plan Levaquin for Day 5-15 of each cycle for antibacterial prophylaxis       FEN/GI  - Admit weight: 75.2 kg, 77.9 kg ( 4/4)   - Monitor and replace electrolytes as needed  - Cont Vit B complex  -ppx: Continue Pantoprazole 40mg every day  -IV lasix 40mg given 4/3     ENDO  -CTA neck (1/17) with 6 mm nodule in the left lobe of the thyroid gland   -TSH level (1/20): 1.35       DISPO  - Full Code  - Access: PICC line  - Primary Onc: Dr. Klein  - NOK: Ms Deidra Pickett [phone]  - Plan 3x weekly count checks   - Scheduled for count check/Neulasta/Mal heme clinic at [facility]  - 4/8 Count check ([facility])  - Has follow up with Dr. Klein on 4/17  - Plan weekly Malignant Heme clinic follow up on 4/12 and 4/26     DEMETRIO Muller-CNP

## 2024-04-05 ENCOUNTER — APPOINTMENT (OUTPATIENT)
Dept: HEMATOLOGY/ONCOLOGY | Facility: HOSPITAL | Age: 24
End: 2024-04-05
Payer: COMMERCIAL

## 2024-04-05 ENCOUNTER — PHARMACY VISIT (OUTPATIENT)
Dept: PHARMACY | Facility: CLINIC | Age: 24
End: 2024-04-05
Payer: MEDICARE

## 2024-04-05 VITALS
BODY MASS INDEX: 23.86 KG/M2 | SYSTOLIC BLOOD PRESSURE: 138 MMHG | TEMPERATURE: 97.3 F | OXYGEN SATURATION: 97 % | DIASTOLIC BLOOD PRESSURE: 87 MMHG | RESPIRATION RATE: 16 BRPM | HEIGHT: 72 IN | HEART RATE: 64 BPM | WEIGHT: 176.15 LBS

## 2024-04-05 LAB
ALBUMIN SERPL BCP-MCNC: 3.6 G/DL (ref 3.4–5)
ALBUMIN SERPL BCP-MCNC: 3.7 G/DL (ref 3.4–5)
ALP SERPL-CCNC: 43 U/L (ref 33–120)
ALT SERPL W P-5'-P-CCNC: 19 U/L (ref 10–52)
ANION GAP SERPL CALC-SCNC: 13 MMOL/L (ref 10–20)
AST SERPL W P-5'-P-CCNC: 13 U/L (ref 9–39)
BASOPHILS # BLD AUTO: 0 X10*3/UL (ref 0–0.1)
BASOPHILS NFR BLD AUTO: 0 %
BILIRUB DIRECT SERPL-MCNC: 0.1 MG/DL (ref 0–0.3)
BILIRUB SERPL-MCNC: 0.7 MG/DL (ref 0–1.2)
BUN SERPL-MCNC: 14 MG/DL (ref 6–23)
CALCIUM SERPL-MCNC: 8.8 MG/DL (ref 8.6–10.6)
CELL POPULATIONS: NORMAL
CHLORIDE SERPL-SCNC: 103 MMOL/L (ref 98–107)
CO2 SERPL-SCNC: 27 MMOL/L (ref 21–32)
CREAT SERPL-MCNC: 0.79 MG/DL (ref 0.5–1.3)
DIAGNOSIS: NORMAL
EGFRCR SERPLBLD CKD-EPI 2021: >90 ML/MIN/1.73M*2
EOSINOPHIL # BLD AUTO: 0 X10*3/UL (ref 0–0.7)
EOSINOPHIL NFR BLD AUTO: 0 %
ERYTHROCYTE [DISTWIDTH] IN BLOOD BY AUTOMATED COUNT: 18.7 % (ref 11.5–14.5)
FLOW DIFFERENTIAL: NORMAL
FLOW TEST ORDERED: NORMAL
FLUID CELL COUNT: 2 /UL
GLUCOSE SERPL-MCNC: 99 MG/DL (ref 74–99)
HCT VFR BLD AUTO: 24.2 % (ref 41–52)
HGB BLD-MCNC: 8.1 G/DL (ref 13.5–17.5)
IMM GRANULOCYTES # BLD AUTO: 0.04 X10*3/UL (ref 0–0.7)
IMM GRANULOCYTES NFR BLD AUTO: 0.8 % (ref 0–0.9)
LAB TEST METHOD: NORMAL
LDH SERPL L TO P-CCNC: 157 U/L (ref 84–246)
LYMPHOCYTES # BLD AUTO: 0.09 X10*3/UL (ref 1.2–4.8)
LYMPHOCYTES NFR BLD AUTO: 1.9 %
MAGNESIUM SERPL-MCNC: 1.78 MG/DL (ref 1.6–2.4)
MCH RBC QN AUTO: 30.5 PG (ref 26–34)
MCHC RBC AUTO-ENTMCNC: 33.5 G/DL (ref 32–36)
MCV RBC AUTO: 91 FL (ref 80–100)
MONOCYTES # BLD AUTO: 0.45 X10*3/UL (ref 0.1–1)
MONOCYTES NFR BLD AUTO: 9.5 %
NEUTROPHILS # BLD AUTO: 4.18 X10*3/UL (ref 1.2–7.7)
NEUTROPHILS NFR BLD AUTO: 87.8 %
NRBC BLD-RTO: 0 /100 WBCS (ref 0–0)
NUMBER OF CELLS COLLECTED: NORMAL
PATH REPORT.TOTAL CANCER: NORMAL
PHOSPHATE SERPL-MCNC: 4.1 MG/DL (ref 2.5–4.9)
PLATELET # BLD AUTO: 106 X10*3/UL (ref 150–450)
POTASSIUM SERPL-SCNC: 3.9 MMOL/L (ref 3.5–5.3)
PROT SERPL-MCNC: 5.3 G/DL (ref 6.4–8.2)
RBC # BLD AUTO: 2.66 X10*6/UL (ref 4.5–5.9)
SIGNATURE COMMENT: NORMAL
SODIUM SERPL-SCNC: 139 MMOL/L (ref 136–145)
SPECIMEN VIABILITY: NORMAL
URATE SERPL-MCNC: 4.3 MG/DL (ref 4–7.5)
WBC # BLD AUTO: 4.8 X10*3/UL (ref 4.4–11.3)

## 2024-04-05 PROCEDURE — 99238 HOSP IP/OBS DSCHRG MGMT 30/<: CPT | Performed by: STUDENT IN AN ORGANIZED HEALTH CARE EDUCATION/TRAINING PROGRAM

## 2024-04-05 PROCEDURE — 82040 ASSAY OF SERUM ALBUMIN: CPT | Performed by: PHYSICIAN ASSISTANT

## 2024-04-05 PROCEDURE — 83735 ASSAY OF MAGNESIUM: CPT | Performed by: PHYSICIAN ASSISTANT

## 2024-04-05 PROCEDURE — 85025 COMPLETE CBC W/AUTO DIFF WBC: CPT | Performed by: PHYSICIAN ASSISTANT

## 2024-04-05 PROCEDURE — 2500000001 HC RX 250 WO HCPCS SELF ADMINISTERED DRUGS (ALT 637 FOR MEDICARE OP): Performed by: PHYSICIAN ASSISTANT

## 2024-04-05 PROCEDURE — 80053 COMPREHEN METABOLIC PANEL: CPT | Performed by: PHYSICIAN ASSISTANT

## 2024-04-05 PROCEDURE — 83615 LACTATE (LD) (LDH) ENZYME: CPT | Performed by: PHYSICIAN ASSISTANT

## 2024-04-05 PROCEDURE — 2500000005 HC RX 250 GENERAL PHARMACY W/O HCPCS: Performed by: PHYSICIAN ASSISTANT

## 2024-04-05 PROCEDURE — 84550 ASSAY OF BLOOD/URIC ACID: CPT | Performed by: PHYSICIAN ASSISTANT

## 2024-04-05 RX ORDER — FLUCONAZOLE 200 MG/1
200 TABLET ORAL DAILY
Qty: 14 TABLET | Refills: 0
Start: 2024-04-05 | End: 2024-04-07 | Stop reason: SDUPTHER

## 2024-04-05 RX ADMIN — Medication 1 TABLET: at 09:12

## 2024-04-05 RX ADMIN — PANTOPRAZOLE SODIUM 40 MG: 40 TABLET, DELAYED RELEASE ORAL at 09:09

## 2024-04-05 RX ADMIN — ACYCLOVIR 400 MG: 400 TABLET ORAL at 09:09

## 2024-04-05 RX ADMIN — DORZOLAMIDE HYDROCHLORIDE AND TIMOLOL MALEATE 1 DROP: 20; 5 SOLUTION/ DROPS OPHTHALMIC at 09:26

## 2024-04-05 RX ADMIN — PREDNISOLONE ACETATE 1 DROP: 10 SUSPENSION/ DROPS OPHTHALMIC at 21:02

## 2024-04-05 RX ADMIN — ACYCLOVIR 400 MG: 400 TABLET ORAL at 21:04

## 2024-04-05 RX ADMIN — DORZOLAMIDE HYDROCHLORIDE AND TIMOLOL MALEATE 1 DROP: 20; 5 SOLUTION/ DROPS OPHTHALMIC at 21:03

## 2024-04-05 RX ADMIN — KETOROLAC TROMETHAMINE 1 DROP: 5 SOLUTION OPHTHALMIC at 14:37

## 2024-04-05 RX ADMIN — CARBOXYMETHYLCELLULOSE SODIUM 1 DROP: 5 SOLUTION/ DROPS OPHTHALMIC at 21:03

## 2024-04-05 RX ADMIN — KETOROLAC TROMETHAMINE 1 DROP: 5 SOLUTION OPHTHALMIC at 21:02

## 2024-04-05 RX ADMIN — KETOROLAC TROMETHAMINE 1 DROP: 5 SOLUTION OPHTHALMIC at 09:25

## 2024-04-05 RX ADMIN — FERROUS SULFATE TAB 325 MG (65 MG ELEMENTAL FE) 1 TABLET: 325 (65 FE) TAB at 09:09

## 2024-04-05 RX ADMIN — CARBOXYMETHYLCELLULOSE SODIUM 1 DROP: 5 SOLUTION/ DROPS OPHTHALMIC at 17:03

## 2024-04-05 RX ADMIN — CARBOXYMETHYLCELLULOSE SODIUM 1 DROP: 5 SOLUTION/ DROPS OPHTHALMIC at 09:08

## 2024-04-05 RX ADMIN — PREDNISOLONE ACETATE 1 DROP: 10 SUSPENSION/ DROPS OPHTHALMIC at 09:26

## 2024-04-05 RX ADMIN — FERROUS SULFATE TAB 325 MG (65 MG ELEMENTAL FE) 1 TABLET: 325 (65 FE) TAB at 21:04

## 2024-04-05 RX ADMIN — CARBOXYMETHYLCELLULOSE SODIUM 1 DROP: 5 SOLUTION/ DROPS OPHTHALMIC at 13:14

## 2024-04-05 RX ADMIN — PREDNISOLONE ACETATE 1 DROP: 10 SUSPENSION/ DROPS OPHTHALMIC at 14:37

## 2024-04-05 ASSESSMENT — COGNITIVE AND FUNCTIONAL STATUS - GENERAL
DAILY ACTIVITIY SCORE: 24
MOBILITY SCORE: 24

## 2024-04-05 ASSESSMENT — PAIN - FUNCTIONAL ASSESSMENT
PAIN_FUNCTIONAL_ASSESSMENT: 0-10

## 2024-04-05 ASSESSMENT — PAIN SCALES - GENERAL
PAINLEVEL_OUTOF10: 0 - NO PAIN

## 2024-04-05 NOTE — DISCHARGE SUMMARY
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            Hospital Course  Shilo Thakkar is a 23 y.o. male with PMH of Burkitt Lymphoma (Dx 10/20/23; s/p 4 cycles of HyperCVAD), malignant pleural effusions s/p CT (10/29/23), ADHD, and BL retinoschisis and retinal hemorrhages, s/p left vitrectomy who presents as a direct admission on 4/2/2024 for cycle 7 of HyperCVAD. Tolerated chemotherapy well. Received IT Methotrexate 12mg on 4/3. Flow on CSF is pending.     Hx/o bilateral macula-involving sub ILM hemorrhage with serous detachment OS>OD  Multilayer retinal hemorrhage, left eye.  - Transfuse to keep Hgb>8.5, Plt>50-70,000 and avoid dropping below 30,000 per ophtho requirements with hx/o retinal hemorrhages    Also noted during this visit patient had some Bradycardia Hr was done in the 40-50's.   EKG showed SB.  Tele showed some bradycardia that returned to normal with  Help Prn Zofran and placed on   Plan ACV, Fluconazole, Bactrim, and Levaquin prophy at discharge     Access: PICC line  Plan 3x weekly count checks   Scheduled for Neulasta/Mal heme clinic at [facility] 4/7/24  Next count check on 4/10 Count check at [facility]   Has follow up with Dr. Klein on 4/17  Plan weekly Malignant Heme clinic follow up on 4/12 and 4/26          Pertinent Physical Exam At Time of Discharge  Physical Exam  Constitutional:       Appearance: Normal appearance.   HENT:      Head: Normocephalic.      Nose: Nose normal.      Mouth/Throat:      Mouth: Mucous membranes are moist.      Pharynx: Oropharynx is clear.   Eyes:      Pupils: Pupils are equal, round, and reactive to light.   Cardiovascular:      Rate and Rhythm: Normal rate and regular rhythm.      Pulses: Normal pulses.      Heart sounds: Normal heart sounds.   Pulmonary:      Effort: Pulmonary effort is normal.       Breath sounds: Normal breath sounds.   Abdominal:      General: Bowel sounds are normal.      Palpations: Abdomen is soft.   Musculoskeletal:         General: Normal range of motion.      Cervical back: Normal range of motion.   Skin:     General: Skin is warm and dry.      Capillary Refill: Capillary refill takes less than 2 seconds.   Neurological:      General: No focal deficit present.      Mental Status: He is alert.   Psychiatric:         Mood and Affect: Mood normal.         Home Medications     Medication List      START taking these medications     fluconazole 200 mg tablet; Commonly known as: Diflucan; Take 1 tablet   (200 mg) by mouth once daily for 14 days.   lubricating eye drops ophthalmic solution; Administer 1 drop into the   left eye 4 times a day.   prednisoLONE acetate 1 % ophthalmic suspension; Commonly known as:   Pred-Forte; Administer 1 drop into both eyes 3 times a day for 14 days.   prochlorperazine 10 mg tablet; Commonly known as: Compazine; Take 1   tablet (10 mg) by mouth every 6 hours if needed for nausea for up to 8   days.     CHANGE how you take these medications     ketorolac 0.5 % ophthalmic solution; Commonly known as: Acular; Use one   drop 4 times a day in the right eye; What changed: how to take this     CONTINUE taking these medications     acyclovir 400 mg tablet; Commonly known as: Zovirax; Take 1 tablet (400   mg) by mouth 2 times a day.   B complex-vitamin C tablet; Take 1 tablet by mouth once daily.   dorzolamide-timoloL 22.3-6.8 mg/mL ophthalmic solution; Commonly known   as: Cosopt; Administer 1 drop into the right eye 2 times a day.   FeroSuL tablet; Generic drug: ferrous sulfate (325 mg ferrous sulfate);   Take 1 tablet by mouth 2 times a day.   levoFLOXacin 500 mg tablet; Commonly known as: Levaquin; Take 1 tablet   (500 mg) by mouth once daily for 14 days.   pantoprazole 40 mg EC tablet; Commonly known as: ProtoNix; Take 1 tablet   (40 mg) by mouth once daily in  the morning. Take before meals. Do not   crush, chew, or split.   sulfamethoxazole-trimethoprim 800-160 mg tablet; Commonly known as:   Bactrim DS; Take 1 tablet by mouth once a day on Monday, Wednesday, and   Friday.     STOP taking these medications     sennosides-docusate sodium 8.6-50 mg tablet; Commonly known as:   Ashley-Colace       Outpatient Follow-Up  Future Appointments   Date Time Provider Department Center   4/7/2024 11:00 AM [place] 08 [facility]   4/7/2024 11:00 AM [facility]   4/10/2024 11:45 AM [place] [facility]   4/12/2024 11:00 AM [place] 23 [facility]   4/12/2024 11:00 AM [facility]   4/15/2024  9:45 AM [place] 08 [facility]   4/17/2024 12:40 PM [facility] CENTRAL LINE 02 [id number] [facility]   4/17/2024  1:20 PM Olegario Klein MD [id number] [facility]   4/17/2024  2:00 PM IN[place] 30 [facility]   4/19/2024 10:00 AM [facility] BMT POST TRANSPLANT [facility]   4/22/2024 10:00 AM [facility] BMT POST TRANSPLANT [facility]   4/24/2024  1:00 PM [facility] BMT POST TRANSPLANT [facility]   4/26/2024 12:30 PM INF 19 [facility]   4/26/2024  1:30 PM [facility]   5/15/2024  3:45 PM Abbi Canales MD PhD [id number] Clayton Mckeon, APRN-CNP

## 2024-04-05 NOTE — CARE PLAN
The patient's goals for the shift include      The clinical goals for the shift include Patient will remain hemodynamically stable.

## 2024-04-05 NOTE — CARE PLAN
Pt received in bed with no signs of distress or complaints of pain.  Pt has PICC with brisk blood return, dressing C/D/I.  Pt received dose 6/6 cytoxan 500 mg, tolerated well.  Will received Vincristine and Doxorubicin overnight . Pt on telemetry d/t  bradycardia, EKG completed. Patient VSS, on RA.  Reviewed plan of care, lab results and medications to which pt agreed.  Will continue to monitor.        Overnight patient tolerated Vincristine well, currently receiving Doxorubicin, no adverse events.  Will continue to monitor.      Problem: Pain  Goal: My pain/discomfort is manageable  Outcome: Progressing     Problem: Safety  Goal: Patient will be injury free during hospitalization  Outcome: Progressing  Goal: I will remain free of falls  Outcome: Progressing     Problem: Daily Care  Goal: Daily care needs are met  Outcome: Progressing     Problem: Psychosocial Needs  Goal: Demonstrates ability to cope with hospitalization/illness  Outcome: Progressing  Goal: Collaborate with me, my family, and caregiver to identify my specific goals  Outcome: Progressing     Problem: Discharge Barriers  Goal: My discharge needs are met  Outcome: Progressing     The clinical goals for the shift include Remain HDS

## 2024-04-06 ENCOUNTER — APPOINTMENT (OUTPATIENT)
Dept: HEMATOLOGY/ONCOLOGY | Facility: HOSPITAL | Age: 24
DRG: 847 | End: 2024-04-06
Payer: COMMERCIAL

## 2024-04-06 NOTE — NURSING NOTE
Nursing Note  Patient remains afebrile, continue to receive PO antibiotic. Vital signs stable. Denies any pain, nausea, vomiting and diarrhea. PO intake fair. Continue to receive IV fluid 0.9 % NaCl at 100 ML/HR. Voids clear yellow urine. Doxorubicin 96  MG in 1098 ML IV infusion completed . Positive blood return obtained  via syringe aspiration from purple lumen upon chemotherapy infusion completed.  Patient  discharged home . Prior discharge , discharge instructions reviewed with patient and copy of written discharge instructions given to patient. Also, each lumen of right upper arm PICC line flushed with  10 ML 0.9 % NaCl .

## 2024-04-07 ENCOUNTER — OFFICE VISIT (OUTPATIENT)
Dept: HEMATOLOGY/ONCOLOGY | Facility: HOSPITAL | Age: 24
End: 2024-04-07
Payer: COMMERCIAL

## 2024-04-07 ENCOUNTER — APPOINTMENT (OUTPATIENT)
Dept: HEMATOLOGY/ONCOLOGY | Facility: HOSPITAL | Age: 24
End: 2024-04-07
Payer: COMMERCIAL

## 2024-04-07 ENCOUNTER — INFUSION (OUTPATIENT)
Dept: HEMATOLOGY/ONCOLOGY | Facility: HOSPITAL | Age: 24
End: 2024-04-07
Payer: COMMERCIAL

## 2024-04-07 VITALS
BODY MASS INDEX: 23.93 KG/M2 | TEMPERATURE: 97.5 F | WEIGHT: 174.38 LBS | OXYGEN SATURATION: 100 % | HEART RATE: 74 BPM | SYSTOLIC BLOOD PRESSURE: 139 MMHG | DIASTOLIC BLOOD PRESSURE: 75 MMHG | RESPIRATION RATE: 16 BRPM

## 2024-04-07 DIAGNOSIS — C83.78 BURKITT LYMPHOMA OF LYMPH NODES OF MULTIPLE REGIONS (MULTI): ICD-10-CM

## 2024-04-07 DIAGNOSIS — E87.6 HYPOKALEMIA: Primary | ICD-10-CM

## 2024-04-07 DIAGNOSIS — C83.70 BURKITT LYMPHOMA, UNSPECIFIED BODY REGION (MULTI): ICD-10-CM

## 2024-04-07 LAB
ABO GROUP (TYPE) IN BLOOD: NORMAL
ALBUMIN SERPL BCP-MCNC: 3.9 G/DL (ref 3.4–5)
ALP SERPL-CCNC: 49 U/L (ref 33–120)
ALT SERPL W P-5'-P-CCNC: 16 U/L (ref 10–52)
ANION GAP SERPL CALC-SCNC: 11 MMOL/L (ref 10–20)
ANTIBODY SCREEN: NORMAL
AST SERPL W P-5'-P-CCNC: 12 U/L (ref 9–39)
BASOPHILS # BLD AUTO: 0 X10*3/UL (ref 0–0.1)
BASOPHILS NFR BLD AUTO: 0 %
BILIRUB SERPL-MCNC: 0.9 MG/DL (ref 0–1.2)
BUN SERPL-MCNC: 19 MG/DL (ref 6–23)
CALCIUM SERPL-MCNC: 8.7 MG/DL (ref 8.6–10.6)
CHLORIDE SERPL-SCNC: 104 MMOL/L (ref 98–107)
CO2 SERPL-SCNC: 27 MMOL/L (ref 21–32)
CREAT SERPL-MCNC: 0.74 MG/DL (ref 0.5–1.3)
EGFRCR SERPLBLD CKD-EPI 2021: >90 ML/MIN/1.73M*2
EOSINOPHIL # BLD AUTO: 0 X10*3/UL (ref 0–0.7)
EOSINOPHIL NFR BLD AUTO: 0 %
ERYTHROCYTE [DISTWIDTH] IN BLOOD BY AUTOMATED COUNT: 17.9 % (ref 11.5–14.5)
GLUCOSE SERPL-MCNC: 121 MG/DL (ref 74–99)
HCT VFR BLD AUTO: 24.5 % (ref 41–52)
HGB BLD-MCNC: 8.2 G/DL (ref 13.5–17.5)
IMM GRANULOCYTES # BLD AUTO: 0.02 X10*3/UL (ref 0–0.7)
IMM GRANULOCYTES NFR BLD AUTO: 0.7 % (ref 0–0.9)
LYMPHOCYTES # BLD AUTO: 0.11 X10*3/UL (ref 1.2–4.8)
LYMPHOCYTES NFR BLD AUTO: 4.1 %
MCH RBC QN AUTO: 29.9 PG (ref 26–34)
MCHC RBC AUTO-ENTMCNC: 33.5 G/DL (ref 32–36)
MCV RBC AUTO: 89 FL (ref 80–100)
MONOCYTES # BLD AUTO: 0.09 X10*3/UL (ref 0.1–1)
MONOCYTES NFR BLD AUTO: 3.4 %
NEUTROPHILS # BLD AUTO: 2.45 X10*3/UL (ref 1.2–7.7)
NEUTROPHILS NFR BLD AUTO: 91.8 %
NRBC BLD-RTO: 0 /100 WBCS (ref 0–0)
PLATELET # BLD AUTO: 104 X10*3/UL (ref 150–450)
POTASSIUM SERPL-SCNC: 3.2 MMOL/L (ref 3.5–5.3)
PROT SERPL-MCNC: 5.6 G/DL (ref 6.4–8.2)
RBC # BLD AUTO: 2.74 X10*6/UL (ref 4.5–5.9)
RH FACTOR (ANTIGEN D): NORMAL
SODIUM SERPL-SCNC: 139 MMOL/L (ref 136–145)
WBC # BLD AUTO: 2.7 X10*3/UL (ref 4.4–11.3)

## 2024-04-07 PROCEDURE — 99214 OFFICE O/P EST MOD 30 MIN: CPT | Performed by: NURSE PRACTITIONER

## 2024-04-07 PROCEDURE — 80053 COMPREHEN METABOLIC PANEL: CPT

## 2024-04-07 PROCEDURE — 1036F TOBACCO NON-USER: CPT | Performed by: NURSE PRACTITIONER

## 2024-04-07 PROCEDURE — 86901 BLOOD TYPING SEROLOGIC RH(D): CPT

## 2024-04-07 PROCEDURE — 96372 THER/PROPH/DIAG INJ SC/IM: CPT

## 2024-04-07 PROCEDURE — 86920 COMPATIBILITY TEST SPIN: CPT

## 2024-04-07 PROCEDURE — 85025 COMPLETE CBC W/AUTO DIFF WBC: CPT

## 2024-04-07 PROCEDURE — 2500000004 HC RX 250 GENERAL PHARMACY W/ HCPCS (ALT 636 FOR OP/ED): Mod: JZ | Performed by: STUDENT IN AN ORGANIZED HEALTH CARE EDUCATION/TRAINING PROGRAM

## 2024-04-07 PROCEDURE — 96372 THER/PROPH/DIAG INJ SC/IM: CPT | Performed by: STUDENT IN AN ORGANIZED HEALTH CARE EDUCATION/TRAINING PROGRAM

## 2024-04-07 RX ORDER — ACYCLOVIR 400 MG/1
400 TABLET ORAL 2 TIMES DAILY
Qty: 180 TABLET | Refills: 0 | Status: SHIPPED | OUTPATIENT
Start: 2024-04-07 | End: 2024-05-17 | Stop reason: SDUPTHER

## 2024-04-07 RX ORDER — POTASSIUM CHLORIDE 20 MEQ/1
20 TABLET, EXTENDED RELEASE ORAL DAILY
Qty: 7 TABLET | Refills: 0 | Status: SHIPPED | OUTPATIENT
Start: 2024-04-07 | End: 2024-04-17

## 2024-04-07 RX ORDER — ALBUTEROL SULFATE 0.83 MG/ML
3 SOLUTION RESPIRATORY (INHALATION) AS NEEDED
Status: CANCELLED | OUTPATIENT
Start: 2024-04-07

## 2024-04-07 RX ORDER — EPINEPHRINE 0.3 MG/.3ML
0.3 INJECTION SUBCUTANEOUS EVERY 5 MIN PRN
Status: CANCELLED | OUTPATIENT
Start: 2024-04-07

## 2024-04-07 RX ORDER — FAMOTIDINE 10 MG/ML
20 INJECTION INTRAVENOUS ONCE AS NEEDED
Status: CANCELLED | OUTPATIENT
Start: 2024-04-07

## 2024-04-07 RX ORDER — FLUCONAZOLE 200 MG/1
200 TABLET ORAL DAILY
Qty: 14 TABLET | Refills: 0 | Status: SHIPPED | OUTPATIENT
Start: 2024-04-07 | End: 2024-04-21

## 2024-04-07 RX ORDER — SULFAMETHOXAZOLE AND TRIMETHOPRIM 800; 160 MG/1; MG/1
1 TABLET ORAL
Qty: 12 TABLET | Refills: 0 | Status: SHIPPED | OUTPATIENT
Start: 2024-04-08 | End: 2024-05-08

## 2024-04-07 RX ORDER — DIPHENHYDRAMINE HYDROCHLORIDE 50 MG/ML
50 INJECTION, SOLUTION INTRAMUSCULAR; INTRAVENOUS ONCE
Status: CANCELLED
Start: 2024-04-07 | End: 2024-04-07

## 2024-04-07 RX ORDER — ACETAMINOPHEN 325 MG/1
650 TABLET ORAL ONCE
Status: CANCELLED
Start: 2024-04-07 | End: 2024-04-07

## 2024-04-07 RX ORDER — DIPHENHYDRAMINE HYDROCHLORIDE 50 MG/ML
50 INJECTION INTRAMUSCULAR; INTRAVENOUS AS NEEDED
Status: CANCELLED | OUTPATIENT
Start: 2024-04-07

## 2024-04-07 RX ADMIN — PEGFILGRASTIM 6 MG: 6 INJECTION SUBCUTANEOUS at 11:49

## 2024-04-07 ASSESSMENT — PAIN SCALES - GENERAL: PAINLEVEL: 0-NO PAIN

## 2024-04-07 NOTE — PROGRESS NOTES
Patient ID: Shilo Thakkar is a 23 y.o. male.    Barbie Lao presents today for discharge follow-up after being admitted for cycle 7 HyperCVAD. He is feeling well with the exception of fatigue. Sleeps well at night. Vision changes stable. Denies nausea, vomiting, diarrhea, headaches, bleeding, and fevers since discharge.         Objective    Vital signs reviewed today.      Physical Exam  Vitals and nursing note reviewed.   Constitutional:       Appearance: Normal appearance.   HENT:      Head: Normocephalic.      Mouth/Throat:      Mouth: Mucous membranes are moist.   Eyes:      Conjunctiva/sclera: Conjunctivae normal.      Pupils: Pupils are equal, round, and reactive to light.   Cardiovascular:      Rate and Rhythm: Normal rate and regular rhythm.      Pulses: Normal pulses.   Pulmonary:      Effort: Pulmonary effort is normal.      Breath sounds: Normal breath sounds.   Abdominal:      General: Bowel sounds are normal.      Palpations: Abdomen is soft.      Tenderness: There is no abdominal tenderness.   Musculoskeletal:         General: Normal range of motion.      Cervical back: Normal range of motion and neck supple.   Lymphadenopathy:      Comments: No lymphadenopathy   Skin:     General: Skin is warm and dry.      Findings: No erythema, lesion or rash.   Neurological:      General: No focal deficit present.      Mental Status: He is alert and oriented to person, place, and time. Mental status is at baseline.      Comments: No numbness or tingling   Psychiatric:         Mood and Affect: Mood normal.         Behavior: Behavior normal.         Performance Status:  Karnofsky Score: 80 - Normal activity with effort; some signs or symptoms of disease      Assessment/Plan         Oncology History   Burkitt lymphoma (CMS/HCC)   10/27/2023 Initial Diagnosis    Burkitt lymphoma, high risk, MYC positive  - 10/19/23 presented to OSH ED with dysphagia and abdominal pain  - CT A/P with IV contrast (10/19) mass in  the body of the pancreas measuring approximately 2.7 x 2.9 cm. The pancreatic duct is dilated. Multiple masses in the mesentery as well as omental caking consistent with carcinomatosis. Moderate amount of free fluid throughout the abdomen and pelvis.   - CT soft tissue neck with IV contrast (10/19): homogeneous soft tissue attenuation lesion in the L oropharynx which measures approximately 3.0 x 4.0 x 5.1 cm.   - US guided mesenteric LN biopsy (10/20/23): HIGH GRADE B-CELL LYMPHOMA MORPHOLOGICALLY CONSISTENT WITH BURKITT LYMPHOMA; flow cytometry: No B cell population identified  - Bone marrow aspiration biopsy November 1, 2023 no evidence of lymphoma involvement.  CSF by flow cytometry showed no lymphoma 11/2/2023.  - Biopsy L oropharyngeal mass and L cervical LN by ENT (10/23/23) high grade B-cell burkitt lymphoma, MYC positive, Bcl-2 negative.  - MR brain with and w/o IV contrast (10/27): No evidence of intracranial metastatic disease.     - Dexa 40 mg daily ended 11/1  - PET CT ordered 10/30 showed FDG avid left oropharyngeal mass and LAD above and below diaphragm     10/29/2023 -  Chemotherapy    HyperCVAD + RiTUXimab, 21 Day Cycles     12/6/2023 Imaging    -CT scan of the chest abdomen or pelvis on December 6, 2023 shows complete resolution of intra-abdominal disease, with mildly worse splenomegaly most likely unrelated to his lymphoma.      1/15/2024 Imaging    -PET scan 1/15/24 after C4:   Interval near complete/complete resolution of previous metabolic activity. Deauville Score 2.          Thrombocytopenia (CMS/HCC)  Due to disease and chemotherapy   - Ideal platelet transfusion threshold per Optho recommendations is to keep platelets > 50,000  - Plan for count checks three times weekly        Bilateral retinoschisis  - Bilateral retinoschisis and retinal hemorrgages   - S/p left eye vitrectomy  - May require repeat surgery in left eye folowing if vitreous hemorrhage persists or retina detaches  - Optho  recommends Platelet threshold of 50,000  - Close Follow up with Optho. Next appt 5/15/24  -- Taper Prednisolone: Completes 3x/day both eyes x 2 weeks this week, then go to BID x 2 weeks  -- Continue Cosopt BID to the R eye  -- Taper Ketorolac TID both eyes x 2 weeks, BID x 2 weeks  -- Artificial tears QID to the left eye    Burkitt lymphoma (CMS/HCC)  - High risk BL  in CR   - PET-CT s/p C4 (1/15/24) c/w Deauville 2  - Tolerated C6 HyperCVAD (Dose reduce Cytarabine at 1.5 G/m2 per dose with Methotrexate 1000 mg/m2 total)   - 1 LP with intrathecal cytarabine during C6 on 3/7; CSF flow cytometry negative  - After discussion with Dr. Klein on 3/20, they decided to proceed with 8 cycles, with 1 intrathecal each cycle.    - Admitted 4/1/24. This will include rituximab day 1 and day 11 (4/10), and IT methotrexate 12 mg on 4/3/24 (CSF negative for lymphoma) and day 11 vincristine and dexamethasone pulse.    - Cycle 8 is planned with dose reduction of cytarabine to 1 g/m² per dose and methotrexate to to 800 mg/m² total [20% as short infusion and 80% as 24-hour infusion].   - Transfuse to keep Hgb>8 and Plt>50,000    Immunosuppressed due to chemotherapy (CMS/HCC)  - Continue prophylactic Acyclovir, Fluconazole, and Bactrim  - Levofloxacin Day 5-15 of each cycle for antibacterial prophylaxis     Count Checks 3 times weekly M-W-F (4/10, 4/12, 4/15, 4/17, 4/19, 4/22, 4/24, 4/26)  4/17 Follow up with Dr. Klein   Weekly [facility] Follow Up (4/12, 4/26)    DEMETRIO Penaloza-CNP  [facility]

## 2024-04-07 NOTE — ASSESSMENT & PLAN NOTE
Due to disease and chemotherapy   - Ideal platelet transfusion threshold per Optho recommendations is to keep platelets > 50,000  - Plan for count checks three times weekly

## 2024-04-07 NOTE — ASSESSMENT & PLAN NOTE
- High risk BL  in CR   - PET-CT s/p C4 (1/15/24) c/w Rafiille 2  - Tolerated C6 HyperCVAD (Dose reduce Cytarabine at 1.5 G/m2 per dose with Methotrexate 1000 mg/m2 total)   - 1 LP with intrathecal cytarabine during C6 on 3/7; CSF flow cytometry negative  - After discussion with Dr. Klein on 3/20, they decided to proceed with 8 cycles, with 1 intrathecal each cycle.    - Admitted 4/1/24. This will include rituximab day 1 and day 11 (4/12), and IT methotrexate 12 mg on 4/3/24 (CSF negative for lymphoma) and day 11 vincristine and dexamethasone pulse.    - Cycle 8 is planned with dose reduction of cytarabine to 1 g/m² per dose and methotrexate to to 800 mg/m² total [20% as short infusion and 80% as 24-hour infusion].   - Transfuse to keep Hgb>8 and Plt>50,000

## 2024-04-07 NOTE — ASSESSMENT & PLAN NOTE
- Bilateral retinoschisis and retinal hemorrgages   - S/p left eye vitrectomy  - May require repeat surgery in left eye folowing if vitreous hemorrhage persists or retina detaches  - Optho recommends Platelet threshold of 50,000  - Close Follow up with Optho. Next appt 5/15/24  -- Taper Prednisolone: Completes 3x/day both eyes x 2 weeks this week, then go to BID x 2 weeks  -- Continue Cosopt BID to the R eye  -- Taper Ketorolac TID both eyes x 2 weeks, BID x 2 weeks  -- Artificial tears QID to the left eye

## 2024-04-07 NOTE — PROGRESS NOTES
[facility] Infusion Note     Shilo Thakkar is a 23 y.o. year old male here today,04/07/24,  in the [facility] infusion center for a count check, neulasta injection and follow up visit with the mal-hem provider.       Overall, pt states mild fatigue, no concerns appetite, and no pain.     Access: PICC        Administrations This Visit       pegfilgrastim (Neulasta) injection 6 mg       Admin Date  04/07/2024 Action  Given Dose  6 mg Route  subcutaneous Administered By  Mariel Tovar RN                    Pt tolerated lab draw and injection well and was discharged to home in stable condition. Pt had no further questions or concerns at this time.

## 2024-04-08 ENCOUNTER — APPOINTMENT (OUTPATIENT)
Dept: HEMATOLOGY/ONCOLOGY | Facility: HOSPITAL | Age: 24
End: 2024-04-08
Payer: COMMERCIAL

## 2024-04-08 ENCOUNTER — APPOINTMENT (OUTPATIENT)
Dept: HEMATOLOGY/ONCOLOGY | Facility: CLINIC | Age: 24
DRG: 847 | End: 2024-04-08
Payer: COMMERCIAL

## 2024-04-08 LAB
ATRIAL RATE: 52 BPM
P AXIS: 9 DEGREES
P OFFSET: 193 MS
P ONSET: 154 MS
PR INTERVAL: 120 MS
Q ONSET: 214 MS
QRS COUNT: 9 BEATS
QRS DURATION: 112 MS
QT INTERVAL: 430 MS
QTC CALCULATION(BAZETT): 399 MS
QTC FREDERICIA: 409 MS
R AXIS: 59 DEGREES
T AXIS: 41 DEGREES
T OFFSET: 429 MS
VENTRICULAR RATE: 52 BPM

## 2024-04-10 ENCOUNTER — INFUSION (OUTPATIENT)
Dept: HEMATOLOGY/ONCOLOGY | Facility: HOSPITAL | Age: 24
End: 2024-04-10
Payer: COMMERCIAL

## 2024-04-10 VITALS
WEIGHT: 168.87 LBS | RESPIRATION RATE: 16 BRPM | DIASTOLIC BLOOD PRESSURE: 49 MMHG | OXYGEN SATURATION: 98 % | TEMPERATURE: 99.3 F | BODY MASS INDEX: 22.87 KG/M2 | HEART RATE: 64 BPM | SYSTOLIC BLOOD PRESSURE: 121 MMHG | HEIGHT: 72 IN

## 2024-04-10 DIAGNOSIS — C83.78 BURKITT LYMPHOMA OF LYMPH NODES OF MULTIPLE REGIONS (MULTI): ICD-10-CM

## 2024-04-10 DIAGNOSIS — C83.70 BURKITT LYMPHOMA, UNSPECIFIED BODY REGION (MULTI): ICD-10-CM

## 2024-04-10 LAB
ANION GAP SERPL CALC-SCNC: 13 MMOL/L (ref 10–20)
BASOPHILS # BLD MANUAL: 0 X10*3/UL (ref 0–0.1)
BASOPHILS NFR BLD MANUAL: 0 %
BLOOD EXPIRATION DATE: NORMAL
BUN SERPL-MCNC: 16 MG/DL (ref 6–23)
CALCIUM SERPL-MCNC: 9 MG/DL (ref 8.6–10.3)
CHLORIDE SERPL-SCNC: 105 MMOL/L (ref 98–107)
CO2 SERPL-SCNC: 28 MMOL/L (ref 21–32)
CREAT SERPL-MCNC: 0.75 MG/DL (ref 0.5–1.3)
DACRYOCYTES BLD QL SMEAR: ABNORMAL
DISPENSE STATUS: NORMAL
EGFRCR SERPLBLD CKD-EPI 2021: >90 ML/MIN/1.73M*2
EOSINOPHIL # BLD MANUAL: 0.02 X10*3/UL (ref 0–0.7)
EOSINOPHIL NFR BLD MANUAL: 1 %
ERYTHROCYTE [DISTWIDTH] IN BLOOD BY AUTOMATED COUNT: 18.2 % (ref 11.5–14.5)
GLUCOSE SERPL-MCNC: 91 MG/DL (ref 74–99)
HCT VFR BLD AUTO: 21.1 % (ref 41–52)
HGB BLD-MCNC: 7.2 G/DL (ref 13.5–17.5)
HYPOCHROMIA BLD QL SMEAR: ABNORMAL
IMM GRANULOCYTES # BLD AUTO: 0.17 X10*3/UL (ref 0–0.7)
IMM GRANULOCYTES NFR BLD AUTO: 9.7 % (ref 0–0.9)
LYMPHOCYTES # BLD MANUAL: 0.11 X10*3/UL (ref 1.2–4.8)
LYMPHOCYTES NFR BLD MANUAL: 6 %
MCH RBC QN AUTO: 31 PG (ref 26–34)
MCHC RBC AUTO-ENTMCNC: 34.1 G/DL (ref 32–36)
MCV RBC AUTO: 91 FL (ref 80–100)
MONOCYTES # BLD MANUAL: 0 X10*3/UL (ref 0.1–1)
MONOCYTES NFR BLD MANUAL: 0 %
NEUTROPHILS # BLD MANUAL: 1.68 X10*3/UL (ref 1.2–7.7)
NEUTS BAND # BLD MANUAL: 0.02 X10*3/UL (ref 0–0.7)
NEUTS BAND NFR BLD MANUAL: 1 %
NEUTS HYPERSEG BLD QL SMEAR: PRESENT
NEUTS SEG # BLD MANUAL: 1.66 X10*3/UL (ref 1.2–7)
NEUTS SEG NFR BLD MANUAL: 92 %
NRBC BLD-RTO: 0 /100 WBCS (ref 0–0)
OVALOCYTES BLD QL SMEAR: ABNORMAL
PLATELET # BLD AUTO: 60 X10*3/UL (ref 150–450)
POTASSIUM SERPL-SCNC: 3.7 MMOL/L (ref 3.5–5.3)
PRODUCT BLOOD TYPE: 5100
PRODUCT CODE: NORMAL
RBC # BLD AUTO: 2.32 X10*6/UL (ref 4.5–5.9)
RBC MORPH BLD: ABNORMAL
SCHISTOCYTES BLD QL SMEAR: ABNORMAL
SODIUM SERPL-SCNC: 142 MMOL/L (ref 136–145)
TOTAL CELLS COUNTED BLD: 100
UNIT ABO: NORMAL
UNIT NUMBER: NORMAL
UNIT RH: NORMAL
UNIT VOLUME: 350
WBC # BLD AUTO: 1.8 X10*3/UL (ref 4.4–11.3)
XM INTEP: NORMAL

## 2024-04-10 PROCEDURE — 85007 BL SMEAR W/DIFF WBC COUNT: CPT

## 2024-04-10 PROCEDURE — P9040 RBC LEUKOREDUCED IRRADIATED: HCPCS

## 2024-04-10 PROCEDURE — 82374 ASSAY BLOOD CARBON DIOXIDE: CPT

## 2024-04-10 PROCEDURE — 2500000004 HC RX 250 GENERAL PHARMACY W/ HCPCS (ALT 636 FOR OP/ED): Performed by: INTERNAL MEDICINE

## 2024-04-10 PROCEDURE — 85027 COMPLETE CBC AUTOMATED: CPT

## 2024-04-10 PROCEDURE — 36430 TRANSFUSION BLD/BLD COMPNT: CPT

## 2024-04-10 PROCEDURE — 96374 THER/PROPH/DIAG INJ IV PUSH: CPT | Mod: INF

## 2024-04-10 RX ORDER — DIPHENHYDRAMINE HYDROCHLORIDE 50 MG/ML
50 INJECTION, SOLUTION INTRAMUSCULAR; INTRAVENOUS ONCE
Status: CANCELLED
Start: 2024-04-10 | End: 2024-04-10

## 2024-04-10 RX ORDER — ACETAMINOPHEN 325 MG/1
650 TABLET ORAL ONCE
Status: COMPLETED | OUTPATIENT
Start: 2024-04-10 | End: 2024-04-10

## 2024-04-10 RX ORDER — FAMOTIDINE 10 MG/ML
20 INJECTION INTRAVENOUS ONCE AS NEEDED
Status: CANCELLED | OUTPATIENT
Start: 2024-04-10

## 2024-04-10 RX ORDER — ACETAMINOPHEN 325 MG/1
650 TABLET ORAL ONCE
Status: CANCELLED
Start: 2024-04-10 | End: 2024-04-10

## 2024-04-10 RX ORDER — DIPHENHYDRAMINE HYDROCHLORIDE 50 MG/ML
50 INJECTION INTRAMUSCULAR; INTRAVENOUS AS NEEDED
Status: CANCELLED | OUTPATIENT
Start: 2024-04-10

## 2024-04-10 RX ORDER — EPINEPHRINE 0.3 MG/.3ML
0.3 INJECTION SUBCUTANEOUS EVERY 5 MIN PRN
Status: CANCELLED | OUTPATIENT
Start: 2024-04-10

## 2024-04-10 RX ORDER — DIPHENHYDRAMINE HYDROCHLORIDE 50 MG/ML
50 INJECTION INTRAMUSCULAR; INTRAVENOUS ONCE
Qty: 1 ML | Refills: 0 | Status: COMPLETED | OUTPATIENT
Start: 2024-04-10 | End: 2024-04-10

## 2024-04-10 RX ORDER — ALBUTEROL SULFATE 0.83 MG/ML
3 SOLUTION RESPIRATORY (INHALATION) AS NEEDED
Status: CANCELLED | OUTPATIENT
Start: 2024-04-10

## 2024-04-10 RX ADMIN — DIPHENHYDRAMINE HYDROCHLORIDE 50 MG: 50 INJECTION INTRAMUSCULAR; INTRAVENOUS at 14:00

## 2024-04-10 RX ADMIN — ACETAMINOPHEN 650 MG: 325 TABLET ORAL at 14:01

## 2024-04-10 ASSESSMENT — PAIN SCALES - GENERAL: PAINLEVEL_OUTOF10: 0-NO PAIN

## 2024-04-10 NOTE — PROGRESS NOTES
[facility] Infusion Note  04/10/24    Shilo Thakkar is a 23 y.o. year old male patient presenting to outpatient infusion for a count check.     Hgb 7.2 today, will transfuse 1 unit per parameter order of >8.5.     Since the last visit, he reports doing well. Overall, he states that energy level is energy level is good. The patient is able to perform all ADLs. . Appetite has been unchanged and good. he reports no complaints.   There are no social work or other referral needs at this time.    Line type: R DL PICC- dressing and caps changed  Line removed/maintained prior to discharge: maintained    Administrations This Visit       acetaminophen (Tylenol) tablet 650 mg       Admin Date  04/10/2024 Action  Given Dose  650 mg Route  oral Documented By  Yamile Talbot RN              diphenhydrAMINE (BENADryl) injection 50 mg       Admin Date  04/10/2024 Action  Given Dose  50 mg Route  intravenous Documented By  Yamile Talbot RN                  Hypersensitivity reaction noted: No  Patient tolerated treatment well. Discharged home in stable condition.    Follow-up Plan: return to infusion 4/12     Yamile Talbot RN

## 2024-04-12 ENCOUNTER — OFFICE VISIT (OUTPATIENT)
Dept: HEMATOLOGY/ONCOLOGY | Facility: HOSPITAL | Age: 24
End: 2024-04-12
Payer: COMMERCIAL

## 2024-04-12 ENCOUNTER — INFUSION (OUTPATIENT)
Dept: HEMATOLOGY/ONCOLOGY | Facility: HOSPITAL | Age: 24
End: 2024-04-12
Payer: COMMERCIAL

## 2024-04-12 VITALS
WEIGHT: 169.3 LBS | HEART RATE: 64 BPM | TEMPERATURE: 99.3 F | RESPIRATION RATE: 18 BRPM | SYSTOLIC BLOOD PRESSURE: 129 MMHG | DIASTOLIC BLOOD PRESSURE: 60 MMHG | BODY MASS INDEX: 23.23 KG/M2 | OXYGEN SATURATION: 100 %

## 2024-04-12 DIAGNOSIS — H33.103 BILATERAL RETINOSCHISIS: ICD-10-CM

## 2024-04-12 DIAGNOSIS — C83.70 BURKITT LYMPHOMA, UNSPECIFIED BODY REGION (MULTI): ICD-10-CM

## 2024-04-12 DIAGNOSIS — C83.70 BURKITT LYMPHOMA, UNSPECIFIED BODY REGION (MULTI): Primary | ICD-10-CM

## 2024-04-12 DIAGNOSIS — C83.78 BURKITT LYMPHOMA OF LYMPH NODES OF MULTIPLE REGIONS (MULTI): ICD-10-CM

## 2024-04-12 DIAGNOSIS — Z79.899 IMMUNOSUPPRESSED DUE TO CHEMOTHERAPY (MULTI): ICD-10-CM

## 2024-04-12 DIAGNOSIS — T45.1X5A IMMUNOSUPPRESSED DUE TO CHEMOTHERAPY (MULTI): ICD-10-CM

## 2024-04-12 DIAGNOSIS — D69.6 THROMBOCYTOPENIA (CMS-HCC): ICD-10-CM

## 2024-04-12 DIAGNOSIS — D84.821 IMMUNOSUPPRESSED DUE TO CHEMOTHERAPY (MULTI): ICD-10-CM

## 2024-04-12 LAB
ABO GROUP (TYPE) IN BLOOD: NORMAL
ANION GAP SERPL CALC-SCNC: 12 MMOL/L (ref 10–20)
ANTIBODY SCREEN: NORMAL
BASOPHILS # BLD AUTO: 0 X10*3/UL (ref 0–0.1)
BASOPHILS NFR BLD AUTO: 0 %
BLOOD EXPIRATION DATE: NORMAL
BUN SERPL-MCNC: 16 MG/DL (ref 6–23)
CALCIUM SERPL-MCNC: 9.7 MG/DL (ref 8.6–10.3)
CHLORIDE SERPL-SCNC: 103 MMOL/L (ref 98–107)
CO2 SERPL-SCNC: 28 MMOL/L (ref 21–32)
CREAT SERPL-MCNC: 0.83 MG/DL (ref 0.5–1.3)
DISPENSE STATUS: NORMAL
EGFRCR SERPLBLD CKD-EPI 2021: >90 ML/MIN/1.73M*2
EOSINOPHIL # BLD AUTO: 0 X10*3/UL (ref 0–0.7)
EOSINOPHIL NFR BLD AUTO: 0 %
ERYTHROCYTE [DISTWIDTH] IN BLOOD BY AUTOMATED COUNT: 15.9 % (ref 11.5–14.5)
GLUCOSE SERPL-MCNC: 92 MG/DL (ref 74–99)
HCT VFR BLD AUTO: 24.9 % (ref 41–52)
HGB BLD-MCNC: 8.4 G/DL (ref 13.5–17.5)
IMM GRANULOCYTES # BLD AUTO: 0 X10*3/UL (ref 0–0.7)
IMM GRANULOCYTES NFR BLD AUTO: 0 % (ref 0–0.9)
LYMPHOCYTES # BLD AUTO: 0.09 X10*3/UL (ref 1.2–4.8)
LYMPHOCYTES NFR BLD AUTO: 47.4 %
MCH RBC QN AUTO: 30.1 PG (ref 26–34)
MCHC RBC AUTO-ENTMCNC: 33.7 G/DL (ref 32–36)
MCV RBC AUTO: 89 FL (ref 80–100)
MONOCYTES # BLD AUTO: 0.06 X10*3/UL (ref 0.1–1)
MONOCYTES NFR BLD AUTO: 31.6 %
NEUTROPHILS # BLD AUTO: 0.04 X10*3/UL (ref 1.2–7.7)
NEUTROPHILS NFR BLD AUTO: 21 %
NRBC BLD-RTO: 0 /100 WBCS (ref 0–0)
PLATELET # BLD AUTO: 33 X10*3/UL (ref 150–450)
POTASSIUM SERPL-SCNC: 3.9 MMOL/L (ref 3.5–5.3)
PRODUCT BLOOD TYPE: 5100
PRODUCT CODE: NORMAL
RBC # BLD AUTO: 2.79 X10*6/UL (ref 4.5–5.9)
RH FACTOR (ANTIGEN D): NORMAL
SODIUM SERPL-SCNC: 139 MMOL/L (ref 136–145)
UNIT ABO: NORMAL
UNIT NUMBER: NORMAL
UNIT RH: NORMAL
UNIT VOLUME: 254
WBC # BLD AUTO: 0.2 X10*3/UL (ref 4.4–11.3)

## 2024-04-12 PROCEDURE — 86901 BLOOD TYPING SEROLOGIC RH(D): CPT

## 2024-04-12 PROCEDURE — 2500000004 HC RX 250 GENERAL PHARMACY W/ HCPCS (ALT 636 FOR OP/ED): Mod: JZ | Performed by: STUDENT IN AN ORGANIZED HEALTH CARE EDUCATION/TRAINING PROGRAM

## 2024-04-12 PROCEDURE — 82374 ASSAY BLOOD CARBON DIOXIDE: CPT

## 2024-04-12 PROCEDURE — 96413 CHEMO IV INFUSION 1 HR: CPT

## 2024-04-12 PROCEDURE — 96411 CHEMO IV PUSH ADDL DRUG: CPT

## 2024-04-12 PROCEDURE — 96415 CHEMO IV INFUSION ADDL HR: CPT

## 2024-04-12 PROCEDURE — 85025 COMPLETE CBC W/AUTO DIFF WBC: CPT

## 2024-04-12 PROCEDURE — 86920 COMPATIBILITY TEST SPIN: CPT

## 2024-04-12 PROCEDURE — P9073 PLATELETS PHERESIS PATH REDU: HCPCS

## 2024-04-12 PROCEDURE — 2500000001 HC RX 250 WO HCPCS SELF ADMINISTERED DRUGS (ALT 637 FOR MEDICARE OP): Performed by: STUDENT IN AN ORGANIZED HEALTH CARE EDUCATION/TRAINING PROGRAM

## 2024-04-12 PROCEDURE — 99215 OFFICE O/P EST HI 40 MIN: CPT | Performed by: PHYSICIAN ASSISTANT

## 2024-04-12 PROCEDURE — 36430 TRANSFUSION BLD/BLD COMPNT: CPT

## 2024-04-12 RX ORDER — FAMOTIDINE 10 MG/ML
20 INJECTION INTRAVENOUS ONCE AS NEEDED
Status: CANCELLED | OUTPATIENT
Start: 2024-04-12

## 2024-04-12 RX ORDER — ACETAMINOPHEN 325 MG/1
650 TABLET ORAL ONCE
Status: COMPLETED | OUTPATIENT
Start: 2024-04-12 | End: 2024-04-12

## 2024-04-12 RX ORDER — DIPHENHYDRAMINE HYDROCHLORIDE 50 MG/ML
50 INJECTION, SOLUTION INTRAMUSCULAR; INTRAVENOUS ONCE
Status: CANCELLED
Start: 2024-04-12 | End: 2024-04-12

## 2024-04-12 RX ORDER — DIPHENHYDRAMINE HYDROCHLORIDE 50 MG/ML
50 INJECTION, SOLUTION INTRAMUSCULAR; INTRAVENOUS ONCE
Qty: 1 ML | Refills: 0 | Status: DISCONTINUED | OUTPATIENT
Start: 2024-04-12 | End: 2024-04-12 | Stop reason: HOSPADM

## 2024-04-12 RX ORDER — DIPHENHYDRAMINE HYDROCHLORIDE 50 MG/ML
50 INJECTION INTRAMUSCULAR; INTRAVENOUS AS NEEDED
Status: CANCELLED | OUTPATIENT
Start: 2024-04-12

## 2024-04-12 RX ORDER — ALBUTEROL SULFATE 0.83 MG/ML
3 SOLUTION RESPIRATORY (INHALATION) AS NEEDED
Status: CANCELLED | OUTPATIENT
Start: 2024-04-12

## 2024-04-12 RX ORDER — ALBUTEROL SULFATE 0.83 MG/ML
3 SOLUTION RESPIRATORY (INHALATION) AS NEEDED
Status: DISCONTINUED | OUTPATIENT
Start: 2024-04-12 | End: 2024-04-12 | Stop reason: HOSPADM

## 2024-04-12 RX ORDER — FAMOTIDINE 10 MG/ML
20 INJECTION INTRAVENOUS ONCE AS NEEDED
Status: DISCONTINUED | OUTPATIENT
Start: 2024-04-12 | End: 2024-04-12 | Stop reason: HOSPADM

## 2024-04-12 RX ORDER — EPINEPHRINE 0.3 MG/.3ML
0.3 INJECTION SUBCUTANEOUS EVERY 5 MIN PRN
Status: DISCONTINUED | OUTPATIENT
Start: 2024-04-12 | End: 2024-04-12 | Stop reason: HOSPADM

## 2024-04-12 RX ORDER — ACETAMINOPHEN 325 MG/1
650 TABLET ORAL ONCE
Status: DISCONTINUED | OUTPATIENT
Start: 2024-04-12 | End: 2024-04-12 | Stop reason: HOSPADM

## 2024-04-12 RX ORDER — ACETAMINOPHEN 325 MG/1
650 TABLET ORAL ONCE
Status: CANCELLED
Start: 2024-04-12 | End: 2024-04-12

## 2024-04-12 RX ORDER — EPINEPHRINE 0.3 MG/.3ML
0.3 INJECTION SUBCUTANEOUS EVERY 5 MIN PRN
Status: CANCELLED | OUTPATIENT
Start: 2024-04-12

## 2024-04-12 RX ORDER — DIPHENHYDRAMINE HCL 50 MG
50 CAPSULE ORAL ONCE
Status: COMPLETED | OUTPATIENT
Start: 2024-04-12 | End: 2024-04-12

## 2024-04-12 RX ORDER — DIPHENHYDRAMINE HYDROCHLORIDE 50 MG/ML
50 INJECTION INTRAMUSCULAR; INTRAVENOUS AS NEEDED
Status: DISCONTINUED | OUTPATIENT
Start: 2024-04-12 | End: 2024-04-12 | Stop reason: HOSPADM

## 2024-04-12 RX ADMIN — VINCRISTINE SULFATE 2 MG: 1 INJECTION, SOLUTION INTRAVENOUS at 13:39

## 2024-04-12 RX ADMIN — DIPHENHYDRAMINE HYDROCHLORIDE 50 MG: 50 CAPSULE ORAL at 11:47

## 2024-04-12 RX ADMIN — ACETAMINOPHEN 650 MG: 325 TABLET ORAL at 11:47

## 2024-04-12 RX ADMIN — RITUXIMAB 700 MG: 10 INJECTION, SOLUTION INTRAVENOUS at 14:03

## 2024-04-12 ASSESSMENT — PAIN SCALES - GENERAL: PAINLEVEL: 0-NO PAIN

## 2024-04-12 NOTE — ASSESSMENT & PLAN NOTE
- Continue prophylactic Acyclovir, Fluconazole, and Bactrim  - Levofloxacin Day 5-15 of each cycle for antibacterial prophylaxis -- will complete this on 4/16/24

## 2024-04-12 NOTE — ASSESSMENT & PLAN NOTE
- Bilateral retinoschisis and retinal hemorrhages   - S/p left eye vitrectomy  - May require repeat surgery in left eye folowing if vitreous hemorrhage persists or retina detaches  - Optho recommends Platelet threshold of 50,000  -- Continue prednisolone drops BID in both eyes  -- Continue Cosopt BID to the R eye  -- Continue Ketorolac BID both eyes   -- Artificial tears QID to the left eye  -- Spoke to Ophthalmology (Dr. Canales) today (04/12/24): no further tapering of any drops until he is seen in Ophthalmology clinic  - Close Follow up with Optho: next 5/15/24

## 2024-04-12 NOTE — ASSESSMENT & PLAN NOTE
:: Due to disease and chemotherapy   - Ideal platelet transfusion threshold 50,000-70,000 per Optho recommendations; however, definitely cannot allow to drop below 30,000/uL  - Transfused platelets today (04/12/24) for count of 33 k/uL

## 2024-04-12 NOTE — PROGRESS NOTES
Patient ID: Shilo Thakkar is a 23 y.o. male.    Diagnosis: No matching staging information was found for the patient.,    Treatment:   Oncology History   Burkitt lymphoma (Multi)   10/27/2023 Initial Diagnosis    Burkitt lymphoma, high risk, MYC positive  - 10/19/23 presented to [facility] ED with dysphagia and abdominal pain  - CT A/P with IV contrast (10/19) mass in the body of the pancreas measuring approximately 2.7 x 2.9 cm. The pancreatic duct is dilated. Multiple masses in the mesentery as well as omental caking consistent with carcinomatosis. Moderate amount of free fluid throughout the abdomen and pelvis.   - CT soft tissue neck with IV contrast (10/19): homogeneous soft tissue attenuation lesion in the L oropharynx which measures approximately 3.0 x 4.0 x 5.1 cm.   - US guided mesenteric LN biopsy (10/20/23): HIGH GRADE B-CELL LYMPHOMA MORPHOLOGICALLY CONSISTENT WITH BURKITT LYMPHOMA; flow cytometry: No B cell population identified  - Bone marrow aspiration biopsy November 1, 2023 no evidence of lymphoma involvement.  CSF by flow cytometry showed no lymphoma 11/2/2023.  - Biopsy L oropharyngeal mass and L cervical LN by ENT (10/23/23) high grade B-cell burkitt lymphoma, MYC positive, Bcl-2 negative.  - MR brain with and w/o IV contrast (10/27): No evidence of intracranial metastatic disease.     - Dexa 40 mg daily ended 11/1  - PET CT ordered 10/30 showed FDG avid left oropharyngeal mass and LAD above and below diaphragm     10/29/2023 -  Chemotherapy    HyperCVAD + RiTUXimab, 21 Day Cycles     12/6/2023 Imaging    -CT scan of the chest abdomen or pelvis on December 6, 2023 shows complete resolution of intra-abdominal disease, with mildly worse splenomegaly most likely unrelated to his lymphoma.      1/15/2024 Imaging    -PET scan 1/15/24 after C4:   Interval near complete/complete resolution of previous metabolic activity. Deauville Score 2.         Past Medical History:     Past Medical History:   Diagnosis Date     ADHD (attention deficit hyperactivity disorder) 01/28/2014    Burkitt lymphoma (Multi) 10/27/2023    Colles' fracture of right radius, initial encounter for closed fracture 04/23/2015    Colles' fracture of right radius    Gynecomastia 05/23/2016    Unspecified fracture of navicular (scaphoid) bone of left wrist, initial encounter for closed fracture 12/17/2015    Closed fracture of scaphoid bone of left wrist       Surgical History:     Past Surgical History:   Procedure Laterality Date    CHEST TUBE INSERTION  10/29/2023    FOOT FRACTURE SURGERY Left     OTHER SURGICAL HISTORY  10/03/2017    History Of Prior Surgery    US GUIDED SOFT TISSUE BIOPSY  10/20/2023    US GUIDED SOFT TISSUE BIOPSY 10/20/2023 [facility] US        Family History:     Family History   Problem Relation Name Age of Onset    ADD / ADHD Mother      ADD / ADHD Sister      ADD / ADHD Brother         Social History:     Social History     Tobacco Use    Smoking status: Never     Passive exposure: Never    Smokeless tobacco: Never   Substance Use Topics    Alcohol use: Not Currently     Comment: rarely    Drug use: Never      -------------------------------------------------------------------------------------------------------  Subjective       The patient presents to the clinic today (04/12/24) for routine follow-up; overall, he is doing well.    He says that there's not been much chage in his vision -- he doesn't see much our of his L eye and the R is fairly flurry. He doesn't have pain in his eyes at all.    Denies nausea/vomiting/diarrhea. Denies constipation. Denies fevers and chills. Denies chest pain, dyspnea, cough, and productive cough. No night sweats.     He's eating and drinking okay -- no problems there. No swelling in his arms or legs, no headaches..     Review of Systems   All other systems reviewed and are negative.     -------------------------------------------------------------------------------------------------------  Objective    BSA: There is no height or weight on file to calculate BSA.  There were no vitals taken for this visit.    Physical Exam  HENT:      Mouth/Throat:      Mouth: Mucous membranes are moist.      Pharynx: No posterior oropharyngeal erythema.   Eyes:      General: No scleral icterus.     Extraocular Movements: Extraocular movements intact.      Conjunctiva/sclera:      Right eye: Right conjunctiva is not injected. No hemorrhage.     Left eye: Left conjunctiva is not injected. No hemorrhage.     Pupils: Pupils are equal, round, and reactive to light.      Comments: No significant periorbital edema on either side today (04/12/24)    Cardiovascular:      Rate and Rhythm: Normal rate and regular rhythm.      Heart sounds: No murmur heard.     No friction rub. No gallop.   Pulmonary:      Effort: No respiratory distress.      Breath sounds: No stridor. No wheezing, rhonchi or rales.   Abdominal:      General: There is no distension.      Palpations: There is no mass.      Tenderness: There is no abdominal tenderness. There is no guarding or rebound.   Musculoskeletal:         General: No swelling or deformity.      Right lower leg: No edema.      Left lower leg: No edema.   Lymphadenopathy:      Cervical: No cervical adenopathy.   Skin:     Findings: No lesion or rash.      Comments: RUE PICC insertion site without erythema, edema, tenderness, and dischrage   Neurological:      Mental Status: He is alert.      Cranial Nerves: No cranial nerve deficit.      Motor: No weakness.   Psychiatric:         Mood and Affect: Mood normal.         Behavior: Behavior normal.       Performance Status:  Symptomatic; fully ambulatory  -------------------------------------------------------------------------------------------------------  Assessment/Plan    Burkitt lymphoma (Multi)  - High risk BL  in CR   - PET-CT s/p C4 (1/15/24) c/w Deauville 2  - Tolerated C6 HyperCVAD (Dose reduce Cytarabine at 1.5 G/m2 per dose with Methotrexate 1000  mg/m2 total)   - 1 LP with intrathecal cytarabine during C6 on 3/7; CSF flow cytometry negative  - After discussion with Dr. Klein on 3/20, they decided to proceed with 8 cycles, with 1 intrathecal each cycle.    - Admitted 4/1/24. This will include rituximab day 1 and day 11 (4/12), and IT methotrexate 12 mg on 4/3/24 (CSF negative for lymphoma) and day 11 vincristine and dexamethasone pulse.    - Cycle 8 is planned with dose reduction of cytarabine to 1 g/m² per dose and methotrexate to to 800 mg/m² total [20% as short infusion and 80% as 24-hour infusion].   - Transfuse to keep Hgb>8 and Plt>50,000    Thrombocytopenia (CMS-HCC)  :: Due to disease and chemotherapy   - Ideal platelet transfusion threshold 50,000-70,000 per Optho recommendations; however, definitely cannot allow to drop below 30,000/uL  - Transfused platelets today (04/12/24) for count of 33 k/uL     Immunosuppressed due to chemotherapy (Multi)  - Continue prophylactic Acyclovir, Fluconazole, and Bactrim  - Levofloxacin Day 5-15 of each cycle for antibacterial prophylaxis -- will complete this on 4/16/24    Bilateral retinoschisis  - Bilateral retinoschisis and retinal hemorrhages   - S/p left eye vitrectomy  - May require repeat surgery in left eye folowing if vitreous hemorrhage persists or retina detaches  - Optho recommends Platelet threshold of 50,000  -- Continue prednisolone drops BID in both eyes  -- Continue Cosopt BID to the R eye  -- Continue Ketorolac BID both eyes   -- Artificial tears QID to the left eye  -- Spoke to Ophthalmology (Dr. Canales) today (04/12/24): no further tapering of any drops until he is seen in Ophthalmology clinic  - Close Follow up with Optho: next 5/15/24    RTC:  - 3x weekly count checks: 4/15, 4/17, 4/19, 4/22  - 4/17/24: Dr. Klein  - 4/24/24: Plan for C8 Admission  -------------------------------------------------------------------------------------------------------  I spent at least 40 minutes in the care  of this patient, including time discussing his care with other members of his team, including Ophthalmology.     Néstor Soni PA-C  [facility]

## 2024-04-15 ENCOUNTER — INFUSION (OUTPATIENT)
Dept: HEMATOLOGY/ONCOLOGY | Facility: HOSPITAL | Age: 24
End: 2024-04-15
Payer: COMMERCIAL

## 2024-04-15 VITALS
SYSTOLIC BLOOD PRESSURE: 116 MMHG | HEIGHT: 72 IN | OXYGEN SATURATION: 98 % | HEART RATE: 64 BPM | WEIGHT: 168.43 LBS | DIASTOLIC BLOOD PRESSURE: 54 MMHG | BODY MASS INDEX: 22.81 KG/M2 | TEMPERATURE: 99.3 F | RESPIRATION RATE: 18 BRPM

## 2024-04-15 DIAGNOSIS — C83.79 BURKITT LYMPHOMA OF SOLID ORGAN EXCLUDING SPLEEN (MULTI): ICD-10-CM

## 2024-04-15 DIAGNOSIS — C83.70 BURKITT LYMPHOMA, UNSPECIFIED BODY REGION (MULTI): ICD-10-CM

## 2024-04-15 DIAGNOSIS — C83.78 BURKITT LYMPHOMA OF LYMPH NODES OF MULTIPLE REGIONS (MULTI): ICD-10-CM

## 2024-04-15 LAB
ABO GROUP (TYPE) IN BLOOD: NORMAL
ALBUMIN SERPL BCP-MCNC: 4.3 G/DL (ref 3.4–5)
ALP SERPL-CCNC: 73 U/L (ref 33–120)
ALT SERPL W P-5'-P-CCNC: 17 U/L (ref 10–52)
ANION GAP SERPL CALC-SCNC: 16 MMOL/L (ref 10–20)
ANTIBODY SCREEN: NORMAL
AST SERPL W P-5'-P-CCNC: 14 U/L (ref 9–39)
BASOPHILS # BLD MANUAL: 0 X10*3/UL (ref 0–0.1)
BASOPHILS NFR BLD MANUAL: 0 %
BILIRUB SERPL-MCNC: 0.5 MG/DL (ref 0–1.2)
BLOOD EXPIRATION DATE: NORMAL
BUN SERPL-MCNC: 9 MG/DL (ref 6–23)
CALCIUM SERPL-MCNC: 9.1 MG/DL (ref 8.6–10.3)
CHLORIDE SERPL-SCNC: 102 MMOL/L (ref 98–107)
CO2 SERPL-SCNC: 25 MMOL/L (ref 21–32)
CREAT SERPL-MCNC: 1.04 MG/DL (ref 0.5–1.3)
DACRYOCYTES BLD QL SMEAR: ABNORMAL
DISPENSE STATUS: NORMAL
EGFRCR SERPLBLD CKD-EPI 2021: >90 ML/MIN/1.73M*2
EOSINOPHIL # BLD MANUAL: 0 X10*3/UL (ref 0–0.7)
EOSINOPHIL NFR BLD MANUAL: 0 %
ERYTHROCYTE [DISTWIDTH] IN BLOOD BY AUTOMATED COUNT: 15.9 % (ref 11.5–14.5)
GLUCOSE SERPL-MCNC: 144 MG/DL (ref 74–99)
HCT VFR BLD AUTO: 20.2 % (ref 41–52)
HGB BLD-MCNC: 7 G/DL (ref 13.5–17.5)
IMM GRANULOCYTES # BLD AUTO: 0.92 X10*3/UL (ref 0–0.7)
IMM GRANULOCYTES NFR BLD AUTO: 10.2 % (ref 0–0.9)
LYMPHOCYTES # BLD MANUAL: 0.27 X10*3/UL (ref 1.2–4.8)
LYMPHOCYTES NFR BLD MANUAL: 3 %
MAGNESIUM SERPL-MCNC: 1.57 MG/DL (ref 1.6–2.4)
MCH RBC QN AUTO: 31 PG (ref 26–34)
MCHC RBC AUTO-ENTMCNC: 34.7 G/DL (ref 32–36)
MCV RBC AUTO: 89 FL (ref 80–100)
MONOCYTES # BLD MANUAL: 0.27 X10*3/UL (ref 0.1–1)
MONOCYTES NFR BLD MANUAL: 3 %
MYELOCYTES # BLD MANUAL: 0.36 X10*3/UL
MYELOCYTES NFR BLD MANUAL: 4 %
NEUTROPHILS # BLD MANUAL: 8.1 X10*3/UL (ref 1.2–7.7)
NEUTS BAND # BLD MANUAL: 2.16 X10*3/UL (ref 0–0.7)
NEUTS BAND NFR BLD MANUAL: 24 %
NEUTS SEG # BLD MANUAL: 5.94 X10*3/UL (ref 1.2–7)
NEUTS SEG NFR BLD MANUAL: 66 %
NRBC BLD-RTO: 0 /100 WBCS (ref 0–0)
OVALOCYTES BLD QL SMEAR: ABNORMAL
PLATELET # BLD AUTO: 27 X10*3/UL (ref 150–450)
POLYCHROMASIA BLD QL SMEAR: ABNORMAL
POTASSIUM SERPL-SCNC: 3.5 MMOL/L (ref 3.5–5.3)
PRODUCT BLOOD TYPE: 1700
PRODUCT BLOOD TYPE: 5100
PRODUCT BLOOD TYPE: 8400
PRODUCT CODE: NORMAL
PROT SERPL-MCNC: 6.3 G/DL (ref 6.4–8.2)
RBC # BLD AUTO: 2.26 X10*6/UL (ref 4.5–5.9)
RBC MORPH BLD: ABNORMAL
RH FACTOR (ANTIGEN D): NORMAL
SCHISTOCYTES BLD QL SMEAR: ABNORMAL
SODIUM SERPL-SCNC: 139 MMOL/L (ref 136–145)
TOTAL CELLS COUNTED BLD: 100
UNIT ABO: NORMAL
UNIT NUMBER: NORMAL
UNIT RH: NORMAL
UNIT VOLUME: 220
UNIT VOLUME: 350
UNIT VOLUME: 587
WBC # BLD AUTO: 9 X10*3/UL (ref 4.4–11.3)
XM INTEP: NORMAL

## 2024-04-15 PROCEDURE — 85027 COMPLETE CBC AUTOMATED: CPT

## 2024-04-15 PROCEDURE — 80053 COMPREHEN METABOLIC PANEL: CPT

## 2024-04-15 PROCEDURE — P9040 RBC LEUKOREDUCED IRRADIATED: HCPCS

## 2024-04-15 PROCEDURE — 96375 TX/PRO/DX INJ NEW DRUG ADDON: CPT | Mod: INF

## 2024-04-15 PROCEDURE — 36430 TRANSFUSION BLD/BLD COMPNT: CPT

## 2024-04-15 PROCEDURE — 85007 BL SMEAR W/DIFF WBC COUNT: CPT

## 2024-04-15 PROCEDURE — 2500000004 HC RX 250 GENERAL PHARMACY W/ HCPCS (ALT 636 FOR OP/ED): Performed by: INTERNAL MEDICINE

## 2024-04-15 PROCEDURE — 96374 THER/PROPH/DIAG INJ IV PUSH: CPT | Mod: INF

## 2024-04-15 PROCEDURE — 83735 ASSAY OF MAGNESIUM: CPT

## 2024-04-15 PROCEDURE — 86901 BLOOD TYPING SEROLOGIC RH(D): CPT

## 2024-04-15 PROCEDURE — P9037 PLATE PHERES LEUKOREDU IRRAD: HCPCS

## 2024-04-15 RX ORDER — EPINEPHRINE 0.3 MG/.3ML
0.3 INJECTION SUBCUTANEOUS EVERY 5 MIN PRN
Status: CANCELLED | OUTPATIENT
Start: 2024-04-15

## 2024-04-15 RX ORDER — DIPHENHYDRAMINE HYDROCHLORIDE 50 MG/ML
50 INJECTION, SOLUTION INTRAMUSCULAR; INTRAVENOUS ONCE
Status: CANCELLED
Start: 2024-04-15 | End: 2024-04-15

## 2024-04-15 RX ORDER — MAGNESIUM SULFATE HEPTAHYDRATE 40 MG/ML
4 INJECTION, SOLUTION INTRAVENOUS AS NEEDED
Status: DISCONTINUED | OUTPATIENT
Start: 2024-04-15 | End: 2024-04-15 | Stop reason: HOSPADM

## 2024-04-15 RX ORDER — ACETAMINOPHEN 325 MG/1
650 TABLET ORAL ONCE
Status: CANCELLED
Start: 2024-04-15 | End: 2024-04-15

## 2024-04-15 RX ORDER — FAMOTIDINE 10 MG/ML
20 INJECTION INTRAVENOUS ONCE AS NEEDED
Status: CANCELLED | OUTPATIENT
Start: 2024-04-15

## 2024-04-15 RX ORDER — DIPHENHYDRAMINE HYDROCHLORIDE 50 MG/ML
50 INJECTION INTRAMUSCULAR; INTRAVENOUS AS NEEDED
Status: CANCELLED | OUTPATIENT
Start: 2024-04-15

## 2024-04-15 RX ORDER — ALBUTEROL SULFATE 0.83 MG/ML
3 SOLUTION RESPIRATORY (INHALATION) AS NEEDED
Status: CANCELLED | OUTPATIENT
Start: 2024-04-15

## 2024-04-15 RX ORDER — POTASSIUM CHLORIDE 14.9 MG/ML
20 INJECTION INTRAVENOUS AS NEEDED
Status: CANCELLED
Start: 2024-04-15

## 2024-04-15 RX ORDER — DIPHENHYDRAMINE HYDROCHLORIDE 50 MG/ML
50 INJECTION INTRAMUSCULAR; INTRAVENOUS ONCE
Status: COMPLETED | OUTPATIENT
Start: 2024-04-15 | End: 2024-04-15

## 2024-04-15 RX ORDER — ONDANSETRON HYDROCHLORIDE 2 MG/ML
8 INJECTION, SOLUTION INTRAVENOUS EVERY 4 HOURS PRN
Status: CANCELLED
Start: 2024-04-15

## 2024-04-15 RX ORDER — MAGNESIUM SULFATE HEPTAHYDRATE 40 MG/ML
4 INJECTION, SOLUTION INTRAVENOUS AS NEEDED
Status: CANCELLED
Start: 2024-04-15

## 2024-04-15 RX ORDER — ACETAMINOPHEN 325 MG/1
650 TABLET ORAL ONCE
Status: COMPLETED | OUTPATIENT
Start: 2024-04-15 | End: 2024-04-15

## 2024-04-15 RX ORDER — HEPARIN SODIUM,PORCINE/PF 10 UNIT/ML
50 SYRINGE (ML) INTRAVENOUS AS NEEDED
Status: CANCELLED | OUTPATIENT
Start: 2024-04-15

## 2024-04-15 RX ADMIN — ACETAMINOPHEN 650 MG: 325 TABLET ORAL at 10:54

## 2024-04-15 RX ADMIN — MAGNESIUM SULFATE 4 G: 4 INJECTION INTRAVENOUS at 13:24

## 2024-04-15 RX ADMIN — DIPHENHYDRAMINE HYDROCHLORIDE 50 MG: 50 INJECTION, SOLUTION INTRAMUSCULAR; INTRAVENOUS at 10:54

## 2024-04-15 NOTE — PROGRESS NOTES
1000 Pt here for count check today. His HGB is 7, Plts 27 and Magnesium 1.57 He has orders to replace 1 UPRBC, 2 Units platlets and 4grams of magnesium. Dr Brandtiso aware. Pt reports feeling good today. POC discussed with pt. He verbalized understanding.    1330 2 units of platelets completed. Pt tolerated well. Magnesium is running @ this time.  1345 1UPRBC started.  1530 Unit of blood and Magnesium completed. Both PICC line ports flushed with NS. Pt left ambulatory in NAD.

## 2024-04-16 ENCOUNTER — APPOINTMENT (OUTPATIENT)
Dept: HEMATOLOGY/ONCOLOGY | Facility: HOSPITAL | Age: 24
End: 2024-04-16
Payer: COMMERCIAL

## 2024-04-17 ENCOUNTER — APPOINTMENT (OUTPATIENT)
Dept: HEMATOLOGY/ONCOLOGY | Facility: HOSPITAL | Age: 24
End: 2024-04-17
Payer: COMMERCIAL

## 2024-04-17 ENCOUNTER — LAB (OUTPATIENT)
Dept: HEMATOLOGY/ONCOLOGY | Facility: HOSPITAL | Age: 24
End: 2024-04-17
Payer: COMMERCIAL

## 2024-04-17 ENCOUNTER — OFFICE VISIT (OUTPATIENT)
Dept: HEMATOLOGY/ONCOLOGY | Facility: HOSPITAL | Age: 24
End: 2024-04-17
Payer: COMMERCIAL

## 2024-04-17 VITALS
HEART RATE: 64 BPM | SYSTOLIC BLOOD PRESSURE: 118 MMHG | BODY MASS INDEX: 22.82 KG/M2 | DIASTOLIC BLOOD PRESSURE: 74 MMHG | TEMPERATURE: 96.4 F | OXYGEN SATURATION: 100 % | WEIGHT: 166.3 LBS | RESPIRATION RATE: 16 BRPM

## 2024-04-17 DIAGNOSIS — C83.79 BURKITT LYMPHOMA OF SOLID ORGAN EXCLUDING SPLEEN (MULTI): ICD-10-CM

## 2024-04-17 DIAGNOSIS — C83.78 BURKITT LYMPHOMA OF LYMPH NODES OF MULTIPLE REGIONS (MULTI): ICD-10-CM

## 2024-04-17 DIAGNOSIS — C83.70 BURKITT LYMPHOMA, UNSPECIFIED BODY REGION (MULTI): ICD-10-CM

## 2024-04-17 DIAGNOSIS — C83.70 BURKITT LYMPHOMA, UNSPECIFIED BODY REGION (MULTI): Primary | ICD-10-CM

## 2024-04-17 LAB
ANION GAP SERPL CALC-SCNC: 14 MMOL/L (ref 10–20)
BASOPHILS # BLD AUTO: 0.02 X10*3/UL (ref 0–0.1)
BASOPHILS NFR BLD AUTO: 0.3 %
BUN SERPL-MCNC: 11 MG/DL (ref 6–23)
CALCIUM SERPL-MCNC: 9.7 MG/DL (ref 8.6–10.3)
CHLORIDE SERPL-SCNC: 102 MMOL/L (ref 98–107)
CO2 SERPL-SCNC: 29 MMOL/L (ref 21–32)
CREAT SERPL-MCNC: 1.16 MG/DL (ref 0.5–1.3)
DACRYOCYTES BLD QL SMEAR: NORMAL
EGFRCR SERPLBLD CKD-EPI 2021: 90 ML/MIN/1.73M*2
EOSINOPHIL # BLD AUTO: 0.01 X10*3/UL (ref 0–0.7)
EOSINOPHIL NFR BLD AUTO: 0.1 %
ERYTHROCYTE [DISTWIDTH] IN BLOOD BY AUTOMATED COUNT: 15.6 % (ref 11.5–14.5)
GLUCOSE SERPL-MCNC: 84 MG/DL (ref 74–99)
HCT VFR BLD AUTO: 27 % (ref 41–52)
HGB BLD-MCNC: 9.3 G/DL (ref 13.5–17.5)
IMM GRANULOCYTES # BLD AUTO: 0.34 X10*3/UL (ref 0–0.7)
IMM GRANULOCYTES NFR BLD AUTO: 4.6 % (ref 0–0.9)
LYMPHOCYTES # BLD AUTO: 0.47 X10*3/UL (ref 1.2–4.8)
LYMPHOCYTES NFR BLD AUTO: 6.4 %
MCH RBC QN AUTO: 30 PG (ref 26–34)
MCHC RBC AUTO-ENTMCNC: 34.4 G/DL (ref 32–36)
MCV RBC AUTO: 87 FL (ref 80–100)
MONOCYTES # BLD AUTO: 0.93 X10*3/UL (ref 0.1–1)
MONOCYTES NFR BLD AUTO: 12.6 %
NEUTROPHILS # BLD AUTO: 5.59 X10*3/UL (ref 1.2–7.7)
NEUTROPHILS NFR BLD AUTO: 76 %
NRBC BLD-RTO: 0 /100 WBCS (ref 0–0)
OVALOCYTES BLD QL SMEAR: NORMAL
PLATELET # BLD AUTO: 83 X10*3/UL (ref 150–450)
POLYCHROMASIA BLD QL SMEAR: NORMAL
POTASSIUM SERPL-SCNC: 3.7 MMOL/L (ref 3.5–5.3)
RBC # BLD AUTO: 3.1 X10*6/UL (ref 4.5–5.9)
RBC MORPH BLD: NORMAL
SCHISTOCYTES BLD QL SMEAR: NORMAL
SODIUM SERPL-SCNC: 141 MMOL/L (ref 136–145)
WBC # BLD AUTO: 7.4 X10*3/UL (ref 4.4–11.3)

## 2024-04-17 PROCEDURE — 85025 COMPLETE CBC W/AUTO DIFF WBC: CPT

## 2024-04-17 PROCEDURE — 82374 ASSAY BLOOD CARBON DIOXIDE: CPT

## 2024-04-17 PROCEDURE — 36591 DRAW BLOOD OFF VENOUS DEVICE: CPT

## 2024-04-17 PROCEDURE — 99215 OFFICE O/P EST HI 40 MIN: CPT | Performed by: INTERNAL MEDICINE

## 2024-04-17 RX ORDER — HEPARIN SODIUM,PORCINE/PF 10 UNIT/ML
50 SYRINGE (ML) INTRAVENOUS AS NEEDED
Status: CANCELLED | OUTPATIENT
Start: 2024-04-17

## 2024-04-17 ASSESSMENT — PAIN SCALES - GENERAL: PAINLEVEL_OUTOF10: 0-NO PAIN

## 2024-04-18 ENCOUNTER — APPOINTMENT (OUTPATIENT)
Dept: HEMATOLOGY/ONCOLOGY | Facility: HOSPITAL | Age: 24
End: 2024-04-18
Payer: COMMERCIAL

## 2024-04-19 ENCOUNTER — INFUSION (OUTPATIENT)
Dept: HEMATOLOGY/ONCOLOGY | Facility: HOSPITAL | Age: 24
End: 2024-04-19
Payer: COMMERCIAL

## 2024-04-19 ENCOUNTER — APPOINTMENT (OUTPATIENT)
Dept: HEMATOLOGY/ONCOLOGY | Facility: HOSPITAL | Age: 24
End: 2024-04-19
Payer: COMMERCIAL

## 2024-04-19 ENCOUNTER — TELEPHONE (OUTPATIENT)
Dept: HEMATOLOGY/ONCOLOGY | Facility: HOSPITAL | Age: 24
End: 2024-04-19

## 2024-04-19 VITALS
DIASTOLIC BLOOD PRESSURE: 77 MMHG | RESPIRATION RATE: 18 BRPM | TEMPERATURE: 97.2 F | OXYGEN SATURATION: 100 % | HEART RATE: 84 BPM | BODY MASS INDEX: 23.09 KG/M2 | WEIGHT: 168.21 LBS | SYSTOLIC BLOOD PRESSURE: 125 MMHG

## 2024-04-19 DIAGNOSIS — C83.70 BURKITT LYMPHOMA, UNSPECIFIED BODY REGION (MULTI): ICD-10-CM

## 2024-04-19 DIAGNOSIS — C83.70 BURKITT LYMPHOMA, UNSPECIFIED BODY REGION (MULTI): Primary | ICD-10-CM

## 2024-04-19 DIAGNOSIS — C83.78 BURKITT LYMPHOMA OF LYMPH NODES OF MULTIPLE REGIONS (MULTI): ICD-10-CM

## 2024-04-19 LAB
ALBUMIN SERPL BCP-MCNC: 5 G/DL (ref 3.4–5)
ALP SERPL-CCNC: 92 U/L (ref 33–120)
ALT SERPL W P-5'-P-CCNC: 16 U/L (ref 10–52)
ANION GAP SERPL CALC-SCNC: 16 MMOL/L (ref 10–20)
AST SERPL W P-5'-P-CCNC: 13 U/L (ref 9–39)
BASOPHILS # BLD MANUAL: 0 X10*3/UL (ref 0–0.1)
BASOPHILS NFR BLD MANUAL: 0 %
BILIRUB SERPL-MCNC: 0.4 MG/DL (ref 0–1.2)
BUN SERPL-MCNC: 14 MG/DL (ref 6–23)
CALCIUM SERPL-MCNC: 9.6 MG/DL (ref 8.6–10.3)
CHLORIDE SERPL-SCNC: 103 MMOL/L (ref 98–107)
CO2 SERPL-SCNC: 26 MMOL/L (ref 21–32)
CREAT SERPL-MCNC: 1.3 MG/DL (ref 0.5–1.3)
DACRYOCYTES BLD QL SMEAR: ABNORMAL
EGFRCR SERPLBLD CKD-EPI 2021: 79 ML/MIN/1.73M*2
EOSINOPHIL # BLD MANUAL: 0 X10*3/UL (ref 0–0.7)
EOSINOPHIL NFR BLD MANUAL: 0 %
ERYTHROCYTE [DISTWIDTH] IN BLOOD BY AUTOMATED COUNT: 15.1 % (ref 11.5–14.5)
GLUCOSE SERPL-MCNC: 131 MG/DL (ref 74–99)
HCT VFR BLD AUTO: 28.2 % (ref 41–52)
HGB BLD-MCNC: 9.7 G/DL (ref 13.5–17.5)
IMM GRANULOCYTES # BLD AUTO: 0.48 X10*3/UL (ref 0–0.7)
IMM GRANULOCYTES NFR BLD AUTO: 5.2 % (ref 0–0.9)
LYMPHOCYTES # BLD MANUAL: 0.55 X10*3/UL (ref 1.2–4.8)
LYMPHOCYTES NFR BLD MANUAL: 6 %
MCH RBC QN AUTO: 30.1 PG (ref 26–34)
MCHC RBC AUTO-ENTMCNC: 34.4 G/DL (ref 32–36)
MCV RBC AUTO: 88 FL (ref 80–100)
MONOCYTES # BLD MANUAL: 0.46 X10*3/UL (ref 0.1–1)
MONOCYTES NFR BLD MANUAL: 5 %
MYELOCYTES # BLD MANUAL: 0.09 X10*3/UL
MYELOCYTES NFR BLD MANUAL: 1 %
NEUTROPHILS # BLD MANUAL: 8 X10*3/UL (ref 1.2–7.7)
NEUTS BAND # BLD MANUAL: 1.01 X10*3/UL (ref 0–0.7)
NEUTS BAND NFR BLD MANUAL: 11 %
NEUTS SEG # BLD MANUAL: 6.99 X10*3/UL (ref 1.2–7)
NEUTS SEG NFR BLD MANUAL: 76 %
NRBC BLD-RTO: 0 /100 WBCS (ref 0–0)
OVALOCYTES BLD QL SMEAR: ABNORMAL
PLATELET # BLD AUTO: 80 X10*3/UL (ref 150–450)
POLYCHROMASIA BLD QL SMEAR: ABNORMAL
POTASSIUM SERPL-SCNC: 3.6 MMOL/L (ref 3.5–5.3)
PROT SERPL-MCNC: 7.1 G/DL (ref 6.4–8.2)
RBC # BLD AUTO: 3.22 X10*6/UL (ref 4.5–5.9)
RBC MORPH BLD: ABNORMAL
SCHISTOCYTES BLD QL SMEAR: ABNORMAL
SODIUM SERPL-SCNC: 141 MMOL/L (ref 136–145)
TOTAL CELLS COUNTED BLD: 100
VARIANT LYMPHS # BLD MANUAL: 0.09 X10*3/UL (ref 0–0.5)
VARIANT LYMPHS NFR BLD: 1 %
WBC # BLD AUTO: 9.2 X10*3/UL (ref 4.4–11.3)

## 2024-04-19 PROCEDURE — 85027 COMPLETE CBC AUTOMATED: CPT | Performed by: STUDENT IN AN ORGANIZED HEALTH CARE EDUCATION/TRAINING PROGRAM

## 2024-04-19 PROCEDURE — 80053 COMPREHEN METABOLIC PANEL: CPT | Performed by: STUDENT IN AN ORGANIZED HEALTH CARE EDUCATION/TRAINING PROGRAM

## 2024-04-19 PROCEDURE — 36591 DRAW BLOOD OFF VENOUS DEVICE: CPT

## 2024-04-19 PROCEDURE — 85007 BL SMEAR W/DIFF WBC COUNT: CPT | Performed by: STUDENT IN AN ORGANIZED HEALTH CARE EDUCATION/TRAINING PROGRAM

## 2024-04-19 ASSESSMENT — PAIN SCALES - GENERAL: PAINLEVEL: 0-NO PAIN

## 2024-04-19 NOTE — TELEPHONE ENCOUNTER
Call made to patient to discuss appt also to discuss admission planned for Monday 4/22/2024    Patients platelets per Dr. Klein need to be >100 to proceed with next cycle, the most recent labs drawn today see emr are below that.    Patient encouraged to keep count check on Monday 4/22/2024, as scheduled and will assess at time of appt if appropriate.     Patients schedule has also been updated with imaging scheduled.   This nurse wanted to review prep for PET on 5/8/2024.

## 2024-04-22 ENCOUNTER — APPOINTMENT (OUTPATIENT)
Dept: HEMATOLOGY/ONCOLOGY | Facility: HOSPITAL | Age: 24
End: 2024-04-22
Payer: COMMERCIAL

## 2024-04-22 ENCOUNTER — INFUSION (OUTPATIENT)
Dept: HEMATOLOGY/ONCOLOGY | Facility: HOSPITAL | Age: 24
End: 2024-04-22
Payer: COMMERCIAL

## 2024-04-22 VITALS
DIASTOLIC BLOOD PRESSURE: 70 MMHG | TEMPERATURE: 97.7 F | RESPIRATION RATE: 18 BRPM | SYSTOLIC BLOOD PRESSURE: 121 MMHG | OXYGEN SATURATION: 99 % | WEIGHT: 166 LBS | HEART RATE: 71 BPM | BODY MASS INDEX: 22.78 KG/M2

## 2024-04-22 DIAGNOSIS — C83.79 BURKITT LYMPHOMA OF SOLID ORGAN EXCLUDING SPLEEN (MULTI): ICD-10-CM

## 2024-04-22 DIAGNOSIS — C83.78 BURKITT LYMPHOMA OF LYMPH NODES OF MULTIPLE REGIONS (MULTI): ICD-10-CM

## 2024-04-22 DIAGNOSIS — C83.70 BURKITT LYMPHOMA, UNSPECIFIED BODY REGION (MULTI): ICD-10-CM

## 2024-04-22 LAB
ALBUMIN SERPL BCP-MCNC: 4.9 G/DL (ref 3.4–5)
ALP SERPL-CCNC: 83 U/L (ref 33–120)
ALT SERPL W P-5'-P-CCNC: 17 U/L (ref 10–52)
ANION GAP SERPL CALC-SCNC: 13 MMOL/L (ref 10–20)
AST SERPL W P-5'-P-CCNC: 15 U/L (ref 9–39)
BASOPHILS # BLD AUTO: 0.01 X10*3/UL (ref 0–0.1)
BASOPHILS NFR BLD AUTO: 0.1 %
BILIRUB SERPL-MCNC: 0.5 MG/DL (ref 0–1.2)
BUN SERPL-MCNC: 14 MG/DL (ref 6–23)
CALCIUM SERPL-MCNC: 9.7 MG/DL (ref 8.6–10.3)
CHLORIDE SERPL-SCNC: 104 MMOL/L (ref 98–107)
CO2 SERPL-SCNC: 29 MMOL/L (ref 21–32)
CREAT SERPL-MCNC: 1.54 MG/DL (ref 0.5–1.3)
EGFRCR SERPLBLD CKD-EPI 2021: 64 ML/MIN/1.73M*2
EOSINOPHIL # BLD AUTO: 0 X10*3/UL (ref 0–0.7)
EOSINOPHIL NFR BLD AUTO: 0 %
ERYTHROCYTE [DISTWIDTH] IN BLOOD BY AUTOMATED COUNT: 17 % (ref 11.5–14.5)
GLUCOSE SERPL-MCNC: 85 MG/DL (ref 74–99)
HCT VFR BLD AUTO: 28.2 % (ref 41–52)
HGB BLD-MCNC: 9.6 G/DL (ref 13.5–17.5)
IMM GRANULOCYTES # BLD AUTO: 0.09 X10*3/UL (ref 0–0.7)
IMM GRANULOCYTES NFR BLD AUTO: 1 % (ref 0–0.9)
LDH SERPL L TO P-CCNC: 185 U/L (ref 84–246)
LYMPHOCYTES # BLD AUTO: 0.59 X10*3/UL (ref 1.2–4.8)
LYMPHOCYTES NFR BLD AUTO: 6.8 %
MAGNESIUM SERPL-MCNC: 1.93 MG/DL (ref 1.6–2.4)
MCH RBC QN AUTO: 30.5 PG (ref 26–34)
MCHC RBC AUTO-ENTMCNC: 34 G/DL (ref 32–36)
MCV RBC AUTO: 90 FL (ref 80–100)
MONOCYTES # BLD AUTO: 0.79 X10*3/UL (ref 0.1–1)
MONOCYTES NFR BLD AUTO: 9.1 %
NEUTROPHILS # BLD AUTO: 7.16 X10*3/UL (ref 1.2–7.7)
NEUTROPHILS NFR BLD AUTO: 83 %
NRBC BLD-RTO: 0 /100 WBCS (ref 0–0)
PLATELET # BLD AUTO: 97 X10*3/UL (ref 150–450)
POTASSIUM SERPL-SCNC: 4.2 MMOL/L (ref 3.5–5.3)
PROT SERPL-MCNC: 7 G/DL (ref 6.4–8.2)
RBC # BLD AUTO: 3.15 X10*6/UL (ref 4.5–5.9)
SODIUM SERPL-SCNC: 142 MMOL/L (ref 136–145)
WBC # BLD AUTO: 8.6 X10*3/UL (ref 4.4–11.3)

## 2024-04-22 PROCEDURE — 83615 LACTATE (LD) (LDH) ENZYME: CPT

## 2024-04-22 PROCEDURE — 83735 ASSAY OF MAGNESIUM: CPT

## 2024-04-22 PROCEDURE — 2500000004 HC RX 250 GENERAL PHARMACY W/ HCPCS (ALT 636 FOR OP/ED): Performed by: NURSE PRACTITIONER

## 2024-04-22 PROCEDURE — 80053 COMPREHEN METABOLIC PANEL: CPT

## 2024-04-22 PROCEDURE — 85025 COMPLETE CBC W/AUTO DIFF WBC: CPT

## 2024-04-22 RX ADMIN — SODIUM CHLORIDE 1000 ML: 9 INJECTION, SOLUTION INTRAVENOUS at 16:00

## 2024-04-22 ASSESSMENT — PAIN SCALES - GENERAL: PAINLEVEL: 0-NO PAIN

## 2024-04-22 NOTE — PROGRESS NOTES
Patient ID: Shilo Thakkar is a 23 y.o. male.  Oncologic History  Burkitt lymphoma, high risk, MYC positive  - 10/19/23 presented to OSH ED with dysphagia and abdominal pain  - CT A/P with IV contrast (10/19) mass in the body of the pancreas measuring approximately 2.7 x 2.9 cm. The pancreatic duct is dilated. Multiple masses in the mesentery as well as omental caking consistent with carcinomatosis. Moderate amount of free fluid throughout the abdomen and pelvis.   - CT soft tissue neck with IV contrast (10/19): homogeneous soft tissue attenuation lesion in the L oropharynx which measures approximately 3.0 x 4.0 x 5.1 cm.   - US guided mesenteric LN biopsy (10/20/23): HIGH GRADE B-CELL LYMPHOMA MORPHOLOGICALLY CONSISTENT WITH BURKITT LYMPHOMA; flow cytometry: No B cell population identified  - Bone marrow aspiration biopsy November 1, 2023 no evidence of lymphoma involvement.  CSF by flow cytometry showed no lymphoma 11/2/2023.  - Biopsy L oropharyngeal mass and L cervical LN by ENT (10/23/23) high grade B-cell burkitt lymphoma, MYC positive, Bcl-2 negative.  - MR brain with and w/o IV contrast (10/27): No evidence of intracranial metastatic disease.     - Dexa 40 mg daily ended 11/1  - PET CT ordered 10/30 showed FDG avid left oropharyngeal mass and LAD above and below diaphragm  - Started HyperCVAD 10/29 cycle 1 day 1  - Part A:   D1: CTX + mesna, dexamethasone, D2: CTX + mesna, D3: CTX + mesna, D4: Doxo, vinca, D6: Ritux, GCSF, D9: IT,   D2:  intrathecal methotrexate November 2, 2023, 12 mg, CSF shows no B cells by flow cytometry.  D6: Intrathecal cytarabine 100 mg on November 6, 2023.  D11: vinca, dexamethasone, D13: Ritux 11/10  D13: Rituximab therapy  - Daily neupogen since 11/3/23  -Discharged home November 11, 2023  -Evaluation November 15, 2023: Recovered thrombocytopenia, stop levofloxacin, continue prophylactic acyclovir and 3 times weekly Bactrim and fluconazole.  Depressed affect noted.  -Cycle 2-day 1  part to be high-dose methotrexate plus high-dose cytarabine, November 20, 2023,  with 2 intrathecal chemotherapy administered  -Neulasta on 11/27/2023.  -Evaluation 12/6/2023 asymptomatic gaining weight doing well.  To schedule twice weekly CBC BMP Mondays and Thursdays after discharge, emphasized prophylactic levofloxacin between day 5 and day 15, acyclovir, fluconazole and 3 times a week Bactrim.  -CT scan of the chest abdomen or pelvis on December 6, 2023 shows complete resolution of intra-abdominal disease, with mildly worse splenomegaly most likely unrelated to his lymphoma.  -Cycle 3 hyper-CVAD regimen December 12, 2023, including Rituxan day 1 and day 11, intrathecal methotrexate 12/14/2023, intrathecal cytarabine December 18, 2023.  -December 27, 2023: Feeling well asymptomatic tolerated cycle 3 well, plan for cycle 4 January 2, 2024 consisting of high-dose methotrexate high-dose cytarabine, and includes 2 doses of rituximab day 1 day 8, and 2 doses of intrathecal chemotherapy, intrathecal cytarabine day 1 and intrathecal methotrexate day 8  -Hospitalized  1/17-1/23/2024 with visual changes, retinoschisis, profound pancytopenia s/p left eye vitrectomy, retinal hemorrgages. Delay cycle 5. Discuss with ophth regarding etiology and plans.  -Evaluation January 31, 2024: Vision appears to be improving.  Discussed with ophthalmology.  They will be seeing February 2 and will decide whether they will clear him for cycle 5 of chemotherapy which would be tentatively planned to start February 9, 2024 as an inpatient at modified dosing.  Repeat echocardiogram, continue prophylactic antibiotics.  Will need 3 times weekly labs for close monitoring and will keep platelets above 50,000, keep hemoglobin above 8.5-9.  Will need closer supervision as he was noncompliant with twice weekly labs before.  -Evaluation 2/7/2024: Continues to improve, vision clearly better.  Appears to have been cleared by ophthalmology, to proceed  with cycle 5 starting 2/9/2024 with 80% cyclophosphamide.  Needs echocardiogram repeated to verify a normal ejection fraction.  Upon discharge he will need 3 times a week count check to assure compliance, as he had been noncompliant with twice weekly labs previously.  Need to keep platelets above 30-50,000, need to keep hemoglobin above 8.5 at esthela.  Final cycle of chemotherapy will be also dose reduced with cytarabine down to 1 g/m².  Needs a chest x-ray on Friday before starting  -Evaluation 2/20/2024: Doing well.  Eyes appear to be improving tolerated cycle 5 without difficulty.  We omitted intrathecal on cycle 5.  Plan to admit on 3/5/2024 for cycle 6, planned dose reduction of high-dose cytarabine to 1.5 g/m², keeping methotrexate the same.  We will incorporate intrathecal cytarabine day 1, patient declining a second intrathecal.  We are in the process of trying to convince the patient to complete 8 cycles to adhere to the published data on hyper CVAD and Burkitt's lymphoma.  -Evaluation 3/20/2024 C6D15 recovering counts.  Keeping hemoglobin above 8 and platelets above 50 due to the retinal hemorrhages.  Overall doing well.  After discussion with patient and mom, we decided to proceed with 8 cycles, with 1 intrathecal with a each cycle.  Next cycle will be on 3/28/2024, should include rituximab day 1 and day 11, and intrathecal methotrexate 12 mg on day 2 [+/-1 day) and day 11 vincristine and dexamethasone pulse.  Cycle 8 is planned with dose reduction of cytarabine to 1 g/m² per dose and methotrexate to to 800 mg/m² total [20% as short infusion and 80% as 24-hour infusion].  -Evaluation 3/27/2024,C6D22, platelets less than 80,000, we will wait till Monday, April 1, 2024 and proceed with cycle 7, on cycle consisting of cyclophosphamide 250 mg/m² every 12 hours D1-D3, doxorubicin 50 mg/m², vincristine 2 mg flat dose D4 and day 11, dexamethasone 40 mg p.o. day 4 and day 11 through day 14, rituximab possibly  after discharge 375 mg/m², intrathecal methotrexate 12 mg to be delivered day 2 or day 3 of the cycle.  Post chemotherapy the patient will need pegfilgrastim, and will need to be set up  for count check Monday Wednesday Friday, keeping platelets above 50,000, keeping hemoglobin above 8, orders  for interim nursing assessment RN.  -Evaluation 4/17/2024: Completed 7 cycles did well since discharge without any significant toxicities except for the expected grade 3 thrombocytopenia requiring transfusion.  Had difficulty with lumbar puncture last admission.     Therapy:  Hyper-CVAD C1D1 10/29/2023  Hyper-CVAD C2D1 11/20/2023  Hyper-CVAD C3D1 12/12/2023  Hyper-CVAD C4D1 01/02/2024  Hyper-CVAD C5D1 02/09/2024  Hyper-CVAD C6D1 03/06/2024  Hyper-CVAD C7D1 04/01/2024   Subjective      HPI  Feels well today without new complaints.  He did well after discharge.  Required platelet transfusion for grade 3 thrombocytopenia as we want to keep platelets above 50,000 to reduce risk for intraocular bleeds.  Eating well maintaining weight.  His vision is slowly improving.  No oral sores no dyspnea no cough no fever no abdominal pain nausea vomiting diarrhea or change in bowel habits or blood per rectum or melena.  No edema in the legs.  No rash.  No paresthesias.  He reports that there was some difficulty with doing the LP last hospitalization.  His weight is stable.  No headache.        Objective    BSA: 1.95 meters squared  /74 (BP Location: Left arm, Patient Position: Sitting, BP Cuff Size: Large adult)   Pulse 64   Temp 35.8 °C (96.4 °F) (Skin)   Resp 16   Wt 75.4 kg (166 lb 4.8 oz)   SpO2 100%   BMI 22.82 kg/m²      Physical Exam  Alert oriented not in distress not pale or jaundiced  Lymph nodes: No adenopathy  Oral mucosa negative, no mucositis  Head is normocephalic atraumatic, pupils equal reactive  Neck is supple no adenopathy, no thyromegaly  Lungs show equal air entry, no crackles or wheezing, equal  percussion  Heart shows regular rate and rhythm normal S1-S2 no murmurs or gallop rhythm  Abdomen is soft nontender, moves with respiration, no hepatosplenomegaly or masses, positive bowel sounds, not distended.  Lower extremities show no edema  Neurologically grossly nonfocal  Psych: Normal affect      Performance Status:  Symptomatic; fully ambulatory     Latest Reference Range & Units 04/15/24 10:13 04/17/24 13:35   GLUCOSE 74 - 99 mg/dL 144 (H) 84   SODIUM 136 - 145 mmol/L 139 141   POTASSIUM 3.5 - 5.3 mmol/L 3.5 3.7   CHLORIDE 98 - 107 mmol/L 102 102   Bicarbonate 21 - 32 mmol/L 25 29   Anion Gap 10 - 20 mmol/L 16 14   Blood Urea Nitrogen 6 - 23 mg/dL 9 11   Creatinine 0.50 - 1.30 mg/dL 1.04 1.16   EGFR >60 mL/min/1.73m*2 >90 90   Calcium 8.6 - 10.3 mg/dL 9.1 9.7   Albumin 3.4 - 5.0 g/dL 4.3    Alkaline Phosphatase 33 - 120 U/L 73    ALT 10 - 52 U/L 17    AST 9 - 39 U/L 14    Bilirubin Total 0.0 - 1.2 mg/dL 0.5    Total Protein 6.4 - 8.2 g/dL 6.3 (L)    MAGNESIUM 1.60 - 2.40 mg/dL 1.57 (L)    (H): Data is abnormally high  (L): Data is abnormally low     Latest Reference Range & Units 04/15/24 10:13 04/17/24 13:35   LEUKOCYTES (10*3/UL) IN BLOOD BY AUTOMATED COUNT, [place] 4.4 - 11.3 x10*3/uL 9.0 7.4   nRBC 0.0 - 0.0 /100 WBCs 0.0 0.0   ERYTHROCYTES (10*6/UL) IN BLOOD BY AUTOMATED COUNT, [place] 4.50 - 5.90 x10*6/uL 2.26 (L) 3.10 (L)   HEMOGLOBIN 13.5 - 17.5 g/dL 7.0 (L) 9.3 (L)   HEMATOCRIT 41.0 - 52.0 % 20.2 (L) 27.0 (L)   MCV 80 - 100 fL 89 87   MCH 26.0 - 34.0 pg 31.0 30.0   MCHC 32.0 - 36.0 g/dL 34.7 34.4   RED CELL DISTRIBUTION WIDTH 11.5 - 14.5 % 15.9 (H) 15.6 (H)   PLATELETS (10*3/UL) IN BLOOD AUTOMATED COUNT, [place] 150 - 450 x10*3/uL 27 (LL) 83 (L)   NEUTROPHILS/100 LEUKOCYTES IN BLOOD BY AUTOMATED COUNT, [place] 40.0 - 80.0 %  76.0   Immature Granulocytes %, Automated 0.0 - 0.9 % 10.2 (H) 4.6 (H)   Lymphocytes % 13.0 - 44.0 %  6.4   Monocytes % 2.0 - 10.0 %  12.6   Eosinophils % 0.0 - 6.0 %  0.1    Basophils % 0.0 - 2.0 %  0.3   NEUTROPHILS (10*3/UL) IN BLOOD BY AUTOMATED COUNT, [place] 1.20 - 7.70 x10*3/uL  5.59   Immature Granulocytes Absolute, Automated 0.00 - 0.70 x10*3/uL 0.92 (H) 0.34   Lymphocytes Absolute 1.20 - 4.80 x10*3/uL  0.47 (L)   Monocytes Absolute 0.10 - 1.00 x10*3/uL  0.93   Eosinophils Absolute 0.00 - 0.70 x10*3/uL  0.01   Basophils Absolute 0.00 - 0.10 x10*3/uL  0.02   (LL): Data is critically low  (L): Data is abnormally low  (H): Data is abnormally high      Assessment/Plan   High risk Burkitt's lymphoma in complete remission  Completed 7 cycles of Hyperseal at alternating with high-dose methotrexate high-dose cytarabine  Plan to tentatively admit him on 4/22/2024 for the last cycle of chemotherapy if his platelets are over 100,000 and his ANC over 1500 on that day.  If not I would delay at least for 3 days and recheck on Thursday next week 4/25/2024 before a second attempt at admitting him.  Once admitted we would like to keep high-dose methotrexate at 1 g/m² total dose over 24 hours as before, keep cytarabine at 1 g/m² per dose but cut to the number of doses from 4 total doses to 2 total doses i.e. omit day 3 completely and the total number of cytarabine doses to be given to him this last cycle will be 2 doses only given the profound thrombocytopenia that he developed with the last cycle in an attempt to mitigate risk due to his intraocular hemorrhage history.  He will need to come in after his discharge from the chemotherapy admission 3 times a week for count check with the nurse and parameters are placed already for transfusion including 1 unit packed red blood cells for hemoglobin under 7.5 and platelets transfusion [single donor platelet] for platelets under 50,000.    We will see him back in about 4 weeks with end of therapy PET scan for restaging.  He is doing very well at this time.  Cancer Staging   No matching staging information was found for the patient.      Oncology  History   Burkitt lymphoma (Multi)   10/27/2023 Initial Diagnosis    Burkitt lymphoma, high risk, MYC positive  - 10/19/23 presented to OSH ED with dysphagia and abdominal pain  - CT A/P with IV contrast (10/19) mass in the body of the pancreas measuring approximately 2.7 x 2.9 cm. The pancreatic duct is dilated. Multiple masses in the mesentery as well as omental caking consistent with carcinomatosis. Moderate amount of free fluid throughout the abdomen and pelvis.   - CT soft tissue neck with IV contrast (10/19): homogeneous soft tissue attenuation lesion in the L oropharynx which measures approximately 3.0 x 4.0 x 5.1 cm.   - US guided mesenteric LN biopsy (10/20/23): HIGH GRADE B-CELL LYMPHOMA MORPHOLOGICALLY CONSISTENT WITH BURKITT LYMPHOMA; flow cytometry: No B cell population identified  - Bone marrow aspiration biopsy November 1, 2023 no evidence of lymphoma involvement.  CSF by flow cytometry showed no lymphoma 11/2/2023.  - Biopsy L oropharyngeal mass and L cervical LN by ENT (10/23/23) high grade B-cell burkitt lymphoma, MYC positive, Bcl-2 negative.  - MR brain with and w/o IV contrast (10/27): No evidence of intracranial metastatic disease.     - Dexa 40 mg daily ended 11/1  - PET CT ordered 10/30 showed FDG avid left oropharyngeal mass and LAD above and below diaphragm     10/29/2023 -  Chemotherapy    HyperCVAD + RiTUXimab, 21 Day Cycles     12/6/2023 Imaging    -CT scan of the chest abdomen or pelvis on December 6, 2023 shows complete resolution of intra-abdominal disease, with mildly worse splenomegaly most likely unrelated to his lymphoma.      1/15/2024 Imaging    -PET scan 1/15/24 after C4:   Interval near complete/complete resolution of previous metabolic activity. Deauville Score 2.          Diagnoses and all orders for this visit:  Burkitt lymphoma, unspecified body region (Multi)  -     Clinic Appointment Request [facility]  -     Planned Future Admission >24 Hours  -     Lactate  Dehydrogenase; Future  -     Comprehensive Metabolic Panel; Future  -     CBC and Auto Differential; Future  -     Magnesium; Future  -     NM PET CT lymphoma staging; Future  -     Infusion Appointment Request [facility] (count check); Future  -     Clinic Appointment Request Follow Up; [facility]; Future  -     Infusion Appointment Request [facility] (count check); Future  -     Infusion Appointment Request [facility]; Future  -     Infusion Appointment Request [facility] (count check); Future  -     Infusion Appointment Request [facility]; Future  -     Infusion Appointment Request [facility] (count check); Future  -     Infusion Appointment Request [facility] (count check); Future  -     Infusion Appointment Request [facility] (count check); Future  -     Oncology Line Draw Appointment Request; Future  -     Clinic Appointment Request Follow Up; [facility]; Future  -     Infusion Appointment Request [facility] (main campus); Future  -     Oncology Line Draw Appointment Request; Future  -     Infusion Appointment Request [facility] (count check); Future  -     Infusion Appointment Request [facility] (count check); Future           Marbin Monique MD

## 2024-04-22 NOTE — PROGRESS NOTES
[facility] Infusion Note  04/22/24    Shilo Thakkar is a 23 y.o. year old male patient presenting to outpatient infusion for count check prior to planned admission.     Platelets 97 today, need to be 100 for admission and chemo so per Dr. Klein we are holding admission until we can recheck counts on Wednesday.   Creatinine increased today-- gave 1 L NS per order.   Will recheck both CBCd and CMP on Wednesday to assess for admission. Dr. Klein to see patient on Wednesday morning as well.     Line type: R DL PICC  Line removed/maintained prior to discharge: maintained    Administrations This Visit       sodium chloride 0.9 % bolus 1,000 mL       Admin Date  04/22/2024 Action  New Bag Dose  1,000 mL Rate  1,000 mL/hr Route  intravenous Documented By  Yamile Talbot RN                  Follow-up Plan: return Wednesday for count check and possible admission for chemotherapy    Yamile Talbot RN

## 2024-04-24 ENCOUNTER — APPOINTMENT (OUTPATIENT)
Dept: HEMATOLOGY/ONCOLOGY | Facility: HOSPITAL | Age: 24
End: 2024-04-24
Payer: COMMERCIAL

## 2024-04-24 ENCOUNTER — INFUSION (OUTPATIENT)
Dept: HEMATOLOGY/ONCOLOGY | Facility: HOSPITAL | Age: 24
End: 2024-04-24
Payer: COMMERCIAL

## 2024-04-24 VITALS
DIASTOLIC BLOOD PRESSURE: 67 MMHG | SYSTOLIC BLOOD PRESSURE: 146 MMHG | RESPIRATION RATE: 16 BRPM | BODY MASS INDEX: 23.09 KG/M2 | OXYGEN SATURATION: 100 % | HEART RATE: 79 BPM | TEMPERATURE: 98.8 F | WEIGHT: 168.21 LBS

## 2024-04-24 DIAGNOSIS — C83.78 BURKITT LYMPHOMA OF LYMPH NODES OF MULTIPLE REGIONS (MULTI): ICD-10-CM

## 2024-04-24 DIAGNOSIS — C83.70 BURKITT LYMPHOMA, UNSPECIFIED BODY REGION (MULTI): ICD-10-CM

## 2024-04-24 LAB
ALBUMIN SERPL BCP-MCNC: 4.7 G/DL (ref 3.4–5)
ALP SERPL-CCNC: 73 U/L (ref 33–120)
ALT SERPL W P-5'-P-CCNC: 19 U/L (ref 10–52)
ANION GAP SERPL CALC-SCNC: 13 MMOL/L (ref 10–20)
AST SERPL W P-5'-P-CCNC: 16 U/L (ref 9–39)
BASOPHILS # BLD AUTO: 0.02 X10*3/UL (ref 0–0.1)
BASOPHILS NFR BLD AUTO: 0.3 %
BILIRUB SERPL-MCNC: 0.4 MG/DL (ref 0–1.2)
BUN SERPL-MCNC: 9 MG/DL (ref 6–23)
CALCIUM SERPL-MCNC: 9.6 MG/DL (ref 8.6–10.3)
CHLORIDE SERPL-SCNC: 103 MMOL/L (ref 98–107)
CO2 SERPL-SCNC: 28 MMOL/L (ref 21–32)
CREAT SERPL-MCNC: 1.02 MG/DL (ref 0.5–1.3)
EGFRCR SERPLBLD CKD-EPI 2021: >90 ML/MIN/1.73M*2
EOSINOPHIL # BLD AUTO: 0 X10*3/UL (ref 0–0.7)
EOSINOPHIL NFR BLD AUTO: 0 %
ERYTHROCYTE [DISTWIDTH] IN BLOOD BY AUTOMATED COUNT: 18 % (ref 11.5–14.5)
GLUCOSE SERPL-MCNC: 94 MG/DL (ref 74–99)
HCT VFR BLD AUTO: 27.7 % (ref 41–52)
HGB BLD-MCNC: 9.4 G/DL (ref 13.5–17.5)
IMM GRANULOCYTES # BLD AUTO: 0.04 X10*3/UL (ref 0–0.7)
IMM GRANULOCYTES NFR BLD AUTO: 0.6 % (ref 0–0.9)
LYMPHOCYTES # BLD AUTO: 0.54 X10*3/UL (ref 1.2–4.8)
LYMPHOCYTES NFR BLD AUTO: 8.7 %
MCH RBC QN AUTO: 30.8 PG (ref 26–34)
MCHC RBC AUTO-ENTMCNC: 33.9 G/DL (ref 32–36)
MCV RBC AUTO: 91 FL (ref 80–100)
MONOCYTES # BLD AUTO: 0.54 X10*3/UL (ref 0.1–1)
MONOCYTES NFR BLD AUTO: 8.7 %
NEUTROPHILS # BLD AUTO: 5.1 X10*3/UL (ref 1.2–7.7)
NEUTROPHILS NFR BLD AUTO: 81.7 %
NRBC BLD-RTO: 0 /100 WBCS (ref 0–0)
PLATELET # BLD AUTO: 95 X10*3/UL (ref 150–450)
POTASSIUM SERPL-SCNC: 3.7 MMOL/L (ref 3.5–5.3)
PROT SERPL-MCNC: 6.8 G/DL (ref 6.4–8.2)
RBC # BLD AUTO: 3.05 X10*6/UL (ref 4.5–5.9)
SODIUM SERPL-SCNC: 140 MMOL/L (ref 136–145)
WBC # BLD AUTO: 6.2 X10*3/UL (ref 4.4–11.3)

## 2024-04-24 PROCEDURE — 85025 COMPLETE CBC W/AUTO DIFF WBC: CPT

## 2024-04-24 PROCEDURE — 84075 ASSAY ALKALINE PHOSPHATASE: CPT

## 2024-04-24 PROCEDURE — 36591 DRAW BLOOD OFF VENOUS DEVICE: CPT

## 2024-04-24 ASSESSMENT — PAIN SCALES - GENERAL: PAINLEVEL_OUTOF10: 0-NO PAIN

## 2024-04-24 NOTE — PROGRESS NOTES
[facility] Infusion Note  04/24/24    Shilo Thakkar is a 23 y.o. year old male patient presenting to outpatient infusion for a count check for pending admission.     Platelets 95 today, need to be 100 for admission and chemotherapy. Delaying admission for now, pt to return Friday 4/26 for count check and mal heme visit.     Since the last visit, he reports doing well. Overall, he states that energy level is energy level is good. The patient is able to perform all ADLs. . Appetite has been unchanged and good. he reports no complaints.     Line type: R DL PICC- dressing and caps changed    Yamile Talbot RN

## 2024-04-26 ENCOUNTER — OFFICE VISIT (OUTPATIENT)
Dept: HEMATOLOGY/ONCOLOGY | Facility: HOSPITAL | Age: 24
End: 2024-04-26
Payer: COMMERCIAL

## 2024-04-26 ENCOUNTER — INFUSION (OUTPATIENT)
Dept: HEMATOLOGY/ONCOLOGY | Facility: HOSPITAL | Age: 24
End: 2024-04-26
Payer: COMMERCIAL

## 2024-04-26 VITALS
RESPIRATION RATE: 16 BRPM | DIASTOLIC BLOOD PRESSURE: 61 MMHG | OXYGEN SATURATION: 100 % | WEIGHT: 171.74 LBS | SYSTOLIC BLOOD PRESSURE: 125 MMHG | BODY MASS INDEX: 23.57 KG/M2 | HEART RATE: 60 BPM | TEMPERATURE: 98.2 F

## 2024-04-26 DIAGNOSIS — C83.79 BURKITT LYMPHOMA OF SOLID ORGAN EXCLUDING SPLEEN (MULTI): ICD-10-CM

## 2024-04-26 DIAGNOSIS — Z51.11 ENCOUNTER FOR ANTINEOPLASTIC CHEMOTHERAPY: ICD-10-CM

## 2024-04-26 DIAGNOSIS — C83.78 BURKITT LYMPHOMA OF LYMPH NODES OF MULTIPLE REGIONS (MULTI): ICD-10-CM

## 2024-04-26 DIAGNOSIS — C83.70 BURKITT LYMPHOMA, UNSPECIFIED BODY REGION (MULTI): ICD-10-CM

## 2024-04-26 LAB
ANION GAP SERPL CALC-SCNC: 13 MMOL/L (ref 10–20)
BASOPHILS # BLD AUTO: 0.02 X10*3/UL (ref 0–0.1)
BASOPHILS NFR BLD AUTO: 0.4 %
BUN SERPL-MCNC: 16 MG/DL (ref 6–23)
CALCIUM SERPL-MCNC: 9.5 MG/DL (ref 8.6–10.3)
CHLORIDE SERPL-SCNC: 105 MMOL/L (ref 98–107)
CO2 SERPL-SCNC: 28 MMOL/L (ref 21–32)
CREAT SERPL-MCNC: 0.99 MG/DL (ref 0.5–1.3)
EGFRCR SERPLBLD CKD-EPI 2021: >90 ML/MIN/1.73M*2
EOSINOPHIL # BLD AUTO: 0 X10*3/UL (ref 0–0.7)
EOSINOPHIL NFR BLD AUTO: 0 %
ERYTHROCYTE [DISTWIDTH] IN BLOOD BY AUTOMATED COUNT: 18.9 % (ref 11.5–14.5)
GLUCOSE SERPL-MCNC: 81 MG/DL (ref 74–99)
HCT VFR BLD AUTO: 28.8 % (ref 41–52)
HGB BLD-MCNC: 9.8 G/DL (ref 13.5–17.5)
IMM GRANULOCYTES # BLD AUTO: 0.03 X10*3/UL (ref 0–0.7)
IMM GRANULOCYTES NFR BLD AUTO: 0.5 % (ref 0–0.9)
LDH SERPL L TO P-CCNC: 156 U/L (ref 84–246)
LYMPHOCYTES # BLD AUTO: 0.56 X10*3/UL (ref 1.2–4.8)
LYMPHOCYTES NFR BLD AUTO: 9.9 %
MCH RBC QN AUTO: 31.4 PG (ref 26–34)
MCHC RBC AUTO-ENTMCNC: 34 G/DL (ref 32–36)
MCV RBC AUTO: 92 FL (ref 80–100)
MONOCYTES # BLD AUTO: 0.72 X10*3/UL (ref 0.1–1)
MONOCYTES NFR BLD AUTO: 12.7 %
NEUTROPHILS # BLD AUTO: 4.33 X10*3/UL (ref 1.2–7.7)
NEUTROPHILS NFR BLD AUTO: 76.5 %
NRBC BLD-RTO: 0 /100 WBCS (ref 0–0)
PLATELET # BLD AUTO: 111 X10*3/UL (ref 150–450)
POTASSIUM SERPL-SCNC: 4 MMOL/L (ref 3.5–5.3)
RBC # BLD AUTO: 3.12 X10*6/UL (ref 4.5–5.9)
SODIUM SERPL-SCNC: 142 MMOL/L (ref 136–145)
WBC # BLD AUTO: 5.7 X10*3/UL (ref 4.4–11.3)

## 2024-04-26 PROCEDURE — 85025 COMPLETE CBC W/AUTO DIFF WBC: CPT

## 2024-04-26 PROCEDURE — 99215 OFFICE O/P EST HI 40 MIN: CPT | Performed by: NURSE PRACTITIONER

## 2024-04-26 PROCEDURE — 36415 COLL VENOUS BLD VENIPUNCTURE: CPT

## 2024-04-26 PROCEDURE — 83615 LACTATE (LD) (LDH) ENZYME: CPT

## 2024-04-26 PROCEDURE — 80048 BASIC METABOLIC PNL TOTAL CA: CPT

## 2024-04-26 RX ORDER — FAMOTIDINE 10 MG/ML
20 INJECTION INTRAVENOUS ONCE AS NEEDED
Status: CANCELLED | OUTPATIENT
Start: 2024-04-26

## 2024-04-26 RX ORDER — ACETAMINOPHEN 325 MG/1
650 TABLET ORAL ONCE
Status: CANCELLED
Start: 2024-04-26 | End: 2024-04-26

## 2024-04-26 RX ORDER — DIPHENHYDRAMINE HYDROCHLORIDE 50 MG/ML
50 INJECTION INTRAMUSCULAR; INTRAVENOUS AS NEEDED
Status: CANCELLED | OUTPATIENT
Start: 2024-04-26

## 2024-04-26 RX ORDER — DIPHENHYDRAMINE HYDROCHLORIDE 50 MG/ML
50 INJECTION, SOLUTION INTRAMUSCULAR; INTRAVENOUS ONCE
Status: CANCELLED
Start: 2024-04-26 | End: 2024-04-26

## 2024-04-26 RX ORDER — ALBUTEROL SULFATE 0.83 MG/ML
3 SOLUTION RESPIRATORY (INHALATION) AS NEEDED
Status: CANCELLED | OUTPATIENT
Start: 2024-04-26

## 2024-04-26 RX ORDER — EPINEPHRINE 0.3 MG/.3ML
0.3 INJECTION SUBCUTANEOUS EVERY 5 MIN PRN
Status: CANCELLED | OUTPATIENT
Start: 2024-04-26

## 2024-04-26 ASSESSMENT — PAIN SCALES - GENERAL: PAINLEVEL: 0-NO PAIN

## 2024-04-28 ASSESSMENT — ENCOUNTER SYMPTOMS: EYE PROBLEMS: 1

## 2024-04-28 NOTE — ASSESSMENT & PLAN NOTE
High risk BL  in CR     Treatment History:  Hyper-CVAD C1D1 10/29/2023  Hyper-CVAD C2D1 11/20/2023  Hyper-CVAD C3D1 12/12/2023  Hyper-CVAD C4D1 01/02/2024  PET-CT s/p C4 (1/15/24) c/w Deauville 2  Hyper-CVAD C5D1 02/09/2024  Hyper-CVAD C6D1 03/06/2024 (Dose reduce Cytarabine at 1.5 G/m2 per dose with Methotrexate 1000 mg/m2 total; LP with intrathecal cytarabine during C6 on 3/7; CSF flow cytometry negative for lymphoma)  Hyper-CVAD C7D1 04/01/2024 (LP with intrathecal methotrexate 12 mg on 4/3; CSF flow cytometry negative for lymphoma)     - Plan to admit Monday 4/29 for C8. Today Platelets>100,000 and ANC>1500. Plan to keep high-dose methotrexate at 1 g/m² total dose over 24 hours as before, keep cytarabine at 1 g/m² per dose but cut to the number of doses from 4 total doses to 2 total doses i.e. omit day 3 completely and the total number of cytarabine doses to be given to him this last cycle will be 2 doses only given the profound thrombocytopenia that he developed with the last cycle in an attempt to mitigate risk due to his intraocular hemorrhage history.    - At discharge, he will have count checks three times weekly. Transfuse to keep Hgb>7.5 and Plt>50,000.

## 2024-04-28 NOTE — ASSESSMENT & PLAN NOTE
- Continue prophylactic Acyclovir, Fluconazole  - Bactrim held 4/22 for new MALU (sCr 1.5). sCr trended back to 0.99 4/26. Will continue to hold while admitted for this cycle and restart at discharge if MALU resolved.  - Levofloxacin Day 5-15 of each cycle for antibacterial prophylaxis

## 2024-04-28 NOTE — PROGRESS NOTES
Patient ID: Shilo Thakkar is a 23 y.o. male.    Subjective    HPI  Presents today for pre-admission visit in [facility]. He is doing well overall with no new issues or concerns. He reports his vision is a little bit improved lately. Since platelets are above 100,000 today he is wondering if he could admit Monday 4/29 instead of Tuesday 4/30.      Review of Systems   Eyes:  Positive for eye problems (Vision changes slightly improved recently).   All other systems reviewed and are negative.      Objective    BSA: There is no height or weight on file to calculate BSA.  There were no vitals taken for this visit.  Vital signs reviewed today.      Physical Exam  Constitutional:       Appearance: Normal appearance.   HENT:      Head: Normocephalic.      Nose: Nose normal.      Mouth/Throat:      Mouth: Mucous membranes are moist.      Pharynx: Oropharynx is clear.   Eyes:      Extraocular Movements: Extraocular movements intact.      Conjunctiva/sclera: Conjunctivae normal.      Pupils: Pupils are equal, round, and reactive to light.   Cardiovascular:      Rate and Rhythm: Normal rate and regular rhythm.      Pulses: Normal pulses.      Heart sounds: Normal heart sounds.   Pulmonary:      Effort: Pulmonary effort is normal.      Breath sounds: Normal breath sounds.   Abdominal:      General: Abdomen is flat. Bowel sounds are normal.      Palpations: Abdomen is soft.   Musculoskeletal:         General: Normal range of motion.      Cervical back: Normal range of motion.   Skin:     General: Skin is warm and dry.      Capillary Refill: Capillary refill takes less than 2 seconds.   Neurological:      General: No focal deficit present.      Mental Status: He is alert and oriented to person, place, and time. Mental status is at baseline.   Psychiatric:         Mood and Affect: Mood normal.         Performance Status:  Karnofsky Score: 70 - Cares for self; unable to carry on normal activity or do normal work        Assessment/Plan         Oncology History   Burkitt lymphoma (Multi)   10/27/2023 Initial Diagnosis    Burkitt lymphoma, high risk, MYC positive  - 10/19/23 presented to OSH ED with dysphagia and abdominal pain  - CT A/P with IV contrast (10/19) mass in the body of the pancreas measuring approximately 2.7 x 2.9 cm. The pancreatic duct is dilated. Multiple masses in the mesentery as well as omental caking consistent with carcinomatosis. Moderate amount of free fluid throughout the abdomen and pelvis.   - CT soft tissue neck with IV contrast (10/19): homogeneous soft tissue attenuation lesion in the L oropharynx which measures approximately 3.0 x 4.0 x 5.1 cm.   - US guided mesenteric LN biopsy (10/20/23): HIGH GRADE B-CELL LYMPHOMA MORPHOLOGICALLY CONSISTENT WITH BURKITT LYMPHOMA; flow cytometry: No B cell population identified  - Bone marrow aspiration biopsy November 1, 2023 no evidence of lymphoma involvement.  CSF by flow cytometry showed no lymphoma 11/2/2023.  - Biopsy L oropharyngeal mass and L cervical LN by ENT (10/23/23) high grade B-cell burkitt lymphoma, MYC positive, Bcl-2 negative.  - MR brain with and w/o IV contrast (10/27): No evidence of intracranial metastatic disease.     - Dexa 40 mg daily ended 11/1  - PET CT ordered 10/30 showed FDG avid left oropharyngeal mass and LAD above and below diaphragm     10/29/2023 -  Chemotherapy    HyperCVAD + RiTUXimab, 21 Day Cycles     12/6/2023 Imaging    -CT scan of the chest abdomen or pelvis on December 6, 2023 shows complete resolution of intra-abdominal disease, with mildly worse splenomegaly most likely unrelated to his lymphoma.      1/15/2024 Imaging    -PET scan 1/15/24 after C4:   Interval near complete/complete resolution of previous metabolic activity. Deauville Score 2.          Burkitt lymphoma (Multi)  High risk BL  in CR     Treatment History:  Hyper-CVAD C1D1 10/29/2023  Hyper-CVAD C2D1 11/20/2023  Hyper-CVAD C3D1 12/12/2023  Hyper-CVAD  C4D1 01/02/2024  PET-CT s/p C4 (1/15/24) c/w Deauville 2  Hyper-CVAD C5D1 02/09/2024  Hyper-CVAD C6D1 03/06/2024 (Dose reduce Cytarabine at 1.5 G/m2 per dose with Methotrexate 1000 mg/m2 total; LP with intrathecal cytarabine during C6 on 3/7; CSF flow cytometry negative for lymphoma)  Hyper-CVAD C7D1 04/01/2024 (LP with intrathecal methotrexate 12 mg on 4/3; CSF flow cytometry negative for lymphoma)     - Plan to admit Monday 4/29 for C8. Today Platelets>100,000 and ANC>1500. Plan to keep high-dose methotrexate at 1 g/m² total dose over 24 hours as before, keep cytarabine at 1 g/m² per dose but cut to the number of doses from 4 total doses to 2 total doses i.e. omit day 3 completely and the total number of cytarabine doses to be given to him this last cycle will be 2 doses only given the profound thrombocytopenia that he developed with the last cycle in an attempt to mitigate risk due to his intraocular hemorrhage history.    - At discharge, he will have count checks three times weekly. Transfuse to keep Hgb>7.5 and Plt>50,000.           Immunosuppressed due to chemotherapy (Multi)  - Continue prophylactic Acyclovir, Fluconazole  - Bactrim held 4/22 for new MALU (sCr 1.5). sCr trended back to 0.99 4/26. Will continue to hold while admitted for this cycle and restart at discharge if MALU resolved.  - Levofloxacin Day 5-15 of each cycle for antibacterial prophylaxis    Bilateral retinoschisis  - Bilateral retinoschisis and retinal hemorrhages   - S/p left eye vitrectomy  - May require repeat surgery in left eye folowing if vitreous hemorrhage persists or retina detaches  - Optho recommends Platelet threshold of 50,000  -- Continue prednisolone drops BID in both eyes  -- Continue Cosopt BID to the R eye  -- Continue Ketorolac BID both eyes   -- Artificial tears QID to the left eye  -- Spoke to Ophthalmology (Dr. Canales) today (04/12/24): no further tapering of any drops until he is seen in Ophthalmology clinic  - Close  Follow up with Optho: next 5/15/24       RTC  Admit to [facility] for C8 HyperCVAD 4/29 (Admission Order Placed)  C8D5 Ritux/Neulasta 5/3  Count Checks 3 times weekly M-W-F   Weekly [facility] Visits (5/3, 5/10, 5/15)  PET-CT 5/8  Optho 5/15  Follow up with Dr. Klein 5/22    Marleni Deal, DEMETRIO-CNP  [facility]

## 2024-04-29 ENCOUNTER — APPOINTMENT (OUTPATIENT)
Dept: HEMATOLOGY/ONCOLOGY | Facility: HOSPITAL | Age: 24
End: 2024-04-29
Payer: COMMERCIAL

## 2024-04-29 ENCOUNTER — APPOINTMENT (OUTPATIENT)
Dept: RADIOLOGY | Facility: HOSPITAL | Age: 24
DRG: 847 | End: 2024-04-29
Payer: COMMERCIAL

## 2024-04-29 ENCOUNTER — HOSPITAL ENCOUNTER (INPATIENT)
Facility: HOSPITAL | Age: 24
LOS: 3 days | Discharge: HOME | DRG: 847 | End: 2024-05-02
Attending: INTERNAL MEDICINE | Admitting: PHYSICIAN ASSISTANT
Payer: COMMERCIAL

## 2024-04-29 DIAGNOSIS — C83.78 BURKITT LYMPHOMA OF LYMPH NODES OF MULTIPLE REGIONS (MULTI): ICD-10-CM

## 2024-04-29 DIAGNOSIS — H35.351 CYSTOID MACULAR EDEMA, RIGHT EYE: ICD-10-CM

## 2024-04-29 DIAGNOSIS — C83.70 BURKITT'S LYMPHOMA (MULTI): Primary | ICD-10-CM

## 2024-04-29 LAB
ALBUMIN SERPL BCP-MCNC: 4.3 G/DL (ref 3.4–5)
ALP SERPL-CCNC: 66 U/L (ref 33–120)
ALT SERPL W P-5'-P-CCNC: 15 U/L (ref 10–52)
ANION GAP SERPL CALC-SCNC: 14 MMOL/L (ref 10–20)
APTT PPP: 32 SECONDS (ref 27–38)
AST SERPL W P-5'-P-CCNC: 13 U/L (ref 9–39)
BASOPHILS # BLD MANUAL: 0 X10*3/UL (ref 0–0.1)
BASOPHILS NFR BLD MANUAL: 0 %
BILIRUB DIRECT SERPL-MCNC: 0.1 MG/DL (ref 0–0.3)
BILIRUB SERPL-MCNC: 0.4 MG/DL (ref 0–1.2)
BUN SERPL-MCNC: 14 MG/DL (ref 6–23)
CALCIUM SERPL-MCNC: 9 MG/DL (ref 8.6–10.6)
CHLORIDE SERPL-SCNC: 105 MMOL/L (ref 98–107)
CO2 SERPL-SCNC: 28 MMOL/L (ref 21–32)
CREAT SERPL-MCNC: 0.98 MG/DL (ref 0.5–1.3)
EGFRCR SERPLBLD CKD-EPI 2021: >90 ML/MIN/1.73M*2
EOSINOPHIL # BLD MANUAL: 0 X10*3/UL (ref 0–0.7)
EOSINOPHIL NFR BLD MANUAL: 0 %
ERYTHROCYTE [DISTWIDTH] IN BLOOD BY AUTOMATED COUNT: 20.7 % (ref 11.5–14.5)
GLUCOSE SERPL-MCNC: 102 MG/DL (ref 74–99)
HCT VFR BLD AUTO: 28.4 % (ref 41–52)
HGB BLD-MCNC: 9.2 G/DL (ref 13.5–17.5)
IMM GRANULOCYTES # BLD AUTO: 0.02 X10*3/UL (ref 0–0.7)
IMM GRANULOCYTES NFR BLD AUTO: 0.4 % (ref 0–0.9)
LDH SERPL L TO P-CCNC: 166 U/L (ref 84–246)
LYMPHOCYTES # BLD MANUAL: 0.57 X10*3/UL (ref 1.2–4.8)
LYMPHOCYTES NFR BLD MANUAL: 11.9 %
MAGNESIUM SERPL-MCNC: 1.71 MG/DL (ref 1.6–2.4)
MCH RBC QN AUTO: 31.6 PG (ref 26–34)
MCHC RBC AUTO-ENTMCNC: 32.4 G/DL (ref 32–36)
MCV RBC AUTO: 98 FL (ref 80–100)
MONOCYTES # BLD MANUAL: 0.45 X10*3/UL (ref 0.1–1)
MONOCYTES NFR BLD MANUAL: 9.3 %
NEUTS SEG # BLD MANUAL: 3.74 X10*3/UL (ref 1.2–7)
NEUTS SEG NFR BLD MANUAL: 78 %
NRBC BLD-RTO: 0 /100 WBCS (ref 0–0)
OVALOCYTES BLD QL SMEAR: ABNORMAL
PLATELET # BLD AUTO: 116 X10*3/UL (ref 150–450)
POTASSIUM SERPL-SCNC: 3.5 MMOL/L (ref 3.5–5.3)
PROT SERPL-MCNC: 6 G/DL (ref 6.4–8.2)
RBC # BLD AUTO: 2.91 X10*6/UL (ref 4.5–5.9)
RBC MORPH BLD: ABNORMAL
SODIUM SERPL-SCNC: 143 MMOL/L (ref 136–145)
TOTAL CELLS COUNTED BLD: 118
URATE SERPL-MCNC: 6 MG/DL (ref 4–7.5)
VARIANT LYMPHS # BLD MANUAL: 0.04 X10*3/UL (ref 0–0.5)
VARIANT LYMPHS NFR BLD: 0.8 %
WBC # BLD AUTO: 4.8 X10*3/UL (ref 4.4–11.3)

## 2024-04-29 PROCEDURE — 99223 1ST HOSP IP/OBS HIGH 75: CPT | Performed by: INTERNAL MEDICINE

## 2024-04-29 PROCEDURE — 83735 ASSAY OF MAGNESIUM: CPT | Performed by: PHYSICIAN ASSISTANT

## 2024-04-29 PROCEDURE — 83615 LACTATE (LD) (LDH) ENZYME: CPT | Performed by: PHYSICIAN ASSISTANT

## 2024-04-29 PROCEDURE — 82248 BILIRUBIN DIRECT: CPT | Performed by: PHYSICIAN ASSISTANT

## 2024-04-29 PROCEDURE — 2500000004 HC RX 250 GENERAL PHARMACY W/ HCPCS (ALT 636 FOR OP/ED): Performed by: PHYSICIAN ASSISTANT

## 2024-04-29 PROCEDURE — 84550 ASSAY OF BLOOD/URIC ACID: CPT | Performed by: PHYSICIAN ASSISTANT

## 2024-04-29 PROCEDURE — 85730 THROMBOPLASTIN TIME PARTIAL: CPT | Performed by: PHYSICIAN ASSISTANT

## 2024-04-29 PROCEDURE — 71046 X-RAY EXAM CHEST 2 VIEWS: CPT

## 2024-04-29 PROCEDURE — 85007 BL SMEAR W/DIFF WBC COUNT: CPT | Performed by: PHYSICIAN ASSISTANT

## 2024-04-29 PROCEDURE — 85027 COMPLETE CBC AUTOMATED: CPT | Performed by: PHYSICIAN ASSISTANT

## 2024-04-29 PROCEDURE — 84075 ASSAY ALKALINE PHOSPHATASE: CPT | Performed by: PHYSICIAN ASSISTANT

## 2024-04-29 PROCEDURE — 2500000001 HC RX 250 WO HCPCS SELF ADMINISTERED DRUGS (ALT 637 FOR MEDICARE OP): Performed by: PHYSICIAN ASSISTANT

## 2024-04-29 PROCEDURE — 2500000005 HC RX 250 GENERAL PHARMACY W/O HCPCS: Performed by: INTERNAL MEDICINE

## 2024-04-29 PROCEDURE — 1170000001 HC PRIVATE ONCOLOGY ROOM DAILY

## 2024-04-29 RX ORDER — ACETAMINOPHEN 325 MG/1
650 TABLET ORAL ONCE
Status: CANCELLED | OUTPATIENT
Start: 2024-05-03

## 2024-04-29 RX ORDER — EPINEPHRINE 0.1 MG/ML
0.3 INJECTION INTRACARDIAC; INTRAVENOUS EVERY 5 MIN PRN
Status: DISCONTINUED | OUTPATIENT
Start: 2024-04-29 | End: 2024-05-02 | Stop reason: HOSPADM

## 2024-04-29 RX ORDER — ACYCLOVIR 400 MG/1
400 TABLET ORAL 2 TIMES DAILY
Status: DISCONTINUED | OUTPATIENT
Start: 2024-04-29 | End: 2024-05-02 | Stop reason: HOSPADM

## 2024-04-29 RX ORDER — DIPHENHYDRAMINE HYDROCHLORIDE 50 MG/ML
50 INJECTION INTRAMUSCULAR; INTRAVENOUS AS NEEDED
Status: CANCELLED | OUTPATIENT
Start: 2024-05-03

## 2024-04-29 RX ORDER — DIPHENHYDRAMINE HYDROCHLORIDE 50 MG/ML
50 INJECTION INTRAMUSCULAR; INTRAVENOUS AS NEEDED
Status: DISCONTINUED | OUTPATIENT
Start: 2024-04-29 | End: 2024-05-02 | Stop reason: HOSPADM

## 2024-04-29 RX ORDER — PROCHLORPERAZINE EDISYLATE 5 MG/ML
10 INJECTION INTRAMUSCULAR; INTRAVENOUS EVERY 6 HOURS PRN
Status: DISCONTINUED | OUTPATIENT
Start: 2024-04-29 | End: 2024-05-02 | Stop reason: HOSPADM

## 2024-04-29 RX ORDER — FAMOTIDINE 10 MG/ML
20 INJECTION INTRAVENOUS ONCE AS NEEDED
Status: CANCELLED | OUTPATIENT
Start: 2024-05-03

## 2024-04-29 RX ORDER — DIPHENHYDRAMINE HCL 50 MG
50 CAPSULE ORAL ONCE
Status: CANCELLED | OUTPATIENT
Start: 2024-05-03

## 2024-04-29 RX ORDER — ALBUTEROL SULFATE 0.83 MG/ML
3 SOLUTION RESPIRATORY (INHALATION) AS NEEDED
Status: DISCONTINUED | OUTPATIENT
Start: 2024-04-29 | End: 2024-05-02 | Stop reason: HOSPADM

## 2024-04-29 RX ORDER — EPINEPHRINE 0.3 MG/.3ML
0.3 INJECTION SUBCUTANEOUS EVERY 5 MIN PRN
Status: CANCELLED | OUTPATIENT
Start: 2024-05-03

## 2024-04-29 RX ORDER — ALBUTEROL SULFATE 0.83 MG/ML
3 SOLUTION RESPIRATORY (INHALATION) AS NEEDED
Status: CANCELLED | OUTPATIENT
Start: 2024-05-03

## 2024-04-29 RX ORDER — PANTOPRAZOLE SODIUM 40 MG/1
40 TABLET, DELAYED RELEASE ORAL
Status: DISCONTINUED | OUTPATIENT
Start: 2024-04-30 | End: 2024-05-02 | Stop reason: HOSPADM

## 2024-04-29 RX ORDER — KETOROLAC TROMETHAMINE 5 MG/ML
1 SOLUTION OPHTHALMIC DAILY
Status: DISCONTINUED | OUTPATIENT
Start: 2024-04-29 | End: 2024-05-02 | Stop reason: HOSPADM

## 2024-04-29 RX ORDER — FAMOTIDINE 10 MG/ML
20 INJECTION INTRAVENOUS AS NEEDED
Status: DISCONTINUED | OUTPATIENT
Start: 2024-04-29 | End: 2024-05-02 | Stop reason: HOSPADM

## 2024-04-29 RX ORDER — ONDANSETRON HYDROCHLORIDE 2 MG/ML
4 INJECTION, SOLUTION INTRAVENOUS EVERY 8 HOURS PRN
Status: DISCONTINUED | OUTPATIENT
Start: 2024-04-29 | End: 2024-05-02 | Stop reason: HOSPADM

## 2024-04-29 RX ORDER — PREDNISOLONE ACETATE 10 MG/ML
2 SUSPENSION/ DROPS OPHTHALMIC EVERY 6 HOURS
Status: DISCONTINUED | OUTPATIENT
Start: 2024-04-29 | End: 2024-05-02 | Stop reason: HOSPADM

## 2024-04-29 RX ORDER — FERROUS SULFATE 325(65) MG
65 TABLET ORAL 2 TIMES DAILY
Status: DISCONTINUED | OUTPATIENT
Start: 2024-04-29 | End: 2024-05-02 | Stop reason: HOSPADM

## 2024-04-29 RX ORDER — PROCHLORPERAZINE MALEATE 10 MG
10 TABLET ORAL EVERY 6 HOURS PRN
Status: DISCONTINUED | OUTPATIENT
Start: 2024-04-29 | End: 2024-05-02 | Stop reason: HOSPADM

## 2024-04-29 RX ORDER — DORZOLAMIDE HYDROCHLORIDE AND TIMOLOL MALEATE PRESERVATIVE FREE 20; 5 MG/ML; MG/ML
1 SOLUTION/ DROPS OPHTHALMIC 2 TIMES DAILY
Status: DISCONTINUED | OUTPATIENT
Start: 2024-04-29 | End: 2024-05-02 | Stop reason: HOSPADM

## 2024-04-29 RX ORDER — ONDANSETRON 4 MG/1
4 TABLET, FILM COATED ORAL EVERY 8 HOURS PRN
Status: DISCONTINUED | OUTPATIENT
Start: 2024-04-29 | End: 2024-05-02 | Stop reason: HOSPADM

## 2024-04-29 RX ADMIN — SODIUM BICARBONATE 200 ML/HR: 84 INJECTION, SOLUTION INTRAVENOUS at 22:09

## 2024-04-29 RX ADMIN — PREDNISOLONE ACETATE 2 DROP: 10 SUSPENSION/ DROPS OPHTHALMIC at 22:07

## 2024-04-29 RX ADMIN — DORZOLAMIDE HYDROCHLORIDE AND TIMOLOL MALEATE PRESERVATIVE FREE 1 DROP: 20; 5 SOLUTION/ DROPS OPHTHALMIC at 22:07

## 2024-04-29 RX ADMIN — ACYCLOVIR 400 MG: 400 TABLET ORAL at 22:06

## 2024-04-29 RX ADMIN — FERROUS SULFATE TAB 325 MG (65 MG ELEMENTAL FE) 1 TABLET: 325 (65 FE) TAB at 22:06

## 2024-04-29 RX ADMIN — SODIUM BICARBONATE 200 ML/HR: 84 INJECTION, SOLUTION INTRAVENOUS at 15:40

## 2024-04-29 SDOH — SOCIAL STABILITY: SOCIAL INSECURITY: WERE YOU ABLE TO COMPLETE ALL THE BEHAVIORAL HEALTH SCREENINGS?: YES

## 2024-04-29 SDOH — SOCIAL STABILITY: SOCIAL INSECURITY: HAS ANYONE EVER THREATENED TO HURT YOUR FAMILY OR YOUR PETS?: NO

## 2024-04-29 SDOH — SOCIAL STABILITY: SOCIAL INSECURITY: HAVE YOU HAD THOUGHTS OF HARMING ANYONE ELSE?: NO

## 2024-04-29 SDOH — SOCIAL STABILITY: SOCIAL INSECURITY: DO YOU FEEL ANYONE HAS EXPLOITED OR TAKEN ADVANTAGE OF YOU FINANCIALLY OR OF YOUR PERSONAL PROPERTY?: NO

## 2024-04-29 SDOH — SOCIAL STABILITY: SOCIAL INSECURITY: HAVE YOU HAD ANY THOUGHTS OF HARMING ANYONE ELSE?: NO

## 2024-04-29 SDOH — SOCIAL STABILITY: SOCIAL INSECURITY: ARE THERE ANY APPARENT SIGNS OF INJURIES/BEHAVIORS THAT COULD BE RELATED TO ABUSE/NEGLECT?: NO

## 2024-04-29 SDOH — SOCIAL STABILITY: SOCIAL INSECURITY: ABUSE: ADULT

## 2024-04-29 SDOH — SOCIAL STABILITY: SOCIAL INSECURITY: ARE YOU OR HAVE YOU BEEN THREATENED OR ABUSED PHYSICALLY, EMOTIONALLY, OR SEXUALLY BY ANYONE?: NO

## 2024-04-29 SDOH — SOCIAL STABILITY: SOCIAL INSECURITY: DOES ANYONE TRY TO KEEP YOU FROM HAVING/CONTACTING OTHER FRIENDS OR DOING THINGS OUTSIDE YOUR HOME?: NO

## 2024-04-29 SDOH — SOCIAL STABILITY: SOCIAL INSECURITY: DO YOU FEEL UNSAFE GOING BACK TO THE PLACE WHERE YOU ARE LIVING?: NO

## 2024-04-29 ASSESSMENT — PAIN SCALES - GENERAL
PAINLEVEL_OUTOF10: 0 - NO PAIN

## 2024-04-29 ASSESSMENT — COGNITIVE AND FUNCTIONAL STATUS - GENERAL
DAILY ACTIVITIY SCORE: 24
MOBILITY SCORE: 24
PATIENT BASELINE BEDBOUND: NO
DAILY ACTIVITIY SCORE: 24
MOBILITY SCORE: 24

## 2024-04-29 ASSESSMENT — LIFESTYLE VARIABLES
AUDIT-C TOTAL SCORE: 0
HOW OFTEN DO YOU HAVE 6 OR MORE DRINKS ON ONE OCCASION: NEVER
HOW MANY STANDARD DRINKS CONTAINING ALCOHOL DO YOU HAVE ON A TYPICAL DAY: PATIENT DOES NOT DRINK
SKIP TO QUESTIONS 9-10: 1
AUDIT-C TOTAL SCORE: 0
HOW OFTEN DO YOU HAVE A DRINK CONTAINING ALCOHOL: NEVER

## 2024-04-29 ASSESSMENT — ACTIVITIES OF DAILY LIVING (ADL)
HEARING - RIGHT EAR: FUNCTIONAL
JUDGMENT_ADEQUATE_SAFELY_COMPLETE_DAILY_ACTIVITIES: YES
WALKS IN HOME: INDEPENDENT
HEARING - LEFT EAR: FUNCTIONAL
FEEDING YOURSELF: INDEPENDENT
GROOMING: INDEPENDENT
PATIENT'S MEMORY ADEQUATE TO SAFELY COMPLETE DAILY ACTIVITIES?: YES
TOILETING: INDEPENDENT
HEARING - RIGHT EAR: FUNCTIONAL
BATHING: INDEPENDENT
BATHING: INDEPENDENT
ADEQUATE_TO_COMPLETE_ADL: YES
GROOMING: INDEPENDENT
TOILETING: INDEPENDENT
DRESSING YOURSELF: INDEPENDENT
LACK_OF_TRANSPORTATION: NO
JUDGMENT_ADEQUATE_SAFELY_COMPLETE_DAILY_ACTIVITIES: YES
HEARING - LEFT EAR: FUNCTIONAL
DRESSING YOURSELF: INDEPENDENT
ADEQUATE_TO_COMPLETE_ADL: YES
WALKS IN HOME: INDEPENDENT
FEEDING YOURSELF: INDEPENDENT
PATIENT'S MEMORY ADEQUATE TO SAFELY COMPLETE DAILY ACTIVITIES?: YES

## 2024-04-29 ASSESSMENT — PAIN - FUNCTIONAL ASSESSMENT
PAIN_FUNCTIONAL_ASSESSMENT: 0-10

## 2024-04-29 NOTE — H&P
History Of Present Illness  Shilo Thakkar is a 23 y.o. male presenting with history of Burkitt's lymphoma who is admitted today for cycle 8 of HyperCVAD (HD-Methotrexate).    On admit, patient doing well. Eating and drinking well. No complaints on admit. Denies recent fever, chills, dizziness, headache, sore throat, cough, nasal congestion, CP, SOB, nausea, vomiting, diarrhea, constipation, abdominal pain, urinary issues, bleeding issues, or neuropathy issues.     Oncology History   Burkitt lymphoma (CMS/HCC)   10/27/2023 Initial Diagnosis     Burkitt lymphoma, high risk, MYC positive  - 10/19/23 presented to OSH ED with dysphagia and abdominal pain  - CT A/P with IV contrast (10/19) mass in the body of the pancreas measuring approximately 2.7 x 2.9 cm. The pancreatic duct is dilated. Multiple masses in the mesentery as well as omental caking consistent with carcinomatosis. Moderate amount of free fluid throughout the abdomen and pelvis.   - CT soft tissue neck with IV contrast (10/19): homogeneous soft tissue attenuation lesion in the L oropharynx which measures approximately 3.0 x 4.0 x 5.1 cm.   - US guided mesenteric LN biopsy (10/20/23): HIGH GRADE B-CELL LYMPHOMA MORPHOLOGICALLY CONSISTENT WITH BURKITT LYMPHOMA; flow cytometry: No B cell population identified  - Bone marrow aspiration biopsy November 1, 2023 no evidence of lymphoma involvement.  CSF by flow cytometry showed no lymphoma 11/2/2023.  - Biopsy L oropharyngeal mass and L cervical LN by ENT (10/23/23) high grade B-cell burkitt lymphoma, MYC positive, Bcl-2 negative.  - MR brain with and w/o IV contrast (10/27): No evidence of intracranial metastatic disease.     - Dexa 40 mg daily ended 11/1  - PET CT ordered 10/30 showed FDG avid left oropharyngeal mass and LAD above and below diaphragm      10/29/2023 -  Chemotherapy     HyperCVAD + RiTUXimab, 21 Day Cycles      12/6/2023 Imaging     -CT scan of the chest abdomen or pelvis on December 6, 2023 shows  complete resolution of intra-abdominal disease, with mildly worse splenomegaly most likely unrelated to his lymphoma.              Past Medical History  Past Medical History:   Diagnosis Date    ADHD (attention deficit hyperactivity disorder) 01/28/2014    Burkitt lymphoma (Multi) 10/27/2023    Colles' fracture of right radius, initial encounter for closed fracture 04/23/2015    Colles' fracture of right radius    Gynecomastia 05/23/2016    Unspecified fracture of navicular (scaphoid) bone of left wrist, initial encounter for closed fracture 12/17/2015    Closed fracture of scaphoid bone of left wrist       Surgical History  Past Surgical History:   Procedure Laterality Date    CHEST TUBE INSERTION  10/29/2023    FOOT FRACTURE SURGERY Left     OTHER SURGICAL HISTORY  10/03/2017    History Of Prior Surgery    US GUIDED SOFT TISSUE BIOPSY  10/20/2023    US GUIDED SOFT TISSUE BIOPSY 10/20/2023 [facility] US        Social History  He reports that he has never smoked. He has never been exposed to tobacco smoke. He has never used smokeless tobacco. He reports that he does not currently use alcohol. He reports that he does not use drugs.    Family History  Family History   Problem Relation Name Age of Onset    ADD / ADHD Mother      ADD / ADHD Sister      ADD / ADHD Brother          Allergies  Patient has no known allergies.    Review of Systems     Physical Exam    Physical Exam  Vitals reviewed.   Constitutional:       Appearance: Normal appearance. He is not ill-appearing.   HENT:      Head: Normocephalic and atraumatic.      Mouth/Throat:      Mouth: Mucous membranes are moist.   Eyes:      Extraocular Movements: Extraocular movements intact.      Pupils: Pupils are equal, round, and reactive to light.   Cardiovascular:      Rate and Rhythm: Normal rate and regular rhythm.      Heart sounds: Normal heart sounds.   Pulmonary:      Effort: Pulmonary effort is normal.      Breath sounds: Normal breath sounds. No wheezing,  rhonchi or rales.   Abdominal:      General: Abdomen is flat. Bowel sounds are normal.      Palpations: Abdomen is soft.      Tenderness: There is no abdominal tenderness. There is no guarding.   Musculoskeletal:         General: No swelling. Normal range of motion.      Cervical back: Normal range of motion and neck supple.   Skin:     General: Skin is warm and dry.      Findings: No rash.  RUE Dbl PICC line c/d/i  Neurological:      General: No focal deficit present.      Mental Status: He is alert and oriented to person, place, and time.      Cranial Nerves: No cranial nerve deficit.   Psychiatric:         Mood and Affect: Mood normal.         Behavior: Behavior normal.     Scheduled medications  acyclovir, 400 mg, oral, BID  dorzolamide-timolol (PF), 1 drop, Right Eye, BID  ferrous sulfate (325 mg ferrous sulfate), 65 mg of iron, oral, BID  ketorolac, 1 drop, Both Eyes, Daily  [START ON 4/30/2024] pantoprazole, 40 mg, oral, Daily before breakfast      Continuous medications  sodium bicarbonate 150 mEq in dextrose 5 % in water (D5W) 1,000 mL infusion, 200 mL/hr, Last Rate: 200 mL/hr (04/29/24 1540)      PRN medications  PRN medications: alteplase, ondansetron **OR** ondansetron      Current Outpatient Medications:     acyclovir (Zovirax) 400 mg tablet, Take 1 tablet (400 mg) by mouth 2 times a day., Disp: 180 tablet, Rfl: 0    B complex-vitamin C tablet, Take 1 tablet by mouth once daily., Disp: 30 tablet, Rfl: 11    dorzolamide-timoloL (Cosopt) 22.3-6.8 mg/mL ophthalmic solution, Administer 1 drop into the right eye 2 times a day., Disp: 10 mL, Rfl: 2    ketorolac (Acular) 0.5 % ophthalmic solution, Use one drop 4 times a day in the right eye (Patient taking differently: Administer into both eyes. Use one drop 4 times a day in the right eye), Disp: 5 mL, Rfl: 3    lubricating eye drops ophthalmic solution, Administer 1 drop into the left eye 4 times a day., Disp: 120 each, Rfl: 0    pantoprazole (ProtoNix) 40 mg  EC tablet, Take 1 tablet (40 mg) by mouth once daily in the morning. Take before meals. Do not crush, chew, or split., Disp: 30 tablet, Rfl: 0    sulfamethoxazole-trimethoprim (Bactrim DS) 800-160 mg tablet, Take 1 tablet by mouth once a day on Monday, Wednesday, and Friday., Disp: 12 tablet, Rfl: 0    Results from last 7 days   Lab Units 04/29/24  1446 04/26/24  1243 04/24/24  0911   WBC AUTO x10*3/uL 4.8 5.7 6.2   HEMOGLOBIN g/dL 9.2* 9.8* 9.4*   HEMATOCRIT % 28.4* 28.8* 27.7*   PLATELETS AUTO x10*3/uL 116* 111* 95*   NEUTROS PCT AUTO %  --  76.5 81.7   LYMPHS PCT AUTO %  --  9.9 8.7   MONOS PCT AUTO %  --  12.7 8.7   EOS PCT AUTO %  --  0.0 0.0     Results from last 7 days   Lab Units 04/29/24  1446 04/26/24  1243 04/24/24  0927   SODIUM mmol/L 143 142 140   POTASSIUM mmol/L 3.5 4.0 3.7   CHLORIDE mmol/L 105 105 103   CO2 mmol/L 28 28 28   BUN mg/dL 14 16 9   CREATININE mg/dL 0.98 0.99 1.02   CALCIUM mg/dL 9.0 9.5 9.6   PROTEIN TOTAL g/dL 6.0*  --  6.8   BILIRUBIN TOTAL mg/dL 0.4  --  0.4   ALK PHOS U/L 66  --  73   ALT U/L 15  --  19   AST U/L 13  --  16   GLUCOSE mg/dL 102* 81 94     Results from last 7 days   Lab Units 04/29/24  1446   MAGNESIUM mg/dL 1.71       A/P :    Shilo Thakkar is a 23 y.o. male with PMH of Burkitt Lymphoma (Dx 10/20/23; s/p 7 cycles of HyperCVAD), malignant pleural effusions s/p CT (10/29/23), ADHD, and BL retinoschisis and retinal hemorrhages, s/p left vitrectomy who presents as a direct admission on 4/29/2024 for cycle 8 of HyperCVAD (HD-Methotrexate).      -Hydration and anti-emetics per chemotherapy order set   - Begin Methotrexate when urine pH > 7 & UO >= 150 ml/hr x 4 hrs  - Pre treatment CXR (4/29) - No evidence of acute cardiopulmonary process. No pleural effusion.    Day 1, Cycle 8 of HyperCVAD  - Methotrexate 200 mg/m2 IV over 2 hours on Day 1 followed by  - Methotrexate 800 mg/m2 IV continuous infusion over 22 hours on Day 1  - Leucovorin 50 mg IV over 15 minutes on Day 2,  administered 12 hours after completion of 22-hour     Methotrexate infusion followed by Leucovorin 15 mg IV over 15 minutes or PO every 6 hours for at       least 8 doses (until methotrexate concentration is LESS THAN 0.05 micromol/L)  - Cytarabine 100 mg intrathecal on Day 2 +/- 2 days  - RiTUXimab 375 mg/m2 IV or 1,400 mg SUBQ on Day 5 +/- 2 days  - Methotrexate 12 mg intrathecal on Day 7 +/- 2 days     ONC  #Burkitt lymphoma, high risk, MYC positive, newly diagnosed (10/20/23)  - 10/19/23 presented to OSH ED with dysphagia and abdominal pain  - CT A/P with IV contrast (10/19) mass in the body of the pancreas measuring approximately 2.7 x 2.9 cm. Multiple masses in the mesentery as well as omental caking c/q carcinomatosis.   - CT soft tissue neck w/ IV contrast (10/19): homogeneous soft tissue attenuation lesion in the L oropharynx which measures approximately 3.0 x 4.0 x 5.1 cm.   - CT Abdomen w/o IV contrast (10/20): Mesenteric lymphadenopathy  - US guided mesenteric LN biopsy (10/20/23): HIGH GRADE B-CELL LYMPHOMA MORPHOLOGICALLY C/W BURKITT LYMPHOMA; flow cytometry: CD10+ kappa restricted B cells supportive of a B cell lymphoma with germinal center origin.   - Biopsy L oropharyngeal mass and L cervical LN by ENT (10/23/23) high grade B-cell burkitt lymphoma  - PET CT (10/30) showed FDG avid left oropharyngeal mass and LAD above and below diaphragm  - BMBx (11/1) without evidence of disease  - s/p 7 cycles of HyperCVAD (10/28, 11/19, 12/11, 1/2/24, 2/10, 3/06, 4/1)  - PET scan (1/15/24): Deauville 2. Complete resolution of metabolic activity of previously seen Lt oropharyngeal mass, resolution of previously seen hypermetabolic Lt level 2 and 3 cervical lymphadenopathy, resolution of previously seen moderate-large size Lt pleural effusion and trace Rt pleural effusion and abdominopelvic ascites, resolution of previously seen hypermetabolic large mass within pancreatic tail/body and the previously seen  hypermetabolic retroperitoneal and mesenteric lymphadenopathy.  - Cycle 5 initially delayed a little over a week due to recent hospitalization from 1/17-1/23/24 for visual changes, retinoschisis, retinal hemorrhages, and profound pancytopenia requiring left eye vitrectomy  - Plan total of 8 cycles of therapy   - Now admitted (4/29) for cycle 8 of HyperCVAD       OPTHO  -Recent hx/o bilateral macula-involving sub ILM hemorrhage with serous detachment OS>OD  -Multilayer retinal hemorrhage, left eye.   -S/p pars plana vitrectomy (PPV)/C3F8/intraretinal tPA OS  -S/P PPV/MP/EL/air O  -Last seen by Ophthalm in outpatient clinic on 2/19. Per recs, Taper Prednisolone 3x/day OU x 2 weeks then BID x 2 weeks, continue Cosopt BID OD, Taper Ketorolac TID OU x 2 weeks, BID x 2 weeks.  -Okay to proceed with chemotherapy.   - Prefer to keep Plts 50-70,000 and recommend avoiding PLt levels below 30K  - Pt reports baseline vision w slt improvement  - May need repeat surgery in the Lt eye (after this cycle or the next) if the vitreous hemorrhage persists or retina detaches  - Per Dr. Abbi Canales (Ophthalomology) on 4/24/24, no further tapering of any drops until he is seen in Ophthalmology clinic - next clinic on 5/15/24.  Cont Cosopt one gtt BID right eye & Ketorolac one gtt both eyes daily     HEME  #Anemia without active bleeding  - Continue home ferrous sulfate 325mg BID started by Dr. Klein  - Transfuse to keep Hgb>8.0, Plt>50-70,000 and avoid dropping below 30,000 per ophtho requirements with hx/o retinal hemorrhages     CARDIAC  - ECHO (2/10/24) LVEF 60-65%     ID  Allergies: NKDA  Afebrile on admit  PPX: Acyclovir 400mg BID, Bactrim MWF  - Please address if needs to be on fluconazole for antifungal ppx, currently not in his home meds list  - Plan Levaquin for Day 5-15 of each cycle for antibacterial prophylaxis       FEN/GI  - Admit weight:  78.9 kg, current wt :  kg (   )   - Monitor and replace electrolytes as needed  -  Cont Vit B complex  -ppx: Continue Pantoprazole 40mg every day       ENDO  -CTA neck (1/17) with 6 mm nodule in the left lobe of the thyroid gland   -TSH level (1/20): 1.35       DISPO  - Full Code  - Access: KIM Najera PICC line  - Primary Onc: Dr. Klein  - NOK: Ms Deidra Pickett [phone]  - C8 D5 Rituxan/Neulasta on 5/3  - Plan 3x weekly count checks   - Scheduled for count check/Neulasta/[facility] heme clinic at [facility] (5/3, 5/10, 5/15)  - PET-CT 5/8  - Ophthal f/up 5/15  - Has follow up with Dr. Klein on 5/22         I spent 45 minutes in the professional and overall care of this patient.      Néstor Alarcon PA-C

## 2024-04-30 ENCOUNTER — APPOINTMENT (OUTPATIENT)
Dept: HEMATOLOGY/ONCOLOGY | Facility: HOSPITAL | Age: 24
End: 2024-04-30
Payer: COMMERCIAL

## 2024-04-30 LAB
ALBUMIN SERPL BCP-MCNC: 4.1 G/DL (ref 3.4–5)
ALP SERPL-CCNC: 64 U/L (ref 33–120)
ALT SERPL W P-5'-P-CCNC: 16 U/L (ref 10–52)
ANION GAP SERPL CALC-SCNC: 12 MMOL/L (ref 10–20)
APPEARANCE CSF: CLEAR
AST SERPL W P-5'-P-CCNC: 14 U/L (ref 9–39)
BASOPHILS # BLD MANUAL: 0 X10*3/UL (ref 0–0.1)
BASOPHILS NFR BLD MANUAL: 0 %
BASOPHILS NFR CSF MANUAL: 0 %
BILIRUB SERPL-MCNC: 0.5 MG/DL (ref 0–1.2)
BLASTS CSF MANUAL: 0 %
BUN SERPL-MCNC: 11 MG/DL (ref 6–23)
CALCIUM SERPL-MCNC: 8.9 MG/DL (ref 8.6–10.6)
CHLORIDE SERPL-SCNC: 102 MMOL/L (ref 98–107)
CO2 SERPL-SCNC: 32 MMOL/L (ref 21–32)
COLOR CSF: COLORLESS
COLOR SPUN CSF: COLORLESS
CREAT SERPL-MCNC: 0.85 MG/DL (ref 0.5–1.3)
EGFRCR SERPLBLD CKD-EPI 2021: >90 ML/MIN/1.73M*2
EOSINOPHIL # BLD MANUAL: 0 X10*3/UL (ref 0–0.7)
EOSINOPHIL NFR BLD MANUAL: 0 %
EOSINOPHIL NFR CSF MANUAL: 0 %
ERYTHROCYTE [DISTWIDTH] IN BLOOD BY AUTOMATED COUNT: 20.2 % (ref 11.5–14.5)
GLUCOSE CSF-MCNC: 77 MG/DL (ref 40–70)
GLUCOSE SERPL-MCNC: 110 MG/DL (ref 74–99)
HCT VFR BLD AUTO: 28 % (ref 41–52)
HGB BLD-MCNC: 9.6 G/DL (ref 13.5–17.5)
IMM GRANULOCYTES # BLD AUTO: 0.06 X10*3/UL (ref 0–0.7)
IMM GRANULOCYTES NFR BLD AUTO: 0.8 % (ref 0–0.9)
IMM GRANULOCYTES NFR CSF: 0 %
LDH CSF L TO P-CCNC: <25 U/L
LDH SERPL L TO P-CCNC: 135 U/L (ref 84–246)
LYMPHOCYTES # BLD MANUAL: 0.27 X10*3/UL (ref 1.2–4.8)
LYMPHOCYTES NFR BLD MANUAL: 3.5 %
LYMPHOCYTES NFR CSF MANUAL: 24 % (ref 28–96)
MAGNESIUM SERPL-MCNC: 1.71 MG/DL (ref 1.6–2.4)
MCH RBC QN AUTO: 32.2 PG (ref 26–34)
MCHC RBC AUTO-ENTMCNC: 34.3 G/DL (ref 32–36)
MCV RBC AUTO: 94 FL (ref 80–100)
METAMYELOCYTES # BLD MANUAL: 0.07 X10*3/UL
METAMYELOCYTES NFR BLD MANUAL: 0.9 %
MONOCYTES # BLD MANUAL: 0.06 X10*3/UL (ref 0.1–1)
MONOCYTES NFR BLD MANUAL: 0.8 %
MONOS+MACROS NFR CSF MANUAL: 65 % (ref 16–56)
NEUTS SEG # BLD MANUAL: 7.32 X10*3/UL (ref 1.2–7)
NEUTS SEG NFR BLD MANUAL: 93.9 %
NEUTS SEG NFR CSF MANUAL: 12 % (ref 0–5)
NRBC BLD-RTO: 0 /100 WBCS (ref 0–0)
OTHER CELLS NFR CSF MANUAL: 0 %
PH, POC: 8.5
PLASMA CELLS NFR CSF MICRO: 0 %
PLATELET # BLD AUTO: 112 X10*3/UL (ref 150–450)
POTASSIUM SERPL-SCNC: 3.9 MMOL/L (ref 3.5–5.3)
PROMYELOCYTES # BLD MANUAL: 0.07 X10*3/UL
PROMYELOCYTES NFR BLD MANUAL: 0.9 %
PROT CSF-MCNC: 38 MG/DL (ref 15–45)
PROT SERPL-MCNC: 5.5 G/DL (ref 6.4–8.2)
RBC # BLD AUTO: 2.98 X10*6/UL (ref 4.5–5.9)
RBC # CSF AUTO: 11 /UL (ref 0–5)
RBC MORPH BLD: ABNORMAL
SODIUM SERPL-SCNC: 142 MMOL/L (ref 136–145)
TOTAL CELLS COUNTED BLD: 115
TOTAL CELLS COUNTED CSF: 17
TUBE # CSF: ABNORMAL
WBC # BLD AUTO: 7.8 X10*3/UL (ref 4.4–11.3)
WBC # CSF AUTO: <1 /UL (ref 1–5)

## 2024-04-30 PROCEDURE — 83615 LACTATE (LD) (LDH) ENZYME: CPT | Performed by: PHYSICIAN ASSISTANT

## 2024-04-30 PROCEDURE — 84075 ASSAY ALKALINE PHOSPHATASE: CPT | Performed by: PHYSICIAN ASSISTANT

## 2024-04-30 PROCEDURE — 1170000001 HC PRIVATE ONCOLOGY ROOM DAILY

## 2024-04-30 PROCEDURE — 89051 BODY FLUID CELL COUNT: CPT | Performed by: PHYSICIAN ASSISTANT

## 2024-04-30 PROCEDURE — 2500000004 HC RX 250 GENERAL PHARMACY W/ HCPCS (ALT 636 FOR OP/ED): Performed by: INTERNAL MEDICINE

## 2024-04-30 PROCEDURE — 88104 CYTOPATH FL NONGYN SMEARS: CPT | Performed by: PATHOLOGY

## 2024-04-30 PROCEDURE — 81002 URINALYSIS NONAUTO W/O SCOPE: CPT | Performed by: PHYSICIAN ASSISTANT

## 2024-04-30 PROCEDURE — 85007 BL SMEAR W/DIFF WBC COUNT: CPT | Performed by: PHYSICIAN ASSISTANT

## 2024-04-30 PROCEDURE — 88187 FLOWCYTOMETRY/READ 2-8: CPT | Performed by: PATHOLOGY

## 2024-04-30 PROCEDURE — 2500000001 HC RX 250 WO HCPCS SELF ADMINISTERED DRUGS (ALT 637 FOR MEDICARE OP): Performed by: PHYSICIAN ASSISTANT

## 2024-04-30 PROCEDURE — XW043B3 INTRODUCTION OF CYTARABINE AND DAUNORUBICIN LIPOSOME ANTINEOPLASTIC INTO CENTRAL VEIN, PERCUTANEOUS APPROACH, NEW TECHNOLOGY GROUP 3: ICD-10-PCS | Performed by: INTERNAL MEDICINE

## 2024-04-30 PROCEDURE — 96450 CHEMOTHERAPY INTO CNS: CPT | Performed by: PHYSICIAN ASSISTANT

## 2024-04-30 PROCEDURE — 009U3ZZ DRAINAGE OF SPINAL CANAL, PERCUTANEOUS APPROACH: ICD-10-PCS | Performed by: PHYSICIAN ASSISTANT

## 2024-04-30 PROCEDURE — 83735 ASSAY OF MAGNESIUM: CPT | Performed by: PHYSICIAN ASSISTANT

## 2024-04-30 PROCEDURE — 88185 FLOWCYTOMETRY/TC ADD-ON: CPT | Mod: TC | Performed by: PHYSICIAN ASSISTANT

## 2024-04-30 PROCEDURE — A4216 STERILE WATER/SALINE, 10 ML: HCPCS | Performed by: INTERNAL MEDICINE

## 2024-04-30 PROCEDURE — 85027 COMPLETE CBC AUTOMATED: CPT | Performed by: PHYSICIAN ASSISTANT

## 2024-04-30 PROCEDURE — 2500000004 HC RX 250 GENERAL PHARMACY W/ HCPCS (ALT 636 FOR OP/ED): Performed by: PHYSICIAN ASSISTANT

## 2024-04-30 PROCEDURE — 84157 ASSAY OF PROTEIN OTHER: CPT | Performed by: PHYSICIAN ASSISTANT

## 2024-04-30 PROCEDURE — 2500000005 HC RX 250 GENERAL PHARMACY W/O HCPCS: Performed by: PHYSICIAN ASSISTANT

## 2024-04-30 PROCEDURE — 82945 GLUCOSE OTHER FLUID: CPT | Performed by: PHYSICIAN ASSISTANT

## 2024-04-30 PROCEDURE — 62270 DX LMBR SPI PNXR: CPT | Performed by: PHYSICIAN ASSISTANT

## 2024-04-30 RX ADMIN — ACYCLOVIR 400 MG: 400 TABLET ORAL at 09:13

## 2024-04-30 RX ADMIN — DORZOLAMIDE HYDROCHLORIDE AND TIMOLOL MALEATE PRESERVATIVE FREE 1 DROP: 20; 5 SOLUTION/ DROPS OPHTHALMIC at 09:13

## 2024-04-30 RX ADMIN — FERROUS SULFATE TAB 325 MG (65 MG ELEMENTAL FE) 1 TABLET: 325 (65 FE) TAB at 21:19

## 2024-04-30 RX ADMIN — PREDNISOLONE ACETATE 2 DROP: 10 SUSPENSION/ DROPS OPHTHALMIC at 15:31

## 2024-04-30 RX ADMIN — ONDANSETRON 16 MG: 2 INJECTION INTRAMUSCULAR; INTRAVENOUS at 00:36

## 2024-04-30 RX ADMIN — PREDNISOLONE ACETATE 2 DROP: 10 SUSPENSION/ DROPS OPHTHALMIC at 04:49

## 2024-04-30 RX ADMIN — PREDNISOLONE ACETATE 2 DROP: 10 SUSPENSION/ DROPS OPHTHALMIC at 09:13

## 2024-04-30 RX ADMIN — DEXAMETHASONE SODIUM PHOSPHATE 12 MG: 10 INJECTION, SOLUTION INTRAMUSCULAR; INTRAVENOUS at 00:37

## 2024-04-30 RX ADMIN — SODIUM BICARBONATE 200 ML/HR: 84 INJECTION, SOLUTION INTRAVENOUS at 17:05

## 2024-04-30 RX ADMIN — DORZOLAMIDE HYDROCHLORIDE AND TIMOLOL MALEATE PRESERVATIVE FREE 1 DROP: 20; 5 SOLUTION/ DROPS OPHTHALMIC at 21:20

## 2024-04-30 RX ADMIN — PREDNISOLONE ACETATE 2 DROP: 10 SUSPENSION/ DROPS OPHTHALMIC at 21:20

## 2024-04-30 RX ADMIN — CYTARABINE 100 MG: 100 INJECTION, SOLUTION INTRATHECAL; INTRAVENOUS; SUBCUTANEOUS at 14:00

## 2024-04-30 RX ADMIN — ACYCLOVIR 400 MG: 400 TABLET ORAL at 21:19

## 2024-04-30 RX ADMIN — FERROUS SULFATE TAB 325 MG (65 MG ELEMENTAL FE) 1 TABLET: 325 (65 FE) TAB at 09:13

## 2024-04-30 RX ADMIN — KETOROLAC TROMETHAMINE 1 DROP: 5 SOLUTION OPHTHALMIC at 09:13

## 2024-04-30 RX ADMIN — SODIUM BICARBONATE 200 ML/HR: 84 INJECTION, SOLUTION INTRAVENOUS at 04:39

## 2024-04-30 RX ADMIN — SODIUM BICARBONATE 200 ML/HR: 84 INJECTION, SOLUTION INTRAVENOUS at 10:33

## 2024-04-30 RX ADMIN — METHOTREXATE 1600 MG: 25 INJECTION, SOLUTION INTRA-ARTERIAL; INTRAMUSCULAR; INTRATHECAL; INTRAVENOUS at 04:40

## 2024-04-30 RX ADMIN — METHOTREXATE 400 MG: 25 INJECTION, SOLUTION INTRA-ARTERIAL; INTRAMUSCULAR; INTRATHECAL; INTRAVENOUS at 01:46

## 2024-04-30 ASSESSMENT — COGNITIVE AND FUNCTIONAL STATUS - GENERAL
MOBILITY SCORE: 24
DAILY ACTIVITIY SCORE: 24

## 2024-04-30 ASSESSMENT — PAIN - FUNCTIONAL ASSESSMENT
PAIN_FUNCTIONAL_ASSESSMENT: 0-10

## 2024-04-30 ASSESSMENT — PAIN SCALES - GENERAL
PAINLEVEL_OUTOF10: 0 - NO PAIN

## 2024-04-30 NOTE — PROGRESS NOTES
Shilo Thakkar is a 23 y.o. male on day 1 of admission presenting with Burkitt lymphoma (Multi).    Pt with Burkitt's Lymphoma was admitted for C8 of HyperCVAD. TCC and SW familiar with this pt.  Full assessment deferred at this time- no home going needs.  He will return to home with his mother.  His SOLO PICC is cared for in the out infusion center. His MD is Dr. Olegario Klein.          Edna Santana RN

## 2024-04-30 NOTE — PROCEDURES
Lumbar Puncture    Date/Time: 4/30/2024 3:20 PM    Performed by: Nésotr Alarcon PA-C  Authorized by: Néstor Alarcon PA-C    Consent:     Consent obtained:  Verbal and written    Consent given by:  Patient    Risks, benefits, and alternatives were discussed: yes      Risks discussed:  Bleeding, infection and pain  Universal protocol:     Procedure explained and questions answered to patient or proxy's satisfaction: yes      Relevant documents present and verified: yes      Test results available: yes      Imaging studies available: yes      Required blood products, implants, devices, and special equipment available: yes      Immediately prior to procedure a time out was called: yes      Site/side marked: yes      Patient identity confirmed:  Verbally with patient, arm band and hospital-assigned identification number  Pre-procedure details:     Procedure purpose:  Therapeutic    Preparation: Patient was prepped and draped in usual sterile fashion    Procedure details:     Lumbar space:  L4-L5 interspace    Patient position:  Sitting    Needle gauge:  22    Needle type:  Spinal needle - Quincke tip    Needle length (in):  3.5    Ultrasound guidance: no      Number of attempts:  1    Fluid appearance:  Clear    Tubes of fluid:  3    Total volume (ml):  7  Post-procedure details:     Puncture site:  Adhesive bandage applied    Procedure completion:  Tolerated well, no immediate complications  Comments:      Diagnostic lumbar puncture w 7 ml clear CSF removed & sent for CBD, Protein, Glucose, LDH, & flow cytometry, then instilled 3 ml of PF Cytarabine 100 mg in 0.9% sodium chloride.

## 2024-04-30 NOTE — PROGRESS NOTES
"Shilo Thakkar is a 23 y.o. male on day 1 of admission presenting with Burkitt lymphoma (Multi).    Subjective   Shilo Thakkar is a 23 y.o. male presenting with history of Burkitt's lymphoma who is admitted today for cycle 8 of HyperCVAD (HD-Methotrexate).     Patient doing well. Eating and drinking well. No complaints. R eye with slt visual improvement. Denies recent fever, chills, dizziness, headache, sore throat, cough, nasal congestion, CP, SOB, nausea, vomiting, diarrhea, constipation, abdominal pain, urinary issues, bleeding issues, or neuropathy issues.         Objective     Vitals:    04/29/24 1324 04/29/24 1352 04/29/24 1854 04/30/24 0350   BP: 137/74  123/73 127/73   BP Location: Left arm   Left arm   Patient Position: Lying  Lying Lying   Pulse: 78  55 51   Resp: 18  18 16   Temp: 36.6 °C (97.9 °F)  36.8 °C (98.2 °F) 36.7 °C (98.1 °F)   TempSrc: Temporal  Temporal Temporal   SpO2: 96%  98% 98%   Weight:  78.9 kg (173 lb 15.1 oz)     Height:  1.819 m (5' 11.61\")          Physical Exam    Constitutional:       Appearance: Normal appearance. He is not ill-appearing.   HENT:      Head: Normocephalic and atraumatic.      Mouth/Throat:      Mouth: Mucous membranes are moist.   Eyes:      Extraocular Movements: Extraocular movements intact.      Pupils: Pupils are equal, round, and reactive to light.   Cardiovascular:      Rate and Rhythm: Normal rate and regular rhythm.      Heart sounds: Normal heart sounds.   Pulmonary:      Effort: Pulmonary effort is normal.      Breath sounds: Normal breath sounds. No wheezing, rhonchi or rales.   Abdominal:      General: Abdomen is flat. Bowel sounds are normal.      Palpations: Abdomen is soft.      Tenderness: There is no abdominal tenderness. There is no guarding.   Musculoskeletal:         General: No swelling. Normal range of motion.      Cervical back: Normal range of motion and neck supple.   Skin:     General: Skin is warm and dry.      Findings: No rash.  RUE Reinaldo " PICC line c/d/i  Neurological:      General: No focal deficit present.      Mental Status: He is alert and oriented to person, place, and time.      Cranial Nerves: No cranial nerve deficit.   Psychiatric:         Mood and Affect: Mood normal.         Behavior: Behavior normal.     Scheduled medications  acyclovir, 400 mg, oral, BID  dorzolamide-timolol (PF), 1 drop, Right Eye, BID  ferrous sulfate (325 mg ferrous sulfate), 65 mg of iron, oral, BID  ketorolac, 1 drop, Both Eyes, Daily  [START ON 5/1/2024] leucovorin, 15 mg, intravenous, q6h  [START ON 5/1/2024] leucovorin, 50 mg, intravenous, Once  methotrexate 1,600 mg in sodium chloride 0.9% 1,064 mL IV, 800 mg/m2 (Treatment Plan Recorded), intravenous, Once  [Held by provider] pantoprazole, 40 mg, oral, Daily before breakfast  prednisoLONE acetate, 2 drop, Both Eyes, q6h    Continuous medications  sodium bicarbonate 150 mEq in dextrose 5 % in water (D5W) 1,000 mL infusion, 200 mL/hr, Last Rate: 200 mL/hr (04/30/24 0500)    PRN medications  PRN medications: albuterol, alteplase, dextrose, diphenhydrAMINE, EPINEPHrine, famotidine, methylPREDNISolone sodium succinate (PF), ondansetron **OR** ondansetron, prochlorperazine, prochlorperazine, sodium chloride    Results from last 7 days   Lab Units 04/30/24  0436 04/29/24  1446 04/26/24  1243 04/24/24  0911   WBC AUTO x10*3/uL 7.8 4.8 5.7 6.2   HEMOGLOBIN g/dL 9.6* 9.2* 9.8* 9.4*   HEMATOCRIT % 28.0* 28.4* 28.8* 27.7*   PLATELETS AUTO x10*3/uL 112* 116* 111* 95*   NEUTROS PCT AUTO %  --   --  76.5 81.7   LYMPHO PCT MAN % 3.5 11.9  --   --    LYMPHS PCT AUTO %  --   --  9.9 8.7   MONO PCT MAN % 0.8 9.3  --   --    MONOS PCT AUTO %  --   --  12.7 8.7   EOSINO PCT MAN % 0.0 0.0  --   --    EOS PCT AUTO %  --   --  0.0 0.0     Results from last 7 days   Lab Units 04/30/24  0436 04/29/24  1446 04/26/24  1243 04/24/24  0927   SODIUM mmol/L 142 143 142 140   POTASSIUM mmol/L 3.9 3.5 4.0 3.7   CHLORIDE mmol/L 102 105 105 103    CO2 mmol/L 32 28 28 28   BUN mg/dL 11 14 16 9   CREATININE mg/dL 0.85 0.98 0.99 1.02   CALCIUM mg/dL 8.9 9.0 9.5 9.6   PROTEIN TOTAL g/dL 5.5* 6.0*  --  6.8   BILIRUBIN TOTAL mg/dL 0.5 0.4  --  0.4   ALK PHOS U/L 64 66  --  73   ALT U/L 16 15  --  19   AST U/L 14 13  --  16   GLUCOSE mg/dL 110* 102* 81 94     Results from last 7 days   Lab Units 04/30/24  0436 04/29/24  1446   MAGNESIUM mg/dL 1.71 1.71     A/P :    Shilo Thakkar is a 23 y.o. male with PMH of Burkitt Lymphoma (Dx 10/20/23; s/p 7 cycles of HyperCVAD), malignant pleural effusions s/p CT (10/29/23), ADHD, and BL retinoschisis and retinal hemorrhages, s/p left vitrectomy who presents as a direct admission on 4/29/2024 for cycle 8 of HyperCVAD (HD-Methotrexate).      -Hydration and anti-emetics per chemotherapy order set   - Begin Methotrexate when urine pH > 7 & UO >= 150 ml/hr x 4 hrs  - Pre treatment CXR (4/29) - No evidence of acute cardiopulmonary process. No pleural effusion.     Day 2, Cycle 8 of HyperCVAD  - Methotrexate 200 mg/m2 IV over 2 hours on Day 1 followed by  - Methotrexate 800 mg/m2 IV continuous infusion over 22 hours on Day 1  - Leucovorin 50 mg IV over 15 minutes on Day 2, administered 12 hours after completion of 22-hour     Methotrexate infusion followed by Leucovorin 15 mg IV over 15 minutes or PO every 6 hours for at       least 8 doses (until methotrexate concentration is LESS THAN 0.05 micromol/L)  - Cytarabine 1,000-3,000 mg/m2 IV over 3 hrs every 12 hours x 2 doses on D2 (reduced from usu 4 doses    given the profound thrombocytopenia that he developed with the last cycle in an attempt to mitigate risk due     to his intraocular hemorrhage history).   - Cytarabine 100 mg intrathecal on Day 2 (given on 4/30/24)  - RiTUXimab 375 mg/m2 IV or 1,400 mg SUBQ on Day 5 +/- 2 days       ONC  #Burkitt lymphoma, high risk, MYC positive, newly diagnosed (10/20/23)  - 10/19/23 presented to OSH ED with dysphagia and abdominal pain  - CT  A/P with IV contrast (10/19) mass in the body of the pancreas measuring approximately 2.7 x 2.9 cm. Multiple masses in the mesentery as well as omental caking c/q carcinomatosis.   - CT soft tissue neck w/ IV contrast (10/19): homogeneous soft tissue attenuation lesion in the L oropharynx which measures approximately 3.0 x 4.0 x 5.1 cm.   - CT Abdomen w/o IV contrast (10/20): Mesenteric lymphadenopathy  - US guided mesenteric LN biopsy (10/20/23): HIGH GRADE B-CELL LYMPHOMA MORPHOLOGICALLY C/W BURKITT LYMPHOMA; flow cytometry: CD10+ kappa restricted B cells supportive of a B cell lymphoma with germinal center origin.   - Biopsy L oropharyngeal mass and L cervical LN by ENT (10/23/23) high grade B-cell burkitt lymphoma  - PET CT (10/30) showed FDG avid left oropharyngeal mass and LAD above and below diaphragm  - BMBx (11/1) without evidence of disease  - s/p 7 cycles of HyperCVAD (10/28, 11/19, 12/11, 1/2/24, 2/10, 3/06, 4/1)  - PET scan (1/15/24): Deauville 2. Complete resolution of metabolic activity of previously seen Lt oropharyngeal mass, resolution of previously seen hypermetabolic Lt level 2 and 3 cervical lymphadenopathy, resolution of previously seen moderate-large size Lt pleural effusion and trace Rt pleural effusion and abdominopelvic ascites, resolution of previously seen hypermetabolic large mass within pancreatic tail/body and the previously seen hypermetabolic retroperitoneal and mesenteric lymphadenopathy.  - Cycle 5 initially delayed a little over a week due to recent hospitalization from 1/17-1/23/24 for visual changes, retinoschisis, retinal hemorrhages, and profound pancytopenia requiring left eye vitrectomy  - Plan total of 8 cycles of therapy   - Admitted (4/29) for cycle 8 of HyperCVAD       OPTHO  -Recent hx/o bilateral macula-involving sub ILM hemorrhage with serous detachment OS>OD  -Multilayer retinal hemorrhage, left eye.   -S/p pars plana vitrectomy (PPV)/C3F8/intraretinal tPA OS  -S/P  PPV/MP/EL/air O  -Last seen by Ophthalm in outpatient clinic on 2/19. Per recs, Taper Prednisolone 3x/day OU x 2 weeks then BID x 2 weeks, continue Cosopt BID OD, Taper Ketorolac TID OU x 2 weeks, BID x 2 weeks.  -Okay to proceed with chemotherapy.   - Prefer to keep Plts 50-70,000 and recommend avoiding PLt levels below 30K  - Pt reports baseline vision w slt improvement  - May need repeat surgery in the Lt eye (after this cycle or the next) if the vitreous hemorrhage persists or retina detaches  - Per Dr. Abbi Canales (Ophthalomology) on 4/24/24, no further tapering of any drops until he is seen in Ophthalmology clinic - next clinic on 5/15/24.  Cont Cosopt one gtt BID right eye & Ketorolac one gtt both eyes daily     HEME  #Anemia without active bleeding  - Continue home ferrous sulfate 325mg BID started by Dr. Klein  - Transfuse to keep Hgb>7.5, Plt > 50,000 and avoid dropping below 30,000 per ophtho requirements with hx/o retinal hemorrhages     CARDIAC  - ECHO (2/10/24) LVEF 60-65%     ID  Allergies: NKDA  Afebrile on admit  PPX: Acyclovir 400mg BID, Bactrim MWF  - Please address if needs to be on fluconazole for antifungal ppx, currently not in his home meds list  - Plan Levaquin for Day 5-15 of each cycle for antibacterial prophylaxis       FEN/GI  - Admit weight:  78.9 kg, current wt :  kg (81.2)   - Monitor and replace electrolytes as needed  - Cont Vit B complex  -ppx:  Pantoprazole 40mg every day - hold for MTX        ENDO  -CTA neck (1/17) with 6 mm nodule in the left lobe of the thyroid gland   -TSH level (1/20): 1.35       DISPO  - Full Code  - Access: KIM Najera PICC line  - Primary Onc: Dr. Klein  - JUNAIDK: Ms Deidra Pickett [phone]  - C8 D5 Rituxan/Neulasta on 5/3  - Plan 3x weekly count checks/INF visits (5/6, 5/8, 5/10, 5/13, 5/15, 5/17, 5/20).       Keep plt ct > 50,000 & hgb > 7.5 due to the retinal hemorrhages   - Scheduled for count check/Neulasta/Mal heme clinic at [facility] (5/3, 5/10,  5/15)  - PET-CT 5/8  - Ophthal f/up 5/15  - Has follow up with Dr. Klein on 5/22     Patient examined & discussed with Dr. Manuel Alarcon PA-C

## 2024-04-30 NOTE — SIGNIFICANT EVENT

## 2024-04-30 NOTE — HOSPITAL COURSE
Shilo Thakkar is a 23 y.o. male with PMH of Burkitt Lymphoma (Dx 10/20/23; s/p 7 cycles of HyperCVAD), malignant pleural effusions s/p CT (10/29/23), ADHD, and BL retinoschisis and retinal hemorrhages, s/p left vitrectomy who presents as a direct admission on 4/29/2024 for cycle 8 of HyperCVAD (HD-Methotrexate). S/p 7 cycles of HyperCVAD (10/28, 11/19, 12/11, 1/2/24, 2/10, 3/06, 4/1). PET scan (1/15/24): Deauville 2.    Chemotherapy initiated 4/30, tolerated well. Received IT cytarabine on 4/30. Methotrexate level at time of discharge 0.09.     Hospital course otherwise uncomplicated.     Anti-infectives upon discharge: acyclovir, bactrim, levofloxacin (D5-15), fluconazole x14 days   Dispo: home   Access: SOLO PICC   Anti-infectives:   [ ] Mal heme FUV with Rituxan 5/3/24  [ ] Dr. Klein FUV 5/22  [ ] Ophtho FUV 5/15  -Plan 3x weekly count checks

## 2024-05-01 ENCOUNTER — APPOINTMENT (OUTPATIENT)
Dept: HEMATOLOGY/ONCOLOGY | Facility: HOSPITAL | Age: 24
End: 2024-05-01
Payer: COMMERCIAL

## 2024-05-01 LAB
ALBUMIN SERPL BCP-MCNC: 3.8 G/DL (ref 3.4–5)
ALP SERPL-CCNC: 50 U/L (ref 33–120)
ALT SERPL W P-5'-P-CCNC: 15 U/L (ref 10–52)
ANION GAP SERPL CALC-SCNC: 13 MMOL/L (ref 10–20)
AST SERPL W P-5'-P-CCNC: 14 U/L (ref 9–39)
BASOPHILS # BLD AUTO: 0 X10*3/UL (ref 0–0.1)
BASOPHILS NFR BLD AUTO: 0 %
BILIRUB DIRECT SERPL-MCNC: 0.1 MG/DL (ref 0–0.3)
BILIRUB SERPL-MCNC: 0.7 MG/DL (ref 0–1.2)
BUN SERPL-MCNC: 7 MG/DL (ref 6–23)
CALCIUM SERPL-MCNC: 8.7 MG/DL (ref 8.6–10.6)
CHLORIDE SERPL-SCNC: 99 MMOL/L (ref 98–107)
CO2 SERPL-SCNC: 33 MMOL/L (ref 21–32)
CREAT SERPL-MCNC: 0.73 MG/DL (ref 0.5–1.3)
EGFRCR SERPLBLD CKD-EPI 2021: >90 ML/MIN/1.73M*2
EOSINOPHIL # BLD AUTO: 0 X10*3/UL (ref 0–0.7)
EOSINOPHIL NFR BLD AUTO: 0 %
ERYTHROCYTE [DISTWIDTH] IN BLOOD BY AUTOMATED COUNT: 20.2 % (ref 11.5–14.5)
GLUCOSE SERPL-MCNC: 102 MG/DL (ref 74–99)
HCT VFR BLD AUTO: 24.4 % (ref 41–52)
HGB BLD-MCNC: 8.4 G/DL (ref 13.5–17.5)
IMM GRANULOCYTES # BLD AUTO: 0.04 X10*3/UL (ref 0–0.7)
IMM GRANULOCYTES NFR BLD AUTO: 0.6 % (ref 0–0.9)
LDH SERPL L TO P-CCNC: 154 U/L (ref 84–246)
LYMPHOCYTES # BLD AUTO: 0.38 X10*3/UL (ref 1.2–4.8)
LYMPHOCYTES NFR BLD AUTO: 5.7 %
MAGNESIUM SERPL-MCNC: 1.57 MG/DL (ref 1.6–2.4)
MCH RBC QN AUTO: 31.1 PG (ref 26–34)
MCHC RBC AUTO-ENTMCNC: 34.4 G/DL (ref 32–36)
MCV RBC AUTO: 90 FL (ref 80–100)
MONOCYTES # BLD AUTO: 0.83 X10*3/UL (ref 0.1–1)
MONOCYTES NFR BLD AUTO: 12.4 %
MTX 24H P SERPL-SCNC: 8.2 UMOL/L
MTX SERPL-SCNC: 1.09 UMOL/L
NEUTROPHILS # BLD AUTO: 5.44 X10*3/UL (ref 1.2–7.7)
NEUTROPHILS NFR BLD AUTO: 81.3 %
NRBC BLD-RTO: 0 /100 WBCS (ref 0–0)
PATH REVIEW-CELL CT,CSF: NORMAL
PH, POC: 8.5
PLATELET # BLD AUTO: 99 X10*3/UL (ref 150–450)
POTASSIUM SERPL-SCNC: 3 MMOL/L (ref 3.5–5.3)
PROT SERPL-MCNC: 5.3 G/DL (ref 6.4–8.2)
RBC # BLD AUTO: 2.7 X10*6/UL (ref 4.5–5.9)
RBC MORPH BLD: NORMAL
SODIUM SERPL-SCNC: 142 MMOL/L (ref 136–145)
WBC # BLD AUTO: 6.7 X10*3/UL (ref 4.4–11.3)

## 2024-05-01 PROCEDURE — 83615 LACTATE (LD) (LDH) ENZYME: CPT | Performed by: PHYSICIAN ASSISTANT

## 2024-05-01 PROCEDURE — 80204 DRUG ASSAY METHOTREXATE: CPT | Performed by: INTERNAL MEDICINE

## 2024-05-01 PROCEDURE — 2500000004 HC RX 250 GENERAL PHARMACY W/ HCPCS (ALT 636 FOR OP/ED)

## 2024-05-01 PROCEDURE — 2500000006 HC RX 250 W HCPCS SELF ADMINISTERED DRUGS (ALT 637 FOR ALL PAYERS)

## 2024-05-01 PROCEDURE — 2500000004 HC RX 250 GENERAL PHARMACY W/ HCPCS (ALT 636 FOR OP/ED): Performed by: PHYSICIAN ASSISTANT

## 2024-05-01 PROCEDURE — 80204 DRUG ASSAY METHOTREXATE: CPT | Performed by: PHYSICIAN ASSISTANT

## 2024-05-01 PROCEDURE — 83735 ASSAY OF MAGNESIUM: CPT | Performed by: PHYSICIAN ASSISTANT

## 2024-05-01 PROCEDURE — 2500000004 HC RX 250 GENERAL PHARMACY W/ HCPCS (ALT 636 FOR OP/ED): Performed by: INTERNAL MEDICINE

## 2024-05-01 PROCEDURE — 84075 ASSAY ALKALINE PHOSPHATASE: CPT | Performed by: PHYSICIAN ASSISTANT

## 2024-05-01 PROCEDURE — 1170000001 HC PRIVATE ONCOLOGY ROOM DAILY

## 2024-05-01 PROCEDURE — 2500000005 HC RX 250 GENERAL PHARMACY W/O HCPCS: Performed by: PHYSICIAN ASSISTANT

## 2024-05-01 PROCEDURE — 99232 SBSQ HOSP IP/OBS MODERATE 35: CPT | Performed by: INTERNAL MEDICINE

## 2024-05-01 PROCEDURE — 2500000001 HC RX 250 WO HCPCS SELF ADMINISTERED DRUGS (ALT 637 FOR MEDICARE OP): Performed by: PHYSICIAN ASSISTANT

## 2024-05-01 PROCEDURE — 82248 BILIRUBIN DIRECT: CPT | Performed by: PHYSICIAN ASSISTANT

## 2024-05-01 PROCEDURE — 85025 COMPLETE CBC W/AUTO DIFF WBC: CPT | Performed by: PHYSICIAN ASSISTANT

## 2024-05-01 RX ORDER — POTASSIUM CHLORIDE 14.9 MG/ML
20 INJECTION INTRAVENOUS ONCE
Status: COMPLETED | OUTPATIENT
Start: 2024-05-01 | End: 2024-05-01

## 2024-05-01 RX ORDER — POTASSIUM CHLORIDE 20 MEQ/1
40 TABLET, EXTENDED RELEASE ORAL ONCE
Status: COMPLETED | OUTPATIENT
Start: 2024-05-01 | End: 2024-05-01

## 2024-05-01 RX ORDER — MAGNESIUM SULFATE HEPTAHYDRATE 40 MG/ML
2 INJECTION, SOLUTION INTRAVENOUS ONCE
Status: COMPLETED | OUTPATIENT
Start: 2024-05-01 | End: 2024-05-01

## 2024-05-01 RX ADMIN — DORZOLAMIDE HYDROCHLORIDE AND TIMOLOL MALEATE PRESERVATIVE FREE 1 DROP: 20; 5 SOLUTION/ DROPS OPHTHALMIC at 21:41

## 2024-05-01 RX ADMIN — LEUCOVORIN CALCIUM 50 MG: 100 INJECTION, POWDER, LYOPHILIZED, FOR SOLUTION INTRAMUSCULAR; INTRAVENOUS at 09:48

## 2024-05-01 RX ADMIN — CYTARABINE 2000 MG: 100 INJECTION, SOLUTION INTRATHECAL; INTRAVENOUS; SUBCUTANEOUS at 13:42

## 2024-05-01 RX ADMIN — SODIUM BICARBONATE 200 ML/HR: 84 INJECTION, SOLUTION INTRAVENOUS at 23:18

## 2024-05-01 RX ADMIN — PREDNISOLONE ACETATE 2 DROP: 10 SUSPENSION/ DROPS OPHTHALMIC at 16:31

## 2024-05-01 RX ADMIN — LEUCOVORIN CALCIUM 15 MG: 100 INJECTION, POWDER, LYOPHILIZED, FOR SOLUTION INTRAMUSCULAR; INTRAVENOUS at 17:25

## 2024-05-01 RX ADMIN — PREDNISOLONE ACETATE 2 DROP: 10 SUSPENSION/ DROPS OPHTHALMIC at 09:48

## 2024-05-01 RX ADMIN — SODIUM BICARBONATE 200 ML/HR: 84 INJECTION, SOLUTION INTRAVENOUS at 00:23

## 2024-05-01 RX ADMIN — FERROUS SULFATE TAB 325 MG (65 MG ELEMENTAL FE) 1 TABLET: 325 (65 FE) TAB at 09:41

## 2024-05-01 RX ADMIN — SODIUM BICARBONATE 200 ML/HR: 84 INJECTION, SOLUTION INTRAVENOUS at 06:49

## 2024-05-01 RX ADMIN — POTASSIUM CHLORIDE 20 MEQ: 14.9 INJECTION, SOLUTION INTRAVENOUS at 09:42

## 2024-05-01 RX ADMIN — DORZOLAMIDE HYDROCHLORIDE AND TIMOLOL MALEATE PRESERVATIVE FREE 1 DROP: 20; 5 SOLUTION/ DROPS OPHTHALMIC at 09:41

## 2024-05-01 RX ADMIN — PREDNISOLONE ACETATE 2 DROP: 10 SUSPENSION/ DROPS OPHTHALMIC at 23:17

## 2024-05-01 RX ADMIN — LEUCOVORIN CALCIUM 15 MG: 100 INJECTION, POWDER, LYOPHILIZED, FOR SOLUTION INTRAMUSCULAR; INTRAVENOUS at 23:18

## 2024-05-01 RX ADMIN — PREDNISOLONE ACETATE 2 DROP: 10 SUSPENSION/ DROPS OPHTHALMIC at 04:03

## 2024-05-01 RX ADMIN — MAGNESIUM SULFATE HEPTAHYDRATE 2 G: 40 INJECTION, SOLUTION INTRAVENOUS at 09:42

## 2024-05-01 RX ADMIN — KETOROLAC TROMETHAMINE 1 DROP: 5 SOLUTION OPHTHALMIC at 09:41

## 2024-05-01 RX ADMIN — DEXAMETHASONE SODIUM PHOSPHATE 12 MG: 10 INJECTION, SOLUTION INTRAMUSCULAR; INTRAVENOUS at 04:20

## 2024-05-01 RX ADMIN — POTASSIUM CHLORIDE 40 MEQ: 1500 TABLET, EXTENDED RELEASE ORAL at 06:53

## 2024-05-01 RX ADMIN — ACYCLOVIR 400 MG: 400 TABLET ORAL at 21:41

## 2024-05-01 RX ADMIN — FERROUS SULFATE TAB 325 MG (65 MG ELEMENTAL FE) 1 TABLET: 325 (65 FE) TAB at 21:42

## 2024-05-01 RX ADMIN — ACYCLOVIR 400 MG: 400 TABLET ORAL at 09:41

## 2024-05-01 RX ADMIN — SODIUM BICARBONATE 200 ML/HR: 84 INJECTION, SOLUTION INTRAVENOUS at 12:38

## 2024-05-01 RX ADMIN — SODIUM BICARBONATE 200 ML/HR: 84 INJECTION, SOLUTION INTRAVENOUS at 18:13

## 2024-05-01 RX ADMIN — CYTARABINE 2000 MG: 100 INJECTION, SOLUTION INTRATHECAL; INTRAVENOUS; SUBCUTANEOUS at 05:24

## 2024-05-01 RX ADMIN — ONDANSETRON 16 MG: 2 INJECTION INTRAMUSCULAR; INTRAVENOUS at 04:02

## 2024-05-01 ASSESSMENT — PAIN SCALES - GENERAL
PAINLEVEL_OUTOF10: 0 - NO PAIN

## 2024-05-01 ASSESSMENT — COGNITIVE AND FUNCTIONAL STATUS - GENERAL
DAILY ACTIVITIY SCORE: 24
MOBILITY SCORE: 24

## 2024-05-01 ASSESSMENT — PAIN - FUNCTIONAL ASSESSMENT
PAIN_FUNCTIONAL_ASSESSMENT: 0-10

## 2024-05-01 NOTE — PROGRESS NOTES
Shilo Thakkar is a 23 y.o. male on day 1 of admission presenting with Burkitt lymphoma (Multi).    Subjective   Shilo Thakkar is a 23 y.o. male presenting with history of Burkitt's lymphoma who is admitted today for cycle 8 of HyperCVAD (HD-Methotrexate).     Patient doing well. Eating and drinking well. No complaints. R eye with slt visual improvement. Denies recent fever, chills, dizziness, headache, stomatitis, sore throat, cough, nasal congestion, CP, SOB, nausea, vomiting, diarrhea, constipation, abdominal pain, urinary issues, bleeding issues, or neuropathy issues.         Objective     Vitals:    04/30/24 2246 05/01/24 0317 05/01/24 0654 05/01/24 0932   BP: 128/74 138/83 151/84 136/76   BP Location:   Left arm Left arm   Patient Position: Lying  Lying Lying   Pulse: (!) 48 54 51 54   Resp: 18 16 16 16   Temp: 36.4 °C (97.5 °F) 36.5 °C (97.7 °F) 36.2 °C (97.2 °F) 36.7 °C (98.1 °F)   TempSrc: Temporal Temporal Temporal    SpO2: 98% 97% 98% 98%   Weight:       Height:          Physical Exam    Constitutional:       Appearance: Normal appearance. He is not ill-appearing.   HENT:      Head: Normocephalic and atraumatic.      Mouth/Throat:      Mouth: Mucous membranes are moist.   Eyes:      Extraocular Movements: Extraocular movements intact.      Pupils: Pupils are equal, round, and reactive to light.   Cardiovascular:      Rate and Rhythm: Asympt bradycardia and regular rhythm.      Heart sounds: Normal heart sounds.   Pulmonary:      Effort: Pulmonary effort is normal.      Breath sounds: Normal breath sounds. No wheezing, rhonchi or rales.   Abdominal:      General: Abdomen is flat. Bowel sounds are normal.      Palpations: Abdomen is soft.      Tenderness: There is no abdominal tenderness. There is no guarding.   Musculoskeletal:         General: No swelling. Normal range of motion.      Cervical back: Normal range of motion and neck supple.   Skin:     General: Skin is warm and dry.      Findings: No rash.   [facility] PICC line c/d/i  Neurological:      General: No focal deficit present.      Mental Status: He is alert and oriented to person, place, and time.      Cranial Nerves: No cranial nerve deficit.   Psychiatric:         Mood and Affect: Mood normal.         Behavior: Behavior normal.     Scheduled medications  acyclovir, 400 mg, oral, BID  cytarabine, 1,000 mg/m2 (Treatment Plan Recorded), intravenous, q12h  dorzolamide-timolol (PF), 1 drop, Right Eye, BID  ferrous sulfate (325 mg ferrous sulfate), 65 mg of iron, oral, BID  ketorolac, 1 drop, Both Eyes, Daily  leucovorin, 15 mg, intravenous, q6h  magnesium sulfate, 2 g, intravenous, Once  [Held by provider] pantoprazole, 40 mg, oral, Daily before breakfast  prednisoLONE acetate, 2 drop, Both Eyes, q6h    Continuous medications  sodium bicarbonate 150 mEq in dextrose 5 % in water (D5W) 1,000 mL infusion, 200 mL/hr, Last Rate: 200 mL/hr (05/01/24 1024)    PRN medications  PRN medications: albuterol, alteplase, dextrose, diphenhydrAMINE, EPINEPHrine, famotidine, methylPREDNISolone sodium succinate (PF), ondansetron **OR** ondansetron, prochlorperazine, prochlorperazine, sodium chloride    Results from last 7 days   Lab Units 05/01/24  0358 04/30/24  0436 04/29/24  1446 04/26/24  1243   WBC AUTO x10*3/uL 6.7 7.8 4.8 5.7   HEMOGLOBIN g/dL 8.4* 9.6* 9.2* 9.8*   HEMATOCRIT % 24.4* 28.0* 28.4* 28.8*   PLATELETS AUTO x10*3/uL 99* 112* 116* 111*   NEUTROS PCT AUTO % 81.3  --   --  76.5   LYMPHO PCT MAN %  --  3.5 11.9  --    LYMPHS PCT AUTO % 5.7  --   --  9.9   MONO PCT MAN %  --  0.8 9.3  --    MONOS PCT AUTO % 12.4  --   --  12.7   EOSINO PCT MAN %  --  0.0 0.0  --    EOS PCT AUTO % 0.0  --   --  0.0     Results from last 7 days   Lab Units 05/01/24  0358 04/30/24  0436 04/29/24  1446   SODIUM mmol/L 142 142 143   POTASSIUM mmol/L 3.0* 3.9 3.5   CHLORIDE mmol/L 99 102 105   CO2 mmol/L 33* 32 28   BUN mg/dL 7 11 14   CREATININE mg/dL 0.73 0.85 0.98   CALCIUM mg/dL 8.7 8.9  9.0   PROTEIN TOTAL g/dL 5.3* 5.5* 6.0*   BILIRUBIN TOTAL mg/dL 0.7 0.5 0.4   ALK PHOS U/L 50 64 66   ALT U/L 15 16 15   AST U/L 14 14 13   GLUCOSE mg/dL 102* 110* 102*     Results from last 7 days   Lab Units 05/01/24  0358 04/30/24  0436 04/29/24  1446   MAGNESIUM mg/dL 1.57* 1.71 1.71     Methotrexate 24 hr (5/2) =     A/P :    Shilo Thakkar is a 23 y.o. male with PMH of Burkitt Lymphoma (Dx 10/20/23; s/p 7 cycles of HyperCVAD), malignant pleural effusions s/p CT (10/29/23), ADHD, and BL retinoschisis and retinal hemorrhages, s/p left vitrectomy who presents as a direct admission on 4/29/2024 for cycle 8 of HyperCVAD (HD-Methotrexate).      -Hydration and anti-emetics per chemotherapy order set   - Begin Methotrexate when urine pH > 7 & UO >= 150 ml/hr x 4 hrs  - Pre treatment CXR (4/29) - No evidence of acute cardiopulmonary process. No pleural effusion.     Day 3, Cycle 8 of HyperCVAD  - Methotrexate 200 mg/m2 IV over 2 hours on Day 1 followed by  - Methotrexate 800 mg/m2 IV continuous infusion over 22 hours on Day 1 - completed 5/1 at 0400 hrs  - Leucovorin 50 mg IV over 15 minutes on Day 2, administered 12 hours after completion of 22-hour     Methotrexate infusion followed by Leucovorin 15 mg IV over 15 minutes or PO every 6 hours for at       least 8 doses (until methotrexate concentration is LESS THAN 0.05 micromol/L)  - Cytarabine 1,000-3,000 mg/m2 IV over 3 hrs every 12 hours x 2 doses on D2 (reduced from usu 4 doses    given the profound thrombocytopenia that he developed with the last cycle in an attempt to mitigate risk due     to his intraocular hemorrhage history).   - Cytarabine 100 mg intrathecal on Day 2 (given on 4/30/24).  CSF flow cytometry (4/30) - p  - RiTUXimab 375 mg/m2 IV or 1,400 mg SUBQ on Day 5 +/- 2 days    24 hr Methotrexate level erroneously drawn at end of methotrexate infusion on 5/1 at 0400 hrs - disregard.    Methotrexate 24 hr level (5/2 at 0400) =        ONC  #Burkitt lymphoma,  high risk, MYC positive, newly diagnosed (10/20/23)  - 10/19/23 presented to H ED with dysphagia and abdominal pain  - CT A/P with IV contrast (10/19) mass in the body of the pancreas measuring approximately 2.7 x 2.9 cm. Multiple masses in the mesentery as well as omental caking c/q carcinomatosis.   - CT soft tissue neck w/ IV contrast (10/19): homogeneous soft tissue attenuation lesion in the L oropharynx which measures approximately 3.0 x 4.0 x 5.1 cm.   - CT Abdomen w/o IV contrast (10/20): Mesenteric lymphadenopathy  - US guided mesenteric LN biopsy (10/20/23): HIGH GRADE B-CELL LYMPHOMA MORPHOLOGICALLY C/W BURKITT LYMPHOMA; flow cytometry: CD10+ kappa restricted B cells supportive of a B cell lymphoma with germinal center origin.   - Biopsy L oropharyngeal mass and L cervical LN by ENT (10/23/23) high grade B-cell burkitt lymphoma  - PET CT (10/30) showed FDG avid left oropharyngeal mass and LAD above and below diaphragm  - BMBx (11/1) without evidence of disease  - s/p 7 cycles of HyperCVAD (10/28, 11/19, 12/11, 1/2/24, 2/10, 3/06, 4/1)  - PET scan (1/15/24): Deauville 2. Complete resolution of metabolic activity of previously seen Lt oropharyngeal mass, resolution of previously seen hypermetabolic Lt level 2 and 3 cervical lymphadenopathy, resolution of previously seen moderate-large size Lt pleural effusion and trace Rt pleural effusion and abdominopelvic ascites, resolution of previously seen hypermetabolic large mass within pancreatic tail/body and the previously seen hypermetabolic retroperitoneal and mesenteric lymphadenopathy.  - Cycle 5 initially delayed a little over a week due to recent hospitalization from 1/17-1/23/24 for visual changes, retinoschisis, retinal hemorrhages, and profound pancytopenia requiring left eye vitrectomy  - Plan total of 8 cycles of therapy   - Admitted (4/29) for cycle 8 of HyperCVAD       OPTHO  -Recent hx/o bilateral macula-involving sub ILM hemorrhage with serous  detachment OS>OD  -Multilayer retinal hemorrhage, left eye.   -S/p pars plana vitrectomy (PPV)/C3F8/intraretinal tPA OS  -S/P PPV/MP/EL/air O  -Last seen by Ophthalm in outpatient clinic on 2/19. Per recs, Taper Prednisolone 3x/day OU x 2 weeks then BID x 2 weeks, continue Cosopt BID OD, Taper Ketorolac TID OU x 2 weeks, BID x 2 weeks.  -Okay to proceed with chemotherapy.   - Prefer to keep Plts 50-70,000 and recommend avoiding PLt levels below 30K  - Pt reports baseline vision w slt improvement  - May need repeat surgery in the Lt eye (after this cycle or the next) if the vitreous hemorrhage persists or retina detaches  - Per Dr. Abbi Canales (Ophthalomology) on 4/24/24, no further tapering of any drops until he is seen in Ophthalmology clinic - next clinic on 5/15/24.  Cont Cosopt one gtt BID right eye & Ketorolac one gtt both eyes daily     HEME  #Anemia without active bleeding  - Continue home ferrous sulfate 325mg BID started by Dr. Klein  - Transfuse to keep Hgb>7.5, Plt > 50,000 and avoid dropping below 30,000 per ophtho requirements with hx/o retinal hemorrhages     CARDIAC  - ECHO (2/10/24) LVEF 60-65%     ID  Allergies: NKDA  Afebrile on admit  PPX: Acyclovir 400mg BID, Bactrim MWF  - Please address if needs to be on fluconazole for antifungal ppx, currently not in his home meds list  - Plan Levaquin for Day 5-15 of each cycle for antibacterial prophylaxis       FEN/GI  - Admit weight:  78.9 kg, current wt :  81.2 kg (4/30)   - Monitor and replace electrolytes as needed  - Cont Vit B complex  -ppx:  Pantoprazole 40mg every day - hold on admission for MTX        ENDO  -CTA neck (1/17) with 6 mm nodule in the left lobe of the thyroid gland   -TSH level (1/20): 1.35       DISPO  - Full Code  - Access: KIM Najera PICC line  - Primary Onc: Dr. Klein  - NOK: Ms Deidra Pickett [phone]  - C8 D5 Rituxan/Neulasta on 5/3  - Plan 3x weekly count checks/INF visits (5/6, 5/8, 5/10, 5/13, 5/15, 5/17, 5/20).        Keep plt ct > 50,000 & hgb > 7.5 due to the retinal hemorrhages   - Scheduled for count check/Neulasta/Mal heme clinic at [facility] (5/3, 5/10, 5/15)  - PET-CT 5/8  - Ophthal f/up 5/15  - Has follow up with Dr. Klein on 5/22     Patient examined & discussed with JOSELYN MorochoC

## 2024-05-02 VITALS
WEIGHT: 178.13 LBS | RESPIRATION RATE: 18 BRPM | HEART RATE: 59 BPM | HEIGHT: 72 IN | OXYGEN SATURATION: 97 % | TEMPERATURE: 98.4 F | DIASTOLIC BLOOD PRESSURE: 74 MMHG | SYSTOLIC BLOOD PRESSURE: 150 MMHG | BODY MASS INDEX: 24.13 KG/M2

## 2024-05-02 LAB
ALBUMIN SERPL BCP-MCNC: 3.7 G/DL (ref 3.4–5)
ALP SERPL-CCNC: 47 U/L (ref 33–120)
ALT SERPL W P-5'-P-CCNC: 33 U/L (ref 10–52)
ANION GAP SERPL CALC-SCNC: 13 MMOL/L (ref 10–20)
AST SERPL W P-5'-P-CCNC: 26 U/L (ref 9–39)
BASOPHILS # BLD AUTO: 0 X10*3/UL (ref 0–0.1)
BASOPHILS NFR BLD AUTO: 0 %
BILIRUB SERPL-MCNC: 0.7 MG/DL (ref 0–1.2)
BUN SERPL-MCNC: 9 MG/DL (ref 6–23)
CALCIUM SERPL-MCNC: 8.5 MG/DL (ref 8.6–10.6)
CELL POPULATIONS: NORMAL
CHLORIDE SERPL-SCNC: 100 MMOL/L (ref 98–107)
CO2 SERPL-SCNC: 32 MMOL/L (ref 21–32)
CREAT SERPL-MCNC: 0.79 MG/DL (ref 0.5–1.3)
DIAGNOSIS: NORMAL
EGFRCR SERPLBLD CKD-EPI 2021: >90 ML/MIN/1.73M*2
EOSINOPHIL # BLD AUTO: 0 X10*3/UL (ref 0–0.7)
EOSINOPHIL NFR BLD AUTO: 0 %
ERYTHROCYTE [DISTWIDTH] IN BLOOD BY AUTOMATED COUNT: 19.9 % (ref 11.5–14.5)
FLOW DIFFERENTIAL: NORMAL
FLOW TEST ORDERED: NORMAL
GLUCOSE SERPL-MCNC: 107 MG/DL (ref 74–99)
HCT VFR BLD AUTO: 25.1 % (ref 41–52)
HGB BLD-MCNC: 8.6 G/DL (ref 13.5–17.5)
IMM GRANULOCYTES # BLD AUTO: 0.01 X10*3/UL (ref 0–0.7)
IMM GRANULOCYTES NFR BLD AUTO: 0.2 % (ref 0–0.9)
LAB TEST METHOD: NORMAL
LDH SERPL L TO P-CCNC: 190 U/L (ref 84–246)
LYMPHOCYTES # BLD AUTO: 0.14 X10*3/UL (ref 1.2–4.8)
LYMPHOCYTES NFR BLD AUTO: 2.6 %
MAGNESIUM SERPL-MCNC: 1.85 MG/DL (ref 1.6–2.4)
MCH RBC QN AUTO: 30.8 PG (ref 26–34)
MCHC RBC AUTO-ENTMCNC: 34.3 G/DL (ref 32–36)
MCV RBC AUTO: 90 FL (ref 80–100)
MONOCYTES # BLD AUTO: 0.43 X10*3/UL (ref 0.1–1)
MONOCYTES NFR BLD AUTO: 8.1 %
MTX SERPL-SCNC: 0.09 UMOL/L
MTX SERPL-SCNC: 0.16 UMOL/L
NEUTROPHILS # BLD AUTO: 4.74 X10*3/UL (ref 1.2–7.7)
NEUTROPHILS NFR BLD AUTO: 89.1 %
NRBC BLD-RTO: 0 /100 WBCS (ref 0–0)
NUMBER OF CELLS COLLECTED: NORMAL PER TUBE
PATH REPORT.TOTAL CANCER: NORMAL
PH, POC: 8.5
PH, POC: 8.5
PLATELET # BLD AUTO: 97 X10*3/UL (ref 150–450)
POTASSIUM SERPL-SCNC: 3.3 MMOL/L (ref 3.5–5.3)
PROT SERPL-MCNC: 5.3 G/DL (ref 6.4–8.2)
RBC # BLD AUTO: 2.79 X10*6/UL (ref 4.5–5.9)
SIGNATURE COMMENT: NORMAL
SODIUM SERPL-SCNC: 142 MMOL/L (ref 136–145)
SPECIMEN VIABILITY: NORMAL
WBC # BLD AUTO: 5.3 X10*3/UL (ref 4.4–11.3)

## 2024-05-02 PROCEDURE — 83615 LACTATE (LD) (LDH) ENZYME: CPT | Performed by: PHYSICIAN ASSISTANT

## 2024-05-02 PROCEDURE — 85025 COMPLETE CBC W/AUTO DIFF WBC: CPT | Performed by: PHYSICIAN ASSISTANT

## 2024-05-02 PROCEDURE — 80204 DRUG ASSAY METHOTREXATE: CPT | Performed by: INTERNAL MEDICINE

## 2024-05-02 PROCEDURE — 2500000001 HC RX 250 WO HCPCS SELF ADMINISTERED DRUGS (ALT 637 FOR MEDICARE OP): Performed by: PHYSICIAN ASSISTANT

## 2024-05-02 PROCEDURE — 2500000004 HC RX 250 GENERAL PHARMACY W/ HCPCS (ALT 636 FOR OP/ED): Performed by: PHYSICIAN ASSISTANT

## 2024-05-02 PROCEDURE — 84075 ASSAY ALKALINE PHOSPHATASE: CPT | Performed by: PHYSICIAN ASSISTANT

## 2024-05-02 PROCEDURE — 99238 HOSP IP/OBS DSCHRG MGMT 30/<: CPT | Performed by: INTERNAL MEDICINE

## 2024-05-02 PROCEDURE — 2500000004 HC RX 250 GENERAL PHARMACY W/ HCPCS (ALT 636 FOR OP/ED): Performed by: INTERNAL MEDICINE

## 2024-05-02 PROCEDURE — 2500000004 HC RX 250 GENERAL PHARMACY W/ HCPCS (ALT 636 FOR OP/ED)

## 2024-05-02 PROCEDURE — 83735 ASSAY OF MAGNESIUM: CPT | Performed by: PHYSICIAN ASSISTANT

## 2024-05-02 PROCEDURE — 80204 DRUG ASSAY METHOTREXATE: CPT | Performed by: HOME HEALTH AIDE

## 2024-05-02 PROCEDURE — 81002 URINALYSIS NONAUTO W/O SCOPE: CPT | Performed by: PHYSICIAN ASSISTANT

## 2024-05-02 RX ORDER — MAGNESIUM SULFATE HEPTAHYDRATE 40 MG/ML
4 INJECTION, SOLUTION INTRAVENOUS ONCE
Status: DISCONTINUED | OUTPATIENT
Start: 2024-05-02 | End: 2024-05-02

## 2024-05-02 RX ORDER — FERROUS SULFATE 325(65) MG
65 TABLET ORAL 2 TIMES DAILY
Qty: 60 TABLET | Refills: 11 | Status: SHIPPED | OUTPATIENT
Start: 2024-05-02 | End: 2025-05-02

## 2024-05-02 RX ORDER — LEVOFLOXACIN 500 MG/1
500 TABLET, FILM COATED ORAL DAILY
Qty: 10 TABLET | Refills: 0 | Status: SHIPPED | OUTPATIENT
Start: 2024-05-02 | End: 2024-05-12

## 2024-05-02 RX ORDER — POTASSIUM CHLORIDE 29.8 MG/ML
40 INJECTION INTRAVENOUS ONCE
Status: COMPLETED | OUTPATIENT
Start: 2024-05-02 | End: 2024-05-02

## 2024-05-02 RX ORDER — KETOROLAC TROMETHAMINE 5 MG/ML
1 SOLUTION OPHTHALMIC DAILY
Start: 2024-05-02

## 2024-05-02 RX ORDER — FLUCONAZOLE 200 MG/1
400 TABLET ORAL DAILY
Qty: 28 TABLET | Refills: 0 | Status: SHIPPED | OUTPATIENT
Start: 2024-05-02 | End: 2024-05-16

## 2024-05-02 RX ORDER — POTASSIUM CHLORIDE 20 MEQ/1
20 TABLET, EXTENDED RELEASE ORAL ONCE
Status: DISCONTINUED | OUTPATIENT
Start: 2024-05-02 | End: 2024-05-02

## 2024-05-02 RX ORDER — LEVOFLOXACIN 500 MG/1
500 TABLET, FILM COATED ORAL DAILY
Qty: 10 TABLET | Refills: 0 | Status: SHIPPED | OUTPATIENT
Start: 2024-05-02 | End: 2024-05-02

## 2024-05-02 RX ORDER — LEUCOVORIN CALCIUM 25 MG/1
25 TABLET ORAL EVERY 6 HOURS
Qty: 20 TABLET | Refills: 0 | Status: SHIPPED | OUTPATIENT
Start: 2024-05-02 | End: 2024-05-07

## 2024-05-02 RX ORDER — FLUCONAZOLE 200 MG/1
400 TABLET ORAL DAILY
Qty: 28 TABLET | Refills: 0 | Status: SHIPPED | OUTPATIENT
Start: 2024-05-02 | End: 2024-05-02

## 2024-05-02 RX ADMIN — ACYCLOVIR 400 MG: 400 TABLET ORAL at 09:12

## 2024-05-02 RX ADMIN — LEUCOVORIN CALCIUM 15 MG: 100 INJECTION, POWDER, LYOPHILIZED, FOR SOLUTION INTRAMUSCULAR; INTRAVENOUS at 10:15

## 2024-05-02 RX ADMIN — SODIUM BICARBONATE 200 ML/HR: 84 INJECTION, SOLUTION INTRAVENOUS at 06:07

## 2024-05-02 RX ADMIN — LEUCOVORIN CALCIUM 15 MG: 100 INJECTION, POWDER, LYOPHILIZED, FOR SOLUTION INTRAMUSCULAR; INTRAVENOUS at 15:52

## 2024-05-02 RX ADMIN — DORZOLAMIDE HYDROCHLORIDE AND TIMOLOL MALEATE PRESERVATIVE FREE 1 DROP: 20; 5 SOLUTION/ DROPS OPHTHALMIC at 09:13

## 2024-05-02 RX ADMIN — LEUCOVORIN CALCIUM 15 MG: 100 INJECTION, POWDER, LYOPHILIZED, FOR SOLUTION INTRAMUSCULAR; INTRAVENOUS at 04:16

## 2024-05-02 RX ADMIN — POTASSIUM CHLORIDE 40 MEQ: 400 INJECTION, SOLUTION INTRAVENOUS at 06:06

## 2024-05-02 RX ADMIN — FERROUS SULFATE TAB 325 MG (65 MG ELEMENTAL FE) 1 TABLET: 325 (65 FE) TAB at 09:12

## 2024-05-02 RX ADMIN — KETOROLAC TROMETHAMINE 1 DROP: 5 SOLUTION OPHTHALMIC at 09:12

## 2024-05-02 RX ADMIN — SODIUM BICARBONATE 200 ML/HR: 84 INJECTION, SOLUTION INTRAVENOUS at 12:06

## 2024-05-02 RX ADMIN — PREDNISOLONE ACETATE 2 DROP: 10 SUSPENSION/ DROPS OPHTHALMIC at 15:52

## 2024-05-02 RX ADMIN — PREDNISOLONE ACETATE 2 DROP: 10 SUSPENSION/ DROPS OPHTHALMIC at 09:13

## 2024-05-02 RX ADMIN — PREDNISOLONE ACETATE 2 DROP: 10 SUSPENSION/ DROPS OPHTHALMIC at 04:16

## 2024-05-02 ASSESSMENT — COGNITIVE AND FUNCTIONAL STATUS - GENERAL
DAILY ACTIVITIY SCORE: 24
MOBILITY SCORE: 24

## 2024-05-02 ASSESSMENT — PAIN - FUNCTIONAL ASSESSMENT: PAIN_FUNCTIONAL_ASSESSMENT: 0-10

## 2024-05-02 ASSESSMENT — PAIN SCALES - GENERAL: PAINLEVEL_OUTOF10: 0 - NO PAIN

## 2024-05-02 NOTE — NURSING NOTE
Patient discharged safely to home via private vehicle. Discharge paperwork reviewed by RN. Patient with no questions or concerns at this time.

## 2024-05-02 NOTE — DISCHARGE SUMMARY
Discharge Diagnosis  Burkitt lymphoma (Multi)    Issues Requiring Follow-Up  Rituxan infusion on 5/3    Test Results Pending At Discharge  Pending Labs       Order Current Status    POCT pH, urine, qualitative, dipstick manually resulted In process    POCT pH, urine, qualitative, dipstick manually resulted In process    POCT pH, urine, qualitative, dipstick manually resulted In process    POCT pH, urine, qualitative, dipstick manually resulted In process            Hospital Course  Shilo Thakkar is a 23 y.o. male with PMH of Burkitt Lymphoma (Dx 10/20/23; s/p 7 cycles of HyperCVAD), malignant pleural effusions s/p CT (10/29/23), ADHD, and BL retinoschisis and retinal hemorrhages, s/p left vitrectomy who presents as a direct admission on 4/29/2024 for cycle 8 of HyperCVAD (HD-Methotrexate). S/p 7 cycles of HyperCVAD (10/28, 11/19, 12/11, 1/2/24, 2/10, 3/06, 4/1). PET scan (1/15/24): Deauville 2.    Chemotherapy initiated 4/30, tolerated well. Received IT cytarabine on 4/30. Methotrexate level at time of discharge 0.09.     Hospital course otherwise uncomplicated.     Anti-infectives upon discharge: acyclovir, bactrim, levofloxacin (D5-15), fluconazole x14 days   Dispo: home   Access: SOLO PICC   Anti-infectives:   [ ] Mal heme FUV with Rituxan 5/3/24  [ ] Dr. Klein FUV 5/22  [ ] Ophtho FUV 5/15  -Plan 3x weekly count checks      Pertinent Physical Exam At Time of Discharge  Constitutional:       Appearance: Normal appearance. He is not ill-appearing. Sitting upright in bed   HENT:      Head: Normocephalic and atraumatic.      Mouth/Throat:      Mouth: Mucous membranes are moist.   Eyes:      Extraocular Movements: Extraocular movements intact.      Pupils: Pupils are equal, round, and reactive to light.   Cardiovascular:      Rate and Rhythm: Asympt bradycardia and regular rhythm.      Heart sounds: Normal heart sounds. No MRG  Pulmonary:      Effort: Pulmonary effort is normal.      Breath sounds: Normal breath  sounds. No wheezing, rhonchi or rales.   Abdominal:      General: Abdomen is flat. Bowel sounds are normal.      Palpations: Abdomen is soft.      Tenderness: There is no abdominal tenderness. There is no guarding.   Musculoskeletal:         General: No swelling. Normal range of motion.      Cervical back: Normal range of motion and neck supple.   Skin:     General: Skin is warm and dry.      Findings: No rash.  RUE Dbl PICC line c/d/i  Neurological:      General: No focal deficit present.      Mental Status: He is alert and oriented to person, place, and time.      Cranial Nerves: No cranial nerve deficit.   Psychiatric:         Mood and Affect: Mood normal.         Behavior: Behavior normal. Fluent speech, cooperative    Home Medications     Medication List      START taking these medications     ferrous sulfate (325 mg ferrous sulfate) tablet; Take 1 tablet by mouth   2 times a day.   fluconazole 200 mg tablet; Commonly known as: Diflucan; Take 2 tablets   (400 mg) by mouth once daily for 14 days.   leucovorin 25 mg tablet; Commonly known as: Wellcovorin; Take 1 tablet   (25 mg total) by mouth every 6 hours for 5 days.  Take with a full glass   of water.   levoFLOXacin 500 mg tablet; Commonly known as: Levaquin; Take 1 tablet   (500 mg) by mouth once daily for 10 days. Please take on days 5-15 of each   chemotherapy cycle     CHANGE how you take these medications     ketorolac 0.5 % ophthalmic solution; Commonly known as: Acular;   Administer 1 drop into both eyes once daily. Use one drop 4 times a day in   the right eye; What changed: how much to take, how to take this, when to   take this     CONTINUE taking these medications     acyclovir 400 mg tablet; Commonly known as: Zovirax; Take 1 tablet (400   mg) by mouth 2 times a day.   B complex-vitamin C tablet; Take 1 tablet by mouth once daily.   dorzolamide-timoloL 22.3-6.8 mg/mL ophthalmic solution; Commonly known   as: Cosopt; Administer 1 drop into the right  eye 2 times a day.   lubricating eye drops ophthalmic solution; Administer 1 drop into the   left eye 4 times a day.   pantoprazole 40 mg EC tablet; Commonly known as: ProtoNix; Take 1 tablet   (40 mg) by mouth once daily in the morning. Take before meals. Do not   crush, chew, or split.   sulfamethoxazole-trimethoprim 800-160 mg tablet; Commonly known as:   Bactrim DS; Take 1 tablet by mouth once a day on Monday, Wednesday, and   Friday.       Outpatient Follow-Up  Future Appointments   Date Time Provider Department Center   5/3/2024  1:00 PM INF 29 [facility]   5/3/2024  1:00 PM [facility]   5/6/2024 11:00 AM INF 23 [facility]   5/8/2024 10:45 AM [facility] ADMIN ROOM PET CT 2 [facility]   5/8/2024 11:45 AM [facility] PET CT 2 [facility]   5/8/2024  1:00 PM INF 15 [facility]   5/10/2024  9:30 AM INF 05 [facility]   5/10/2024  9:30 AM [facility]   5/13/2024  1:30 PM INF 20 [facility]   5/15/2024  9:45 AM INF 19 [facility]   5/15/2024 10:00 AM [facility]   5/15/2024  3:45 PM Abbi Canales MD PhD [id number] [facility]   5/17/2024 10:45 AM INF 36 [facility]   5/20/2024 10:30 AM INF 22 [facility]   5/22/2024 12:20 PM [facility] CENTRAL LINE 02 [id number] [facility]   5/22/2024  1:00 PM Olegario Klein MD [id number] [facility]   5/22/2024  2:30 PM INF 36 [facility]   5/24/2024  1:00 PM INF 20 [facility]   9/11/2024  2:45 PM Lisa Guardado [facility] [id number] [employer]       Silva Ortega PA-C

## 2024-05-03 ENCOUNTER — INFUSION (OUTPATIENT)
Dept: HEMATOLOGY/ONCOLOGY | Facility: HOSPITAL | Age: 24
End: 2024-05-03
Payer: COMMERCIAL

## 2024-05-03 ENCOUNTER — OFFICE VISIT (OUTPATIENT)
Dept: HEMATOLOGY/ONCOLOGY | Facility: HOSPITAL | Age: 24
DRG: 847 | End: 2024-05-03
Payer: COMMERCIAL

## 2024-05-03 VITALS
HEART RATE: 88 BPM | BODY MASS INDEX: 24.81 KG/M2 | WEIGHT: 181 LBS | OXYGEN SATURATION: 100 % | RESPIRATION RATE: 18 BRPM | TEMPERATURE: 98.1 F | SYSTOLIC BLOOD PRESSURE: 148 MMHG | DIASTOLIC BLOOD PRESSURE: 73 MMHG

## 2024-05-03 DIAGNOSIS — C83.79 BURKITT LYMPHOMA OF SOLID ORGAN EXCLUDING SPLEEN (MULTI): ICD-10-CM

## 2024-05-03 DIAGNOSIS — C83.70 BURKITT LYMPHOMA, UNSPECIFIED BODY REGION (MULTI): Primary | ICD-10-CM

## 2024-05-03 DIAGNOSIS — E87.6 HYPOKALEMIA: ICD-10-CM

## 2024-05-03 DIAGNOSIS — C83.70 BURKITT LYMPHOMA, UNSPECIFIED BODY REGION (MULTI): ICD-10-CM

## 2024-05-03 DIAGNOSIS — Z79.899 IMMUNOSUPPRESSED DUE TO CHEMOTHERAPY (MULTI): ICD-10-CM

## 2024-05-03 DIAGNOSIS — T45.1X5A IMMUNOSUPPRESSED DUE TO CHEMOTHERAPY (MULTI): ICD-10-CM

## 2024-05-03 DIAGNOSIS — H33.103 BILATERAL RETINOSCHISIS: ICD-10-CM

## 2024-05-03 DIAGNOSIS — D84.821 IMMUNOSUPPRESSED DUE TO CHEMOTHERAPY (MULTI): ICD-10-CM

## 2024-05-03 DIAGNOSIS — C83.78 BURKITT LYMPHOMA OF LYMPH NODES OF MULTIPLE REGIONS (MULTI): ICD-10-CM

## 2024-05-03 LAB
ALBUMIN SERPL BCP-MCNC: 4.3 G/DL (ref 3.4–5)
ALP SERPL-CCNC: 56 U/L (ref 33–120)
ALT SERPL W P-5'-P-CCNC: 32 U/L (ref 10–52)
ANION GAP SERPL CALC-SCNC: 12 MMOL/L (ref 10–20)
AST SERPL W P-5'-P-CCNC: 17 U/L (ref 9–39)
BASOPHILS # BLD AUTO: 0.01 X10*3/UL (ref 0–0.1)
BASOPHILS NFR BLD AUTO: 0.3 %
BILIRUB SERPL-MCNC: 1 MG/DL (ref 0–1.2)
BUN SERPL-MCNC: 14 MG/DL (ref 6–23)
CALCIUM SERPL-MCNC: 8.8 MG/DL (ref 8.6–10.3)
CHLORIDE SERPL-SCNC: 102 MMOL/L (ref 98–107)
CO2 SERPL-SCNC: 30 MMOL/L (ref 21–32)
CREAT SERPL-MCNC: 0.84 MG/DL (ref 0.5–1.3)
EGFRCR SERPLBLD CKD-EPI 2021: >90 ML/MIN/1.73M*2
EOSINOPHIL # BLD AUTO: 0.01 X10*3/UL (ref 0–0.7)
EOSINOPHIL NFR BLD AUTO: 0.3 %
ERYTHROCYTE [DISTWIDTH] IN BLOOD BY AUTOMATED COUNT: 19.3 % (ref 11.5–14.5)
GLUCOSE SERPL-MCNC: 119 MG/DL (ref 74–99)
HCT VFR BLD AUTO: 27.2 % (ref 41–52)
HGB BLD-MCNC: 9.4 G/DL (ref 13.5–17.5)
IMM GRANULOCYTES # BLD AUTO: 0.02 X10*3/UL (ref 0–0.7)
IMM GRANULOCYTES NFR BLD AUTO: 0.6 % (ref 0–0.9)
LDH SERPL L TO P-CCNC: 165 U/L (ref 84–246)
LYMPHOCYTES # BLD AUTO: 0.16 X10*3/UL (ref 1.2–4.8)
LYMPHOCYTES NFR BLD AUTO: 5 %
MAGNESIUM SERPL-MCNC: 1.8 MG/DL (ref 1.6–2.4)
MCH RBC QN AUTO: 31.8 PG (ref 26–34)
MCHC RBC AUTO-ENTMCNC: 34.6 G/DL (ref 32–36)
MCV RBC AUTO: 92 FL (ref 80–100)
MONOCYTES # BLD AUTO: 0.11 X10*3/UL (ref 0.1–1)
MONOCYTES NFR BLD AUTO: 3.4 %
NEUTROPHILS # BLD AUTO: 2.91 X10*3/UL (ref 1.2–7.7)
NEUTROPHILS NFR BLD AUTO: 90.4 %
NRBC BLD-RTO: 0 /100 WBCS (ref 0–0)
PLATELET # BLD AUTO: 97 X10*3/UL (ref 150–450)
POTASSIUM SERPL-SCNC: 3.3 MMOL/L (ref 3.5–5.3)
PROT SERPL-MCNC: 6 G/DL (ref 6.4–8.2)
RBC # BLD AUTO: 2.96 X10*6/UL (ref 4.5–5.9)
SODIUM SERPL-SCNC: 141 MMOL/L (ref 136–145)
WBC # BLD AUTO: 3.2 X10*3/UL (ref 4.4–11.3)

## 2024-05-03 PROCEDURE — 85025 COMPLETE CBC W/AUTO DIFF WBC: CPT

## 2024-05-03 PROCEDURE — 96368 THER/DIAG CONCURRENT INF: CPT

## 2024-05-03 PROCEDURE — 99215 OFFICE O/P EST HI 40 MIN: CPT

## 2024-05-03 PROCEDURE — 83735 ASSAY OF MAGNESIUM: CPT

## 2024-05-03 PROCEDURE — 84075 ASSAY ALKALINE PHOSPHATASE: CPT

## 2024-05-03 PROCEDURE — 2500000004 HC RX 250 GENERAL PHARMACY W/ HCPCS (ALT 636 FOR OP/ED): Performed by: INTERNAL MEDICINE

## 2024-05-03 PROCEDURE — 2500000004 HC RX 250 GENERAL PHARMACY W/ HCPCS (ALT 636 FOR OP/ED)

## 2024-05-03 PROCEDURE — 2500000001 HC RX 250 WO HCPCS SELF ADMINISTERED DRUGS (ALT 637 FOR MEDICARE OP): Performed by: INTERNAL MEDICINE

## 2024-05-03 PROCEDURE — 96413 CHEMO IV INFUSION 1 HR: CPT

## 2024-05-03 PROCEDURE — 2500000004 HC RX 250 GENERAL PHARMACY W/ HCPCS (ALT 636 FOR OP/ED): Mod: JZ | Performed by: INTERNAL MEDICINE

## 2024-05-03 PROCEDURE — 83615 LACTATE (LD) (LDH) ENZYME: CPT

## 2024-05-03 RX ORDER — EPINEPHRINE 0.3 MG/.3ML
0.3 INJECTION SUBCUTANEOUS EVERY 5 MIN PRN
Status: DISCONTINUED | OUTPATIENT
Start: 2024-05-03 | End: 2024-05-03 | Stop reason: HOSPADM

## 2024-05-03 RX ORDER — ACETAMINOPHEN 325 MG/1
650 TABLET ORAL ONCE
Status: COMPLETED | OUTPATIENT
Start: 2024-05-03 | End: 2024-05-03

## 2024-05-03 RX ORDER — ALBUTEROL SULFATE 0.83 MG/ML
3 SOLUTION RESPIRATORY (INHALATION) AS NEEDED
Status: DISCONTINUED | OUTPATIENT
Start: 2024-05-03 | End: 2024-05-03 | Stop reason: HOSPADM

## 2024-05-03 RX ORDER — DIPHENHYDRAMINE HYDROCHLORIDE 50 MG/ML
50 INJECTION INTRAMUSCULAR; INTRAVENOUS AS NEEDED
Status: DISCONTINUED | OUTPATIENT
Start: 2024-05-03 | End: 2024-05-03 | Stop reason: HOSPADM

## 2024-05-03 RX ORDER — POTASSIUM CHLORIDE 29.8 MG/ML
40 INJECTION INTRAVENOUS ONCE
Status: COMPLETED | OUTPATIENT
Start: 2024-05-03 | End: 2024-05-03

## 2024-05-03 RX ORDER — FAMOTIDINE 10 MG/ML
20 INJECTION INTRAVENOUS ONCE AS NEEDED
Status: DISCONTINUED | OUTPATIENT
Start: 2024-05-03 | End: 2024-05-03 | Stop reason: HOSPADM

## 2024-05-03 RX ORDER — POTASSIUM CHLORIDE 29.8 MG/ML
40 INJECTION INTRAVENOUS ONCE
Status: DISCONTINUED | OUTPATIENT
Start: 2024-05-03 | End: 2024-05-03

## 2024-05-03 RX ORDER — DIPHENHYDRAMINE HCL 50 MG
50 CAPSULE ORAL ONCE
Status: COMPLETED | OUTPATIENT
Start: 2024-05-03 | End: 2024-05-03

## 2024-05-03 RX ADMIN — RITUXIMAB 750 MG: 10 INJECTION, SOLUTION INTRAVENOUS at 14:36

## 2024-05-03 RX ADMIN — POTASSIUM CHLORIDE 40 MEQ: 29.8 INJECTION, SOLUTION INTRAVENOUS at 15:17

## 2024-05-03 RX ADMIN — DIPHENHYDRAMINE HYDROCHLORIDE 50 MG: 50 CAPSULE ORAL at 13:56

## 2024-05-03 RX ADMIN — PEGFILGRASTIM 6 MG: 6 INJECTION SUBCUTANEOUS at 14:45

## 2024-05-03 RX ADMIN — ACETAMINOPHEN 650 MG: 325 TABLET ORAL at 13:56

## 2024-05-03 ASSESSMENT — ENCOUNTER SYMPTOMS
FATIGUE: 0
CARDIOVASCULAR NEGATIVE: 1
DIZZINESS: 0
CHILLS: 0
LIGHT-HEADEDNESS: 0
CONSTIPATION: 0
DIARRHEA: 0
NAUSEA: 0
MUSCULOSKELETAL NEGATIVE: 1
BLOOD IN STOOL: 0
RESPIRATORY NEGATIVE: 1
NUMBNESS: 0
VOMITING: 0
DYSURIA: 0
UNEXPECTED WEIGHT CHANGE: 0
DIAPHORESIS: 0
BRUISES/BLEEDS EASILY: 0
APPETITE CHANGE: 0
ADENOPATHY: 0
FEVER: 0
HEMATURIA: 0

## 2024-05-03 ASSESSMENT — PAIN SCALES - GENERAL: PAINLEVEL: 0-NO PAIN

## 2024-05-03 NOTE — PROGRESS NOTES
Patient ID: Shilo Thakkar is a 23 y.o. male.    Subjective    HPI    Shilo Thakkar is a 23 year old male with burkitt lymphoma.  He presents to the malignant hematology clinic today (5/3/24) with his girlfriend for follow up evaluation, count checks, and will be receiving dose of rituxan and neulasta.  Shilo states his energy level is good.  Reports no fevers or chills.  Appetite is good and has had no changes to weight.  Follows with ophthalmology.  No vision changes.  Currently using 3 different eye drops.      Review of Systems   Constitutional:  Negative for appetite change, chills, diaphoresis, fatigue, fever and unexpected weight change.   HENT:   Negative for mouth sores.    Respiratory: Negative.     Cardiovascular: Negative.    Gastrointestinal:  Negative for blood in stool, constipation, diarrhea, nausea and vomiting.   Genitourinary:  Negative for dysuria and hematuria.    Musculoskeletal: Negative.  Negative for gait problem.   Skin: Negative.    Neurological:  Negative for dizziness, gait problem, light-headedness and numbness.   Hematological:  Negative for adenopathy. Does not bruise/bleed easily.     Objective       Physical Exam  Vitals reviewed.   Constitutional:       Appearance: Normal appearance.   HENT:      Head: Normocephalic.      Mouth/Throat:      Mouth: Mucous membranes are moist.   Eyes:      Pupils: Pupils are equal, round, and reactive to light.   Cardiovascular:      Rate and Rhythm: Normal rate and regular rhythm.      Pulses: Normal pulses.      Heart sounds: Normal heart sounds.   Pulmonary:      Effort: Pulmonary effort is normal.      Breath sounds: Normal breath sounds.   Abdominal:      General: Abdomen is flat. Bowel sounds are normal. There is no distension.      Palpations: Abdomen is soft. There is no mass.      Tenderness: There is no abdominal tenderness.   Musculoskeletal:         General: No swelling. Normal range of motion.   Lymphadenopathy:      Comments: No  lymphadenopathy   Skin:     General: Skin is warm and dry.   Neurological:      General: No focal deficit present.      Mental Status: He is alert and oriented to person, place, and time.   Psychiatric:         Mood and Affect: Mood normal.         Behavior: Behavior normal.       Performance Status:  ECOG Performance Status: 0 fully active    Assessment/Plan     Oncology History   Burkitt lymphoma (Multi)   10/27/2023 Initial Diagnosis    Burkitt lymphoma, high risk, MYC positive  - 10/19/23 presented to OSH ED with dysphagia and abdominal pain  - CT A/P with IV contrast (10/19) mass in the body of the pancreas measuring approximately 2.7 x 2.9 cm. The pancreatic duct is dilated. Multiple masses in the mesentery as well as omental caking consistent with carcinomatosis. Moderate amount of free fluid throughout the abdomen and pelvis.   - CT soft tissue neck with IV contrast (10/19): homogeneous soft tissue attenuation lesion in the L oropharynx which measures approximately 3.0 x 4.0 x 5.1 cm.   - US guided mesenteric LN biopsy (10/20/23): HIGH GRADE B-CELL LYMPHOMA MORPHOLOGICALLY CONSISTENT WITH BURKITT LYMPHOMA; flow cytometry: No B cell population identified  - Bone marrow aspiration biopsy November 1, 2023 no evidence of lymphoma involvement.  CSF by flow cytometry showed no lymphoma 11/2/2023.  - Biopsy L oropharyngeal mass and L cervical LN by ENT (10/23/23) high grade B-cell burkitt lymphoma, MYC positive, Bcl-2 negative.  - MR brain with and w/o IV contrast (10/27): No evidence of intracranial metastatic disease.     - Dexa 40 mg daily ended 11/1  - PET CT ordered 10/30 showed FDG avid left oropharyngeal mass and LAD above and below diaphragm     10/29/2023 -  Chemotherapy    HyperCVAD + RiTUXimab, 21 Day Cycles     12/6/2023 Imaging    -CT scan of the chest abdomen or pelvis on December 6, 2023 shows complete resolution of intra-abdominal disease, with mildly worse splenomegaly most likely unrelated to his  lymphoma.      1/15/2024 Imaging    -PET scan 1/15/24 after C4:   Interval near complete/complete resolution of previous metabolic activity. Deauville Score 2.       Problem List Items Addressed This Visit             ICD-10-CM    Burkitt lymphoma (Multi) - Primary C83.70     High risk BL in CR     Treatment History:  Hyper-CVAD C1D1 10/29/2023  Hyper-CVAD C2D1 11/20/2023  Hyper-CVAD C3D1 12/12/2023  Hyper-CVAD C4D1 01/02/2024  PET-CT s/p C4 (1/15/24) c/w Deauville 2  Hyper-CVAD C5D1 02/09/2024  Hyper-CVAD C6D1 03/06/2024 (Dose reduce Cytarabine at 1.5 G/m2 per dose with Methotrexate 1000 mg/m2 total; LP with intrathecal cytarabine during C6 on 3/7; CSF flow cytometry negative for lymphoma)  Hyper-CVAD C7D1 04/01/2024 (LP with intrathecal methotrexate 12 mg on 4/3; CSF flow cytometry negative for lymphoma)     Plan to keep high-dose methotrexate at 1 g/m² total dose over 24 hours as before, keep cytarabine at 1 g/m² per dose but cut to the number of doses from 4 total doses to 2 total doses i.e. omit day 3 completely and the total number of cytarabine doses to be given to him this last cycle will be 2 doses only given the profound thrombocytopenia that he developed with the last cycle in an attempt to mitigate risk due to his intraocular hemorrhage history.    Admitted to hospital 4/29-5/2/24 for C8 HyperCVAD.  Chemotherapy initiated 4/30, tolerated well. Received IT cytarabine on 4/30. Methotrexate level at time of discharge 0.09.    Plan for three times weekly count checks. Transfuse to keep Hgb>7.5 and Plt>50,000.  5/3/24:  Today hgb 9.4, plt 97, WBC 3.2.  No transfusion replacement required today.  Receiving dose of rituxan and neulasta today.       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  - Close Follow up with Optho: next 5/15/24         Immunosuppressed due to chemotherapy (Multi) D84.821, T45.1X5A, Z79.899     - Continue prophylactic Acyclovir, Fluconazole  - Bactrim held 4/22 for new MALU (sCr 1.5). sCr trended back to 0.99 4/26. Will continue to hold while admitted for this cycle and restart at discharge if MLAU resolved.  Restarted a discharged.  - Levofloxacin Day 5-15 of each cycle for antibacterial prophylaxis.         Burkitt's lymphoma (Multi) C83.70    Relevant Orders    CBC and Auto Differential (Completed)    Comprehensive Metabolic Panel (Completed)    Lactate dehydrogenase (Completed)    Magnesium (Completed)    Hypokalemia E87.6     5/3/24:  Potassium level 3.3.  Replaced with 40 meq IV potassium chloride in infusion today.  Recommended to start taking oral potassium chloride 20 meq daily.  Shilo states he has prescription at home.  Refused new prescription and he will use his current supply from home.  Advised to contact if needs refills.  Monitoring potassium level.         Relevant Medications    potassium chloride CR (Klor-Con M20) 20 mEq ER tablet   Central Line:  Solo PICC to right arm.  Dressing intact with no signs of erythema, tenderness, drainage, or swelling.      RTC:  Plan for 3x per week count checks with weekly provider visit  5/6/24 and 5/8/24: Infusion  5/10/24: Infusion and mal heme visit  5/13/24: Infusion   5/15/24: Infusion, and mal heme, and ophthalmology visit  5/17/24:  Infusion and PET scan  5/22/24:  Central line, Dr. Klein appointment, and infusion     Katelyn Murray, APRN-CNP  [facility]  [facility]

## 2024-05-03 NOTE — PROGRESS NOTES
[facility] Infusion Nursing Note  05/03/24    Shilo Thakkar is a 23 y.o. year old male patient presenting to outpatient infusion for cycle 8 day 5 of the following regimen:    Treatment Plans       Name Type Plan Dates Plan Provider         Active    HyperCVAD + RiTUXimab, 21 Day Cycles Oncology Treatment  10/28/2023 - Present Olegario Klein MD                  Since the last visit, he reports doing well. Overall, he states that energy level is energy level is good. The patient is able to perform all ADLs. . Appetite has been unchanged and good. he reports no complaints.   There are no social work or other referral needs at this time.    Line type: R DL PICC  Line removed/maintained prior to discharge: maintained, saline locked    Administrations This Visit       acetaminophen (Tylenol) tablet 650 mg       Admin Date  05/03/2024 Action  Given Dose  650 mg Route  oral Documented By  Yamile Talbot RN              diphenhydrAMINE (BENADryl) capsule 50 mg       Admin Date  05/03/2024 Action  Given Dose  50 mg Route  oral Documented By  Yamile Talbot RN              pegfilgrastim (Neulasta) injection 6 mg       Admin Date  05/03/2024 Action  Given Dose  6 mg Route  subcutaneous Documented By  Kaley Neal RN              potassium chloride 40 mEq in 100 mL IV premix       Admin Date  05/03/2024 Action  New Bag Dose  40 mEq Rate  50 mL/hr Route  intravenous Documented By  Kaley Neal RN              riTUXimab (Rituxan) 750 mg in sodium chloride 0.9% 325 mL IV       Admin Date  05/03/2024 Action  New Bag Dose  750 mg Route  intravenous Documented By  Kaley Neal RN                  Hypersensitivity reaction noted: No  Patient tolerated treatment well. Discharged home in stable condition.    Follow-up Plan: Monday    Yamile Talbot RN

## 2024-05-04 PROBLEM — E87.6 HYPOKALEMIA: Status: ACTIVE | Noted: 2024-05-04

## 2024-05-04 RX ORDER — POTASSIUM CHLORIDE 20 MEQ/1
20 TABLET, EXTENDED RELEASE ORAL DAILY
Start: 2024-05-04 | End: 2024-06-03

## 2024-05-04 NOTE — ASSESSMENT & PLAN NOTE
- Continue prophylactic Acyclovir, Fluconazole  - Bactrim held 4/22 for new MALU (sCr 1.5). sCr trended back to 0.99 4/26. Will continue to hold while admitted for this cycle and restart at discharge if MALU resolved.  Restarted a discharged.  - Levofloxacin Day 5-15 of each cycle for antibacterial prophylaxis.

## 2024-05-04 NOTE — ASSESSMENT & PLAN NOTE
High risk BL in CR     Treatment History:  Hyper-CVAD C1D1 10/29/2023  Hyper-CVAD C2D1 11/20/2023  Hyper-CVAD C3D1 12/12/2023  Hyper-CVAD C4D1 01/02/2024  PET-CT s/p C4 (1/15/24) c/w Deauville 2  Hyper-CVAD C5D1 02/09/2024  Hyper-CVAD C6D1 03/06/2024 (Dose reduce Cytarabine at 1.5 G/m2 per dose with Methotrexate 1000 mg/m2 total; LP with intrathecal cytarabine during C6 on 3/7; CSF flow cytometry negative for lymphoma)  Hyper-CVAD C7D1 04/01/2024 (LP with intrathecal methotrexate 12 mg on 4/3; CSF flow cytometry negative for lymphoma)     Plan to keep high-dose methotrexate at 1 g/m² total dose over 24 hours as before, keep cytarabine at 1 g/m² per dose but cut to the number of doses from 4 total doses to 2 total doses i.e. omit day 3 completely and the total number of cytarabine doses to be given to him this last cycle will be 2 doses only given the profound thrombocytopenia that he developed with the last cycle in an attempt to mitigate risk due to his intraocular hemorrhage history.    Admitted to hospital 4/29-5/2/24 for C8 HyperCVAD.  Chemotherapy initiated 4/30, tolerated well. Received IT cytarabine on 4/30. Methotrexate level at time of discharge 0.09.    Plan for three times weekly count checks. Transfuse to keep Hgb>7.5 and Plt>50,000.  5/3/24:  Today hgb 9.4, plt 97, WBC 3.2.  No transfusion replacement required today.

## 2024-05-04 NOTE — ASSESSMENT & PLAN NOTE
5/3/24:  Potassium level 3.3.  Replaced with 40 meq IV potassium chloride in infusion today.  Recommended to start taking oral potassium chloride 20 meq daily.  Shilo states he has prescription at home.  Refused new prescription and he will use his current supply from home.  Advised to contact if needs refills.  Monitoring potassium level.

## 2024-05-04 NOTE — ASSESSMENT & PLAN NOTE
- Bilateral retinoschisis and retinal hemorrhages   - S/p left eye vitrectomy  - May require repeat surgery in left eye folowing if vitreous hemorrhage persists or retina detaches  - Optho recommends Platelet threshold of 50,000  -- Continue Cosopt BID to the R eye  -- Continue Ketorolac BID both eyes   -- Artificial tears QID to the left eye  5/3/24:  Reports no issues with vision and is compliant with prescribed eye drops.  - Close Follow up with Optho: next 5/15/24

## 2024-05-06 ENCOUNTER — INFUSION (OUTPATIENT)
Dept: HEMATOLOGY/ONCOLOGY | Facility: HOSPITAL | Age: 24
End: 2024-05-06
Payer: COMMERCIAL

## 2024-05-06 VITALS
WEIGHT: 175.93 LBS | DIASTOLIC BLOOD PRESSURE: 82 MMHG | TEMPERATURE: 97.9 F | OXYGEN SATURATION: 100 % | SYSTOLIC BLOOD PRESSURE: 132 MMHG | BODY MASS INDEX: 24.12 KG/M2 | RESPIRATION RATE: 16 BRPM | HEART RATE: 79 BPM

## 2024-05-06 DIAGNOSIS — C83.70 BURKITT LYMPHOMA, UNSPECIFIED BODY REGION (MULTI): ICD-10-CM

## 2024-05-06 DIAGNOSIS — C83.79 BURKITT LYMPHOMA OF SOLID ORGAN EXCLUDING SPLEEN (MULTI): ICD-10-CM

## 2024-05-06 LAB
ALBUMIN SERPL BCP-MCNC: 5 G/DL (ref 3.4–5)
ALP SERPL-CCNC: 85 U/L (ref 33–120)
ALT SERPL W P-5'-P-CCNC: 35 U/L (ref 10–52)
ANION GAP SERPL CALC-SCNC: 12 MMOL/L (ref 10–20)
AST SERPL W P-5'-P-CCNC: 17 U/L (ref 9–39)
BASOPHILS # BLD MANUAL: 0.16 X10*3/UL (ref 0–0.1)
BASOPHILS NFR BLD MANUAL: 1 %
BILIRUB SERPL-MCNC: 1.6 MG/DL (ref 0–1.2)
BLOOD EXPIRATION DATE: NORMAL
BUN SERPL-MCNC: 19 MG/DL (ref 6–23)
CALCIUM SERPL-MCNC: 9.8 MG/DL (ref 8.6–10.3)
CHLORIDE SERPL-SCNC: 102 MMOL/L (ref 98–107)
CO2 SERPL-SCNC: 29 MMOL/L (ref 21–32)
CREAT SERPL-MCNC: 0.7 MG/DL (ref 0.5–1.3)
DACRYOCYTES BLD QL SMEAR: ABNORMAL
DISPENSE STATUS: NORMAL
DOHLE BOD BLD QL SMEAR: PRESENT
EGFRCR SERPLBLD CKD-EPI 2021: >90 ML/MIN/1.73M*2
EOSINOPHIL # BLD MANUAL: 0 X10*3/UL (ref 0–0.7)
EOSINOPHIL NFR BLD MANUAL: 0 %
ERYTHROCYTE [DISTWIDTH] IN BLOOD BY AUTOMATED COUNT: 18.4 % (ref 11.5–14.5)
GLUCOSE SERPL-MCNC: 88 MG/DL (ref 74–99)
HCT VFR BLD AUTO: 26.9 % (ref 41–52)
HGB BLD-MCNC: 9.3 G/DL (ref 13.5–17.5)
IMM GRANULOCYTES # BLD AUTO: 2.18 X10*3/UL (ref 0–0.7)
IMM GRANULOCYTES NFR BLD AUTO: 13.4 % (ref 0–0.9)
LDH SERPL L TO P-CCNC: 166 U/L (ref 84–246)
LYMPHOCYTES # BLD MANUAL: 0.16 X10*3/UL (ref 1.2–4.8)
LYMPHOCYTES NFR BLD MANUAL: 1 %
MAGNESIUM SERPL-MCNC: 1.86 MG/DL (ref 1.6–2.4)
MCH RBC QN AUTO: 32.1 PG (ref 26–34)
MCHC RBC AUTO-ENTMCNC: 34.6 G/DL (ref 32–36)
MCV RBC AUTO: 93 FL (ref 80–100)
MONOCYTES # BLD MANUAL: 0.16 X10*3/UL (ref 0.1–1)
MONOCYTES NFR BLD MANUAL: 1 %
NEUTROPHILS # BLD MANUAL: 15.81 X10*3/UL (ref 1.2–7.7)
NEUTS BAND # BLD MANUAL: 0.49 X10*3/UL (ref 0–0.7)
NEUTS BAND NFR BLD MANUAL: 3 %
NEUTS SEG # BLD MANUAL: 15.32 X10*3/UL (ref 1.2–7)
NEUTS SEG NFR BLD MANUAL: 94 %
NRBC BLD-RTO: 0 /100 WBCS (ref 0–0)
OVALOCYTES BLD QL SMEAR: ABNORMAL
PLATELET # BLD AUTO: 47 X10*3/UL (ref 150–450)
POTASSIUM SERPL-SCNC: 4.2 MMOL/L (ref 3.5–5.3)
PRODUCT BLOOD TYPE: 6200
PRODUCT CODE: NORMAL
PROT SERPL-MCNC: 6.9 G/DL (ref 6.4–8.2)
RBC # BLD AUTO: 2.9 X10*6/UL (ref 4.5–5.9)
RBC MORPH BLD: ABNORMAL
SCHISTOCYTES BLD QL SMEAR: ABNORMAL
SODIUM SERPL-SCNC: 139 MMOL/L (ref 136–145)
TOTAL CELLS COUNTED BLD: 100
UNIT ABO: NORMAL
UNIT NUMBER: NORMAL
UNIT RH: NORMAL
UNIT VOLUME: 274
WBC # BLD AUTO: 16.3 X10*3/UL (ref 4.4–11.3)

## 2024-05-06 PROCEDURE — 85027 COMPLETE CBC AUTOMATED: CPT

## 2024-05-06 PROCEDURE — 80053 COMPREHEN METABOLIC PANEL: CPT

## 2024-05-06 PROCEDURE — 36430 TRANSFUSION BLD/BLD COMPNT: CPT

## 2024-05-06 PROCEDURE — 83615 LACTATE (LD) (LDH) ENZYME: CPT

## 2024-05-06 PROCEDURE — 85007 BL SMEAR W/DIFF WBC COUNT: CPT

## 2024-05-06 PROCEDURE — 2500000004 HC RX 250 GENERAL PHARMACY W/ HCPCS (ALT 636 FOR OP/ED): Performed by: INTERNAL MEDICINE

## 2024-05-06 PROCEDURE — P9073 PLATELETS PHERESIS PATH REDU: HCPCS

## 2024-05-06 PROCEDURE — 83735 ASSAY OF MAGNESIUM: CPT

## 2024-05-06 RX ORDER — HEPARIN SODIUM,PORCINE/PF 10 UNIT/ML
50 SYRINGE (ML) INTRAVENOUS AS NEEDED
Status: CANCELLED | OUTPATIENT
Start: 2024-05-06

## 2024-05-06 RX ORDER — FAMOTIDINE 10 MG/ML
20 INJECTION INTRAVENOUS ONCE AS NEEDED
Status: CANCELLED | OUTPATIENT
Start: 2024-05-06

## 2024-05-06 RX ORDER — ACETAMINOPHEN 325 MG/1
650 TABLET ORAL ONCE
Status: COMPLETED | OUTPATIENT
Start: 2024-05-06 | End: 2024-05-06

## 2024-05-06 RX ORDER — DIPHENHYDRAMINE HYDROCHLORIDE 50 MG/ML
50 INJECTION INTRAMUSCULAR; INTRAVENOUS AS NEEDED
Status: CANCELLED | OUTPATIENT
Start: 2024-05-06

## 2024-05-06 RX ORDER — ACETAMINOPHEN 325 MG/1
650 TABLET ORAL ONCE
Status: CANCELLED
Start: 2024-05-06 | End: 2024-05-06

## 2024-05-06 RX ORDER — ALBUTEROL SULFATE 0.83 MG/ML
3 SOLUTION RESPIRATORY (INHALATION) AS NEEDED
Status: CANCELLED | OUTPATIENT
Start: 2024-05-06

## 2024-05-06 RX ORDER — PANTOPRAZOLE SODIUM 40 MG/1
40 TABLET, DELAYED RELEASE ORAL
Qty: 30 TABLET | Refills: 0 | Status: SHIPPED | OUTPATIENT
Start: 2024-05-06 | End: 2024-06-05

## 2024-05-06 RX ORDER — DIPHENHYDRAMINE HYDROCHLORIDE 50 MG/ML
50 INJECTION INTRAMUSCULAR; INTRAVENOUS ONCE
Status: COMPLETED | OUTPATIENT
Start: 2024-05-06 | End: 2024-05-06

## 2024-05-06 RX ORDER — DIPHENHYDRAMINE HYDROCHLORIDE 50 MG/ML
50 INJECTION, SOLUTION INTRAMUSCULAR; INTRAVENOUS ONCE
Status: CANCELLED
Start: 2024-05-06 | End: 2024-05-06

## 2024-05-06 RX ORDER — EPINEPHRINE 0.3 MG/.3ML
0.3 INJECTION SUBCUTANEOUS EVERY 5 MIN PRN
Status: CANCELLED | OUTPATIENT
Start: 2024-05-06

## 2024-05-06 RX ADMIN — DIPHENHYDRAMINE HYDROCHLORIDE 50 MG: 50 INJECTION, SOLUTION INTRAMUSCULAR; INTRAVENOUS at 12:39

## 2024-05-06 RX ADMIN — ACETAMINOPHEN 650 MG: 325 TABLET ORAL at 12:39

## 2024-05-06 ASSESSMENT — PAIN SCALES - GENERAL: PAINLEVEL: 0-NO PAIN

## 2024-05-06 NOTE — PROGRESS NOTES
[facility] Infusion Nursing Note  05/06/24    Shilo Thakkar is a 23 y.o. year old male patient presenting to outpatient infusion for a count check.     Platelets 47 today, transfused 1 unit per orders for <50.     Since the last visit, he reports doing well. Overall, he states that energy level is energy level is good. The patient is able to perform all ADLs. . Appetite has been unchanged and good. he reports no complaints.   There are no social work or other referral needs at this time.    Line type: R DL PICC   Line removed/maintained prior to discharge: maintained, saline locked    Administrations This Visit       acetaminophen (Tylenol) tablet 650 mg       Admin Date  05/06/2024 Action  Given Dose  650 mg Route  oral Documented By  Yamile Tablot RN              diphenhydrAMINE (BENADryl) injection 50 mg       Admin Date  05/06/2024 Action  Given Dose  50 mg Route  intravenous Documented By  Yamile Talbot RN                  Hypersensitivity reaction noted: No  Patient tolerated treatment well. Discharged home in stable condition.    Follow-up Plan: 5/8 count check    Yamile Talbot RN

## 2024-05-08 ENCOUNTER — INFUSION (OUTPATIENT)
Dept: HEMATOLOGY/ONCOLOGY | Facility: HOSPITAL | Age: 24
End: 2024-05-08
Payer: COMMERCIAL

## 2024-05-08 ENCOUNTER — APPOINTMENT (OUTPATIENT)
Dept: RADIOLOGY | Facility: HOSPITAL | Age: 24
End: 2024-05-08
Payer: COMMERCIAL

## 2024-05-08 ENCOUNTER — APPOINTMENT (OUTPATIENT)
Dept: HEMATOLOGY/ONCOLOGY | Facility: HOSPITAL | Age: 24
End: 2024-05-08
Payer: COMMERCIAL

## 2024-05-08 VITALS
OXYGEN SATURATION: 100 % | TEMPERATURE: 97.3 F | DIASTOLIC BLOOD PRESSURE: 70 MMHG | WEIGHT: 169.09 LBS | HEART RATE: 69 BPM | BODY MASS INDEX: 23.18 KG/M2 | RESPIRATION RATE: 20 BRPM | SYSTOLIC BLOOD PRESSURE: 122 MMHG

## 2024-05-08 DIAGNOSIS — C83.79 BURKITT LYMPHOMA OF SOLID ORGAN EXCLUDING SPLEEN (MULTI): ICD-10-CM

## 2024-05-08 DIAGNOSIS — C83.70 BURKITT LYMPHOMA, UNSPECIFIED BODY REGION (MULTI): ICD-10-CM

## 2024-05-08 LAB
ALBUMIN SERPL BCP-MCNC: 5.2 G/DL (ref 3.4–5)
ALP SERPL-CCNC: 97 U/L (ref 33–120)
ALT SERPL W P-5'-P-CCNC: 36 U/L (ref 10–52)
ANION GAP SERPL CALC-SCNC: 14 MMOL/L (ref 10–20)
AST SERPL W P-5'-P-CCNC: 17 U/L (ref 9–39)
BASOPHILS # BLD AUTO: 0.01 X10*3/UL (ref 0–0.1)
BASOPHILS NFR BLD AUTO: 0.9 %
BILIRUB SERPL-MCNC: 1.1 MG/DL (ref 0–1.2)
BLOOD EXPIRATION DATE: NORMAL
BUN SERPL-MCNC: 22 MG/DL (ref 6–23)
CALCIUM SERPL-MCNC: 10.1 MG/DL (ref 8.6–10.3)
CHLORIDE SERPL-SCNC: 99 MMOL/L (ref 98–107)
CO2 SERPL-SCNC: 29 MMOL/L (ref 21–32)
CREAT SERPL-MCNC: 0.92 MG/DL (ref 0.5–1.3)
DACRYOCYTES BLD QL SMEAR: NORMAL
DISPENSE STATUS: NORMAL
DOHLE BOD BLD QL SMEAR: PRESENT
EGFRCR SERPLBLD CKD-EPI 2021: >90 ML/MIN/1.73M*2
EOSINOPHIL # BLD AUTO: 0.02 X10*3/UL (ref 0–0.7)
EOSINOPHIL NFR BLD AUTO: 1.9 %
ERYTHROCYTE [DISTWIDTH] IN BLOOD BY AUTOMATED COUNT: 17.3 % (ref 11.5–14.5)
GLUCOSE SERPL-MCNC: 88 MG/DL (ref 74–99)
HCT VFR BLD AUTO: 24.9 % (ref 41–52)
HGB BLD-MCNC: 8.7 G/DL (ref 13.5–17.5)
IMM GRANULOCYTES # BLD AUTO: 0.01 X10*3/UL (ref 0–0.7)
IMM GRANULOCYTES NFR BLD AUTO: 0.9 % (ref 0–0.9)
LDH SERPL L TO P-CCNC: 133 U/L (ref 84–246)
LYMPHOCYTES # BLD AUTO: 0.27 X10*3/UL (ref 1.2–4.8)
LYMPHOCYTES NFR BLD AUTO: 25 %
MAGNESIUM SERPL-MCNC: 1.99 MG/DL (ref 1.6–2.4)
MCH RBC QN AUTO: 31.6 PG (ref 26–34)
MCHC RBC AUTO-ENTMCNC: 34.9 G/DL (ref 32–36)
MCV RBC AUTO: 91 FL (ref 80–100)
MONOCYTES # BLD AUTO: 0.1 X10*3/UL (ref 0.1–1)
MONOCYTES NFR BLD AUTO: 9.3 %
NEUTROPHILS # BLD AUTO: 0.67 X10*3/UL (ref 1.2–7.7)
NEUTROPHILS NFR BLD AUTO: 62 %
NRBC BLD-RTO: 0 /100 WBCS (ref 0–0)
OVALOCYTES BLD QL SMEAR: NORMAL
PLATELET # BLD AUTO: 29 X10*3/UL (ref 150–450)
POTASSIUM SERPL-SCNC: 4.2 MMOL/L (ref 3.5–5.3)
PRODUCT BLOOD TYPE: 7300
PRODUCT CODE: NORMAL
PROT SERPL-MCNC: 7.5 G/DL (ref 6.4–8.2)
RBC # BLD AUTO: 2.75 X10*6/UL (ref 4.5–5.9)
RBC MORPH BLD: NORMAL
SODIUM SERPL-SCNC: 138 MMOL/L (ref 136–145)
UNIT ABO: NORMAL
UNIT NUMBER: NORMAL
UNIT RH: NORMAL
UNIT VOLUME: 299
WBC # BLD AUTO: 1.1 X10*3/UL (ref 4.4–11.3)

## 2024-05-08 PROCEDURE — P9073 PLATELETS PHERESIS PATH REDU: HCPCS

## 2024-05-08 PROCEDURE — 83735 ASSAY OF MAGNESIUM: CPT

## 2024-05-08 PROCEDURE — 85025 COMPLETE CBC W/AUTO DIFF WBC: CPT

## 2024-05-08 PROCEDURE — 84075 ASSAY ALKALINE PHOSPHATASE: CPT

## 2024-05-08 PROCEDURE — 96374 THER/PROPH/DIAG INJ IV PUSH: CPT | Mod: INF

## 2024-05-08 PROCEDURE — 2500000004 HC RX 250 GENERAL PHARMACY W/ HCPCS (ALT 636 FOR OP/ED): Performed by: INTERNAL MEDICINE

## 2024-05-08 PROCEDURE — 36430 TRANSFUSION BLD/BLD COMPNT: CPT

## 2024-05-08 PROCEDURE — 83615 LACTATE (LD) (LDH) ENZYME: CPT

## 2024-05-08 RX ORDER — DIPHENHYDRAMINE HYDROCHLORIDE 50 MG/ML
50 INJECTION INTRAMUSCULAR; INTRAVENOUS AS NEEDED
Status: CANCELLED | OUTPATIENT
Start: 2024-05-08

## 2024-05-08 RX ORDER — DIPHENHYDRAMINE HYDROCHLORIDE 50 MG/ML
50 INJECTION INTRAMUSCULAR; INTRAVENOUS ONCE
Status: DISCONTINUED | OUTPATIENT
Start: 2024-05-08 | End: 2024-05-08 | Stop reason: HOSPADM

## 2024-05-08 RX ORDER — DIPHENHYDRAMINE HYDROCHLORIDE 50 MG/ML
50 INJECTION, SOLUTION INTRAMUSCULAR; INTRAVENOUS ONCE
Qty: 1 ML | Refills: 0 | Status: COMPLETED | OUTPATIENT
Start: 2024-05-08 | End: 2024-05-08

## 2024-05-08 RX ORDER — ACETAMINOPHEN 325 MG/1
650 TABLET ORAL ONCE
Status: CANCELLED
Start: 2024-05-08 | End: 2024-05-08

## 2024-05-08 RX ORDER — ALBUTEROL SULFATE 0.83 MG/ML
3 SOLUTION RESPIRATORY (INHALATION) AS NEEDED
Status: CANCELLED | OUTPATIENT
Start: 2024-05-08

## 2024-05-08 RX ORDER — DIPHENHYDRAMINE HYDROCHLORIDE 50 MG/ML
50 INJECTION, SOLUTION INTRAMUSCULAR; INTRAVENOUS ONCE
Status: CANCELLED
Start: 2024-05-08 | End: 2024-05-08

## 2024-05-08 RX ORDER — FAMOTIDINE 10 MG/ML
20 INJECTION INTRAVENOUS ONCE AS NEEDED
Status: CANCELLED | OUTPATIENT
Start: 2024-05-08

## 2024-05-08 RX ORDER — EPINEPHRINE 0.3 MG/.3ML
0.3 INJECTION SUBCUTANEOUS EVERY 5 MIN PRN
Status: CANCELLED | OUTPATIENT
Start: 2024-05-08

## 2024-05-08 RX ORDER — ACETAMINOPHEN 325 MG/1
650 TABLET ORAL ONCE
Status: DISCONTINUED | OUTPATIENT
Start: 2024-05-08 | End: 2024-05-08 | Stop reason: HOSPADM

## 2024-05-08 RX ORDER — ACETAMINOPHEN 325 MG/1
650 TABLET ORAL ONCE
Status: COMPLETED | OUTPATIENT
Start: 2024-05-08 | End: 2024-05-08

## 2024-05-08 RX ORDER — HEPARIN SODIUM,PORCINE/PF 10 UNIT/ML
50 SYRINGE (ML) INTRAVENOUS AS NEEDED
Status: CANCELLED | OUTPATIENT
Start: 2024-05-08

## 2024-05-08 RX ADMIN — DIPHENHYDRAMINE HYDROCHLORIDE 50 MG: 50 INJECTION, SOLUTION INTRAMUSCULAR; INTRAVENOUS at 14:32

## 2024-05-08 RX ADMIN — ACETAMINOPHEN 650 MG: 325 TABLET ORAL at 14:30

## 2024-05-08 ASSESSMENT — PAIN SCALES - GENERAL: PAINLEVEL: 0-NO PAIN

## 2024-05-10 ENCOUNTER — INFUSION (OUTPATIENT)
Dept: HEMATOLOGY/ONCOLOGY | Facility: HOSPITAL | Age: 24
End: 2024-05-10
Payer: COMMERCIAL

## 2024-05-10 ENCOUNTER — OFFICE VISIT (OUTPATIENT)
Dept: HEMATOLOGY/ONCOLOGY | Facility: HOSPITAL | Age: 24
End: 2024-05-10
Payer: COMMERCIAL

## 2024-05-10 VITALS
DIASTOLIC BLOOD PRESSURE: 65 MMHG | WEIGHT: 168.43 LBS | TEMPERATURE: 98.2 F | OXYGEN SATURATION: 100 % | HEART RATE: 66 BPM | BODY MASS INDEX: 23.09 KG/M2 | SYSTOLIC BLOOD PRESSURE: 129 MMHG | RESPIRATION RATE: 18 BRPM

## 2024-05-10 DIAGNOSIS — C83.79 BURKITT LYMPHOMA OF SOLID ORGAN EXCLUDING SPLEEN (MULTI): ICD-10-CM

## 2024-05-10 DIAGNOSIS — C83.70 BURKITT LYMPHOMA, UNSPECIFIED BODY REGION (MULTI): ICD-10-CM

## 2024-05-10 LAB
ALBUMIN SERPL BCP-MCNC: 4.9 G/DL (ref 3.4–5)
ALP SERPL-CCNC: 94 U/L (ref 33–120)
ALT SERPL W P-5'-P-CCNC: 27 U/L (ref 10–52)
ANION GAP SERPL CALC-SCNC: 14 MMOL/L (ref 10–20)
AST SERPL W P-5'-P-CCNC: 12 U/L (ref 9–39)
BASOPHILS # BLD AUTO: 0.01 X10*3/UL (ref 0–0.1)
BASOPHILS NFR BLD AUTO: 2.7 %
BILIRUB SERPL-MCNC: 0.8 MG/DL (ref 0–1.2)
BLOOD EXPIRATION DATE: NORMAL
BLOOD EXPIRATION DATE: NORMAL
BUN SERPL-MCNC: 22 MG/DL (ref 6–23)
CALCIUM SERPL-MCNC: 9.9 MG/DL (ref 8.6–10.3)
CHLORIDE SERPL-SCNC: 102 MMOL/L (ref 98–107)
CO2 SERPL-SCNC: 28 MMOL/L (ref 21–32)
CREAT SERPL-MCNC: 0.94 MG/DL (ref 0.5–1.3)
DISPENSE STATUS: NORMAL
DISPENSE STATUS: NORMAL
EGFRCR SERPLBLD CKD-EPI 2021: >90 ML/MIN/1.73M*2
EOSINOPHIL # BLD AUTO: 0.02 X10*3/UL (ref 0–0.7)
EOSINOPHIL NFR BLD AUTO: 5.4 %
ERYTHROCYTE [DISTWIDTH] IN BLOOD BY AUTOMATED COUNT: 16.1 % (ref 11.5–14.5)
GLUCOSE SERPL-MCNC: 90 MG/DL (ref 74–99)
HCT VFR BLD AUTO: 21.3 % (ref 41–52)
HGB BLD-MCNC: 7.6 G/DL (ref 13.5–17.5)
IMM GRANULOCYTES # BLD AUTO: 0.01 X10*3/UL (ref 0–0.7)
IMM GRANULOCYTES NFR BLD AUTO: 2.7 % (ref 0–0.9)
LDH SERPL L TO P-CCNC: 112 U/L (ref 84–246)
LYMPHOCYTES # BLD AUTO: 0.22 X10*3/UL (ref 1.2–4.8)
LYMPHOCYTES NFR BLD AUTO: 59.5 %
MAGNESIUM SERPL-MCNC: 1.84 MG/DL (ref 1.6–2.4)
MCH RBC QN AUTO: 31.4 PG (ref 26–34)
MCHC RBC AUTO-ENTMCNC: 35.7 G/DL (ref 32–36)
MCV RBC AUTO: 88 FL (ref 80–100)
MONOCYTES # BLD AUTO: 0.06 X10*3/UL (ref 0.1–1)
MONOCYTES NFR BLD AUTO: 16.2 %
NEUTROPHILS # BLD AUTO: 0.05 X10*3/UL (ref 1.2–7.7)
NEUTROPHILS NFR BLD AUTO: 13.5 %
NRBC BLD-RTO: 0 /100 WBCS (ref 0–0)
PLATELET # BLD AUTO: 28 X10*3/UL (ref 150–450)
POTASSIUM SERPL-SCNC: 4 MMOL/L (ref 3.5–5.3)
PRODUCT BLOOD TYPE: 5100
PRODUCT BLOOD TYPE: 5100
PRODUCT CODE: NORMAL
PRODUCT CODE: NORMAL
PROT SERPL-MCNC: 7.1 G/DL (ref 6.4–8.2)
RBC # BLD AUTO: 2.42 X10*6/UL (ref 4.5–5.9)
SODIUM SERPL-SCNC: 140 MMOL/L (ref 136–145)
UNIT ABO: NORMAL
UNIT ABO: NORMAL
UNIT NUMBER: NORMAL
UNIT NUMBER: NORMAL
UNIT RH: NORMAL
UNIT RH: NORMAL
UNIT VOLUME: 278
UNIT VOLUME: 279
WBC # BLD AUTO: 0.4 X10*3/UL (ref 4.4–11.3)

## 2024-05-10 PROCEDURE — 2500000004 HC RX 250 GENERAL PHARMACY W/ HCPCS (ALT 636 FOR OP/ED): Performed by: INTERNAL MEDICINE

## 2024-05-10 PROCEDURE — 36430 TRANSFUSION BLD/BLD COMPNT: CPT

## 2024-05-10 PROCEDURE — 36513 APHERESIS PLATELETS: CPT

## 2024-05-10 PROCEDURE — P9073 PLATELETS PHERESIS PATH REDU: HCPCS

## 2024-05-10 PROCEDURE — 99215 OFFICE O/P EST HI 40 MIN: CPT | Mod: 25 | Performed by: NURSE PRACTITIONER

## 2024-05-10 PROCEDURE — 83615 LACTATE (LD) (LDH) ENZYME: CPT

## 2024-05-10 PROCEDURE — 99215 OFFICE O/P EST HI 40 MIN: CPT | Performed by: NURSE PRACTITIONER

## 2024-05-10 PROCEDURE — 85025 COMPLETE CBC W/AUTO DIFF WBC: CPT

## 2024-05-10 PROCEDURE — 83735 ASSAY OF MAGNESIUM: CPT

## 2024-05-10 PROCEDURE — 80053 COMPREHEN METABOLIC PANEL: CPT

## 2024-05-10 RX ORDER — DIPHENHYDRAMINE HYDROCHLORIDE 50 MG/ML
50 INJECTION INTRAMUSCULAR; INTRAVENOUS ONCE
Status: COMPLETED | OUTPATIENT
Start: 2024-05-10 | End: 2024-05-10

## 2024-05-10 RX ORDER — EPINEPHRINE 0.3 MG/.3ML
0.3 INJECTION SUBCUTANEOUS EVERY 5 MIN PRN
Status: CANCELLED | OUTPATIENT
Start: 2024-05-10

## 2024-05-10 RX ORDER — ALBUTEROL SULFATE 0.83 MG/ML
3 SOLUTION RESPIRATORY (INHALATION) AS NEEDED
Status: CANCELLED | OUTPATIENT
Start: 2024-05-10

## 2024-05-10 RX ORDER — HEPARIN SODIUM,PORCINE/PF 10 UNIT/ML
50 SYRINGE (ML) INTRAVENOUS AS NEEDED
Status: CANCELLED | OUTPATIENT
Start: 2024-05-10

## 2024-05-10 RX ORDER — ACETAMINOPHEN 325 MG/1
650 TABLET ORAL ONCE
Status: CANCELLED
Start: 2024-05-10 | End: 2024-05-10

## 2024-05-10 RX ORDER — FAMOTIDINE 10 MG/ML
20 INJECTION INTRAVENOUS ONCE AS NEEDED
Status: CANCELLED | OUTPATIENT
Start: 2024-05-10

## 2024-05-10 RX ORDER — DIPHENHYDRAMINE HYDROCHLORIDE 50 MG/ML
50 INJECTION, SOLUTION INTRAMUSCULAR; INTRAVENOUS ONCE
Status: CANCELLED
Start: 2024-05-10 | End: 2024-05-10

## 2024-05-10 RX ORDER — ACETAMINOPHEN 325 MG/1
650 TABLET ORAL ONCE
Status: COMPLETED | OUTPATIENT
Start: 2024-05-10 | End: 2024-05-10

## 2024-05-10 RX ORDER — DIPHENHYDRAMINE HYDROCHLORIDE 50 MG/ML
50 INJECTION INTRAMUSCULAR; INTRAVENOUS AS NEEDED
Status: CANCELLED | OUTPATIENT
Start: 2024-05-10

## 2024-05-10 RX ADMIN — ACETAMINOPHEN 650 MG: 325 TABLET ORAL at 11:20

## 2024-05-10 RX ADMIN — DIPHENHYDRAMINE HYDROCHLORIDE 50 MG: 50 INJECTION, SOLUTION INTRAMUSCULAR; INTRAVENOUS at 11:20

## 2024-05-10 ASSESSMENT — PAIN SCALES - GENERAL: PAINLEVEL: 0-NO PAIN

## 2024-05-13 ENCOUNTER — INFUSION (OUTPATIENT)
Dept: HEMATOLOGY/ONCOLOGY | Facility: HOSPITAL | Age: 24
End: 2024-05-13
Payer: COMMERCIAL

## 2024-05-13 VITALS
BODY MASS INDEX: 22.84 KG/M2 | WEIGHT: 168.65 LBS | DIASTOLIC BLOOD PRESSURE: 58 MMHG | SYSTOLIC BLOOD PRESSURE: 116 MMHG | HEIGHT: 72 IN | TEMPERATURE: 97.7 F | HEART RATE: 57 BPM | RESPIRATION RATE: 18 BRPM | OXYGEN SATURATION: 100 %

## 2024-05-13 DIAGNOSIS — C83.79 BURKITT LYMPHOMA OF SOLID ORGAN EXCLUDING SPLEEN (MULTI): ICD-10-CM

## 2024-05-13 DIAGNOSIS — C83.70 BURKITT LYMPHOMA, UNSPECIFIED BODY REGION (MULTI): ICD-10-CM

## 2024-05-13 DIAGNOSIS — D69.6 THROMBOCYTOPENIA (CMS-HCC): ICD-10-CM

## 2024-05-13 DIAGNOSIS — D69.6 THROMBOCYTOPENIA (CMS-HCC): Primary | ICD-10-CM

## 2024-05-13 LAB
ABO GROUP (TYPE) IN BLOOD: NORMAL
ALBUMIN SERPL BCP-MCNC: 4.9 G/DL (ref 3.4–5)
ALP SERPL-CCNC: 105 U/L (ref 33–120)
ALT SERPL W P-5'-P-CCNC: 21 U/L (ref 10–52)
ANION GAP SERPL CALC-SCNC: 13 MMOL/L (ref 10–20)
ANTIBODY SCREEN: NORMAL
AST SERPL W P-5'-P-CCNC: 14 U/L (ref 9–39)
BASOPHILS # BLD AUTO: 0.01 X10*3/UL (ref 0–0.1)
BASOPHILS NFR BLD AUTO: 0.4 %
BILIRUB SERPL-MCNC: 0.6 MG/DL (ref 0–1.2)
BLOOD EXPIRATION DATE: NORMAL
BUN SERPL-MCNC: 15 MG/DL (ref 6–23)
CALCIUM SERPL-MCNC: 9.6 MG/DL (ref 8.6–10.3)
CHLORIDE SERPL-SCNC: 103 MMOL/L (ref 98–107)
CO2 SERPL-SCNC: 29 MMOL/L (ref 21–32)
CREAT SERPL-MCNC: 1.11 MG/DL (ref 0.5–1.3)
DACRYOCYTES BLD QL SMEAR: NORMAL
DISPENSE STATUS: NORMAL
EGFRCR SERPLBLD CKD-EPI 2021: >90 ML/MIN/1.73M*2
EOSINOPHIL # BLD AUTO: 0.04 X10*3/UL (ref 0–0.7)
EOSINOPHIL NFR BLD AUTO: 1.5 %
ERYTHROCYTE [DISTWIDTH] IN BLOOD BY AUTOMATED COUNT: 15.9 % (ref 11.5–14.5)
GLUCOSE SERPL-MCNC: 90 MG/DL (ref 74–99)
HCT VFR BLD AUTO: 19.4 % (ref 41–52)
HGB BLD-MCNC: 7 G/DL (ref 13.5–17.5)
HYPOCHROMIA BLD QL SMEAR: NORMAL
IMM GRANULOCYTES # BLD AUTO: 0.05 X10*3/UL (ref 0–0.7)
IMM GRANULOCYTES NFR BLD AUTO: 1.9 % (ref 0–0.9)
LDH SERPL L TO P-CCNC: 146 U/L (ref 84–246)
LYMPHOCYTES # BLD AUTO: 0.5 X10*3/UL (ref 1.2–4.8)
LYMPHOCYTES NFR BLD AUTO: 19.2 %
MAGNESIUM SERPL-MCNC: 1.7 MG/DL (ref 1.6–2.4)
MCH RBC QN AUTO: 31 PG (ref 26–34)
MCHC RBC AUTO-ENTMCNC: 36.1 G/DL (ref 32–36)
MCV RBC AUTO: 86 FL (ref 80–100)
MONOCYTES # BLD AUTO: 0.57 X10*3/UL (ref 0.1–1)
MONOCYTES NFR BLD AUTO: 21.8 %
NEUTROPHILS # BLD AUTO: 1.44 X10*3/UL (ref 1.2–7.7)
NEUTROPHILS NFR BLD AUTO: 55.2 %
NRBC BLD-RTO: 0.8 /100 WBCS (ref 0–0)
OVALOCYTES BLD QL SMEAR: NORMAL
PLATELET # BLD AUTO: 27 X10*3/UL (ref 150–450)
PLATELET # BLD AUTO: 46 X10*3/UL (ref 150–450)
POTASSIUM SERPL-SCNC: 3.7 MMOL/L (ref 3.5–5.3)
PRODUCT BLOOD TYPE: 1700
PRODUCT BLOOD TYPE: 5100
PRODUCT BLOOD TYPE: 600
PRODUCT CODE: NORMAL
PROT SERPL-MCNC: 6.9 G/DL (ref 6.4–8.2)
RBC # BLD AUTO: 2.26 X10*6/UL (ref 4.5–5.9)
RBC MORPH BLD: NORMAL
RH FACTOR (ANTIGEN D): NORMAL
SODIUM SERPL-SCNC: 141 MMOL/L (ref 136–145)
UNIT ABO: NORMAL
UNIT NUMBER: NORMAL
UNIT RH: NORMAL
UNIT VOLUME: 274
UNIT VOLUME: 277
UNIT VOLUME: 350
WBC # BLD AUTO: 2.6 X10*3/UL (ref 4.4–11.3)
XM INTEP: NORMAL

## 2024-05-13 PROCEDURE — 85049 AUTOMATED PLATELET COUNT: CPT

## 2024-05-13 PROCEDURE — 85025 COMPLETE CBC W/AUTO DIFF WBC: CPT

## 2024-05-13 PROCEDURE — P9073 PLATELETS PHERESIS PATH REDU: HCPCS

## 2024-05-13 PROCEDURE — 86923 COMPATIBILITY TEST ELECTRIC: CPT

## 2024-05-13 PROCEDURE — P9037 PLATE PHERES LEUKOREDU IRRAD: HCPCS

## 2024-05-13 PROCEDURE — 83735 ASSAY OF MAGNESIUM: CPT

## 2024-05-13 PROCEDURE — 83615 LACTATE (LD) (LDH) ENZYME: CPT

## 2024-05-13 PROCEDURE — 84075 ASSAY ALKALINE PHOSPHATASE: CPT

## 2024-05-13 PROCEDURE — P9040 RBC LEUKOREDUCED IRRADIATED: HCPCS

## 2024-05-13 PROCEDURE — 36430 TRANSFUSION BLD/BLD COMPNT: CPT

## 2024-05-13 PROCEDURE — 2500000004 HC RX 250 GENERAL PHARMACY W/ HCPCS (ALT 636 FOR OP/ED): Performed by: INTERNAL MEDICINE

## 2024-05-13 PROCEDURE — 86901 BLOOD TYPING SEROLOGIC RH(D): CPT

## 2024-05-13 RX ORDER — EPINEPHRINE 0.3 MG/.3ML
0.3 INJECTION SUBCUTANEOUS EVERY 5 MIN PRN
Status: CANCELLED | OUTPATIENT
Start: 2024-05-13

## 2024-05-13 RX ORDER — MAGNESIUM SULFATE HEPTAHYDRATE 40 MG/ML
4 INJECTION, SOLUTION INTRAVENOUS AS NEEDED
Start: 2024-05-13

## 2024-05-13 RX ORDER — DIPHENHYDRAMINE HYDROCHLORIDE 50 MG/ML
50 INJECTION INTRAMUSCULAR; INTRAVENOUS ONCE
Status: COMPLETED | OUTPATIENT
Start: 2024-05-13 | End: 2024-05-13

## 2024-05-13 RX ORDER — ONDANSETRON HYDROCHLORIDE 2 MG/ML
8 INJECTION, SOLUTION INTRAVENOUS EVERY 4 HOURS PRN
Start: 2024-05-13

## 2024-05-13 RX ORDER — POTASSIUM CHLORIDE 14.9 MG/ML
20 INJECTION INTRAVENOUS AS NEEDED
Start: 2024-05-13

## 2024-05-13 RX ORDER — EPINEPHRINE 0.3 MG/.3ML
0.3 INJECTION SUBCUTANEOUS EVERY 5 MIN PRN
OUTPATIENT
Start: 2024-05-13

## 2024-05-13 RX ORDER — DIPHENHYDRAMINE HYDROCHLORIDE 50 MG/ML
50 INJECTION INTRAMUSCULAR; INTRAVENOUS AS NEEDED
Status: CANCELLED | OUTPATIENT
Start: 2024-05-13

## 2024-05-13 RX ORDER — DIPHENHYDRAMINE HYDROCHLORIDE 50 MG/ML
50 INJECTION INTRAMUSCULAR; INTRAVENOUS AS NEEDED
OUTPATIENT
Start: 2024-05-13

## 2024-05-13 RX ORDER — DIPHENHYDRAMINE HYDROCHLORIDE 50 MG/ML
50 INJECTION, SOLUTION INTRAMUSCULAR; INTRAVENOUS ONCE
Status: CANCELLED
Start: 2024-05-13 | End: 2024-05-13

## 2024-05-13 RX ORDER — HEPARIN SODIUM,PORCINE/PF 10 UNIT/ML
50 SYRINGE (ML) INTRAVENOUS AS NEEDED
Status: CANCELLED | OUTPATIENT
Start: 2024-05-13

## 2024-05-13 RX ORDER — ACETAMINOPHEN 325 MG/1
650 TABLET ORAL ONCE
Status: CANCELLED
Start: 2024-05-13 | End: 2024-05-13

## 2024-05-13 RX ORDER — ACETAMINOPHEN 325 MG/1
650 TABLET ORAL ONCE
Status: COMPLETED | OUTPATIENT
Start: 2024-05-13 | End: 2024-05-13

## 2024-05-13 RX ORDER — ALBUTEROL SULFATE 0.83 MG/ML
3 SOLUTION RESPIRATORY (INHALATION) AS NEEDED
OUTPATIENT
Start: 2024-05-13

## 2024-05-13 RX ORDER — FAMOTIDINE 10 MG/ML
20 INJECTION INTRAVENOUS ONCE AS NEEDED
Status: CANCELLED | OUTPATIENT
Start: 2024-05-13

## 2024-05-13 RX ORDER — FAMOTIDINE 10 MG/ML
20 INJECTION INTRAVENOUS ONCE AS NEEDED
OUTPATIENT
Start: 2024-05-13

## 2024-05-13 RX ORDER — ALBUTEROL SULFATE 0.83 MG/ML
3 SOLUTION RESPIRATORY (INHALATION) AS NEEDED
Status: CANCELLED | OUTPATIENT
Start: 2024-05-13

## 2024-05-13 RX ADMIN — ACETAMINOPHEN 650 MG: 325 TABLET ORAL at 14:30

## 2024-05-13 RX ADMIN — DIPHENHYDRAMINE HYDROCHLORIDE 50 MG: 50 INJECTION, SOLUTION INTRAMUSCULAR; INTRAVENOUS at 14:31

## 2024-05-13 NOTE — PROGRESS NOTES
Pt here for count check today. Platelets 27 and HGB 7.0. Pt was given 1 Unit of Platelets his platelet count was repeated. It was 46. Another unit of platlets were ordered. 1UPRBC was transfused while waiting for second unit of platelets. Pt tolerating infusions well. No concerns voiced at this time. Dr Klein updated.    1810 Second unit of platelets started. Pt doing well. His mother is at chairside. Pt voiced no concerns or needs at this time. Call light within reach.  1826 report given to EUGENE Chavarria RN

## 2024-05-15 ENCOUNTER — INFUSION (OUTPATIENT)
Dept: HEMATOLOGY/ONCOLOGY | Facility: HOSPITAL | Age: 24
End: 2024-05-15
Payer: COMMERCIAL

## 2024-05-15 ENCOUNTER — APPOINTMENT (OUTPATIENT)
Dept: HEMATOLOGY/ONCOLOGY | Facility: HOSPITAL | Age: 24
End: 2024-05-15
Payer: COMMERCIAL

## 2024-05-15 ENCOUNTER — OFFICE VISIT (OUTPATIENT)
Dept: HEMATOLOGY/ONCOLOGY | Facility: HOSPITAL | Age: 24
End: 2024-05-15
Payer: COMMERCIAL

## 2024-05-15 ENCOUNTER — OFFICE VISIT (OUTPATIENT)
Dept: OPHTHALMOLOGY | Facility: CLINIC | Age: 24
End: 2024-05-15
Payer: COMMERCIAL

## 2024-05-15 VITALS
OXYGEN SATURATION: 100 % | DIASTOLIC BLOOD PRESSURE: 68 MMHG | TEMPERATURE: 98.1 F | RESPIRATION RATE: 16 BRPM | SYSTOLIC BLOOD PRESSURE: 124 MMHG | HEART RATE: 58 BPM

## 2024-05-15 VITALS
HEART RATE: 56 BPM | WEIGHT: 161.38 LBS | SYSTOLIC BLOOD PRESSURE: 133 MMHG | OXYGEN SATURATION: 100 % | DIASTOLIC BLOOD PRESSURE: 63 MMHG | BODY MASS INDEX: 22.15 KG/M2 | RESPIRATION RATE: 18 BRPM | TEMPERATURE: 97.3 F

## 2024-05-15 DIAGNOSIS — H35.63 RETINAL HEMORRHAGE OF BOTH EYES: ICD-10-CM

## 2024-05-15 DIAGNOSIS — C83.79 BURKITT LYMPHOMA OF SOLID ORGAN EXCLUDING SPLEEN (MULTI): ICD-10-CM

## 2024-05-15 DIAGNOSIS — D84.821 IMMUNOSUPPRESSED DUE TO CHEMOTHERAPY (MULTI): Primary | ICD-10-CM

## 2024-05-15 DIAGNOSIS — C83.70 BURKITT LYMPHOMA, UNSPECIFIED BODY REGION (MULTI): ICD-10-CM

## 2024-05-15 DIAGNOSIS — H35.351 CYSTOID MACULAR EDEMA, RIGHT EYE: Primary | ICD-10-CM

## 2024-05-15 DIAGNOSIS — T45.1X5A IMMUNOSUPPRESSED DUE TO CHEMOTHERAPY (MULTI): Primary | ICD-10-CM

## 2024-05-15 DIAGNOSIS — C83.78 BURKITT LYMPHOMA OF LYMPH NODES OF MULTIPLE REGIONS (MULTI): ICD-10-CM

## 2024-05-15 DIAGNOSIS — Z79.899 IMMUNOSUPPRESSED DUE TO CHEMOTHERAPY (MULTI): Primary | ICD-10-CM

## 2024-05-15 DIAGNOSIS — H33.103 BILATERAL RETINOSCHISIS: ICD-10-CM

## 2024-05-15 LAB
ABO GROUP (TYPE) IN BLOOD: NORMAL
ALBUMIN SERPL BCP-MCNC: 4.7 G/DL (ref 3.4–5)
ALP SERPL-CCNC: 103 U/L (ref 33–120)
ALT SERPL W P-5'-P-CCNC: 19 U/L (ref 10–52)
ANION GAP SERPL CALC-SCNC: 14 MMOL/L (ref 10–20)
ANTIBODY SCREEN: NORMAL
AST SERPL W P-5'-P-CCNC: 14 U/L (ref 9–39)
BASOPHILS # BLD MANUAL: 0 X10*3/UL (ref 0–0.1)
BASOPHILS NFR BLD MANUAL: 0 %
BILIRUB SERPL-MCNC: 0.5 MG/DL (ref 0–1.2)
BLOOD EXPIRATION DATE: NORMAL
BUN SERPL-MCNC: 13 MG/DL (ref 6–23)
CALCIUM SERPL-MCNC: 9.3 MG/DL (ref 8.6–10.3)
CHLORIDE SERPL-SCNC: 105 MMOL/L (ref 98–107)
CO2 SERPL-SCNC: 29 MMOL/L (ref 21–32)
CREAT SERPL-MCNC: 1.11 MG/DL (ref 0.5–1.3)
DACRYOCYTES BLD QL SMEAR: ABNORMAL
DISPENSE STATUS: NORMAL
EGFRCR SERPLBLD CKD-EPI 2021: >90 ML/MIN/1.73M*2
EOSINOPHIL # BLD MANUAL: 0.05 X10*3/UL (ref 0–0.7)
EOSINOPHIL NFR BLD MANUAL: 1 %
ERYTHROCYTE [DISTWIDTH] IN BLOOD BY AUTOMATED COUNT: 16.2 % (ref 11.5–14.5)
GLUCOSE SERPL-MCNC: 88 MG/DL (ref 74–99)
HCT VFR BLD AUTO: 22.5 % (ref 41–52)
HGB BLD-MCNC: 7.7 G/DL (ref 13.5–17.5)
IMM GRANULOCYTES # BLD AUTO: 0.26 X10*3/UL (ref 0–0.7)
IMM GRANULOCYTES NFR BLD AUTO: 5.4 % (ref 0–0.9)
LDH SERPL L TO P-CCNC: 172 U/L (ref 84–246)
LYMPHOCYTES # BLD MANUAL: 0.82 X10*3/UL (ref 1.2–4.8)
LYMPHOCYTES NFR BLD MANUAL: 17 %
MAGNESIUM SERPL-MCNC: 1.72 MG/DL (ref 1.6–2.4)
MCH RBC QN AUTO: 30.3 PG (ref 26–34)
MCHC RBC AUTO-ENTMCNC: 34.2 G/DL (ref 32–36)
MCV RBC AUTO: 89 FL (ref 80–100)
METAMYELOCYTES # BLD MANUAL: 0.05 X10*3/UL
METAMYELOCYTES NFR BLD MANUAL: 1 %
MONOCYTES # BLD MANUAL: 0.34 X10*3/UL (ref 0.1–1)
MONOCYTES NFR BLD MANUAL: 7 %
MYELOCYTES # BLD MANUAL: 0.1 X10*3/UL
MYELOCYTES NFR BLD MANUAL: 2 %
NEUTROPHILS # BLD MANUAL: 3.46 X10*3/UL (ref 1.2–7.7)
NEUTS BAND # BLD MANUAL: 0.53 X10*3/UL (ref 0–0.7)
NEUTS BAND NFR BLD MANUAL: 11 %
NEUTS SEG # BLD MANUAL: 2.93 X10*3/UL (ref 1.2–7)
NEUTS SEG NFR BLD MANUAL: 61 %
NRBC BLD-RTO: 1.9 /100 WBCS (ref 0–0)
OVALOCYTES BLD QL SMEAR: ABNORMAL
PLATELET # BLD AUTO: 61 X10*3/UL (ref 150–450)
POLYCHROMASIA BLD QL SMEAR: ABNORMAL
POTASSIUM SERPL-SCNC: 3.6 MMOL/L (ref 3.5–5.3)
PRODUCT BLOOD TYPE: 5100
PRODUCT CODE: NORMAL
PROT SERPL-MCNC: 6.5 G/DL (ref 6.4–8.2)
RBC # BLD AUTO: 2.54 X10*6/UL (ref 4.5–5.9)
RBC MORPH BLD: ABNORMAL
RH FACTOR (ANTIGEN D): NORMAL
SODIUM SERPL-SCNC: 144 MMOL/L (ref 136–145)
TOTAL CELLS COUNTED BLD: 100
TOXIC GRANULES BLD QL SMEAR: PRESENT
UNIT ABO: NORMAL
UNIT NUMBER: NORMAL
UNIT RH: NORMAL
UNIT VOLUME: 350
WBC # BLD AUTO: 4.8 X10*3/UL (ref 4.4–11.3)
XM INTEP: NORMAL

## 2024-05-15 PROCEDURE — 85007 BL SMEAR W/DIFF WBC COUNT: CPT

## 2024-05-15 PROCEDURE — 86901 BLOOD TYPING SEROLOGIC RH(D): CPT

## 2024-05-15 PROCEDURE — 2500000004 HC RX 250 GENERAL PHARMACY W/ HCPCS (ALT 636 FOR OP/ED): Performed by: INTERNAL MEDICINE

## 2024-05-15 PROCEDURE — 92134 CPTRZ OPH DX IMG PST SGM RTA: CPT

## 2024-05-15 PROCEDURE — 99213 OFFICE O/P EST LOW 20 MIN: CPT

## 2024-05-15 PROCEDURE — 99214 OFFICE O/P EST MOD 30 MIN: CPT | Mod: 25 | Performed by: PHYSICIAN ASSISTANT

## 2024-05-15 PROCEDURE — 80053 COMPREHEN METABOLIC PANEL: CPT

## 2024-05-15 PROCEDURE — P9040 RBC LEUKOREDUCED IRRADIATED: HCPCS

## 2024-05-15 PROCEDURE — 36430 TRANSFUSION BLD/BLD COMPNT: CPT

## 2024-05-15 PROCEDURE — 96374 THER/PROPH/DIAG INJ IV PUSH: CPT | Mod: INF

## 2024-05-15 PROCEDURE — 83735 ASSAY OF MAGNESIUM: CPT

## 2024-05-15 PROCEDURE — 83615 LACTATE (LD) (LDH) ENZYME: CPT

## 2024-05-15 PROCEDURE — 99214 OFFICE O/P EST MOD 30 MIN: CPT | Performed by: PHYSICIAN ASSISTANT

## 2024-05-15 PROCEDURE — 1036F TOBACCO NON-USER: CPT | Performed by: PHYSICIAN ASSISTANT

## 2024-05-15 PROCEDURE — 85027 COMPLETE CBC AUTOMATED: CPT

## 2024-05-15 RX ORDER — FAMOTIDINE 10 MG/ML
20 INJECTION INTRAVENOUS ONCE AS NEEDED
OUTPATIENT
Start: 2024-05-15

## 2024-05-15 RX ORDER — DIPHENHYDRAMINE HYDROCHLORIDE 50 MG/ML
50 INJECTION INTRAMUSCULAR; INTRAVENOUS AS NEEDED
OUTPATIENT
Start: 2024-05-15

## 2024-05-15 RX ORDER — ACETAMINOPHEN 325 MG/1
650 TABLET ORAL ONCE
Start: 2024-05-15 | End: 2024-05-15

## 2024-05-15 RX ORDER — HEPARIN SODIUM,PORCINE/PF 10 UNIT/ML
50 SYRINGE (ML) INTRAVENOUS AS NEEDED
OUTPATIENT
Start: 2024-05-15

## 2024-05-15 RX ORDER — EPINEPHRINE 0.3 MG/.3ML
0.3 INJECTION SUBCUTANEOUS EVERY 5 MIN PRN
OUTPATIENT
Start: 2024-05-15

## 2024-05-15 RX ORDER — DIPHENHYDRAMINE HYDROCHLORIDE 50 MG/ML
50 INJECTION, SOLUTION INTRAMUSCULAR; INTRAVENOUS ONCE
Start: 2024-05-15 | End: 2024-05-15

## 2024-05-15 RX ORDER — HEPARIN SODIUM,PORCINE/PF 10 UNIT/ML
50 SYRINGE (ML) INTRAVENOUS AS NEEDED
Status: DISCONTINUED | OUTPATIENT
Start: 2024-05-15 | End: 2024-05-15 | Stop reason: HOSPADM

## 2024-05-15 RX ORDER — ALBUTEROL SULFATE 0.83 MG/ML
3 SOLUTION RESPIRATORY (INHALATION) AS NEEDED
OUTPATIENT
Start: 2024-05-15

## 2024-05-15 RX ORDER — DIPHENHYDRAMINE HYDROCHLORIDE 50 MG/ML
50 INJECTION, SOLUTION INTRAMUSCULAR; INTRAVENOUS ONCE
Qty: 1 ML | Refills: 0 | Status: COMPLETED | OUTPATIENT
Start: 2024-05-15 | End: 2024-05-15

## 2024-05-15 RX ORDER — ACETAMINOPHEN 325 MG/1
650 TABLET ORAL ONCE
Status: COMPLETED | OUTPATIENT
Start: 2024-05-15 | End: 2024-05-15

## 2024-05-15 RX ADMIN — DIPHENHYDRAMINE HYDROCHLORIDE 50 MG: 50 INJECTION, SOLUTION INTRAMUSCULAR; INTRAVENOUS at 11:00

## 2024-05-15 RX ADMIN — ACETAMINOPHEN 650 MG: 325 TABLET ORAL at 11:25

## 2024-05-15 ASSESSMENT — ENCOUNTER SYMPTOMS
UNEXPECTED WEIGHT CHANGE: 0
COUGH: 0
NUMBNESS: 0
DYSURIA: 0
ABDOMINAL PAIN: 0
DIARRHEA: 0
WHEEZING: 0
BLOOD IN STOOL: 0
HEMOPTYSIS: 0
SHORTNESS OF BREATH: 0
LEG SWELLING: 0
VOMITING: 0
FEVER: 0
DIZZINESS: 0
PALPITATIONS: 0
CONSTIPATION: 0
HEMATURIA: 0
NAUSEA: 0
EYE PROBLEMS: 0
HEADACHES: 0
FREQUENCY: 0

## 2024-05-15 ASSESSMENT — VISUAL ACUITY
OD_SC: 20/30
OD_SC+: +2
OS_SC: 20/200
OS_SC+: -2
METHOD: SNELLEN - LINEAR

## 2024-05-15 ASSESSMENT — TONOMETRY
IOP_METHOD: GOLDMANN APPLANATION
OS_IOP_MMHG: 18
OD_IOP_MMHG: 19

## 2024-05-15 ASSESSMENT — CUP TO DISC RATIO
OD_RATIO: 0.3
OS_RATIO: 0.3

## 2024-05-15 ASSESSMENT — CONF VISUAL FIELD
METHOD: COUNTING FINGERS
OS_SUPERIOR_TEMPORAL_RESTRICTION: 0
OD_INFERIOR_NASAL_RESTRICTION: 0
OD_SUPERIOR_TEMPORAL_RESTRICTION: 0
OS_INFERIOR_NASAL_RESTRICTION: 0
OS_SUPERIOR_NASAL_RESTRICTION: 0
OS_NORMAL: 1
OD_NORMAL: 1
OS_INFERIOR_TEMPORAL_RESTRICTION: 0
OD_SUPERIOR_NASAL_RESTRICTION: 0
OD_INFERIOR_TEMPORAL_RESTRICTION: 0

## 2024-05-15 ASSESSMENT — SLIT LAMP EXAM - LIDS
COMMENTS: NORMAL
COMMENTS: NORMAL

## 2024-05-15 ASSESSMENT — EXTERNAL EXAM - RIGHT EYE: OD_EXAM: NORMAL

## 2024-05-15 ASSESSMENT — PAIN - FUNCTIONAL ASSESSMENT: PAIN_FUNCTIONAL_ASSESSMENT: 0-10

## 2024-05-15 ASSESSMENT — PAIN SCALES - GENERAL: PAINLEVEL: 0-NO PAIN

## 2024-05-15 ASSESSMENT — EXTERNAL EXAM - LEFT EYE: OS_EXAM: NORMAL

## 2024-05-15 NOTE — PROGRESS NOTES
Patient ID: Shilo Thakkar is a 23 y.o. male.    Subjective    HPI  Presents today for count check and routine weekly follow up visit in [facility], accompanied by his mom. He is s/p recent admission for C8 HyperCVAD. He is feeling well today without any new issues/concerns.      They are thinking of planning a vacation for end of June once counts recovery from this cycle. Will discuss dates with Dr. Klein at follow up to coordinate.    Review of Systems   Eyes:         Chronic visual changes   All other systems reviewed and are negative.      Objective    BSA: There is no height or weight on file to calculate BSA.  There were no vitals taken for this visit.  Vital signs reviewed today.      Physical Exam  Constitutional:       Appearance: Normal appearance.   HENT:      Head: Normocephalic.      Nose: Nose normal.      Mouth/Throat:      Mouth: Mucous membranes are moist.      Pharynx: Oropharynx is clear.   Eyes:      Extraocular Movements: Extraocular movements intact.      Conjunctiva/sclera: Conjunctivae normal.      Pupils: Pupils are equal, round, and reactive to light.   Cardiovascular:      Rate and Rhythm: Normal rate and regular rhythm.      Pulses: Normal pulses.      Heart sounds: Normal heart sounds.   Pulmonary:      Effort: Pulmonary effort is normal.      Breath sounds: Normal breath sounds.   Abdominal:      General: Abdomen is flat. Bowel sounds are normal.      Palpations: Abdomen is soft.   Musculoskeletal:         General: Normal range of motion.      Cervical back: Normal range of motion.   Skin:     General: Skin is warm and dry.      Capillary Refill: Capillary refill takes less than 2 seconds.   Neurological:      General: No focal deficit present.      Mental Status: He is alert and oriented to person, place, and time. Mental status is at baseline.   Psychiatric:         Mood and Affect: Mood normal.         Performance Status:  Karnofsky Score: 80 - Normal activity with effort;  some signs or symptoms of disease      Assessment/Plan         Oncology History   Burkitt lymphoma (Multi)   10/27/2023 Initial Diagnosis    Burkitt lymphoma, high risk, MYC positive  - 10/19/23 presented to OSH ED with dysphagia and abdominal pain  - CT A/P with IV contrast (10/19) mass in the body of the pancreas measuring approximately 2.7 x 2.9 cm. The pancreatic duct is dilated. Multiple masses in the mesentery as well as omental caking consistent with carcinomatosis. Moderate amount of free fluid throughout the abdomen and pelvis.   - CT soft tissue neck with IV contrast (10/19): homogeneous soft tissue attenuation lesion in the L oropharynx which measures approximately 3.0 x 4.0 x 5.1 cm.   - US guided mesenteric LN biopsy (10/20/23): HIGH GRADE B-CELL LYMPHOMA MORPHOLOGICALLY CONSISTENT WITH BURKITT LYMPHOMA; flow cytometry: No B cell population identified  - Bone marrow aspiration biopsy November 1, 2023 no evidence of lymphoma involvement.  CSF by flow cytometry showed no lymphoma 11/2/2023.  - Biopsy L oropharyngeal mass and L cervical LN by ENT (10/23/23) high grade B-cell burkitt lymphoma, MYC positive, Bcl-2 negative.  - MR brain with and w/o IV contrast (10/27): No evidence of intracranial metastatic disease.     - Dexa 40 mg daily ended 11/1  - PET CT ordered 10/30 showed FDG avid left oropharyngeal mass and LAD above and below diaphragm     10/29/2023 -  Chemotherapy    HyperCVAD + RiTUXimab, 21 Day Cycles     12/6/2023 Imaging    -CT scan of the chest abdomen or pelvis on December 6, 2023 shows complete resolution of intra-abdominal disease, with mildly worse splenomegaly most likely unrelated to his lymphoma.      1/15/2024 Imaging    -PET scan 1/15/24 after C4:   Interval near complete/complete resolution of previous metabolic activity. Deauville Score 2.          Burkitt lymphoma (Multi)  High risk BL in CR      Treatment History:  Hyper-CVAD C1D1 10/29/2023  Hyper-CVAD C2D1  11/20/2023  Hyper-CVAD C3D1 12/12/2023  Hyper-CVAD C4D1 01/02/2024  PET-CT s/p C4 (1/15/24) c/w Deauville 2  Hyper-CVAD C5D1 02/09/2024  Hyper-CVAD C6D1 03/06/2024 (Dose reduce Cytarabine at 1.5 G/m2 per dose with Methotrexate 1000 mg/m2 total; LP with intrathecal cytarabine during C6 on 3/7; CSF flow cytometry negative for lymphoma)  Hyper-CVAD C7D1 04/01/2024 (LP with intrathecal methotrexate 12 mg on 4/3; CSF flow cytometry negative for lymphoma)   HyperCVAD C8D1 4/29/24 High-dose methotrexate 1 g/m² total dose over 24 hours as before, with 2 total doses of Cytarabine at 1 g/m² per dose given the profound thrombocytopenia that he developed with the last cycle in an attempt to mitigate risk due to his intraocular hemorrhage history. LP with Intrathecal Cytarabine 4/30; CSF flow cytometry negative for lymphoma.     Post Treatment PET-CT planned for 5/17/24.     Transfuse to keep Hgb>7.5 and Plt>50,000.         Immunosuppressed due to chemotherapy (Multi)  - Continue prophylactic Acyclovir, Fluconazole  - Bactrim held 4/22 for new MALU (sCr 1.5). sCr trended back to 0.99 4/26. Will continue to hold while admitted for this cycle and restart at discharge if MALU resolved.  Restarted at discharge.  - Levofloxacin Day 5-15 of each cycle for antibacterial prophylaxis.    Bilateral retinoschisis  - Bilateral retinoschisis and retinal hemorrhages   - S/p left eye vitrectomy  - May require repeat surgery in left eye folowing if vitreous hemorrhage persists or retina detaches  - Optho recommends Platelet threshold of 50,000  -- Continue Cosopt BID to the R eye  -- Continue Ketorolac BID both eyes   -- Artificial tears QID to the left eye  -- Follow up with Optho 5/15/24    RTC  3x per week count checks with weekly provider visit  5/13/24: Infusion   5/15/24: Infusion, and mal heme, and ophthalmology visit  5/17/24:  Infusion and PET scan  5/22/24:  Central line, Dr. Latoya chen, and infusion     Marleni Deal,  APRN-CNP  [facility]

## 2024-05-15 NOTE — ASSESSMENT & PLAN NOTE
:: Due to disease and chemotherapy   - VZV: Continue acyclovir 400 mg PO BID  - Candidiasis: Continue flucaonzole 400 mg PO daily x 14 days upon discharge  - Neutropenia: Levofloxacin 500 mg PO daily (days 5-15 of each cycle)  - PJP: Continue trimethoprim-sulfamethoxazole 800-160 mg PO 3x weekly  -- this was previously held (4/22/24) for MALU (sCr 1.5), but is back on this now; sCr 1.11 mg/dL with eGFR 106.2 mL/min today (05/15/24)

## 2024-05-15 NOTE — PROGRESS NOTES
Patient ID: Shilo Thakkar is a 23 y.o. male.    Diagnosis: No matching staging information was found for the patient.,    Treatment:   Oncology History   Burkitt lymphoma (Multi)   10/27/2023 Initial Diagnosis    Burkitt lymphoma, high risk, MYC positive  - 10/19/23 presented to [facility] ED with dysphagia and abdominal pain  - CT A/P with IV contrast (10/19) mass in the body of the pancreas measuring approximately 2.7 x 2.9 cm. The pancreatic duct is dilated. Multiple masses in the mesentery as well as omental caking consistent with carcinomatosis. Moderate amount of free fluid throughout the abdomen and pelvis.   - CT soft tissue neck with IV contrast (10/19): homogeneous soft tissue attenuation lesion in the L oropharynx which measures approximately 3.0 x 4.0 x 5.1 cm.   - US guided mesenteric LN biopsy (10/20/23): HIGH GRADE B-CELL LYMPHOMA MORPHOLOGICALLY CONSISTENT WITH BURKITT LYMPHOMA; flow cytometry: No B cell population identified  - Bone marrow aspiration biopsy November 1, 2023 no evidence of lymphoma involvement.  CSF by flow cytometry showed no lymphoma 11/2/2023.  - Biopsy L oropharyngeal mass and L cervical LN by ENT (10/23/23) high grade B-cell burkitt lymphoma, MYC positive, Bcl-2 negative.  - MR brain with and w/o IV contrast (10/27): No evidence of intracranial metastatic disease.     - Dexa 40 mg daily ended 11/1  - PET CT ordered 10/30 showed FDG avid left oropharyngeal mass and LAD above and below diaphragm     10/29/2023 -  Chemotherapy    Hyper-CVAD C1D1 10/29/2023  Hyper-CVAD C2D1 11/20/2023  Hyper-CVAD C3D1 12/12/2023  Hyper-CVAD C4D1 01/02/2024  Hyper-CVAD C5D1 02/09/2024  Hyper-CVAD C6D1 03/06/2024 (Dose reduce Cytarabine at 1.5 G/m2 per dose with Methotrexate 1000 mg/m2 total; LP with intrathecal cytarabine during C6 on 3/7; CSF flow cytometry negative for lymphoma)  Hyper-CVAD C7D1 04/01/2024 (LP with intrathecal methotrexate 12 mg on 4/3; CSF flow cytometry negative for lymphoma)   HyperCVAD  C8D1 4/29/24 High-dose methotrexate 1 g/m² total dose over 24 hours as before, with 2 total doses of Cytarabine at 1 g/m² per dose given the profound thrombocytopenia that he developed with the last cycle in an attempt to mitigate risk due to his intraocular hemorrhage history. LP with Intrathecal Cytarabine 4/30; CSF flow cytometry negative for lymphoma.   HyperCVAD + RiTUXimab, 21 Day Cycles     12/6/2023 Imaging    -CT scan of the chest abdomen or pelvis on December 6, 2023 shows complete resolution of intra-abdominal disease, with mildly worse splenomegaly most likely unrelated to his lymphoma.      1/15/2024 Imaging    -PET scan 1/15/24 after C4:   Interval near complete/complete resolution of previous metabolic activity. Deauville Score 2.       Past Medical History:     Past Medical History:   Diagnosis Date    ADHD (attention deficit hyperactivity disorder) 01/28/2014    Burkitt lymphoma (Multi) 10/27/2023    Colles' fracture of right radius, initial encounter for closed fracture 04/23/2015    Colles' fracture of right radius    Gynecomastia 05/23/2016    Unspecified fracture of navicular (scaphoid) bone of left wrist, initial encounter for closed fracture 12/17/2015    Closed fracture of scaphoid bone of left wrist     Surgical History:     Past Surgical History:   Procedure Laterality Date    CHEST TUBE INSERTION  10/29/2023    FOOT FRACTURE SURGERY Left     OTHER SURGICAL HISTORY  10/03/2017    History Of Prior Surgery    US GUIDED SOFT TISSUE BIOPSY  10/20/2023    US GUIDED SOFT TISSUE BIOPSY 10/20/2023 [facility] US        Family History:     Family History   Problem Relation Name Age of Onset    ADD / ADHD Mother      ADD / ADHD Sister      ADD / ADHD Brother       Social History:     Social History     Tobacco Use    Smoking status: Never     Passive exposure: Never    Smokeless tobacco: Never   Substance Use Topics    Alcohol use: Not Currently     Comment: rarely    Drug use: Never     "  -------------------------------------------------------------------------------------------------------  Subjective       The patient presents to the clinic today (05/15/24) for followup and count check; overall, he is doing well.    Denies fevers and chills. Denies nausea/vomiting/diarrhea. Denies constipation. He's eating and drinking okay. Denies chest pain, dyspnea, cough, and productive cough. No hematochezia and melena. Energy level is \"good,\" he says.    He says that he thinks that his R eye has maybe improved a little. He still can't see much out of his L eye, but it's not improved. No pain in his eyes. No headaches. No dysphagia, no dysarthria.     Nothing else is going on.     Review of Systems   Constitutional:  Negative for fever and unexpected weight change.   HENT:   Negative for mouth sores.    Eyes:  Negative for eye problems.   Respiratory:  Negative for cough, hemoptysis, shortness of breath and wheezing.    Cardiovascular:  Negative for chest pain, leg swelling and palpitations.   Gastrointestinal:  Negative for abdominal pain, blood in stool, constipation, diarrhea, nausea and vomiting.   Genitourinary:  Negative for bladder incontinence, dysuria, frequency and hematuria.    Musculoskeletal:  Negative for gait problem.   Skin:  Negative for rash.   Neurological:  Negative for dizziness, gait problem, headaches and numbness.   All other systems reviewed and are negative.     -------------------------------------------------------------------------------------------------------  Objective   BSA: 1.92 meters squared  /63 (BP Location: Left arm, Patient Position: Sitting)   Pulse 56   Temp 36.3 °C (97.3 °F) (Temporal)   Resp 18   Wt 73.2 kg (161 lb 6 oz)   SpO2 100%   BMI 22.15 kg/m²     Physical Exam  HENT:      Mouth/Throat:      Mouth: Mucous membranes are moist.      Pharynx: No posterior oropharyngeal erythema.   Eyes:      General: No scleral icterus.     Extraocular Movements: " Extraocular movements intact.   Cardiovascular:      Rate and Rhythm: Normal rate and regular rhythm.      Heart sounds: No murmur heard.     No friction rub. No gallop.   Pulmonary:      Effort: No respiratory distress.      Breath sounds: No stridor. No wheezing, rhonchi or rales.   Abdominal:      General: There is no distension.      Palpations: There is no mass.      Tenderness: There is no abdominal tenderness. There is no guarding or rebound.   Musculoskeletal:         General: No swelling or deformity.      Right lower leg: No edema.      Left lower leg: No edema.   Lymphadenopathy:      Cervical: No cervical adenopathy.   Skin:     Findings: No lesion or rash.      Comments: R PICC insertion site is clean/dry without erythema, edema, tenderness or discharge; dressing is peeling off somewhat today (05/15/24)    Neurological:      Mental Status: He is alert.      Cranial Nerves: No cranial nerve deficit.      Motor: No weakness.   Psychiatric:         Mood and Affect: Mood normal.         Behavior: Behavior normal.       Performance Status:  Asymptomatic  -------------------------------------------------------------------------------------------------------  Assessment/Plan    Burkitt lymphoma (Multi)  - Workup and treatment as above   - Currently in CR1  - Post Treatment PET-CT planned for 5/17/24.  - Transfuse to keep Hgb >7.5 g/dL and platelets > 50,000/dL due to bilateral retinoschisis    Immunosuppressed due to chemotherapy (Multi)  :: Due to disease and chemotherapy   - VZV: Continue acyclovir 400 mg PO BID  - Candidiasis: Continue flucaonzole 400 mg PO daily x 14 days upon discharge  - Neutropenia: Levofloxacin 500 mg PO daily (days 5-15 of each cycle)  - PJP: Continue trimethoprim-sulfamethoxazole 800-160 mg PO 3x weekly  -- this was previously held (4/22/24) for MALU (sCr 1.5), but is back on this now; sCr 1.11 mg/dL with eGFR 106.2 mL/min today (05/15/24)      Bilateral retinoschisis  - Bilateral  retinoschisis and retinal hemorrhages   - S/p left eye vitrectomy  - May require repeat surgery in left eye folowing if vitreous hemorrhage persists or retina detaches  - Optho recommends Platelet threshold of 50,000  -- Continue Cosopt BID to the R eye  -- Continue Ketorolac BID both eyes   -- Artificial tears QID to the left eye  -- Follow up with Ophthalmology later today (05/15/24)    RTC:  - 5/17, 5/20, 5/22, 5/24/24: Count checks  - 5/17/24: PET/CT  - 5/22/24: Dr. Brandtiso  -------------------------------------------------------------------------------------------------------  Néstor Soni PA-C  [facility]

## 2024-05-15 NOTE — ASSESSMENT & PLAN NOTE
- Continue prophylactic Acyclovir, Fluconazole  - Bactrim held 4/22 for new MALU (sCr 1.5). sCr trended back to 0.99 4/26. Will continue to hold while admitted for this cycle and restart at discharge if MALU resolved.  Restarted at discharge.  - Levofloxacin Day 5-15 of each cycle for antibacterial prophylaxis.

## 2024-05-15 NOTE — ASSESSMENT & PLAN NOTE
- Bilateral retinoschisis and retinal hemorrhages   - S/p left eye vitrectomy  - May require repeat surgery in left eye folowing if vitreous hemorrhage persists or retina detaches  - Optho recommends Platelet threshold of 50,000  -- Continue Cosopt BID to the R eye  -- Continue Ketorolac BID both eyes   -- Artificial tears QID to the left eye  -- Follow up with Ophthalmology later today (05/15/24)

## 2024-05-15 NOTE — ASSESSMENT & PLAN NOTE
- Workup and treatment as above   - Currently in CR1  - Post Treatment PET-CT planned for 5/17/24.  - Transfuse to keep Hgb >7.5 g/dL and platelets > 50,000/dL due to bilateral retinoschisis

## 2024-05-15 NOTE — PROGRESS NOTES
Patient for count check and transfusion. PRBCs needed per labs. 1 unit given to patient, no issues noted. Patient discharged with family, ambulated from department under own power, no acute distress noted.

## 2024-05-15 NOTE — PROGRESS NOTES
Bilateral macula-involving sub ILM hemorrhage with serous detachment OS>OD  Multilayer retinal hemorrhage, left eye  S/p pars plana vitrectomy (PPV)/C3F8/intraretinal tPA OS  S/P PPV/MP/EL/air OD    OS:  VH has resolved  VA 20/200  OCT with outer retinal changes that may explain 20/200 vision  Used to wear glasses; Would recommend seeing optometry for updated MRX  Retina is flat OS    OD:  Hemorrhages resolved  VA 20/30  Doing well  Continue to monitor    OCT 05/15/24     - Right eye (OD): resolved CME    - Left eye (OS): outer retinal discontinuities, hyperreflective foci at fovea    Prior Optos:    - Right eye (OD): SubLM subfoveal clotted heme now mostly resolved with scattered pre-retinal hemorrhage with in the posterior pole; stable    - OS:  Poor view 25% gas fill, retina is attached, residual preretinal blood clot with fibrin membranes in the temporal retina     Plan:  - Off all drops  - Eyes are healing well, patient can proceed with chemotherapy while avoiding platelets from dropping significantly - preferable level would be 50-70K but its understandable if this is not achievable and would recommend avoiding levels below 30K.  - Consult with optometry for updated MRX and I will see him after

## 2024-05-15 NOTE — ASSESSMENT & PLAN NOTE
- Bilateral retinoschisis and retinal hemorrhages   - S/p left eye vitrectomy  - May require repeat surgery in left eye folowing if vitreous hemorrhage persists or retina detaches  - Optho recommends Platelet threshold of 50,000  -- Continue Cosopt BID to the R eye  -- Continue Ketorolac BID both eyes   -- Artificial tears QID to the left eye  -- Follow up with Optho 5/15/24

## 2024-05-15 NOTE — ASSESSMENT & PLAN NOTE
High risk BL in CR      Treatment History:  Hyper-CVAD C1D1 10/29/2023  Hyper-CVAD C2D1 11/20/2023  Hyper-CVAD C3D1 12/12/2023  Hyper-CVAD C4D1 01/02/2024  PET-CT s/p C4 (1/15/24) c/w Deauville 2  Hyper-CVAD C5D1 02/09/2024  Hyper-CVAD C6D1 03/06/2024 (Dose reduce Cytarabine at 1.5 G/m2 per dose with Methotrexate 1000 mg/m2 total; LP with intrathecal cytarabine during C6 on 3/7; CSF flow cytometry negative for lymphoma)  Hyper-CVAD C7D1 04/01/2024 (LP with intrathecal methotrexate 12 mg on 4/3; CSF flow cytometry negative for lymphoma)   HyperCVAD C8D1 4/29/24 High-dose methotrexate 1 g/m² total dose over 24 hours as before, with 2 total doses of Cytarabine at 1 g/m² per dose given the profound thrombocytopenia that he developed with the last cycle in an attempt to mitigate risk due to his intraocular hemorrhage history. LP with Intrathecal Cytarabine 4/30; CSF flow cytometry negative for lymphoma.     Post Treatment PET-CT planned for 5/17/24.     Transfuse to keep Hgb>7.5 and Plt>50,000.

## 2024-05-17 ENCOUNTER — HOSPITAL ENCOUNTER (OUTPATIENT)
Dept: RADIOLOGY | Facility: HOSPITAL | Age: 24
Discharge: HOME | End: 2024-05-17
Payer: COMMERCIAL

## 2024-05-17 ENCOUNTER — INFUSION (OUTPATIENT)
Dept: HEMATOLOGY/ONCOLOGY | Facility: HOSPITAL | Age: 24
End: 2024-05-17
Payer: COMMERCIAL

## 2024-05-17 ENCOUNTER — TELEPHONE (OUTPATIENT)
Dept: HEMATOLOGY/ONCOLOGY | Facility: HOSPITAL | Age: 24
End: 2024-05-17

## 2024-05-17 VITALS
TEMPERATURE: 97.2 F | BODY MASS INDEX: 24.2 KG/M2 | DIASTOLIC BLOOD PRESSURE: 74 MMHG | HEART RATE: 50 BPM | SYSTOLIC BLOOD PRESSURE: 143 MMHG | OXYGEN SATURATION: 100 % | WEIGHT: 176.37 LBS

## 2024-05-17 DIAGNOSIS — C83.70 BURKITT LYMPHOMA, UNSPECIFIED BODY REGION (MULTI): ICD-10-CM

## 2024-05-17 LAB
ALBUMIN SERPL BCP-MCNC: 4.7 G/DL (ref 3.4–5)
ALP SERPL-CCNC: 100 U/L (ref 33–120)
ALT SERPL W P-5'-P-CCNC: 16 U/L (ref 10–52)
ANION GAP SERPL CALC-SCNC: 14 MMOL/L (ref 10–20)
AST SERPL W P-5'-P-CCNC: 14 U/L (ref 9–39)
BASOPHILS # BLD MANUAL: 0 X10*3/UL (ref 0–0.1)
BASOPHILS NFR BLD MANUAL: 0 %
BILIRUB SERPL-MCNC: 0.5 MG/DL (ref 0–1.2)
BUN SERPL-MCNC: 10 MG/DL (ref 6–23)
CALCIUM SERPL-MCNC: 9.3 MG/DL (ref 8.6–10.3)
CHLORIDE SERPL-SCNC: 104 MMOL/L (ref 98–107)
CO2 SERPL-SCNC: 29 MMOL/L (ref 21–32)
CREAT SERPL-MCNC: 1.11 MG/DL (ref 0.5–1.3)
DACRYOCYTES BLD QL SMEAR: ABNORMAL
EGFRCR SERPLBLD CKD-EPI 2021: >90 ML/MIN/1.73M*2
EOSINOPHIL # BLD MANUAL: 0 X10*3/UL (ref 0–0.7)
EOSINOPHIL NFR BLD MANUAL: 0 %
ERYTHROCYTE [DISTWIDTH] IN BLOOD BY AUTOMATED COUNT: 16.8 % (ref 11.5–14.5)
GLUCOSE BLD MANUAL STRIP-MCNC: 88 MG/DL (ref 74–99)
GLUCOSE SERPL-MCNC: 128 MG/DL (ref 74–99)
HCT VFR BLD AUTO: 26.9 % (ref 41–52)
HGB BLD-MCNC: 9.4 G/DL (ref 13.5–17.5)
IMM GRANULOCYTES # BLD AUTO: 0.31 X10*3/UL (ref 0–0.7)
IMM GRANULOCYTES NFR BLD AUTO: 5.7 % (ref 0–0.9)
LDH SERPL L TO P-CCNC: 187 U/L (ref 84–246)
LYMPHOCYTES # BLD MANUAL: 0.61 X10*3/UL (ref 1.2–4.8)
LYMPHOCYTES NFR BLD MANUAL: 11 %
MAGNESIUM SERPL-MCNC: 1.79 MG/DL (ref 1.6–2.4)
MCH RBC QN AUTO: 31.2 PG (ref 26–34)
MCHC RBC AUTO-ENTMCNC: 34.9 G/DL (ref 32–36)
MCV RBC AUTO: 89 FL (ref 80–100)
METAMYELOCYTES # BLD MANUAL: 0.06 X10*3/UL
METAMYELOCYTES NFR BLD MANUAL: 1 %
MONOCYTES # BLD MANUAL: 0.44 X10*3/UL (ref 0.1–1)
MONOCYTES NFR BLD MANUAL: 8 %
NEUTROPHILS # BLD MANUAL: 4.4 X10*3/UL (ref 1.2–7.7)
NEUTS BAND # BLD MANUAL: 0.11 X10*3/UL (ref 0–0.7)
NEUTS BAND NFR BLD MANUAL: 2 %
NEUTS SEG # BLD MANUAL: 4.29 X10*3/UL (ref 1.2–7)
NEUTS SEG NFR BLD MANUAL: 78 %
NRBC BLD-RTO: 0.7 /100 WBCS (ref 0–0)
OVALOCYTES BLD QL SMEAR: ABNORMAL
PLATELET # BLD AUTO: 59 X10*3/UL (ref 150–450)
POLYCHROMASIA BLD QL SMEAR: ABNORMAL
POTASSIUM SERPL-SCNC: 3.6 MMOL/L (ref 3.5–5.3)
PROT SERPL-MCNC: 6.5 G/DL (ref 6.4–8.2)
RBC # BLD AUTO: 3.01 X10*6/UL (ref 4.5–5.9)
RBC MORPH BLD: ABNORMAL
SODIUM SERPL-SCNC: 143 MMOL/L (ref 136–145)
TOTAL CELLS COUNTED BLD: 100
TOXIC GRANULES BLD QL SMEAR: PRESENT
WBC # BLD AUTO: 5.5 X10*3/UL (ref 4.4–11.3)

## 2024-05-17 PROCEDURE — 82947 ASSAY GLUCOSE BLOOD QUANT: CPT | Mod: 59

## 2024-05-17 PROCEDURE — 85027 COMPLETE CBC AUTOMATED: CPT

## 2024-05-17 PROCEDURE — 3430000001 HC RX 343 DIAGNOSTIC RADIOPHARMACEUTICALS: Performed by: INTERNAL MEDICINE

## 2024-05-17 PROCEDURE — 83615 LACTATE (LD) (LDH) ENZYME: CPT

## 2024-05-17 PROCEDURE — 80053 COMPREHEN METABOLIC PANEL: CPT

## 2024-05-17 PROCEDURE — 83735 ASSAY OF MAGNESIUM: CPT

## 2024-05-17 PROCEDURE — A9552 F18 FDG: HCPCS | Performed by: INTERNAL MEDICINE

## 2024-05-17 PROCEDURE — 78815 PET IMAGE W/CT SKULL-THIGH: CPT | Mod: PS

## 2024-05-17 PROCEDURE — 78815 PET IMAGE W/CT SKULL-THIGH: CPT | Mod: PET TUMOR SUBSQ TX STRATEGY | Performed by: NUCLEAR MEDICINE

## 2024-05-17 PROCEDURE — 85007 BL SMEAR W/DIFF WBC COUNT: CPT

## 2024-05-17 PROCEDURE — 36591 DRAW BLOOD OFF VENOUS DEVICE: CPT

## 2024-05-17 RX ORDER — FLUDEOXYGLUCOSE F 18 200 MCI/ML
14.6 INJECTION, SOLUTION INTRAVENOUS
Status: COMPLETED | OUTPATIENT
Start: 2024-05-17 | End: 2024-05-17

## 2024-05-17 RX ORDER — ACYCLOVIR 400 MG/1
400 TABLET ORAL 2 TIMES DAILY
Qty: 180 TABLET | Refills: 0 | Status: SHIPPED | OUTPATIENT
Start: 2024-05-17 | End: 2024-08-15

## 2024-05-17 RX ADMIN — FLUDEOXYGLUCOSE F 18 14.6 MILLICURIE: 200 INJECTION, SOLUTION INTRAVENOUS at 12:52

## 2024-05-17 ASSESSMENT — PAIN SCALES - GENERAL: PAINLEVEL: 0-NO PAIN

## 2024-05-17 NOTE — PROGRESS NOTES
[facility] Infusion Nursing Note  05/17/24    Shilo Thakkar is a 23 y.o. year old male patient presenting to outpatient infusion for a count check.     Hgb 9.4, platelets 59. No transfusion needs today.     Since the last visit, he reports doing well. Overall, he states that energy level is energy level is good. The patient is able to perform all ADLs. . Appetite has been unchanged and good. he reports no complaints.   There are no social work or other referral needs at this time.    Line type: R DL PICC    Follow-up Plan: 5/20 count check    Yamile Talbot RN

## 2024-05-20 ENCOUNTER — INFUSION (OUTPATIENT)
Dept: HEMATOLOGY/ONCOLOGY | Facility: HOSPITAL | Age: 24
End: 2024-05-20
Payer: COMMERCIAL

## 2024-05-20 VITALS
SYSTOLIC BLOOD PRESSURE: 138 MMHG | WEIGHT: 171.96 LBS | BODY MASS INDEX: 23.6 KG/M2 | DIASTOLIC BLOOD PRESSURE: 79 MMHG | HEART RATE: 53 BPM | TEMPERATURE: 97.2 F | OXYGEN SATURATION: 100 % | RESPIRATION RATE: 17 BRPM

## 2024-05-20 DIAGNOSIS — C83.70 BURKITT LYMPHOMA, UNSPECIFIED BODY REGION (MULTI): ICD-10-CM

## 2024-05-20 LAB
ALBUMIN SERPL BCP-MCNC: 4.8 G/DL (ref 3.4–5)
ALP SERPL-CCNC: 85 U/L (ref 33–120)
ALT SERPL W P-5'-P-CCNC: 15 U/L (ref 10–52)
ANION GAP SERPL CALC-SCNC: 12 MMOL/L (ref 10–20)
AST SERPL W P-5'-P-CCNC: 14 U/L (ref 9–39)
BASOPHILS # BLD AUTO: 0.01 X10*3/UL (ref 0–0.1)
BASOPHILS NFR BLD AUTO: 0.3 %
BILIRUB SERPL-MCNC: 0.6 MG/DL (ref 0–1.2)
BUN SERPL-MCNC: 10 MG/DL (ref 6–23)
CALCIUM SERPL-MCNC: 9.4 MG/DL (ref 8.6–10.3)
CHLORIDE SERPL-SCNC: 106 MMOL/L (ref 98–107)
CO2 SERPL-SCNC: 28 MMOL/L (ref 21–32)
CREAT SERPL-MCNC: 1.08 MG/DL (ref 0.5–1.3)
EGFRCR SERPLBLD CKD-EPI 2021: >90 ML/MIN/1.73M*2
EOSINOPHIL # BLD AUTO: 0.03 X10*3/UL (ref 0–0.7)
EOSINOPHIL NFR BLD AUTO: 0.8 %
ERYTHROCYTE [DISTWIDTH] IN BLOOD BY AUTOMATED COUNT: 18.2 % (ref 11.5–14.5)
GLUCOSE SERPL-MCNC: 95 MG/DL (ref 74–99)
HCT VFR BLD AUTO: 29.1 % (ref 41–52)
HGB BLD-MCNC: 10 G/DL (ref 13.5–17.5)
IMM GRANULOCYTES # BLD AUTO: 0.03 X10*3/UL (ref 0–0.7)
IMM GRANULOCYTES NFR BLD AUTO: 0.8 % (ref 0–0.9)
LDH SERPL L TO P-CCNC: 184 U/L (ref 84–246)
LYMPHOCYTES # BLD AUTO: 0.43 X10*3/UL (ref 1.2–4.8)
LYMPHOCYTES NFR BLD AUTO: 10.8 %
MAGNESIUM SERPL-MCNC: 2.02 MG/DL (ref 1.6–2.4)
MCH RBC QN AUTO: 31.3 PG (ref 26–34)
MCHC RBC AUTO-ENTMCNC: 34.4 G/DL (ref 32–36)
MCV RBC AUTO: 91 FL (ref 80–100)
MONOCYTES # BLD AUTO: 0.44 X10*3/UL (ref 0.1–1)
MONOCYTES NFR BLD AUTO: 11.1 %
NEUTROPHILS # BLD AUTO: 3.03 X10*3/UL (ref 1.2–7.7)
NEUTROPHILS NFR BLD AUTO: 76.2 %
NRBC BLD-RTO: 0 /100 WBCS (ref 0–0)
PLATELET # BLD AUTO: 86 X10*3/UL (ref 150–450)
POTASSIUM SERPL-SCNC: 4.1 MMOL/L (ref 3.5–5.3)
PROT SERPL-MCNC: 6.6 G/DL (ref 6.4–8.2)
RBC # BLD AUTO: 3.19 X10*6/UL (ref 4.5–5.9)
SODIUM SERPL-SCNC: 142 MMOL/L (ref 136–145)
WBC # BLD AUTO: 4 X10*3/UL (ref 4.4–11.3)

## 2024-05-20 PROCEDURE — 83615 LACTATE (LD) (LDH) ENZYME: CPT

## 2024-05-20 PROCEDURE — 36591 DRAW BLOOD OFF VENOUS DEVICE: CPT

## 2024-05-20 PROCEDURE — 83735 ASSAY OF MAGNESIUM: CPT

## 2024-05-20 PROCEDURE — 80053 COMPREHEN METABOLIC PANEL: CPT

## 2024-05-20 PROCEDURE — 36415 COLL VENOUS BLD VENIPUNCTURE: CPT

## 2024-05-20 PROCEDURE — 85025 COMPLETE CBC W/AUTO DIFF WBC: CPT

## 2024-05-20 PROCEDURE — 87040 BLOOD CULTURE FOR BACTERIA: CPT

## 2024-05-20 RX ORDER — AMOXICILLIN AND CLAVULANATE POTASSIUM 875; 125 MG/1; MG/1
875 TABLET, FILM COATED ORAL 2 TIMES DAILY
Qty: 14 TABLET | Refills: 0 | Status: SHIPPED | OUTPATIENT
Start: 2024-05-20 | End: 2024-05-27

## 2024-05-20 ASSESSMENT — PAIN SCALES - GENERAL: PAINLEVEL_OUTOF10: 0-NO PAIN

## 2024-05-20 NOTE — PROGRESS NOTES
[facility] Infusion Nursing Note  05/20/24    Shilo Thakkar is a 23 y.o. year old male patient presenting to outpatient infusion for a count check.     Since the last visit, he reports doing well. Overall, he states that energy level is energy level is good. The patient is able to perform all ADLs. . Appetite has been unchanged and good.     he reports  itching at his PICC line site . Itching started on Wednesday 5/15 after dressing was changed, it was still itchy on Friday 5/17 when I assessed the site but did not see any redness, drainage, etc. Today when I assessed the dressing, the top was peeling off so I began a dressing change. When the stat lock was removed, noted honey-colored crusting and an area of small raised-red bumps. Pt still endorses itching. Shan Scales PA-C came to assess the site and took a photo to consult with Dr. Klein for next steps. Cleaned the site with CHG and covered with a tegaderm while decision was being made.     Decision was ultimately made by Dr. Klein to pull the PICC line after blood cultures x2 (one from red lumen, one from L AC peripheral). Line pulled by Maureen DUNBAR under my supervision, pulled following sterile  policy and procedure. VS stable both before and after observation.     Pt will take a course of oral antibiotics per orders, script sent to his home pharmacy by Shan Scales PA-C.     There are no social work or other referral needs at this time.    Line type: R DL PICC  Line removed/maintained prior to discharge: line removed        Follow-up Plan: 5/22 FUV and count check    Yamile Talbot RN

## 2024-05-22 ENCOUNTER — OFFICE VISIT (OUTPATIENT)
Dept: HEMATOLOGY/ONCOLOGY | Facility: HOSPITAL | Age: 24
End: 2024-05-22
Payer: COMMERCIAL

## 2024-05-22 ENCOUNTER — TELEPHONE (OUTPATIENT)
Dept: HEMATOLOGY/ONCOLOGY | Facility: HOSPITAL | Age: 24
End: 2024-05-22
Payer: COMMERCIAL

## 2024-05-22 ENCOUNTER — APPOINTMENT (OUTPATIENT)
Dept: HEMATOLOGY/ONCOLOGY | Facility: HOSPITAL | Age: 24
End: 2024-05-22
Payer: COMMERCIAL

## 2024-05-22 ENCOUNTER — LAB (OUTPATIENT)
Dept: LAB | Facility: HOSPITAL | Age: 24
End: 2024-05-22
Payer: COMMERCIAL

## 2024-05-22 VITALS
DIASTOLIC BLOOD PRESSURE: 68 MMHG | OXYGEN SATURATION: 100 % | BODY MASS INDEX: 24.5 KG/M2 | RESPIRATION RATE: 16 BRPM | HEART RATE: 45 BPM | WEIGHT: 178.5 LBS | TEMPERATURE: 96.8 F | SYSTOLIC BLOOD PRESSURE: 125 MMHG

## 2024-05-22 DIAGNOSIS — D61.818 PANCYTOPENIA (MULTI): ICD-10-CM

## 2024-05-22 DIAGNOSIS — C91.10 CLL (CHRONIC LYMPHOCYTIC LEUKEMIA) (MULTI): ICD-10-CM

## 2024-05-22 DIAGNOSIS — C83.70 BURKITT LYMPHOMA, UNSPECIFIED BODY REGION (MULTI): ICD-10-CM

## 2024-05-22 DIAGNOSIS — C83.70 BURKITT LYMPHOMA, UNSPECIFIED BODY REGION (MULTI): Primary | ICD-10-CM

## 2024-05-22 DIAGNOSIS — C83.78 BURKITT LYMPHOMA OF LYMPH NODES OF MULTIPLE REGIONS (MULTI): ICD-10-CM

## 2024-05-22 LAB
ALBUMIN SERPL BCP-MCNC: 4.7 G/DL (ref 3.4–5)
ALP SERPL-CCNC: 77 U/L (ref 33–120)
ALT SERPL W P-5'-P-CCNC: 14 U/L (ref 10–52)
ANION GAP SERPL CALC-SCNC: 11 MMOL/L (ref 10–20)
APTT PPP: 34 SECONDS (ref 27–38)
AST SERPL W P-5'-P-CCNC: 13 U/L (ref 9–39)
BASOPHILS # BLD AUTO: 0.01 X10*3/UL (ref 0–0.1)
BASOPHILS NFR BLD AUTO: 0.3 %
BILIRUB DIRECT SERPL-MCNC: 0.1 MG/DL (ref 0–0.3)
BILIRUB SERPL-MCNC: 0.5 MG/DL (ref 0–1.2)
BUN SERPL-MCNC: 10 MG/DL (ref 6–23)
CALCIUM SERPL-MCNC: 9.2 MG/DL (ref 8.6–10.3)
CHLORIDE SERPL-SCNC: 106 MMOL/L (ref 98–107)
CO2 SERPL-SCNC: 30 MMOL/L (ref 21–32)
CREAT SERPL-MCNC: 1.14 MG/DL (ref 0.5–1.3)
EGFRCR SERPLBLD CKD-EPI 2021: >90 ML/MIN/1.73M*2
EOSINOPHIL # BLD AUTO: 0.04 X10*3/UL (ref 0–0.7)
EOSINOPHIL NFR BLD AUTO: 1.1 %
ERYTHROCYTE [DISTWIDTH] IN BLOOD BY AUTOMATED COUNT: 19.3 % (ref 11.5–14.5)
ERYTHROCYTE [SEDIMENTATION RATE] IN BLOOD BY WESTERGREN METHOD: <1 MM/H (ref 0–15)
FERRITIN SERPL-MCNC: 1431 NG/ML (ref 20–300)
FIBRINOGEN PPP-MCNC: 270 MG/DL (ref 200–400)
FOLATE SERPL-MCNC: >24 NG/ML
GLUCOSE SERPL-MCNC: 87 MG/DL (ref 74–99)
HCT VFR BLD AUTO: 30.2 % (ref 41–52)
HGB BLD-MCNC: 10.1 G/DL (ref 13.5–17.5)
IMM GRANULOCYTES # BLD AUTO: 0.02 X10*3/UL (ref 0–0.7)
IMM GRANULOCYTES NFR BLD AUTO: 0.5 % (ref 0–0.9)
INR PPP: 1.1 (ref 0.9–1.1)
IRON SATN MFR SERPL: 47 % (ref 25–45)
IRON SERPL-MCNC: 141 UG/DL (ref 35–150)
LDH SERPL L TO P-CCNC: 177 U/L (ref 84–246)
LYMPHOCYTES # BLD AUTO: 0.42 X10*3/UL (ref 1.2–4.8)
LYMPHOCYTES NFR BLD AUTO: 11.4 %
MAGNESIUM SERPL-MCNC: 1.82 MG/DL (ref 1.6–2.4)
MCH RBC QN AUTO: 31.5 PG (ref 26–34)
MCHC RBC AUTO-ENTMCNC: 33.4 G/DL (ref 32–36)
MCV RBC AUTO: 94 FL (ref 80–100)
MONOCYTES # BLD AUTO: 0.47 X10*3/UL (ref 0.1–1)
MONOCYTES NFR BLD AUTO: 12.7 %
NEUTROPHILS # BLD AUTO: 2.74 X10*3/UL (ref 1.2–7.7)
NEUTROPHILS NFR BLD AUTO: 74 %
NRBC BLD-RTO: 0 /100 WBCS (ref 0–0)
PLATELET # BLD AUTO: 94 X10*3/UL (ref 150–450)
POTASSIUM SERPL-SCNC: 4.1 MMOL/L (ref 3.5–5.3)
PROT SERPL-MCNC: 6.5 G/DL (ref 6.4–8.2)
PROTHROMBIN TIME: 12.2 SECONDS (ref 9.8–12.8)
RBC # BLD AUTO: 3.21 X10*6/UL (ref 4.5–5.9)
SODIUM SERPL-SCNC: 143 MMOL/L (ref 136–145)
TIBC SERPL-MCNC: 299 UG/DL (ref 240–445)
UIBC SERPL-MCNC: 158 UG/DL (ref 110–370)
WBC # BLD AUTO: 3.7 X10*3/UL (ref 4.4–11.3)

## 2024-05-22 PROCEDURE — 85652 RBC SED RATE AUTOMATED: CPT

## 2024-05-22 PROCEDURE — 99214 OFFICE O/P EST MOD 30 MIN: CPT | Performed by: INTERNAL MEDICINE

## 2024-05-22 PROCEDURE — 84075 ASSAY ALKALINE PHOSPHATASE: CPT

## 2024-05-22 PROCEDURE — 83735 ASSAY OF MAGNESIUM: CPT

## 2024-05-22 PROCEDURE — 82248 BILIRUBIN DIRECT: CPT

## 2024-05-22 PROCEDURE — 85730 THROMBOPLASTIN TIME PARTIAL: CPT | Performed by: INTERNAL MEDICINE

## 2024-05-22 PROCEDURE — 82728 ASSAY OF FERRITIN: CPT

## 2024-05-22 PROCEDURE — 85025 COMPLETE CBC W/AUTO DIFF WBC: CPT

## 2024-05-22 PROCEDURE — 83540 ASSAY OF IRON: CPT

## 2024-05-22 PROCEDURE — 85384 FIBRINOGEN ACTIVITY: CPT | Performed by: INTERNAL MEDICINE

## 2024-05-22 PROCEDURE — 82746 ASSAY OF FOLIC ACID SERUM: CPT

## 2024-05-22 PROCEDURE — 85610 PROTHROMBIN TIME: CPT | Performed by: INTERNAL MEDICINE

## 2024-05-22 PROCEDURE — 83615 LACTATE (LD) (LDH) ENZYME: CPT

## 2024-05-22 PROCEDURE — 36415 COLL VENOUS BLD VENIPUNCTURE: CPT

## 2024-05-22 ASSESSMENT — PAIN SCALES - GENERAL: PAINLEVEL_OUTOF10: 0-NO PAIN

## 2024-05-22 NOTE — PATIENT INSTRUCTIONS
Please obtain lab work in 4 weeks. Will follow up with Dr. Klein in 3 months with CT prior to appointment. Referral to ENT placed. Please contact the office with any questions at [phone].

## 2024-05-22 NOTE — TELEPHONE ENCOUNTER
Referral made to ENT and patient scheduled in August with general ENT. I contacted Dr. Huber's office and obtained appointment for 6/13/24 at 345pm. Attempted to contact the patient but reached . Left message and also sent message via [employer] updating patient of new appointment provider, date and time.

## 2024-05-24 ENCOUNTER — APPOINTMENT (OUTPATIENT)
Dept: HEMATOLOGY/ONCOLOGY | Facility: HOSPITAL | Age: 24
End: 2024-05-24
Payer: COMMERCIAL

## 2024-05-24 LAB
BACTERIA BLD CULT: NORMAL
BACTERIA BLD CULT: NORMAL

## 2024-05-24 NOTE — PROGRESS NOTES
Patient ID: Shilo Thakkar is a 23 y.o. male.  Oncologic History  Burkitt lymphoma, high risk, MYC positive  - 10/19/23 presented to OSH ED with dysphagia and abdominal pain  - CT A/P with IV contrast (10/19) mass in the body of the pancreas measuring approximately 2.7 x 2.9 cm. The pancreatic duct is dilated. Multiple masses in the mesentery as well as omental caking consistent with carcinomatosis. Moderate amount of free fluid throughout the abdomen and pelvis.   - CT soft tissue neck with IV contrast (10/19): homogeneous soft tissue attenuation lesion in the L oropharynx which measures approximately 3.0 x 4.0 x 5.1 cm.   - US guided mesenteric LN biopsy (10/20/23): HIGH GRADE B-CELL LYMPHOMA MORPHOLOGICALLY CONSISTENT WITH BURKITT LYMPHOMA; flow cytometry: No B cell population identified  - Bone marrow aspiration biopsy November 1, 2023 no evidence of lymphoma involvement.  CSF by flow cytometry showed no lymphoma 11/2/2023.  - Biopsy L oropharyngeal mass and L cervical LN by ENT (10/23/23) high grade B-cell burkitt lymphoma, MYC positive, Bcl-2 negative.  - MR brain with and w/o IV contrast (10/27): No evidence of intracranial metastatic disease.     - Dexa 40 mg daily ended 11/1  - PET CT ordered 10/30 showed FDG avid left oropharyngeal mass and LAD above and below diaphragm  - Started HyperCVAD 10/29 cycle 1 day 1  - Part A:   D1: CTX + mesna, dexamethasone, D2: CTX + mesna, D3: CTX + mesna, D4: Doxo, vinca, D6: Ritux, GCSF, D9: IT,   D2:  intrathecal methotrexate November 2, 2023, 12 mg, CSF shows no B cells by flow cytometry.  D6: Intrathecal cytarabine 100 mg on November 6, 2023.  D11: vinca, dexamethasone, D13: Ritux 11/10  D13: Rituximab therapy  - Daily neupogen since 11/3/23  -Discharged home November 11, 2023  -Evaluation November 15, 2023: Recovered thrombocytopenia, stop levofloxacin, continue prophylactic acyclovir and 3 times weekly Bactrim and fluconazole.  Depressed affect noted.  -Cycle 2-day 1  part to be high-dose methotrexate plus high-dose cytarabine, November 20, 2023,  with 2 intrathecal chemotherapy administered  -Neulasta on 11/27/2023.  -Evaluation 12/6/2023 asymptomatic gaining weight doing well.  To schedule twice weekly CBC BMP Mondays and Thursdays after discharge, emphasized prophylactic levofloxacin between day 5 and day 15, acyclovir, fluconazole and 3 times a week Bactrim.  -CT scan of the chest abdomen or pelvis on December 6, 2023 shows complete resolution of intra-abdominal disease, with mildly worse splenomegaly most likely unrelated to his lymphoma.  -Cycle 3 hyper-CVAD regimen December 12, 2023, including Rituxan day 1 and day 11, intrathecal methotrexate 12/14/2023, intrathecal cytarabine December 18, 2023.  -December 27, 2023: Feeling well asymptomatic tolerated cycle 3 well, plan for cycle 4 January 2, 2024 consisting of high-dose methotrexate high-dose cytarabine, and includes 2 doses of rituximab day 1 day 8, and 2 doses of intrathecal chemotherapy, intrathecal cytarabine day 1 and intrathecal methotrexate day 8  -Hospitalized  1/17-1/23/2024 with visual changes, retinoschisis, profound pancytopenia s/p left eye vitrectomy, retinal hemorrgages. Delay cycle 5. Discuss with ophth regarding etiology and plans.  -Evaluation January 31, 2024: Vision appears to be improving.  Discussed with ophthalmology.  They will be seeing February 2 and will decide whether they will clear him for cycle 5 of chemotherapy which would be tentatively planned to start February 9, 2024 as an inpatient at modified dosing.  Repeat echocardiogram, continue prophylactic antibiotics.  Will need 3 times weekly labs for close monitoring and will keep platelets above 50,000, keep hemoglobin above 8.5-9.  Will need closer supervision as he was noncompliant with twice weekly labs before.  -Evaluation 2/7/2024: Continues to improve, vision clearly better.  Appears to have been cleared by ophthalmology, to proceed  with cycle 5 starting 2/9/2024 with 80% cyclophosphamide.  Needs echocardiogram repeated to verify a normal ejection fraction.  Upon discharge he will need 3 times a week count check to assure compliance, as he had been noncompliant with twice weekly labs previously.  Need to keep platelets above 30-50,000, need to keep hemoglobin above 8.5 at esthela.  Final cycle of chemotherapy will be also dose reduced with cytarabine down to 1 g/m².  Needs a chest x-ray on Friday before starting  -Evaluation 2/20/2024: Doing well.  Eyes appear to be improving tolerated cycle 5 without difficulty.  We omitted intrathecal on cycle 5.  Plan to admit on 3/5/2024 for cycle 6, planned dose reduction of high-dose cytarabine to 1.5 g/m², keeping methotrexate the same.  We will incorporate intrathecal cytarabine day 1, patient declining a second intrathecal.  We are in the process of trying to convince the patient to complete 8 cycles to adhere to the published data on hyper CVAD and Burkitt's lymphoma.  -Evaluation 3/20/2024 C6D15 recovering counts.  Keeping hemoglobin above 8 and platelets above 50 due to the retinal hemorrhages.  Overall doing well.  After discussion with patient and mom, we decided to proceed with 8 cycles, with 1 intrathecal with a each cycle.  Next cycle will be on 3/28/2024, should include rituximab day 1 and day 11, and intrathecal methotrexate 12 mg on day 2 [+/-1 day) and day 11 vincristine and dexamethasone pulse.  Cycle 8 is planned with dose reduction of cytarabine to 1 g/m² per dose and methotrexate to to 800 mg/m² total [20% as short infusion and 80% as 24-hour infusion].  -Evaluation 3/27/2024,C6D22, platelets less than 80,000, we will wait till Monday, April 1, 2024 and proceed with cycle 7, on cycle consisting of cyclophosphamide 250 mg/m² every 12 hours D1-D3, doxorubicin 50 mg/m², vincristine 2 mg flat dose D4 and day 11, dexamethasone 40 mg p.o. day 4 and day 11 through day 14, rituximab possibly  after discharge 375 mg/m², intrathecal methotrexate 12 mg to be delivered day 2 or day 3 of the cycle.  Post chemotherapy the patient will need pegfilgrastim, and will need to be set up  for count check Monday Wednesday Friday, keeping platelets above 50,000, keeping hemoglobin above 8, orders  for interim nursing assessment RN.  -Evaluation 4/17/2024: Completed 7 cycles did well since discharge without any significant toxicities except for the expected grade 3 thrombocytopenia requiring transfusion.  Had difficulty with lumbar puncture last admission.  -Evaluation 5/22/2024: Completed 8 cycles of HyperCVAD, last cycle delivered April 30, 2024.  PET scan 5/17/2024, end of therapy PET, ARNOLD, Deauville score 2.  Nonspecific metabolic activity in the right palatine tonsil and tongue felt to be physiologic.  Patient admitted to eating 2 hours before the PET scan.  Referred to ENT, plan repeat imaging in 3 months with CT neck chest abdomen pelvis.    Therapy:  Hyper-CVAD C1D1 10/29/2023  Hyper-CVAD C2D1 11/20/2023  Hyper-CVAD C3D1 12/12/2023  Hyper-CVAD C4D1 01/02/2024  Hyper-CVAD C5D1 02/09/2024  Hyper-CVAD C6D1 03/06/2024  Hyper-CVAD C7D1 04/01/2024   Hyper-CVAD C8D1 04/30/2024     Subjective    HPI  Patient is doing well.  He is largely asymptomatic.  He and his family appear to be Deauville and today in good mood, as they marked the end of his treatment.  Denies self-palpated adenopathy or B symptoms.  Normal masses in the neck underarm or groin.  No dysphagia, fever, night sweats, weight loss, abdominal fullness pain discomfort or change in bowel habits.  No edema in legs.  No paresthesias.  His vision continues to slowly improve and his ophthalmologist is prescribing glasses.    Review of Systems    Head and neck: Other than HPI negative  CNS: Other than HPI negative  Cardiovascular: Other than HPI negative  Pulmonary: Other than HPI negative  GI: Other than HPI negative  : Other than HPI negative  Bleeding: Other  than HPI negative  Constitutional: Other than HPI negative       Current Outpatient Medications on File Prior to Visit   Medication Sig Dispense Refill    acyclovir (Zovirax) 400 mg tablet Take 1 tablet (400 mg) by mouth 2 times a day. 180 tablet 0    amoxicillin-pot clavulanate (Augmentin) 875-125 mg tablet Take 1 tablet (875 mg) by mouth 2 times a day for 7 days. 14 tablet 0    dorzolamide-timoloL (Cosopt) 22.3-6.8 mg/mL ophthalmic solution Administer 1 drop into the right eye 2 times a day. 10 mL 2    ferrous sulfate, 325 mg ferrous sulfate, tablet Take 1 tablet by mouth 2 times a day. 60 tablet 11    ketorolac (Acular) 0.5 % ophthalmic solution Administer 1 drop into both eyes once daily. Use one drop 4 times a day in the right eye      pantoprazole (ProtoNix) 40 mg EC tablet Take 1 tablet (40 mg) by mouth once daily in the morning. Take before meals. Do not crush, chew, or split. 30 tablet 0    potassium chloride CR (Klor-Con M20) 20 mEq ER tablet Take 1 tablet (20 mEq) by mouth once daily. Do not crush or chew.      [] fluconazole (Diflucan) 200 mg tablet Take 2 tablets (400 mg) by mouth once daily for 14 days. 28 tablet 0    [DISCONTINUED] acyclovir (Zovirax) 400 mg tablet Take 1 tablet (400 mg) by mouth 2 times a day. 180 tablet 0     No current facility-administered medications on file prior to visit.      Objective    BSA: 2.02 meters squared  /68 (BP Location: Left arm, Patient Position: Sitting, BP Cuff Size: Large adult)   Pulse (!) 45   Temp 36 °C (96.8 °F) (Skin)   Resp 16   Wt 81 kg (178 lb 8 oz)   SpO2 100%   BMI 24.50 kg/m²      Physical Exam  Alert oriented not in distress not pale or jaundiced  Lymph nodes: No adenopathy  Oral mucosa negative, no mucositis  Head is normocephalic atraumatic, pupils equal reactive  Neck is supple no adenopathy, no thyromegaly  Lungs show equal air entry, no crackles or wheezing, equal percussion  Heart shows regular rate and rhythm normal S1-S2  no murmurs or gallop rhythm  Abdomen is soft nontender, moves with respiration, no hepatosplenomegaly or masses, positive bowel sounds, not distended.  Lower extremities show no edema  Neurologically grossly nonfocal  Psych: Normal affect      Performance Status:  Symptomatic; fully ambulatory      Assessment/Plan   Burkitt's lymphoma-high risk  Diagnosis October 2023  Therapy hyper-CVAD plus rituximab x 8 cycles with intrathecal chemotherapy [about 10 intrathecal treatments]  Therapy related toxicity: Profound cytopenias, intraocular hemorrhage is resolved  Disease status: No evidence of disease doing very well asymptomatic  End of therapy PET: Physiologic oral activity  Plan: Gerry back 3 months with CT neck and chest abdomen pelvis with contrast  Outcome: Complete remission, happy about completion of therapy, regimen related toxicities resolving or resolved fully.  Intraocular hemorrhage in the context of thrombocytopenia resolved.    Will see back in 3 months    Cancer Staging   No matching staging information was found for the patient.      Oncology History   Burkitt lymphoma (Multi)   10/27/2023 Initial Diagnosis    Burkitt lymphoma, high risk, MYC positive  - 10/19/23 presented to OSH ED with dysphagia and abdominal pain  - CT A/P with IV contrast (10/19) mass in the body of the pancreas measuring approximately 2.7 x 2.9 cm. The pancreatic duct is dilated. Multiple masses in the mesentery as well as omental caking consistent with carcinomatosis. Moderate amount of free fluid throughout the abdomen and pelvis.   - CT soft tissue neck with IV contrast (10/19): homogeneous soft tissue attenuation lesion in the L oropharynx which measures approximately 3.0 x 4.0 x 5.1 cm.   - US guided mesenteric LN biopsy (10/20/23): HIGH GRADE B-CELL LYMPHOMA MORPHOLOGICALLY CONSISTENT WITH BURKITT LYMPHOMA; flow cytometry: No B cell population identified  - Bone marrow aspiration biopsy November 1, 2023 no evidence of  lymphoma involvement.  CSF by flow cytometry showed no lymphoma 11/2/2023.  - Biopsy L oropharyngeal mass and L cervical LN by ENT (10/23/23) high grade B-cell burkitt lymphoma, MYC positive, Bcl-2 negative.  - MR brain with and w/o IV contrast (10/27): No evidence of intracranial metastatic disease.     - Dexa 40 mg daily ended 11/1  - PET CT ordered 10/30 showed FDG avid left oropharyngeal mass and LAD above and below diaphragm     10/29/2023 -  Chemotherapy    Hyper-CVAD C1D1 10/29/2023  Hyper-CVAD C2D1 11/20/2023  Hyper-CVAD C3D1 12/12/2023  Hyper-CVAD C4D1 01/02/2024  Hyper-CVAD C5D1 02/09/2024  Hyper-CVAD C6D1 03/06/2024 (Dose reduce Cytarabine at 1.5 G/m2 per dose with Methotrexate 1000 mg/m2 total; LP with intrathecal cytarabine during C6 on 3/7; CSF flow cytometry negative for lymphoma)  Hyper-CVAD C7D1 04/01/2024 (LP with intrathecal methotrexate 12 mg on 4/3; CSF flow cytometry negative for lymphoma)   HyperCVAD C8D1 4/29/24 High-dose methotrexate 1 g/m² total dose over 24 hours as before, with 2 total doses of Cytarabine at 1 g/m² per dose given the profound thrombocytopenia that he developed with the last cycle in an attempt to mitigate risk due to his intraocular hemorrhage history. LP with Intrathecal Cytarabine 4/30; CSF flow cytometry negative for lymphoma.   HyperCVAD + RiTUXimab, 21 Day Cycles     12/6/2023 Imaging    -CT scan of the chest abdomen or pelvis on December 6, 2023 shows complete resolution of intra-abdominal disease, with mildly worse splenomegaly most likely unrelated to his lymphoma.      1/15/2024 Imaging    -PET scan 1/15/24 after C4:   Interval near complete/complete resolution of previous metabolic activity. Deauville Score 2.                   Olegario Klein MD

## 2024-06-13 ENCOUNTER — OFFICE VISIT (OUTPATIENT)
Dept: OTOLARYNGOLOGY | Facility: HOSPITAL | Age: 24
End: 2024-06-13
Payer: COMMERCIAL

## 2024-06-13 VITALS — HEIGHT: 72 IN | WEIGHT: 176 LBS | BODY MASS INDEX: 23.84 KG/M2 | TEMPERATURE: 97.9 F

## 2024-06-13 DIAGNOSIS — R22.0 SWELLING, MASS, OR LUMP ON FACE: Primary | ICD-10-CM

## 2024-06-13 PROCEDURE — 99213 OFFICE O/P EST LOW 20 MIN: CPT | Performed by: OTOLARYNGOLOGY

## 2024-06-18 NOTE — PROGRESS NOTES
Here for follow-up visit status post treatment from Burkitt lymphoma    I biopsied his lymph node on the left as well as left tonsillar mass    He had what appears to be a complete response however recent PET scan showed some activity in the head and neck      He is asymptomatic      PET scan has been reviewed in detail with him which appears to be physiologic activity it was compared to the original lesion and locations    It was reviewed with the patient        Physical Exam:  CONSTITUTIONAL:  No acute distress  VOICE:  No hoarseness or other abnormality  RESPIRATION:  Breathing comfortably, no stridor  CV:  No clubbing/cyanosis/edema in hands  EYES:  EOM intact, sclera normal  NEURO:  Alert and oriented times 3, Cranial nerves II-XII grossly intact and symmetric bilaterally  HEAD AND FACE:  Symmetric facial features, no masses or lesions, sinuses non-tender to palpation  SALIVARY GLANDS:  Parotid and submandibular glands normal bilaterally  EARS:  Normal external ears, external auditory canals, and TMs to otoscopy, normal hearing to whispered voice.  NOSE:  External nose midline, anterior rhinoscopy is normal with limited visualization to the anterior aspect of the interior turbinates, no bleeding or drainage, no lesions  ORAL CAVITY/OROPHARYNX/LIPS:  Normal mucous membranes, normal floor of mouth/tongue/OP, no masses or lesions  PHARYNGEAL WALLS:  No masses or lesions  NECK/LYMPH:  No LAD, no thyroid masses, trachea midline  SKIN: Healed biopsy site  PSYCH:  Alert and oriented with appropriate mood and affect           Imp ARNOLD    Patient with normal ENT exam, the PET scan appears to be physiologic in nature and does not correlate to any intraoral or head neck abnormality    Patient was reassured and he can see me as needed

## 2024-08-19 ENCOUNTER — APPOINTMENT (OUTPATIENT)
Dept: OPHTHALMOLOGY | Facility: CLINIC | Age: 24
End: 2024-08-19
Payer: COMMERCIAL

## 2024-08-19 DIAGNOSIS — H33.322 PERIPHERAL RETINAL HOLE OF LEFT EYE: ICD-10-CM

## 2024-08-19 DIAGNOSIS — H35.63 RETINAL HEMORRHAGE OF BOTH EYES: ICD-10-CM

## 2024-08-19 DIAGNOSIS — H35.351 CYSTOID MACULAR EDEMA, RIGHT EYE: Primary | ICD-10-CM

## 2024-08-19 PROCEDURE — 92134 CPTRZ OPH DX IMG PST SGM RTA: CPT

## 2024-08-19 PROCEDURE — 99213 OFFICE O/P EST LOW 20 MIN: CPT

## 2024-08-19 ASSESSMENT — CONF VISUAL FIELD
OD_INFERIOR_NASAL_RESTRICTION: 0
METHOD: COUNTING FINGERS
OD_INFERIOR_TEMPORAL_RESTRICTION: 0
OS_INFERIOR_NASAL_RESTRICTION: 3
OD_SUPERIOR_TEMPORAL_RESTRICTION: 0
OD_SUPERIOR_NASAL_RESTRICTION: 0
OD_NORMAL: 1

## 2024-08-19 ASSESSMENT — ENCOUNTER SYMPTOMS
PSYCHIATRIC NEGATIVE: 0
GASTROINTESTINAL NEGATIVE: 0
NEUROLOGICAL NEGATIVE: 0
CONSTITUTIONAL NEGATIVE: 0
MUSCULOSKELETAL NEGATIVE: 0
ALLERGIC/IMMUNOLOGIC NEGATIVE: 0
CARDIOVASCULAR NEGATIVE: 0
HEMATOLOGIC/LYMPHATIC NEGATIVE: 0
ENDOCRINE NEGATIVE: 0
EYES NEGATIVE: 1
RESPIRATORY NEGATIVE: 0

## 2024-08-19 ASSESSMENT — EXTERNAL EXAM - RIGHT EYE: OD_EXAM: NORMAL

## 2024-08-19 ASSESSMENT — VISUAL ACUITY
OS_SC: 20/100
METHOD: SNELLEN - LINEAR
OD_SC: 20/30
OS_SC+: -2
OD_SC+: -1

## 2024-08-19 ASSESSMENT — CUP TO DISC RATIO
OD_RATIO: 0.3
OS_RATIO: 0.3

## 2024-08-19 ASSESSMENT — SLIT LAMP EXAM - LIDS
COMMENTS: NORMAL
COMMENTS: NORMAL

## 2024-08-19 ASSESSMENT — TONOMETRY
OD_IOP_MMHG: 24
IOP_METHOD: TONOPEN
OS_IOP_MMHG: 22

## 2024-08-19 ASSESSMENT — EXTERNAL EXAM - LEFT EYE: OS_EXAM: NORMAL

## 2024-08-19 NOTE — PROGRESS NOTES
Bilateral macula-involving sub ILM hemorrhage with serous detachment OS>OD  Multilayer retinal hemorrhage, left eye  S/p pars plana vitrectomy (PPV)/C3F8/intraretinal tPA OS  S/P PPV/MP/EL/air OD    OS:  VH has resolved  VA improved to 20/100  OCT with outer retinal changes that may explain 20/200 vision  Retina is flat OS  2 small retinal hole at the posterior pole (temporal to the macula and and juxtafoveal superior)    OD:  Hemorrhages resolved  VA 20/30  Doing well  Continue to monitor    OCT 08/19/24     - Right eye (OD): resolved CME    - Left eye (OS): outer retinal discontinuities, hyperreflective foci at fovea    Prior Optos:    - Right eye (OD): SubLM subfoveal clotted heme now mostly resolved with scattered pre-retinal hemorrhage with in the posterior pole; stable    - OS:  Poor view 25% gas fill, retina is attached, residual preretinal blood clot with fibrin membranes in the temporal retina     Plan:  - Off all drops  - Discussed option of laser to retinal hole OS; patient opted to observe  - RTC in 3 months

## 2024-08-21 ENCOUNTER — APPOINTMENT (OUTPATIENT)
Dept: HEMATOLOGY/ONCOLOGY | Facility: HOSPITAL | Age: 24
End: 2024-08-21
Payer: COMMERCIAL

## 2024-08-26 ENCOUNTER — HOSPITAL ENCOUNTER (OUTPATIENT)
Dept: RADIOLOGY | Facility: HOSPITAL | Age: 24
End: 2024-08-26

## 2024-08-28 ENCOUNTER — APPOINTMENT (OUTPATIENT)
Dept: OTOLARYNGOLOGY | Facility: CLINIC | Age: 24
End: 2024-08-28
Payer: COMMERCIAL

## 2024-08-28 ENCOUNTER — APPOINTMENT (OUTPATIENT)
Dept: HEMATOLOGY/ONCOLOGY | Facility: HOSPITAL | Age: 24
End: 2024-08-28

## 2024-09-09 ENCOUNTER — HOSPITAL ENCOUNTER (OUTPATIENT)
Dept: RADIOLOGY | Facility: HOSPITAL | Age: 24
Discharge: HOME | End: 2024-09-09

## 2024-09-09 DIAGNOSIS — C83.70 BURKITT LYMPHOMA, UNSPECIFIED BODY REGION (MULTI): ICD-10-CM

## 2024-09-09 PROCEDURE — 70491 CT SOFT TISSUE NECK W/DYE: CPT

## 2024-09-09 PROCEDURE — 70491 CT SOFT TISSUE NECK W/DYE: CPT | Performed by: RADIOLOGY

## 2024-09-09 PROCEDURE — 2550000001 HC RX 255 CONTRASTS: Performed by: INTERNAL MEDICINE

## 2024-09-11 ENCOUNTER — APPOINTMENT (OUTPATIENT)
Dept: GENETICS | Facility: CLINIC | Age: 24
End: 2024-09-11
Payer: COMMERCIAL

## 2024-09-17 ENCOUNTER — OFFICE VISIT (OUTPATIENT)
Dept: HEMATOLOGY/ONCOLOGY | Facility: HOSPITAL | Age: 24
End: 2024-09-17
Payer: COMMERCIAL

## 2024-09-17 VITALS
BODY MASS INDEX: 22.05 KG/M2 | TEMPERATURE: 98.2 F | RESPIRATION RATE: 16 BRPM | HEART RATE: 53 BPM | WEIGHT: 160.7 LBS | SYSTOLIC BLOOD PRESSURE: 120 MMHG | OXYGEN SATURATION: 100 % | DIASTOLIC BLOOD PRESSURE: 55 MMHG

## 2024-09-17 DIAGNOSIS — C83.70 BURKITT LYMPHOMA, UNSPECIFIED BODY REGION (MULTI): Primary | ICD-10-CM

## 2024-09-17 PROCEDURE — 99214 OFFICE O/P EST MOD 30 MIN: CPT | Performed by: INTERNAL MEDICINE

## 2024-09-17 ASSESSMENT — PAIN SCALES - GENERAL: PAINLEVEL: 0-NO PAIN

## 2024-09-20 NOTE — PROGRESS NOTES
Patient ID: Shilo Thakkar is a 24 y.o. male.  Oncologic History  Burkitt lymphoma, high risk, MYC positive  - 10/19/23 presented to OSH ED with dysphagia and abdominal pain  - CT A/P with IV contrast (10/19) mass in the body of the pancreas measuring approximately 2.7 x 2.9 cm. The pancreatic duct is dilated. Multiple masses in the mesentery as well as omental caking consistent with carcinomatosis. Moderate amount of free fluid throughout the abdomen and pelvis.   - CT soft tissue neck with IV contrast (10/19): homogeneous soft tissue attenuation lesion in the L oropharynx which measures approximately 3.0 x 4.0 x 5.1 cm.   - US guided mesenteric LN biopsy (10/20/23): HIGH GRADE B-CELL LYMPHOMA MORPHOLOGICALLY CONSISTENT WITH BURKITT LYMPHOMA; flow cytometry: No B cell population identified  - Bone marrow aspiration biopsy November 1, 2023 no evidence of lymphoma involvement.  CSF by flow cytometry showed no lymphoma 11/2/2023.  - Biopsy L oropharyngeal mass and L cervical LN by ENT (10/23/23) high grade B-cell burkitt lymphoma, MYC positive, Bcl-2 negative.  - MR brain with and w/o IV contrast (10/27): No evidence of intracranial metastatic disease.     - Dexa 40 mg daily ended 11/1  - PET CT ordered 10/30 showed FDG avid left oropharyngeal mass and LAD above and below diaphragm  - Started HyperCVAD 10/29 cycle 1 day 1  - Part A:   D1: CTX + mesna, dexamethasone, D2: CTX + mesna, D3: CTX + mesna, D4: Doxo, vinca, D6: Ritux, GCSF, D9: IT,   D2:  intrathecal methotrexate November 2, 2023, 12 mg, CSF shows no B cells by flow cytometry.  D6: Intrathecal cytarabine 100 mg on November 6, 2023.  D11: vinca, dexamethasone, D13: Ritux 11/10  D13: Rituximab therapy  - Daily neupogen since 11/3/23  -Discharged home November 11, 2023  -Evaluation November 15, 2023: Recovered thrombocytopenia, stop levofloxacin, continue prophylactic acyclovir and 3 times weekly Bactrim and fluconazole.  Depressed affect noted.  -Cycle 2-day 1  part to be high-dose methotrexate plus high-dose cytarabine, November 20, 2023,  with 2 intrathecal chemotherapy administered  -Neulasta on 11/27/2023.  -Evaluation 12/6/2023 asymptomatic gaining weight doing well.  To schedule twice weekly CBC BMP Mondays and Thursdays after discharge, emphasized prophylactic levofloxacin between day 5 and day 15, acyclovir, fluconazole and 3 times a week Bactrim.  -CT scan of the chest abdomen or pelvis on December 6, 2023 shows complete resolution of intra-abdominal disease, with mildly worse splenomegaly most likely unrelated to his lymphoma.  -Cycle 3 hyper-CVAD regimen December 12, 2023, including Rituxan day 1 and day 11, intrathecal methotrexate 12/14/2023, intrathecal cytarabine December 18, 2023.  -December 27, 2023: Feeling well asymptomatic tolerated cycle 3 well, plan for cycle 4 January 2, 2024 consisting of high-dose methotrexate high-dose cytarabine, and includes 2 doses of rituximab day 1 day 8, and 2 doses of intrathecal chemotherapy, intrathecal cytarabine day 1 and intrathecal methotrexate day 8  -Hospitalized  1/17-1/23/2024 with visual changes, retinoschisis, profound pancytopenia s/p left eye vitrectomy, retinal hemorrgages. Delay cycle 5. Discuss with ophth regarding etiology and plans.  -Evaluation January 31, 2024: Vision appears to be improving.  Discussed with ophthalmology.  They will be seeing February 2 and will decide whether they will clear him for cycle 5 of chemotherapy which would be tentatively planned to start February 9, 2024 as an inpatient at modified dosing.  Repeat echocardiogram, continue prophylactic antibiotics.  Will need 3 times weekly labs for close monitoring and will keep platelets above 50,000, keep hemoglobin above 8.5-9.  Will need closer supervision as he was noncompliant with twice weekly labs before.  -Evaluation 2/7/2024: Continues to improve, vision clearly better.  Appears to have been cleared by ophthalmology, to proceed  with cycle 5 starting 2/9/2024 with 80% cyclophosphamide.  Needs echocardiogram repeated to verify a normal ejection fraction.  Upon discharge he will need 3 times a week count check to assure compliance, as he had been noncompliant with twice weekly labs previously.  Need to keep platelets above 30-50,000, need to keep hemoglobin above 8.5 at esthela.  Final cycle of chemotherapy will be also dose reduced with cytarabine down to 1 g/m².  Needs a chest x-ray on Friday before starting  -Evaluation 2/20/2024: Doing well.  Eyes appear to be improving tolerated cycle 5 without difficulty.  We omitted intrathecal on cycle 5.  Plan to admit on 3/5/2024 for cycle 6, planned dose reduction of high-dose cytarabine to 1.5 g/m², keeping methotrexate the same.  We will incorporate intrathecal cytarabine day 1, patient declining a second intrathecal.  We are in the process of trying to convince the patient to complete 8 cycles to adhere to the published data on hyper CVAD and Burkitt's lymphoma.  -Evaluation 3/20/2024 C6D15 recovering counts.  Keeping hemoglobin above 8 and platelets above 50 due to the retinal hemorrhages.  Overall doing well.  After discussion with patient and mom, we decided to proceed with 8 cycles, with 1 intrathecal with a each cycle.  Next cycle will be on 3/28/2024, should include rituximab day 1 and day 11, and intrathecal methotrexate 12 mg on day 2 [+/-1 day) and day 11 vincristine and dexamethasone pulse.  Cycle 8 is planned with dose reduction of cytarabine to 1 g/m² per dose and methotrexate to to 800 mg/m² total [20% as short infusion and 80% as 24-hour infusion].  -Evaluation 3/27/2024,C6D22, platelets less than 80,000, we will wait till Monday, April 1, 2024 and proceed with cycle 7, on cycle consisting of cyclophosphamide 250 mg/m² every 12 hours D1-D3, doxorubicin 50 mg/m², vincristine 2 mg flat dose D4 and day 11, dexamethasone 40 mg p.o. day 4 and day 11 through day 14, rituximab possibly  after discharge 375 mg/m², intrathecal methotrexate 12 mg to be delivered day 2 or day 3 of the cycle.  Post chemotherapy the patient will need pegfilgrastim, and will need to be set up  for count check Monday Wednesday Friday, keeping platelets above 50,000, keeping hemoglobin above 8, orders  for interim nursing assessment RN.  -Evaluation 4/17/2024: Completed 7 cycles did well since discharge without any significant toxicities except for the expected grade 3 thrombocytopenia requiring transfusion.  Had difficulty with lumbar puncture last admission.  -Evaluation 5/22/2024: Completed 8 cycles of HyperCVAD, last cycle delivered April 30, 2024.  PET scan 5/17/2024, end of therapy PET, ARNOLD, Deauville score 2.  Nonspecific metabolic activity in the right palatine tonsil and tongue felt to be physiologic.  Patient admitted to eating 2 hours before the PET scan.  Referred to ENT, plan repeat imaging in 3 months with CT neck chest abdomen pelvis.  -Evaluation 9/17/2024: Asymptomatic, CT of the head and neck on 9/9/2024 negative but CT of the chest abdomen pelvis has not been done yet.  Reordered ASAP.  Discussed referral to genetics counseling [was referred back in May but he did not show up for the appointment].  Father and grandfather had pancreatic cancer.  Therapy:  Hyper-CVAD C1D1 10/29/2023  Hyper-CVAD C2D1 11/20/2023  Hyper-CVAD C3D1 12/12/2023  Hyper-CVAD C4D1 01/02/2024  Hyper-CVAD C5D1 02/09/2024  Hyper-CVAD C6D1 03/06/2024  Hyper-CVAD C7D1 04/01/2024   Hyper-CVAD C8D1 04/30/2024   Subjective    HPI  Patient doing well.  No new complaints.  Eating well maintaining weight.  Vision very slowly improving, follows with ophthalmology.  He is ambulatory and functional.  Denies any new complaints.  No abdominal fullness or pain or discomfort nausea vomiting or change in bowel habits or dyspnea or cough or recurrent infections.    Head and neck: Other than HPI negative  CNS: Other than HPI negative  Cardiovascular:  Other than HPI negative  Pulmonary: Other than HPI negative  GI: Other than HPI negative  : Other than HPI negative  Bleeding: Other than HPI negative  Constitutional: Other than HPI negative     Objective    BSA: 1.92 meters squared  /55 (BP Location: Left arm, Patient Position: Sitting, BP Cuff Size: Adult)   Pulse 53   Temp 36.8 °C (98.2 °F) (Skin)   Resp 16   Wt 72.9 kg (160 lb 11.2 oz)   SpO2 100%   BMI 22.05 kg/m²      Physical Exam  Alert oriented not in distress not pale or jaundiced  Lymph nodes: No adenopathy  Oral mucosa negative, no mucositis  Head is normocephalic atraumatic, pupils equal reactive  Neck is supple no adenopathy, no thyromegaly  Lungs show equal air entry, no crackles or wheezing, equal percussion  Heart shows regular rate and rhythm normal S1-S2 no murmurs or gallop rhythm  Abdomen is soft nontender, moves with respiration, no hepatosplenomegaly or masses, positive bowel sounds, not distended.  Lower extremities show no edema  Neurologically grossly nonfocal  Psych: Normal affect    Performance Status:  Asymptomatic    CT of the neck shows no lymphoma  Assessment/Plan   Burkitt's lymphoma  Incomplete remission  Completed 8 cycles of HyperCVAD with rituximab and intrathecal prophylaxis  Retinal hemorrhages improving follows with ophthalmology    He did not have his CT scan of the chest abdomen pelvis nor did he have his labs.  I am reordering the CT scan of the chest abdomen pelvis and his labs to be done this week.  We will see him back in 4 months with imaging and labs.  Overall doing remarkably well.  Cancer Staging   No matching staging information was found for the patient.      Oncology History   Burkitt lymphoma (Multi)   10/27/2023 Initial Diagnosis    Burkitt lymphoma, high risk, MYC positive  - 10/19/23 presented to OSH ED with dysphagia and abdominal pain  - CT A/P with IV contrast (10/19) mass in the body of the pancreas measuring approximately 2.7 x 2.9 cm. The  pancreatic duct is dilated. Multiple masses in the mesentery as well as omental caking consistent with carcinomatosis. Moderate amount of free fluid throughout the abdomen and pelvis.   - CT soft tissue neck with IV contrast (10/19): homogeneous soft tissue attenuation lesion in the L oropharynx which measures approximately 3.0 x 4.0 x 5.1 cm.   - US guided mesenteric LN biopsy (10/20/23): HIGH GRADE B-CELL LYMPHOMA MORPHOLOGICALLY CONSISTENT WITH BURKITT LYMPHOMA; flow cytometry: No B cell population identified  - Bone marrow aspiration biopsy November 1, 2023 no evidence of lymphoma involvement.  CSF by flow cytometry showed no lymphoma 11/2/2023.  - Biopsy L oropharyngeal mass and L cervical LN by ENT (10/23/23) high grade B-cell burkitt lymphoma, MYC positive, Bcl-2 negative.  - MR brain with and w/o IV contrast (10/27): No evidence of intracranial metastatic disease.     - Dexa 40 mg daily ended 11/1  - PET CT ordered 10/30 showed FDG avid left oropharyngeal mass and LAD above and below diaphragm     10/29/2023 -  Chemotherapy    Hyper-CVAD C1D1 10/29/2023  Hyper-CVAD C2D1 11/20/2023  Hyper-CVAD C3D1 12/12/2023  Hyper-CVAD C4D1 01/02/2024  Hyper-CVAD C5D1 02/09/2024  Hyper-CVAD C6D1 03/06/2024 (Dose reduce Cytarabine at 1.5 G/m2 per dose with Methotrexate 1000 mg/m2 total; LP with intrathecal cytarabine during C6 on 3/7; CSF flow cytometry negative for lymphoma)  Hyper-CVAD C7D1 04/01/2024 (LP with intrathecal methotrexate 12 mg on 4/3; CSF flow cytometry negative for lymphoma)   HyperCVAD C8D1 4/29/24 High-dose methotrexate 1 g/m² total dose over 24 hours as before, with 2 total doses of Cytarabine at 1 g/m² per dose given the profound thrombocytopenia that he developed with the last cycle in an attempt to mitigate risk due to his intraocular hemorrhage history. LP with Intrathecal Cytarabine 4/30; CSF flow cytometry negative for lymphoma.   HyperCVAD + RiTUXimab, 21 Day Cycles     12/6/2023 Imaging    -CT  scan of the chest abdomen or pelvis on December 6, 2023 shows complete resolution of intra-abdominal disease, with mildly worse splenomegaly most likely unrelated to his lymphoma.      1/15/2024 Imaging    -PET scan 1/15/24 after C4:   Interval near complete/complete resolution of previous metabolic activity. Deauville Score 2.          Diagnoses and all orders for this visit:  Burkitt lymphoma, unspecified body region (Multi)  -     Clinic Appointment Request MARBIN MONIQUE; [id number]  -     Lactate Dehydrogenase; Future  -     Comprehensive Metabolic Panel; Future  -     CBC and Auto Differential; Future  -     Magnesium; Future  -     CT chest abdomen pelvis w IV contrast; Future  -     Lactate Dehydrogenase; Future  -     Comprehensive Metabolic Panel; Future  -     CBC and Auto Differential; Future  -     Magnesium; Future  -     Hurley/Lambda Free Light Chain, Serum; Future  -     Immunoglobulins (IgG, IgA, IgM); Future  -     Serum Protein Electrophoresis + Immunofixation; Future  -     Beta 2 Microglobuin; Future  -     CT chest abdomen pelvis w IV contrast; Future  -     Clinic Appointment Request Follow Up; MARBIN MONIQUE; Future           Marbin Monique MD

## 2024-09-23 ENCOUNTER — HOSPITAL ENCOUNTER (OUTPATIENT)
Dept: RADIOLOGY | Facility: HOSPITAL | Age: 24
Discharge: HOME | End: 2024-09-23
Payer: COMMERCIAL

## 2024-09-23 DIAGNOSIS — C83.70 BURKITT LYMPHOMA, UNSPECIFIED BODY REGION (MULTI): ICD-10-CM

## 2024-09-23 LAB
ALBUMIN SERPL BCP-MCNC: 4.6 G/DL (ref 3.4–5)
ALP SERPL-CCNC: 71 U/L (ref 33–120)
ALT SERPL W P-5'-P-CCNC: 13 U/L (ref 10–52)
ANION GAP SERPL CALC-SCNC: 13 MMOL/L (ref 10–20)
AST SERPL W P-5'-P-CCNC: 15 U/L (ref 9–39)
BASOPHILS # BLD AUTO: 0.03 X10*3/UL (ref 0–0.1)
BASOPHILS NFR BLD AUTO: 1.7 %
BILIRUB SERPL-MCNC: 0.6 MG/DL (ref 0–1.2)
BUN SERPL-MCNC: 13 MG/DL (ref 6–23)
CALCIUM SERPL-MCNC: 9.2 MG/DL (ref 8.6–10.3)
CHLORIDE SERPL-SCNC: 99 MMOL/L (ref 98–107)
CO2 SERPL-SCNC: 29 MMOL/L (ref 21–32)
CREAT SERPL-MCNC: 1.01 MG/DL (ref 0.5–1.3)
EGFRCR SERPLBLD CKD-EPI 2021: >90 ML/MIN/1.73M*2
EOSINOPHIL # BLD AUTO: 0.04 X10*3/UL (ref 0–0.7)
EOSINOPHIL NFR BLD AUTO: 2.2 %
ERYTHROCYTE [DISTWIDTH] IN BLOOD BY AUTOMATED COUNT: 12.9 % (ref 11.5–14.5)
GLUCOSE SERPL-MCNC: 87 MG/DL (ref 74–99)
HCT VFR BLD AUTO: 42.2 % (ref 41–52)
HGB BLD-MCNC: 13.9 G/DL (ref 13.5–17.5)
IMM GRANULOCYTES # BLD AUTO: 0 X10*3/UL (ref 0–0.7)
IMM GRANULOCYTES NFR BLD AUTO: 0 % (ref 0–0.9)
LDH SERPL L TO P-CCNC: 116 U/L (ref 84–246)
LYMPHOCYTES # BLD AUTO: 0.53 X10*3/UL (ref 1.2–4.8)
LYMPHOCYTES NFR BLD AUTO: 29.8 %
MAGNESIUM SERPL-MCNC: 1.79 MG/DL (ref 1.6–2.4)
MCH RBC QN AUTO: 30.3 PG (ref 26–34)
MCHC RBC AUTO-ENTMCNC: 32.9 G/DL (ref 32–36)
MCV RBC AUTO: 92 FL (ref 80–100)
MONOCYTES # BLD AUTO: 0.93 X10*3/UL (ref 0.1–1)
MONOCYTES NFR BLD AUTO: 52.2 %
NEUTROPHILS # BLD AUTO: 0.25 X10*3/UL (ref 1.2–7.7)
NEUTROPHILS NFR BLD AUTO: 14.1 %
NRBC BLD-RTO: 0 /100 WBCS (ref 0–0)
PLATELET # BLD AUTO: 160 X10*3/UL (ref 150–450)
POTASSIUM SERPL-SCNC: 4.3 MMOL/L (ref 3.5–5.3)
PROT SERPL-MCNC: 6.1 G/DL (ref 6.4–8.2)
RBC # BLD AUTO: 4.59 X10*6/UL (ref 4.5–5.9)
RBC MORPH BLD: NORMAL
SODIUM SERPL-SCNC: 137 MMOL/L (ref 136–145)
WBC # BLD AUTO: 1.8 X10*3/UL (ref 4.4–11.3)

## 2024-09-23 PROCEDURE — 2550000001 HC RX 255 CONTRASTS: Performed by: INTERNAL MEDICINE

## 2024-09-23 PROCEDURE — 71260 CT THORAX DX C+: CPT | Performed by: RADIOLOGY

## 2024-09-23 PROCEDURE — 83735 ASSAY OF MAGNESIUM: CPT | Performed by: INTERNAL MEDICINE

## 2024-09-23 PROCEDURE — 83615 LACTATE (LD) (LDH) ENZYME: CPT | Performed by: INTERNAL MEDICINE

## 2024-09-23 PROCEDURE — 74177 CT ABD & PELVIS W/CONTRAST: CPT | Performed by: RADIOLOGY

## 2024-09-23 PROCEDURE — 80053 COMPREHEN METABOLIC PANEL: CPT | Performed by: INTERNAL MEDICINE

## 2024-09-23 PROCEDURE — 36415 COLL VENOUS BLD VENIPUNCTURE: CPT | Performed by: INTERNAL MEDICINE

## 2024-09-23 PROCEDURE — 74177 CT ABD & PELVIS W/CONTRAST: CPT

## 2024-09-23 PROCEDURE — 85025 COMPLETE CBC W/AUTO DIFF WBC: CPT | Performed by: INTERNAL MEDICINE

## 2024-10-01 DIAGNOSIS — C83.70 BURKITT LYMPHOMA, UNSPECIFIED BODY REGION (MULTI): Primary | ICD-10-CM

## 2024-10-02 DIAGNOSIS — C83.70 BURKITT LYMPHOMA, UNSPECIFIED BODY REGION (MULTI): Primary | ICD-10-CM

## 2024-10-07 ENCOUNTER — APPOINTMENT (OUTPATIENT)
Dept: RADIOLOGY | Facility: HOSPITAL | Age: 24
End: 2024-10-07
Payer: COMMERCIAL

## 2024-11-18 ENCOUNTER — APPOINTMENT (OUTPATIENT)
Dept: OPHTHALMOLOGY | Facility: CLINIC | Age: 24
End: 2024-11-18

## 2024-11-27 DIAGNOSIS — C83.70 BURKITT LYMPHOMA, UNSPECIFIED BODY REGION (MULTI): Primary | ICD-10-CM

## 2024-12-10 ENCOUNTER — DOCUMENTATION (OUTPATIENT)
Dept: HEMATOLOGY/ONCOLOGY | Facility: HOSPITAL | Age: 24
End: 2024-12-10
Payer: COMMERCIAL

## 2025-01-02 ENCOUNTER — HOSPITAL ENCOUNTER (OUTPATIENT)
Dept: RADIOLOGY | Facility: HOSPITAL | Age: 25
Discharge: HOME | End: 2025-01-02
Payer: COMMERCIAL

## 2025-01-02 DIAGNOSIS — C83.70 BURKITT LYMPHOMA, UNSPECIFIED BODY REGION (MULTI): ICD-10-CM

## 2025-01-02 LAB
ALBUMIN SERPL BCP-MCNC: 4.7 G/DL (ref 3.4–5)
ALP SERPL-CCNC: 74 U/L (ref 33–120)
ALT SERPL W P-5'-P-CCNC: 18 U/L (ref 10–52)
ANION GAP SERPL CALC-SCNC: 10 MMOL/L (ref 10–20)
AST SERPL W P-5'-P-CCNC: 18 U/L (ref 9–39)
BASOPHILS # BLD AUTO: 0.02 X10*3/UL (ref 0–0.1)
BASOPHILS NFR BLD AUTO: 0.4 %
BILIRUB SERPL-MCNC: 0.7 MG/DL (ref 0–1.2)
BUN SERPL-MCNC: 15 MG/DL (ref 6–23)
CALCIUM SERPL-MCNC: 9 MG/DL (ref 8.6–10.3)
CHLORIDE SERPL-SCNC: 100 MMOL/L (ref 98–107)
CO2 SERPL-SCNC: 30 MMOL/L (ref 21–32)
CREAT SERPL-MCNC: 1.02 MG/DL (ref 0.5–1.3)
EGFRCR SERPLBLD CKD-EPI 2021: >90 ML/MIN/1.73M*2
EOSINOPHIL # BLD AUTO: 0.04 X10*3/UL (ref 0–0.7)
EOSINOPHIL NFR BLD AUTO: 0.8 %
ERYTHROCYTE [DISTWIDTH] IN BLOOD BY AUTOMATED COUNT: 13.1 % (ref 11.5–14.5)
GLUCOSE SERPL-MCNC: 82 MG/DL (ref 74–99)
HCT VFR BLD AUTO: 42.9 % (ref 41–52)
HGB BLD-MCNC: 14.4 G/DL (ref 13.5–17.5)
IMM GRANULOCYTES # BLD AUTO: 0.01 X10*3/UL (ref 0–0.7)
IMM GRANULOCYTES NFR BLD AUTO: 0.2 % (ref 0–0.9)
LDH SERPL L TO P-CCNC: 131 U/L (ref 84–246)
LYMPHOCYTES # BLD AUTO: 0.78 X10*3/UL (ref 1.2–4.8)
LYMPHOCYTES NFR BLD AUTO: 15.3 %
MAGNESIUM SERPL-MCNC: 1.82 MG/DL (ref 1.6–2.4)
MCH RBC QN AUTO: 30.6 PG (ref 26–34)
MCHC RBC AUTO-ENTMCNC: 33.6 G/DL (ref 32–36)
MCV RBC AUTO: 91 FL (ref 80–100)
MONOCYTES # BLD AUTO: 0.6 X10*3/UL (ref 0.1–1)
MONOCYTES NFR BLD AUTO: 11.8 %
NEUTROPHILS # BLD AUTO: 3.65 X10*3/UL (ref 1.2–7.7)
NEUTROPHILS NFR BLD AUTO: 71.5 %
PLATELET # BLD AUTO: 128 X10*3/UL (ref 150–450)
POTASSIUM SERPL-SCNC: 3.8 MMOL/L (ref 3.5–5.3)
PROT SERPL-MCNC: 6.2 G/DL (ref 6.4–8.2)
RBC # BLD AUTO: 4.71 X10*6/UL (ref 4.5–5.9)
SODIUM SERPL-SCNC: 136 MMOL/L (ref 136–145)
WBC # BLD AUTO: 5.1 X10*3/UL (ref 4.4–11.3)

## 2025-01-02 PROCEDURE — 80053 COMPREHEN METABOLIC PANEL: CPT

## 2025-01-02 PROCEDURE — 83735 ASSAY OF MAGNESIUM: CPT

## 2025-01-02 PROCEDURE — 36415 COLL VENOUS BLD VENIPUNCTURE: CPT

## 2025-01-02 PROCEDURE — 83615 LACTATE (LD) (LDH) ENZYME: CPT

## 2025-01-02 PROCEDURE — 2550000001 HC RX 255 CONTRASTS: Performed by: INTERNAL MEDICINE

## 2025-01-02 PROCEDURE — 71260 CT THORAX DX C+: CPT

## 2025-01-02 PROCEDURE — 85025 COMPLETE CBC W/AUTO DIFF WBC: CPT

## 2025-01-02 RX ADMIN — IOHEXOL 75 ML: 350 INJECTION, SOLUTION INTRAVENOUS at 13:40

## 2025-01-14 ENCOUNTER — OFFICE VISIT (OUTPATIENT)
Dept: HEMATOLOGY/ONCOLOGY | Facility: HOSPITAL | Age: 25
End: 2025-01-14
Payer: COMMERCIAL

## 2025-01-14 ENCOUNTER — LAB (OUTPATIENT)
Dept: LAB | Facility: HOSPITAL | Age: 25
End: 2025-01-14
Payer: COMMERCIAL

## 2025-01-14 VITALS
DIASTOLIC BLOOD PRESSURE: 68 MMHG | WEIGHT: 158.51 LBS | SYSTOLIC BLOOD PRESSURE: 129 MMHG | TEMPERATURE: 97.9 F | RESPIRATION RATE: 18 BRPM | BODY MASS INDEX: 21.75 KG/M2 | HEART RATE: 45 BPM | OXYGEN SATURATION: 100 %

## 2025-01-14 DIAGNOSIS — C83.70 BURKITT LYMPHOMA, UNSPECIFIED BODY REGION (MULTI): ICD-10-CM

## 2025-01-14 DIAGNOSIS — C82.98 FOLLICULAR LYMPHOMA OF LYMPH NODES OF MULTIPLE REGIONS, UNSPECIFIED FOLLICULAR LYMPHOMA TYPE: ICD-10-CM

## 2025-01-14 DIAGNOSIS — J35.8 TONSILLAR MASS: ICD-10-CM

## 2025-01-14 DIAGNOSIS — C83.70 BURKITT LYMPHOMA, UNSPECIFIED BODY REGION (MULTI): Primary | ICD-10-CM

## 2025-01-14 LAB
ALBUMIN SERPL BCP-MCNC: 5 G/DL (ref 3.4–5)
ALP SERPL-CCNC: 78 U/L (ref 33–120)
ALT SERPL W P-5'-P-CCNC: 15 U/L (ref 10–52)
ANION GAP SERPL CALC-SCNC: 14 MMOL/L (ref 10–20)
AST SERPL W P-5'-P-CCNC: 17 U/L (ref 9–39)
B2 MICROGLOB SERPL-MCNC: 1.8 MG/L (ref 0.7–2.2)
BASOPHILS # BLD AUTO: 0.02 X10*3/UL (ref 0–0.1)
BASOPHILS NFR BLD AUTO: 0.3 %
BILIRUB SERPL-MCNC: 0.6 MG/DL (ref 0–1.2)
BUN SERPL-MCNC: 21 MG/DL (ref 6–23)
CALCIUM SERPL-MCNC: 9.4 MG/DL (ref 8.6–10.3)
CHLORIDE SERPL-SCNC: 103 MMOL/L (ref 98–107)
CO2 SERPL-SCNC: 29 MMOL/L (ref 21–32)
CREAT SERPL-MCNC: 1.1 MG/DL (ref 0.5–1.3)
EGFRCR SERPLBLD CKD-EPI 2021: >90 ML/MIN/1.73M*2
EOSINOPHIL # BLD AUTO: 0.03 X10*3/UL (ref 0–0.7)
EOSINOPHIL NFR BLD AUTO: 0.5 %
ERYTHROCYTE [DISTWIDTH] IN BLOOD BY AUTOMATED COUNT: 13.4 % (ref 11.5–14.5)
GLUCOSE SERPL-MCNC: 87 MG/DL (ref 74–99)
HCT VFR BLD AUTO: 44.4 % (ref 41–52)
HGB BLD-MCNC: 15.2 G/DL (ref 13.5–17.5)
IGA SERPL-MCNC: 12 MG/DL (ref 70–400)
IGG SERPL-MCNC: 196 MG/DL (ref 700–1600)
IGM SERPL-MCNC: 7 MG/DL (ref 40–230)
IMM GRANULOCYTES # BLD AUTO: 0.01 X10*3/UL (ref 0–0.7)
IMM GRANULOCYTES NFR BLD AUTO: 0.2 % (ref 0–0.9)
LDH SERPL L TO P-CCNC: 126 U/L (ref 84–246)
LYMPHOCYTES # BLD AUTO: 0.77 X10*3/UL (ref 1.2–4.8)
LYMPHOCYTES NFR BLD AUTO: 12.2 %
MAGNESIUM SERPL-MCNC: 1.84 MG/DL (ref 1.6–2.4)
MCH RBC QN AUTO: 30.6 PG (ref 26–34)
MCHC RBC AUTO-ENTMCNC: 34.2 G/DL (ref 32–36)
MCV RBC AUTO: 89 FL (ref 80–100)
MONOCYTES # BLD AUTO: 0.55 X10*3/UL (ref 0.1–1)
MONOCYTES NFR BLD AUTO: 8.7 %
NEUTROPHILS # BLD AUTO: 4.91 X10*3/UL (ref 1.2–7.7)
NEUTROPHILS NFR BLD AUTO: 78.1 %
NRBC BLD-RTO: 0 /100 WBCS (ref 0–0)
PLATELET # BLD AUTO: 162 X10*3/UL (ref 150–450)
POTASSIUM SERPL-SCNC: 4 MMOL/L (ref 3.5–5.3)
PROT SERPL-MCNC: 6.7 G/DL (ref 6.4–8.2)
PROT SERPL-MCNC: 6.7 G/DL (ref 6.4–8.2)
RBC # BLD AUTO: 4.97 X10*6/UL (ref 4.5–5.9)
SODIUM SERPL-SCNC: 142 MMOL/L (ref 136–145)
WBC # BLD AUTO: 6.3 X10*3/UL (ref 4.4–11.3)

## 2025-01-14 PROCEDURE — 80053 COMPREHEN METABOLIC PANEL: CPT

## 2025-01-14 PROCEDURE — 86334 IMMUNOFIX E-PHORESIS SERUM: CPT

## 2025-01-14 PROCEDURE — 82232 ASSAY OF BETA-2 PROTEIN: CPT

## 2025-01-14 PROCEDURE — 83615 LACTATE (LD) (LDH) ENZYME: CPT

## 2025-01-14 PROCEDURE — 36415 COLL VENOUS BLD VENIPUNCTURE: CPT

## 2025-01-14 PROCEDURE — 85025 COMPLETE CBC W/AUTO DIFF WBC: CPT

## 2025-01-14 PROCEDURE — 83521 IG LIGHT CHAINS FREE EACH: CPT

## 2025-01-14 PROCEDURE — 82784 ASSAY IGA/IGD/IGG/IGM EACH: CPT

## 2025-01-14 PROCEDURE — 83735 ASSAY OF MAGNESIUM: CPT

## 2025-01-14 PROCEDURE — 99214 OFFICE O/P EST MOD 30 MIN: CPT | Performed by: INTERNAL MEDICINE

## 2025-01-14 PROCEDURE — 84155 ASSAY OF PROTEIN SERUM: CPT | Mod: 59

## 2025-01-14 ASSESSMENT — PAIN SCALES - GENERAL: PAINLEVEL_OUTOF10: 0-NO PAIN

## 2025-01-14 NOTE — PROGRESS NOTES
Patient ID: Shilo Thakkar is a 24 y.o. male.  Oncologic History  Burkitt lymphoma, high risk, MYC positive  - 10/19/23 presented to OSH ED with dysphagia and abdominal pain  - CT A/P with IV contrast (10/19) mass in the body of the pancreas measuring approximately 2.7 x 2.9 cm. The pancreatic duct is dilated. Multiple masses in the mesentery as well as omental caking consistent with carcinomatosis. Moderate amount of free fluid throughout the abdomen and pelvis.   - CT soft tissue neck with IV contrast (10/19): homogeneous soft tissue attenuation lesion in the L oropharynx which measures approximately 3.0 x 4.0 x 5.1 cm.   - US guided mesenteric LN biopsy (10/20/23): HIGH GRADE B-CELL LYMPHOMA MORPHOLOGICALLY CONSISTENT WITH BURKITT LYMPHOMA; flow cytometry: No B cell population identified  - Bone marrow aspiration biopsy November 1, 2023 no evidence of lymphoma involvement.  CSF by flow cytometry showed no lymphoma 11/2/2023.  - Biopsy L oropharyngeal mass and L cervical LN by ENT (10/23/23) high grade B-cell burkitt lymphoma, MYC positive, Bcl-2 negative.  - MR brain with and w/o IV contrast (10/27): No evidence of intracranial metastatic disease.     - Dexa 40 mg daily ended 11/1  - PET CT ordered 10/30 showed FDG avid left oropharyngeal mass and LAD above and below diaphragm  - Started HyperCVAD 10/29 cycle 1 day 1  - Part A:   D1: CTX + mesna, dexamethasone, D2: CTX + mesna, D3: CTX + mesna, D4: Doxo, vinca, D6: Ritux, GCSF, D9: IT,   D2:  intrathecal methotrexate November 2, 2023, 12 mg, CSF shows no B cells by flow cytometry.  D6: Intrathecal cytarabine 100 mg on November 6, 2023.  D11: vinca, dexamethasone, D13: Ritux 11/10  D13: Rituximab therapy  - Daily neupogen since 11/3/23  -Discharged home November 11, 2023  -Evaluation November 15, 2023: Recovered thrombocytopenia, stop levofloxacin, continue prophylactic acyclovir and 3 times weekly Bactrim and fluconazole.  Depressed affect noted.  -Cycle 2-day 1  part to be high-dose methotrexate plus high-dose cytarabine, November 20, 2023,  with 2 intrathecal chemotherapy administered  -Neulasta on 11/27/2023.  -Evaluation 12/6/2023 asymptomatic gaining weight doing well.  To schedule twice weekly CBC BMP Mondays and Thursdays after discharge, emphasized prophylactic levofloxacin between day 5 and day 15, acyclovir, fluconazole and 3 times a week Bactrim.  -CT scan of the chest abdomen or pelvis on December 6, 2023 shows complete resolution of intra-abdominal disease, with mildly worse splenomegaly most likely unrelated to his lymphoma.  -Cycle 3 hyper-CVAD regimen December 12, 2023, including Rituxan day 1 and day 11, intrathecal methotrexate 12/14/2023, intrathecal cytarabine December 18, 2023.  -December 27, 2023: Feeling well asymptomatic tolerated cycle 3 well, plan for cycle 4 January 2, 2024 consisting of high-dose methotrexate high-dose cytarabine, and includes 2 doses of rituximab day 1 day 8, and 2 doses of intrathecal chemotherapy, intrathecal cytarabine day 1 and intrathecal methotrexate day 8  -Hospitalized  1/17-1/23/2024 with visual changes, retinoschisis, profound pancytopenia s/p left eye vitrectomy, retinal hemorrgages. Delay cycle 5. Discuss with ophth regarding etiology and plans.  -Evaluation January 31, 2024: Vision appears to be improving.  Discussed with ophthalmology.  They will be seeing February 2 and will decide whether they will clear him for cycle 5 of chemotherapy which would be tentatively planned to start February 9, 2024 as an inpatient at modified dosing.  Repeat echocardiogram, continue prophylactic antibiotics.  Will need 3 times weekly labs for close monitoring and will keep platelets above 50,000, keep hemoglobin above 8.5-9.  Will need closer supervision as he was noncompliant with twice weekly labs before.  -Evaluation 2/7/2024: Continues to improve, vision clearly better.  Appears to have been cleared by ophthalmology, to proceed  with cycle 5 starting 2/9/2024 with 80% cyclophosphamide.  Needs echocardiogram repeated to verify a normal ejection fraction.  Upon discharge he will need 3 times a week count check to assure compliance, as he had been noncompliant with twice weekly labs previously.  Need to keep platelets above 30-50,000, need to keep hemoglobin above 8.5 at esthela.  Final cycle of chemotherapy will be also dose reduced with cytarabine down to 1 g/m².  Needs a chest x-ray on Friday before starting  -Evaluation 2/20/2024: Doing well.  Eyes appear to be improving tolerated cycle 5 without difficulty.  We omitted intrathecal on cycle 5.  Plan to admit on 3/5/2024 for cycle 6, planned dose reduction of high-dose cytarabine to 1.5 g/m², keeping methotrexate the same.  We will incorporate intrathecal cytarabine day 1, patient declining a second intrathecal.  We are in the process of trying to convince the patient to complete 8 cycles to adhere to the published data on hyper CVAD and Burkitt's lymphoma.  -Evaluation 3/20/2024 C6D15 recovering counts.  Keeping hemoglobin above 8 and platelets above 50 due to the retinal hemorrhages.  Overall doing well.  After discussion with patient and mom, we decided to proceed with 8 cycles, with 1 intrathecal with a each cycle.  Next cycle will be on 3/28/2024, should include rituximab day 1 and day 11, and intrathecal methotrexate 12 mg on day 2 [+/-1 day) and day 11 vincristine and dexamethasone pulse.  Cycle 8 is planned with dose reduction of cytarabine to 1 g/m² per dose and methotrexate to to 800 mg/m² total [20% as short infusion and 80% as 24-hour infusion].  -Evaluation 3/27/2024,C6D22, platelets less than 80,000, we will wait till Monday, April 1, 2024 and proceed with cycle 7, on cycle consisting of cyclophosphamide 250 mg/m² every 12 hours D1-D3, doxorubicin 50 mg/m², vincristine 2 mg flat dose D4 and day 11, dexamethasone 40 mg p.o. day 4 and day 11 through day 14, rituximab possibly  after discharge 375 mg/m², intrathecal methotrexate 12 mg to be delivered day 2 or day 3 of the cycle.  Post chemotherapy the patient will need pegfilgrastim, and will need to be set up  for count check Monday Wednesday Friday, keeping platelets above 50,000, keeping hemoglobin above 8, orders  for interim nursing assessment RN.  -Evaluation 4/17/2024: Completed 7 cycles did well since discharge without any significant toxicities except for the expected grade 3 thrombocytopenia requiring transfusion.  Had difficulty with lumbar puncture last admission.  -Evaluation 5/22/2024: Completed 8 cycles of HyperCVAD, last cycle delivered April 30, 2024.  PET scan 5/17/2024, end of therapy PET, ARNOLD, Deauville score 2.  Nonspecific metabolic activity in the right palatine tonsil and tongue felt to be physiologic.  Patient admitted to eating 2 hours before the PET scan.  Referred to ENT, plan repeat imaging in 3 months with CT neck chest abdomen pelvis.  -Evaluation 9/17/2024: Asymptomatic, CT of the head and neck on 9/9/2024 negative but CT of the chest abdomen pelvis has not been done yet.  Reordered ASAP.  Discussed referral to genetics counseling [was referred back in May but he did not show up for the appointment].  Father and grandfather had pancreatic cancer.  -Evaluation 1//14/2025 pt is 1 year 3 mo out from diagnosis.     Therapy:  Hyper-CVAD C1D1 10/29/2023  Hyper-CVAD C2D1 11/20/2023  Hyper-CVAD C3D1 12/12/2023  Hyper-CVAD C4D1 01/02/2024  Hyper-CVAD C5D1 02/09/2024  Hyper-CVAD C6D1 03/06/2024  Hyper-CVAD C7D1 04/01/2024   Hyper-CVAD C8D1 04/30/2024   Subjective    HPI      Objective    BSA: 1.91 meters squared  /68 (BP Location: Left arm, Patient Position: Sitting, BP Cuff Size: Adult)   Pulse (!) 45   Temp 36.6 °C (97.9 °F) (Temporal)   Resp 18   Wt 71.9 kg (158 lb 8.2 oz)   SpO2 100%   BMI 21.75 kg/m²      Physical Exam    Performance Status:  {ECOG performance status:14458}      Assessment/Plan   OV 4  mo, next time switch to q 6 mo  CT ARNOLD 1/3/2025  Congested IVC-check ECHO  Ferritin  CT next visit    Cancer Staging   No matching staging information was found for the patient.      Oncology History   Burkitt lymphoma   10/27/2023 Initial Diagnosis    Burkitt lymphoma, high risk, MYC positive  - 10/19/23 presented to [facility] ED with dysphagia and abdominal pain  - CT A/P with IV contrast (10/19) mass in the body of the pancreas measuring approximately 2.7 x 2.9 cm. The pancreatic duct is dilated. Multiple masses in the mesentery as well as omental caking consistent with carcinomatosis. Moderate amount of free fluid throughout the abdomen and pelvis.   - CT soft tissue neck with IV contrast (10/19): homogeneous soft tissue attenuation lesion in the L oropharynx which measures approximately 3.0 x 4.0 x 5.1 cm.   - US guided mesenteric LN biopsy (10/20/23): HIGH GRADE B-CELL LYMPHOMA MORPHOLOGICALLY CONSISTENT WITH BURKITT LYMPHOMA; flow cytometry: No B cell population identified  - Bone marrow aspiration biopsy November 1, 2023 no evidence of lymphoma involvement.  CSF by flow cytometry showed no lymphoma 11/2/2023.  - Biopsy L oropharyngeal mass and L cervical LN by ENT (10/23/23) high grade B-cell burkitt lymphoma, MYC positive, Bcl-2 negative.  - MR brain with and w/o IV contrast (10/27): No evidence of intracranial metastatic disease.     - Dexa 40 mg daily ended 11/1  - PET CT ordered 10/30 showed FDG avid left oropharyngeal mass and LAD above and below diaphragm     10/29/2023 -  Chemotherapy    Hyper-CVAD C1D1 10/29/2023  Hyper-CVAD C2D1 11/20/2023  Hyper-CVAD C3D1 12/12/2023  Hyper-CVAD C4D1 01/02/2024  Hyper-CVAD C5D1 02/09/2024  Hyper-CVAD C6D1 03/06/2024 (Dose reduce Cytarabine at 1.5 G/m2 per dose with Methotrexate 1000 mg/m2 total; LP with intrathecal cytarabine during C6 on 3/7; CSF flow cytometry negative for lymphoma)  Hyper-CVAD C7D1 04/01/2024 (LP with intrathecal methotrexate 12 mg on 4/3; CSF flow  cytometry negative for lymphoma)   HyperCVAD C8D1 4/29/24 High-dose methotrexate 1 g/m² total dose over 24 hours as before, with 2 total doses of Cytarabine at 1 g/m² per dose given the profound thrombocytopenia that he developed with the last cycle in an attempt to mitigate risk due to his intraocular hemorrhage history. LP with Intrathecal Cytarabine 4/30; CSF flow cytometry negative for lymphoma.   HyperCVAD + RiTUXimab, 21 Day Cycles     12/6/2023 Imaging    -CT scan of the chest abdomen or pelvis on December 6, 2023 shows complete resolution of intra-abdominal disease, with mildly worse splenomegaly most likely unrelated to his lymphoma.      1/15/2024 Imaging    -PET scan 1/15/24 after C4:   Interval near complete/complete resolution of previous metabolic activity. Deauville Score 2.          {Assess/PlanSmartLinks:43413}         Olegario Klein MD             remission  Completed 8 cycles of rituximab plus HyperCVAD +8 doses of intrathecal chemotherapy  CT from 1/2/2025 shows possible congested liver, with periportal edema, and normal liver function test, rule out cardiac origin, plan echocardiogram ASAP  Follow-up OV in 4 mo with routine imaging, next time switch to q 6 mo follow-ups  CT ARNOLD 1/3/2025 in regard to Burkitt's lymphoma    Plan  Congested IVC-check ECHO ASAP schedule 2/5/2025  Check ferritin level, was elevated last check towards the end of therapy  CT next visit in 4 months    Cancer Staging   No matching staging information was found for the patient.      Oncology History   Burkitt lymphoma   10/27/2023 Initial Diagnosis    Burkitt lymphoma, high risk, MYC positive  - 10/19/23 presented to OSH ED with dysphagia and abdominal pain  - CT A/P with IV contrast (10/19) mass in the body of the pancreas measuring approximately 2.7 x 2.9 cm. The pancreatic duct is dilated. Multiple masses in the mesentery as well as omental caking consistent with carcinomatosis. Moderate amount of free fluid throughout the abdomen and pelvis.   - CT soft tissue neck with IV contrast (10/19): homogeneous soft tissue attenuation lesion in the L oropharynx which measures approximately 3.0 x 4.0 x 5.1 cm.   - US guided mesenteric LN biopsy (10/20/23): HIGH GRADE B-CELL LYMPHOMA MORPHOLOGICALLY CONSISTENT WITH BURKITT LYMPHOMA; flow cytometry: No B cell population identified  - Bone marrow aspiration biopsy November 1, 2023 no evidence of lymphoma involvement.  CSF by flow cytometry showed no lymphoma 11/2/2023.  - Biopsy L oropharyngeal mass and L cervical LN by ENT (10/23/23) high grade B-cell burkitt lymphoma, MYC positive, Bcl-2 negative.  - MR brain with and w/o IV contrast (10/27): No evidence of intracranial metastatic disease.     - Dexa 40 mg daily ended 11/1  - PET CT ordered 10/30 showed FDG avid left oropharyngeal mass and LAD above and below diaphragm     10/29/2023 -   Chemotherapy    Hyper-CVAD C1D1 10/29/2023  Hyper-CVAD C2D1 11/20/2023  Hyper-CVAD C3D1 12/12/2023  Hyper-CVAD C4D1 01/02/2024  Hyper-CVAD C5D1 02/09/2024  Hyper-CVAD C6D1 03/06/2024 (Dose reduce Cytarabine at 1.5 G/m2 per dose with Methotrexate 1000 mg/m2 total; LP with intrathecal cytarabine during C6 on 3/7; CSF flow cytometry negative for lymphoma)  Hyper-CVAD C7D1 04/01/2024 (LP with intrathecal methotrexate 12 mg on 4/3; CSF flow cytometry negative for lymphoma)   HyperCVAD C8D1 4/29/24 High-dose methotrexate 1 g/m² total dose over 24 hours as before, with 2 total doses of Cytarabine at 1 g/m² per dose given the profound thrombocytopenia that he developed with the last cycle in an attempt to mitigate risk due to his intraocular hemorrhage history. LP with Intrathecal Cytarabine 4/30; CSF flow cytometry negative for lymphoma.   HyperCVAD + RiTUXimab, 21 Day Cycles     12/6/2023 Imaging    -CT scan of the chest abdomen or pelvis on December 6, 2023 shows complete resolution of intra-abdominal disease, with mildly worse splenomegaly most likely unrelated to his lymphoma.      1/15/2024 Imaging    -PET scan 1/15/24 after C4:   Interval near complete/complete resolution of previous metabolic activity. Deauville Score 2.                   Olegario Klein MD

## 2025-01-15 LAB
KAPPA LC SERPL-MCNC: 0.24 MG/DL (ref 0.33–1.94)
KAPPA LC/LAMBDA SER: ABNORMAL {RATIO}
LAMBDA LC SERPL-MCNC: <0.17 MG/DL (ref 0.57–2.63)

## 2025-01-16 LAB
ALBUMIN: 4.9 G/DL (ref 3.4–5)
ALPHA 1 GLOBULIN: 0.3 G/DL (ref 0.2–0.6)
ALPHA 2 GLOBULIN: 0.8 G/DL (ref 0.4–1.1)
BETA GLOBULIN: 0.6 G/DL (ref 0.5–1.2)
GAMMA GLOBULIN: 0.2 G/DL (ref 0.5–1.4)
IMMUNOFIXATION COMMENT: ABNORMAL
PATH REVIEW - SERUM IMMUNOFIXATION: ABNORMAL
PATH REVIEW-SERUM PROTEIN ELECTROPHORESIS: ABNORMAL
PROTEIN ELECTROPHORESIS COMMENT: ABNORMAL

## 2025-01-20 LAB — FERRITIN SERPL-MCNC: 792 NG/ML (ref 20–300)

## 2025-02-05 ENCOUNTER — HOSPITAL ENCOUNTER (OUTPATIENT)
Dept: CARDIOLOGY | Facility: HOSPITAL | Age: 25
Discharge: HOME | End: 2025-02-05
Payer: COMMERCIAL

## 2025-02-05 DIAGNOSIS — C83.70 BURKITT LYMPHOMA, UNSPECIFIED BODY REGION (MULTI): ICD-10-CM

## 2025-02-05 DIAGNOSIS — I50.20 UNSPECIFIED SYSTOLIC (CONGESTIVE) HEART FAILURE: ICD-10-CM

## 2025-02-05 LAB
AORTIC VALVE MEAN GRADIENT: 3 MMHG
AORTIC VALVE PEAK VELOCITY: 1.06 M/S
AV PEAK GRADIENT: 4 MMHG
AVA (PEAK VEL): 4.09 CM2
AVA (VTI): 4.42 CM2
EJECTION FRACTION APICAL 4 CHAMBER: 53.5
EJECTION FRACTION: 58 %
GLOBAL LONGITUDINAL STRAIN: 19.5 %
LEFT ATRIUM VOLUME AREA LENGTH INDEX BSA: 16.4 ML/M2
LEFT VENTRICLE INTERNAL DIMENSION DIASTOLE: 4.82 CM (ref 3.5–6)
LEFT VENTRICULAR OUTFLOW TRACT DIAMETER: 2.2 CM
MITRAL VALVE E/A RATIO: 2.33
RIGHT VENTRICLE FREE WALL PEAK S': 13.9 CM/S
TRICUSPID ANNULAR PLANE SYSTOLIC EXCURSION: 3.4 CM

## 2025-02-05 PROCEDURE — 93356 MYOCRD STRAIN IMG SPCKL TRCK: CPT | Performed by: STUDENT IN AN ORGANIZED HEALTH CARE EDUCATION/TRAINING PROGRAM

## 2025-02-05 PROCEDURE — 76376 3D RENDER W/INTRP POSTPROCES: CPT

## 2025-02-05 PROCEDURE — 93306 TTE W/DOPPLER COMPLETE: CPT | Performed by: STUDENT IN AN ORGANIZED HEALTH CARE EDUCATION/TRAINING PROGRAM

## 2025-02-05 PROCEDURE — 76376 3D RENDER W/INTRP POSTPROCES: CPT | Performed by: STUDENT IN AN ORGANIZED HEALTH CARE EDUCATION/TRAINING PROGRAM

## 2025-05-13 ENCOUNTER — APPOINTMENT (OUTPATIENT)
Dept: HEMATOLOGY/ONCOLOGY | Facility: HOSPITAL | Age: 25
End: 2025-05-13
Payer: COMMERCIAL

## 2025-05-17 ENCOUNTER — HOSPITAL ENCOUNTER (OUTPATIENT)
Dept: RADIOLOGY | Facility: HOSPITAL | Age: 25
Discharge: HOME | End: 2025-05-17
Payer: COMMERCIAL

## 2025-05-17 DIAGNOSIS — J35.8 TONSILLAR MASS: ICD-10-CM

## 2025-05-17 PROCEDURE — 2550000001 HC RX 255 CONTRASTS: Performed by: INTERNAL MEDICINE

## 2025-05-17 PROCEDURE — 71260 CT THORAX DX C+: CPT

## 2025-05-17 RX ADMIN — IOHEXOL 75 ML: 350 INJECTION, SOLUTION INTRAVENOUS at 10:09

## 2025-05-20 ENCOUNTER — OFFICE VISIT (OUTPATIENT)
Dept: HEMATOLOGY/ONCOLOGY | Facility: HOSPITAL | Age: 25
End: 2025-05-20
Payer: COMMERCIAL

## 2025-05-20 ENCOUNTER — LAB (OUTPATIENT)
Dept: LAB | Facility: HOSPITAL | Age: 25
End: 2025-05-20
Payer: COMMERCIAL

## 2025-05-20 VITALS
HEART RATE: 43 BPM | TEMPERATURE: 97 F | HEIGHT: 72 IN | WEIGHT: 169.4 LBS | OXYGEN SATURATION: 100 % | DIASTOLIC BLOOD PRESSURE: 58 MMHG | SYSTOLIC BLOOD PRESSURE: 117 MMHG | RESPIRATION RATE: 16 BRPM | BODY MASS INDEX: 22.94 KG/M2

## 2025-05-20 DIAGNOSIS — C83.70 BURKITT LYMPHOMA, UNSPECIFIED BODY REGION (MULTI): ICD-10-CM

## 2025-05-20 DIAGNOSIS — C83.78 BURKITT LYMPHOMA OF LYMPH NODES OF MULTIPLE REGIONS (MULTI): ICD-10-CM

## 2025-05-20 DIAGNOSIS — C83.70 BURKITT LYMPHOMA, UNSPECIFIED BODY REGION (MULTI): Primary | ICD-10-CM

## 2025-05-20 LAB
ALBUMIN SERPL BCP-MCNC: 4.7 G/DL (ref 3.4–5)
ALP SERPL-CCNC: 71 U/L (ref 33–120)
ALT SERPL W P-5'-P-CCNC: 25 U/L (ref 10–52)
ANION GAP SERPL CALC-SCNC: 10 MMOL/L (ref 10–20)
AST SERPL W P-5'-P-CCNC: 18 U/L (ref 9–39)
BASOPHILS # BLD AUTO: 0.01 X10*3/UL (ref 0–0.1)
BASOPHILS NFR BLD AUTO: 0.2 %
BILIRUB SERPL-MCNC: 0.6 MG/DL (ref 0–1.2)
BUN SERPL-MCNC: 16 MG/DL (ref 6–23)
CALCIUM SERPL-MCNC: 9.7 MG/DL (ref 8.6–10.3)
CHLORIDE SERPL-SCNC: 104 MMOL/L (ref 98–107)
CO2 SERPL-SCNC: 33 MMOL/L (ref 21–32)
CREAT SERPL-MCNC: 1 MG/DL (ref 0.5–1.3)
EGFRCR SERPLBLD CKD-EPI 2021: >90 ML/MIN/1.73M*2
EOSINOPHIL # BLD AUTO: 0.03 X10*3/UL (ref 0–0.7)
EOSINOPHIL NFR BLD AUTO: 0.7 %
ERYTHROCYTE [DISTWIDTH] IN BLOOD BY AUTOMATED COUNT: 12.7 % (ref 11.5–14.5)
FERRITIN SERPL-MCNC: 543 NG/ML (ref 20–300)
GLUCOSE SERPL-MCNC: 93 MG/DL (ref 74–99)
HCT VFR BLD AUTO: 45 % (ref 41–52)
HGB BLD-MCNC: 14.8 G/DL (ref 13.5–17.5)
IMM GRANULOCYTES # BLD AUTO: 0.01 X10*3/UL (ref 0–0.7)
IMM GRANULOCYTES NFR BLD AUTO: 0.2 % (ref 0–0.9)
IRON SATN MFR SERPL: 21 % (ref 25–45)
IRON SERPL-MCNC: 65 UG/DL (ref 35–150)
LDH SERPL L TO P-CCNC: 134 U/L (ref 84–246)
LYMPHOCYTES # BLD AUTO: 0.62 X10*3/UL (ref 1.2–4.8)
LYMPHOCYTES NFR BLD AUTO: 15 %
MAGNESIUM SERPL-MCNC: 2.01 MG/DL (ref 1.6–2.4)
MCH RBC QN AUTO: 31 PG (ref 26–34)
MCHC RBC AUTO-ENTMCNC: 32.9 G/DL (ref 32–36)
MCV RBC AUTO: 94 FL (ref 80–100)
MONOCYTES # BLD AUTO: 0.46 X10*3/UL (ref 0.1–1)
MONOCYTES NFR BLD AUTO: 11.1 %
NEUTROPHILS # BLD AUTO: 3.01 X10*3/UL (ref 1.2–7.7)
NEUTROPHILS NFR BLD AUTO: 72.8 %
NRBC BLD-RTO: 0 /100 WBCS (ref 0–0)
PLATELET # BLD AUTO: 139 X10*3/UL (ref 150–450)
POTASSIUM SERPL-SCNC: 4.6 MMOL/L (ref 3.5–5.3)
PROT SERPL-MCNC: 6 G/DL (ref 6.4–8.2)
RBC # BLD AUTO: 4.78 X10*6/UL (ref 4.5–5.9)
SODIUM SERPL-SCNC: 142 MMOL/L (ref 136–145)
TIBC SERPL-MCNC: 312 UG/DL (ref 240–445)
UIBC SERPL-MCNC: 247 UG/DL (ref 110–370)
WBC # BLD AUTO: 4.1 X10*3/UL (ref 4.4–11.3)

## 2025-05-20 PROCEDURE — 84075 ASSAY ALKALINE PHOSPHATASE: CPT

## 2025-05-20 PROCEDURE — 83540 ASSAY OF IRON: CPT

## 2025-05-20 PROCEDURE — 83521 IG LIGHT CHAINS FREE EACH: CPT

## 2025-05-20 PROCEDURE — 3008F BODY MASS INDEX DOCD: CPT | Performed by: INTERNAL MEDICINE

## 2025-05-20 PROCEDURE — 99214 OFFICE O/P EST MOD 30 MIN: CPT | Performed by: INTERNAL MEDICINE

## 2025-05-20 PROCEDURE — 83735 ASSAY OF MAGNESIUM: CPT

## 2025-05-20 PROCEDURE — 82232 ASSAY OF BETA-2 PROTEIN: CPT

## 2025-05-20 PROCEDURE — 85025 COMPLETE CBC W/AUTO DIFF WBC: CPT

## 2025-05-20 PROCEDURE — 36415 COLL VENOUS BLD VENIPUNCTURE: CPT

## 2025-05-20 PROCEDURE — 82728 ASSAY OF FERRITIN: CPT

## 2025-05-20 PROCEDURE — 83615 LACTATE (LD) (LDH) ENZYME: CPT

## 2025-05-20 RX ORDER — DIPHENHYDRAMINE HYDROCHLORIDE 50 MG/ML
50 INJECTION, SOLUTION INTRAMUSCULAR; INTRAVENOUS AS NEEDED
OUTPATIENT
Start: 2025-11-25

## 2025-05-20 RX ORDER — ALBUTEROL SULFATE 0.83 MG/ML
3 SOLUTION RESPIRATORY (INHALATION) AS NEEDED
OUTPATIENT
Start: 2025-11-25

## 2025-05-20 RX ORDER — FAMOTIDINE 10 MG/ML
20 INJECTION, SOLUTION INTRAVENOUS ONCE AS NEEDED
OUTPATIENT
Start: 2025-11-25

## 2025-05-20 RX ORDER — EPINEPHRINE 0.3 MG/.3ML
0.3 INJECTION SUBCUTANEOUS EVERY 5 MIN PRN
OUTPATIENT
Start: 2025-11-25

## 2025-05-20 ASSESSMENT — PAIN SCALES - GENERAL: PAINLEVEL_OUTOF10: 0-NO PAIN

## 2025-05-20 NOTE — PROGRESS NOTES
Patient ID: Shilo Thakkar is a 24 y.o. male.  Oncologic History  Burkitt lymphoma, high risk, MYC positive  - 10/19/23 presented to OSH ED with dysphagia and abdominal pain  - CT A/P with IV contrast (10/19) mass in the body of the pancreas measuring approximately 2.7 x 2.9 cm. The pancreatic duct is dilated. Multiple masses in the mesentery as well as omental caking consistent with carcinomatosis. Moderate amount of free fluid throughout the abdomen and pelvis.   - CT soft tissue neck with IV contrast (10/19): homogeneous soft tissue attenuation lesion in the L oropharynx which measures approximately 3.0 x 4.0 x 5.1 cm.   - US guided mesenteric LN biopsy (10/20/23): HIGH GRADE B-CELL LYMPHOMA MORPHOLOGICALLY CONSISTENT WITH BURKITT LYMPHOMA; flow cytometry: No B cell population identified  - Bone marrow aspiration biopsy November 1, 2023 no evidence of lymphoma involvement.  CSF by flow cytometry showed no lymphoma 11/2/2023.  - Biopsy L oropharyngeal mass and L cervical LN by ENT (10/23/23) high grade B-cell burkitt lymphoma, MYC positive, Bcl-2 negative.  - MR brain with and w/o IV contrast (10/27): No evidence of intracranial metastatic disease.     - Dexa 40 mg daily ended 11/1  - PET CT ordered 10/30 showed FDG avid left oropharyngeal mass and LAD above and below diaphragm  - Started HyperCVAD 10/29 cycle 1 day 1  - Part A:   D1: CTX + mesna, dexamethasone, D2: CTX + mesna, D3: CTX + mesna, D4: Doxo, vinca, D6: Ritux, GCSF, D9: IT,   D2:  intrathecal methotrexate November 2, 2023, 12 mg, CSF shows no B cells by flow cytometry.  D6: Intrathecal cytarabine 100 mg on November 6, 2023.  D11: vinca, dexamethasone, D13: Ritux 11/10  D13: Rituximab therapy  - Daily neupogen since 11/3/23  -Discharged home November 11, 2023  -Evaluation November 15, 2023: Recovered thrombocytopenia, stop levofloxacin, continue prophylactic acyclovir and 3 times weekly Bactrim and fluconazole.  Depressed affect noted.  -Cycle 2-day 1  part to be high-dose methotrexate plus high-dose cytarabine, November 20, 2023,  with 2 intrathecal chemotherapy administered  -Neulasta on 11/27/2023.  -Evaluation 12/6/2023 asymptomatic gaining weight doing well.  To schedule twice weekly CBC BMP Mondays and Thursdays after discharge, emphasized prophylactic levofloxacin between day 5 and day 15, acyclovir, fluconazole and 3 times a week Bactrim.  -CT scan of the chest abdomen or pelvis on December 6, 2023 shows complete resolution of intra-abdominal disease, with mildly worse splenomegaly most likely unrelated to his lymphoma.  -Cycle 3 hyper-CVAD regimen December 12, 2023, including Rituxan day 1 and day 11, intrathecal methotrexate 12/14/2023, intrathecal cytarabine December 18, 2023.  -December 27, 2023: Feeling well asymptomatic tolerated cycle 3 well, plan for cycle 4 January 2, 2024 consisting of high-dose methotrexate high-dose cytarabine, and includes 2 doses of rituximab day 1 day 8, and 2 doses of intrathecal chemotherapy, intrathecal cytarabine day 1 and intrathecal methotrexate day 8  -Hospitalized  1/17-1/23/2024 with visual changes, retinoschisis, profound pancytopenia s/p left eye vitrectomy, retinal hemorrgages. Delay cycle 5. Discuss with ophth regarding etiology and plans.  -Evaluation January 31, 2024: Vision appears to be improving.  Discussed with ophthalmology.  They will be seeing February 2 and will decide whether they will clear him for cycle 5 of chemotherapy which would be tentatively planned to start February 9, 2024 as an inpatient at modified dosing.  Repeat echocardiogram, continue prophylactic antibiotics.  Will need 3 times weekly labs for close monitoring and will keep platelets above 50,000, keep hemoglobin above 8.5-9.  Will need closer supervision as he was noncompliant with twice weekly labs before.  -Evaluation 2/7/2024: Continues to improve, vision clearly better.  Appears to have been cleared by ophthalmology, to proceed  with cycle 5 starting 2/9/2024 with 80% cyclophosphamide.  Needs echocardiogram repeated to verify a normal ejection fraction.  Upon discharge he will need 3 times a week count check to assure compliance, as he had been noncompliant with twice weekly labs previously.  Need to keep platelets above 30-50,000, need to keep hemoglobin above 8.5 at est[facility]a.  Final cycle of chemotherapy will be also dose reduced with cytarabine down to 1 g/m².  Needs a chest x-ray on Friday before starting  -Evaluation 2/20/2024: Doing well.  Eyes appear to be improving tolerated cycle 5 without difficulty.  We omitted intrathecal on cycle 5.  Plan to admit on 3/5/2024 for cycle 6, planned dose reduction of high-dose cytarabine to 1.5 g/m², keeping methotrexate the same.  We will incorporate intrathecal cytarabine day 1, patient declining a second intrathecal.  We are in the process of trying to convince the patient to complete 8 cycles to adhere to the published data on hyper CVAD and Burkitt's lymphoma.  -Evaluation 3/20/2024 C6D15 recovering counts.  Keeping hemoglobin above 8 and platelets above 50 due to the retinal hemorrhages.  Overall doing well.  After discussion with patient and mom, we decided to proceed with 8 cycles, with 1 intrathecal with a each cycle.  Next cycle will be on 3/28/2024, should include rituximab day 1 and day 11, and intrathecal methotrexate 12 mg on day 2 [+/-1 day) and day 11 vincristine and dexamethasone pulse.  Cycle 8 is planned with dose reduction of cytarabine to 1 g/m² per dose and methotrexate to to 800 mg/m² total [20% as short infusion and 80% as 24-hour infusion].  -Evaluation 3/27/2024,C6D22, platelets less than 80,000, we will wait till Monday, April 1, 2024 and proceed with cycle 7, on cycle consisting of cyclophosphamide 250 mg/m² every 12 hours D1-D3, doxorubicin 50 mg/m², vincristine 2 mg flat dose D4 and day 11, dexamethasone 40 mg p.o. day 4 and day 11 through day 14, rituximab possibly  after discharge 375 mg/m², intrathecal methotrexate 12 mg to be delivered day 2 or day 3 of the cycle.  Post chemotherapy the patient will need pegfilgrastim, and will need to be set up  for count check Monday Wednesday Friday, keeping platelets above 50,000, keeping hemoglobin above 8, orders  for interim nursing assessment RN.  -Evaluation 4/17/2024: Completed 7 cycles did well since discharge without any significant toxicities except for the expected grade 3 thrombocytopenia requiring transfusion.  Had difficulty with lumbar puncture last admission.  -Evaluation 5/22/2024: Completed 8 cycles of HyperCVAD, last cycle delivered April 30, 2024.  PET scan 5/17/2024, end of therapy PET, ARNOLD, Deauville score 2.  Nonspecific metabolic activity in the right palatine tonsil and tongue felt to be physiologic.  Patient admitted to eating 2 hours before the PET scan.  Referred to ENT, plan repeat imaging in 3 months with CT neck chest abdomen pelvis.  -Evaluation 9/17/2024: Asymptomatic, CT of the head and neck on 9/9/2024 negative but CT of the chest abdomen pelvis has not been done yet.  Reordered ASAP.  Discussed referral to genetics counseling [was referred back in May but he did not show up for the appointment].  Father and grandfather had pancreatic cancer.  -Evaluation 1/14/2025 pt is 1 year 3 mo out from diagnosis, remains without evidence of disease.  CT scan 1/2/2025 show stable mild splenomegaly, borderline liver with mild periportal edema and intrahepatic ductal dilatation concern for congestion related to possible right-sided heart failure, no evidence of disease.  -Echocardiogram scheduled for 2/5/2025. Normal  -Eval 5/20/25: 1 year out from EOT, ARN[facility] CT negative, periportal edema mild, stable. Stable splenomegaly  Therapy:  Hyper-CVAD C1D1 10/29/2023  Hyper-CVAD C2D1 11/20/2023  Hyper-CVAD C3D1 12/12/2023  Hyper-CVAD C4D1 01/02/2024  Hyper-CVAD C5D1 02/09/2024  Hyper-CVAD C6D1 03/06/2024  Hyper-CVAD C7D1  "04/01/2024   Hyper-CVAD C8D1 04/30/2024      Subjective    HPI  Feels ok. No new concerns. No LN. No SOB. Eats ok.Weight is stable. No nlimitations in activity. Abdomen normnal. No change in BM.  No recurrent infection.    ROS  Head and neck: Other than HPI negative  CNS: Other than HPI negative  Cardiovascular: Other than HPI negative  Pulmonary: Other than HPI negative  GI: Other than HPI negative  : Other than HPI negative  Bleeding: Other than HPI negative  Constitutional: Other than HPI negative   Objective    BSA: 1.97 meters squared  /58 (BP Location: Right arm, Patient Position: Sitting, BP Cuff Size: Adult)   Pulse (!) 43 Comment: Dr. Klein made aware of low HR. No orders recieved at this time.  Temp 36.1 °C (97 °F) (Skin)   Resp 16   Ht (S) 1.828 m (5' 11.97\")   Wt 76.8 kg (169 lb 6.4 oz)   SpO2 100%   BMI 22.99 kg/m²      Physical Exam  Alert oriented not in distress not pale or jaundiced  Lymph nodes: No adenopathy  Oral mucosa negative, no mucositis  Head is normocephalic atraumatic, pupils equal reactive  Neck is supple no adenopathy, no thyromegaly  Lungs show equal air entry, no crackles or wheezing, equal percussion  Heart shows regular rate and rhythm normal S1-S2 no murmurs or gallop rhythm  Abdomen is soft nontender, moves with respiration, no hepatosplenomegaly or masses, positive bowel sounds, not distended.  Lower extremities show no edema  Neurologically grossly nonfocal  Psych: Normal affect    Performance Status:  Symptomatic; fully ambulatory   Latest Reference Range & Units 05/20/25 08:30   GLUCOSE 74 - 99 mg/dL 93   SODIUM 136 - 145 mmol/L 142   POTASSIUM 3.5 - 5.3 mmol/L 4.6   CHLORIDE 98 - 107 mmol/L 104   Bicarbonate 21 - 32 mmol/L 33 (H)   Anion Gap 10 - 20 mmol/L 10   Blood Urea Nitrogen 6 - 23 mg/dL 16   Creatinine 0.50 - 1.30 mg/dL 1.00   EGFR >60 mL/min/1.73m*2 >90   Calcium 8.6 - 10.3 mg/dL 9.7   Albumin 3.4 - 5.0 g/dL 4.7   Alkaline Phosphatase 33 - 120 U/L 71 "   ALT 10 - 52 U/L 25   AST 9 - 39 U/L 18   Bilirubin Total 0.0 - 1.2 mg/dL 0.6   Total Protein 6.4 - 8.2 g/dL 6.0 (L)   MAGNESIUM 1.60 - 2.40 mg/dL 2.01   LDH 84 - 246 U/L 134   WBC 4.4 - 11.3 x10*3/uL 4.1 (L)   nRBC 0.0 - 0.0 /100 WBCs 0.0   RBC 4.50 - 5.90 x10*6/uL 4.78   HEMOGLOBIN 13.5 - 17.5 g/dL 14.8   HEMATOCRIT 41.0 - 52.0 % 45.0   MCV 80 - 100 fL 94   MCH 26.0 - 34.0 pg 31.0   MCHC 32.0 - 36.0 g/dL 32.9   RED CELL DISTRIBUTION WIDTH 11.5 - 14.5 % 12.7   Platelets 150 - 450 x10*3/uL 139 (L)   Neutrophils % 40.0 - 80.0 % 72.8   Immature Granulocytes %, Automated 0.0 - 0.9 % 0.2   Lymphocytes % 13.0 - 44.0 % 15.0   Monocytes % 2.0 - 10.0 % 11.1   Eosinophils % 0.0 - 6.0 % 0.7   Basophils % 0.0 - 2.0 % 0.2   Neutrophils Absolute 1.20 - 7.70 x10*3/uL 3.01   Immature Granulocytes Absolute, Automated 0.00 - 0.70 x10*3/uL 0.01   Lymphocytes Absolute 1.20 - 4.80 x10*3/uL 0.62 (L)   Monocytes Absolute 0.10 - 1.00 x10*3/uL 0.46   Eosinophils Absolute 0.00 - 0.70 x10*3/uL 0.03   Basophils Absolute 0.00 - 0.10 x10*3/uL 0.01   (H): Data is abnormally high  (L): Data is abnormally low    Assessment/Plan   Burkitt's lymphoma, high risk, diagnosed October 2023,   1 year out from EOT [facility]  6 mo FUV with CT C/A/P  Vaccines next OV Pneumonia, Neisseria Haemophilus    Cancer Staging   No matching staging information was found for the patient.      Oncology History   Burkitt lymphoma   10/27/2023 Initial Diagnosis    Burkitt lymphoma, high risk, MYC positive  - 10/19/23 presented to OSH ED with dysphagia and abdominal pain  - CT A/P with IV contrast (10/19) mass in the body of the pancreas measuring approximately 2.7 x 2.9 cm. The pancreatic duct is dilated. Multiple masses in the mesentery as well as omental caking consistent with carcinomatosis. Moderate amount of free fluid throughout the abdomen and pelvis.   - CT soft tissue neck with IV contrast (10/19): homogeneous soft tissue attenuation lesion in the L oropharynx which  measures approximately 3.0 x 4.0 x 5.1 cm.   - US guided mesenteric LN biopsy (10/20/23): HIGH GRADE B-CELL LYMPHOMA MORPHOLOGICALLY CONSISTENT WITH BURKITT LYMPHOMA; flow cytometry: No B cell population identified  - Bone marrow aspiration biopsy November 1, 2023 no evidence of lymphoma involvement.  CSF by flow cytometry showed no lymphoma 11/2/2023.  - Biopsy L oropharyngeal mass and L cervical LN by ENT (10/23/23) high grade B-cell burkitt lymphoma, MYC positive, Bcl-2 negative.  - MR brain with and w/o IV contrast (10/27): No evidence of intracranial metastatic disease.     - Dexa 40 mg daily ended 11/1  - PET CT ordered 10/30 showed FDG avid left oropharyngeal mass and LAD above and below diaphragm     10/29/2023 -  Chemotherapy    Hyper-CVAD C1D1 10/29/2023  Hyper-CVAD C2D1 11/20/2023  Hyper-CVAD C3D1 12/12/2023  Hyper-CVAD C4D1 01/02/2024  Hyper-CVAD C5D1 02/09/2024  Hyper-CVAD C6D1 03/06/2024 (Dose reduce Cytarabine at 1.5 G/m2 per dose with Methotrexate 1000 mg/m2 total; LP with intrathecal cytarabine during C6 on 3/7; CSF flow cytometry negative for lymphoma)  Hyper-CVAD C7D1 04/01/2024 (LP with intrathecal methotrexate 12 mg on 4/3; CSF flow cytometry negative for lymphoma)   HyperCVAD C8D1 4/29/24 High-dose methotrexate 1 g/m² total dose over 24 hours as before, with 2 total doses of Cytarabine at 1 g/m² per dose given the profound thrombocytopenia that he developed with the last cycle in an attempt to mitigate risk due to his intraocular hemorrhage history. LP with Intrathecal Cytarabine 4/30; CSF flow cytometry negative for lymphoma.   HyperCVAD + RiTUXimab, 21 Day Cycles     12/6/2023 Imaging    -CT scan of the chest abdomen or pelvis on December 6, 2023 shows complete resolution of intra-abdominal disease, with mildly worse splenomegaly most likely unrelated to his lymphoma.      1/15/2024 Imaging    -PET scan 1/15/24 after C4:   Interval near complete/complete resolution of previous metabolic  activity. Rudolph Score 2.                   Olegario Klein MD

## 2025-05-21 LAB
B2 MICROGLOB SERPL-MCNC: 2.6 MG/L (ref 0.7–2.2)
KAPPA LC SERPL-MCNC: 0.83 MG/DL (ref 0.33–1.94)
KAPPA LC/LAMBDA SER: 3.77 {RATIO} (ref 0.26–1.65)
LAMBDA LC SERPL-MCNC: 0.22 MG/DL (ref 0.57–2.63)

## 2025-08-08 NOTE — ASSESSMENT & PLAN NOTE
- Monitored blood pressure, noting improved overall numbers but slightly elevated diastolic reading.  - Prescribed lisinopril with refill provided.  - Scheduled follow-up nursing visit to recheck blood pressure if elevation persists.   :: Due to disease and chemotherapy   - Ideal platelet transfusion threshold 50,000-70,000 per Optho recommendations; however, definitely cannot allow to drop below 30,000/uL.  - Updated Treatment Plan 2/16

## (undated) DEVICE — MANIFOLD, 4 PORT NEPTUNE STANDARD

## (undated) DEVICE — SYRINGE, MONOJECT, LUER LOCK, 3 CC, LF

## (undated) DEVICE — DRESSING, TRANSPARENT, TEGADERM, 4 X 4-3/4 IN

## (undated) DEVICE — SHIELD, EYE, FOX, CONVEX, ALUMINUM, NS

## (undated) DEVICE — NEEDLE, HYPODERMIC, REGULAR WALL, REGULAR BEVEL, 20 G X 1 IN

## (undated) DEVICE — CANNULA, 27G X 22MM, ANTERIOR, CHAMBER, RYCROFT

## (undated) DEVICE — Device

## (undated) DEVICE — SUTURE, VICRYL, 7-0, 18 IN, TG1608, DA, VIOLET

## (undated) DEVICE — REST, HEAD, BAGEL, 9 IN

## (undated) DEVICE — BIOM, OPTIC, HD PROFESSIONAL F/F=200MM

## (undated) DEVICE — COVER, CART, 45 X 27 X 48 IN, CLEAR

## (undated) DEVICE — FORCEPS, 25G PLUS, MAXGRIP REVOLUTION

## (undated) DEVICE — SYRINGE, HYPODERMIC, LUER LOCK, 6 CC

## (undated) DEVICE — MARKER, SKIN, RULER AND LABEL PACK, CUSTOM

## (undated) DEVICE — NEEDLE, HYPODERMIC, REGULAR WALL, REGULAR BEVEL, 27 G X 0.5 IN

## (undated) DEVICE — GOWN, ASTOUND, L

## (undated) DEVICE — COVER, MAYO STAND, W/PAD, 23 IN, DISPOSABLE, PLASTIC, LF, STERILE

## (undated) DEVICE — INSTRUMENT, GRIESHABER REVOLUTION, 25 GA, ILM FORCEP

## (undated) DEVICE — NEEDLE, HYPODERMIC, REGULAR WALL, REGULAR BEVEL, 30 G X 0.5 IN

## (undated) DEVICE — DRESSING, GAUZE, 16 PLY, 4 X 4 IN, STERILE

## (undated) DEVICE — CHANDELIER, 25G

## (undated) DEVICE — KIT, CONSTELLATION, 25G VITRECTOMY

## (undated) DEVICE — PAD, EYE, OVAL, 1 5/8 X 2 5/8 IN, STERILE

## (undated) DEVICE — SCISSORS, 25G, HORIZONTAL, CURVED

## (undated) DEVICE — CUP, SOLUTION

## (undated) DEVICE — TRAY, MINOR, SINGLE BASIN, STERILE

## (undated) DEVICE — AUTO GAS FULL, CONSTELLATION

## (undated) DEVICE — SEALANT, HEMOSTATIC, FLOSEAL, 10 ML

## (undated) DEVICE — PROBE, 25G ILLUMINATED FLEX CURVED LASER 2X W/RFID

## (undated) DEVICE — MAYO TRAY, SMALL

## (undated) DEVICE — SYRINGE, 60 CC, IRRIGATION, BULB, CONTRO-BULB, PAPER POUCH

## (undated) DEVICE — KNIFE, OPHTHALMIC, FULL HANDLE, 15 DEG

## (undated) DEVICE — TIP, SUCTION, YANKAUER, FLEXIBLE

## (undated) DEVICE — ELECTRODE, ELECTROSURGICAL, BLADE, INSULATED, ENT/IMA, STERILE